# Patient Record
Sex: FEMALE | Race: WHITE | NOT HISPANIC OR LATINO | Employment: OTHER | URBAN - METROPOLITAN AREA
[De-identification: names, ages, dates, MRNs, and addresses within clinical notes are randomized per-mention and may not be internally consistent; named-entity substitution may affect disease eponyms.]

---

## 2017-01-05 ENCOUNTER — HOSPITAL ENCOUNTER (OUTPATIENT)
Dept: CT IMAGING | Facility: HOSPITAL | Age: 72
Discharge: HOME | End: 2017-01-05
Admitting: FAMILY MEDICINE

## 2017-01-05 DIAGNOSIS — J45.50 ASTHMATIC BRONCHITIS, SEVERE PERSISTENT, UNCOMPLICATED: ICD-10-CM

## 2017-01-05 PROCEDURE — 71250 CT THORAX DX C-: CPT

## 2017-01-18 DIAGNOSIS — K21.9 GASTROESOPHAGEAL REFLUX DISEASE, ESOPHAGITIS PRESENCE NOT SPECIFIED: ICD-10-CM

## 2017-01-18 RX ORDER — OMEPRAZOLE 40 MG/1
40 CAPSULE, DELAYED RELEASE ORAL DAILY
Qty: 90 CAPSULE | Refills: 3 | Status: SHIPPED | OUTPATIENT
Start: 2017-01-18 | End: 2018-01-14 | Stop reason: SDUPTHER

## 2017-01-27 ENCOUNTER — OFFICE VISIT (OUTPATIENT)
Dept: INTERNAL MEDICINE | Facility: CLINIC | Age: 72
End: 2017-01-27

## 2017-01-27 VITALS
HEART RATE: 72 BPM | RESPIRATION RATE: 16 BRPM | WEIGHT: 192 LBS | DIASTOLIC BLOOD PRESSURE: 74 MMHG | TEMPERATURE: 96.1 F | SYSTOLIC BLOOD PRESSURE: 156 MMHG | BODY MASS INDEX: 32.96 KG/M2

## 2017-01-27 DIAGNOSIS — E03.8 OTHER SPECIFIED HYPOTHYROIDISM: ICD-10-CM

## 2017-01-27 DIAGNOSIS — D50.0 IRON DEFICIENCY ANEMIA DUE TO CHRONIC BLOOD LOSS: Primary | ICD-10-CM

## 2017-01-27 DIAGNOSIS — E11.9 TYPE 2 DIABETES MELLITUS WITHOUT COMPLICATION, WITHOUT LONG-TERM CURRENT USE OF INSULIN (HCC): ICD-10-CM

## 2017-01-27 DIAGNOSIS — E78.5 HYPERLIPIDEMIA, UNSPECIFIED HYPERLIPIDEMIA TYPE: ICD-10-CM

## 2017-01-27 DIAGNOSIS — I10 BENIGN ESSENTIAL HYPERTENSION: ICD-10-CM

## 2017-01-27 PROCEDURE — 99214 OFFICE O/P EST MOD 30 MIN: CPT | Performed by: FAMILY MEDICINE

## 2017-01-27 RX ORDER — METOPROLOL SUCCINATE 50 MG/1
50 TABLET, EXTENDED RELEASE ORAL DAILY
Qty: 90 TABLET | Refills: 3 | Status: SHIPPED | OUTPATIENT
Start: 2017-01-27 | End: 2017-07-28 | Stop reason: DRUGHIGH

## 2017-01-27 RX ORDER — GLIPIZIDE 5 MG/1
5 TABLET, FILM COATED, EXTENDED RELEASE ORAL DAILY
Qty: 90 TABLET | Refills: 3 | Status: SHIPPED | OUTPATIENT
Start: 2017-01-27 | End: 2017-10-27

## 2017-01-27 NOTE — MR AVS SNAPSHOT
Yi Lee   1/27/2017 8:45 AM   Office Visit    Dept Phone:  [phone]   Encounter #:  [id number]    Provider:  Ankit Albert Jr., MD   Department:  [facility] INTERNAL MEDICINE                Your Full Care Plan              Today's Medication Changes          These changes are accurate as of: 1/27/17  9:32 AM.  If you have any questions, ask your nurse or doctor.               Medication(s)that have changed:     glipiZIDE 5 MG ER tablet   Commonly known as:  GLUCOTROL   Take 1 tablet by mouth Daily.   What changed:  See the new instructions.         Stop taking medication(s)listed here:     clarithromycin 500 MG tablet   Commonly known as:  BIAXIN           TESSALON PERLES 100 MG capsule   Generic drug:  benzonatate                Where to Get Your Medications      These medications were sent to [facility] [address] [place] [address] MAGGI JULIEN [place] [facility] [place] [phone] [facility] [phone]   [address] [place] [id number]     Phone:  [phone]     glipiZIDE 5 MG ER tablet    metFORMIN 500 MG tablet    metoprolol succinate XL 50 MG 24 hr tablet                  Your Updated Medication List          This list is accurate as of: 1/27/17  9:32 AM.  Always use your most recent med list.                aspirin 81 MG chewable tablet       donepezil 5 MG tablet   Commonly known as:  ARICEPT       FLUoxetine 20 MG capsule   Commonly known as:  PROzac       FREESTYLE LITE TEST VI       glipiZIDE 5 MG ER tablet   Commonly known as:  GLUCOTROL   Take 1 tablet by mouth Daily.       lamoTRIgine 200 MG tablet   Commonly known as:  LaMICtal       levothyroxine 100 MCG tablet   Commonly known as:  SYNTHROID, LEVOTHROID   Take 1 tablet by mouth Daily.       metFORMIN 500 MG tablet   Commonly known as:  GLUCOPHAGE   Take 2 tablets by mouth Daily With Breakfast.       metoprolol succinate XL 50 MG 24 hr tablet   Commonly known as:   TOPROL-XL   Take 1 tablet by mouth Daily.       omeprazole 40 MG capsule   Commonly known as:  priLOSEC   Take 1 capsule by mouth Daily.       polyethylene glycol packet   Commonly known as:  MIRALAX       pravastatin 40 MG tablet   Commonly known as:  PRAVACHOL   Take 1 tablet by mouth Daily.       PROVENTIL  (90 BASE) MCG/ACT inhaler   Generic drug:  albuterol       QUEtiapine  MG 24 hr tablet   Commonly known as:  SEROquel XR       Vitamin D3 2000 UNITS tablet               You Were Diagnosed With        Codes Comments    Other specified hypothyroidism     ICD-10-CM: E03.8  ICD-9-CM: 244.8     Hyperlipidemia, unspecified hyperlipidemia type     ICD-10-CM: E78.5  ICD-9-CM: 272.4     Benign essential hypertension     ICD-10-CM: I10  ICD-9-CM: 401.1     Type 2 diabetes mellitus without complication, without long-term current use of insulin     ICD-10-CM: E11.9  ICD-9-CM: 250.00       Instructions     None    Patient Instructions History      Upcoming Appointments     Visit Type Date Time Department    OFFICE VISIT 1/27/2017  8:45 AM [facility] 1 [id number]    FOLLOW UP 2/1/2017  9:45 AM [facility]      MyChart Signup     Our records indicate that you have declined [employer] signup. If you would like to sign up for [employer], please email [email or web address] or call [phone] to obtain an activation code.             Other Info from Your Visit           Your Appointments     Feb 01, 2017  9:45 AM EST   Follow Up with Eddie Grigsby MD   [facility] (--)    [address] [place] [id number] [phone]           Arrive 15 minutes prior to appointment.              Allergies     Morphine  Shortness Of Breath    Penicillins  Hives, Swelling    Ace Inhibitors  Cough    Telmisartan  Cough      Reason for Visit     Anemia     Pneumonia           Vital Signs     Blood Pressure Pulse Temperature Respirations Weight Body Mass Index     156/74 (BP Location: Left arm, Patient Position: Sitting, Cuff Size: Large Adult) 72 96.1 °F (35.6 °C) (Tympanic) 16 192 lb (87.1 kg) 32.96 kg/m2    Smoking Status                   Former Smoker           Problems and Diagnoses Noted     Benign essential hypertension    High cholesterol or triglycerides    Underactive thyroid    Type 2 diabetes mellitus without complication, without long-term current use of insulin

## 2017-02-01 ENCOUNTER — OFFICE VISIT (OUTPATIENT)
Dept: GASTROENTEROLOGY | Facility: CLINIC | Age: 72
End: 2017-02-01

## 2017-02-01 VITALS
SYSTOLIC BLOOD PRESSURE: 138 MMHG | WEIGHT: 191.4 LBS | HEIGHT: 65 IN | DIASTOLIC BLOOD PRESSURE: 80 MMHG | BODY MASS INDEX: 31.89 KG/M2

## 2017-02-01 DIAGNOSIS — K59.09 OTHER CONSTIPATION: ICD-10-CM

## 2017-02-01 DIAGNOSIS — R10.11 RIGHT UPPER QUADRANT ABDOMINAL PAIN: Primary | ICD-10-CM

## 2017-02-01 DIAGNOSIS — R10.31 RIGHT LOWER QUADRANT ABDOMINAL PAIN: ICD-10-CM

## 2017-02-01 DIAGNOSIS — D64.9 ANEMIA, UNSPECIFIED TYPE: ICD-10-CM

## 2017-02-01 PROCEDURE — 99214 OFFICE O/P EST MOD 30 MIN: CPT | Performed by: INTERNAL MEDICINE

## 2017-02-01 NOTE — PROGRESS NOTES
Chief Complaint   Patient presents with   • Abdominal Pain       History of Present Illness: We reviewed the results of the 11/16 colonoscopy. She takes the lactulose once daily and it works to help the constipation. No rectal bleeding or melena.  She has right sided abdominal discomfort x 1.5 yrs. She isn't sure what makes it worse and nothing makes it better. NO nausea or vomting. NO fevers, chills. No diarrhea. We reviewed the 6/16 CT abd/pelvis and the 10/16 labs.     Past Medical History   Diagnosis Date   • Abdominal pain, LLQ    • Arthralgia of hip 11/22/2015   • Asthma    • Ataxic gait 11/22/2015     Description: Eval Dr Lillian Mederos, Neuro.  Some vestibular dysfunction present 2013/2014   • Back pain 11/22/2015   • Bipolar I disorder, single manic episode    • Cardiac murmur    • Coronary artery disease    • Cystic kidney disease      right   • Degeneration, intervertebral disc, thoracolumbar    • Diverticulosis    • Esophageal spasm    • Fatigue    • H/O bone density study 10/17/2007     Dr. Giles   • Hemorrhoids    • History of mammogram      02/2012, per GYN Reshma Aranda   • History of Papanicolaou smear of cervix      DR. GILES GYN   • Impaired cognition 11/22/2015     Description: Testing done 05/14   • Memory loss 11/22/2015   • Optic nerve contusion    • Pain of lower extremity 11/22/2015   • Palpitations    • Vitamin B12 deficiency    • Vitamin D deficiency        Past Surgical History   Procedure Laterality Date   • Breast lumpectomy Left      benign   • Cholecystectomy     • Dilatation and curettage     • Esophagogastric fundoplasty     • Rotator cuff repair Right 2009   • Colonoscopy  2007     done 02/12, repeat 5 yrs, Dr Grigsby; tics in sigmoid colon, IH   • Colonoscopy N/A 11/3/2016     polyp, IH         Current Outpatient Prescriptions:   •  albuterol (PROVENTIL HFA) 108 (90 BASE) MCG/ACT inhaler, Inhale 2 puffs., Disp: , Rfl:   •  aspirin 81 MG chewable tablet, Chew 81 mg daily., Disp: , Rfl:    •  Cholecalciferol (VITAMIN D3) 2000 UNITS tablet, Take  by mouth., Disp: , Rfl:   •  donepezil (ARICEPT) 5 MG tablet, TK 1 T PO Q NIGHT, Disp: , Rfl: 1  •  FLUoxetine (PROzac) 20 MG capsule, TK ONE C PO QD, Disp: , Rfl: 1  •  glipiZIDE (GLUCOTROL) 5 MG ER tablet, Take 1 tablet by mouth Daily., Disp: 90 tablet, Rfl: 3  •  Glucose Blood (FREESTYLE LITE TEST VI), by In Vitro route. Use to test everyday as directed, Disp: , Rfl:   •  lamoTRIgine (LaMICtal) 200 MG tablet, TK 3 TS PO ONCE DAILY, Disp: , Rfl: 1  •  levothyroxine (SYNTHROID, LEVOTHROID) 100 MCG tablet, Take 1 tablet by mouth Daily., Disp: 90 tablet, Rfl: 3  •  metFORMIN (GLUCOPHAGE) 500 MG tablet, Take 2 tablets by mouth Daily With Breakfast., Disp: 180 tablet, Rfl: 3  •  metoprolol succinate XL (TOPROL-XL) 50 MG 24 hr tablet, Take 1 tablet by mouth Daily., Disp: 90 tablet, Rfl: 3  •  omeprazole (priLOSEC) 40 MG capsule, Take 1 capsule by mouth Daily., Disp: 90 capsule, Rfl: 3  •  polyethylene glycol (GLYCOLAX) packet, Take 17 g by mouth daily. Use as directed, Disp: , Rfl:   •  pravastatin (PRAVACHOL) 40 MG tablet, Take 1 tablet by mouth Daily., Disp: 90 tablet, Rfl: 3  •  QUEtiapine XR (SEROquel XR) 150 MG 24 hr tablet, 150 mg every night., Disp: , Rfl:     Allergies   Allergen Reactions   • Morphine Shortness Of Breath   • Penicillins Hives and Swelling   • Ace Inhibitors Cough   • Telmisartan Cough       Family History   Problem Relation Age of Onset   • Colon polyps Father    • Hepatitis Other    • Coronary artery disease Other        Social History     Social History   • Marital status:      Spouse name: N/A   • Number of children: N/A   • Years of education: N/A     Occupational History   • Not on file.     Social History Main Topics   • Smoking status: Former Smoker     Packs/day: 2.00     Types: Cigarettes     Quit date: 2012   • Smokeless tobacco: Never Used   • Alcohol use No   • Drug use: No   • Sexual activity: No     Other Topics  "Concern   • Not on file     Social History Narrative       Review of Systems   Gastrointestinal: Positive for abdominal pain.   All other systems reviewed and are negative.      Vitals:    02/01/17 0951   BP: 138/80       Physical Exam   Constitutional: She is oriented to person, place, and time. She appears well-developed and well-nourished. No distress.   HENT:   Head: Normocephalic and atraumatic. Hair is normal.   Right Ear: Hearing, tympanic membrane, external ear and ear canal normal. No drainage. No decreased hearing is noted.   Left Ear: Hearing, tympanic membrane, external ear and ear canal normal. No decreased hearing is noted.   Nose: No nasal deformity.   Mouth/Throat: Oropharynx is clear and moist.   Eyes: Conjunctivae, EOM and lids are normal. Pupils are equal, round, and reactive to light. Right eye exhibits no discharge. Left eye exhibits no discharge.   Neck: Normal range of motion. Neck supple. No JVD present. No tracheal deviation present. No thyromegaly present.   Cardiovascular: Normal rate, regular rhythm, normal heart sounds, intact distal pulses and normal pulses.  Exam reveals no gallop and no friction rub.    No murmur heard.  Pulmonary/Chest: Effort normal and breath sounds normal. No respiratory distress. She has no wheezes. She has no rales. She exhibits no tenderness.   Abdominal: Soft. Bowel sounds are normal. She exhibits no distension and no mass. There is no tenderness. There is no rebound and no guarding. No hernia.   Genitourinary:   Genitourinary Comments: She didn't want another rectal exam because \"you did one the last time\".   Musculoskeletal: Normal range of motion. She exhibits no edema, tenderness or deformity.   Lymphadenopathy:     She has no cervical adenopathy.   Neurological: She is alert and oriented to person, place, and time. She has normal reflexes. She displays normal reflexes. No cranial nerve deficit. She exhibits normal muscle tone. Coordination normal.   Skin: " Skin is warm and dry. No rash noted. She is not diaphoretic. No erythema.   Psychiatric: She has a normal mood and affect. Her behavior is normal. Judgment and thought content normal.   Vitals reviewed.      Yi was seen today for abdominal pain.    Diagnoses and all orders for this visit:    Right upper quadrant abdominal pain  -     FL Small Bowel Follow Through; Future  -     Urinalysis With / Culture If Indicated  -     TSH  -     CBC & Differential  -     Comprehensive Metabolic Panel  -     Reticulocytes  -     Iron and TIBC  -     Ferritin  -     Vitamin B12  -     Folate RBC    Right lower quadrant abdominal pain  -     FL Small Bowel Follow Through; Future  -     Urinalysis With / Culture If Indicated  -     TSH  -     CBC & Differential  -     Comprehensive Metabolic Panel  -     Reticulocytes  -     Iron and TIBC  -     Ferritin  -     Vitamin B12  -     Folate RBC    Other constipation  -     FL Small Bowel Follow Through; Future  -     Urinalysis With / Culture If Indicated  -     TSH  -     CBC & Differential  -     Comprehensive Metabolic Panel  -     Reticulocytes  -     Iron and TIBC  -     Ferritin  -     Vitamin B12  -     Folate RBC    Anemia, unspecified type  -     Urinalysis With / Culture If Indicated  -     TSH  -     CBC & Differential  -     Comprehensive Metabolic Panel  -     Reticulocytes  -     Iron and TIBC  -     Ferritin  -     Vitamin B12  -     Folate RBC     Assessment:  1) h/o colonic adenomas.  2) Right sided abdominal pain  3) Anemia    Recommendations:  1) Repeat colonscopy in 5 yrs.   2) SBFT  3) Labs: CBC, anemia labs, UA, CMP  4) f/u 6 weeks.      No Follow-up on file.

## 2017-02-04 LAB
ALBUMIN SERPL-MCNC: 4.3 G/DL (ref 3.5–5.2)
ALBUMIN/GLOB SERPL: 1.6 G/DL
ALP SERPL-CCNC: 82 U/L (ref 39–117)
ALT SERPL-CCNC: 18 U/L (ref 1–33)
APPEARANCE UR: CLEAR
AST SERPL-CCNC: 19 U/L (ref 1–32)
BACTERIA #/AREA URNS HPF: ABNORMAL /HPF
BACTERIA UR CULT: ABNORMAL
BASOPHILS # BLD AUTO: 0.03 10*3/MM3 (ref 0–0.2)
BASOPHILS NFR BLD AUTO: 0.5 % (ref 0–1.5)
BILIRUB SERPL-MCNC: <0.2 MG/DL (ref 0.1–1.2)
BILIRUB UR QL STRIP: NEGATIVE
BUN SERPL-MCNC: 15 MG/DL (ref 8–23)
BUN/CREAT SERPL: 18.1 (ref 7–25)
CALCIUM SERPL-MCNC: 9.8 MG/DL (ref 8.6–10.5)
CHLORIDE SERPL-SCNC: 104 MMOL/L (ref 98–107)
CO2 SERPL-SCNC: 25.4 MMOL/L (ref 22–29)
COLOR UR: YELLOW
CREAT SERPL-MCNC: 0.83 MG/DL (ref 0.57–1)
EOSINOPHIL # BLD AUTO: 0.38 10*3/MM3 (ref 0–0.7)
EOSINOPHIL NFR BLD AUTO: 5.9 % (ref 0.3–6.2)
EPI CELLS #/AREA URNS HPF: ABNORMAL /HPF
ERYTHROCYTE [DISTWIDTH] IN BLOOD BY AUTOMATED COUNT: 15 % (ref 11.7–13)
FERRITIN SERPL-MCNC: 14.94 NG/ML (ref 13–150)
FOLATE BLD-MCNC: 538.1 NG/ML
FOLATE RBC-MCNC: 1520 NG/ML
GLOBULIN SER CALC-MCNC: 2.7 GM/DL
GLUCOSE SERPL-MCNC: 139 MG/DL (ref 65–99)
GLUCOSE UR QL: NEGATIVE
HCT VFR BLD AUTO: 35.4 % (ref 35.6–45.5)
HGB BLD-MCNC: 10.7 G/DL (ref 11.9–15.5)
HGB UR QL STRIP: NEGATIVE
IMM GRANULOCYTES # BLD: 0.02 10*3/MM3 (ref 0–0.03)
IMM GRANULOCYTES NFR BLD: 0.3 % (ref 0–0.5)
IRON SATN MFR SERPL: 5 % (ref 20–50)
IRON SERPL-MCNC: 25 MCG/DL (ref 37–145)
KETONES UR QL STRIP: NEGATIVE
LEUKOCYTE ESTERASE UR QL STRIP: NEGATIVE
LYMPHOCYTES # BLD AUTO: 1.44 10*3/MM3 (ref 0.9–4.8)
LYMPHOCYTES NFR BLD AUTO: 22.5 % (ref 19.6–45.3)
MCH RBC QN AUTO: 26.2 PG (ref 26.9–32)
MCHC RBC AUTO-ENTMCNC: 30.2 G/DL (ref 32.4–36.3)
MCV RBC AUTO: 86.8 FL (ref 80.5–98.2)
MICRO URNS: ABNORMAL
MICRO URNS: ABNORMAL
MONOCYTES # BLD AUTO: 0.67 10*3/MM3 (ref 0.2–1.2)
MONOCYTES NFR BLD AUTO: 10.5 % (ref 5–12)
MUCOUS THREADS URNS QL MICRO: PRESENT /HPF
NEUTROPHILS # BLD AUTO: 3.87 10*3/MM3 (ref 1.9–8.1)
NEUTROPHILS NFR BLD AUTO: 60.3 % (ref 42.7–76)
NITRITE UR QL STRIP: NEGATIVE
OTHER ANTIBIOTIC SUSC ISLT: ABNORMAL
PH UR STRIP: 5.5 [PH] (ref 5–7.5)
PLATELET # BLD AUTO: 382 10*3/MM3 (ref 140–500)
POTASSIUM SERPL-SCNC: 5 MMOL/L (ref 3.5–5.2)
PROT SERPL-MCNC: 7 G/DL (ref 6–8.5)
PROT UR QL STRIP: ABNORMAL
RBC # BLD AUTO: 4.08 10*6/MM3 (ref 3.9–5.2)
RBC #/AREA URNS HPF: ABNORMAL /HPF
RETICS/RBC NFR AUTO: 1.21 % (ref 0.5–1.5)
SODIUM SERPL-SCNC: 141 MMOL/L (ref 136–145)
SP GR UR: >=1.03 (ref 1–1.03)
TIBC SERPL-MCNC: 484 MCG/DL
TSH SERPL DL<=0.005 MIU/L-ACNC: 1.97 MIU/ML (ref 0.27–4.2)
UIBC SERPL-MCNC: 459 MCG/DL
URINALYSIS REFLEX: ABNORMAL
UROBILINOGEN UR STRIP-MCNC: 0.2 MG/DL (ref 0.2–1)
VIT B12 SERPL-MCNC: 405 PG/ML (ref 211–946)
WBC # BLD AUTO: 6.41 10*3/MM3 (ref 4.5–10.7)
WBC #/AREA URNS HPF: ABNORMAL /HPF

## 2017-02-05 NOTE — PROGRESS NOTES
Tell her that she has iron deficiency anemia. I recommend that she take OTC iron sulfate pills 325 mg, one pill BID x a few months to build up her blood count.  Also her UA shows some WBC's and some RBC's but the urine culture grew very little. Have her go by her PCP's office to have a urinalysis rechecked. cheryl

## 2017-02-06 ENCOUNTER — TELEPHONE (OUTPATIENT)
Dept: GASTROENTEROLOGY | Facility: CLINIC | Age: 72
End: 2017-02-06

## 2017-02-06 NOTE — TELEPHONE ENCOUNTER
----- Message from Eddie Grigsby MD sent at 2/5/2017  4:15 PM EST -----  Tell her that she has iron deficiency anemia. I recommend that she take OTC iron sulfate pills 325 mg, one pill BID x a few months to build up her blood count.  Also her UA shows some WBC's and some RBC's but the urine culture grew very little. Have her go by her PCP's office to have a urinalysis rechecked. [email or web address]

## 2017-02-14 ENCOUNTER — HOSPITAL ENCOUNTER (OUTPATIENT)
Dept: GENERAL RADIOLOGY | Facility: HOSPITAL | Age: 72
Discharge: HOME | End: 2017-02-14
Attending: INTERNAL MEDICINE | Admitting: INTERNAL MEDICINE

## 2017-02-14 DIAGNOSIS — R10.11 RIGHT UPPER QUADRANT ABDOMINAL PAIN: ICD-10-CM

## 2017-02-14 DIAGNOSIS — R10.31 RIGHT LOWER QUADRANT ABDOMINAL PAIN: ICD-10-CM

## 2017-02-14 DIAGNOSIS — K59.09 OTHER CONSTIPATION: ICD-10-CM

## 2017-02-14 PROCEDURE — 74250 X-RAY XM SM INT 1CNTRST STD: CPT

## 2017-02-20 ENCOUNTER — TELEPHONE (OUTPATIENT)
Dept: GASTROENTEROLOGY | Facility: CLINIC | Age: 72
End: 2017-02-20

## 2017-03-25 ENCOUNTER — LAB (OUTPATIENT)
Dept: LAB | Facility: HOSPITAL | Age: 72
End: 2017-03-25
Attending: ORTHOPAEDIC SURGERY

## 2017-03-25 ENCOUNTER — HOSPITAL ENCOUNTER (OUTPATIENT)
Dept: GENERAL RADIOLOGY | Facility: HOSPITAL | Age: 72
Discharge: HOME | End: 2017-03-25
Attending: ORTHOPAEDIC SURGERY | Admitting: ORTHOPAEDIC SURGERY

## 2017-03-25 ENCOUNTER — TRANSCRIBE ORDERS (OUTPATIENT)
Dept: LAB | Facility: HOSPITAL | Age: 72
End: 2017-03-25

## 2017-03-25 DIAGNOSIS — Z01.818 PRE-OP TESTING: ICD-10-CM

## 2017-03-25 DIAGNOSIS — S83.105A DISLOCATION OF LEFT KNEE WITH MEDIAL MENISCUS TEAR, INITIAL ENCOUNTER: ICD-10-CM

## 2017-03-25 DIAGNOSIS — I15.2 HYPERTENSION ASSOCIATED WITH DIABETES (HCC): ICD-10-CM

## 2017-03-25 DIAGNOSIS — S83.242A DISLOCATION OF LEFT KNEE WITH MEDIAL MENISCUS TEAR, INITIAL ENCOUNTER: ICD-10-CM

## 2017-03-25 DIAGNOSIS — Z01.818 PRE-OP TESTING: Primary | ICD-10-CM

## 2017-03-25 DIAGNOSIS — E11.59 HYPERTENSION ASSOCIATED WITH DIABETES (HCC): ICD-10-CM

## 2017-03-25 LAB
ALBUMIN SERPL-MCNC: 3.8 G/DL (ref 3.5–5.2)
ALBUMIN/GLOB SERPL: 1.1 G/DL
ALP SERPL-CCNC: 81 U/L (ref 39–117)
ALT SERPL W P-5'-P-CCNC: 16 U/L (ref 1–33)
ANION GAP SERPL CALCULATED.3IONS-SCNC: 12.8 MMOL/L
AST SERPL-CCNC: 15 U/L (ref 1–32)
BASOPHILS # BLD AUTO: 0.03 10*3/MM3 (ref 0–0.2)
BASOPHILS NFR BLD AUTO: 0.5 % (ref 0–1.5)
BILIRUB SERPL-MCNC: 0.2 MG/DL (ref 0.1–1.2)
BUN BLD-MCNC: 15 MG/DL (ref 8–23)
BUN/CREAT SERPL: 16.7 (ref 7–25)
CALCIUM SPEC-SCNC: 10.5 MG/DL (ref 8.6–10.5)
CHLORIDE SERPL-SCNC: 102 MMOL/L (ref 98–107)
CO2 SERPL-SCNC: 25.2 MMOL/L (ref 22–29)
CREAT BLD-MCNC: 0.9 MG/DL (ref 0.57–1)
DEPRECATED RDW RBC AUTO: 52.4 FL (ref 37–54)
EOSINOPHIL # BLD AUTO: 0.24 10*3/MM3 (ref 0–0.7)
EOSINOPHIL NFR BLD AUTO: 4.1 % (ref 0.3–6.2)
ERYTHROCYTE [DISTWIDTH] IN BLOOD BY AUTOMATED COUNT: 16.3 % (ref 11.7–13)
GFR SERPL CREATININE-BSD FRML MDRD: 62 ML/MIN/1.73
GLOBULIN UR ELPH-MCNC: 3.4 GM/DL
GLUCOSE BLD-MCNC: 144 MG/DL (ref 65–99)
HCT VFR BLD AUTO: 38.3 % (ref 35.6–45.5)
HGB BLD-MCNC: 12 G/DL (ref 11.9–15.5)
IMM GRANULOCYTES # BLD: 0 10*3/MM3 (ref 0–0.03)
IMM GRANULOCYTES NFR BLD: 0 % (ref 0–0.5)
LYMPHOCYTES # BLD AUTO: 1.32 10*3/MM3 (ref 0.9–4.8)
LYMPHOCYTES NFR BLD AUTO: 22.3 % (ref 19.6–45.3)
MCH RBC QN AUTO: 27.4 PG (ref 26.9–32)
MCHC RBC AUTO-ENTMCNC: 31.3 G/DL (ref 32.4–36.3)
MCV RBC AUTO: 87.4 FL (ref 80.5–98.2)
MONOCYTES # BLD AUTO: 0.72 10*3/MM3 (ref 0.2–1.2)
MONOCYTES NFR BLD AUTO: 12.2 % (ref 5–12)
NEUTROPHILS # BLD AUTO: 3.61 10*3/MM3 (ref 1.9–8.1)
NEUTROPHILS NFR BLD AUTO: 60.9 % (ref 42.7–76)
PLATELET # BLD AUTO: 296 10*3/MM3 (ref 140–500)
PMV BLD AUTO: 9.1 FL (ref 6–12)
POTASSIUM BLD-SCNC: 5.3 MMOL/L (ref 3.5–5.2)
PROT SERPL-MCNC: 7.2 G/DL (ref 6–8.5)
RBC # BLD AUTO: 4.38 10*6/MM3 (ref 3.9–5.2)
SODIUM BLD-SCNC: 140 MMOL/L (ref 136–145)
WBC NRBC COR # BLD: 5.92 10*3/MM3 (ref 4.5–10.7)

## 2017-03-25 PROCEDURE — 80053 COMPREHEN METABOLIC PANEL: CPT

## 2017-03-25 PROCEDURE — 85025 COMPLETE CBC W/AUTO DIFF WBC: CPT

## 2017-03-25 PROCEDURE — 36415 COLL VENOUS BLD VENIPUNCTURE: CPT

## 2017-03-25 PROCEDURE — 93010 ELECTROCARDIOGRAM REPORT: CPT | Performed by: INTERNAL MEDICINE

## 2017-03-25 PROCEDURE — 93005 ELECTROCARDIOGRAM TRACING: CPT | Performed by: ORTHOPAEDIC SURGERY

## 2017-03-25 PROCEDURE — 71020 HC CHEST PA AND LATERAL: CPT

## 2017-04-19 PROBLEM — I73.9 CLAUDICATION (HCC): Status: ACTIVE | Noted: 2017-03-13

## 2017-04-19 PROBLEM — I65.29 CAROTID ARTERY NARROWING: Status: ACTIVE | Noted: 2017-03-13

## 2017-07-28 ENCOUNTER — OFFICE VISIT (OUTPATIENT)
Dept: INTERNAL MEDICINE | Facility: CLINIC | Age: 72
End: 2017-07-28

## 2017-07-28 VITALS
WEIGHT: 201 LBS | TEMPERATURE: 96.9 F | BODY MASS INDEX: 33.97 KG/M2 | HEART RATE: 68 BPM | SYSTOLIC BLOOD PRESSURE: 148 MMHG | RESPIRATION RATE: 16 BRPM | DIASTOLIC BLOOD PRESSURE: 88 MMHG

## 2017-07-28 DIAGNOSIS — Z00.00 MEDICARE ANNUAL WELLNESS VISIT, SUBSEQUENT: ICD-10-CM

## 2017-07-28 DIAGNOSIS — E08.65 DIABETES MELLITUS DUE TO UNDERLYING CONDITION WITH HYPERGLYCEMIA, WITHOUT LONG-TERM CURRENT USE OF INSULIN (HCC): ICD-10-CM

## 2017-07-28 DIAGNOSIS — R41.3 MEMORY LOSS: ICD-10-CM

## 2017-07-28 DIAGNOSIS — R94.4 ABNORMAL CREATININE CLEARANCE GLOMERULAR FILTRATION: ICD-10-CM

## 2017-07-28 DIAGNOSIS — I10 BENIGN ESSENTIAL HYPERTENSION: ICD-10-CM

## 2017-07-28 DIAGNOSIS — E78.2 MIXED HYPERLIPIDEMIA: ICD-10-CM

## 2017-07-28 DIAGNOSIS — F32.1 MODERATE SINGLE CURRENT EPISODE OF MAJOR DEPRESSIVE DISORDER (HCC): ICD-10-CM

## 2017-07-28 DIAGNOSIS — D50.0 IRON DEFICIENCY ANEMIA DUE TO CHRONIC BLOOD LOSS: ICD-10-CM

## 2017-07-28 DIAGNOSIS — Z23 NEED FOR PNEUMOCOCCAL VACCINE: Primary | ICD-10-CM

## 2017-07-28 PROBLEM — Q61.9 CYSTIC KIDNEY DISEASE: Status: ACTIVE | Noted: 2017-07-28

## 2017-07-28 PROBLEM — E53.8 VITAMIN B12 DEFICIENCY: Status: ACTIVE | Noted: 2017-07-28

## 2017-07-28 PROCEDURE — 90670 PCV13 VACCINE IM: CPT | Performed by: FAMILY MEDICINE

## 2017-07-28 PROCEDURE — G0009 ADMIN PNEUMOCOCCAL VACCINE: HCPCS | Performed by: FAMILY MEDICINE

## 2017-07-28 PROCEDURE — G0439 PPPS, SUBSEQ VISIT: HCPCS | Performed by: FAMILY MEDICINE

## 2017-07-28 PROCEDURE — 99214 OFFICE O/P EST MOD 30 MIN: CPT | Performed by: FAMILY MEDICINE

## 2017-07-28 RX ORDER — MELOXICAM 15 MG/1
15 TABLET ORAL
COMMUNITY
Start: 2017-06-03 | End: 2019-12-26

## 2017-07-28 RX ORDER — METOPROLOL SUCCINATE 100 MG/1
100 TABLET, EXTENDED RELEASE ORAL DAILY
Qty: 90 TABLET | Refills: 1 | Status: SHIPPED | OUTPATIENT
Start: 2017-07-28 | End: 2018-03-26 | Stop reason: SDUPTHER

## 2017-07-28 RX ORDER — LANOLIN ALCOHOL/MO/W.PET/CERES
650 CREAM (GRAM) TOPICAL
COMMUNITY
End: 2022-01-01 | Stop reason: SDDI

## 2017-07-28 NOTE — PROGRESS NOTES
Subjective   Yi Lee is a 71 y.o. female.     Chief Complaint   Patient presents with   • Hypertension   • Hyperlipidemia   • Diabetes   • Memory Loss         History of Present Illness Yi comes in with a complex series of events.  She has had a colonoscopy with history of iron deficient anemia and removal of polyps.  Need to recheck her iron level she is on ferrous sulfate 325 twice a day.  She had colonoscopy.    Otherwise there is a history of lupus which is in remission.    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  The question is whether or not her pravastatin which is associated with a low risk of amnesia is actually affecting this.    She has had some hypoglycemia on glipizide.    Her blood pressures not adequate controlled on metoprolol XL 50 mg daily.    Her psychiatrist has been fired from her position and I'll refill her medications pending another psychiatrist.    The following portions of the patient's history were reviewed and updated as appropriate: allergies, current medications, past social history and problem list.    Review of Systems    Objective   Vitals:    07/28/17 0835   BP: 148/88   Pulse: 68   Resp: 16   Temp: 96.9 °F (36.1 °C)     Physical Exam    Assessment/Plan   Problem List Items Addressed This Visit        Cardiovascular and Mediastinum    Benign essential hypertension    Relevant Medications    metoprolol succinate XL (TOPROL-XL) 100 MG 24 hr tablet    Other Relevant Orders    CBC & Differential    Comprehensive Metabolic Panel    Urinalysis With / Microscopic If Indicated    Hemoglobin A1c    Lipid Panel With / Chol / HDL Ratio    TSH    T4, Free    T3, Free    Vitamin B12    Sedimentation Rate    EUGENIA    Hyperlipidemia    Relevant Orders    CBC & Differential    Comprehensive Metabolic Panel    Urinalysis With / Microscopic If Indicated    Hemoglobin A1c    Lipid Panel With / Chol / HDL Ratio    TSH    T4, Free     T3, Free    Vitamin B12    Sedimentation Rate    EUGENIA       Hematopoietic and Hemostatic    Iron deficiency anemia due to chronic blood loss    Relevant Medications    ferrous sulfate 325 (65 FE) MG EC tablet    Other Relevant Orders    CBC & Differential    Comprehensive Metabolic Panel    Urinalysis With / Microscopic If Indicated    Hemoglobin A1c    Lipid Panel With / Chol / HDL Ratio    TSH    T4, Free    T3, Free    Vitamin B12    Sedimentation Rate    EUGENIA       Other    Memory loss    Relevant Orders    CBC & Differential    Comprehensive Metabolic Panel    Urinalysis With / Microscopic If Indicated    Hemoglobin A1c    Lipid Panel With / Chol / HDL Ratio    TSH    T4, Free    T3, Free    Vitamin B12    Sedimentation Rate    EUGENIA    Abnormal creatinine clearance glomerular filtration    Relevant Orders    CBC & Differential    Comprehensive Metabolic Panel    Urinalysis With / Microscopic If Indicated    Hemoglobin A1c    Lipid Panel With / Chol / HDL Ratio    TSH    T4, Free    T3, Free    Vitamin B12    Sedimentation Rate    EUGENIA    Depression    Relevant Orders    CBC & Differential    Comprehensive Metabolic Panel    Urinalysis With / Microscopic If Indicated    Hemoglobin A1c    Lipid Panel With / Chol / HDL Ratio    TSH    T4, Free    T3, Free    Vitamin B12    Sedimentation Rate    EUGENIA      Other Visit Diagnoses     Need for pneumococcal vaccine    -  Primary    Relevant Orders    Pneumococcal Conjugate Vaccine 13-Valent All (PCV13) (Completed)    CBC & Differential    Comprehensive Metabolic Panel    Urinalysis With / Microscopic If Indicated    Hemoglobin A1c    Lipid Panel With / Chol / HDL Ratio    TSH    T4, Free    T3, Free    Vitamin B12    Sedimentation Rate    EUGENIA    Diabetes mellitus due to underlying condition with hyperglycemia, without long-term current use of insulin         Relevant Orders    Hemoglobin A1c    Medicare annual wellness visit, subsequent          Plan: Labs including iron  studies B12.  Return visit in 3 months.  I will glipizide due to hypoglycemia.    Increase metoprolol  mg daily.    Stop pravastatin for 3 months and see if this has any impact on amnesia disorientation and memory.

## 2017-07-28 NOTE — PROGRESS NOTES
QUICK REFERENCE INFORMATION:  The ABCs of the Annual Wellness Visit    Subsequent Medicare Wellness Visit    HEALTH RISK ASSESSMENT    1945    Recent Hospitalizations:  No hospitalization(s) within the last year..        Current Medical Providers:  Patient Care Team:  Ankit Albert Jr., MD as PCP - General (Family Medicine)  Ana Castañeda MD as PCP - Claims Attributed        Smoking Status:  History   Smoking Status   • Former Smoker   • Packs/day: 2.00   • Types: Cigarettes   • Quit date: 2012   Smokeless Tobacco   • Never Used       Alcohol Consumption:  History   Alcohol Use No       Depression Screen:   PHQ-9 Depression Screening 7/28/2017   Little interest or pleasure in doing things 0   Feeling down, depressed, or hopeless 1   Trouble falling or staying asleep, or sleeping too much 0   Feeling tired or having little energy 1   Poor appetite or overeating 0   Feeling bad about yourself - or that you are a failure or have let yourself or your family down 0   Trouble concentrating on things, such as reading the newspaper or watching television 1   Moving or speaking so slowly that other people could have noticed. Or the opposite - being so fidgety or restless that you have been moving around a lot more than usual 0   Thoughts that you would be better off dead, or of hurting yourself in some way 0   PHQ-9 Total Score 3   If you checked off any problems, how difficult have these problems made it for you to do your work, take care of things at home, or get along with other people? Not difficult at all       Health Habits and Functional and Cognitive Screening:  Functional & Cognitive Status 7/28/2017   Do you have difficulty preparing food and eating? No   Do you have difficulty bathing yourself? No   Do you have difficulty getting dressed? No   Do you have difficulty using the toilet? No   Do you have difficulty moving around from place to place? No   In the past year have you fallen or experienced a near fall?  No   Do you need help using the phone?  No   Are you deaf or do you have serious difficulty hearing?  No   Do you need help with transportation? No   Do you need help shopping? No   Do you need help preparing meals?  No   Do you need help with housework?  No   Do you need help with laundry? No   Do you need help taking your medications? No   Do you need help managing money? No   Do you have difficulty concentrating, remembering or making decisions? No       Health Habits  Current Diet: Diabetic Diet  Dental Exam: Not up to date  Eye Exam: Not up to date  Exercise (times per week): 5 times per week  Current Exercise Activities Include: Yard Work      Does the patient have evidence of cognitive impairment? Yes    Aspirin use counseling: Taking ASA appropriately as indicated      Recent Lab Results:  CMP:  Lab Results   Component Value Date     (H) 02/01/2017    BUN 15 03/25/2017    CREATININE 0.90 03/25/2017    EGFRIFNONA 62 03/25/2017    EGFRIFAFRI 82 02/01/2017    BCR 16.7 03/25/2017     03/25/2017    K 5.3 (H) 03/25/2017    CO2 25.2 03/25/2017    CALCIUM 10.5 03/25/2017    PROTENTOTREF 7.0 02/01/2017    ALBUMIN 3.80 03/25/2017    LABGLOBREF 2.7 02/01/2017    LABIL2 1.1 03/25/2017    BILITOT 0.2 03/25/2017    ALKPHOS 81 03/25/2017    AST 15 03/25/2017    ALT 16 03/25/2017     Lipid Panel:  Lab Results   Component Value Date    TRIG 90 10/27/2016    HDL 53 10/27/2016    VLDL 18 10/27/2016    LDLHDL 1.6 12/28/2015     HbA1c:  Lab Results   Component Value Date    HGBA1C 6.5 (H) 10/27/2016       Visual Acuity:  No exam data present    Age-appropriate Screening Schedule:  Refer to the list below for future screening recommendations based on patient's age, sex and/or medical conditions. Orders for these recommended tests are listed in the plan section. The patient has been provided with a written plan.    Health Maintenance   Topic Date Due   • DIABETIC EYE EXAM  12/28/2015   • PNEUMOCOCCAL VACCINES (65+  LOW/MEDIUM RISK) (2 of 2 - PPSV23) 05/27/2016   • URINE MICROALBUMIN  04/25/2017   • HEMOGLOBIN A1C  04/27/2017   • INFLUENZA VACCINE  08/01/2017   • DIABETIC FOOT EXAM  08/03/2017   • LIPID PANEL  10/27/2017   • COLONOSCOPY  11/03/2021   • TDAP/TD VACCINES (3 - Td) 08/03/2026   • ZOSTER VACCINE  Completed   • DXA SCAN  Excluded        Subjective   History of Present Illness    Yi Lee is a 71 y.o. female who presents for an Subsequent Wellness Visit.    The following portions of the patient's history were reviewed and updated as appropriate: allergies, current medications, past family history, past medical history, past social history, past surgical history and problem list.    Outpatient Medications Prior to Visit   Medication Sig Dispense Refill   • albuterol (PROVENTIL HFA) 108 (90 BASE) MCG/ACT inhaler Inhale 2 puffs.     • aspirin 81 MG chewable tablet Chew 81 mg daily.     • Cholecalciferol (VITAMIN D3) 2000 UNITS tablet Take  by mouth.     • donepezil (ARICEPT) 5 MG tablet TK 1 T PO Q NIGHT  1   • FLUoxetine (PROzac) 20 MG capsule TK ONE C PO QD  1   • glipiZIDE (GLUCOTROL) 5 MG ER tablet Take 1 tablet by mouth Daily. 90 tablet 3   • Glucose Blood (FREESTYLE LITE TEST VI) by In Vitro route. Use to test everyday as directed     • lamoTRIgine (LaMICtal) 200 MG tablet TK 3 TS PO ONCE DAILY  1   • levothyroxine (SYNTHROID, LEVOTHROID) 100 MCG tablet Take 1 tablet by mouth Daily. 90 tablet 3   • metFORMIN (GLUCOPHAGE) 500 MG tablet Take 2 tablets by mouth Daily With Breakfast. 180 tablet 3   • metoprolol succinate XL (TOPROL-XL) 50 MG 24 hr tablet Take 1 tablet by mouth Daily. 90 tablet 3   • omeprazole (priLOSEC) 40 MG capsule Take 1 capsule by mouth Daily. 90 capsule 3   • polyethylene glycol (GLYCOLAX) packet Take 17 g by mouth daily. Use as directed     • pravastatin (PRAVACHOL) 40 MG tablet Take 1 tablet by mouth Daily. 90 tablet 3   • QUEtiapine XR (SEROquel XR) 150 MG 24 hr tablet 150 mg every night.        No facility-administered medications prior to visit.        Patient Active Problem List   Diagnosis   • Abnormal creatinine clearance glomerular filtration   • Benign essential hypertension   • Chronic obstructive pulmonary disease   • Depression   • Type 2 diabetes mellitus with diabetic neuropathy   • Gastroesophageal reflux disease   • Hyperlipidemia   • Hypothyroidism   • Obesity (BMI 30-39.9)   • Memory loss   • Ataxic gait   • Abdominal pain, right lateral   • Iron deficiency anemia due to chronic blood loss   • Cystic kidney disease   • Vitamin B12 deficiency       Advance Care Planning:  has an advance directive - a copy has been provided and is in file    Identification of Risk Factors:  Risk factors include: cardiovascular risk and depression.    Review of Systems    Compared to one year ago, the patient feels her physical health is better.  Compared to one year ago, the patient feels her mental health is the same.    Objective     Physical Exam    Vitals:    07/28/17 0835   BP: 148/88   BP Location: Left arm   Patient Position: Sitting   Cuff Size: Large Adult   Pulse: 68   Resp: 16   Temp: 96.9 °F (36.1 °C)   TempSrc: Tympanic   Weight: 201 lb (91.2 kg)   PainSc: 0-No pain       Body mass index is 33.97 kg/(m^2).  Discussed the patient's BMI with her. The BMI is above average; BMI management plan is completed.    Assessment/Plan   Patient Self-Management and Personalized Health Advice  The patient has been provided with information about: prevention of cardiac or vascular disease and mental health concerns and preventive services including:   · Counseling for cardiovascular disease risk reduction.    Visit Diagnoses:    ICD-10-CM ICD-9-CM   1. Need for pneumococcal vaccine Z23 V03.82       Orders Placed This Encounter   Procedures   • Pneumococcal Conjugate Vaccine 13-Valent All (PCV13)       Outpatient Encounter Prescriptions as of 7/28/2017   Medication Sig Dispense Refill   • albuterol  (PROVENTIL HFA) 108 (90 BASE) MCG/ACT inhaler Inhale 2 puffs.     • aspirin 81 MG chewable tablet Chew 81 mg daily.     • Cholecalciferol (VITAMIN D3) 2000 UNITS tablet Take  by mouth.     • donepezil (ARICEPT) 5 MG tablet TK 1 T PO Q NIGHT  1   • ferrous sulfate 325 (65 FE) MG EC tablet Take 325 mg by mouth.     • FLUoxetine (PROzac) 20 MG capsule TK ONE C PO QD  1   • glipiZIDE (GLUCOTROL) 5 MG ER tablet Take 1 tablet by mouth Daily. 90 tablet 3   • Glucose Blood (FREESTYLE LITE TEST VI) by In Vitro route. Use to test everyday as directed     • lamoTRIgine (LaMICtal) 200 MG tablet TK 3 TS PO ONCE DAILY  1   • levothyroxine (SYNTHROID, LEVOTHROID) 100 MCG tablet Take 1 tablet by mouth Daily. 90 tablet 3   • meloxicam (MOBIC) 15 MG tablet Take 15 mg by mouth.     • metFORMIN (GLUCOPHAGE) 500 MG tablet Take 2 tablets by mouth Daily With Breakfast. 180 tablet 3   • metoprolol succinate XL (TOPROL-XL) 50 MG 24 hr tablet Take 1 tablet by mouth Daily. 90 tablet 3   • omeprazole (priLOSEC) 40 MG capsule Take 1 capsule by mouth Daily. 90 capsule 3   • polyethylene glycol (GLYCOLAX) packet Take 17 g by mouth daily. Use as directed     • pravastatin (PRAVACHOL) 40 MG tablet Take 1 tablet by mouth Daily. 90 tablet 3   • QUEtiapine XR (SEROquel XR) 150 MG 24 hr tablet 150 mg every night.       No facility-administered encounter medications on file as of 7/28/2017.        Reviewed use of high risk medication in the elderly: yes  Reviewed for potential of harmful drug interactions in the elderly: yes    Follow Up:  No Follow-up on file.     An After Visit Summary and PPPS with all of these plans were given to the patient.

## 2017-07-31 LAB
ALBUMIN SERPL-MCNC: 4.3 G/DL (ref 3.5–5.2)
ALBUMIN/GLOB SERPL: 1.5 G/DL
ALP SERPL-CCNC: 98 U/L (ref 39–117)
ALT SERPL-CCNC: 14 U/L (ref 1–33)
ANA SER QL: NEGATIVE
APPEARANCE UR: CLEAR
AST SERPL-CCNC: 12 U/L (ref 1–32)
BACTERIA #/AREA URNS HPF: ABNORMAL /HPF
BASOPHILS # BLD AUTO: 0.04 10*3/MM3 (ref 0–0.2)
BASOPHILS NFR BLD AUTO: 0.6 % (ref 0–1.5)
BILIRUB SERPL-MCNC: 0.2 MG/DL (ref 0.1–1.2)
BILIRUB UR QL STRIP: NEGATIVE
BUN SERPL-MCNC: 17 MG/DL (ref 8–23)
BUN/CREAT SERPL: 15.7 (ref 7–25)
CALCIUM SERPL-MCNC: 10.1 MG/DL (ref 8.6–10.5)
CASTS URNS MICRO: ABNORMAL
CHLORIDE SERPL-SCNC: 102 MMOL/L (ref 98–107)
CHOLEST SERPL-MCNC: 173 MG/DL (ref 0–200)
CHOLEST/HDLC SERPL: 2.34 {RATIO}
CO2 SERPL-SCNC: 25.8 MMOL/L (ref 22–29)
COLOR UR: YELLOW
CREAT SERPL-MCNC: 1.08 MG/DL (ref 0.57–1)
EOSINOPHIL # BLD AUTO: 0.2 10*3/MM3 (ref 0–0.7)
EOSINOPHIL NFR BLD AUTO: 3.1 % (ref 0.3–6.2)
EPI CELLS #/AREA URNS HPF: ABNORMAL /HPF
ERYTHROCYTE [DISTWIDTH] IN BLOOD BY AUTOMATED COUNT: 14.5 % (ref 11.7–13)
ERYTHROCYTE [SEDIMENTATION RATE] IN BLOOD BY WESTERGREN METHOD: 31 MM/HR (ref 0–30)
FERRITIN SERPL-MCNC: 41.71 NG/ML (ref 13–150)
GLOBULIN SER CALC-MCNC: 2.8 GM/DL
GLUCOSE SERPL-MCNC: 140 MG/DL (ref 65–99)
GLUCOSE UR QL: NEGATIVE
HBA1C MFR BLD: 6.3 % (ref 4.8–5.6)
HCT VFR BLD AUTO: 38.2 % (ref 35.6–45.5)
HDLC SERPL-MCNC: 74 MG/DL (ref 40–60)
HGB BLD-MCNC: 11.8 G/DL (ref 11.9–15.5)
HGB UR QL STRIP: ABNORMAL
IMM GRANULOCYTES # BLD: 0.02 10*3/MM3 (ref 0–0.03)
IMM GRANULOCYTES NFR BLD: 0.3 % (ref 0–0.5)
IRON SATN MFR SERPL: 15 % (ref 20–50)
IRON SERPL-MCNC: 63 MCG/DL (ref 37–145)
KETONES UR QL STRIP: NEGATIVE
LDLC SERPL CALC-MCNC: 86 MG/DL (ref 0–100)
LEUKOCYTE ESTERASE UR QL STRIP: NEGATIVE
LYMPHOCYTES # BLD AUTO: 1.4 10*3/MM3 (ref 0.9–4.8)
LYMPHOCYTES NFR BLD AUTO: 21.5 % (ref 19.6–45.3)
MCH RBC QN AUTO: 29.5 PG (ref 26.9–32)
MCHC RBC AUTO-ENTMCNC: 30.9 G/DL (ref 32.4–36.3)
MCV RBC AUTO: 95.5 FL (ref 80.5–98.2)
MONOCYTES # BLD AUTO: 0.7 10*3/MM3 (ref 0.2–1.2)
MONOCYTES NFR BLD AUTO: 10.8 % (ref 5–12)
NEUTROPHILS # BLD AUTO: 4.15 10*3/MM3 (ref 1.9–8.1)
NEUTROPHILS NFR BLD AUTO: 63.7 % (ref 42.7–76)
NITRITE UR QL STRIP: NEGATIVE
PH UR STRIP: 6 [PH] (ref 5–8)
PLATELET # BLD AUTO: 345 10*3/MM3 (ref 140–500)
POTASSIUM SERPL-SCNC: 5.7 MMOL/L (ref 3.5–5.2)
PROT SERPL-MCNC: 7.1 G/DL (ref 6–8.5)
PROT UR QL STRIP: NEGATIVE
RBC # BLD AUTO: 4 10*6/MM3 (ref 3.9–5.2)
RBC #/AREA URNS HPF: ABNORMAL /HPF
SODIUM SERPL-SCNC: 141 MMOL/L (ref 136–145)
SP GR UR: 1.02 (ref 1–1.03)
T3FREE SERPL-MCNC: 2 PG/ML (ref 2–4.4)
T4 FREE SERPL-MCNC: 1.21 NG/DL (ref 0.93–1.7)
TIBC SERPL-MCNC: 409 MCG/DL
TRIGL SERPL-MCNC: 65 MG/DL (ref 0–150)
TSH SERPL DL<=0.005 MIU/L-ACNC: 1.53 MIU/ML (ref 0.27–4.2)
UIBC SERPL-MCNC: 346 MCG/DL
UROBILINOGEN UR STRIP-MCNC: ABNORMAL MG/DL
VIT B12 SERPL-MCNC: 353 PG/ML (ref 211–946)
VLDLC SERPL CALC-MCNC: 13 MG/DL (ref 5–40)
WBC # BLD AUTO: 6.51 10*3/MM3 (ref 4.5–10.7)
WBC #/AREA URNS HPF: ABNORMAL /HPF

## 2017-10-27 ENCOUNTER — OFFICE VISIT (OUTPATIENT)
Dept: INTERNAL MEDICINE | Facility: CLINIC | Age: 72
End: 2017-10-27

## 2017-10-27 VITALS
OXYGEN SATURATION: 98 % | TEMPERATURE: 97.6 F | HEART RATE: 74 BPM | DIASTOLIC BLOOD PRESSURE: 78 MMHG | WEIGHT: 198 LBS | BODY MASS INDEX: 33.46 KG/M2 | SYSTOLIC BLOOD PRESSURE: 178 MMHG

## 2017-10-27 DIAGNOSIS — E03.8 OTHER SPECIFIED HYPOTHYROIDISM: ICD-10-CM

## 2017-10-27 DIAGNOSIS — E11.40 TYPE 2 DIABETES MELLITUS WITH DIABETIC NEUROPATHY, WITHOUT LONG-TERM CURRENT USE OF INSULIN (HCC): ICD-10-CM

## 2017-10-27 DIAGNOSIS — I10 BENIGN ESSENTIAL HYPERTENSION: Primary | ICD-10-CM

## 2017-10-27 DIAGNOSIS — R41.3 MEMORY LOSS: ICD-10-CM

## 2017-10-27 DIAGNOSIS — E78.2 MIXED HYPERLIPIDEMIA: ICD-10-CM

## 2017-10-27 DIAGNOSIS — R94.4 ABNORMAL CREATININE CLEARANCE GLOMERULAR FILTRATION: ICD-10-CM

## 2017-10-27 PROCEDURE — 99214 OFFICE O/P EST MOD 30 MIN: CPT | Performed by: FAMILY MEDICINE

## 2017-10-27 RX ORDER — LACTULOSE 10 G/15ML
SOLUTION ORAL
COMMUNITY
Start: 2017-09-29 | End: 2017-12-07 | Stop reason: SDUPTHER

## 2017-10-27 RX ORDER — HYDROCHLOROTHIAZIDE 12.5 MG/1
12.5 CAPSULE, GELATIN COATED ORAL DAILY
Qty: 90 CAPSULE | Refills: 3 | Status: SHIPPED | OUTPATIENT
Start: 2017-10-27 | End: 2018-10-23 | Stop reason: SDUPTHER

## 2017-10-27 NOTE — PROGRESS NOTES
Subjective   Yi Lee is a 71 y.o. female.     Chief Complaint   Patient presents with   • Diabetes   • Hyperlipidemia   • Hypertension         History of Present Illness   Yi feels well last visit we discussed memory loss and she seems to be better off statins.  Will given recheck her lipid panel.  Otherwise she is off glipizide because of hypoglycemia and previous disorientation amnesia did not responded to omeprazole which she is off of his well.  Her blood pressure is not adequate adequately controlled her metoprolol XL is up 200 mg daily.  We'll add hydrochlorothiazide 12.5 daily.  Last week.  Recheck today.    Otherwise her memory loss seems better off statins.      The following portions of the patient's history were reviewed and updated as appropriate: allergies, current medications, past social history and problem list.    Review of Systems   Constitutional: Negative.    HENT: Negative.    Eyes: Negative.    Respiratory: Negative.    Cardiovascular: Negative.    Gastrointestinal: Negative.    Endocrine: Negative.    Genitourinary: Negative.    Musculoskeletal: Negative.    Skin: Negative.    Allergic/Immunologic: Negative.    Neurological: Negative.    Hematological: Negative.    Psychiatric/Behavioral: Negative.        Objective   Vitals:    10/27/17 1042   BP: 178/78   Pulse: 74   Temp: 97.6 °F (36.4 °C)   SpO2: 98%     Physical Exam   Constitutional: She is oriented to person, place, and time. She appears well-developed and well-nourished.   HENT:   Head: Normocephalic and atraumatic.   Right Ear: Tympanic membrane and external ear normal.   Left Ear: Tympanic membrane and external ear normal.   Nose: Nose normal.   Mouth/Throat: Oropharynx is clear and moist.   Eyes: Conjunctivae and EOM are normal. Pupils are equal, round, and reactive to light.   Neck: Normal range of motion. Neck supple. No JVD present. No thyromegaly present.   Cardiovascular: Normal rate, regular rhythm, normal heart sounds  and intact distal pulses.    Pulmonary/Chest: Effort normal and breath sounds normal.   Abdominal: Soft. Bowel sounds are normal.   Musculoskeletal: Normal range of motion.   Lymphadenopathy:     She has no cervical adenopathy.   Neurological: She is alert and oriented to person, place, and time. No cranial nerve deficit. Coordination normal.   Skin: Skin is warm and dry. No rash noted.   Psychiatric: She has a normal mood and affect. Her behavior is normal. Judgment and thought content normal.   Vitals reviewed.      Assessment/Plan   Problem List Items Addressed This Visit        Cardiovascular and Mediastinum    Benign essential hypertension - Primary    Relevant Medications    hydrochlorothiazide (MICROZIDE) 12.5 MG capsule    Other Relevant Orders    CBC & Differential    Comprehensive Metabolic Panel    Hemoglobin A1c    Lipid Panel With / Chol / HDL Ratio    Urinalysis With / Microscopic If Indicated - Urine, Clean Catch    TSH    T4, Free    T3, Free    Hyperlipidemia    Relevant Orders    CBC & Differential    Comprehensive Metabolic Panel    Hemoglobin A1c    Lipid Panel With / Chol / HDL Ratio    Urinalysis With / Microscopic If Indicated - Urine, Clean Catch    TSH    T4, Free    T3, Free       Endocrine    Type 2 diabetes mellitus with diabetic neuropathy    Relevant Orders    CBC & Differential    Comprehensive Metabolic Panel    Hemoglobin A1c    Lipid Panel With / Chol / HDL Ratio    Urinalysis With / Microscopic If Indicated - Urine, Clean Catch    TSH    T4, Free    T3, Free    Hypothyroidism    Relevant Orders    CBC & Differential    Comprehensive Metabolic Panel    Hemoglobin A1c    Lipid Panel With / Chol / HDL Ratio    Urinalysis With / Microscopic If Indicated - Urine, Clean Catch    TSH    T4, Free    T3, Free       Other    Memory loss    Relevant Orders    CBC & Differential    Comprehensive Metabolic Panel    Hemoglobin A1c    Lipid Panel With / Chol / HDL Ratio    Urinalysis With /  Microscopic If Indicated - Urine, Clean Catch    TSH    T4, Free    T3, Free    Abnormal creatinine clearance glomerular filtration    Relevant Orders    CBC & Differential    Comprehensive Metabolic Panel    Hemoglobin A1c    Lipid Panel With / Chol / HDL Ratio    Urinalysis With / Microscopic If Indicated - Urine, Clean Catch    TSH    T4, Free    T3, Free      Plan: Add hydrochlorothiazide 12.5 mg daily recheck labs today.  Return visit in 3 months.

## 2017-11-03 LAB
ALBUMIN SERPL-MCNC: 4.2 G/DL (ref 3.5–5.2)
ALBUMIN/GLOB SERPL: 1.6 G/DL
ALP SERPL-CCNC: 94 U/L (ref 39–117)
ALT SERPL-CCNC: 17 U/L (ref 1–33)
APPEARANCE UR: CLEAR
AST SERPL-CCNC: 17 U/L (ref 1–32)
BASOPHILS # BLD AUTO: 0.04 10*3/MM3 (ref 0–0.2)
BASOPHILS NFR BLD AUTO: 0.7 % (ref 0–1.5)
BILIRUB SERPL-MCNC: 0.2 MG/DL (ref 0.1–1.2)
BILIRUB UR QL STRIP: NEGATIVE
BUN SERPL-MCNC: 13 MG/DL (ref 8–23)
BUN/CREAT SERPL: 11.7 (ref 7–25)
CALCIUM SERPL-MCNC: 10 MG/DL (ref 8.6–10.5)
CHLORIDE SERPL-SCNC: 100 MMOL/L (ref 98–107)
CHOLEST SERPL-MCNC: 240 MG/DL (ref 0–200)
CHOLEST/HDLC SERPL: 3.38 {RATIO}
CO2 SERPL-SCNC: 27.5 MMOL/L (ref 22–29)
COLOR UR: YELLOW
CREAT SERPL-MCNC: 1.11 MG/DL (ref 0.57–1)
EOSINOPHIL # BLD AUTO: 0.09 10*3/MM3 (ref 0–0.7)
EOSINOPHIL NFR BLD AUTO: 1.7 % (ref 0.3–6.2)
ERYTHROCYTE [DISTWIDTH] IN BLOOD BY AUTOMATED COUNT: 13.4 % (ref 11.7–13)
GFR SERPLBLD CREATININE-BSD FMLA CKD-EPI: 49 ML/MIN/1.73
GFR SERPLBLD CREATININE-BSD FMLA CKD-EPI: 59 ML/MIN/1.73
GLOBULIN SER CALC-MCNC: 2.7 GM/DL
GLUCOSE SERPL-MCNC: 137 MG/DL (ref 65–99)
GLUCOSE UR QL: NEGATIVE
HBA1C MFR BLD: 6.45 % (ref 4.8–5.6)
HCT VFR BLD AUTO: 39.3 % (ref 35.6–45.5)
HDLC SERPL-MCNC: 71 MG/DL (ref 40–60)
HGB BLD-MCNC: 12.1 G/DL (ref 11.9–15.5)
HGB UR QL STRIP: NEGATIVE
IMM GRANULOCYTES # BLD: 0.03 10*3/MM3 (ref 0–0.03)
IMM GRANULOCYTES NFR BLD: 0.6 % (ref 0–0.5)
KETONES UR QL STRIP: NEGATIVE
LDLC SERPL CALC-MCNC: 150 MG/DL (ref 0–100)
LEUKOCYTE ESTERASE UR QL STRIP: NEGATIVE
LYMPHOCYTES # BLD AUTO: 1.16 10*3/MM3 (ref 0.9–4.8)
LYMPHOCYTES NFR BLD AUTO: 21.7 % (ref 19.6–45.3)
MCH RBC QN AUTO: 29.4 PG (ref 26.9–32)
MCHC RBC AUTO-ENTMCNC: 30.8 G/DL (ref 32.4–36.3)
MCV RBC AUTO: 95.4 FL (ref 80.5–98.2)
MONOCYTES # BLD AUTO: 0.67 10*3/MM3 (ref 0.2–1.2)
MONOCYTES NFR BLD AUTO: 12.5 % (ref 5–12)
NEUTROPHILS # BLD AUTO: 3.36 10*3/MM3 (ref 1.9–8.1)
NEUTROPHILS NFR BLD AUTO: 62.8 % (ref 42.7–76)
NITRITE UR QL STRIP: NEGATIVE
PH UR STRIP: 6.5 [PH] (ref 5–8)
PLATELET # BLD AUTO: 319 10*3/MM3 (ref 140–500)
POTASSIUM SERPL-SCNC: 5.1 MMOL/L (ref 3.5–5.2)
PROT SERPL-MCNC: 6.9 G/DL (ref 6–8.5)
PROT UR QL STRIP: NEGATIVE
RBC # BLD AUTO: 4.12 10*6/MM3 (ref 3.9–5.2)
SODIUM SERPL-SCNC: 141 MMOL/L (ref 136–145)
SP GR UR: 1.02 (ref 1–1.03)
T3FREE SERPL-MCNC: 2.3 PG/ML (ref 2–4.4)
T4 FREE SERPL-MCNC: 1.22 NG/DL (ref 0.93–1.7)
TRIGL SERPL-MCNC: 97 MG/DL (ref 0–150)
TSH SERPL DL<=0.005 MIU/L-ACNC: 1.65 MIU/ML (ref 0.27–4.2)
UROBILINOGEN UR STRIP-MCNC: NORMAL MG/DL
VLDLC SERPL CALC-MCNC: 19.4 MG/DL (ref 5–40)
WBC # BLD AUTO: 5.35 10*3/MM3 (ref 4.5–10.7)

## 2017-11-16 ENCOUNTER — TELEPHONE (OUTPATIENT)
Dept: INTERNAL MEDICINE | Facility: CLINIC | Age: 72
End: 2017-11-16

## 2017-12-07 ENCOUNTER — TELEPHONE (OUTPATIENT)
Dept: GASTROENTEROLOGY | Facility: CLINIC | Age: 72
End: 2017-12-07

## 2017-12-07 RX ORDER — LACTULOSE 10 G/15ML
SOLUTION ORAL
Qty: 1350 ML | Refills: 2 | Status: SHIPPED | OUTPATIENT
Start: 2017-12-07 | End: 2017-12-07 | Stop reason: SDUPTHER

## 2017-12-07 NOTE — TELEPHONE ENCOUNTER
PT RETURNED YOUR CALL - ADVISED OF NOTE  Received: Today       Dayanna Gomez RN; Kelsi Stoner RN                     TAMEKA O/V WITH DR RIVERA IN FEB.

## 2017-12-07 NOTE — TELEPHONE ENCOUNTER
----- Message from Dayanna Marks sent at 12/7/2017  8:59 AM EST -----  Regarding: PT CALLED - REFILL ON LACTULOSE  Contact: [phone]  PT OUT OF MEDS. STATED PHARM HAS SENT A FEW DAYS.

## 2017-12-07 NOTE — TELEPHONE ENCOUNTER
Faxed request from [facility] for Lactulose 10 mg/15 ml solution - take 45 ml by mouth daily.  Escribe completed for same with amount 1350 ml, R2.   Note included to pharmacy that pt will require office appt before any future refills.    VM to pt with request to contact office.

## 2017-12-08 RX ORDER — LACTULOSE 10 G/15ML
SOLUTION ORAL
Qty: 4050 ML | Refills: 0 | Status: SHIPPED | OUTPATIENT
Start: 2017-12-08 | End: 2018-02-08 | Stop reason: SDUPTHER

## 2017-12-14 DIAGNOSIS — I10 BENIGN ESSENTIAL HYPERTENSION: ICD-10-CM

## 2017-12-14 RX ORDER — LEVOTHYROXINE SODIUM 0.1 MG/1
TABLET ORAL
Qty: 90 TABLET | Refills: 0 | Status: SHIPPED | OUTPATIENT
Start: 2017-12-14 | End: 2018-03-21 | Stop reason: SDUPTHER

## 2018-01-14 DIAGNOSIS — K21.9 GASTROESOPHAGEAL REFLUX DISEASE, ESOPHAGITIS PRESENCE NOT SPECIFIED: ICD-10-CM

## 2018-01-15 RX ORDER — OMEPRAZOLE 40 MG/1
CAPSULE, DELAYED RELEASE ORAL
Qty: 90 CAPSULE | Refills: 0 | Status: SHIPPED | OUTPATIENT
Start: 2018-01-15 | End: 2018-02-08 | Stop reason: SDUPTHER

## 2018-02-02 RX ORDER — HYDROXYZINE HYDROCHLORIDE 25 MG/1
25 TABLET, FILM COATED ORAL 3 TIMES DAILY PRN
Qty: 90 TABLET | Refills: 5 | Status: SHIPPED | OUTPATIENT
Start: 2018-02-02 | End: 2021-01-27

## 2018-02-07 ENCOUNTER — HOSPITAL ENCOUNTER (OUTPATIENT)
Dept: GENERAL RADIOLOGY | Facility: HOSPITAL | Age: 73
Discharge: HOME | End: 2018-02-07

## 2018-02-07 ENCOUNTER — APPOINTMENT (OUTPATIENT)
Dept: PREADMISSION TESTING | Facility: HOSPITAL | Age: 73
End: 2018-02-07

## 2018-02-07 ENCOUNTER — HOSPITAL ENCOUNTER (OUTPATIENT)
Dept: GENERAL RADIOLOGY | Facility: HOSPITAL | Age: 73
Discharge: HOME | End: 2018-02-07
Admitting: ORTHOPAEDIC SURGERY

## 2018-02-07 VITALS
HEIGHT: 64 IN | RESPIRATION RATE: 16 BRPM | HEART RATE: 68 BPM | OXYGEN SATURATION: 98 % | BODY MASS INDEX: 32.91 KG/M2 | WEIGHT: 192.8 LBS | DIASTOLIC BLOOD PRESSURE: 70 MMHG | SYSTOLIC BLOOD PRESSURE: 155 MMHG | TEMPERATURE: 98.2 F

## 2018-02-07 LAB
ABO GROUP BLD: NORMAL
ALBUMIN SERPL-MCNC: 4.1 G/DL (ref 3.5–5.2)
ALBUMIN/GLOB SERPL: 1.3 G/DL
ALP SERPL-CCNC: 88 U/L (ref 39–117)
ALT SERPL W P-5'-P-CCNC: 13 U/L (ref 1–33)
ANION GAP SERPL CALCULATED.3IONS-SCNC: 12.4 MMOL/L
AST SERPL-CCNC: 13 U/L (ref 1–32)
BILIRUB SERPL-MCNC: 0.2 MG/DL (ref 0.1–1.2)
BILIRUB UR QL STRIP: NEGATIVE
BLD GP AB SCN SERPL QL: NEGATIVE
BUN BLD-MCNC: 22 MG/DL (ref 8–23)
BUN/CREAT SERPL: 18.2 (ref 7–25)
CALCIUM SPEC-SCNC: 10.3 MG/DL (ref 8.6–10.5)
CHLORIDE SERPL-SCNC: 100 MMOL/L (ref 98–107)
CLARITY UR: CLEAR
CO2 SERPL-SCNC: 26.6 MMOL/L (ref 22–29)
COLOR UR: YELLOW
CREAT BLD-MCNC: 1.21 MG/DL (ref 0.57–1)
DEPRECATED RDW RBC AUTO: 43.7 FL (ref 37–54)
ERYTHROCYTE [DISTWIDTH] IN BLOOD BY AUTOMATED COUNT: 12.9 % (ref 11.7–13)
GFR SERPL CREATININE-BSD FRML MDRD: 44 ML/MIN/1.73
GLOBULIN UR ELPH-MCNC: 3.1 GM/DL
GLUCOSE BLD-MCNC: 136 MG/DL (ref 65–99)
GLUCOSE UR STRIP-MCNC: NEGATIVE MG/DL
HCT VFR BLD AUTO: 37.6 % (ref 35.6–45.5)
HGB BLD-MCNC: 12 G/DL (ref 11.9–15.5)
HGB UR QL STRIP.AUTO: NEGATIVE
INR PPP: 0.98 (ref 0.9–1.1)
KETONES UR QL STRIP: NEGATIVE
LEUKOCYTE ESTERASE UR QL STRIP.AUTO: NEGATIVE
MCH RBC QN AUTO: 29.7 PG (ref 26.9–32)
MCHC RBC AUTO-ENTMCNC: 31.9 G/DL (ref 32.4–36.3)
MCV RBC AUTO: 93.1 FL (ref 80.5–98.2)
NITRITE UR QL STRIP: NEGATIVE
PH UR STRIP.AUTO: 6 [PH] (ref 5–8)
PLATELET # BLD AUTO: 303 10*3/MM3 (ref 140–500)
PMV BLD AUTO: 8.7 FL (ref 6–12)
POTASSIUM BLD-SCNC: 5 MMOL/L (ref 3.5–5.2)
PROT SERPL-MCNC: 7.2 G/DL (ref 6–8.5)
PROT UR QL STRIP: NEGATIVE
PROTHROMBIN TIME: 12.8 SECONDS (ref 11.7–14.2)
RBC # BLD AUTO: 4.04 10*6/MM3 (ref 3.9–5.2)
RH BLD: POSITIVE
SODIUM BLD-SCNC: 139 MMOL/L (ref 136–145)
SP GR UR STRIP: 1.02 (ref 1–1.03)
UROBILINOGEN UR QL STRIP: NORMAL
WBC NRBC COR # BLD: 7.36 10*3/MM3 (ref 4.5–10.7)

## 2018-02-07 PROCEDURE — 86900 BLOOD TYPING SEROLOGIC ABO: CPT | Performed by: ORTHOPAEDIC SURGERY

## 2018-02-07 PROCEDURE — 73560 X-RAY EXAM OF KNEE 1 OR 2: CPT

## 2018-02-07 PROCEDURE — 86850 RBC ANTIBODY SCREEN: CPT | Performed by: ORTHOPAEDIC SURGERY

## 2018-02-07 PROCEDURE — 93005 ELECTROCARDIOGRAM TRACING: CPT

## 2018-02-07 PROCEDURE — 81003 URINALYSIS AUTO W/O SCOPE: CPT | Performed by: ORTHOPAEDIC SURGERY

## 2018-02-07 PROCEDURE — 85027 COMPLETE CBC AUTOMATED: CPT | Performed by: ORTHOPAEDIC SURGERY

## 2018-02-07 PROCEDURE — 85610 PROTHROMBIN TIME: CPT | Performed by: ORTHOPAEDIC SURGERY

## 2018-02-07 PROCEDURE — 80053 COMPREHEN METABOLIC PANEL: CPT | Performed by: ORTHOPAEDIC SURGERY

## 2018-02-07 PROCEDURE — 93010 ELECTROCARDIOGRAM REPORT: CPT | Performed by: INTERNAL MEDICINE

## 2018-02-07 PROCEDURE — 86901 BLOOD TYPING SEROLOGIC RH(D): CPT | Performed by: ORTHOPAEDIC SURGERY

## 2018-02-07 PROCEDURE — 36415 COLL VENOUS BLD VENIPUNCTURE: CPT

## 2018-02-07 PROCEDURE — 71046 X-RAY EXAM CHEST 2 VIEWS: CPT

## 2018-02-07 RX ORDER — LEVOTHYROXINE SODIUM 0.1 MG/1
100 TABLET ORAL DAILY
COMMUNITY
End: 2018-02-08 | Stop reason: SDUPTHER

## 2018-02-07 RX ORDER — CHLORHEXIDINE GLUCONATE 500 MG/1
CLOTH TOPICAL
COMMUNITY
End: 2019-02-05

## 2018-02-07 RX ORDER — LAMOTRIGINE 200 MG/1
400 TABLET ORAL NIGHTLY
COMMUNITY
End: 2018-08-24 | Stop reason: SDUPTHER

## 2018-02-07 RX ORDER — OMEPRAZOLE 40 MG/1
40 CAPSULE, DELAYED RELEASE ORAL DAILY
COMMUNITY
End: 2018-02-08 | Stop reason: SDUPTHER

## 2018-02-07 RX ORDER — LACTULOSE 10 G/15ML
20 SOLUTION ORAL 2 TIMES DAILY PRN
COMMUNITY
End: 2018-02-08 | Stop reason: SDUPTHER

## 2018-02-07 RX ORDER — FLUOXETINE HYDROCHLORIDE 20 MG/1
20 CAPSULE ORAL DAILY
COMMUNITY
End: 2018-03-07 | Stop reason: SDUPTHER

## 2018-02-07 ASSESSMENT — KOOS JR
KOOS JR SCORE: 52.465
KOOS JR SCORE: 14

## 2018-02-07 NOTE — DISCHARGE INSTRUCTIONS
Take the following medications the morning of surgery with a small sip of water:    OMEPRAZOLE, METOPROLOL    General Instructions:  • Do not eat solid food after midnight the night before surgery.  • You may drink clear liquids day of surgery but must stop at least one hour before your hospital arrival time.  • It is beneficial for you to have a clear drink that contains carbohydrates the day of surgery.  We suggest a 12 to 20 ounce bottle of Gatorade or Powerade for non-diabetic patients or a 12 to 20 ounce bottle of G2 or Powerade Zero for diabetic patients. (Pediatric patients, are not advised to drink a 12 to 20 ounce carbohydrate drink)    Clear liquids are liquids you can see through.  Nothing red in color.     Plain water                               Sports drinks  Sodas                                   Gelatin (Jell-O)  Fruit juices without pulp such as white grape juice and apple juice  Popsicles that contain no fruit or yogurt  Tea or coffee (no cream or milk added)  Gatorade / Powerade  G2 / Powerade Zero    • Infants may have breast milk up to four hours before surgery.  • Infants drinking formula may drink formula up to six hours before surgery.   • Patients who avoid smoking, chewing tobacco and alcohol for 4 weeks prior to surgery have a reduced risk of post-operative complications.  Quit smoking as many days before surgery as you can.  • Do not smoke, use chewing tobacco or drink alcohol the day of surgery.   • If applicable bring your C-PAP/ BI-PAP machine.  • Bring any papers given to you in the doctor’s office.  • Wear clean comfortable clothes and socks.  • Do not wear contact lenses or make-up.  Bring a case for your glasses.   • Bring crutches or walker if applicable.  • Remove all piercings.  Leave jewelry and any other valuables at home.  • Hair extensions with metal clips must be removed prior to surgery.  • The Pre-Admission Testing nurse will instruct you to bring medications if unable  to obtain an accurate list in Pre-Admission Testing.        If you were given a blood bank ID arm band remember to bring it with you the day of surgery.    Preventing a Surgical Site Infection:  • For 2 to 3 days before surgery, avoid shaving with a razor because the razor can irritate skin and make it easier to develop an infection.  • The night prior to surgery sleep in a clean bed with clean clothing.  Do not allow pets to sleep with you.  • Shower on the morning of surgery using a fresh bar of anti-bacterial soap (such as Dial) and clean washcloth.  Dry with a clean towel and dress in clean clothing.  • Ask your surgeon if you will be receiving antibiotics prior to surgery.  • Make sure you, your family, and all healthcare providers clean their hands with soap and water or an alcohol based hand  before caring for you or your wound.    Day of surgery:  Upon arrival, a Pre-op nurse and Anesthesiologist will review your health history, obtain vital signs, and answer questions you may have.  The only belongings needed at this time will be your home medications and if applicable your C-PAP/BI-PAP machine.  If you are staying overnight your family can leave the rest of your belongings in the car and bring them to your room later.  A Pre-op nurse will start an IV and you may receive medication in preparation for surgery, including something to help you relax.  Your family will be able to see you in the Pre-op area.  While you are in surgery your family should notify the waiting room  if they leave the waiting room area and provide a contact phone number.    Please be aware that surgery does come with discomfort.  We want to make every effort to control your discomfort so please discuss any uncontrolled symptoms with your nurse.   Your doctor will most likely have prescribed pain medications.      If you are going home after surgery you will receive individualized written care instructions before  being discharged.  A responsible adult must drive you to and from the hospital on the day of your surgery and stay with you for 24 hours.    If you are staying overnight following surgery, you will be transported to your hospital room following the recovery period.  [facility] has all private rooms.    If you have any questions please call Pre-Admission Testing at [phone].  Deductibles and co-payments are collected on the day of service. Please be prepared to pay the required co-pay, deductible or deposit on the day of service as defined by your plan.    2% CHLORAHEXIDINE GLUCONATE* CLOTH  Preparing or “prepping” skin before surgery can reduce the risk of infection at the surgical site. To make the process easier, [facility] has chosen disposable cloths moistened with a rinse-free, 2% Chlorhexidine Gluconate (CHG) antiseptic solution. The steps below outline the prepping process and should be carefully followed.        Use the prep cloth on the area that is circled in the diagram             Directions Night before Surgery  1) Shower using a fresh bar of anti-bacterial soap (such as Dial) and clean washcloth.  Use a clean towel to completely dry your skin.  2) Do not use any lotions, oils or creams on your skin.  3) Open the package and remove 1 cloth, wipe your skin for 30 seconds in a circular motion.  Allow to dry for 3 minutes.  4) Repeat #3 with second cloth.  5) Do not touch your eyes, ears, or mouth with the prep cloth.  6) Allow the wet prep solution to air dry.  7) Discard the prep cloth and wash your hands with soap and water.   8) Dress in clean bed clothes and sleep on fresh clean bed sheets.   9) You may experience some temporary itching after the prep.    Directions Day of Surgery  1) Repeat steps 1,2,3,4,5,6,7, and 9.   2) Dress in clean clothes before coming to the hospital.    BACTROBAN NASAL OINTMENT  There are many germs normally in your nose. Bactroban is an  ointment that will help reduce these germs. Please follow these instructions for Bactroban use:    ____Two days before surgery in the evening Date________    ____The day before surgery in the morning  Date________    ____The day before surgery in the evening              Date________    ____The day of surgery in the morning    Date________    **Squirt ½ package of Bactroban Ointment onto a cotton applicator and apply to inside of 1st nostril.  Squirt the remaining Bactroban and apply to the inside of the other nostril.

## 2018-02-08 ENCOUNTER — OFFICE VISIT (OUTPATIENT)
Dept: GASTROENTEROLOGY | Facility: CLINIC | Age: 73
End: 2018-02-08

## 2018-02-08 VITALS
SYSTOLIC BLOOD PRESSURE: 126 MMHG | BODY MASS INDEX: 31.99 KG/M2 | WEIGHT: 192 LBS | TEMPERATURE: 98.9 F | DIASTOLIC BLOOD PRESSURE: 82 MMHG | HEIGHT: 65 IN

## 2018-02-08 DIAGNOSIS — K59.04 CHRONIC IDIOPATHIC CONSTIPATION: Primary | ICD-10-CM

## 2018-02-08 DIAGNOSIS — D12.6 ADENOMATOUS POLYP OF COLON, UNSPECIFIED PART OF COLON: ICD-10-CM

## 2018-02-08 DIAGNOSIS — K21.9 GASTROESOPHAGEAL REFLUX DISEASE, ESOPHAGITIS PRESENCE NOT SPECIFIED: ICD-10-CM

## 2018-02-08 PROCEDURE — 99213 OFFICE O/P EST LOW 20 MIN: CPT | Performed by: NURSE PRACTITIONER

## 2018-02-08 RX ORDER — OMEPRAZOLE 40 MG/1
40 CAPSULE, DELAYED RELEASE ORAL DAILY
Qty: 90 CAPSULE | Refills: 3 | Status: SHIPPED | OUTPATIENT
Start: 2018-02-08 | End: 2018-05-01 | Stop reason: SDUPTHER

## 2018-02-08 RX ORDER — LACTULOSE 10 G/15ML
SOLUTION ORAL
Qty: 4050 ML | Refills: 1 | Status: SHIPPED | OUTPATIENT
Start: 2018-02-08 | End: 2019-02-11 | Stop reason: SDUPTHER

## 2018-02-08 NOTE — PROGRESS NOTES
Chief Complaint   Patient presents with   • Constipation         HPI    Pt is being seen today for A follow-up.  She is followed by Dr. Grigsby with a history of chronic constipation, GERD, and adenomatous colon polyps.  She takes lactulose once daily for the constipation.  This produces a bowel movement within 15 minutes to an hour.  She denies rectal bleeding or melena.  She has occasional right-sided abdominal discomfort that is unchanged in 2-3 years.  Nothing makes it better or worse.  She denies nausea or vomiting.  She denies fever, chills, diarrhea or incontinence.  We reviewed her last colonoscopy performed in 2016.  She is due for a repeat colonoscopy in 2021.  Patient is scheduled for a knee replacement next week.      Past Medical History:   Diagnosis Date   • Abdominal pain, LLQ    • Anemia    • Arthralgia of hip 11/22/2015   • Ataxic gait 11/22/2015    Description: Eval Dr Lillian Mederos, Neuro.  Some vestibular dysfunction present 2013/2014   • Bipolar I disorder, single manic episode    • Cardiac murmur    • Colon polyp    • Coronary artery disease    • Degeneration, intervertebral disc, thoracolumbar    • Diabetes mellitus    • Disease of thyroid gland    • Diverticulosis    • Esophageal spasm    • Fatigue    • H/O bone density study 10/17/2007    Dr. Giles   • Hemorrhoids    • History of mammogram     02/2012, per GYN Reshma Aranda   • History of Papanicolaou smear of cervix     DR. GILES GYN   • Hypertension    • Impaired cognition 11/22/2015    Description: Testing done 05/14   • Optic nerve contusion    • Pain of lower extremity 11/22/2015   • Vitamin B12 deficiency    • Vitamin D deficiency      Past Surgical History:   Procedure Laterality Date   • BREAST LUMPECTOMY Left     benign   • CHOLECYSTECTOMY     • COLONOSCOPY  2007    done 02/12, repeat 5 yrs, Dr Grigsby; tics in sigmoid colon, IH   • COLONOSCOPY N/A 11/3/2016    polyp, IH   • DILATATION AND CURETTAGE     • ESOPHAGOGASTRIC FUNDOPLASTY      • ROTATOR CUFF REPAIR Right 2009       Current Outpatient Prescriptions   Medication Sig Dispense Refill   • albuterol (PROVENTIL HFA) 108 (90 BASE) MCG/ACT inhaler Inhale 2 puffs.     • aspirin 81 MG chewable tablet Chew 81 mg daily.     • Chlorhexidine Gluconate Cloth 2 % pads Apply  topically. PER MD INSTRUCTIONS     • Cholecalciferol (VITAMIN D3) 2000 UNITS tablet Take  by mouth.     • ferrous sulfate 325 (65 FE) MG EC tablet Take 325 mg by mouth.     • FLUoxetine (PROzac) 20 MG capsule Take 20 mg by mouth Daily.     • Glucose Blood (FREESTYLE LITE TEST VI) by In Vitro route. Use to test everyday as directed     • hydrochlorothiazide (MICROZIDE) 12.5 MG capsule Take 1 capsule by mouth Daily. 90 capsule 3   • hydrOXYzine (ATARAX) 25 MG tablet Take 1 tablet by mouth 3 (Three) Times a Day As Needed for Itching. 90 tablet 5   • lactulose (CHRONULAC) 10 GM/15ML solution 30-45mLs every day for constipation 4050 mL 1   • lamoTRIgine (LAMICTAL) 200 MG tablet Take 400 mg by mouth Every Night.     • levothyroxine (SYNTHROID, LEVOTHROID) 100 MCG tablet TAKE 1 TABLET BY MOUTH DAILY 90 tablet 0   • meloxicam (MOBIC) 15 MG tablet Take 15 mg by mouth.     • metFORMIN (GLUCOPHAGE) 500 MG tablet TAKE 2 TABLETS BY MOUTH DAILY WITH BREAKFAST 180 tablet 0   • metoprolol succinate XL (TOPROL-XL) 100 MG 24 hr tablet Take 1 tablet by mouth Daily. 90 tablet 1   • mupirocin (BACTROBAN) 2 % nasal ointment into each nostril. PER MD INSTRUCTIONS     • omeprazole (priLOSEC) 40 MG capsule Take 1 capsule by mouth Daily. 90 capsule 3   • QUEtiapine XR (SEROquel XR) 150 MG 24 hr tablet Take 150 mg by mouth Every Night.       No current facility-administered medications for this visit.      Facility-Administered Medications Ordered in Other Visits   Medication Dose Route Frequency Provider Last Rate Last Dose   • mupirocin (BACTROBAN) 2 % nasal ointment   Each Nare BID Selwyn Pinto MD           PRN Meds:.    Allergies   Allergen Reactions  "  • Morphine Shortness Of Breath   • Penicillins Hives and Swelling   • Ace Inhibitors Cough   • Telmisartan Cough       Social History     Social History   • Marital status:      Spouse name: N/A   • Number of children: N/A   • Years of education: N/A     Occupational History   • Not on file.     Social History Main Topics   • Smoking status: Former Smoker     Packs/day: 1.00     Years: 7.00     Types: Cigarettes     Quit date: 1972   • Smokeless tobacco: Never Used   • Alcohol use Yes      Comment: OCC   • Drug use: No   • Sexual activity: No     Other Topics Concern   • Not on file     Social History Narrative       Family History   Problem Relation Age of Onset   • Colon polyps Father    • Hepatitis Other    • Coronary artery disease Other    • Malig Hyperthermia Neg Hx        Review of Systems   Constitutional: Negative for appetite change and unexpected weight change.   HENT: Negative for trouble swallowing.    Respiratory: Negative for choking and shortness of breath.    Cardiovascular: Negative for chest pain.   Gastrointestinal: Positive for constipation. Negative for abdominal distention, abdominal pain, blood in stool, diarrhea, nausea and vomiting.   Musculoskeletal: Negative for back pain.   Skin: Negative for color change.   Neurological: Negative for dizziness.   Hematological: Does not bruise/bleed easily.   Psychiatric/Behavioral: Negative.        Vitals:    02/08/18 1434   BP: 126/82   Temp: 98.9 °F (37.2 °C)     /82  Temp 98.9 °F (37.2 °C)  Ht 163.8 cm (64.5\")  Wt 87.1 kg (192 lb)  BMI 32.45 kg/m2  Physical Exam   Constitutional: She is oriented to person, place, and time. She appears well-developed and well-nourished.   HENT:   Head: Normocephalic and atraumatic.   Eyes: Pupils are equal, round, and reactive to light.   Cardiovascular: Normal rate, regular rhythm and normal heart sounds.    Pulmonary/Chest: Effort normal and breath sounds normal.   Abdominal: Soft. Bowel sounds " are normal. She exhibits no shifting dullness, no distension, no pulsatile liver, no fluid wave, no abdominal bruit, no ascites, no pulsatile midline mass and no mass. There is no hepatosplenomegaly. There is no tenderness. There is no rigidity and no guarding. No hernia.   Musculoskeletal: Normal range of motion.   Neurological: She is alert and oriented to person, place, and time.   Skin: Skin is warm and dry.   Psychiatric: She has a normal mood and affect. Her behavior is normal. Thought content normal.   Nursing note and vitals reviewed.      ASSESSMENT AND PLAN    Yi was seen today for constipation.    Diagnoses and all orders for this visit:    Chronic idiopathic constipation  Comments:  lactulose daily- adjust amount as needed especially when taking opioids post knee replacement.     Gastroesophageal reflux disease, esophagitis presence not specified  Comments:  Continue PPI daily   Orders:  -     omeprazole (priLOSEC) 40 MG capsule; Take 1 capsule by mouth Daily.    Adenomatous polyp of colon, unspecified part of colon  Comments:  repeat colonoscopy 2021 - on recall system    Other orders  -     lactulose (CHRONULAC) 10 GM/15ML solution; 30-45mLs every day for constipation      Please begin a probiotic.  You can try activia yogurt, align, or florastor.  These can be found over-the-counter at a local grocery store.    For any additional questions, concerns or changes to your condition after today's office visit please contact the office at [phone].      Abi MUNGUIA   [facility]  [address] [place] [address]  Office: [phone]

## 2018-02-13 ENCOUNTER — ANESTHESIA EVENT (OUTPATIENT)
Dept: PERIOP | Facility: HOSPITAL | Age: 73
End: 2018-02-13

## 2018-02-13 ENCOUNTER — ANESTHESIA (OUTPATIENT)
Dept: PERIOP | Facility: HOSPITAL | Age: 73
End: 2018-02-13

## 2018-02-13 ENCOUNTER — APPOINTMENT (OUTPATIENT)
Dept: GENERAL RADIOLOGY | Facility: HOSPITAL | Age: 73
End: 2018-02-13

## 2018-02-13 ENCOUNTER — HOSPITAL ENCOUNTER (INPATIENT)
Facility: HOSPITAL | Age: 73
LOS: 2 days | Discharge: HOME HEALTH | End: 2018-02-15
Attending: ORTHOPAEDIC SURGERY | Admitting: ORTHOPAEDIC SURGERY

## 2018-02-13 DIAGNOSIS — R26.2 DIFFICULTY WALKING: Primary | ICD-10-CM

## 2018-02-13 PROBLEM — M17.9 OA (OSTEOARTHRITIS) OF KNEE: Status: ACTIVE | Noted: 2018-02-13

## 2018-02-13 LAB
ABO GROUP BLD: NORMAL
BLD GP AB SCN SERPL QL: NEGATIVE
GLUCOSE BLDC GLUCOMTR-MCNC: 141 MG/DL (ref 70–130)
RH BLD: POSITIVE

## 2018-02-13 PROCEDURE — 25010000002 FENTANYL CITRATE (PF) 100 MCG/2ML SOLUTION: Performed by: ANESTHESIOLOGY

## 2018-02-13 PROCEDURE — 25010000002 PROPOFOL 10 MG/ML EMULSION: Performed by: ANESTHESIOLOGY

## 2018-02-13 PROCEDURE — 25010000002 ONDANSETRON PER 1 MG: Performed by: ANESTHESIOLOGY

## 2018-02-13 PROCEDURE — 86850 RBC ANTIBODY SCREEN: CPT | Performed by: ORTHOPAEDIC SURGERY

## 2018-02-13 PROCEDURE — 25010000002 KETOROLAC TROMETHAMINE PER 15 MG: Performed by: ORTHOPAEDIC SURGERY

## 2018-02-13 PROCEDURE — 0SRD0J9 REPLACEMENT OF LEFT KNEE JOINT WITH SYNTHETIC SUBSTITUTE, CEMENTED, OPEN APPROACH: ICD-10-PCS | Performed by: ORTHOPAEDIC SURGERY

## 2018-02-13 PROCEDURE — 97110 THERAPEUTIC EXERCISES: CPT

## 2018-02-13 PROCEDURE — 25010000002 MIDAZOLAM PER 1 MG: Performed by: ANESTHESIOLOGY

## 2018-02-13 PROCEDURE — 86900 BLOOD TYPING SEROLOGIC ABO: CPT | Performed by: ORTHOPAEDIC SURGERY

## 2018-02-13 PROCEDURE — 82962 GLUCOSE BLOOD TEST: CPT

## 2018-02-13 PROCEDURE — C1713 ANCHOR/SCREW BN/BN,TIS/BN: HCPCS | Performed by: ORTHOPAEDIC SURGERY

## 2018-02-13 PROCEDURE — 25010000002 DEXAMETHASONE PER 1 MG: Performed by: ANESTHESIOLOGY

## 2018-02-13 PROCEDURE — 25010000002 ROPIVACAINE PER 1 MG: Performed by: ORTHOPAEDIC SURGERY

## 2018-02-13 PROCEDURE — 97162 PT EVAL MOD COMPLEX 30 MIN: CPT

## 2018-02-13 PROCEDURE — 73560 X-RAY EXAM OF KNEE 1 OR 2: CPT

## 2018-02-13 PROCEDURE — C1776 JOINT DEVICE (IMPLANTABLE): HCPCS | Performed by: ORTHOPAEDIC SURGERY

## 2018-02-13 PROCEDURE — 86901 BLOOD TYPING SEROLOGIC RH(D): CPT | Performed by: ORTHOPAEDIC SURGERY

## 2018-02-13 PROCEDURE — 94799 UNLISTED PULMONARY SVC/PX: CPT

## 2018-02-13 DEVICE — STEM TIB PRI FINN 46X40MM: Type: IMPLANTABLE DEVICE | Site: KNEE | Status: FUNCTIONAL

## 2018-02-13 DEVICE — CAP TOTL KN CMT PREMIUM: Type: IMPLANTABLE DEVICE | Site: KNEE | Status: FUNCTIONAL

## 2018-02-13 DEVICE — BEAR TIB/KN VANGUARD CR LIP 14X63/67MM NS: Type: IMPLANTABLE DEVICE | Site: KNEE | Status: FUNCTIONAL

## 2018-02-13 DEVICE — COMP FEM/KN VANGUARD INTLK CR 60MM NS LT: Type: IMPLANTABLE DEVICE | Site: KNEE | Status: FUNCTIONAL

## 2018-02-13 DEVICE — PAT 3PEG THN 31X6.2  31MM: Type: IMPLANTABLE DEVICE | Site: KNEE | Status: FUNCTIONAL

## 2018-02-13 DEVICE — CMT BONE PALACOS 120001: Type: IMPLANTABLE DEVICE | Site: KNEE | Status: FUNCTIONAL

## 2018-02-13 DEVICE — TRY TIB INTERLK PRI 67MM: Type: IMPLANTABLE DEVICE | Site: KNEE | Status: FUNCTIONAL

## 2018-02-13 RX ORDER — PROMETHAZINE HYDROCHLORIDE 25 MG/1
25 SUPPOSITORY RECTAL ONCE AS NEEDED
Status: DISCONTINUED | OUTPATIENT
Start: 2018-02-13 | End: 2018-02-13 | Stop reason: HOSPADM

## 2018-02-13 RX ORDER — LEVOTHYROXINE SODIUM 0.1 MG/1
100 TABLET ORAL
Status: DISCONTINUED | OUTPATIENT
Start: 2018-02-14 | End: 2018-02-15 | Stop reason: HOSPADM

## 2018-02-13 RX ORDER — SODIUM CHLORIDE 0.9 % (FLUSH) 0.9 %
1-10 SYRINGE (ML) INJECTION AS NEEDED
Status: DISCONTINUED | OUTPATIENT
Start: 2018-02-13 | End: 2018-02-15 | Stop reason: HOSPADM

## 2018-02-13 RX ORDER — PROPOFOL 10 MG/ML
VIAL (ML) INTRAVENOUS AS NEEDED
Status: DISCONTINUED | OUTPATIENT
Start: 2018-02-13 | End: 2018-02-13 | Stop reason: SURG

## 2018-02-13 RX ORDER — SODIUM CHLORIDE, SODIUM LACTATE, POTASSIUM CHLORIDE, CALCIUM CHLORIDE 600; 310; 30; 20 MG/100ML; MG/100ML; MG/100ML; MG/100ML
9 INJECTION, SOLUTION INTRAVENOUS CONTINUOUS
Status: DISCONTINUED | OUTPATIENT
Start: 2018-02-13 | End: 2018-02-15 | Stop reason: HOSPADM

## 2018-02-13 RX ORDER — OXYCODONE HYDROCHLORIDE AND ACETAMINOPHEN 5; 325 MG/1; MG/1
1 TABLET ORAL EVERY 4 HOURS PRN
Status: DISCONTINUED | OUTPATIENT
Start: 2018-02-13 | End: 2018-02-14

## 2018-02-13 RX ORDER — LABETALOL HYDROCHLORIDE 5 MG/ML
5 INJECTION, SOLUTION INTRAVENOUS
Status: DISCONTINUED | OUTPATIENT
Start: 2018-02-13 | End: 2018-02-13 | Stop reason: HOSPADM

## 2018-02-13 RX ORDER — ALBUTEROL SULFATE 2.5 MG/3ML
2.5 SOLUTION RESPIRATORY (INHALATION) EVERY 4 HOURS PRN
Status: DISCONTINUED | OUTPATIENT
Start: 2018-02-13 | End: 2018-02-15 | Stop reason: HOSPADM

## 2018-02-13 RX ORDER — LIDOCAINE HYDROCHLORIDE 20 MG/ML
INJECTION, SOLUTION INFILTRATION; PERINEURAL AS NEEDED
Status: DISCONTINUED | OUTPATIENT
Start: 2018-02-13 | End: 2018-02-13 | Stop reason: SURG

## 2018-02-13 RX ORDER — CELECOXIB 200 MG/1
200 CAPSULE ORAL ONCE
Status: COMPLETED | OUTPATIENT
Start: 2018-02-13 | End: 2018-02-13

## 2018-02-13 RX ORDER — ONDANSETRON 4 MG/1
4 TABLET, ORALLY DISINTEGRATING ORAL EVERY 6 HOURS PRN
Status: DISCONTINUED | OUTPATIENT
Start: 2018-02-13 | End: 2018-02-15 | Stop reason: HOSPADM

## 2018-02-13 RX ORDER — FAMOTIDINE 10 MG/ML
20 INJECTION, SOLUTION INTRAVENOUS ONCE
Status: COMPLETED | OUTPATIENT
Start: 2018-02-13 | End: 2018-02-13

## 2018-02-13 RX ORDER — ASPIRIN 325 MG
325 TABLET, DELAYED RELEASE (ENTERIC COATED) ORAL 2 TIMES DAILY WITH MEALS
Status: DISCONTINUED | OUTPATIENT
Start: 2018-02-13 | End: 2018-02-15 | Stop reason: HOSPADM

## 2018-02-13 RX ORDER — FERROUS SULFATE 325(65) MG
325 TABLET ORAL
Status: DISCONTINUED | OUTPATIENT
Start: 2018-02-14 | End: 2018-02-15 | Stop reason: HOSPADM

## 2018-02-13 RX ORDER — TRANEXAMIC ACID 100 MG/ML
INJECTION, SOLUTION INTRAVENOUS AS NEEDED
Status: DISCONTINUED | OUTPATIENT
Start: 2018-02-13 | End: 2018-02-13 | Stop reason: SURG

## 2018-02-13 RX ORDER — ONDANSETRON 2 MG/ML
4 INJECTION INTRAMUSCULAR; INTRAVENOUS EVERY 6 HOURS PRN
Status: DISCONTINUED | OUTPATIENT
Start: 2018-02-13 | End: 2018-02-15 | Stop reason: HOSPADM

## 2018-02-13 RX ORDER — PANTOPRAZOLE SODIUM 40 MG/1
40 TABLET, DELAYED RELEASE ORAL EVERY MORNING
Status: DISCONTINUED | OUTPATIENT
Start: 2018-02-14 | End: 2018-02-15 | Stop reason: HOSPADM

## 2018-02-13 RX ORDER — HYDROXYZINE HYDROCHLORIDE 25 MG/1
25 TABLET, FILM COATED ORAL 3 TIMES DAILY PRN
Status: DISCONTINUED | OUTPATIENT
Start: 2018-02-13 | End: 2018-02-15 | Stop reason: HOSPADM

## 2018-02-13 RX ORDER — OXYCODONE HYDROCHLORIDE AND ACETAMINOPHEN 5; 325 MG/1; MG/1
2 TABLET ORAL EVERY 4 HOURS PRN
Status: DISCONTINUED | OUTPATIENT
Start: 2018-02-13 | End: 2018-02-14

## 2018-02-13 RX ORDER — NALOXONE HCL 0.4 MG/ML
0.1 VIAL (ML) INJECTION
Status: DISCONTINUED | OUTPATIENT
Start: 2018-02-13 | End: 2018-02-15 | Stop reason: HOSPADM

## 2018-02-13 RX ORDER — PROMETHAZINE HYDROCHLORIDE 25 MG/1
12.5 TABLET ORAL ONCE AS NEEDED
Status: DISCONTINUED | OUTPATIENT
Start: 2018-02-13 | End: 2018-02-13 | Stop reason: HOSPADM

## 2018-02-13 RX ORDER — BISACODYL 10 MG
10 SUPPOSITORY, RECTAL RECTAL DAILY PRN
Status: DISCONTINUED | OUTPATIENT
Start: 2018-02-13 | End: 2018-02-15 | Stop reason: HOSPADM

## 2018-02-13 RX ORDER — HYDROMORPHONE HCL 110MG/55ML
0.25 PATIENT CONTROLLED ANALGESIA SYRINGE INTRAVENOUS
Status: DISCONTINUED | OUTPATIENT
Start: 2018-02-13 | End: 2018-02-13 | Stop reason: HOSPADM

## 2018-02-13 RX ORDER — PROMETHAZINE HYDROCHLORIDE 25 MG/1
25 TABLET ORAL ONCE AS NEEDED
Status: DISCONTINUED | OUTPATIENT
Start: 2018-02-13 | End: 2018-02-13 | Stop reason: HOSPADM

## 2018-02-13 RX ORDER — CLINDAMYCIN PHOSPHATE 900 MG/50ML
900 INJECTION INTRAVENOUS ONCE
Status: COMPLETED | OUTPATIENT
Start: 2018-02-13 | End: 2018-02-13

## 2018-02-13 RX ORDER — QUETIAPINE FUMARATE 50 MG/1
150 TABLET, EXTENDED RELEASE ORAL NIGHTLY
Status: DISCONTINUED | OUTPATIENT
Start: 2018-02-13 | End: 2018-02-15 | Stop reason: HOSPADM

## 2018-02-13 RX ORDER — MIDAZOLAM HYDROCHLORIDE 1 MG/ML
1 INJECTION INTRAMUSCULAR; INTRAVENOUS
Status: DISCONTINUED | OUTPATIENT
Start: 2018-02-13 | End: 2018-02-13 | Stop reason: HOSPADM

## 2018-02-13 RX ORDER — DIAZEPAM 5 MG/1
5 TABLET ORAL 2 TIMES DAILY PRN
Status: DISCONTINUED | OUTPATIENT
Start: 2018-02-13 | End: 2018-02-15 | Stop reason: HOSPADM

## 2018-02-13 RX ORDER — FENTANYL CITRATE 50 UG/ML
50 INJECTION, SOLUTION INTRAMUSCULAR; INTRAVENOUS
Status: DISCONTINUED | OUTPATIENT
Start: 2018-02-13 | End: 2018-02-13 | Stop reason: HOSPADM

## 2018-02-13 RX ORDER — FENTANYL CITRATE 50 UG/ML
25 INJECTION, SOLUTION INTRAMUSCULAR; INTRAVENOUS
Status: DISCONTINUED | OUTPATIENT
Start: 2018-02-13 | End: 2018-02-13 | Stop reason: HOSPADM

## 2018-02-13 RX ORDER — PROMETHAZINE HYDROCHLORIDE 25 MG/ML
12.5 INJECTION, SOLUTION INTRAMUSCULAR; INTRAVENOUS ONCE AS NEEDED
Status: DISCONTINUED | OUTPATIENT
Start: 2018-02-13 | End: 2018-02-13 | Stop reason: HOSPADM

## 2018-02-13 RX ORDER — ONDANSETRON 4 MG/1
4 TABLET, FILM COATED ORAL EVERY 6 HOURS PRN
Status: DISCONTINUED | OUTPATIENT
Start: 2018-02-13 | End: 2018-02-15 | Stop reason: HOSPADM

## 2018-02-13 RX ORDER — LAMOTRIGINE 100 MG/1
400 TABLET ORAL NIGHTLY
Status: DISCONTINUED | OUTPATIENT
Start: 2018-02-13 | End: 2018-02-15 | Stop reason: HOSPADM

## 2018-02-13 RX ORDER — MIDAZOLAM HYDROCHLORIDE 1 MG/ML
2 INJECTION INTRAMUSCULAR; INTRAVENOUS
Status: DISCONTINUED | OUTPATIENT
Start: 2018-02-13 | End: 2018-02-13 | Stop reason: HOSPADM

## 2018-02-13 RX ORDER — SODIUM CHLORIDE 0.9 % (FLUSH) 0.9 %
1-10 SYRINGE (ML) INJECTION AS NEEDED
Status: DISCONTINUED | OUTPATIENT
Start: 2018-02-13 | End: 2018-02-13 | Stop reason: HOSPADM

## 2018-02-13 RX ORDER — EPHEDRINE SULFATE 50 MG/ML
5 INJECTION, SOLUTION INTRAVENOUS ONCE AS NEEDED
Status: DISCONTINUED | OUTPATIENT
Start: 2018-02-13 | End: 2018-02-13 | Stop reason: HOSPADM

## 2018-02-13 RX ORDER — FENTANYL CITRATE 50 UG/ML
INJECTION, SOLUTION INTRAMUSCULAR; INTRAVENOUS AS NEEDED
Status: DISCONTINUED | OUTPATIENT
Start: 2018-02-13 | End: 2018-02-13 | Stop reason: SURG

## 2018-02-13 RX ORDER — CLINDAMYCIN PHOSPHATE 900 MG/50ML
900 INJECTION INTRAVENOUS EVERY 8 HOURS
Status: COMPLETED | OUTPATIENT
Start: 2018-02-13 | End: 2018-02-14

## 2018-02-13 RX ORDER — DIAZEPAM 5 MG/ML
5 INJECTION, SOLUTION INTRAMUSCULAR; INTRAVENOUS 2 TIMES DAILY PRN
Status: ACTIVE | OUTPATIENT
Start: 2018-02-13 | End: 2018-02-14

## 2018-02-13 RX ORDER — ALBUTEROL SULFATE 90 UG/1
2 AEROSOL, METERED RESPIRATORY (INHALATION) EVERY 4 HOURS PRN
Status: DISCONTINUED | OUTPATIENT
Start: 2018-02-13 | End: 2018-02-13 | Stop reason: CLARIF

## 2018-02-13 RX ORDER — SENNA AND DOCUSATE SODIUM 50; 8.6 MG/1; MG/1
2 TABLET, FILM COATED ORAL 2 TIMES DAILY PRN
Status: DISCONTINUED | OUTPATIENT
Start: 2018-02-13 | End: 2018-02-15 | Stop reason: HOSPADM

## 2018-02-13 RX ORDER — ONDANSETRON 2 MG/ML
4 INJECTION INTRAMUSCULAR; INTRAVENOUS ONCE AS NEEDED
Status: DISCONTINUED | OUTPATIENT
Start: 2018-02-13 | End: 2018-02-13 | Stop reason: HOSPADM

## 2018-02-13 RX ORDER — METOPROLOL SUCCINATE 100 MG/1
100 TABLET, EXTENDED RELEASE ORAL DAILY
Status: DISCONTINUED | OUTPATIENT
Start: 2018-02-13 | End: 2018-02-15 | Stop reason: HOSPADM

## 2018-02-13 RX ORDER — DEXAMETHASONE SODIUM PHOSPHATE 10 MG/ML
INJECTION INTRAMUSCULAR; INTRAVENOUS AS NEEDED
Status: DISCONTINUED | OUTPATIENT
Start: 2018-02-13 | End: 2018-02-13 | Stop reason: SURG

## 2018-02-13 RX ORDER — DIPHENHYDRAMINE HYDROCHLORIDE 50 MG/ML
12.5 INJECTION INTRAMUSCULAR; INTRAVENOUS
Status: DISCONTINUED | OUTPATIENT
Start: 2018-02-13 | End: 2018-02-13 | Stop reason: HOSPADM

## 2018-02-13 RX ORDER — FLUOXETINE HYDROCHLORIDE 20 MG/1
20 CAPSULE ORAL DAILY
Status: DISCONTINUED | OUTPATIENT
Start: 2018-02-13 | End: 2018-02-15 | Stop reason: HOSPADM

## 2018-02-13 RX ORDER — HYDROMORPHONE HYDROCHLORIDE 1 MG/ML
0.5 INJECTION, SOLUTION INTRAMUSCULAR; INTRAVENOUS; SUBCUTANEOUS
Status: DISCONTINUED | OUTPATIENT
Start: 2018-02-13 | End: 2018-02-15 | Stop reason: HOSPADM

## 2018-02-13 RX ORDER — PROMETHAZINE HYDROCHLORIDE 25 MG/ML
6.25 INJECTION, SOLUTION INTRAMUSCULAR; INTRAVENOUS ONCE AS NEEDED
Status: DISCONTINUED | OUTPATIENT
Start: 2018-02-13 | End: 2018-02-13 | Stop reason: HOSPADM

## 2018-02-13 RX ORDER — ACETAMINOPHEN 325 MG/1
325 TABLET ORAL EVERY 4 HOURS PRN
Status: DISCONTINUED | OUTPATIENT
Start: 2018-02-13 | End: 2018-02-15 | Stop reason: HOSPADM

## 2018-02-13 RX ORDER — EPHEDRINE SULFATE 50 MG/ML
INJECTION, SOLUTION INTRAVENOUS AS NEEDED
Status: DISCONTINUED | OUTPATIENT
Start: 2018-02-13 | End: 2018-02-13 | Stop reason: SURG

## 2018-02-13 RX ORDER — OXYCODONE HYDROCHLORIDE AND ACETAMINOPHEN 5; 325 MG/1; MG/1
TABLET ORAL
Status: COMPLETED
Start: 2018-02-13 | End: 2018-02-13

## 2018-02-13 RX ORDER — DOCUSATE SODIUM 100 MG/1
100 CAPSULE, LIQUID FILLED ORAL 2 TIMES DAILY PRN
Status: DISCONTINUED | OUTPATIENT
Start: 2018-02-13 | End: 2018-02-15 | Stop reason: HOSPADM

## 2018-02-13 RX ORDER — KETOROLAC TROMETHAMINE 30 MG/ML
15 INJECTION, SOLUTION INTRAMUSCULAR; INTRAVENOUS EVERY 8 HOURS PRN
Status: DISCONTINUED | OUTPATIENT
Start: 2018-02-13 | End: 2018-02-15 | Stop reason: HOSPADM

## 2018-02-13 RX ORDER — CHOLECALCIFEROL (VITAMIN D3) 125 MCG
5 CAPSULE ORAL NIGHTLY PRN
Status: DISCONTINUED | OUTPATIENT
Start: 2018-02-13 | End: 2018-02-15 | Stop reason: HOSPADM

## 2018-02-13 RX ORDER — ACETAMINOPHEN 500 MG
1000 TABLET ORAL ONCE
Status: COMPLETED | OUTPATIENT
Start: 2018-02-13 | End: 2018-02-13

## 2018-02-13 RX ORDER — FLUMAZENIL 0.1 MG/ML
0.2 INJECTION INTRAVENOUS AS NEEDED
Status: DISCONTINUED | OUTPATIENT
Start: 2018-02-13 | End: 2018-02-13 | Stop reason: HOSPADM

## 2018-02-13 RX ORDER — ONDANSETRON 2 MG/ML
INJECTION INTRAMUSCULAR; INTRAVENOUS AS NEEDED
Status: DISCONTINUED | OUTPATIENT
Start: 2018-02-13 | End: 2018-02-13 | Stop reason: SURG

## 2018-02-13 RX ORDER — MAGNESIUM HYDROXIDE 1200 MG/15ML
LIQUID ORAL AS NEEDED
Status: DISCONTINUED | OUTPATIENT
Start: 2018-02-13 | End: 2018-02-13 | Stop reason: HOSPADM

## 2018-02-13 RX ORDER — HYDROCHLOROTHIAZIDE 12.5 MG/1
12.5 CAPSULE, GELATIN COATED ORAL DAILY
Status: DISCONTINUED | OUTPATIENT
Start: 2018-02-13 | End: 2018-02-15 | Stop reason: HOSPADM

## 2018-02-13 RX ORDER — NALOXONE HCL 0.4 MG/ML
0.2 VIAL (ML) INJECTION AS NEEDED
Status: DISCONTINUED | OUTPATIENT
Start: 2018-02-13 | End: 2018-02-13 | Stop reason: HOSPADM

## 2018-02-13 RX ORDER — ASPIRIN 81 MG/1
81 TABLET ORAL DAILY
COMMUNITY
End: 2020-07-31

## 2018-02-13 RX ORDER — HYDRALAZINE HYDROCHLORIDE 20 MG/ML
5 INJECTION INTRAMUSCULAR; INTRAVENOUS
Status: DISCONTINUED | OUTPATIENT
Start: 2018-02-13 | End: 2018-02-13 | Stop reason: HOSPADM

## 2018-02-13 RX ORDER — SODIUM CHLORIDE 450 MG/100ML
100 INJECTION, SOLUTION INTRAVENOUS CONTINUOUS
Status: DISCONTINUED | OUTPATIENT
Start: 2018-02-13 | End: 2018-02-15 | Stop reason: HOSPADM

## 2018-02-13 RX ADMIN — FAMOTIDINE 20 MG: 10 INJECTION, SOLUTION INTRAVENOUS at 08:49

## 2018-02-13 RX ADMIN — LIDOCAINE HYDROCHLORIDE 60 MG: 20 INJECTION, SOLUTION INFILTRATION; PERINEURAL at 10:39

## 2018-02-13 RX ADMIN — CLINDAMYCIN PHOSPHATE 900 MG: 900 INJECTION INTRAVENOUS at 11:57

## 2018-02-13 RX ADMIN — ONDANSETRON 4 MG: 2 INJECTION INTRAMUSCULAR; INTRAVENOUS at 11:44

## 2018-02-13 RX ADMIN — MIDAZOLAM 1 MG: 1 INJECTION INTRAMUSCULAR; INTRAVENOUS at 09:27

## 2018-02-13 RX ADMIN — PROPOFOL 140 MG: 10 INJECTION, EMULSION INTRAVENOUS at 10:39

## 2018-02-13 RX ADMIN — MUPIROCIN: 20 OINTMENT TOPICAL at 20:40

## 2018-02-13 RX ADMIN — OXYCODONE HYDROCHLORIDE AND ACETAMINOPHEN 1 TABLET: 5; 325 TABLET ORAL at 14:03

## 2018-02-13 RX ADMIN — ACETAMINOPHEN 1000 MG: 500 TABLET ORAL at 08:22

## 2018-02-13 RX ADMIN — EPHEDRINE SULFATE 10 MG: 50 INJECTION INTRAMUSCULAR; INTRAVENOUS; SUBCUTANEOUS at 10:46

## 2018-02-13 RX ADMIN — QUETIAPINE FUMARATE 150 MG: 50 TABLET, EXTENDED RELEASE ORAL at 22:03

## 2018-02-13 RX ADMIN — FENTANYL CITRATE 25 MCG: 50 INJECTION INTRAMUSCULAR; INTRAVENOUS at 11:02

## 2018-02-13 RX ADMIN — SODIUM CHLORIDE, POTASSIUM CHLORIDE, SODIUM LACTATE AND CALCIUM CHLORIDE 9 ML/HR: 600; 310; 30; 20 INJECTION, SOLUTION INTRAVENOUS at 08:50

## 2018-02-13 RX ADMIN — FENTANYL CITRATE 25 MCG: 50 INJECTION INTRAMUSCULAR; INTRAVENOUS at 11:05

## 2018-02-13 RX ADMIN — MIDAZOLAM 2 MG: 1 INJECTION INTRAMUSCULAR; INTRAVENOUS at 08:49

## 2018-02-13 RX ADMIN — FLUOXETINE HYDROCHLORIDE 20 MG: 20 CAPSULE ORAL at 16:41

## 2018-02-13 RX ADMIN — FENTANYL CITRATE 50 MCG: 50 INJECTION INTRAMUSCULAR; INTRAVENOUS at 10:39

## 2018-02-13 RX ADMIN — FENTANYL CITRATE 25 MCG: 50 INJECTION INTRAMUSCULAR; INTRAVENOUS at 13:02

## 2018-02-13 RX ADMIN — PROPOFOL 50 MG: 10 INJECTION, EMULSION INTRAVENOUS at 11:02

## 2018-02-13 RX ADMIN — FENTANYL CITRATE 50 MCG: 50 INJECTION INTRAMUSCULAR; INTRAVENOUS at 09:27

## 2018-02-13 RX ADMIN — EPHEDRINE SULFATE 5 MG: 50 INJECTION INTRAMUSCULAR; INTRAVENOUS; SUBCUTANEOUS at 10:55

## 2018-02-13 RX ADMIN — CELECOXIB 200 MG: 200 CAPSULE ORAL at 08:22

## 2018-02-13 RX ADMIN — ASPIRIN 325 MG: 325 TABLET, COATED ORAL at 18:02

## 2018-02-13 RX ADMIN — OXYCODONE HYDROCHLORIDE AND ACETAMINOPHEN 1 TABLET: 5; 325 TABLET ORAL at 18:03

## 2018-02-13 RX ADMIN — FENTANYL CITRATE 25 MCG: 50 INJECTION INTRAMUSCULAR; INTRAVENOUS at 12:37

## 2018-02-13 RX ADMIN — SODIUM CHLORIDE 100 ML/HR: 4.5 INJECTION, SOLUTION INTRAVENOUS at 21:03

## 2018-02-13 RX ADMIN — DEXAMETHASONE SODIUM PHOSPHATE 4 MG: 10 INJECTION INTRAMUSCULAR; INTRAVENOUS at 10:55

## 2018-02-13 RX ADMIN — SODIUM CHLORIDE, POTASSIUM CHLORIDE, SODIUM LACTATE AND CALCIUM CHLORIDE: 600; 310; 30; 20 INJECTION, SOLUTION INTRAVENOUS at 10:36

## 2018-02-13 RX ADMIN — CLINDAMYCIN PHOSPHATE 900 MG: 18 INJECTION, SOLUTION INTRAMUSCULAR; INTRAVENOUS at 18:02

## 2018-02-13 RX ADMIN — LAMOTRIGINE 400 MG: 100 TABLET ORAL at 22:03

## 2018-02-13 RX ADMIN — CLINDAMYCIN PHOSPHATE 900 MG: 900 INJECTION INTRAVENOUS at 10:45

## 2018-02-13 RX ADMIN — TRANEXAMIC ACID 1000 MG: 100 INJECTION, SOLUTION INTRAVENOUS at 11:33

## 2018-02-13 RX ADMIN — FENTANYL CITRATE 25 MCG: 50 INJECTION INTRAMUSCULAR; INTRAVENOUS at 12:50

## 2018-02-13 NOTE — PLAN OF CARE
Problem: Fall Risk (Adult)  Goal: Identify Related Risk Factors and Signs and Symptoms  Outcome: Outcome(s) achieved Date Met: 02/13/18 02/13/18 1517   Fall Risk   Fall Risk: Related Risk Factors environment unfamiliar;gait/mobility problems

## 2018-02-13 NOTE — H&P
"History and Physical    Patient Name:  Yi Lee  YOB: 1945    Medical Records Number:  [id number]    Date of Admission:  2/13/2018  7:28 AM    Chief Complaint:  OA (osteoarthritis) of knee [M17.10]    Yi Lee is a 72 y.o. female who presents c/o severe left knee pain.  The pain has been on and off for many years, worsening to the point where the pain is becoming disabling.  The pain is a constant dull ache with occasional sharp, stabbing pain.  The patient has failed conservative treatment and would like to proceed with left total knee arthroplasty.    /60 (BP Location: Left arm, Patient Position: Lying)  Pulse 57  Temp 98.2 °F (36.8 °C) (Oral)   Resp 13  Ht 162.6 cm (64\")  Wt 87.1 kg (192 lb 1 oz)  SpO2 96%  BMI 32.97 kg/m2    Past Medical History:    Past Medical History:   Diagnosis Date   • Abdominal pain, LLQ    • Anemia    • Arthralgia of hip 11/22/2015   • Ataxic gait 11/22/2015    Description: Eval Dr Lillian Mederos, Neuro.  Some vestibular dysfunction present 2013/2014   • Bipolar I disorder, single manic episode    • Cardiac murmur    • Colon polyp    • Coronary artery disease    • Degeneration, intervertebral disc, thoracolumbar    • Diabetes mellitus    • Disease of thyroid gland    • Diverticulosis    • Esophageal spasm    • Fatigue    • H/O bone density study 10/17/2007    Dr. Giles   • Hemorrhoids    • History of mammogram     02/2012, per GYN Reshma Aranda   • History of Papanicolaou smear of cervix     DR. GILES GYN   • Hypertension    • Impaired cognition 11/22/2015    Description: Testing done 05/14   • Optic nerve contusion    • Pain of lower extremity 11/22/2015   • Vitamin B12 deficiency    • Vitamin D deficiency        Social History:    Social History     Social History   • Marital status:      Spouse name: N/A   • Number of children: N/A   • Years of education: N/A     Occupational History   • Not on file.     Social History Main Topics   • " Smoking status: Former Smoker     Packs/day: 1.00     Years: 7.00     Types: Cigarettes     Quit date: 1972   • Smokeless tobacco: Never Used   • Alcohol use Yes      Comment: OCC   • Drug use: No   • Sexual activity: No     Other Topics Concern   • Not on file     Social History Narrative       Family History:    Family History   Problem Relation Age of Onset   • Colon polyps Father    • Hepatitis Other    • Coronary artery disease Other    • Malig Hyperthermia Neg Hx        Current Medications:    Current Facility-Administered Medications:   •  clindamycin (CLEOCIN) 900 mg in dextrose 5% 50 mL IVPB (premix), 900 mg, Intravenous, Once, Selwyn Pinto MD  •  fentaNYL citrate (PF) (SUBLIMAZE) injection 50 mcg, 50 mcg, Intravenous, Q15 Min PRN, Mariah Gallardo MD  •  lactated ringers infusion, 9 mL/hr, Intravenous, Continuous, Mariah Gallardo MD, Last Rate: 9 mL/hr at 02/13/18 0850, 9 mL/hr at 02/13/18 0850  •  midazolam (VERSED) injection 1 mg, 1 mg, Intravenous, Q5 Min PRN **OR** midazolam (VERSED) injection 2 mg, 2 mg, Intravenous, Q5 Min PRN, Mariah Gallardo MD, 2 mg at 02/13/18 0849  •  ropivacaine (NAROPIN) 50 mL, ketorolac (TORADOL) 30 mg, EPINEPHrine PF (ADRENALIN) 0.3 mg in sodium chloride 0.9 % 101.3 mL, , Injection, Once, Selwyn Pinto MD  •  sodium chloride 0.9 % flush 1-10 mL, 1-10 mL, Intravenous, PRN, Mariah Gallardo MD    Allergies:    Allergies   Allergen Reactions   • Morphine Shortness Of Breath   • Penicillins Hives and Swelling   • Ace Inhibitors Cough   • Telmisartan Cough       Review of Systems:   HEENT: Patient denies any headaches, vision changes, change in hearing, or tinnitus, Patient denies any rhinorrhea,epistaxis, sinus pain, mouth or dental problems, sore throat or hoarseness, or dysphagia  Pulmonary: Patient denies any cough, congestion, SOA, or wheezing  Cardiovascular: Patient denies any chest pain, dyspnea, palpitations, weakness, intolerance of exercise, varicosities, swelling  of extremities, known murmur  Gastrointestinal:  Patient denies nausea, vomiting, diarrhea, constipation, loss  of appetite, change in appetite, dysphagia, gas, heartburn, melena, change in bowel habits, use of laxatives or other drugs to alter the function of the gastrointestinal tract.  Genital/Urinary: Patient denies dysuria, change in color of urine, change in frequency of urination, pain with urgency, incontinence, retention, or nocturia.  Musculoskeletal: Patient denies increased warmth; redness; or swelling of joints; limitation of function; deformity; crepitation: pain in a joint or an extremity, the neck, or the back, especially with movement.  Neurological: Patient denies dizziness, tremor, ataxia, difficulty in speaking, change in speech, paresthesia, loss of sensation, seizures, syncope, changes in memory.  Endocrine system: Patient denies tremors, palpitations, intolerance of heat or cold, polyuria, polydipsia, polyphagia, diaphoresis, exophthalmos, or goiter.  Psychological: Patient denies thoughts/plans or harming self or other; depression,  insomnia, night terrors, saul, memory loss, disorientation.  Skin: Patient denies any bruising, rashes, discoloration, pruritus, wounds, ulcers, decubiti, changes in the hair or nails  Hematopoietic: Patient denies history of spontaneous or excessive bleeding, epistaxis, hematuria, melena, fatigue, enlarged or tender lymph nodes, pallor, history of anemia.        Physical Exam:   Awake, A&O x3, affect normal, no acute distress  Ambulating with a limp due to knee pain  Knee ROM is limited due to pain (5-115)  Strength is 4/5 in the quad, hamstring and calf  Cap refill is normal, Sensation intact    Card:  RR, HD Stable  Pulm:  Regular breathing, no S.O.A  Abd:  Soft, NT, ND    Lab Results (last 24 hours)     Procedure Component Value Units Date/Time    POC Glucose Once [[id number]]  (Abnormal) Collected:  02/13/18 0742    Specimen:  Blood Updated:  02/13/18 0743      Glucose 141 (H) mg/dL     Narrative:       Meter: [id number] : [id number] Marco KAMINSKI          Xr Knee 1 Or 2 View Left    Result Date: 2/9/2018  Narrative: HISTORY: Preop, left knee surgery, left knee pain.  PA AND LATERAL CHEST 02/07/2018  FINDINGS: Heart size is within normal limits. Lungs appear free of acute infiltrates. Small calcified granuloma is seen in the left lung base. Bones and soft tissues are unremarkable.      Impression: No acute process.   LEFT KNEE 2 VIEWS 02/07/2018  FINDINGS: There is severe medial tibiofemoral joint space narrowing. Hypertrophic changes are seen in the knee. No fracture or subluxation is seen.  IMPRESSION: Moderate degenerative arthritis of the left knee.  This report was finalized on 2/9/2018 9:41 AM by Dr. Jonathan Munoz MD.      Xr Chest Pa & Lateral    Result Date: 2/9/2018  Narrative: HISTORY: Preop, left knee surgery, left knee pain.  PA AND LATERAL CHEST 02/07/2018  FINDINGS: Heart size is within normal limits. Lungs appear free of acute infiltrates. Small calcified granuloma is seen in the left lung base. Bones and soft tissues are unremarkable.      Impression: No acute process.   LEFT KNEE 2 VIEWS 02/07/2018  FINDINGS: There is severe medial tibiofemoral joint space narrowing. Hypertrophic changes are seen in the knee. No fracture or subluxation is seen.  IMPRESSION: Moderate degenerative arthritis of the left knee.  This report was finalized on 2/9/2018 9:41 AM by Dr. Jonathan Munoz MD.          Assessment:  End-stage Primary left Knee Osteoarthritis    Plan:  Patient's pain is becoming disabling, despite extensive conservative treatment.  Radiographs reveal end-stage degenerative changes.  The risks of surgery, including, but not limited to, heart attack, stroke, dying, DVT, nerve injury, vascular injury, arthrofibrosis and infection were discussed.  The alternatives and benefits were also discussed.  All questions answered and the patient wishes to  proceed with left total knee arthroplasty.

## 2018-02-13 NOTE — OP NOTE
Operative Note    Patient Name:  Yi Lee  YOB: 1945  Medical Records Number:  [id number]    Date of Procedure:  2/13/2018    Pre-operative Diagnosis:  Primary Osteoarthritis Left Knee    Post-operative Diagnosis:  Primary Osteoarthritis Left Knee    Procedure Performed:  Left Total Knee Arthroplasty    Implants:  Biomet Vanguard TKA, 60 Femoral Component, 67 Tibial Tray with a 40 mm Modular Stem, 31 3-Peg Patella, 14 lipped  Polyethylene Insert    Surgeon:  Selwyn Pinto M.D.    Assistant: Gaye Frazier (who was present during the critical portions of the case, thereby decreasing operative time and patient morbidity)    Anesthetic Type:  General    Estimated Blood Loss:  250cc's    Specimens:   Order Name Source Comment Collection Info Order Time   TYPE AND SCREEN   Collected By: Jessi Palencia RN 2/13/2018  9:06 AM       No Complications      Indications for Procedure:  Yi Lee is a 72 y.o. female suffering from end stage degenerative changes in the left knee.  The patients pain is becoming disabling, despite extensive conservative care, including NSAIDS, therapy and injections. The risks, benefits and alternatives were discussed and the patient wishes to proceed with left total knee arthroplasty.      Procedure Performed:    After informed consent was obtained and pre-operative IV Clindamycin given, prior to tourniquet inflation, the patient was taken to the operating room and placed supine on the operating table.  After general anesthesia induced, the patient's left lower extremity was prepped with chloraprep and draped in a sterile fashion.    A midline incision was made overlying the left knee and we sharply dissected down to expose the parapatellar retinaculum.  A mid-vastus, muscle sparing, parapatellar arthrotomy was performed and we elevated the soft tissue both medially and laterally.  The menisci and ACL were excised.  We everted the patella and measured this to be 22 mm  and we removed 7 mm of bone down to 15mm.  We measured the patella to be 31 and drilled our three drill holes.  We protected the patella with the patella protector and turned our attention to the femur and the tibia.    We drilled drill holes into the femoral and the tibial intramedullary canals and proceeded to irrigate the canals to remove the fatty marrow.  We used the intramedullary isis and the 5 degree valgus cut block to make our distal femoral cut.  Once we had a smooth surface, we measured the femur to be 60.  We assured we had rotational alignment using the epicondylar axis, then we used the four-in-one cut block to make our anterior, posterior, anterior and posterior chamfer cuts.  We placed our femoral trial, had excellent fit, and extended the knee and marked Jerardo's line on the tibia.      We then turned our attention to the tibia, where we irrigated the canal again.  We used the intramedullary isis and the 3 degree posterior sloped cut block to remove 2 mm of bone from the effected side of the tibia.  Prior to making our cut, we assured we had rotational alignment using the external guide and [place]'s line.  Once we had a smooth surface, we measured the tibia to be 67.  We then removed soft tissue and bony debris from the posterior aspect of the knee.    We placed our trial implants and had excellent fit, range of motion, stability and ligament balance, throughout a complete arc of motion with a 14 lipped polyethylene liner.  We removed our trial implants, punched the tibial keel, copiously irrigated the knee, gave 1 gm of IV Tranxemic Acid, then cemented our implants in place using palacos cement.  Once the cement cured, we trialed again with the 12, 14 and 16 standard and posterior lipped polyethylene liners.  The 14 lipped gave us the best range of motion, stability and ligament balance, throughout a complete arc of motion.  We removed the trials, copiously irrigated the knee, gave IV  "antibiotics and local anesthetic, then placed our permanent polyethylene liner.  We placed a 1/8\" hemovac drain, then closed the arthrotomy with #1 Vicryl pop-off sutures.  The subcutaneous tissue was closed with 2-0 vicryl pop-off sutures.  The skin was closed with staples.  We placed a sterile dressing of Xeroform, 4x4's, abdominal pads, cast padding and an Ace Wrap.  The patient was then awakened from general anesthesia and taken to the recovery room in stable condition.    The patient will be started on Aspirin 325mg twice daily for DVT prophylaxis.  IV antibiotics will be discontinued within 24 hours of surgery.  Immediately prior to surgery, there were no acute Thromboembolic nor Cardiovascular risk factors.  An updated Medical Reconciliation form is on the chart.        "

## 2018-02-13 NOTE — PLAN OF CARE
Problem: Patient Care Overview (Adult)  Goal: Plan of Care Review  Outcome: Ongoing (interventions implemented as appropriate)   02/13/18 1601   Coping/Psychosocial Response Interventions   Plan Of Care Reviewed With patient   Outcome Evaluation   Outcome Summary/Follow up Plan Pt is a 71 yo F admitted post-op L TKA for h/o OA. Pt presents with impaired gait and balance secondary to some LLE weakness, decreased ROM, and pain post-op. Pt may benefit from skilled PT to address gait, strength, ROM, and functional independence.       Problem: Inpatient Physical Therapy  Goal: Transfer Training Goal 1 LTG- PT  Outcome: Ongoing (interventions implemented as appropriate)   02/13/18 1601   Transfer Training PT LTG   Transfer Training PT LTG, Time to Achieve 3 days   Transfer Training PT LTG, Activity Type all transfers   Transfer Training PT LTG, Solon Level supervision required   Transfer Training PT LTG, Assist Device walker, rolling     Goal: Gait Training Goal LTG- PT  Outcome: Ongoing (interventions implemented as appropriate)   02/13/18 1601   Gait Training PT LTG   Gait Training Goal PT LTG, Time to Achieve 1 wk   Gait Training Goal PT LTG, Solon Level supervision required   Gait Training Goal PT LTG, Assist Device walker, rolling   Gait Training Goal PT LTG, Distance to Achieve 150     Goal: Stair Training Goal LTG- PT  Outcome: Ongoing (interventions implemented as appropriate)   02/13/18 1601   Stair Training PT LTG   Stair Training Goal PT LTG, Time to Achieve 1 wk   Stair Training Goal PT LTG, Number of Steps 3   Stair Training Goal PT LTG, [place] Level contact guard assist   Stair Training Goal PT LTG, Assist Device 1 handrail     Goal: Range of Motion Goal LTG- PT  Outcome: Ongoing (interventions implemented as appropriate)   02/13/18 1601   Range of Motion PT LTG   Range of Motion Goal PT LTG, Time to Achieve 3 days   Range fo Motion Goal PT LTG, Joint L knee   Range of Motion Goal PT LTG,  AROM Measure 0-90

## 2018-02-13 NOTE — ANESTHESIA PREPROCEDURE EVALUATION
Anesthesia Evaluation                  Airway   Mallampati: I  TM distance: >3 FB  Neck ROM: full  Dental    (+) edentulous    Pulmonary    (+) a smoker Former, COPD,   Cardiovascular     (+) hypertension, valvular problems/murmurs, CAD, hyperlipidemia  (-) dysrhythmias, angina      Neuro/Psych  GI/Hepatic/Renal/Endo    (+) morbid obesity, GERD, renal disease CRI, diabetes mellitus,     Musculoskeletal     Abdominal    Substance History      OB/GYN          Other                        Anesthesia Plan    ASA 3     general   (Abducter canal block)  intravenous induction   Anesthetic plan and risks discussed with patient.

## 2018-02-13 NOTE — PLAN OF CARE
Problem: Patient Care Overview (Adult)  Goal: Plan of Care Review  Outcome: Ongoing (interventions implemented as appropriate)   02/13/18 1321   Coping/Psychosocial Response Interventions   Plan Of Care Reviewed With patient   Patient Care Overview   Progress improving   Outcome Evaluation   Outcome Summary/Follow up Plan VSS, responding well to pain medication.       Problem: Perioperative Period (Adult)  Goal: Signs and Symptoms of Listed Potential Problems Will be Absent or Manageable (Perioperative Period)  Outcome: Ongoing (interventions implemented as appropriate)

## 2018-02-13 NOTE — ANESTHESIA POSTPROCEDURE EVALUATION
Patient: Yi Lee    Procedure Summary     Date Anesthesia Start Anesthesia Stop Room / Location    02/13/18 1036 1225 [facility] / [facility]       Procedure Diagnosis Surgeon Provider    LT TOTAL KNEE ARTHROPLASTY (Left Knee) No diagnosis on file. MD Mercedes Lazo MD          Anesthesia Type: general  Last vitals  BP   165/78 (02/13/18 1315)   Temp   36.7 °C (98.1 °F) (02/13/18 1222)   Pulse   59 (02/13/18 1315)   Resp   16 (02/13/18 1315)     SpO2   96 % (02/13/18 1315)     Post Anesthesia Care and Evaluation    Patient location during evaluation: PACU  Patient participation: complete - patient participated  Level of consciousness: awake  Pain management: adequate  Airway patency: patent  Anesthetic complications: No anesthetic complications  PONV Status: none  Cardiovascular status: acceptable  Respiratory status: acceptable  Hydration status: acceptable

## 2018-02-13 NOTE — PLAN OF CARE
Problem: Patient Care Overview (Adult)  Goal: Plan of Care Review  Outcome: Ongoing (interventions implemented as appropriate)   02/13/18 3093   Coping/Psychosocial Response Interventions   Plan Of Care Reviewed With patient   Patient Care Overview   Progress improving   Outcome Evaluation   Outcome Summary/Follow up Plan WORKED C PT, VOIDING PER BRP, AMBULATING, PAIN CONTROLLED, VSS- NO HTN NOTED, NVI, A&OX4     Goal: Adult Individualization and Mutuality  Outcome: Ongoing (interventions implemented as appropriate)    Goal: Discharge Needs Assessment  Outcome: Ongoing (interventions implemented as appropriate)      Problem: Knee Replacement, Total (Adult)  Goal: Signs and Symptoms of Listed Potential Problems Will be Absent or Manageable (Knee Replacement, Total)  Outcome: Ongoing (interventions implemented as appropriate)      Problem: Fall Risk (Adult)  Goal: Absence of Falls  Outcome: Ongoing (interventions implemented as appropriate)

## 2018-02-13 NOTE — PLAN OF CARE
Problem: Patient Care Overview (Adult)  Goal: Plan of Care Review  Outcome: Ongoing (interventions implemented as appropriate)   02/13/18 0813   Coping/Psychosocial Response Interventions   Plan Of Care Reviewed With patient   Patient Care Overview   Progress no change     Goal: Adult Individualization and Mutuality  Outcome: Ongoing (interventions implemented as appropriate)   02/13/18 0813   Individualization   Patient Specific Preferences Renu     Goal: Discharge Needs Assessment  Outcome: Ongoing (interventions implemented as appropriate)      Problem: Perioperative Period (Adult)  Goal: Signs and Symptoms of Listed Potential Problems Will be Absent or Manageable (Perioperative Period)  Outcome: Ongoing (interventions implemented as appropriate)   02/13/18 0813   Perioperative Period   Problems Assessed (Perioperative Period) all   Problems Present (Perioperative Period) pain

## 2018-02-14 LAB
ANION GAP SERPL CALCULATED.3IONS-SCNC: 10.4 MMOL/L
BUN BLD-MCNC: 28 MG/DL (ref 8–23)
BUN/CREAT SERPL: 18.4 (ref 7–25)
CALCIUM SPEC-SCNC: 8.8 MG/DL (ref 8.6–10.5)
CHLORIDE SERPL-SCNC: 101 MMOL/L (ref 98–107)
CO2 SERPL-SCNC: 26.6 MMOL/L (ref 22–29)
CREAT BLD-MCNC: 1.52 MG/DL (ref 0.57–1)
GFR SERPL CREATININE-BSD FRML MDRD: 34 ML/MIN/1.73
GLUCOSE BLD-MCNC: 119 MG/DL (ref 65–99)
HCT VFR BLD AUTO: 30.5 % (ref 35.6–45.5)
HGB BLD-MCNC: 9.9 G/DL (ref 11.9–15.5)
POTASSIUM BLD-SCNC: 4.7 MMOL/L (ref 3.5–5.2)
SODIUM BLD-SCNC: 138 MMOL/L (ref 136–145)

## 2018-02-14 PROCEDURE — 85014 HEMATOCRIT: CPT | Performed by: ORTHOPAEDIC SURGERY

## 2018-02-14 PROCEDURE — 97150 GROUP THERAPEUTIC PROCEDURES: CPT

## 2018-02-14 PROCEDURE — 97110 THERAPEUTIC EXERCISES: CPT

## 2018-02-14 PROCEDURE — 25010000002 KETOROLAC TROMETHAMINE PER 15 MG: Performed by: ORTHOPAEDIC SURGERY

## 2018-02-14 PROCEDURE — 80048 BASIC METABOLIC PNL TOTAL CA: CPT | Performed by: ORTHOPAEDIC SURGERY

## 2018-02-14 PROCEDURE — 85018 HEMOGLOBIN: CPT | Performed by: ORTHOPAEDIC SURGERY

## 2018-02-14 PROCEDURE — 94799 UNLISTED PULMONARY SVC/PX: CPT

## 2018-02-14 RX ORDER — HYDROCODONE BITARTRATE AND ACETAMINOPHEN 10; 325 MG/1; MG/1
2 TABLET ORAL
Status: DISCONTINUED | OUTPATIENT
Start: 2018-02-14 | End: 2018-02-15 | Stop reason: HOSPADM

## 2018-02-14 RX ORDER — HYDROCODONE BITARTRATE AND ACETAMINOPHEN 10; 325 MG/1; MG/1
1 TABLET ORAL EVERY 6 HOURS PRN
Status: DISCONTINUED | OUTPATIENT
Start: 2018-02-14 | End: 2018-02-14

## 2018-02-14 RX ORDER — HYDROCODONE BITARTRATE AND ACETAMINOPHEN 10; 325 MG/1; MG/1
1 TABLET ORAL
Status: DISCONTINUED | OUTPATIENT
Start: 2018-02-14 | End: 2018-02-15 | Stop reason: HOSPADM

## 2018-02-14 RX ADMIN — FLUOXETINE HYDROCHLORIDE 20 MG: 20 CAPSULE ORAL at 08:15

## 2018-02-14 RX ADMIN — PANTOPRAZOLE SODIUM 40 MG: 40 TABLET, DELAYED RELEASE ORAL at 06:41

## 2018-02-14 RX ADMIN — KETOROLAC TROMETHAMINE 15 MG: 30 INJECTION, SOLUTION INTRAMUSCULAR at 14:52

## 2018-02-14 RX ADMIN — FERROUS SULFATE TAB 325 MG (65 MG ELEMENTAL FE) 325 MG: 325 (65 FE) TAB at 08:15

## 2018-02-14 RX ADMIN — HYDROCODONE BITARTRATE AND ACETAMINOPHEN 2 TABLET: 10; 325 TABLET ORAL at 19:44

## 2018-02-14 RX ADMIN — CLINDAMYCIN PHOSPHATE 900 MG: 18 INJECTION, SOLUTION INTRAMUSCULAR; INTRAVENOUS at 04:41

## 2018-02-14 RX ADMIN — MUPIROCIN: 20 OINTMENT TOPICAL at 08:15

## 2018-02-14 RX ADMIN — HYDROCHLOROTHIAZIDE 12.5 MG: 12.5 CAPSULE ORAL at 08:15

## 2018-02-14 RX ADMIN — LAMOTRIGINE 400 MG: 100 TABLET ORAL at 21:01

## 2018-02-14 RX ADMIN — LEVOTHYROXINE SODIUM 100 MCG: 100 TABLET ORAL at 06:41

## 2018-02-14 RX ADMIN — QUETIAPINE FUMARATE 150 MG: 50 TABLET, EXTENDED RELEASE ORAL at 21:01

## 2018-02-14 RX ADMIN — ASPIRIN 325 MG: 325 TABLET, COATED ORAL at 17:58

## 2018-02-14 RX ADMIN — OXYCODONE HYDROCHLORIDE AND ACETAMINOPHEN 1 TABLET: 5; 325 TABLET ORAL at 12:46

## 2018-02-14 RX ADMIN — OXYCODONE HYDROCHLORIDE AND ACETAMINOPHEN 1 TABLET: 5; 325 TABLET ORAL at 04:27

## 2018-02-14 RX ADMIN — METOPROLOL SUCCINATE 100 MG: 100 TABLET, FILM COATED, EXTENDED RELEASE ORAL at 08:14

## 2018-02-14 RX ADMIN — OXYCODONE HYDROCHLORIDE AND ACETAMINOPHEN 1 TABLET: 5; 325 TABLET ORAL at 08:15

## 2018-02-14 RX ADMIN — DIAZEPAM 5 MG: 5 TABLET ORAL at 16:19

## 2018-02-14 RX ADMIN — MUPIROCIN: 20 OINTMENT TOPICAL at 21:01

## 2018-02-14 RX ADMIN — ASPIRIN 325 MG: 325 TABLET, COATED ORAL at 08:15

## 2018-02-14 RX ADMIN — OXYCODONE HYDROCHLORIDE AND ACETAMINOPHEN 1 TABLET: 5; 325 TABLET ORAL at 14:52

## 2018-02-14 RX ADMIN — METFORMIN HYDROCHLORIDE 1000 MG: 1000 TABLET ORAL at 08:15

## 2018-02-14 NOTE — NURSING NOTE
Michelle with PT said pt was c/o calf pain; I performed Akilah's sign on pt and she was only having some pain behind the knee-NO calf pain-that has subsided per pt; pt said she did have a pillow behind her knee during the night so knee was slightly bent.

## 2018-02-14 NOTE — THERAPY TREATMENT NOTE
Acute Care - Physical Therapy Treatment Note  [facility]     Patient Name: Yi Lee  : 1945  MRN: [id number]  Today's Date: 2018  Onset of Illness/Injury or Date of Surgery Date: 18          Admit Date: 2018    Visit Dx:    ICD-10-CM ICD-9-CM   1. Difficulty walking R26.2 719.7     Patient Active Problem List   Diagnosis   • Abnormal creatinine clearance glomerular filtration   • Benign essential hypertension   • Chronic obstructive pulmonary disease   • Depression   • Type 2 diabetes mellitus with diabetic neuropathy   • Gastroesophageal reflux disease   • Hyperlipidemia   • Hypothyroidism   • Obesity (BMI 30-39.9)   • Memory loss   • Ataxic gait   • Abdominal pain, right lateral   • Iron deficiency anemia due to chronic blood loss   • Cystic kidney disease   • Vitamin B12 deficiency   • OA (osteoarthritis) of knee               Adult Rehabilitation Note       18 0835          Rehab Assessment/Intervention    Discipline physical therapy assistant  -      Document Type therapy note (daily note)  -      Subjective Information agree to therapy;complains of;pain;weakness;fatigue;swelling  -      Precautions/Limitations fall precautions  -      Specific Treatment Considerations c/o calf pain-notified RN  -JM      Recorded by [JM] Ria Ramírez PTA      Pain Assessment    Pain Assessment 0-10  -      Pain Score 5  -      Pain Type Surgical pain  -      Pain Location Knee  -      Pain Orientation Left  -      Pain Intervention(s) Medication (See MAR);Repositioned   nsg to get fresh ice pack  -      Response to Interventions destiny  -JM      Recorded by [BRITTNY] Ria Ramírez PTA      ROM (Range of Motion)    General ROM Detail -11-97 L knee  -JM      Recorded by [BRITTNY] Ria Ramírez PTA      Bed Mobility, Assessment/Treatment    Bed Mob, Sit to Supine, Patrick conditional independence;verbal cues required  -      Recorded by [JM] Ria Ramírez PTA       Transfer Assessment/Treatment    Transfers, Sit-Stand Person supervision required;verbal cues required  -      Transfers, Stand-Sit Person supervision required;verbal cues required  -      Transfers, Sit-Stand-Sit, Assist Device rolling walker  -      Recorded by [BRITTNY] Ria Ramírez PTA      Gait Assessment/Treatment    Gait, Person Level stand by assist;verbal cues required  -      Gait, Assistive Device rolling walker  -      Gait, Distance (Feet) 100  -      Gait, Gait Deviations step length decreased;narrow base  -      Gait, Comment improved w/distance  -      Recorded by [BRITTNY] Ria Ramírez PTA      Stairs Assessment/Treatment    Number of Stairs 4  -      Stairs, Handrail Location both sides  -      Stairs, Person Level contact guard assist;verbal cues required  -      Stairs, Technique Used step to step (ascending);step to step (descending)  -      Stairs, Impairments strength decreased;pain  -      Stairs, Comment cues to avoid buckling-c/o block possibly still working   fam present for educ  -      Recorded by [BRITTNY] Ria Ramírez PTA      Therapy Exercises    Exercise Protocols total knee  -      Total Knee Exercises left:;15 reps;completed protocol  -      Recorded by [BRITTNY] Ria Ramírez PTA      Positioning and Restraints    Pre-Treatment Position sitting in chair/recliner  -      Post Treatment Position bed  -      In Bed fowlers;call light within reach;encouraged to call for assist;with family/caregiver;notified nsg  -      Recorded by [BRITTNY] Ria Ramírez PTA        User Key  (r) = Recorded By, (t) = Taken By, (c) = Cosigned By    Initials Name Effective Dates    BRITTNY Ramírez PTA 02/18/16 -                 IP PT Goals       02/13/18 1601          Transfer Training PT LTG    Transfer Training PT LTG, Time to Achieve 3 days  -      Transfer Training PT LTG, Activity Type all transfers  -      Transfer Training PT LTG,  [place] Level supervision required  -CH      Transfer Training PT LTG, Assist Device walker, rolling  -CH      Gait Training PT LTG    Gait Training Goal PT LTG, Time to Achieve 1 wk  -CH      Gait Training Goal PT LTG, [place] Level supervision required  -CH      Gait Training Goal PT LTG, Assist Device walker, rolling  -CH      Gait Training Goal PT LTG, Distance to Achieve 150  -CH      Stair Training PT LTG    Stair Training Goal PT LTG, Time to Achieve 1 wk  -CH      Stair Training Goal PT LTG, Number of Steps 3  -CH      Stair Training Goal PT LTG, [place] Level contact guard assist  -CH      Stair Training Goal PT LTG, Assist Device 1 handrail  -CH      Range of Motion PT LTG    Range of Motion Goal PT LTG, Time to Achieve 3 days  -CH      Range fo Motion Goal PT LTG, Joint L knee  -CH      Range of Motion Goal PT LTG, AROM Measure 0-90  -CH        User Key  (r) = Recorded By, (t) = Taken By, (c) = Cosigned By    Initials Name Provider Type    SONJA Payan, PT Physical Therapist          Physical Therapy Education     Title: PT OT SLP Therapies (Done)     Topic: Physical Therapy (Done)     Point: Mobility training (Done)    Learning Progress Summary    Learner Readiness Method Response Comment Documented by Status   Patient Acceptance E,TBIVANNA,NR   02/13/18 1600 Done               Point: Home exercise program (Done)    Learning Progress Summary    Learner Readiness Method Response Comment Documented by Status   Patient Acceptance E,TB,IVANNA VINSON,NR   02/13/18 1600 Done               Point: Body mechanics (Done)    Learning Progress Summary    Learner Readiness Method Response Comment Documented by Status   Patient Acceptance EMARLEY D VU,NR   02/13/18 1600 Done               Point: Precautions (Done)    Learning Progress Summary    Learner Readiness Method Response Comment Documented by Status   Patient Acceptance E,TB,IVANNA VINSON,NR   02/13/18 1600 Done                      User Key      Initials Effective Dates Name Provider Type [facility] 12/01/15 -  Wandy Payan PT Physical Therapist PT                    PT Recommendation and Plan  Anticipated Discharge Disposition: home with home health  Planned Therapy Interventions: balance training, bed mobility training, gait training, home exercise program, patient/family education, ROM (Range of Motion), stair training, transfer training  PT Frequency: 2 times/day             Outcome Measures       02/13/18 1600          How much help from another person do you currently need...    Turning from your back to your side while in flat bed without using bedrails? 3  -CH      Moving from lying on back to sitting on the side of a flat bed without bedrails? 3  -CH      Moving to and from a bed to a chair (including a wheelchair)? 3  -CH      Standing up from a chair using your arms (e.g., wheelchair, bedside chair)? 3  -CH      Climbing 3-5 steps with a railing? 2  -CH      To walk in hospital room? 3  -CH      AM-PAC 6 Clicks Score 17  -CH      Functional Assessment    Outcome Measure Options AM-PAC 6 Clicks Basic Mobility (PT)  -        User Key  (r) = Recorded By, (t) = Taken By, (c) = Cosigned By    Initials Name Provider Type     Wandy Payan PT Physical Therapist           Time Calculation:         PT Charges       02/14/18 1015          Time Calculation    Start Time 0826  -      Stop Time 0920  -      Time Calculation (min) 54 min  -      PT Received On 02/14/18  -BRITTNY      PT - Next Appointment 02/15/18  -BRITTNY        User Key  (r) = Recorded By, (t) = Taken By, (c) = Cosigned By    Initials Name Provider Type     Ria Ramírez PTA Physical Therapy Assistant          Therapy Charges for Today     Code Description Service Date Service Provider Modifiers Qty    97320656563 HC PT THER PROC EA 15 MIN 2/14/2018 Ria Ramírez PTA GP 1    41955139575 HC PT THER PROC GROUP 2/14/2018 Ria Ramírez PTA GP 1          PT  G-Codes  Outcome Measure Options: AM-PAC 6 Clicks Basic Mobility (PT)    Ria Ramírez, PTA  2/14/2018

## 2018-02-14 NOTE — PROGRESS NOTES
"Ortho POD 1    Patients Name:  Yi Lee  YOB: 1945  Medical Records Number:  [id number]    No complaints except pain    /54 (BP Location: Left arm, Patient Position: Lying)  Pulse 57  Temp 99 °F (37.2 °C) (Oral)   Resp 16  Ht 162.6 cm (64\")  Wt 87.1 kg (192 lb 1 oz)  SpO2 97%  BMI 32.97 kg/m2    LLE:  NVI, calf nontender, Sensation intact  No signs of DVT    Incision: clean, no infection    Lab Results (last 24 hours)     Procedure Component Value Units Date/Time    POC Glucose Once [[id number]]  (Abnormal) Collected:  02/13/18 0742    Specimen:  Blood Updated:  02/13/18 0743     Glucose 141 (H) mg/dL     Narrative:       Meter: [id number] : [id number] Marco KAMINSKI    Hemoglobin & Hematocrit, Blood [[id number]]  (Abnormal) Collected:  02/14/18 0344    Specimen:  Blood Updated:  02/14/18 0430     Hemoglobin 9.9 (L) g/dL      Hematocrit 30.5 (L) %     Basic Metabolic Panel [[id number]]  (Abnormal) Collected:  02/14/18 0344    Specimen:  Blood Updated:  02/14/18 0512     Glucose 119 (H) mg/dL      BUN 28 (H) mg/dL      Creatinine 1.52 (H) mg/dL      Sodium 138 mmol/L      Potassium 4.7 mmol/L      Chloride 101 mmol/L      CO2 26.6 mmol/L      Calcium 8.8 mg/dL      eGFR Non African Amer 34 (L) mL/min/1.73      BUN/Creatinine Ratio 18.4     Anion Gap 10.4 mmol/L     Narrative:       The MDRD GFR formula is only valid for adults with stable renal function between ages 18 and 70.          Xr Knee 1 Or 2 View Left    Result Date: 2/13/2018  Narrative: XR KNEE 1 OR 2 VW LEFT-  INDICATIONS: Postoperative evaluation.  TECHNIQUE:     Frontal and lateral views of the left knee  COMPARISON: 02/07/2018  FINDINGS:   Intact appearing knee arthroplasty hardware is seen with adjacent surgical soft tissue gas, drain, overlying skin staples. No acute fracture is identified.       Impression:  Postsurgical changes.    This report was finalized on 2/13/2018 12:55 PM by Dr. Daren Apodaca MD.  "     Xr Knee 1 Or 2 View Left    Result Date: 2/9/2018  Narrative: HISTORY: Preop, left knee surgery, left knee pain.  PA AND LATERAL CHEST 02/07/2018  FINDINGS: Heart size is within normal limits. Lungs appear free of acute infiltrates. Small calcified granuloma is seen in the left lung base. Bones and soft tissues are unremarkable.      Impression: No acute process.   LEFT KNEE 2 VIEWS 02/07/2018  FINDINGS: There is severe medial tibiofemoral joint space narrowing. Hypertrophic changes are seen in the knee. No fracture or subluxation is seen.  IMPRESSION: Moderate degenerative arthritis of the left knee.  This report was finalized on 2/9/2018 9:41 AM by Dr. Jonathan Munoz MD.      Xr Chest Pa & Lateral    Result Date: 2/9/2018  Narrative: HISTORY: Preop, left knee surgery, left knee pain.  PA AND LATERAL CHEST 02/07/2018  FINDINGS: Heart size is within normal limits. Lungs appear free of acute infiltrates. Small calcified granuloma is seen in the left lung base. Bones and soft tissues are unremarkable.      Impression: No acute process.   LEFT KNEE 2 VIEWS 02/07/2018  FINDINGS: There is severe medial tibiofemoral joint space narrowing. Hypertrophic changes are seen in the knee. No fracture or subluxation is seen.  IMPRESSION: Moderate degenerative arthritis of the left knee.  This report was finalized on 2/9/2018 9:41 AM by Dr. Jonathan Munoz MD.        S/p Left TKA  WBAT with walker  ASA for DVT prophylaxis  Home today after PT, if comfortable

## 2018-02-14 NOTE — PLAN OF CARE
Problem: Patient Care Overview (Adult)  Goal: Plan of Care Review   02/14/18 0028   Coping/Psychosocial Response Interventions   Plan Of Care Reviewed With patient   Outcome Evaluation   Outcome Summary/Follow up Plan po pain medication effective, unsteady with ambulation,VSS, skin on left hand reddeded from hand , educated on importance of monitoring blood pressure at home.     Goal: Adult Individualization and Mutuality  Outcome: Ongoing (interventions implemented as appropriate)    Goal: Discharge Needs Assessment  Outcome: Ongoing (interventions implemented as appropriate)      Problem: Perioperative Period (Adult)  Goal: Signs and Symptoms of Listed Potential Problems Will be Absent or Manageable (Perioperative Period)  Outcome: Ongoing (interventions implemented as appropriate)      Problem: Knee Replacement, Total (Adult)  Goal: Signs and Symptoms of Listed Potential Problems Will be Absent or Manageable (Knee Replacement, Total)  Outcome: Ongoing (interventions implemented as appropriate)      Problem: Fall Risk (Adult)  Goal: Absence of Falls  Outcome: Ongoing (interventions implemented as appropriate)

## 2018-02-14 NOTE — DISCHARGE SUMMARY
Discharge Summary    Patient Name:  Yi Lee  YOB: 1945  Medical Records Number:  [id number]    Date of Admission:  2/13/2018  Date of Discharge:  2/15/2017    Primary Discharge Diagnosis:  OA (osteoarthritis) of knee [M17.10]    Secondary Discharge Diagnosis:    Problem List Items Addressed This Visit     None          Procedures Performed:  Left Total Knee Arthroplasty      Hospital Course:    Yi Lee is a 72 y.o.  female who underwent successful left tka on 2/13/2018.  Yi Lee was started on Aspirin 325mg po twice daily immediately post-operatively for DVT prophylaxis.  On post-op day 1 the patients dressing was changed and their incision was clean, with no signs of infection and their calf was soft, with no signs of DVT.  The patient progressed well with physical therapy and the patients hemoglobin remained stable. On post-operative day 2 the patient was felt ready for discharge.      Total Knee Joint Replacement Discharge Instructions:    I. ACTIVITIES:  1. Exercises:  ? Complete exercise program as taught by the hospital physical therapist 2 times per day  ? Exercise program will be advanced by the physical therapist  ? During the day be up ambulating every 2 hours (while awake) for short distances  ? Complete the ankle pump exercises at least 10 times per hour (while awake)  ? Elevate legs most of the day the first week post operatively and thereafter elevate legs when in bed and for at least 30 minutes during the day. Caution must be taken to avoid pillow placement under the bend of the knee as this can led to flexion contractures of the knee.  ? Use cold packs 20-30 minutes approximately 5 times per day. This should be done before and after completing your exercises and at any time you are experiencing pain/ stiffness in your operative extremity.      2. Activities of Daily Living:  ? No tub baths, hot tubs, or swimming pools for 4 weeks  ? May shower and let water run  over the incision on post-operative day #5 if no drainage. Do not scrub or rub the incision. Simply let the water run over the incision and pat dry.    II. Restrictions  ? Do not cross legs or kneel  ? Your surgeon will discuss with you when you will be able to drive again.  ? Weight bearing is as tolerated  ? First week stay inside on even terrain. May go up and down stairs one stair at a time utilizing the hand rail  ? After one week, you may venture outside.    III. Precautions:  ? Everyone that comes near you should wash their hands  ? No elective dental, genital-urinary, or colon procedures or surgical procedures for 12 weeks after surgery unless absolutely necessary.  ?  If dental work or surgical procedure is deemed absolutely necessary, you will need to contact your surgeon as you will need to take antibiotics 1 hour prior to any dental work (including teeth cleanings).  ? Please discuss with your surgeon prophylactic antibiotics as the length of time this intervention will be necessary for you varies with each patient’s health history and situation.  ? Avoid sick people. If you must be around someone who is ill, they should wear a mask.  ? Avoid visits to the Emergency Room or Urgent Care unless you are having a life threatening event.   ? If ordered stockings are to be placed on in the morning and removed at night. Monitor the stockings to ensure that any swelling is not causing the stockings to become too tight. In this case, remove stockings immediately.    IV. INCISION CARE:  ? Wash your hands prior to dressing changes  ? Change the dressing as needed to keep incision clean and dry. Utilize dry gauze and paper tape. Avoid touching the side of the gauze that goes against the incision with your hands.  ? No creams or ointments to the incision  ? May remove dressing once the incision is free of drainage  ? Do not touch or pick at the incision  ? Check incision every day and notify surgeon immediately if  any of the following signs or symptoms are noted:  o Increase in redness  o Increase in swelling around the incision and of the entire extremity  o Increase in pain  o Drainage oozing from the incision  o Pulling apart of the edges of the incision  o Increase in overall body temperature (greater than 100.5 degrees)  ? Your surgeon will instruct you regarding suture or staple removal    V. Medications:   1. Anticoagulants: You will be discharged on an anticoagulant. This is a prophylactic medication that helps prevent blood clots during your post-operative period. The type and length of dosage varies based on your individual needs, procedure performed, and surgeon’s preference.  ? While taking the anticoagulant, you should avoid taking any additional aspirin, ibuprofen (Advil or Motrin), Aleve (Naprosyn) or other non-steroidal anti-inflammatory medications.   ? Notify surgeon immediately if any fabio bleeding is noted in the urine, stool, emesis, or from the nose or the incision. Blood in the stool will often appear as black rather than red. Blood in urine may appear as pink. Blood in emesis may appear as brown/black like coffee grounds.  ? You will need to apply pressure for longer periods of time to any cuts or abrasions to stop bleeding  ? Avoid alcohol while taking anticoagulants    2. Stool Softeners: You will be at greater risk of constipation after surgery due to being less mobile and the pain medications.   ? Take stool softeners as instructed by your surgeon while on pain medications. Over the counter Colace 100 mg 1-2 capsules twice daily.   ? If stools become too loose or too frequent, please decreases the dosage or stop the stool softener.  ? If constipation occurs despite use of stool softeners, you are to continue the stool softeners and add a laxative (Milk of Magnesia 1 ounce daily as needed)  ? Drink plenty of fluids, and eat fruits and vegetables during your recovery time    3. Pain Medications  utilized after surgery are narcotics and the law requires that the following information be given to all patients that are prescribed narcotics:  ? CLASSIFICATION: Pain medications are called Opioids and are narcotics  ? LEGALITIES: It is illegal to share narcotics with others and to drive within 24 hours of taking narcotics  ? POTENTIAL SIDE EFFECTS: Potential side effects of opioids include: nausea, vomiting, itching, dizziness, drowsiness, dry mouth, constipation, and difficulty urinating.  ? POTENTIAL ADVERSE EFFECTS:   o Opioid tolerance can develop with use of pain medications and this simply means that it requires more and more of the medication to control pain; however, this is seen more in patients that use opioids for longer periods of time.  o Opioid dependence can develop with use of Opioids and this simply means that to stop the medication can cause withdrawal symptoms; however, this is seen with patients that use Opioids for longer periods of time.  o Opioid addiction can develop with use of Opioids and the incidence of this is very unlikely in patients who take the medications as ordered and stop the medications as instructed.  o Opioid overdose can be dangerous, but is unlikely when the medication is taken as ordered and stopped when ordered. It is important not to mix opioids with alcohol or with and type of sedative such as Benadryl as this can lead to over sedation and respiratory difficulty.  ? DOSAGE:   o Pain medications will need to be taken consistently for the first week to decrease pain and promote adequate pain relief and participation in physical therapy.  o After the initial surgical pain begins to resolve, you may begin to decrease the pain medication. By the end of 6-8 weeks, you should be off of pain medications.  o Refills will not be given by the office during evening hours, on weekends, or after 6-8 weeks post-op.  o To seek refills on pain medications during the initial 6 week  post-operative period, you must call the office 48 hours in advance to request the refill. The office will then notify you when to  the prescription. DO NOT wait until you are out of the medication to request a refill.    V. FOLLOW-UP VISITS:  ? You will need to follow up in the office with your surgeon in 3 weeks. Please call this number [phone] to schedule this appointment.  If you have any concerns or suspected complications prior to your follow up visit, please call your surgeons office. Do not wait until your appointment time if you suspect complications. These will need to be addressed in the office promptly.      Discharge Medications:     1) Norco 10/325 mg  1-2 po q 4-6 hours for pain control  2)  Aspirin 325 mg po twice daily for 2 weeks, then once daily for 4 weeks.    CC:Ankit Albert Jr., MD:Selwyn Pinto MD

## 2018-02-14 NOTE — PROGRESS NOTES
Discharge Planning Assessment  [facility]     Patient Name: Yi Lee  MRN: [id number]  Today's Date: 2/14/2018    Admit Date: 2/13/2018          Discharge Needs Assessment       02/14/18 1648    Living Environment    Lives With alone    Living Arrangements house    Discharge Needs Assessment    Concerns To Be Addressed basic needs concerns    Readmission Within The Last 30 Days no previous admission in last 30 days    Anticipated Changes Related to Illness none    Equipment Currently Used at Home none    Discharge Facility/Level Of Care Needs home with home health            Discharge Plan       02/14/18 1648    Case Management/Social Work Plan    Plan [facility]     Patient/Family In Agreement With Plan yes    Additional Comments Spoke with pt, verified correct information on facesheet and explained the role of CCP. Pt would like to d/c home with [facility] , referral given to Vanessa with [facility] who states they are able to accept. Plan will be to d/c home with [facility] and family support. No other needs identified at this time.        Discharge Placement     Facility/Agency Request Status Selected? Address Phone Number Fax Number    [facility] Accepted    Yes [address] [facility], [address], [facility] [address] [phone]                Demographic Summary     None            Functional Status     None            Psychosocial     None            Abuse/Neglect     None            Legal     None            Substance Abuse     None            Patient Forms     None          Vicky Abdalla RN

## 2018-02-14 NOTE — NURSING NOTE
Spoke to Dr. Pinto-we will keep pt until tomorrow due to her significant pain; change pain meds from Perc 5 to Norco  1-2 tabs prn Q 3-4 hrs

## 2018-02-14 NOTE — DISCHARGE PLACEMENT REQUEST
"Barber Lee (72 y.o. Female)     Date of Birth Social Security Number Address Home Phone MRN    1945  [address] [address] [phone] [id number]    Zoroastrianism Marital Status          Rastafarian        Admission Date Admission Type Admitting Provider Attending Provider Department, Room/Bed    2/13/18 Elective Selwyn Pinto MD Goodin, Robert A, MD [address], P896/1    Discharge Date Discharge Disposition Discharge Destination                      Attending Provider: Selwyn Pinto MD     Allergies:  Morphine, Penicillins, Ace Inhibitors, Telmisartan    Isolation:  None   Infection:  None   Code Status:  FULL    Ht:  162.6 cm (64\")   Wt:  87.1 kg (192 lb 1 oz)    Admission Cmt:  None   Principal Problem:  None                Active Insurance as of 2/13/2018     Primary Coverage     Payor Plan Insurance Group Employer/Plan Group    MEDICARE MEDICARE A & B      Payor Plan Address Payor Plan Phone Number Effective From Effective To    [address] [phone] 12/1/2011     [place] [address]       Subscriber Name Subscriber Birth Date Member ID       BARBER LEE 1945 [id number]           Secondary Coverage     Payor Plan Insurance Group Employer/Plan Group    [facility] SUPP [id number]     Payor Plan Address Payor Plan Phone Number Effective From Effective To    [address]  12/1/2016     [place] [address]       Subscriber Name Subscriber Birth Date Member ID       BARBER LEE 1945 [id number]                 Emergency Contacts      (Rel.) Home Phone Work Phone Mobile Phone    Gail Christianson (Friend) [phone] -- --        "

## 2018-02-15 VITALS
TEMPERATURE: 99.1 F | SYSTOLIC BLOOD PRESSURE: 184 MMHG | OXYGEN SATURATION: 95 % | RESPIRATION RATE: 16 BRPM | HEIGHT: 64 IN | WEIGHT: 192.06 LBS | HEART RATE: 74 BPM | DIASTOLIC BLOOD PRESSURE: 80 MMHG | BODY MASS INDEX: 32.79 KG/M2

## 2018-02-15 LAB
HCT VFR BLD AUTO: 30.9 % (ref 35.6–45.5)
HGB BLD-MCNC: 9.8 G/DL (ref 11.9–15.5)

## 2018-02-15 PROCEDURE — 97150 GROUP THERAPEUTIC PROCEDURES: CPT

## 2018-02-15 PROCEDURE — 85018 HEMOGLOBIN: CPT | Performed by: ORTHOPAEDIC SURGERY

## 2018-02-15 PROCEDURE — 97110 THERAPEUTIC EXERCISES: CPT

## 2018-02-15 PROCEDURE — 85014 HEMATOCRIT: CPT | Performed by: ORTHOPAEDIC SURGERY

## 2018-02-15 RX ORDER — HYDROCODONE BITARTRATE AND ACETAMINOPHEN 10; 325 MG/1; MG/1
1 TABLET ORAL EVERY 4 HOURS PRN
Qty: 80 TABLET | Refills: 0 | Status: SHIPPED | OUTPATIENT
Start: 2018-02-15 | End: 2019-02-05

## 2018-02-15 RX ADMIN — MUPIROCIN: 20 OINTMENT TOPICAL at 09:48

## 2018-02-15 RX ADMIN — HYDROCODONE BITARTRATE AND ACETAMINOPHEN 1 TABLET: 10; 325 TABLET ORAL at 00:02

## 2018-02-15 RX ADMIN — FLUOXETINE HYDROCHLORIDE 20 MG: 20 CAPSULE ORAL at 08:27

## 2018-02-15 RX ADMIN — FERROUS SULFATE TAB 325 MG (65 MG ELEMENTAL FE) 325 MG: 325 (65 FE) TAB at 08:27

## 2018-02-15 RX ADMIN — HYDROCODONE BITARTRATE AND ACETAMINOPHEN 2 TABLET: 10; 325 TABLET ORAL at 08:27

## 2018-02-15 RX ADMIN — METOPROLOL SUCCINATE 100 MG: 100 TABLET, FILM COATED, EXTENDED RELEASE ORAL at 08:26

## 2018-02-15 RX ADMIN — ASPIRIN 325 MG: 325 TABLET, COATED ORAL at 08:27

## 2018-02-15 RX ADMIN — HYDROCHLOROTHIAZIDE 12.5 MG: 12.5 CAPSULE ORAL at 08:28

## 2018-02-15 RX ADMIN — PANTOPRAZOLE SODIUM 40 MG: 40 TABLET, DELAYED RELEASE ORAL at 06:40

## 2018-02-15 RX ADMIN — METFORMIN HYDROCHLORIDE 1000 MG: 1000 TABLET ORAL at 08:27

## 2018-02-15 RX ADMIN — DOCUSATE SODIUM 100 MG: 100 CAPSULE, LIQUID FILLED ORAL at 09:48

## 2018-02-15 NOTE — THERAPY TREATMENT NOTE
Acute Care - Physical Therapy Treatment Note  [facility]     Patient Name: Yi Lee  : 1945  MRN: [id number]  Today's Date: 2/15/2018  Onset of Illness/Injury or Date of Surgery Date: 18          Admit Date: 2018    Visit Dx:    ICD-10-CM ICD-9-CM   1. Difficulty walking R26.2 719.7     Patient Active Problem List   Diagnosis   • Abnormal creatinine clearance glomerular filtration   • Benign essential hypertension   • Chronic obstructive pulmonary disease   • Depression   • Type 2 diabetes mellitus with diabetic neuropathy   • Gastroesophageal reflux disease   • Hyperlipidemia   • Hypothyroidism   • Obesity (BMI 30-39.9)   • Memory loss   • Ataxic gait   • Abdominal pain, right lateral   • Iron deficiency anemia due to chronic blood loss   • Cystic kidney disease   • Vitamin B12 deficiency   • OA (osteoarthritis) of knee               Adult Rehabilitation Note       02/15/18 0837 18 0835       Rehab Assessment/Intervention    Discipline physical therapy assistant  - physical therapy assistant  -     Document Type discharge summary;therapy note (daily note)  - therapy note (daily note);discharge summary  -     Subjective Information agree to therapy;complains of;pain;swelling  - agree to therapy;complains of;pain;weakness;fatigue;swelling  -     Precautions/Limitations fall precautions  - fall precautions  -     Specific Treatment Considerations  c/o calf pain-notified RN  -     Recorded by [BRITTNY] Ria Ramírez PTA [JM] Ria Ramírez PTA     Pain Assessment    Pain Assessment 0-10  -JM 0-10  -JM     Pain Score 5  -JM 5  -JM     Pain Type Surgical pain  - Surgical pain  -     Pain Location Knee  - Knee  -     Pain Orientation Left  -JM Left  -     Pain Intervention(s) Medication (See MAR);Repositioned;Cold applied  - Medication (See MAR);Repositioned   nsg to get fresh ice pack  -     Response to Interventions destiny  -JM destiny  -     Recorded by []  Ria Ramírez PTA [JM] Ria Ramírez PTA     ROM (Range of Motion)    General ROM Detail -5-101  -JM -11-97 L knee  -     Recorded by [BRITTNY] Ria Ramírez PTA [JM] Ria Ramírez PTA     Bed Mobility, Assessment/Treatment    Bed Mob, Sit to Supine, [place] conditional independence;verbal cues required  - conditional independence;verbal cues required  -JM     Recorded by [JM] Ria Ramírez PTA [JM] Ria Ramírez PTA     Transfer Assessment/Treatment    Transfers, Sit-Stand [place] supervision required;verbal cues required  - supervision required;verbal cues required  -     Transfers, Stand-Sit [place] supervision required;verbal cues required  - supervision required;verbal cues required  -     Transfers, Sit-Stand-Sit, Assist Device rolling walker  - rolling walker  -     Recorded by [JM] Ria Ramírez PTA [JM] Ria Ramírez PTA     Gait Assessment/Treatment    Gait, [place] Level stand by assist;verbal cues required  - stand by assist;verbal cues required  -     Gait, Assistive Device rolling walker  - rolling walker  -     Gait, Distance (Feet) 75  -  -JM     Gait, Gait Deviations  step length decreased;narrow base  -     Gait, Comment dizziness limiting  - improved w/distance  -JM     Recorded by [JM] Ria Ramírez PTA [JM] Ria Ramírez PTA     Stairs Assessment/Treatment    Number of Stairs  4  -     Stairs, Handrail Location  both sides  -     Stairs, [place] Level  contact guard assist;verbal cues required  -     Stairs, Technique Used  step to step (ascending);step to step (descending)  -     Stairs, Impairments  strength decreased;pain  -     Stairs, Comment  cues to avoid buckling-c/o block possibly still working   fam present for educ  -     Recorded by  [JM] Ria Ramírez PTA     Therapy Exercises    Exercise Protocols total knee  - total knee  -     Total Knee Exercises left:;completed protocol;20  reps  -JM left:;15 reps;completed protocol  -JM     Recorded by [BRITTNY] Ria Ramírez PTA [BRITTNY] Ria Ramírez PTA     Positioning and Restraints    Pre-Treatment Position sitting in chair/recliner  -JM sitting in chair/recliner  -JM     Post Treatment Position  bed  -JM     In Bed  fowlers;call light within reach;encouraged to call for assist;with family/caregiver;notified nsg  -JM     In Chair reclined;with family/caregiver;encouraged to call for assist  -JM      Recorded by [BRITTNY] Ria Ramírez PTA [BRITTNY] Ria Ramírez PTA       User Key  (r) = Recorded By, (t) = Taken By, (c) = Cosigned By    Initials Name Effective Dates    BRITTNY Ramírez PTA 02/18/16 -                 IP PT Goals       02/13/18 1601          Transfer Training PT LTG    Transfer Training PT LTG, Time to Achieve 3 days  -CH      Transfer Training PT LTG, Activity Type all transfers  -CH      Transfer Training PT LTG, Ong Level supervision required  -CH      Transfer Training PT LTG, Assist Device walker, rolling  -CH      Gait Training PT LTG    Gait Training Goal PT LTG, Time to Achieve 1 wk  -CH      Gait Training Goal PT LTG, Ong Level supervision required  -CH      Gait Training Goal PT LTG, Assist Device walker, rolling  -CH      Gait Training Goal PT LTG, Distance to Achieve 150  -CH      Stair Training PT LTG    Stair Training Goal PT LTG, Time to Achieve 1 wk  -CH      Stair Training Goal PT LTG, Number of Steps 3  -CH      Stair Training Goal PT LTG, Ong Level contact guard assist  -CH      Stair Training Goal PT LTG, Assist Device 1 handrail  -CH      Range of Motion PT LTG    Range of Motion Goal PT LTG, Time to Achieve 3 days  -CH      Range fo Motion Goal PT LTG, Joint L knee  -CH      Range of Motion Goal PT LTG, AROM Measure 0-90  -CH        User Key  (r) = Recorded By, (t) = Taken By, (c) = Cosigned By    Initials Name Provider Type    SONJA Payan, PT Physical Therapist           Physical Therapy Education     Title: PT OT SLP Therapies (Resolved)     Topic: Physical Therapy (Resolved)     Point: Mobility training (Resolved)    Learning Progress Summary    Learner Readiness Method Response Comment Documented by Status   Patient Acceptance E,TB,D VU,NR   02/13/18 1600 Done               Point: Home exercise program (Resolved)    Learning Progress Summary    Learner Readiness Method Response Comment Documented by Status   Patient Acceptance E,TB,D VU,NR   02/13/18 1600 Done               Point: Body mechanics (Resolved)    Learning Progress Summary    Learner Readiness Method Response Comment Documented by Status   Patient Acceptance E,TB,D VU,NR   02/13/18 1600 Done               Point: Precautions (Resolved)    Learning Progress Summary    Learner Readiness Method Response Comment Documented by Status   Patient Acceptance E,TB,D VU,NR   02/13/18 1600 Done                      User Key     Initials Effective Dates Name Provider Type Discipline     12/01/15 -  Wandy Payan, PT Physical Therapist PT                    PT Recommendation and Plan  Anticipated Discharge Disposition: home with home health  Planned Therapy Interventions: balance training, bed mobility training, gait training, home exercise program, patient/family education, ROM (Range of Motion), stair training, transfer training  PT Frequency: 2 times/day             Outcome Measures       02/13/18 1600          How much help from another person do you currently need...    Turning from your back to your side while in flat bed without using bedrails? 3  -CH      Moving from lying on back to sitting on the side of a flat bed without bedrails? 3  -CH      Moving to and from a bed to a chair (including a wheelchair)? 3  -CH      Standing up from a chair using your arms (e.g., wheelchair, bedside chair)? 3  -CH      Climbing 3-5 steps with a railing? 2  -CH      To walk in hospital room? 3  -CH      AM-PAC 6 Clicks  Score 17  -      Functional Assessment    Outcome Measure Options AM-PAC 6 Clicks Basic Mobility (PT)  -        User Key  (r) = Recorded By, (t) = Taken By, (c) = Cosigned By    Initials Name Provider Type    SONJA Payan, MEENA Physical Therapist           Time Calculation:         PT Charges       02/15/18 1821          Time Calculation    Start Time 0837  -      Stop Time 0930  -      Time Calculation (min) 53 min  -      PT Received On 02/15/18  -        User Key  (r) = Recorded By, (t) = Taken By, (c) = Cosigned By    Initials Name Provider Type    BRITTNY Ramírez PTA Physical Therapy Assistant          Therapy Charges for Today     Code Description Service Date Service Provider Modifiers Qty    41547652374 HC PT THER PROC EA 15 MIN 2/14/2018 Ria Ramírez PTA GP 1    64978353125 HC PT THER PROC GROUP 2/14/2018 Ria Ramírez PTA GP 1    38761516824 HC PT THER PROC EA 15 MIN 2/15/2018 Ria Ramírez PTA GP 1    91266262794 HC PT THER PROC GROUP 2/15/2018 Ria Ramírez PTA GP 1          PT G-Codes  Outcome Measure Options: AM-PAC 6 Clicks Basic Mobility (PT)    Ria Ramírez PTA  2/15/2018

## 2018-02-15 NOTE — PROGRESS NOTES
"Ortho POD 2    Patient Name:  Yi Lee  YOB: 1945  Medical Records Number:  [id number]    No complaints except pain    /78 (BP Location: Right arm, Patient Position: Lying)  Pulse 68  Temp 98.6 °F (37 °C) (Oral)   Resp 16  Ht 162.6 cm (64\")  Wt 87.1 kg (192 lb 1 oz)  SpO2 97%  BMI 32.97 kg/m2    LLE:  NVI, calf nontender, sensation intact  No signs of DVT    Incision: clean, no infection    Lab Results (last 24 hours)     Procedure Component Value Units Date/Time    Hemoglobin & Hematocrit, Blood [[id number]]  (Abnormal) Collected:  02/15/18 0326    Specimen:  Blood Updated:  02/15/18 0411     Hemoglobin 9.8 (L) g/dL      Hematocrit 30.9 (L) %           S/p Left TKA  WBAT with walker  ASA for DVT prophylaxis  Home with home health today  "

## 2018-02-15 NOTE — PROGRESS NOTES
Discharge Planning Assessment  [facility]     Patient Name: Yi Lee  MRN: [id number]  Today's Date: 2/15/2018    Admit Date: 2/13/2018          Discharge Needs Assessment     None            Discharge Plan       02/15/18 1109    Final Note    Final Note DC home via private auto with [facility] to follow. Cyndy Garza RN        Discharge Placement     Facility/Agency Request Status Selected? Address Phone Number Fax Number    [facility] Accepted    Yes [address] [place], [address], [place] [address] [phone]        Expected Discharge Date and Time     Expected Discharge Date Expected Discharge Time    Feb 15, 2018               Demographic Summary     None            Functional Status     None            Psychosocial     None            Abuse/Neglect     None            Legal     None            Substance Abuse     None            Patient Forms     None          Cyndy Garza RN

## 2018-02-15 NOTE — PLAN OF CARE
"Problem: Patient Care Overview (Adult)  Goal: Plan of Care Review  Outcome: Ongoing (interventions implemented as appropriate)   02/14/18 2032   Coping/Psychosocial Response Interventions   Plan Of Care Reviewed With patient;timothy   Patient Care Overview   Progress improving   Outcome Evaluation   Outcome Summary/Follow up Plan VSS, pt c/o much pain, administered po meds along with toradol and valium, Per Dr. Pinto-changed PO pain meds to Norco  1-2 tabs Q 3-4 hrs, pt stayed today/tonight due to pain, pain did come down to \"5\" after valium, plan to go home w/HH tomorrow, educated pt on bp monitoring      Goal: Adult Individualization and Mutuality  Outcome: Ongoing (interventions implemented as appropriate)      Problem: Perioperative Period (Adult)  Goal: Signs and Symptoms of Listed Potential Problems Will be Absent or Manageable (Perioperative Period)  Outcome: Ongoing (interventions implemented as appropriate)      Problem: Knee Replacement, Total (Adult)  Goal: Signs and Symptoms of Listed Potential Problems Will be Absent or Manageable (Knee Replacement, Total)  Outcome: Ongoing (interventions implemented as appropriate)      Problem: Fall Risk (Adult)  Goal: Absence of Falls  Outcome: Ongoing (interventions implemented as appropriate)        "

## 2018-02-15 NOTE — PLAN OF CARE
Problem: Patient Care Overview (Adult)  Goal: Plan of Care Review  Outcome: Ongoing (interventions implemented as appropriate)   02/15/18 0429   Coping/Psychosocial Response Interventions   Plan Of Care Reviewed With patient   Patient Care Overview   Progress improving   Outcome Evaluation   Outcome Summary/Follow up Plan POD #1 TKA, VSS, voiding per brp, ambulating at increased distances, pt voices better pain control with new pain med order, probable DC home with home health later this afternnon, educated on the importance of continued BP monitoring related to history of HTN     Goal: Adult Individualization and Mutuality  Outcome: Ongoing (interventions implemented as appropriate)   02/15/18 0429   Individualization   Patient Specific Preferences none stated   Patient Specific Goals pain control and imporved mobility     Goal: Discharge Needs Assessment  Outcome: Ongoing (interventions implemented as appropriate)   02/15/18 0429   Discharge Needs Assessment   Concerns To Be Addressed basic needs concerns   Readmission Within The Last 30 Days no previous admission in last 30 days   Equipment Needed After Discharge walker, standard;wound care supplies   Discharge Facility/Level Of Care Needs home with home health   Discharge Disposition still a patient   Current Health   Anticipated Changes Related to Illness inability to care for self   Self-Care   Equipment Currently Used at Home none   Living Environment   Transportation Available car;family or friend will provide       Problem: Knee Replacement, Total (Adult)  Goal: Signs and Symptoms of Listed Potential Problems Will be Absent or Manageable (Knee Replacement, Total)  Outcome: Ongoing (interventions implemented as appropriate)   02/15/18 0429   Knee Replacement, Total   Problems Assessed (Total Knee Replacement) all   Problems Present (Total Knee Replacement) pain;decreased range of motion;functional decline/self care deficit;situational response       Problem:  Fall Risk (Adult)  Goal: Absence of Falls  Outcome: Ongoing (interventions implemented as appropriate)   02/15/18 0429   Fall Risk (Adult)   Absence of Falls achieves outcome

## 2018-03-07 RX ORDER — FLUOXETINE HYDROCHLORIDE 20 MG/1
20 CAPSULE ORAL DAILY
Qty: 90 CAPSULE | Refills: 1 | Status: SHIPPED | OUTPATIENT
Start: 2018-03-07 | End: 2018-08-24 | Stop reason: SDUPTHER

## 2018-03-09 ENCOUNTER — PATIENT OUTREACH (OUTPATIENT)
Dept: CASE MANAGEMENT | Facility: OTHER | Age: 73
End: 2018-03-09

## 2018-03-21 DIAGNOSIS — I10 BENIGN ESSENTIAL HYPERTENSION: ICD-10-CM

## 2018-03-21 RX ORDER — LEVOTHYROXINE SODIUM 0.1 MG/1
TABLET ORAL
Qty: 90 TABLET | Refills: 0 | Status: SHIPPED | OUTPATIENT
Start: 2018-03-21 | End: 2018-06-21 | Stop reason: SDUPTHER

## 2018-03-26 RX ORDER — METOPROLOL SUCCINATE 100 MG/1
100 TABLET, EXTENDED RELEASE ORAL DAILY
Qty: 90 TABLET | Refills: 0 | Status: SHIPPED | OUTPATIENT
Start: 2018-03-26 | End: 2018-06-21 | Stop reason: SDUPTHER

## 2018-04-02 ENCOUNTER — OFFICE VISIT (OUTPATIENT)
Dept: INTERNAL MEDICINE | Facility: CLINIC | Age: 73
End: 2018-04-02

## 2018-04-02 VITALS
HEART RATE: 70 BPM | SYSTOLIC BLOOD PRESSURE: 130 MMHG | WEIGHT: 187.6 LBS | DIASTOLIC BLOOD PRESSURE: 74 MMHG | BODY MASS INDEX: 32.03 KG/M2 | RESPIRATION RATE: 16 BRPM | HEIGHT: 64 IN | OXYGEN SATURATION: 98 % | TEMPERATURE: 97.2 F

## 2018-04-02 DIAGNOSIS — E53.8 VITAMIN B12 DEFICIENCY: ICD-10-CM

## 2018-04-02 DIAGNOSIS — L93.0 LUPUS ERYTHEMATOSUS, UNSPECIFIED FORM: ICD-10-CM

## 2018-04-02 DIAGNOSIS — Q61.9 CYSTIC KIDNEY DISEASE: ICD-10-CM

## 2018-04-02 DIAGNOSIS — E78.2 MIXED HYPERLIPIDEMIA: Primary | ICD-10-CM

## 2018-04-02 DIAGNOSIS — F32.1 MODERATE SINGLE CURRENT EPISODE OF MAJOR DEPRESSIVE DISORDER (HCC): ICD-10-CM

## 2018-04-02 DIAGNOSIS — E03.8 OTHER SPECIFIED HYPOTHYROIDISM: ICD-10-CM

## 2018-04-02 DIAGNOSIS — E11.40 TYPE 2 DIABETES MELLITUS WITH DIABETIC NEUROPATHY, WITHOUT LONG-TERM CURRENT USE OF INSULIN (HCC): ICD-10-CM

## 2018-04-02 DIAGNOSIS — I10 BENIGN ESSENTIAL HYPERTENSION: ICD-10-CM

## 2018-04-02 DIAGNOSIS — R94.4 ABNORMAL CREATININE CLEARANCE GLOMERULAR FILTRATION: ICD-10-CM

## 2018-04-02 DIAGNOSIS — N18.30 STAGE 3 CHRONIC KIDNEY DISEASE (HCC): ICD-10-CM

## 2018-04-02 PROCEDURE — 99214 OFFICE O/P EST MOD 30 MIN: CPT | Performed by: FAMILY MEDICINE

## 2018-04-02 RX ORDER — ROSUVASTATIN CALCIUM 5 MG/1
5 TABLET, COATED ORAL NIGHTLY
Qty: 90 TABLET | Refills: 1 | Status: SHIPPED | OUTPATIENT
Start: 2018-04-02 | End: 2018-10-23 | Stop reason: SDUPTHER

## 2018-04-02 NOTE — PROGRESS NOTES
Subjective   Yi Lee is a 72 y.o. female.     Chief Complaint   Patient presents with   • Hypertension   Elevated creatinine, review labs, hyperlipidemia.      History of Present Illness   Aj returns with review of her recent labs showing elevated creatinine and reduced GFR.  She is seen Dr. Carbajal in the past we'll send her back to see Dr. Valenzuela nephrology.  Otherwise her cholesterol went up to 240 off statins.  She was on Pravachol place her on generic Crestor 5 mg daily.  Rugae repeat labs to review thyroid function her blood pressure looks pretty good.  She is worried about her current weight loss.  We'll check a serum protein electrophoresis as well.      The following portions of the patient's history were reviewed and updated as appropriate: allergies, current medications, past social history and problem list.    Review of Systems   Constitutional: Positive for unexpected weight change.   HENT: Negative.    Eyes: Negative.    Respiratory: Negative.    Cardiovascular: Negative.    Gastrointestinal: Negative.    Endocrine: Negative.    Genitourinary: Negative.    Musculoskeletal: Positive for arthralgias.   Skin: Negative.    Allergic/Immunologic: Negative.    Neurological: Negative.    Hematological: Negative.    Psychiatric/Behavioral: Positive for dysphoric mood.       Objective   Vitals:    04/02/18 1158   BP: 130/74   Pulse: 70   Resp: 16   Temp: 97.2 °F (36.2 °C)   SpO2: 98%     Physical Exam   Constitutional: She is oriented to person, place, and time.   HENT:   Head: Normocephalic.   Right Ear: External ear normal.   Left Ear: External ear normal.   Nose: Nose normal.   Mouth/Throat: Oropharynx is clear and moist.   Eyes: Conjunctivae are normal. Pupils are equal, round, and reactive to light.   Neck: Normal range of motion. Neck supple. No thyromegaly present.   Cardiovascular: Normal rate, regular rhythm and normal heart sounds.    Pulmonary/Chest: Effort normal and breath sounds normal.    Abdominal: Soft. Bowel sounds are normal.   Musculoskeletal: Normal range of motion. She exhibits no edema.   Neurological: She is alert and oriented to person, place, and time.   Skin: Skin is warm and dry.   Psychiatric: She has a normal mood and affect. Her behavior is normal.   Nursing note and vitals reviewed.      Assessment/Plan   Problem List Items Addressed This Visit        Cardiovascular and Mediastinum    Benign essential hypertension    Relevant Orders    Comprehensive Metabolic Panel    CBC & Differential    Hemoglobin A1c    Lipid Panel With / Chol / HDL Ratio    Urinalysis With / Microscopic If Indicated - Urine, Clean Catch    TSH    T4, Free    Vitamin B12    Vitamin D 25 Hydroxy    T3, Free    Hyperlipidemia - Primary    Relevant Medications    rosuvastatin (CRESTOR) 5 MG tablet    Other Relevant Orders    Comprehensive Metabolic Panel    CBC & Differential    Hemoglobin A1c    Lipid Panel With / Chol / HDL Ratio    Urinalysis With / Microscopic If Indicated - Urine, Clean Catch    TSH    T4, Free    Vitamin B12    Vitamin D 25 Hydroxy    T3, Free       Digestive    Vitamin B12 deficiency    Relevant Orders    Comprehensive Metabolic Panel    CBC & Differential    Hemoglobin A1c    Lipid Panel With / Chol / HDL Ratio    Urinalysis With / Microscopic If Indicated - Urine, Clean Catch    TSH    T4, Free    Vitamin B12    Vitamin D 25 Hydroxy    T3, Free       Endocrine    Type 2 diabetes mellitus with diabetic neuropathy    Relevant Orders    Comprehensive Metabolic Panel    CBC & Differential    Hemoglobin A1c    Lipid Panel With / Chol / HDL Ratio    Urinalysis With / Microscopic If Indicated - Urine, Clean Catch    TSH    T4, Free    Vitamin B12    Vitamin D 25 Hydroxy    T3, Free    Hypothyroidism    Relevant Orders    Comprehensive Metabolic Panel    CBC & Differential    Hemoglobin A1c    Lipid Panel With / Chol / HDL Ratio    Urinalysis With / Microscopic If Indicated - Urine, Clean Catch     TSH    T4, Free    Vitamin B12    Vitamin D 25 Hydroxy    T3, Free       Genitourinary    Cystic kidney disease    Relevant Orders    Ambulatory Referral to Nephrology    Comprehensive Metabolic Panel    CBC & Differential    Hemoglobin A1c    Lipid Panel With / Chol / HDL Ratio    Urinalysis With / Microscopic If Indicated - Urine, Clean Catch    TSH    T4, Free    Vitamin B12    Vitamin D 25 Hydroxy    T3, Free       Other    Abnormal creatinine clearance glomerular filtration    Relevant Orders    Ambulatory Referral to Nephrology    Comprehensive Metabolic Panel    CBC & Differential    Hemoglobin A1c    Lipid Panel With / Chol / HDL Ratio    Urinalysis With / Microscopic If Indicated - Urine, Clean Catch    TSH    T4, Free    Vitamin B12    Vitamin D 25 Hydroxy    T3, Free    Depression    Relevant Orders    Comprehensive Metabolic Panel    CBC & Differential    Hemoglobin A1c    Lipid Panel With / Chol / HDL Ratio    Urinalysis With / Microscopic If Indicated - Urine, Clean Catch    TSH    T4, Free    Vitamin B12    Vitamin D 25 Hydroxy    T3, Free      Other Visit Diagnoses     Stage 3 chronic kidney disease        Relevant Orders    Ambulatory Referral to Nephrology    Comprehensive Metabolic Panel    CBC & Differential    Hemoglobin A1c    Lipid Panel With / Chol / HDL Ratio    Urinalysis With / Microscopic If Indicated - Urine, Clean Catch    TSH    T4, Free    Vitamin B12    Vitamin D 25 Hydroxy    T3, Free      Plan: Screening labs referral to nephrology recheck in 3 months sooner if needed .  Start Crestor.  Start Crestor 5 mg nightly.

## 2018-04-11 LAB
25(OH)D3+25(OH)D2 SERPL-MCNC: 34.4 NG/ML (ref 30–100)
ALBUMIN SERPL ELPH-MCNC: 3.6 G/DL (ref 2.9–4.4)
ALBUMIN SERPL-MCNC: 4 G/DL (ref 3.5–5.2)
ALBUMIN/GLOB SERPL: 1.1 {RATIO} (ref 0.7–1.7)
ALBUMIN/GLOB SERPL: 1.4 G/DL
ALP SERPL-CCNC: 106 U/L (ref 39–117)
ALPHA1 GLOB SERPL ELPH-MCNC: 0.2 G/DL (ref 0–0.4)
ALPHA2 GLOB SERPL ELPH-MCNC: 0.9 G/DL (ref 0.4–1)
ALT SERPL-CCNC: 11 U/L (ref 1–33)
APPEARANCE UR: CLEAR
AST SERPL-CCNC: 13 U/L (ref 1–32)
B-GLOBULIN SERPL ELPH-MCNC: 1.2 G/DL (ref 0.7–1.3)
BACTERIA #/AREA URNS HPF: ABNORMAL /HPF
BASOPHILS # BLD AUTO: 0.03 10*3/MM3 (ref 0–0.2)
BASOPHILS NFR BLD AUTO: 0.5 % (ref 0–1.5)
BILIRUB SERPL-MCNC: 0.2 MG/DL (ref 0.1–1.2)
BILIRUB UR QL STRIP: NEGATIVE
BUN SERPL-MCNC: 22 MG/DL (ref 8–23)
BUN/CREAT SERPL: 18.5 (ref 7–25)
CALCIUM SERPL-MCNC: 10 MG/DL (ref 8.6–10.5)
CASTS URNS MICRO: ABNORMAL
CENTROMERE B AB SER-ACNC: <0.2 AI (ref 0–0.9)
CHLORIDE SERPL-SCNC: 102 MMOL/L (ref 98–107)
CHOLEST SERPL-MCNC: 167 MG/DL (ref 0–200)
CHOLEST/HDLC SERPL: 2.78 {RATIO}
CHROMATIN AB SERPL-ACNC: 0.5 AI (ref 0–0.9)
CO2 SERPL-SCNC: 25.9 MMOL/L (ref 22–29)
COLOR UR: YELLOW
CREAT SERPL-MCNC: 1.19 MG/DL (ref 0.57–1)
DSDNA AB SER-ACNC: <1 IU/ML (ref 0–9)
ENA JO1 AB SER-ACNC: <0.2 AI (ref 0–0.9)
ENA RNP AB SER-ACNC: <0.2 AI (ref 0–0.9)
ENA SCL70 AB SER-ACNC: <0.2 AI (ref 0–0.9)
ENA SM AB SER-ACNC: <0.2 AI (ref 0–0.9)
ENA SS-A AB SER-ACNC: <0.2 AI (ref 0–0.9)
ENA SS-B AB SER-ACNC: <0.2 AI (ref 0–0.9)
EOSINOPHIL # BLD AUTO: 0.16 10*3/MM3 (ref 0–0.7)
EOSINOPHIL NFR BLD AUTO: 2.7 % (ref 0.3–6.2)
EPI CELLS #/AREA URNS HPF: ABNORMAL /HPF
ERYTHROCYTE [DISTWIDTH] IN BLOOD BY AUTOMATED COUNT: 13 % (ref 11.7–13)
GAMMA GLOB SERPL ELPH-MCNC: 0.9 G/DL (ref 0.4–1.8)
GFR SERPLBLD CREATININE-BSD FMLA CKD-EPI: 45 ML/MIN/1.73
GFR SERPLBLD CREATININE-BSD FMLA CKD-EPI: 54 ML/MIN/1.73
GLOBULIN SER CALC-MCNC: 2.8 GM/DL
GLOBULIN SER CALC-MCNC: 3.2 G/DL (ref 2.2–3.9)
GLUCOSE SERPL-MCNC: 185 MG/DL (ref 65–99)
GLUCOSE UR QL: NEGATIVE
HBA1C MFR BLD: 6.3 % (ref 4.8–5.6)
HCT VFR BLD AUTO: 39.1 % (ref 35.6–45.5)
HDLC SERPL-MCNC: 60 MG/DL (ref 40–60)
HGB BLD-MCNC: 12.1 G/DL (ref 11.9–15.5)
HGB UR QL STRIP: NEGATIVE
IMM GRANULOCYTES # BLD: 0.03 10*3/MM3 (ref 0–0.03)
IMM GRANULOCYTES NFR BLD: 0.5 % (ref 0–0.5)
KETONES UR QL STRIP: NEGATIVE
LABORATORY COMMENT REPORT: NORMAL
LDLC SERPL CALC-MCNC: 89 MG/DL (ref 0–100)
LEUKOCYTE ESTERASE UR QL STRIP: ABNORMAL
LYMPHOCYTES # BLD AUTO: 1.43 10*3/MM3 (ref 0.9–4.8)
LYMPHOCYTES NFR BLD AUTO: 24 % (ref 19.6–45.3)
Lab: NORMAL
M PROTEIN SERPL ELPH-MCNC: NORMAL G/DL
MCH RBC QN AUTO: 30.1 PG (ref 26.9–32)
MCHC RBC AUTO-ENTMCNC: 30.9 G/DL (ref 32.4–36.3)
MCV RBC AUTO: 97.3 FL (ref 80.5–98.2)
MONOCYTES # BLD AUTO: 0.56 10*3/MM3 (ref 0.2–1.2)
MONOCYTES NFR BLD AUTO: 9.4 % (ref 5–12)
NEUTROPHILS # BLD AUTO: 3.74 10*3/MM3 (ref 1.9–8.1)
NEUTROPHILS NFR BLD AUTO: 62.9 % (ref 42.7–76)
NITRITE UR QL STRIP: NEGATIVE
PH UR STRIP: 6.5 [PH] (ref 5–8)
PLATELET # BLD AUTO: 310 10*3/MM3 (ref 140–500)
POTASSIUM SERPL-SCNC: 5.3 MMOL/L (ref 3.5–5.2)
PROT PATTERN SERPL ELPH-IMP: NORMAL
PROT SERPL-MCNC: 6.8 G/DL (ref 6–8.5)
PROT UR QL STRIP: NEGATIVE
RBC # BLD AUTO: 4.02 10*6/MM3 (ref 3.9–5.2)
RBC #/AREA URNS HPF: ABNORMAL /HPF
SODIUM SERPL-SCNC: 140 MMOL/L (ref 136–145)
SP GR UR: 1.02 (ref 1–1.03)
T3FREE SERPL-MCNC: 2.4 PG/ML (ref 2–4.4)
T4 FREE SERPL-MCNC: 1.11 NG/DL (ref 0.93–1.7)
TRIGL SERPL-MCNC: 92 MG/DL (ref 0–150)
TSH SERPL DL<=0.005 MIU/L-ACNC: 0.86 MIU/ML (ref 0.27–4.2)
UROBILINOGEN UR STRIP-MCNC: ABNORMAL MG/DL
VIT B12 SERPL-MCNC: 336 PG/ML (ref 211–946)
VLDLC SERPL CALC-MCNC: 18.4 MG/DL (ref 5–40)
WBC # BLD AUTO: 5.95 10*3/MM3 (ref 4.5–10.7)
WBC #/AREA URNS HPF: ABNORMAL /HPF

## 2018-04-25 ENCOUNTER — TELEPHONE (OUTPATIENT)
Dept: INTERNAL MEDICINE | Facility: CLINIC | Age: 73
End: 2018-04-25

## 2018-04-25 DIAGNOSIS — E11.40 TYPE 2 DIABETES MELLITUS WITH DIABETIC NEUROPATHY, WITHOUT LONG-TERM CURRENT USE OF INSULIN (HCC): Primary | ICD-10-CM

## 2018-04-25 NOTE — TELEPHONE ENCOUNTER
Pt called requesting a referral for the Diabetes Education program at [facility]  Order was placed

## 2018-05-01 DIAGNOSIS — K21.9 GASTROESOPHAGEAL REFLUX DISEASE, ESOPHAGITIS PRESENCE NOT SPECIFIED: ICD-10-CM

## 2018-05-01 RX ORDER — OMEPRAZOLE 40 MG/1
CAPSULE, DELAYED RELEASE ORAL
Qty: 90 CAPSULE | Refills: 1 | Status: SHIPPED | OUTPATIENT
Start: 2018-05-01 | End: 2018-10-23 | Stop reason: SDUPTHER

## 2018-06-05 ENCOUNTER — HOSPITAL ENCOUNTER (OUTPATIENT)
Dept: DIABETES SERVICES | Facility: HOSPITAL | Age: 73
Setting detail: RECURRING SERIES
Discharge: HOME | End: 2018-06-05

## 2018-06-05 PROCEDURE — G0109 DIAB MANAGE TRN IND/GROUP: HCPCS

## 2018-06-12 ENCOUNTER — HOSPITAL ENCOUNTER (OUTPATIENT)
Dept: DIABETES SERVICES | Facility: HOSPITAL | Age: 73
Setting detail: RECURRING SERIES
Discharge: HOME | End: 2018-06-12

## 2018-06-12 PROCEDURE — G0109 DIAB MANAGE TRN IND/GROUP: HCPCS

## 2018-06-21 DIAGNOSIS — I10 BENIGN ESSENTIAL HYPERTENSION: ICD-10-CM

## 2018-06-21 RX ORDER — METOPROLOL SUCCINATE 100 MG/1
100 TABLET, EXTENDED RELEASE ORAL DAILY
Qty: 90 TABLET | Refills: 0 | Status: SHIPPED | OUTPATIENT
Start: 2018-06-21 | End: 2018-09-17 | Stop reason: SDUPTHER

## 2018-06-21 RX ORDER — LEVOTHYROXINE SODIUM 0.1 MG/1
TABLET ORAL
Qty: 90 TABLET | Refills: 0 | Status: SHIPPED | OUTPATIENT
Start: 2018-06-21 | End: 2018-09-17 | Stop reason: SDUPTHER

## 2018-08-01 ENCOUNTER — OFFICE VISIT (OUTPATIENT)
Dept: INTERNAL MEDICINE | Facility: CLINIC | Age: 73
End: 2018-08-01

## 2018-08-01 VITALS
BODY MASS INDEX: 31.24 KG/M2 | OXYGEN SATURATION: 96 % | SYSTOLIC BLOOD PRESSURE: 142 MMHG | HEART RATE: 68 BPM | DIASTOLIC BLOOD PRESSURE: 84 MMHG | WEIGHT: 182 LBS | TEMPERATURE: 96.4 F

## 2018-08-01 DIAGNOSIS — Z00.00 MEDICARE ANNUAL WELLNESS VISIT, SUBSEQUENT: ICD-10-CM

## 2018-08-01 DIAGNOSIS — E78.2 MIXED HYPERLIPIDEMIA: Primary | ICD-10-CM

## 2018-08-01 DIAGNOSIS — E03.8 OTHER SPECIFIED HYPOTHYROIDISM: ICD-10-CM

## 2018-08-01 DIAGNOSIS — E11.40 TYPE 2 DIABETES MELLITUS WITH DIABETIC NEUROPATHY, WITHOUT LONG-TERM CURRENT USE OF INSULIN (HCC): ICD-10-CM

## 2018-08-01 DIAGNOSIS — I10 BENIGN ESSENTIAL HYPERTENSION: ICD-10-CM

## 2018-08-01 DIAGNOSIS — R94.4 ABNORMAL CREATININE CLEARANCE GLOMERULAR FILTRATION: ICD-10-CM

## 2018-08-01 PROCEDURE — 99214 OFFICE O/P EST MOD 30 MIN: CPT | Performed by: FAMILY MEDICINE

## 2018-08-01 PROCEDURE — G0439 PPPS, SUBSEQ VISIT: HCPCS | Performed by: FAMILY MEDICINE

## 2018-08-01 RX ORDER — QUETIAPINE FUMARATE 300 MG/1
300 TABLET, FILM COATED ORAL NIGHTLY
Qty: 90 TABLET | Refills: 3 | Status: SHIPPED | OUTPATIENT
Start: 2018-08-01 | End: 2019-12-27 | Stop reason: SDUPTHER

## 2018-08-01 NOTE — PROGRESS NOTES
QUICK REFERENCE INFORMATION:  The ABCs of the Annual Wellness Visit    Subsequent Medicare Wellness Visit    HEALTH RISK ASSESSMENT    1945    Recent Hospitalizations:  Recently treated at the following:  [facility].        Current Medical Providers:  Patient Care Team:  Ankit Albert Jr., MD as PCP - General (Family Medicine)  Ana Castañeda MD as PCP - Claims Attributed  Ria Valenzuela MD as Consulting Physician (Nephrology)  Mirian Edwards RN as Care Coordinator (Population Health)        Smoking Status:  History   Smoking Status   • Former Smoker   • Packs/day: 1.00   • Years: 7.00   • Types: Cigarettes   • Quit date: 1972   Smokeless Tobacco   • Never Used       Alcohol Consumption:  History   Alcohol Use   • Yes     Comment: OCC       Depression Screen:   PHQ-2/PHQ-9 Depression Screening 8/1/2018   Little interest or pleasure in doing things 0   Feeling down, depressed, or hopeless 0   Trouble falling or staying asleep, or sleeping too much 0   Feeling tired or having little energy 0   Poor appetite or overeating 0   Feeling bad about yourself - or that you are a failure or have let yourself or your family down 0   Trouble concentrating on things, such as reading the newspaper or watching television 0   Moving or speaking so slowly that other people could have noticed. Or the opposite - being so fidgety or restless that you have been moving around a lot more than usual 0   Thoughts that you would be better off dead, or of hurting yourself in some way 0   Total Score 0   If you checked off any problems, how difficult have these problems made it for you to do your work, take care of things at home, or get along with other people? Not difficult at all       Health Habits and Functional and Cognitive Screening:  Functional & Cognitive Status 8/1/2018   Do you have difficulty preparing food and eating? No   Do you have difficulty bathing yourself, getting dressed or grooming yourself?  No   Do you have difficulty using the toilet? No   Do you have difficulty moving around from place to place? No   Do you have trouble with steps or getting out of a bed or a chair? No   In the past year have you fallen or experienced a near fall? No   Current Diet Unhealthy Diet   Dental Exam Up to date   Eye Exam Up to date   Exercise (times per week) 7 times per week   Current Exercise Activities Include Walking   Do you need help using the phone?  No   Are you deaf or do you have serious difficulty hearing?  No   Do you need help with transportation? No   Do you need help shopping? No   Do you need help preparing meals?  No   Do you need help with housework?  No   Do you need help with laundry? No   Do you need help taking your medications? No   Do you need help managing money? No   Do you ever drive or ride in a car without wearing a seat belt? No   Have you felt unusual stress, anger or loneliness in the last month? No   Who do you live with? Alone   If you need help, do you have trouble finding someone available to you? No   Have you been bothered in the last four weeks by sexual problems? No   Do you have difficulty concentrating, remembering or making decisions? No           Does the patient have evidence of cognitive impairment? No    Aspirin use counseling: Taking ASA appropriately as indicated      Recent Lab Results:  CMP:  Lab Results   Component Value Date     (H) 04/10/2018    BUN 22 04/10/2018    CREATININE 1.19 (H) 04/10/2018    EGFRIFNONA 45 (L) 04/10/2018    EGFRIFAFRI 54 (L) 04/10/2018    BCR 18.5 04/10/2018     04/10/2018    K 5.3 (H) 04/10/2018    CO2 25.9 04/10/2018    CALCIUM 10.0 04/10/2018    PROTENTOTREF 6.8 04/10/2018    ALBUMIN 4.00 04/10/2018    ALBUMIN 3.6 04/10/2018    LABGLOBREF 2.8 04/10/2018    LABGLOBREF 3.2 04/10/2018    LABIL2 1.4 04/10/2018    LABIL2 1.1 04/10/2018    BILITOT 0.2 04/10/2018    ALKPHOS 106 04/10/2018    AST 13 04/10/2018    ALT 11 04/10/2018      Lipid Panel:  Lab Results   Component Value Date    TRIG 92 04/10/2018    HDL 60 04/10/2018    VLDL 18.4 04/10/2018    LDLHDL 1.6 12/28/2015     HbA1c:  Lab Results   Component Value Date    HGBA1C 6.30 (H) 04/10/2018       Visual Acuity:  No exam data present    Age-appropriate Screening Schedule:  Refer to the list below for future screening recommendations based on patient's age, sex and/or medical conditions. Orders for these recommended tests are listed in the plan section. The patient has been provided with a written plan.    Health Maintenance   Topic Date Due   • ZOSTER VACCINE (2 of 3) 02/26/2007   • URINE MICROALBUMIN  04/25/2017   • DIABETIC FOOT EXAM  08/03/2017   • PNEUMOCOCCAL VACCINES (65+ LOW/MEDIUM RISK) (2 of 2 - PPSV23) 07/28/2018   • INFLUENZA VACCINE  08/01/2018   • DIABETIC EYE EXAM  09/27/2018   • HEMOGLOBIN A1C  10/10/2018   • LIPID PANEL  04/10/2019   • COLONOSCOPY  11/03/2021   • TDAP/TD VACCINES (3 - Td) 08/03/2026   • DXA SCAN  Excluded        Subjective   History of Present Illness    Yi Lee is a 72 y.o. female who presents for an Subsequent Wellness Visit.    The following portions of the patient's history were reviewed and updated as appropriate: allergies, current medications, past family history, past medical history, past social history, past surgical history and problem list.    Outpatient Medications Prior to Visit   Medication Sig Dispense Refill   • albuterol (PROVENTIL HFA) 108 (90 BASE) MCG/ACT inhaler Inhale 2 puffs.     • aspirin 81 MG chewable tablet Chew 81 mg daily.     • aspirin 81 MG EC tablet Take 81 mg by mouth Daily.     • Chlorhexidine Gluconate Cloth 2 % pads Apply  topically. PER MD INSTRUCTIONS     • Cholecalciferol (VITAMIN D3) 2000 UNITS tablet Take  by mouth.     • ferrous sulfate 325 (65 FE) MG EC tablet Take 325 mg by mouth.     • FLUoxetine (PROzac) 20 MG capsule Take 1 capsule by mouth Daily. 90 capsule 1   • glucose blood (FREESTYLE LITE) test  strip Use to test everyday as directed 100 each 3   • hydrochlorothiazide (MICROZIDE) 12.5 MG capsule Take 1 capsule by mouth Daily. 90 capsule 3   • HYDROcodone-acetaminophen (NORCO)  MG per tablet Take 1 tablet by mouth Every 4 (Four) Hours As Needed for Moderate Pain  (Pain). 80 tablet 0   • hydrOXYzine (ATARAX) 25 MG tablet Take 1 tablet by mouth 3 (Three) Times a Day As Needed for Itching. 90 tablet 5   • lactulose (CHRONULAC) 10 GM/15ML solution 30-45mLs every day for constipation 4050 mL 1   • lamoTRIgine (LAMICTAL) 200 MG tablet Take 400 mg by mouth Every Night.     • levothyroxine (SYNTHROID, LEVOTHROID) 100 MCG tablet TAKE 1 TABLET BY MOUTH DAILY 90 tablet 0   • meloxicam (MOBIC) 15 MG tablet Take 15 mg by mouth.     • metFORMIN (GLUCOPHAGE) 500 MG tablet TAKE 2 TABLETS BY MOUTH DAILY WITH BREAKFAST 180 tablet 0   • metoprolol succinate XL (TOPROL-XL) 100 MG 24 hr tablet TAKE 1 TABLET BY MOUTH DAILY 90 tablet 0   • omeprazole (priLOSEC) 40 MG capsule TAKE 1 CAPSULE BY MOUTH DAILY 90 capsule 1   • QUEtiapine XR (SEROquel XR) 150 MG 24 hr tablet Take 150 mg by mouth Every Night.     • rosuvastatin (CRESTOR) 5 MG tablet Take 1 tablet by mouth Every Night. for cholesterol 90 tablet 1     No facility-administered medications prior to visit.        Patient Active Problem List   Diagnosis   • Abnormal creatinine clearance glomerular filtration   • Benign essential hypertension   • Chronic obstructive pulmonary disease (CMS/HCC)   • Depression   • Type 2 diabetes mellitus with diabetic neuropathy (CMS/HCC)   • Gastroesophageal reflux disease   • Hyperlipidemia   • Hypothyroidism   • Obesity (BMI 30-39.9)   • Memory loss   • Ataxic gait   • Abdominal pain, right lateral   • Iron deficiency anemia due to chronic blood loss   • Cystic kidney disease   • Vitamin B12 deficiency   • OA (osteoarthritis) of knee       Advance Care Planning:  has an advance directive - a copy has been provided and is in  file    Identification of Risk Factors:  Risk factors include: cardiovascular risk.    Review of Systems    Compared to one year ago, the patient feels her physical health is the same.  Compared to one year ago, the patient feels her mental health is the same.    Objective     Physical Exam    Vitals:    08/01/18 0909   BP: 142/84   BP Location: Left arm   Patient Position: Sitting   Cuff Size: Large Adult   Pulse: 68   Temp: 96.4 °F (35.8 °C)   TempSrc: Tympanic   SpO2: 96%   Weight: 82.6 kg (182 lb)   PainSc: 0-No pain       Patient's Body mass index is 31.24 kg/m². BMI is above normal parameters. Recommendations include: educational material.      Assessment/Plan   Patient Self-Management and Personalized Health Advice  The patient has been provided with information about: prevention of cardiac or vascular disease and mental health concerns and preventive services including:   · Counseling for cardiovascular disease risk reduction.    Visit Diagnoses:  No diagnosis found.    No orders of the defined types were placed in this encounter.      Outpatient Encounter Prescriptions as of 8/1/2018   Medication Sig Dispense Refill   • albuterol (PROVENTIL HFA) 108 (90 BASE) MCG/ACT inhaler Inhale 2 puffs.     • aspirin 81 MG chewable tablet Chew 81 mg daily.     • aspirin 81 MG EC tablet Take 81 mg by mouth Daily.     • Chlorhexidine Gluconate Cloth 2 % pads Apply  topically. PER MD INSTRUCTIONS     • Cholecalciferol (VITAMIN D3) 2000 UNITS tablet Take  by mouth.     • ferrous sulfate 325 (65 FE) MG EC tablet Take 325 mg by mouth.     • FLUoxetine (PROzac) 20 MG capsule Take 1 capsule by mouth Daily. 90 capsule 1   • glucose blood (FREESTYLE LITE) test strip Use to test everyday as directed 100 each 3   • hydrochlorothiazide (MICROZIDE) 12.5 MG capsule Take 1 capsule by mouth Daily. 90 capsule 3   • HYDROcodone-acetaminophen (NORCO)  MG per tablet Take 1 tablet by mouth Every 4 (Four) Hours As Needed for Moderate  Pain  (Pain). 80 tablet 0   • hydrOXYzine (ATARAX) 25 MG tablet Take 1 tablet by mouth 3 (Three) Times a Day As Needed for Itching. 90 tablet 5   • lactulose (CHRONULAC) 10 GM/15ML solution 30-45mLs every day for constipation 4050 mL 1   • lamoTRIgine (LAMICTAL) 200 MG tablet Take 400 mg by mouth Every Night.     • levothyroxine (SYNTHROID, LEVOTHROID) 100 MCG tablet TAKE 1 TABLET BY MOUTH DAILY 90 tablet 0   • meloxicam (MOBIC) 15 MG tablet Take 15 mg by mouth.     • metFORMIN (GLUCOPHAGE) 500 MG tablet TAKE 2 TABLETS BY MOUTH DAILY WITH BREAKFAST 180 tablet 0   • metoprolol succinate XL (TOPROL-XL) 100 MG 24 hr tablet TAKE 1 TABLET BY MOUTH DAILY 90 tablet 0   • omeprazole (priLOSEC) 40 MG capsule TAKE 1 CAPSULE BY MOUTH DAILY 90 capsule 1   • QUEtiapine XR (SEROquel XR) 150 MG 24 hr tablet Take 150 mg by mouth Every Night.     • rosuvastatin (CRESTOR) 5 MG tablet Take 1 tablet by mouth Every Night. for cholesterol 90 tablet 1     No facility-administered encounter medications on file as of 8/1/2018.        Reviewed use of high risk medication in the elderly: yes  Reviewed for potential of harmful drug interactions in the elderly: yes    Follow Up:  No Follow-up on file.     An After Visit Summary and PPPS with all of these plans were given to the patient.

## 2018-08-01 NOTE — PROGRESS NOTES
Subjective   Yi Lee is a 72 y.o. female.     Chief Complaint   Patient presents with   • Annual Exam   • Anxiety   • Depression         History of Present Illness   Patient is here for return visit with history of hyperlipidemia hypertension some degree of chronic kidney disease diabetes type 2.  She had elevated creatinine with some improvement and is seen Dr. Carbajal nephrology.  Meloxicam does not seem to have affected the creatinine as she was off of it for a while.    She has some history of depression but appears stable without suicidal features.  [facility] and [facility] a therapist.  I am refilling her medicines.    We discussed a 10 pound weight loss she has had this year where labs look pretty normal and I cannot discern anything SINISTER as far as a cause or etiology.      The following portions of the patient's history were reviewed and updated as appropriate: allergies, current medications, past social history and problem list.    Review of Systems   Constitutional: Positive for unexpected weight change.   HENT: Negative.    Eyes: Negative.    Respiratory: Negative.    Cardiovascular: Negative.    Gastrointestinal: Negative.    Endocrine: Negative.    Genitourinary: Negative.    Musculoskeletal: Negative.    Skin: Negative.    Allergic/Immunologic: Negative.    Neurological: Negative.    Hematological: Negative.    Psychiatric/Behavioral: Positive for dysphoric mood.       Objective   Vitals:    08/01/18 0909   BP: 142/84   Pulse: 68   Temp: 96.4 °F (35.8 °C)   SpO2: 96%     Physical Exam   Constitutional: She is oriented to person, place, and time. She appears well-developed.   HENT:   Head: Normocephalic.   Right Ear: Tympanic membrane and external ear normal.   Left Ear: Tympanic membrane and external ear normal.   Mouth/Throat: Oropharynx is clear and moist.   Eyes: Pupils are equal, round, and reactive to light.   Neck: Normal range of motion. Neck  supple.   Cardiovascular: Normal rate, regular rhythm and normal heart sounds.    Pulmonary/Chest: Effort normal and breath sounds normal.   Abdominal: Soft. Bowel sounds are normal.   Musculoskeletal: Normal range of motion.   Neurological: She is alert and oriented to person, place, and time.   Skin: Skin is warm and dry.   Psychiatric: She has a normal mood and affect.   Vitals reviewed.      Assessment/Plan   Problem List Items Addressed This Visit        Cardiovascular and Mediastinum    Benign essential hypertension    Hyperlipidemia - Primary       Endocrine    Type 2 diabetes mellitus with diabetic neuropathy (CMS/[facility])    Hypothyroidism       Other    Abnormal creatinine clearance glomerular filtration      Other Visit Diagnoses     Medicare annual wellness visit, subsequent          Plan: Refill Seroquel 300 mg at bedtime.  Term visit 6 months.  Medicare wellness visit performed.  Previous labs are reviewed.

## 2018-08-24 RX ORDER — FLUOXETINE HYDROCHLORIDE 20 MG/1
20 CAPSULE ORAL DAILY
Qty: 90 CAPSULE | Refills: 1 | Status: SHIPPED | OUTPATIENT
Start: 2018-08-24 | End: 2018-12-30 | Stop reason: SDUPTHER

## 2018-08-24 RX ORDER — LAMOTRIGINE 200 MG/1
400 TABLET ORAL NIGHTLY
Qty: 180 TABLET | Refills: 1 | Status: SHIPPED | OUTPATIENT
Start: 2018-08-24 | End: 2019-02-20 | Stop reason: SDUPTHER

## 2018-08-24 NOTE — TELEPHONE ENCOUNTER
Pt called and said another Dr was filling this but you told her you would refill it not?    Ok to fill?

## 2018-09-17 DIAGNOSIS — I10 BENIGN ESSENTIAL HYPERTENSION: ICD-10-CM

## 2018-09-17 RX ORDER — METOPROLOL SUCCINATE 100 MG/1
100 TABLET, EXTENDED RELEASE ORAL DAILY
Qty: 90 TABLET | Refills: 0 | Status: SHIPPED | OUTPATIENT
Start: 2018-09-17 | End: 2018-12-16 | Stop reason: SDUPTHER

## 2018-09-17 RX ORDER — LEVOTHYROXINE SODIUM 0.1 MG/1
TABLET ORAL
Qty: 90 TABLET | Refills: 0 | Status: SHIPPED | OUTPATIENT
Start: 2018-09-17 | End: 2018-12-16 | Stop reason: SDUPTHER

## 2018-09-24 PROBLEM — E11.9 DIABETES MELLITUS (HCC): Status: ACTIVE | Noted: 2018-09-24

## 2018-10-09 ENCOUNTER — EPISODE CHANGES (OUTPATIENT)
Dept: CASE MANAGEMENT | Facility: OTHER | Age: 73
End: 2018-10-09

## 2018-10-23 DIAGNOSIS — K21.9 GASTROESOPHAGEAL REFLUX DISEASE, ESOPHAGITIS PRESENCE NOT SPECIFIED: ICD-10-CM

## 2018-10-23 RX ORDER — OMEPRAZOLE 40 MG/1
CAPSULE, DELAYED RELEASE ORAL
Qty: 90 CAPSULE | Refills: 0 | Status: SHIPPED | OUTPATIENT
Start: 2018-10-23 | End: 2018-12-30 | Stop reason: SDUPTHER

## 2018-10-23 RX ORDER — HYDROCHLOROTHIAZIDE 12.5 MG/1
12.5 CAPSULE, GELATIN COATED ORAL DAILY
Qty: 90 CAPSULE | Refills: 0 | Status: SHIPPED | OUTPATIENT
Start: 2018-10-23 | End: 2018-12-30 | Stop reason: SDUPTHER

## 2018-10-23 RX ORDER — ROSUVASTATIN CALCIUM 5 MG/1
5 TABLET, COATED ORAL NIGHTLY
Qty: 90 TABLET | Refills: 0 | Status: SHIPPED | OUTPATIENT
Start: 2018-10-23 | End: 2018-12-30 | Stop reason: SDUPTHER

## 2018-12-16 DIAGNOSIS — I10 BENIGN ESSENTIAL HYPERTENSION: ICD-10-CM

## 2018-12-17 RX ORDER — METOPROLOL SUCCINATE 100 MG/1
100 TABLET, EXTENDED RELEASE ORAL DAILY
Qty: 90 TABLET | Refills: 1 | Status: SHIPPED | OUTPATIENT
Start: 2018-12-17 | End: 2019-12-26

## 2018-12-17 RX ORDER — LEVOTHYROXINE SODIUM 0.1 MG/1
TABLET ORAL
Qty: 90 TABLET | Refills: 1 | Status: SHIPPED | OUTPATIENT
Start: 2018-12-17 | End: 2019-02-05 | Stop reason: SDUPTHER

## 2018-12-26 DIAGNOSIS — I10 BENIGN ESSENTIAL HYPERTENSION: ICD-10-CM

## 2018-12-26 RX ORDER — LEVOTHYROXINE SODIUM 0.1 MG/1
TABLET ORAL
Qty: 90 TABLET | Refills: 3 | Status: SHIPPED | OUTPATIENT
Start: 2018-12-26 | End: 2019-12-26

## 2018-12-26 RX ORDER — METOPROLOL SUCCINATE 100 MG/1
100 TABLET, EXTENDED RELEASE ORAL DAILY
Qty: 90 TABLET | Refills: 3 | Status: SHIPPED | OUTPATIENT
Start: 2018-12-26 | End: 2019-02-05 | Stop reason: SDUPTHER

## 2018-12-30 DIAGNOSIS — K21.9 GASTROESOPHAGEAL REFLUX DISEASE, ESOPHAGITIS PRESENCE NOT SPECIFIED: ICD-10-CM

## 2018-12-31 RX ORDER — ROSUVASTATIN CALCIUM 5 MG/1
5 TABLET, COATED ORAL NIGHTLY
Qty: 90 TABLET | Refills: 1 | Status: SHIPPED | OUTPATIENT
Start: 2018-12-31 | End: 2019-07-24 | Stop reason: SDUPTHER

## 2018-12-31 RX ORDER — HYDROCHLOROTHIAZIDE 12.5 MG/1
12.5 CAPSULE, GELATIN COATED ORAL DAILY
Qty: 90 CAPSULE | Refills: 1 | Status: SHIPPED | OUTPATIENT
Start: 2018-12-31 | End: 2019-07-24 | Stop reason: SDUPTHER

## 2018-12-31 RX ORDER — FLUOXETINE HYDROCHLORIDE 20 MG/1
20 CAPSULE ORAL DAILY
Qty: 90 CAPSULE | Refills: 1 | Status: SHIPPED | OUTPATIENT
Start: 2018-12-31 | End: 2019-08-16 | Stop reason: SDUPTHER

## 2019-01-04 RX ORDER — OMEPRAZOLE 40 MG/1
CAPSULE, DELAYED RELEASE ORAL
Qty: 90 CAPSULE | Refills: 0 | Status: SHIPPED | OUTPATIENT
Start: 2019-01-04 | End: 2019-02-11 | Stop reason: SDUPTHER

## 2019-01-08 ENCOUNTER — TELEPHONE (OUTPATIENT)
Dept: GASTROENTEROLOGY | Facility: CLINIC | Age: 74
End: 2019-01-08

## 2019-01-08 NOTE — TELEPHONE ENCOUNTER
See omeprazole rx of 1/4/19 - receipt confirmed, #90, R0.      Call to pt.  States was advised that could not fill until made appt (no note to this effect seen)    Call to Lalitha at [phone] and spoke with Brandi.  Rx not received.  Order omeprazole 40 mg 1 tab po daily, #90, R0.  Brandi R&V.    Call to pt to advise of same - verb understanding.

## 2019-01-08 NOTE — TELEPHONE ENCOUNTER
----- Message from Dayanna Marks sent at 1/8/2019 12:08 PM EST -----  Regarding: SEE PENDING REFILL REQUEST - OMEPRAZOLE  Contact: [phone]  TAMEKA O/V WITH EVARISTO IN FEB.

## 2019-02-05 ENCOUNTER — OFFICE VISIT (OUTPATIENT)
Dept: INTERNAL MEDICINE | Facility: CLINIC | Age: 74
End: 2019-02-05

## 2019-02-05 VITALS
BODY MASS INDEX: 31.41 KG/M2 | WEIGHT: 183 LBS | SYSTOLIC BLOOD PRESSURE: 168 MMHG | OXYGEN SATURATION: 98 % | TEMPERATURE: 96.2 F | DIASTOLIC BLOOD PRESSURE: 78 MMHG | HEART RATE: 58 BPM

## 2019-02-05 DIAGNOSIS — F32.0 CURRENT MILD EPISODE OF MAJOR DEPRESSIVE DISORDER WITHOUT PRIOR EPISODE (HCC): ICD-10-CM

## 2019-02-05 DIAGNOSIS — E55.9 VITAMIN D DEFICIENCY: ICD-10-CM

## 2019-02-05 DIAGNOSIS — D50.0 IRON DEFICIENCY ANEMIA DUE TO CHRONIC BLOOD LOSS: ICD-10-CM

## 2019-02-05 DIAGNOSIS — E53.8 VITAMIN B12 DEFICIENCY: ICD-10-CM

## 2019-02-05 DIAGNOSIS — E03.8 OTHER SPECIFIED HYPOTHYROIDISM: ICD-10-CM

## 2019-02-05 DIAGNOSIS — J41.0 SIMPLE CHRONIC BRONCHITIS (HCC): ICD-10-CM

## 2019-02-05 DIAGNOSIS — I10 BENIGN ESSENTIAL HYPERTENSION: Primary | ICD-10-CM

## 2019-02-05 DIAGNOSIS — E78.2 MIXED HYPERLIPIDEMIA: ICD-10-CM

## 2019-02-05 DIAGNOSIS — E11.40 TYPE 2 DIABETES MELLITUS WITH DIABETIC NEUROPATHY, WITHOUT LONG-TERM CURRENT USE OF INSULIN (HCC): ICD-10-CM

## 2019-02-05 DIAGNOSIS — R94.4 ABNORMAL CREATININE CLEARANCE GLOMERULAR FILTRATION: ICD-10-CM

## 2019-02-05 DIAGNOSIS — I44.0 AV BLOCK, 1ST DEGREE: ICD-10-CM

## 2019-02-05 PROCEDURE — 99214 OFFICE O/P EST MOD 30 MIN: CPT | Performed by: FAMILY MEDICINE

## 2019-02-05 PROCEDURE — 93000 ELECTROCARDIOGRAM COMPLETE: CPT | Performed by: FAMILY MEDICINE

## 2019-02-05 RX ORDER — LANCETS 28 GAUGE
EACH MISCELLANEOUS
Qty: 100 EACH | Refills: 12 | Status: SHIPPED | OUTPATIENT
Start: 2019-02-05

## 2019-02-05 RX ORDER — AMLODIPINE BESYLATE 5 MG/1
5 TABLET ORAL DAILY
Qty: 90 TABLET | Refills: 1 | Status: SHIPPED | OUTPATIENT
Start: 2019-02-05 | End: 2019-06-05

## 2019-02-05 RX ORDER — BLOOD-GLUCOSE METER
1 KIT MISCELLANEOUS 2 TIMES DAILY
Qty: 1 EACH | Refills: 1 | Status: SHIPPED | OUTPATIENT
Start: 2019-02-05 | End: 2019-06-05

## 2019-02-05 NOTE — PROGRESS NOTES
Procedure     ECG 12 Lead  Date/Time: 2/5/2019 9:13 AM  Performed by: Ankit Albert Jr., MD  Authorized by: Ankit Albert Jr., MD   Comparison: compared with previous ECG from 2/7/2018  Similar to previous ECG  Rhythm: sinus rhythm and sinus bradycardia  Rate: bradycardic  Conduction: 1st degree  ST Segments: ST segments normal  T Waves: T waves normal  QRS axis: normal  Other: no other findings  Clinical impression: non-specific ECG

## 2019-02-05 NOTE — PROGRESS NOTES
Subjective   Yi Lee is a 73 y.o. female.     Chief Complaint   Patient presents with   • Hypertension   • Hyperlipidemia   • Anemia   • Hypothyroidism         History of Present Illness   Patient has number of issues.  She has had a chronic anemia and will see Dr. Grigsby gastroenterology again.  She has had some blood on the pillow in the morning but no epistaxis.  She has dentures.  She has night sweats.  Previous chest x-ray year ago was normal.  There is a distant history of chronic bronchitis.  Her stools are not dark and she is taking iron tablet one a day.  Recheck labs today.  Blood pressures been running our discussed adding amlodipine.    Family history includes heart disease and brother with atrial fibrillation.  EKG today shows sinus bradycardia with first-degree AV block unchanged.    History of hypothyroidism reviewed with labs pending.  She is to have a diabetic eye exam.  Reviewed history of elevated sugar and diabetes type 2.  Medications are continued.    The following portions of the patient's history were reviewed and updated as appropriate: allergies, current medications, past social history and problem list.    Review of Systems   Constitutional: Negative.    HENT: Negative.    Eyes: Negative.    Respiratory: Negative.    Cardiovascular: Negative.    Gastrointestinal: Positive for constipation.   Endocrine: Negative.    Genitourinary: Negative.    Musculoskeletal: Negative.    Skin: Negative.    Allergic/Immunologic: Negative.    Neurological: Negative.    Hematological: Negative.    Psychiatric/Behavioral: Negative.        Objective   Vitals:    02/05/19 0836   BP: 168/78   Pulse: 58   Temp: 96.2 °F (35.7 °C)   SpO2: 98%     Physical Exam   Constitutional: She is oriented to person, place, and time. She appears well-developed and well-nourished.   HENT:   Head: Normocephalic and atraumatic.   Right Ear: Tympanic membrane and external ear normal.   Left Ear: Tympanic membrane and external  ear normal.   Nose: Nose normal.   Mouth/Throat: Oropharynx is clear and moist.   Eyes: Conjunctivae and EOM are normal. Pupils are equal, round, and reactive to light.   Neck: Normal range of motion. Neck supple. No JVD present. No thyromegaly present.   Cardiovascular: Regular rhythm and intact distal pulses.  Occasional extrasystoles are present. Bradycardia present.   Murmur heard.   Systolic murmur is present with a grade of 2/6.  Pulmonary/Chest: Effort normal and breath sounds normal.   Abdominal: Soft. Bowel sounds are normal.   Musculoskeletal: Normal range of motion.   Lymphadenopathy:     She has no cervical adenopathy.   Neurological: She is alert and oriented to person, place, and time. No cranial nerve deficit. Coordination normal.   Skin: Skin is warm and dry. No rash noted.   Psychiatric: She has a normal mood and affect. Her behavior is normal. Judgment and thought content normal.   Vitals reviewed.      Assessment/Plan   Problem List Items Addressed This Visit        Cardiovascular and Mediastinum    Benign essential hypertension - Primary    Relevant Medications    amLODIPine (NORVASC) 5 MG tablet    Other Relevant Orders    CBC & Differential    Iron and TIBC    Ferritin    Comprehensive Metabolic Panel    Lipid Panel With / Chol / HDL Ratio    Hemoglobin A1c    TSH    T4, Free    T3, Free    Vitamin D 25 Hydroxy    Vitamin B12    Hyperlipidemia    Relevant Orders    CBC & Differential    Iron and TIBC    Ferritin    Comprehensive Metabolic Panel    Lipid Panel With / Chol / HDL Ratio    Hemoglobin A1c    TSH    T4, Free    T3, Free    Vitamin D 25 Hydroxy    Vitamin B12       Respiratory    Chronic obstructive pulmonary disease (CMS/HCC)       Digestive    Vitamin B12 deficiency    Relevant Orders    CBC & Differential    Iron and TIBC    Ferritin    Comprehensive Metabolic Panel    Lipid Panel With / Chol / HDL Ratio    Hemoglobin A1c    TSH    T4, Free    T3, Free    Vitamin D 25 Hydroxy     Vitamin B12       Endocrine    Type 2 diabetes mellitus with diabetic neuropathy, without long-term current use of insulin (CMS/[facility])    Relevant Orders    CBC & Differential    Iron and TIBC    Ferritin    Comprehensive Metabolic Panel    Lipid Panel With / Chol / HDL Ratio    Hemoglobin A1c    TSH    T4, Free    T3, Free    Vitamin D 25 Hydroxy    Vitamin B12    Hypothyroidism    Relevant Orders    CBC & Differential    Iron and TIBC    Ferritin    Comprehensive Metabolic Panel    Lipid Panel With / Chol / HDL Ratio    Hemoglobin A1c    TSH    T4, Free    T3, Free    Vitamin D 25 Hydroxy    Vitamin B12       Hematopoietic and Hemostatic    Iron deficiency anemia due to chronic blood loss    Relevant Orders    CBC & Differential    Iron and TIBC    Ferritin    Comprehensive Metabolic Panel    Lipid Panel With / Chol / HDL Ratio    Hemoglobin A1c    TSH    T4, Free    T3, Free    Vitamin D 25 Hydroxy    Vitamin B12       Other    Abnormal creatinine clearance glomerular filtration    Relevant Orders    CBC & Differential    Iron and TIBC    Ferritin    Comprehensive Metabolic Panel    Lipid Panel With / Chol / HDL Ratio    Hemoglobin A1c    TSH    T4, Free    T3, Free    Vitamin D 25 Hydroxy    Vitamin B12    Depression      Other Visit Diagnoses     Vitamin D deficiency         Relevant Orders    Vitamin D 25 Hydroxy    AV block, 1st degree        Relevant Orders    ECG 12 Lead      Follow-up with gastroneurology meds continued on amlodipine 5 mg daily follow-up in 4 months.  Encouraged to get a diabetic eye exam.

## 2019-02-06 LAB
25(OH)D3+25(OH)D2 SERPL-MCNC: 39.6 NG/ML (ref 30–100)
ALBUMIN SERPL-MCNC: 4.2 G/DL (ref 3.5–5.2)
ALBUMIN/GLOB SERPL: 1.8 G/DL
ALP SERPL-CCNC: 91 U/L (ref 39–117)
ALT SERPL-CCNC: 18 U/L (ref 1–33)
AST SERPL-CCNC: 19 U/L (ref 1–32)
BASOPHILS # BLD AUTO: 0.04 10*3/MM3 (ref 0–0.2)
BASOPHILS NFR BLD AUTO: 0.6 % (ref 0–1.5)
BILIRUB SERPL-MCNC: <0.2 MG/DL (ref 0.1–1.2)
BUN SERPL-MCNC: 26 MG/DL (ref 8–23)
BUN/CREAT SERPL: 19.5 (ref 7–25)
CALCIUM SERPL-MCNC: 9.6 MG/DL (ref 8.6–10.5)
CHLORIDE SERPL-SCNC: 103 MMOL/L (ref 98–107)
CHOLEST SERPL-MCNC: 155 MG/DL (ref 0–200)
CHOLEST/HDLC SERPL: 2.58 {RATIO}
CO2 SERPL-SCNC: 24.3 MMOL/L (ref 22–29)
CREAT SERPL-MCNC: 1.33 MG/DL (ref 0.57–1)
EOSINOPHIL # BLD AUTO: 0.26 10*3/MM3 (ref 0–0.7)
EOSINOPHIL NFR BLD AUTO: 3.9 % (ref 0.3–6.2)
ERYTHROCYTE [DISTWIDTH] IN BLOOD BY AUTOMATED COUNT: 12.7 % (ref 11.7–13)
FERRITIN SERPL-MCNC: 61.45 NG/ML (ref 13–150)
GLOBULIN SER CALC-MCNC: 2.4 GM/DL
GLUCOSE SERPL-MCNC: 125 MG/DL (ref 65–99)
HBA1C MFR BLD: 6.28 % (ref 4.8–5.6)
HCT VFR BLD AUTO: 37 % (ref 35.6–45.5)
HDLC SERPL-MCNC: 60 MG/DL (ref 40–60)
HGB BLD-MCNC: 11.4 G/DL (ref 11.9–15.5)
IMM GRANULOCYTES # BLD AUTO: 0.02 10*3/MM3 (ref 0–0.03)
IMM GRANULOCYTES NFR BLD AUTO: 0.3 % (ref 0–0.5)
IRON SATN MFR SERPL: 8 % (ref 20–50)
IRON SERPL-MCNC: 34 MCG/DL (ref 37–145)
LDLC SERPL CALC-MCNC: 77 MG/DL (ref 0–100)
LYMPHOCYTES # BLD AUTO: 1.36 10*3/MM3 (ref 0.9–4.8)
LYMPHOCYTES NFR BLD AUTO: 20.3 % (ref 19.6–45.3)
MCH RBC QN AUTO: 30.2 PG (ref 26.9–32)
MCHC RBC AUTO-ENTMCNC: 30.8 G/DL (ref 32.4–36.3)
MCV RBC AUTO: 98.1 FL (ref 80.5–98.2)
MONOCYTES # BLD AUTO: 0.86 10*3/MM3 (ref 0.2–1.2)
MONOCYTES NFR BLD AUTO: 12.8 % (ref 5–12)
NEUTROPHILS # BLD AUTO: 4.17 10*3/MM3 (ref 1.9–8.1)
NEUTROPHILS NFR BLD AUTO: 62.1 % (ref 42.7–76)
PLATELET # BLD AUTO: 300 10*3/MM3 (ref 140–500)
POTASSIUM SERPL-SCNC: 4.8 MMOL/L (ref 3.5–5.2)
PROT SERPL-MCNC: 6.6 G/DL (ref 6–8.5)
RBC # BLD AUTO: 3.77 10*6/MM3 (ref 3.9–5.2)
SODIUM SERPL-SCNC: 140 MMOL/L (ref 136–145)
T3FREE SERPL-MCNC: 2 PG/ML (ref 2–4.4)
T4 FREE SERPL-MCNC: 1 NG/DL (ref 0.93–1.7)
TIBC SERPL-MCNC: 401 MCG/DL
TRIGL SERPL-MCNC: 89 MG/DL (ref 0–150)
TSH SERPL DL<=0.005 MIU/L-ACNC: 3.03 MIU/ML (ref 0.27–4.2)
UIBC SERPL-MCNC: 367 MCG/DL
VIT B12 SERPL-MCNC: 450 PG/ML (ref 211–946)
VLDLC SERPL CALC-MCNC: 17.8 MG/DL (ref 5–40)
WBC # BLD AUTO: 6.71 10*3/MM3 (ref 4.5–10.7)

## 2019-02-11 ENCOUNTER — OFFICE VISIT (OUTPATIENT)
Dept: GASTROENTEROLOGY | Facility: CLINIC | Age: 74
End: 2019-02-11

## 2019-02-11 VITALS
SYSTOLIC BLOOD PRESSURE: 146 MMHG | TEMPERATURE: 98 F | HEIGHT: 64 IN | DIASTOLIC BLOOD PRESSURE: 76 MMHG | BODY MASS INDEX: 31.1 KG/M2 | WEIGHT: 182.2 LBS

## 2019-02-11 DIAGNOSIS — K21.9 GASTROESOPHAGEAL REFLUX DISEASE, ESOPHAGITIS PRESENCE NOT SPECIFIED: Primary | ICD-10-CM

## 2019-02-11 DIAGNOSIS — D50.0 IRON DEFICIENCY ANEMIA DUE TO CHRONIC BLOOD LOSS: ICD-10-CM

## 2019-02-11 DIAGNOSIS — K59.00 CONSTIPATION, UNSPECIFIED CONSTIPATION TYPE: ICD-10-CM

## 2019-02-11 PROCEDURE — 99214 OFFICE O/P EST MOD 30 MIN: CPT | Performed by: NURSE PRACTITIONER

## 2019-02-11 RX ORDER — OMEPRAZOLE 40 MG/1
40 CAPSULE, DELAYED RELEASE ORAL DAILY
Qty: 90 CAPSULE | Refills: 3 | Status: SHIPPED | OUTPATIENT
Start: 2019-02-11 | End: 2020-04-13

## 2019-02-11 RX ORDER — LACTULOSE 10 G/15ML
SOLUTION ORAL
Qty: 4050 ML | Refills: 1 | Status: SHIPPED | OUTPATIENT
Start: 2019-02-11 | End: 2019-06-05 | Stop reason: SDUPTHER

## 2019-02-11 NOTE — PROGRESS NOTES
Chief Complaint   Patient presents with   • Follow-up   • Heartburn   • Constipation       Yi Lee is a  73 y.o. female here for a follow up visit for GERD and constipation.     HPI  72 yo f presents today for follow up visit for GERD, constipation and anemia. She is a patient of Dr. Grigsby. She was last seen in the office on 2/2018. She has hx GERD and admits she does well with prilosec. She also has hx constipation and reports lactulose PRN several times a week works well for her. She does have hx LUCA and takes Iron daily. She had her labs done by her PCP and her Hgb and Iron was a little lower than normal for her. She denies any dysphagia, reflux, abd pain, N&V, diarrhea, rectal bleeding or melena. She admits her appetite is good and she has been losing some weight lately. She has been trying to eat better. She is not taking any Vitamin C with her Iron. She tells me PCP told her to go to daily with the Iron instead of BID.     Past Medical History:   Diagnosis Date   • Abdominal pain, LLQ    • Anemia    • Arthralgia of hip 11/22/2015   • Ataxic gait 11/22/2015    Description: Eval Dr Lillian Mederos, Neuro.  Some vestibular dysfunction present 2013/2014   • Bipolar I disorder, single manic episode (CMS/HCC)    • Cardiac murmur    • Colon polyp    • Coronary artery disease    • Degeneration, intervertebral disc, thoracolumbar    • Diabetes mellitus (CMS/HCC)    • Disease of thyroid gland    • Diverticulosis    • Esophageal spasm    • Fatigue    • H/O bone density study 10/17/2007    Dr. Giles   • Hemorrhoids    • History of mammogram     02/2012, per GYN Reshma Aranda   • History of Papanicolaou smear of cervix     DR. GILES GYN   • Hypertension    • Impaired cognition 11/22/2015    Description: Testing done 05/14   • Optic nerve contusion    • Pain of lower extremity 11/22/2015   • Vitamin B12 deficiency    • Vitamin D deficiency        Past Surgical History:   Procedure Laterality Date   • BREAST LUMPECTOMY  Left     benign   • CHOLECYSTECTOMY     • COLONOSCOPY      done , repeat 5 yrs, Dr Grigsby; tics in sigmoid colon, IH   • COLONOSCOPY N/A 11/3/2016    polyp, IH   • DILATATION AND CURETTAGE     • ESOPHAGOGASTRIC FUNDOPLASTY     • ROTATOR CUFF REPAIR Right    • TOTAL KNEE ARTHROPLASTY Left 2018    Procedure: LT TOTAL KNEE ARTHROPLASTY;  Surgeon: Selwyn Pinto MD;  Location: [facility] OR;  Service:        Scheduled Meds:    Continuous Infusions:  No current facility-administered medications for this visit.     PRN Meds:.    Allergies   Allergen Reactions   • Morphine Shortness Of Breath   • Penicillins Hives and Swelling   • Ace Inhibitors Cough   • Telmisartan Cough       Social History     Socioeconomic History   • Marital status:      Spouse name: Not on file   • Number of children: Not on file   • Years of education: Not on file   • Highest education level: Not on file   Social Needs   • Financial resource strain: Not on file   • Food insecurity - worry: Not on file   • Food insecurity - inability: Not on file   • Transportation needs - medical: Not on file   • Transportation needs - non-medical: Not on file   Occupational History   • Not on file   Tobacco Use   • Smoking status: Former Smoker     Packs/day: 1.00     Years: 7.00     Pack years: 7.00     Types: Cigarettes     Last attempt to quit: 1972     Years since quittin.1   • Smokeless tobacco: Never Used   Substance and Sexual Activity   • Alcohol use: Yes     Comment: OCC   • Drug use: No   • Sexual activity: No   Other Topics Concern   • Not on file   Social History Narrative   • Not on file       Family History   Problem Relation Age of Onset   • Colon polyps Father    • Hepatitis Other    • Coronary artery disease Other    • Malig Hyperthermia Neg Hx        Review of Systems   Constitutional: Negative for appetite change, chills, diaphoresis, fatigue, fever and unexpected weight change.   HENT: Negative for nosebleeds,  postnasal drip, sore throat, trouble swallowing and voice change.    Respiratory: Negative for cough, choking, chest tightness, shortness of breath and wheezing.    Cardiovascular: Negative for chest pain.   Gastrointestinal: Negative for abdominal distention, abdominal pain, anal bleeding, blood in stool, constipation, diarrhea, nausea, rectal pain and vomiting.   Endocrine: Negative for polydipsia, polyphagia and polyuria.   Musculoskeletal: Negative for gait problem.   Skin: Negative for rash and wound.   Allergic/Immunologic: Negative for food allergies.   Neurological: Negative for dizziness, speech difficulty and light-headedness.   Psychiatric/Behavioral: Negative for confusion, self-injury, sleep disturbance and suicidal ideas.       Vitals:    02/11/19 0829   BP: 146/76   Temp: 98 °F (36.7 °C)       Physical Exam   Constitutional: She is oriented to person, place, and time. She appears well-developed and well-nourished. She does not appear ill. No distress.   HENT:   Head: Normocephalic.   Eyes: Pupils are equal, round, and reactive to light.   Cardiovascular: Normal rate, regular rhythm and normal heart sounds.   Pulmonary/Chest: Effort normal and breath sounds normal.   Abdominal: Soft. Bowel sounds are normal. She exhibits no distension and no mass. There is no hepatosplenomegaly. There is no tenderness. There is no rebound and no guarding. No hernia.   Musculoskeletal: Normal range of motion.   Neurological: She is alert and oriented to person, place, and time.   Skin: Skin is warm and dry.   Psychiatric: She has a normal mood and affect. Her speech is normal and behavior is normal. Judgment normal.       No images are attached to the encounter.    Assessment & Plan    1. Gastroesophageal reflux disease, esophagitis presence not specified  - omeprazole (priLOSEC) 40 MG capsule; Take 1 capsule by mouth Daily.  Dispense: 90 capsule; Refill: 3    2. Iron deficiency anemia due to chronic blood loss    3.  Constipation, unspecified constipation type      GERD is well controlled on Prilosec 40 mg daily. Constipation is doing well on lactulose PRN. LUCA is not well controlled. Most recent labs show lower Iron and Hgb than normal. Recommend she take the Iron BID and adds Vitamin C to it for better absorption. Patient to follow up with me or Dr. Grigsby in 1 year. Next colonoscopy due 2021.

## 2019-02-20 RX ORDER — LAMOTRIGINE 200 MG/1
400 TABLET ORAL NIGHTLY
Qty: 180 TABLET | Refills: 1 | Status: SHIPPED | OUTPATIENT
Start: 2019-02-20 | End: 2019-08-17 | Stop reason: SDUPTHER

## 2019-03-12 ENCOUNTER — TELEPHONE (OUTPATIENT)
Dept: INTERNAL MEDICINE | Facility: CLINIC | Age: 74
End: 2019-03-12

## 2019-04-16 ENCOUNTER — HOSPITAL ENCOUNTER (OUTPATIENT)
Age: 74
Setting detail: SPECIMEN
Discharge: HOME | End: 2019-04-16
Payer: MEDICARE

## 2019-04-16 DIAGNOSIS — R53.83 OTHER FATIGUE: ICD-10-CM

## 2019-04-16 DIAGNOSIS — Z13.220 LIPID SCREENING: ICD-10-CM

## 2019-04-16 DIAGNOSIS — E16.2 HYPOGLYCEMIA: ICD-10-CM

## 2019-04-16 LAB
ABSOLUTE EOS #: 0.07 K/UL (ref 0–0.44)
ABSOLUTE IMMATURE GRANULOCYTE: <0.03 K/UL (ref 0–0.3)
ABSOLUTE LYMPH #: 1.94 K/UL (ref 1.1–3.7)
ABSOLUTE MONO #: 0.53 K/UL (ref 0.1–1.2)
ALT SERPL-CCNC: 15 U/L (ref 5–33)
ANION GAP SERPL CALCULATED.3IONS-SCNC: 13 MMOL/L (ref 9–17)
AST SERPL-CCNC: 18 U/L
BASOPHILS # BLD: 0 % (ref 0–2)
BASOPHILS ABSOLUTE: <0.03 K/UL (ref 0–0.2)
BUN BLDV-MCNC: 17 MG/DL (ref 8–23)
BUN/CREAT BLD: ABNORMAL (ref 9–20)
CALCIUM SERPL-MCNC: 9.6 MG/DL (ref 8.6–10.4)
CHLORIDE BLD-SCNC: 108 MMOL/L (ref 98–107)
CHOLESTEROL/HDL RATIO: 2.7
CHOLESTEROL: 163 MG/DL
CO2: 23 MMOL/L (ref 20–31)
CREAT SERPL-MCNC: 0.97 MG/DL (ref 0.5–0.9)
DIFFERENTIAL TYPE: ABNORMAL
EOSINOPHILS RELATIVE PERCENT: 1 % (ref 1–4)
ESTIMATED AVERAGE GLUCOSE: 154 MG/DL
GFR AFRICAN AMERICAN: >60 ML/MIN
GFR NON-AFRICAN AMERICAN: 56 ML/MIN
GFR SERPL CREATININE-BSD FRML MDRD: ABNORMAL ML/MIN/{1.73_M2}
GFR SERPL CREATININE-BSD FRML MDRD: ABNORMAL ML/MIN/{1.73_M2}
GLUCOSE FASTING: 144 MG/DL (ref 70–99)
HBA1C MFR BLD: 7 % (ref 4–6)
HCT VFR BLD CALC: 44.1 % (ref 36.3–47.1)
HDLC SERPL-MCNC: 61 MG/DL
HEMOGLOBIN: 14.2 G/DL (ref 11.9–15.1)
IMMATURE GRANULOCYTES: 0 %
LDL CHOLESTEROL: 81 MG/DL (ref 0–130)
LYMPHOCYTES # BLD: 39 % (ref 24–43)
MCH RBC QN AUTO: 30.1 PG (ref 25.2–33.5)
MCHC RBC AUTO-ENTMCNC: 32.2 G/DL (ref 28.4–34.8)
MCV RBC AUTO: 93.4 FL (ref 82.6–102.9)
MONOCYTES # BLD: 11 % (ref 3–12)
NRBC AUTOMATED: 0 PER 100 WBC
PDW BLD-RTO: 15.2 % (ref 11.8–14.4)
PLATELET # BLD: 197 K/UL (ref 138–453)
PLATELET ESTIMATE: ABNORMAL
PMV BLD AUTO: 11.9 FL (ref 8.1–13.5)
POTASSIUM SERPL-SCNC: 3.5 MMOL/L (ref 3.7–5.3)
RBC # BLD: 4.72 M/UL (ref 3.95–5.11)
RBC # BLD: ABNORMAL 10*6/UL
SEG NEUTROPHILS: 49 % (ref 36–65)
SEGMENTED NEUTROPHILS ABSOLUTE COUNT: 2.43 K/UL (ref 1.5–8.1)
SODIUM BLD-SCNC: 144 MMOL/L (ref 135–144)
THYROXINE, FREE: 2.04 NG/DL (ref 0.93–1.7)
TRIGL SERPL-MCNC: 105 MG/DL
TSH SERPL DL<=0.05 MIU/L-ACNC: 1.74 MIU/L (ref 0.3–5)
VLDLC SERPL CALC-MCNC: NORMAL MG/DL (ref 1–30)
WBC # BLD: 5 K/UL (ref 3.5–11.3)
WBC # BLD: ABNORMAL 10*3/UL

## 2019-06-05 ENCOUNTER — OFFICE VISIT (OUTPATIENT)
Dept: INTERNAL MEDICINE | Facility: CLINIC | Age: 74
End: 2019-06-05

## 2019-06-05 VITALS
BODY MASS INDEX: 31.93 KG/M2 | WEIGHT: 186 LBS | HEART RATE: 57 BPM | TEMPERATURE: 96.5 F | SYSTOLIC BLOOD PRESSURE: 156 MMHG | OXYGEN SATURATION: 95 % | DIASTOLIC BLOOD PRESSURE: 72 MMHG

## 2019-06-05 DIAGNOSIS — R01.1 SYSTOLIC MURMUR: ICD-10-CM

## 2019-06-05 DIAGNOSIS — I10 BENIGN ESSENTIAL HYPERTENSION: ICD-10-CM

## 2019-06-05 DIAGNOSIS — E53.8 VITAMIN B12 DEFICIENCY: ICD-10-CM

## 2019-06-05 DIAGNOSIS — Q61.9 CYSTIC KIDNEY DISEASE: ICD-10-CM

## 2019-06-05 DIAGNOSIS — E03.9 ACQUIRED HYPOTHYROIDISM: ICD-10-CM

## 2019-06-05 DIAGNOSIS — R94.4 ABNORMAL CREATININE CLEARANCE GLOMERULAR FILTRATION: ICD-10-CM

## 2019-06-05 DIAGNOSIS — E55.9 VITAMIN D DEFICIENCY: ICD-10-CM

## 2019-06-05 DIAGNOSIS — E78.00 PURE HYPERCHOLESTEROLEMIA: Primary | ICD-10-CM

## 2019-06-05 DIAGNOSIS — F32.0 CURRENT MILD EPISODE OF MAJOR DEPRESSIVE DISORDER, UNSPECIFIED WHETHER RECURRENT (HCC): ICD-10-CM

## 2019-06-05 DIAGNOSIS — R23.3 BRUISING, SPONTANEOUS: ICD-10-CM

## 2019-06-05 DIAGNOSIS — D50.0 IRON DEFICIENCY ANEMIA DUE TO CHRONIC BLOOD LOSS: ICD-10-CM

## 2019-06-05 DIAGNOSIS — E11.40 TYPE 2 DIABETES MELLITUS WITH DIABETIC NEUROPATHY, WITHOUT LONG-TERM CURRENT USE OF INSULIN (HCC): ICD-10-CM

## 2019-06-05 LAB
25(OH)D3+25(OH)D2 SERPL-MCNC: 33.4 NG/ML (ref 30–100)
ALBUMIN SERPL-MCNC: 4.1 G/DL (ref 3.5–5.2)
ALBUMIN/GLOB SERPL: 1.7 G/DL
ALP SERPL-CCNC: 82 U/L (ref 39–117)
ALT SERPL-CCNC: 13 U/L (ref 1–33)
AST SERPL-CCNC: 15 U/L (ref 1–32)
BASOPHILS # BLD AUTO: 0.04 10*3/MM3 (ref 0–0.2)
BASOPHILS NFR BLD AUTO: 0.8 % (ref 0–1.5)
BILIRUB SERPL-MCNC: 0.2 MG/DL (ref 0.2–1.2)
BUN SERPL-MCNC: 25 MG/DL (ref 8–23)
BUN/CREAT SERPL: 20.8 (ref 7–25)
CALCIUM SERPL-MCNC: 10.4 MG/DL (ref 8.6–10.5)
CHLORIDE SERPL-SCNC: 106 MMOL/L (ref 98–107)
CO2 SERPL-SCNC: 22.4 MMOL/L (ref 22–29)
CREAT SERPL-MCNC: 1.2 MG/DL (ref 0.57–1)
EOSINOPHIL # BLD AUTO: 0.19 10*3/MM3 (ref 0–0.4)
EOSINOPHIL NFR BLD AUTO: 3.8 % (ref 0.3–6.2)
ERYTHROCYTE [DISTWIDTH] IN BLOOD BY AUTOMATED COUNT: 13.2 % (ref 12.3–15.4)
FOLATE SERPL-MCNC: 19.8 NG/ML (ref 4.78–24.2)
GLOBULIN SER CALC-MCNC: 2.4 GM/DL
GLUCOSE SERPL-MCNC: 134 MG/DL (ref 65–99)
HBA1C MFR BLD: 6 % (ref 4.8–5.6)
HCT VFR BLD AUTO: 35.4 % (ref 34–46.6)
HGB BLD-MCNC: 10.8 G/DL (ref 12–15.9)
IMM GRANULOCYTES # BLD AUTO: 0.02 10*3/MM3 (ref 0–0.05)
IMM GRANULOCYTES NFR BLD AUTO: 0.4 % (ref 0–0.5)
INR PPP: 0.98 (ref 0.9–1.1)
LYMPHOCYTES # BLD AUTO: 1.4 10*3/MM3 (ref 0.7–3.1)
LYMPHOCYTES NFR BLD AUTO: 27.8 % (ref 19.6–45.3)
MCH RBC QN AUTO: 30.3 PG (ref 26.6–33)
MCHC RBC AUTO-ENTMCNC: 30.5 G/DL (ref 31.5–35.7)
MCV RBC AUTO: 99.2 FL (ref 79–97)
MONOCYTES # BLD AUTO: 0.62 10*3/MM3 (ref 0.1–0.9)
MONOCYTES NFR BLD AUTO: 12.3 % (ref 5–12)
NEUTROPHILS # BLD AUTO: 2.76 10*3/MM3 (ref 1.7–7)
NEUTROPHILS NFR BLD AUTO: 54.9 % (ref 42.7–76)
NRBC BLD AUTO-RTO: 0 /100 WBC (ref 0–0.2)
PLATELET # BLD AUTO: 269 10*3/MM3 (ref 140–450)
POTASSIUM SERPL-SCNC: 5 MMOL/L (ref 3.5–5.2)
PROT SERPL-MCNC: 6.5 G/DL (ref 6–8.5)
PROTHROMBIN TIME: 12.7 SECONDS (ref 11.7–14.2)
RBC # BLD AUTO: 3.57 10*6/MM3 (ref 3.77–5.28)
SODIUM SERPL-SCNC: 140 MMOL/L (ref 136–145)
T4 FREE SERPL-MCNC: 1.06 NG/DL (ref 0.93–1.7)
TSH SERPL DL<=0.005 MIU/L-ACNC: 1.72 MIU/ML (ref 0.27–4.2)
VIT B12 SERPL-MCNC: 362 PG/ML (ref 211–946)
WBC # BLD AUTO: 5.03 10*3/MM3 (ref 3.4–10.8)

## 2019-06-05 PROCEDURE — 99214 OFFICE O/P EST MOD 30 MIN: CPT | Performed by: FAMILY MEDICINE

## 2019-06-05 RX ORDER — LACTULOSE 10 G/15ML
SOLUTION ORAL; RECTAL
COMMUNITY
Start: 2019-03-26 | End: 2020-03-30

## 2019-06-05 RX ORDER — AMLODIPINE BESYLATE 5 MG/1
10 TABLET ORAL DAILY
Qty: 90 TABLET | Refills: 1 | Status: SHIPPED | OUTPATIENT
Start: 2019-06-05 | End: 2019-07-24 | Stop reason: SDUPTHER

## 2019-06-05 NOTE — PROGRESS NOTES
Subjective   Yi Lee is a 73 y.o. female.     Chief Complaint   Patient presents with   • Bleeding/Bruising   • Hyperlipidemia   • Hypertension   • Depression         History of Present Illness   Patient with history of hypertension not well controlled we will increase her amlodipine from 5-10 daily otherwise she has easy bruising over the arms and legs with minimal trauma.  She is seeing gastroenterology for chronic anemia and she is on iron supplement.  Treatment of hyperlipidemia and depression are reviewed also and she has fatigue.  On exam she has chronic systolic murmur.  She saw cardiology many years ago.  We did an EKG on last visit which was nonspecific.  We discussed the etiology of bruising which may be simply loss of subcu fat on the arms.  Labs will be drawn in reference to coagulopathy.      The following portions of the patient's history were reviewed and updated as appropriate: allergies, current medications, past social history and problem list.    Review of Systems   Constitutional: Positive for fatigue.   HENT: Negative.    Eyes: Negative.    Respiratory: Negative.    Cardiovascular: Negative.    Gastrointestinal: Negative.    Endocrine: Negative.    Genitourinary: Negative.    Musculoskeletal: Negative.    Skin: Negative.    Allergic/Immunologic: Negative.    Neurological: Negative.    Hematological: Bruises/bleeds easily.   Psychiatric/Behavioral: Negative.        Objective   Vitals:    06/05/19 0832   BP: 156/72   Pulse: 57   Temp: 96.5 °F (35.8 °C)   SpO2: 95%     Physical Exam   Constitutional: She is oriented to person, place, and time. She appears well-developed and well-nourished.   HENT:   Head: Normocephalic and atraumatic.   Right Ear: Tympanic membrane and external ear normal.   Left Ear: Tympanic membrane and external ear normal.   Nose: Nose normal.   Mouth/Throat: Oropharynx is clear and moist.   Eyes: Conjunctivae and EOM are normal. Pupils are equal, round, and reactive to  light.   Neck: Normal range of motion. Neck supple. No JVD present. No thyromegaly present.   Cardiovascular: Normal rate, regular rhythm and intact distal pulses.   Murmur heard.   Systolic murmur is present with a grade of 3/6.  Pulmonary/Chest: Effort normal and breath sounds normal.   Abdominal: Soft. Bowel sounds are normal.   Musculoskeletal: Normal range of motion.   Lymphadenopathy:     She has no cervical adenopathy.   Neurological: She is alert and oriented to person, place, and time. No cranial nerve deficit. Coordination normal.   Skin: Skin is warm and dry. No rash noted.        Psychiatric: Her behavior is normal. Judgment and thought content normal. She exhibits a depressed mood.   Vitals reviewed.      Assessment/Plan   Problem List Items Addressed This Visit        Cardiovascular and Mediastinum    Benign essential hypertension    Relevant Medications    amLODIPine (NORVASC) 5 MG tablet    Other Relevant Orders    CBC & Differential    Vitamin B12    Folate    Comprehensive Metabolic Panel    Hemoglobin A1c    TSH    T4, Free    Vitamin D 25 Hydroxy    Protime-INR    Hyperlipidemia - Primary    Relevant Orders    CBC & Differential    Vitamin B12    Folate    Comprehensive Metabolic Panel    Hemoglobin A1c    TSH    T4, Free    Vitamin D 25 Hydroxy    Protime-INR       Digestive    Vitamin B12 deficiency    Relevant Orders    CBC & Differential    Vitamin B12    Folate    Comprehensive Metabolic Panel    Hemoglobin A1c    TSH    T4, Free    Vitamin D 25 Hydroxy    Protime-INR       Endocrine    Type 2 diabetes mellitus with diabetic neuropathy, without long-term current use of insulin (CMS/[facility])    Relevant Orders    CBC & Differential    Vitamin B12    Folate    Comprehensive Metabolic Panel    Hemoglobin A1c    TSH    T4, Free    Vitamin D 25 Hydroxy    Protime-INR    Hypothyroidism    Relevant Orders    CBC & Differential    Vitamin B12    Folate    Comprehensive Metabolic Panel    Hemoglobin A1c     TSH    T4, Free    Vitamin D 25 Hydroxy    Protime-INR       Genitourinary    Cystic kidney disease    Relevant Orders    CBC & Differential    Vitamin B12    Folate    Comprehensive Metabolic Panel    Hemoglobin A1c    TSH    T4, Free    Vitamin D 25 Hydroxy    Protime-INR       Hematopoietic and Hemostatic    Iron deficiency anemia due to chronic blood loss    Relevant Orders    CBC & Differential    Vitamin B12    Folate    Comprehensive Metabolic Panel    Hemoglobin A1c    TSH    T4, Free    Vitamin D 25 Hydroxy    Protime-INR       Other    Abnormal creatinine clearance glomerular filtration    Relevant Orders    CBC & Differential    Vitamin B12    Folate    Comprehensive Metabolic Panel    Hemoglobin A1c    TSH    T4, Free    Vitamin D 25 Hydroxy    Protime-INR    Depression      Other Visit Diagnoses     Bruising, spontaneous        Relevant Orders    CBC & Differential    Vitamin B12    Folate    Comprehensive Metabolic Panel    Hemoglobin A1c    TSH    T4, Free    Vitamin D 25 Hydroxy    Protime-INR    Vitamin D deficiency         Relevant Orders    Vitamin D 25 Hydroxy    Systolic murmur        Relevant Orders    Adult Transthoracic Echo Complete W/ Cont if Necessary Per Protocol      Echocardiogram labs in reference to fatigue and coagulopathy recheck in 3 months.  Increase amlodipine to 10 mg daily.

## 2019-07-24 RX ORDER — HYDROCHLOROTHIAZIDE 12.5 MG/1
12.5 CAPSULE, GELATIN COATED ORAL DAILY
Qty: 90 CAPSULE | Refills: 0 | Status: SHIPPED | OUTPATIENT
Start: 2019-07-24 | End: 2019-11-14 | Stop reason: SDUPTHER

## 2019-07-24 RX ORDER — ROSUVASTATIN CALCIUM 5 MG/1
5 TABLET, COATED ORAL NIGHTLY
Qty: 90 TABLET | Refills: 0 | Status: SHIPPED | OUTPATIENT
Start: 2019-07-24 | End: 2019-11-14 | Stop reason: SDUPTHER

## 2019-07-24 RX ORDER — AMLODIPINE BESYLATE 10 MG/1
10 TABLET ORAL DAILY
Qty: 90 TABLET | Refills: 1 | Status: SHIPPED | OUTPATIENT
Start: 2019-07-24 | End: 2020-01-14

## 2019-08-01 RX ORDER — AMLODIPINE BESYLATE 5 MG/1
5 TABLET ORAL DAILY
Qty: 90 TABLET | Refills: 1 | Status: SHIPPED | OUTPATIENT
Start: 2019-08-01 | End: 2019-12-26 | Stop reason: ALTCHOICE

## 2019-08-13 ENCOUNTER — HOSPITAL ENCOUNTER (OUTPATIENT)
Age: 74
Setting detail: SPECIMEN
Discharge: HOME | End: 2019-08-13
Payer: MEDICARE

## 2019-08-14 LAB
CULTURE: NORMAL
Lab: NORMAL
SPECIMEN DESCRIPTION: NORMAL

## 2019-08-16 RX ORDER — FLUOXETINE HYDROCHLORIDE 20 MG/1
20 CAPSULE ORAL DAILY
Qty: 90 CAPSULE | Refills: 1 | Status: SHIPPED | OUTPATIENT
Start: 2019-08-16 | End: 2020-02-12

## 2019-08-19 RX ORDER — LAMOTRIGINE 200 MG/1
400 TABLET ORAL NIGHTLY
Qty: 180 TABLET | Refills: 0 | Status: SHIPPED | OUTPATIENT
Start: 2019-08-19 | End: 2020-05-18

## 2019-11-05 ENCOUNTER — TELEPHONE (OUTPATIENT)
Dept: INTERNAL MEDICINE | Facility: CLINIC | Age: 74
End: 2019-11-05

## 2019-11-05 RX ORDER — AZITHROMYCIN 250 MG/1
TABLET, FILM COATED ORAL
Qty: 6 TABLET | Refills: 0 | Status: SHIPPED | OUTPATIENT
Start: 2019-11-05 | End: 2019-12-26

## 2019-11-06 ENCOUNTER — TELEPHONE (OUTPATIENT)
Dept: INTERNAL MEDICINE | Facility: CLINIC | Age: 74
End: 2019-11-06

## 2019-11-06 RX ORDER — BENZONATATE 200 MG/1
200 CAPSULE ORAL 3 TIMES DAILY PRN
Qty: 30 CAPSULE | Refills: 0 | Status: SHIPPED | OUTPATIENT
Start: 2019-11-06 | End: 2019-11-16

## 2019-11-06 NOTE — TELEPHONE ENCOUNTER
I sent tessalon perles.  If that does not work let me know if she can take codeine. She has morphine listed as allergy.

## 2019-11-14 RX ORDER — HYDROCHLOROTHIAZIDE 12.5 MG/1
12.5 CAPSULE, GELATIN COATED ORAL DAILY
Qty: 90 CAPSULE | Refills: 0 | Status: SHIPPED | OUTPATIENT
Start: 2019-11-14 | End: 2020-02-12

## 2019-11-14 RX ORDER — ROSUVASTATIN CALCIUM 5 MG/1
5 TABLET, COATED ORAL NIGHTLY
Qty: 90 TABLET | Refills: 0 | Status: SHIPPED | OUTPATIENT
Start: 2019-11-14 | End: 2020-02-13

## 2019-12-25 DIAGNOSIS — I10 BENIGN ESSENTIAL HYPERTENSION: ICD-10-CM

## 2019-12-26 ENCOUNTER — OFFICE VISIT (OUTPATIENT)
Dept: INTERNAL MEDICINE | Facility: CLINIC | Age: 74
End: 2019-12-26

## 2019-12-26 VITALS
OXYGEN SATURATION: 96 % | BODY MASS INDEX: 32.13 KG/M2 | SYSTOLIC BLOOD PRESSURE: 150 MMHG | TEMPERATURE: 98 F | HEART RATE: 58 BPM | RESPIRATION RATE: 16 BRPM | WEIGHT: 188.2 LBS | DIASTOLIC BLOOD PRESSURE: 64 MMHG | HEIGHT: 64 IN

## 2019-12-26 DIAGNOSIS — D50.0 IRON DEFICIENCY ANEMIA DUE TO CHRONIC BLOOD LOSS: ICD-10-CM

## 2019-12-26 DIAGNOSIS — E53.8 VITAMIN B12 DEFICIENCY: ICD-10-CM

## 2019-12-26 DIAGNOSIS — Q61.9 CYSTIC KIDNEY DISEASE: ICD-10-CM

## 2019-12-26 DIAGNOSIS — K21.9 GASTROESOPHAGEAL REFLUX DISEASE, ESOPHAGITIS PRESENCE NOT SPECIFIED: ICD-10-CM

## 2019-12-26 DIAGNOSIS — E11.40 TYPE 2 DIABETES MELLITUS WITH DIABETIC NEUROPATHY, WITHOUT LONG-TERM CURRENT USE OF INSULIN (HCC): ICD-10-CM

## 2019-12-26 DIAGNOSIS — I10 BENIGN ESSENTIAL HYPERTENSION: Primary | ICD-10-CM

## 2019-12-26 DIAGNOSIS — E03.9 ACQUIRED HYPOTHYROIDISM: ICD-10-CM

## 2019-12-26 DIAGNOSIS — F32.0 CURRENT MILD EPISODE OF MAJOR DEPRESSIVE DISORDER, UNSPECIFIED WHETHER RECURRENT (HCC): ICD-10-CM

## 2019-12-26 DIAGNOSIS — E78.00 PURE HYPERCHOLESTEROLEMIA: ICD-10-CM

## 2019-12-26 DIAGNOSIS — R94.4 ABNORMAL CREATININE CLEARANCE GLOMERULAR FILTRATION: ICD-10-CM

## 2019-12-26 DIAGNOSIS — J44.9 CHRONIC OBSTRUCTIVE PULMONARY DISEASE, UNSPECIFIED COPD TYPE (HCC): ICD-10-CM

## 2019-12-26 PROCEDURE — 99214 OFFICE O/P EST MOD 30 MIN: CPT | Performed by: FAMILY MEDICINE

## 2019-12-26 RX ORDER — METOPROLOL SUCCINATE 100 MG/1
100 TABLET, EXTENDED RELEASE ORAL DAILY
Qty: 90 TABLET | Refills: 1 | Status: SHIPPED | OUTPATIENT
Start: 2019-12-26 | End: 2020-07-08

## 2019-12-26 RX ORDER — DICLOFENAC SODIUM 75 MG/1
TABLET, DELAYED RELEASE ORAL
COMMUNITY
Start: 2019-12-25 | End: 2020-05-21

## 2019-12-26 RX ORDER — LEVOTHYROXINE SODIUM 0.1 MG/1
TABLET ORAL
Qty: 90 TABLET | Refills: 3 | Status: SHIPPED | OUTPATIENT
Start: 2019-12-26 | End: 2021-01-27 | Stop reason: SDUPTHER

## 2019-12-26 NOTE — PROGRESS NOTES
Subjective   Yi Lee is a 74 y.o. female.     Chief Complaint   Patient presents with   • Anemia   • Shortness of Breath         History of Present Illness   Patient with a history of hypertension recurrent anemia of uncertain etiology with history of gastro enterology work-up.  She has seen Dr. Grigsby and have advised to go back and see him more time for an opinion as far as any evidence of GI blood loss.  She did not did not wish to see hematology.  I discussed with her the possibility of capsule endoscopy or repeat upper endoscopy.  Otherwise we will get iron studies and repeat CBC as well as basic labs folate B12 etc.    Treatment of hypertension is reviewed as well as treatment of depression which is chronic.  There are no suicidal features.  She uses lactulose as needed for constipation which works well.    She does take diclofenac 75 mg for arthritis.  Also recheck GFR/creatinine BUN.          The following portions of the patient's history were reviewed and updated as appropriate: allergies, current medications, past social history and problem list.    Review of Systems   Constitutional: Positive for fatigue.   HENT: Negative.    Eyes: Negative.    Respiratory: Positive for shortness of breath.    Cardiovascular: Negative.    Gastrointestinal: Negative.    Endocrine: Negative.    Genitourinary: Negative.    Musculoskeletal: Negative.    Skin: Negative.    Allergic/Immunologic: Negative.    Neurological: Negative.    Hematological: Negative.    Psychiatric/Behavioral: Negative.        Objective   Vitals:    12/26/19 1007   BP: 150/64   Pulse: 58   Resp: 16   Temp: 98 °F (36.7 °C)   SpO2: 96%     Physical Exam   Constitutional: She is oriented to person, place, and time. She appears well-developed and well-nourished.   HENT:   Head: Normocephalic and atraumatic.   Right Ear: Tympanic membrane and external ear normal.   Left Ear: Tympanic membrane and external ear normal.   Nose: Nose normal.    Mouth/Throat: Oropharynx is clear and moist.   Eyes: Pupils are equal, round, and reactive to light. Conjunctivae and EOM are normal.   Neck: Normal range of motion. Neck supple. No JVD present. No thyromegaly present.   Cardiovascular: Normal rate, regular rhythm and intact distal pulses.   Murmur heard.   Systolic murmur is present with a grade of 2/6.  Pulmonary/Chest: Effort normal and breath sounds normal.   Abdominal: Soft. Bowel sounds are normal.   Musculoskeletal: Normal range of motion.   Lymphadenopathy:     She has no cervical adenopathy.   Neurological: She is alert and oriented to person, place, and time. No cranial nerve deficit. Coordination normal.   Skin: Skin is warm and dry. No rash noted.   Psychiatric: She has a normal mood and affect. Her behavior is normal. Judgment and thought content normal.   Vitals reviewed.      Assessment/Plan   Problem List Items Addressed This Visit        Cardiovascular and Mediastinum    Benign essential hypertension - Primary    Relevant Orders    CBC & Differential    Comprehensive Metabolic Panel    Hemoglobin A1c    Lipid Panel With / Chol / HDL Ratio    Iron and TIBC    Ferritin    Vitamin B12    TSH    T4, Free    T3, Free    Folate    Hyperlipidemia    Relevant Orders    CBC & Differential    Comprehensive Metabolic Panel    Hemoglobin A1c    Lipid Panel With / Chol / HDL Ratio    Iron and TIBC    Ferritin    Vitamin B12    TSH    T4, Free    T3, Free    Folate       Respiratory    Chronic obstructive pulmonary disease (CMS/HCC)       Digestive    Vitamin B12 deficiency    Relevant Orders    CBC & Differential    Comprehensive Metabolic Panel    Hemoglobin A1c    Lipid Panel With / Chol / HDL Ratio    Iron and TIBC    Ferritin    Vitamin B12    TSH    T4, Free    T3, Free    Folate    Gastroesophageal reflux disease    Relevant Orders    CBC & Differential    Comprehensive Metabolic Panel    Hemoglobin A1c    Lipid Panel With / Chol / HDL Ratio    Iron and  TIBC    Ferritin    Vitamin B12    TSH    T4, Free    T3, Free    Folate       Endocrine    Type 2 diabetes mellitus with diabetic neuropathy, without long-term current use of insulin (CMS/[facility])    Relevant Orders    CBC & Differential    Comprehensive Metabolic Panel    Hemoglobin A1c    Lipid Panel With / Chol / HDL Ratio    Iron and TIBC    Ferritin    Vitamin B12    TSH    T4, Free    T3, Free    Folate    Hypothyroidism    Relevant Orders    CBC & Differential    Comprehensive Metabolic Panel    Hemoglobin A1c    Lipid Panel With / Chol / HDL Ratio    Iron and TIBC    Ferritin    Vitamin B12    TSH    T4, Free    T3, Free    Folate       Genitourinary    Cystic kidney disease    Relevant Orders    CBC & Differential    Comprehensive Metabolic Panel    Hemoglobin A1c    Lipid Panel With / Chol / HDL Ratio    Iron and TIBC    Ferritin    Vitamin B12    TSH    T4, Free    T3, Free    Folate       Hematopoietic and Hemostatic    Iron deficiency anemia due to chronic blood loss       Other    Abnormal creatinine clearance glomerular filtration    Relevant Orders    CBC & Differential    Comprehensive Metabolic Panel    Hemoglobin A1c    Lipid Panel With / Chol / HDL Ratio    Iron and TIBC    Ferritin    Vitamin B12    TSH    T4, Free    T3, Free    Folate    Depression      Referral to gastroenterology for continued anemia.  Labs iron studies B12 folate thyroid function CMP CBC are pending.  Recheck in 3 months.

## 2019-12-27 DIAGNOSIS — E11.40 TYPE 2 DIABETES MELLITUS WITH DIABETIC NEUROPATHY, WITHOUT LONG-TERM CURRENT USE OF INSULIN (HCC): Primary | ICD-10-CM

## 2019-12-27 LAB
ALBUMIN SERPL-MCNC: NORMAL G/DL
ALP SERPL-CCNC: NORMAL U/L
ALT SERPL-CCNC: NORMAL U/L
AST SERPL-CCNC: NORMAL U/L
BASOPHILS # BLD AUTO: 0.04 10*3/MM3 (ref 0–0.2)
BASOPHILS NFR BLD AUTO: 0.6 % (ref 0–1.5)
BILIRUB SERPL-MCNC: NORMAL MG/DL
BUN SERPL-MCNC: NORMAL MG/DL
CALCIUM SERPL-MCNC: NORMAL MG/DL
CHLORIDE SERPL-SCNC: NORMAL MMOL/L
CHOLEST SERPL-MCNC: 172 MG/DL (ref 0–200)
CHOLEST/HDLC SERPL: 2.65 {RATIO}
CO2 SERPL-SCNC: NORMAL MMOL/L
CREAT SERPL-MCNC: NORMAL MG/DL
EOSINOPHIL # BLD AUTO: 0.3 10*3/MM3 (ref 0–0.4)
EOSINOPHIL NFR BLD AUTO: 4.7 % (ref 0.3–6.2)
ERYTHROCYTE [DISTWIDTH] IN BLOOD BY AUTOMATED COUNT: 12.2 % (ref 12.3–15.4)
FERRITIN SERPL-MCNC: 68 NG/ML (ref 13–150)
FOLATE SERPL-MCNC: 17.1 NG/ML (ref 4.78–24.2)
GLUCOSE SERPL-MCNC: NORMAL MG/DL
HBA1C MFR BLD: 6.6 % (ref 4.8–5.6)
HCT VFR BLD AUTO: 33 % (ref 34–46.6)
HDLC SERPL-MCNC: 65 MG/DL (ref 40–60)
HGB BLD-MCNC: 10.7 G/DL (ref 12–15.9)
IMM GRANULOCYTES # BLD AUTO: 0.03 10*3/MM3 (ref 0–0.05)
IMM GRANULOCYTES NFR BLD AUTO: 0.5 % (ref 0–0.5)
IRON SATN MFR SERPL: 21 % (ref 20–50)
IRON SERPL-MCNC: 81 MCG/DL (ref 37–145)
LDLC SERPL CALC-MCNC: 85 MG/DL (ref 0–100)
LYMPHOCYTES # BLD AUTO: 1.09 10*3/MM3 (ref 0.7–3.1)
LYMPHOCYTES NFR BLD AUTO: 16.9 % (ref 19.6–45.3)
MCH RBC QN AUTO: 30.6 PG (ref 26.6–33)
MCHC RBC AUTO-ENTMCNC: 32.4 G/DL (ref 31.5–35.7)
MCV RBC AUTO: 94.3 FL (ref 79–97)
MONOCYTES # BLD AUTO: 0.66 10*3/MM3 (ref 0.1–0.9)
MONOCYTES NFR BLD AUTO: 10.2 % (ref 5–12)
NEUTROPHILS # BLD AUTO: 4.32 10*3/MM3 (ref 1.7–7)
NEUTROPHILS NFR BLD AUTO: 67.1 % (ref 42.7–76)
NRBC BLD AUTO-RTO: 0 /100 WBC (ref 0–0.2)
PLATELET # BLD AUTO: 315 10*3/MM3 (ref 140–450)
POTASSIUM SERPL-SCNC: NORMAL MMOL/L
PROT SERPL-MCNC: NORMAL G/DL
RBC # BLD AUTO: 3.5 10*6/MM3 (ref 3.77–5.28)
SODIUM SERPL-SCNC: NORMAL MMOL/L
SPECIMEN STATUS: NORMAL
T3FREE SERPL-MCNC: 3.5 PG/ML (ref 2–4.4)
T4 FREE SERPL-MCNC: 1.35 NG/DL (ref 0.93–1.7)
TIBC SERPL-MCNC: 392 MCG/DL
TRIGL SERPL-MCNC: 112 MG/DL (ref 0–150)
TSH SERPL DL<=0.005 MIU/L-ACNC: 2.8 UIU/ML (ref 0.27–4.2)
UIBC SERPL-MCNC: 311 MCG/DL (ref 112–346)
VIT B12 SERPL-MCNC: 602 PG/ML (ref 211–946)
VLDLC SERPL CALC-MCNC: 22.4 MG/DL
WBC # BLD AUTO: 6.44 10*3/MM3 (ref 3.4–10.8)

## 2019-12-27 RX ORDER — QUETIAPINE FUMARATE 300 MG/1
300 TABLET, FILM COATED ORAL NIGHTLY
Qty: 90 TABLET | Refills: 3 | Status: SHIPPED | OUTPATIENT
Start: 2019-12-27 | End: 2021-01-27 | Stop reason: SDUPTHER

## 2020-01-01 ENCOUNTER — RESULTS ENCOUNTER (OUTPATIENT)
Dept: INTERNAL MEDICINE | Facility: CLINIC | Age: 75
End: 2020-01-01

## 2020-01-01 DIAGNOSIS — E11.40 TYPE 2 DIABETES MELLITUS WITH DIABETIC NEUROPATHY, WITHOUT LONG-TERM CURRENT USE OF INSULIN (HCC): ICD-10-CM

## 2020-01-09 ENCOUNTER — TELEPHONE (OUTPATIENT)
Dept: INTERNAL MEDICINE | Facility: CLINIC | Age: 75
End: 2020-01-09

## 2020-01-09 NOTE — TELEPHONE ENCOUNTER
Pt called to see if you had talked Dr Grigsby about her anemia?  Also, she doesn't have an appointment until next month, is that ok?

## 2020-01-13 ENCOUNTER — TELEPHONE (OUTPATIENT)
Dept: INTERNAL MEDICINE | Facility: CLINIC | Age: 75
End: 2020-01-13

## 2020-01-13 DIAGNOSIS — D50.0 IRON DEFICIENCY ANEMIA DUE TO CHRONIC BLOOD LOSS: Primary | ICD-10-CM

## 2020-01-14 ENCOUNTER — TELEPHONE (OUTPATIENT)
Dept: GASTROENTEROLOGY | Facility: CLINIC | Age: 75
End: 2020-01-14

## 2020-01-14 RX ORDER — AMLODIPINE BESYLATE 10 MG/1
10 TABLET ORAL DAILY
Qty: 90 TABLET | Refills: 1 | Status: SHIPPED | OUTPATIENT
Start: 2020-01-14 | End: 2020-07-13

## 2020-01-14 NOTE — TELEPHONE ENCOUNTER
----- Message from Eddie Grigsby MD sent at 1/14/2020  6:50 AM EST -----  Regarding: RE: anemia  SA/MT - Dr. Albert wants her seen sooner than her appointment next month. Please work her in to see me in the next one week. Thx. kjh  ----- Message -----  From: Ankit Albert Jr., MD  Sent: 1/13/2020  11:08 AM EST  To: Eddie Grigsby MD  Subject: anemia                                           The patient continues to have anemia with hemoglobin in the range of 10.5.  She has a follow-up with you in about 1 month.  Do you wish to see her sooner?  Thanks for everything.    Micah

## 2020-01-15 ENCOUNTER — OFFICE VISIT (OUTPATIENT)
Dept: GASTROENTEROLOGY | Facility: CLINIC | Age: 75
End: 2020-01-15

## 2020-01-15 VITALS
TEMPERATURE: 98 F | HEIGHT: 64 IN | WEIGHT: 184.4 LBS | BODY MASS INDEX: 31.48 KG/M2 | SYSTOLIC BLOOD PRESSURE: 142 MMHG | DIASTOLIC BLOOD PRESSURE: 70 MMHG

## 2020-01-15 DIAGNOSIS — D50.9 IRON DEFICIENCY ANEMIA, UNSPECIFIED IRON DEFICIENCY ANEMIA TYPE: Primary | ICD-10-CM

## 2020-01-15 LAB
BASOPHILS # BLD AUTO: 0.07 10*3/MM3 (ref 0–0.2)
BASOPHILS NFR BLD AUTO: 0.9 % (ref 0–1.5)
EOSINOPHIL # BLD AUTO: 0.25 10*3/MM3 (ref 0–0.4)
EOSINOPHIL NFR BLD AUTO: 3.3 % (ref 0.3–6.2)
ERYTHROCYTE [DISTWIDTH] IN BLOOD BY AUTOMATED COUNT: 12.3 % (ref 12.3–15.4)
HCT VFR BLD AUTO: 33.5 % (ref 34–46.6)
HGB BLD-MCNC: 11 G/DL (ref 12–15.9)
IMM GRANULOCYTES # BLD AUTO: 0.03 10*3/MM3 (ref 0–0.05)
IMM GRANULOCYTES NFR BLD AUTO: 0.4 % (ref 0–0.5)
LYMPHOCYTES # BLD AUTO: 1.3 10*3/MM3 (ref 0.7–3.1)
LYMPHOCYTES NFR BLD AUTO: 17 % (ref 19.6–45.3)
MCH RBC QN AUTO: 29.8 PG (ref 26.6–33)
MCHC RBC AUTO-ENTMCNC: 32.8 G/DL (ref 31.5–35.7)
MCV RBC AUTO: 90.8 FL (ref 79–97)
MONOCYTES # BLD AUTO: 0.85 10*3/MM3 (ref 0.1–0.9)
MONOCYTES NFR BLD AUTO: 11.1 % (ref 5–12)
NEUTROPHILS # BLD AUTO: 5.14 10*3/MM3 (ref 1.7–7)
NEUTROPHILS NFR BLD AUTO: 67.3 % (ref 42.7–76)
NRBC BLD AUTO-RTO: 0 /100 WBC (ref 0–0.2)
PLATELET # BLD AUTO: 332 10*3/MM3 (ref 140–450)
RBC # BLD AUTO: 3.69 10*6/MM3 (ref 3.77–5.28)
WBC # BLD AUTO: 7.64 10*3/MM3 (ref 3.4–10.8)

## 2020-01-15 PROCEDURE — 99214 OFFICE O/P EST MOD 30 MIN: CPT | Performed by: INTERNAL MEDICINE

## 2020-01-15 RX ORDER — ACETAMINOPHEN 325 MG/1
650 TABLET ORAL AS NEEDED
COMMUNITY
End: 2022-01-01 | Stop reason: SDDI

## 2020-01-15 NOTE — PROGRESS NOTES
Chief Complaint   Patient presents with   • Anemia       History of Present Illness:   74 y.o. female with a history of gastroesophageal reflux disease, constipation, and iron deficiency anemia.  Dr. Albert is concerned because she continues to have anemia. No melena or rectal bleeding. She is on iron pills. NO diarrhea. +constiption. On lactulose once daily and it works well. No abdominal or chest pain. She takes one baby aspirin/day and Diclofenac.     Past Medical History:   Diagnosis Date   • Abdominal pain, LLQ    • Anemia    • Arthralgia of hip 11/22/2015   • Ataxic gait 11/22/2015    Description: Eval Dr Lillian Mederos, Neuro.  Some vestibular dysfunction present 2013/2014   • Bipolar I disorder, single manic episode (CMS/HCC)    • Cardiac murmur    • Colon polyp    • Coronary artery disease    • Degeneration, intervertebral disc, thoracolumbar    • Diabetes mellitus (CMS/HCC)    • Disease of thyroid gland    • Diverticulosis    • Esophageal spasm    • Fatigue    • H/O bone density study 10/17/2007    Dr. Giles   • Hemorrhoids    • History of mammogram     02/2012, per GYN Reshma Aranda   • History of Papanicolaou smear of cervix     DR. GILES GYN   • Hypertension    • Impaired cognition 11/22/2015    Description: Testing done 05/14   • Optic nerve contusion    • Pain of lower extremity 11/22/2015   • Vitamin B12 deficiency    • Vitamin D deficiency        Past Surgical History:   Procedure Laterality Date   • BREAST LUMPECTOMY Left     benign   • CHOLECYSTECTOMY     • COLONOSCOPY  2007    done 02/12, repeat 5 yrs, Dr Grigsby; tics in sigmoid colon, IH   • COLONOSCOPY N/A 11/3/2016    polyp, IH   • DILATATION AND CURETTAGE     • ESOPHAGOGASTRIC FUNDOPLASTY     • ROTATOR CUFF REPAIR Right 2009   • TOTAL KNEE ARTHROPLASTY Left 2/13/2018    Procedure: LT TOTAL KNEE ARTHROPLASTY;  Surgeon: Selwyn Pinto MD;  Location: [facility] OR;  Service:          Current Outpatient Medications:   •  acetaminophen (TYLENOL) 325  MG tablet, Take 650 mg by mouth As Needed for Mild Pain ., Disp: , Rfl:   •  amLODIPine (NORVASC) 10 MG tablet, TAKE 1 TABLET BY MOUTH DAILY FOR BLOOD PRESSURE, Disp: 90 tablet, Rfl: 1  •  aspirin 81 MG EC tablet, Take 81 mg by mouth Daily., Disp: , Rfl:   •  Cholecalciferol (VITAMIN D3) 2000 UNITS tablet, Take 1,000 Units by mouth Daily., Disp: , Rfl:   •  diclofenac (VOLTAREN) 75 MG EC tablet, , Disp: , Rfl:   •  ferrous sulfate 325 (65 FE) MG EC tablet, Take 650 mg by mouth., Disp: , Rfl:   •  FLUoxetine (PROzac) 20 MG capsule, TAKE 1 CAPSULE BY MOUTH DAILY, Disp: 90 capsule, Rfl: 1  •  GENERLAC 10 GM/15ML solution solution (encephalopathy), , Disp: , Rfl:   •  glucose blood (FREESTYLE LITE) test strip, Use to test everyday as directed, Disp: 100 each, Rfl: 5  •  hydroCHLOROthiazide (MICROZIDE) 12.5 MG capsule, TAKE 1 CAPSULE BY MOUTH DAILY, Disp: 90 capsule, Rfl: 0  •  hydrOXYzine (ATARAX) 25 MG tablet, Take 1 tablet by mouth 3 (Three) Times a Day As Needed for Itching., Disp: 90 tablet, Rfl: 5  •  lamoTRIgine (LaMICtal) 200 MG tablet, TAKE 2 TABLETS BY MOUTH EVERY NIGHT, Disp: 180 tablet, Rfl: 0  •  Lancets (FREESTYLE) lancets, As directed to check blood glucose, Disp: 100 each, Rfl: 12  •  levothyroxine (SYNTHROID, LEVOTHROID) 100 MCG tablet, TAKE 1 TABLET BY MOUTH DAILY, Disp: 90 tablet, Rfl: 3  •  metFORMIN (GLUCOPHAGE) 500 MG tablet, TAKE 2 TABLETS BY MOUTH DAILY WITH BREAKFAST, Disp: 180 tablet, Rfl: 1  •  metoprolol succinate XL (TOPROL-XL) 100 MG 24 hr tablet, TAKE 1 TABLET BY MOUTH DAILY, Disp: 90 tablet, Rfl: 1  •  omeprazole (priLOSEC) 40 MG capsule, Take 1 capsule by mouth Daily., Disp: 90 capsule, Rfl: 3  •  QUEtiapine (SEROquel) 300 MG tablet, Take 1 tablet by mouth Every Night., Disp: 90 tablet, Rfl: 3  •  rosuvastatin (CRESTOR) 5 MG tablet, TAKE 1 TABLET BY MOUTH EVERY NIGHT FOR CHOLESTEROL, Disp: 90 tablet, Rfl: 0  •  albuterol (PROVENTIL HFA) 108 (90 BASE) MCG/ACT inhaler, Inhale 2 puffs.,  Disp: , Rfl:     Allergies   Allergen Reactions   • Morphine Shortness Of Breath   • Penicillins Hives and Swelling   • Ace Inhibitors Cough   • Telmisartan Cough       Family History   Problem Relation Age of Onset   • Colon polyps Father    • Hepatitis Other    • Coronary artery disease Other    • Malig Hyperthermia Neg Hx        Social History     Socioeconomic History   • Marital status:      Spouse name: Not on file   • Number of children: Not on file   • Years of education: Not on file   • Highest education level: Not on file   Tobacco Use   • Smoking status: Former Smoker     Packs/day: 1.00     Years: 7.00     Pack years: 7.00     Types: Cigarettes     Last attempt to quit:      Years since quittin.0   • Smokeless tobacco: Never Used   Substance and Sexual Activity   • Alcohol use: Yes     Comment: OCC   • Drug use: No   • Sexual activity: Never       Review of Systems   Gastrointestinal: Negative for abdominal pain.     Pertinent positives and negatives documented in the HPI and all other systems reviewed and were found to be negative.  Vitals:    01/15/20 1439   BP: 142/70   Temp: 98 °F (36.7 °C)       Physical Exam   Constitutional: She is oriented to person, place, and time. She appears well-developed and well-nourished. No distress.   HENT:   Head: Normocephalic and atraumatic. Hair is normal.   Right Ear: Hearing, tympanic membrane, external ear and ear canal normal. No drainage. No decreased hearing is noted.   Left Ear: Hearing, tympanic membrane, external ear and ear canal normal. No decreased hearing is noted.   Nose: No nasal deformity.   Mouth/Throat: Oropharynx is clear and moist.   Eyes: Pupils are equal, round, and reactive to light. Conjunctivae, EOM and lids are normal. Right eye exhibits no discharge. Left eye exhibits no discharge.   Neck: Normal range of motion. Neck supple. No JVD present. No tracheal deviation present. No thyromegaly present.   Cardiovascular: Normal  rate, regular rhythm, normal heart sounds, intact distal pulses and normal pulses. Exam reveals no gallop and no friction rub.   No murmur heard.  Pulmonary/Chest: Effort normal and breath sounds normal. No respiratory distress. She has no wheezes. She has no rales. She exhibits no tenderness.   Abdominal: Soft. Bowel sounds are normal. She exhibits no distension and no mass. There is no tenderness. There is no rebound and no guarding. No hernia.   Genitourinary: Rectal exam shows guaiac negative stool.   Musculoskeletal: Normal range of motion. She exhibits no edema, tenderness or deformity.   Lymphadenopathy:     She has no cervical adenopathy.   Neurological: She is alert and oriented to person, place, and time. She has normal reflexes. She displays normal reflexes. No cranial nerve deficit. She exhibits normal muscle tone. Coordination normal.   Skin: Skin is warm and dry. No rash noted. She is not diaphoretic. No erythema.   Psychiatric: She has a normal mood and affect. Her behavior is normal. Judgment and thought content normal.   Vitals reviewed.      Yi was seen today for anemia.    Diagnoses and all orders for this visit:    Iron deficiency anemia, unspecified iron deficiency anemia type  -     Case Request; Standing  -     Case Request    Other orders  -     Follow Anesthesia Guidelines / Standing Orders; Future  -     Obtain Informed Consent; Future      Assessment:  1. Iron def anemia    Recommendations:  1. EGD and colonoscopy.    No follow-ups on file.    Eddie Grigsby MD  1/15/2020

## 2020-01-28 NOTE — THERAPY EVALUATION
Acute Care - Physical Therapy Initial Evaluation  [facility]     Patient Name: Yi Lee  : 1945  MRN: [id number]  Today's Date: 2018   Onset of Illness/Injury or Date of Surgery Date: 18            Admit Date: 2018     Visit Dx:    ICD-10-CM ICD-9-CM   1. Difficulty walking R26.2 719.7     Patient Active Problem List   Diagnosis   • Abnormal creatinine clearance glomerular filtration   • Benign essential hypertension   • Chronic obstructive pulmonary disease   • Depression   • Type 2 diabetes mellitus with diabetic neuropathy   • Gastroesophageal reflux disease   • Hyperlipidemia   • Hypothyroidism   • Obesity (BMI 30-39.9)   • Memory loss   • Ataxic gait   • Abdominal pain, right lateral   • Iron deficiency anemia due to chronic blood loss   • Cystic kidney disease   • Vitamin B12 deficiency   • OA (osteoarthritis) of knee     Past Medical History:   Diagnosis Date   • Abdominal pain, LLQ    • Anemia    • Arthralgia of hip 2015   • Ataxic gait 2015    Description: Eval Dr Lillian Mederos, Neuro.  Some vestibular dysfunction present    • Bipolar I disorder, single manic episode    • Cardiac murmur    • Colon polyp    • Coronary artery disease    • Degeneration, intervertebral disc, thoracolumbar    • Diabetes mellitus    • Disease of thyroid gland    • Diverticulosis    • Esophageal spasm    • Fatigue    • H/O bone density study 10/17/2007    Dr. Giles   • Hemorrhoids    • History of mammogram     2012, per GYN Reshma Aranda   • History of Papanicolaou smear of cervix     DR. GILES GYN   • Hypertension    • Impaired cognition 2015    Description: Testing done    • Optic nerve contusion    • Pain of lower extremity 2015   • Vitamin B12 deficiency    • Vitamin D deficiency      Past Surgical History:   Procedure Laterality Date   • BREAST LUMPECTOMY Left     benign   • CHOLECYSTECTOMY     • COLONOSCOPY      done , repeat 5 yrs, Dr Grigsby;  Pt notified   tics in sigmoid colon, IH   • COLONOSCOPY N/A 11/3/2016    polyp, IH   • DILATATION AND CURETTAGE     • ESOPHAGOGASTRIC FUNDOPLASTY     • ROTATOR CUFF REPAIR Right 2009          PT ASSESSMENT (last 72 hours)      PT Evaluation       02/13/18 1550 02/13/18 1420    Rehab Evaluation    Document Type evaluation  -     Subjective Information agree to therapy  -     Patient Effort, Rehab Treatment good  -     Symptoms Noted During/After Treatment none  -     General Information    Onset of Illness/Injury or Date of Surgery Date 02/13/18  -     General Observations suine in bed, no acute distress noted at rest, family present  -     Pertinent History Of Current Problem pt is post-op L TKA  -     Precautions/Limitations fall precautions  -     Prior Level of Function independent:;gait;transfer;bed mobility;ADL's  -     Equipment Currently Used at Home none   has walker for home use  - none  -    Plans/Goals Discussed With patient and family  -     Barriers to Rehab none identified  -     Clinical Impression    Patient/Family Goals Statement to return to [facility]  -     Criteria for Skilled Therapeutic Interventions Met treatment indicated  -     Impairments Found (describe specific impairments) gait, locomotion, and balance;muscle performance  -     Rehab Potential good, to achieve stated therapy goals  -     Pain Assessment    Pain Assessment 0-10  -     Pain Score 5  -     Pain Type Surgical pain  -     Pain Location Knee  -     Pain Orientation Left  -     Pain Intervention(s) Repositioned;Cold applied  -     Cognitive Assessment/Intervention    Current Cognitive/Communication Assessment functional  -     Orientation Status oriented x 4  -     Follows Commands/Answers Questions 100% of the time  -     Personal Safety WNL/WFL  -     Personal Safety Interventions fall prevention program maintained;gait belt;nonskid shoes/slippers when out of bed  -     ROM (Range of  Motion)    General ROM lower extremity range of motion deficits identified   L knee ROM limited post-op  -CH     MMT (Manual Muscle Testing)    General MMT Assessment lower extremity strength deficits identified   LLE weakness noted post-op  -CH     Bed Mobility, Assessment/Treatment    Bed Mob, Supine to Sit, [place] verbal cues required;nonverbal cues required (demo/gesture);contact guard assist  -     Bed Mob, Sit to Supine, [place] not tested   sitting in chair  -     Transfer Assessment/Treatment    Transfers, Sit-Stand [place] verbal cues required;nonverbal cues required (demo/gesture);contact guard assist;2 person assist required  -     Transfers, Stand-Sit [place] verbal cues required;nonverbal cues required (demo/gesture);contact guard assist;2 person assist required  -     Transfers, Sit-Stand-Sit, Assist Device rolling walker  -     Gait Assessment/Treatment    Gait, [place] Level verbal cues required;nonverbal cues required (demo/gesture);minimum assist (75% patient effort);2 person assist required  -     Gait, Assistive Device rolling walker  -     Gait, Distance (Feet) 15  -     Gait, Gait Deviations stacy decreased;step length decreased;stride length decreased;weight-shifting ability decreased  -     Gait, Safety Issues weight-shifting ability decreased;step length decreased;balance decreased during turns  -     Gait, Comment pt required repeated to put L foot on floor, pt keeps hoping, however, pt states L LE is numb/feel strange so full weight bearing was avoided  -     Motor Skills/Interventions    Additional Documentation Balance Skills Training (Group)  -     Balance Skills Training    Standing-Level of Assistance Contact guard;x2  -CH     Static Standing Balance Support assistive device  -     Gait Balance-Level of Assistance Minimum assistance;x2  -CH     Gait Balance Support assistive device  -     Therapy Exercises    Exercise Protocols  total knee  -     Total Knee Exercises left:;10 reps;completed protocol;with assist  -     Positioning and Restraints    Pre-Treatment Position in bed  -     Post Treatment Position chair  -     In Chair reclined;call light within reach;encouraged to call for assist;with family/caregiver  -       02/13/18 0808       General Information    Equipment Currently Used at Home glucometer  -KR     Living Environment    Lives With alone  -KR     Living Arrangements house  -KR     Home Accessibility no concerns  -KR     Transportation Available car;family or friend will provide  -KR       User Key  (r) = Recorded By, (t) = Taken By, (c) = Cosigned By    Initials Name Provider Type     Wandy Payan, PT Physical Therapist    KASANDRA Ashton, RN Registered Nurse    CHRIS Michaels RN Registered Nurse          Physical Therapy Education     Title: PT OT SLP Therapies (Done)     Topic: Physical Therapy (Done)     Point: Mobility training (Done)    Learning Progress Summary    Learner Readiness Method Response Comment Documented by Status   Patient Acceptance E,TB,D VU,NR   02/13/18 1600 Done               Point: Home exercise program (Done)    Learning Progress Summary    Learner Readiness Method Response Comment Documented by Status   Patient Acceptance E,TB,D VU,NR   02/13/18 1600 Done               Point: Body mechanics (Done)    Learning Progress Summary    Learner Readiness Method Response Comment Documented by Status   Patient Acceptance E,TB,D VU,NR   02/13/18 1600 Done               Point: Precautions (Done)    Learning Progress Summary    Learner Readiness Method Response Comment Documented by Status   Patient Acceptance E,TB,D VU,NR   02/13/18 1600 Done                      User Key     Initials Effective Dates Name Provider Type [facility] 12/01/15 -  Wandy Payan PT Physical Therapist PT                PT Recommendation and Plan  Anticipated Discharge Disposition: home with home  health  Planned Therapy Interventions: balance training, bed mobility training, gait training, home exercise program, patient/family education, ROM (Range of Motion), stair training, transfer training  PT Frequency: daily  Plan of Care Review  Plan Of Care Reviewed With: patient  Outcome Summary/Follow up Plan: Pt is a 71 yo F admitted post-op L TKA  for h/o OA. Pt presents with impaired gait and balance secondary to some LLE weakness, decreased ROM, and pain post-op. Pt may benefit from skilled PT to address gait, strength, ROM, and functional independence.          IP PT Goals       02/13/18 1601          Transfer Training PT LTG    Transfer Training PT LTG, Time to Achieve 3 days  -CH      Transfer Training PT LTG, Activity Type all transfers  -CH      Transfer Training PT LTG, Sneads Level supervision required  -CH      Transfer Training PT LTG, Assist Device walker, rolling  -CH      Gait Training PT LTG    Gait Training Goal PT LTG, Time to Achieve 1 wk  -CH      Gait Training Goal PT LTG, [place]ne[place] Level supervision required  -CH      Gait Training Goal PT LTG, Assist Device walker, rolling  -CH      Gait Training Goal PT LTG, Distance to Achieve 150  -CH      Stair Training PT LTG    Stair Training Goal PT LTG, Time to Achieve 1 wk  -CH      Stair Training Goal PT LTG, Number of Steps 3  -CH      Stair Training Goal PT LTG, [place] Level contact guard assist  -CH      Stair Training Goal PT LTG, Assist Device 1 handrail  -CH      Range of Motion PT LTG    Range of Motion Goal PT LTG, Time to Achieve 3 days  -CH      Range fo Motion Goal PT LTG, Joint L knee  -CH      Range of Motion Goal PT LTG, AROM Measure 0-90  -CH        User Key  (r) = Recorded By, (t) = Taken By, (c) = Cosigned By    Initials Name Provider Type    SONJA Payan, PT Physical Therapist                Outcome Measures       02/13/18 1600          How much help from another person do you currently need...    Turning  from your back to your side while in flat bed without using bedrails? 3  -CH      Moving from lying on back to sitting on the side of a flat bed without bedrails? 3  -CH      Moving to and from a bed to a chair (including a wheelchair)? 3  -CH      Standing up from a chair using your arms (e.g., wheelchair, bedside chair)? 3  -CH      Climbing 3-5 steps with a railing? 2  -CH      To walk in hospital room? 3  -CH      AM-PAC 6 Clicks Score 17  -CH      Functional Assessment    Outcome Measure Options AM-PAC 6 Clicks Basic Mobility (PT)  -        User Key  (r) = Recorded By, (t) = Taken By, (c) = Cosigned By    Initials Name Provider Type    SONJA Payan PT Physical Therapist           Time Calculation:         PT Charges       02/13/18 1605          Time Calculation    Start Time 1530  -      Stop Time 1550  -      Time Calculation (min) 20 min  -      PT Received On 02/13/18  -      PT - Next Appointment 02/14/18  -      PT Goal Re-Cert Due Date 02/18/18  -        User Key  (r) = Recorded By, (t) = Taken By, (c) = Cosigned By    Initials Name Provider Type    SONJA Payan PT Physical Therapist          Therapy Charges for Today     Code Description Service Date Service Provider Modifiers Qty    31196916241 HC PT EVAL MOD COMPLEXITY 2 2/13/2018 Wandy Payan, PT GP 1    71506162953 HC PT THER PROC EA 15 MIN 2/13/2018 Wandy Payan, PT GP 1    32732083999 HC PT THER SUPP EA 15 MIN 2/13/2018 Wandy Payan, PT GP 1          PT G-Codes  Outcome Measure Options: AM-PAC 6 Clicks Basic Mobility (PT)      Wandy Payan PT  2/13/2018

## 2020-02-12 RX ORDER — HYDROCHLOROTHIAZIDE 12.5 MG/1
12.5 CAPSULE, GELATIN COATED ORAL DAILY
Qty: 90 CAPSULE | Refills: 1 | Status: SHIPPED | OUTPATIENT
Start: 2020-02-12 | End: 2020-08-28

## 2020-02-12 RX ORDER — FLUOXETINE HYDROCHLORIDE 20 MG/1
20 CAPSULE ORAL DAILY
Qty: 90 CAPSULE | Refills: 1 | Status: SHIPPED | OUTPATIENT
Start: 2020-02-12 | End: 2020-06-17 | Stop reason: SDUPTHER

## 2020-02-13 RX ORDER — ROSUVASTATIN CALCIUM 5 MG/1
5 TABLET, COATED ORAL NIGHTLY
Qty: 90 TABLET | Refills: 1 | Status: SHIPPED | OUTPATIENT
Start: 2020-02-13 | End: 2020-08-28

## 2020-02-24 ENCOUNTER — ANESTHESIA (OUTPATIENT)
Dept: GASTROENTEROLOGY | Facility: HOSPITAL | Age: 75
End: 2020-02-24

## 2020-02-24 ENCOUNTER — ANESTHESIA EVENT (OUTPATIENT)
Dept: GASTROENTEROLOGY | Facility: HOSPITAL | Age: 75
End: 2020-02-24

## 2020-02-24 ENCOUNTER — HOSPITAL ENCOUNTER (OUTPATIENT)
Facility: HOSPITAL | Age: 75
Setting detail: HOSPITAL OUTPATIENT SURGERY
Discharge: HOME | End: 2020-02-24
Attending: INTERNAL MEDICINE | Admitting: INTERNAL MEDICINE

## 2020-02-24 VITALS
HEART RATE: 66 BPM | OXYGEN SATURATION: 98 % | HEIGHT: 64 IN | BODY MASS INDEX: 29.88 KG/M2 | RESPIRATION RATE: 17 BRPM | WEIGHT: 175 LBS | SYSTOLIC BLOOD PRESSURE: 118 MMHG | DIASTOLIC BLOOD PRESSURE: 83 MMHG

## 2020-02-24 DIAGNOSIS — D50.9 IRON DEFICIENCY ANEMIA, UNSPECIFIED IRON DEFICIENCY ANEMIA TYPE: ICD-10-CM

## 2020-02-24 LAB — GLUCOSE BLDC GLUCOMTR-MCNC: 116 MG/DL (ref 70–130)

## 2020-02-24 PROCEDURE — 87081 CULTURE SCREEN ONLY: CPT | Performed by: INTERNAL MEDICINE

## 2020-02-24 PROCEDURE — 43239 EGD BIOPSY SINGLE/MULTIPLE: CPT | Performed by: INTERNAL MEDICINE

## 2020-02-24 PROCEDURE — 88305 TISSUE EXAM BY PATHOLOGIST: CPT | Performed by: INTERNAL MEDICINE

## 2020-02-24 PROCEDURE — 25010000002 PROPOFOL 10 MG/ML EMULSION: Performed by: NURSE ANESTHETIST, CERTIFIED REGISTERED

## 2020-02-24 PROCEDURE — 45380 COLONOSCOPY AND BIOPSY: CPT | Performed by: INTERNAL MEDICINE

## 2020-02-24 PROCEDURE — 82962 GLUCOSE BLOOD TEST: CPT

## 2020-02-24 RX ORDER — LIDOCAINE HYDROCHLORIDE 20 MG/ML
INJECTION, SOLUTION INFILTRATION; PERINEURAL AS NEEDED
Status: DISCONTINUED | OUTPATIENT
Start: 2020-02-24 | End: 2020-02-24 | Stop reason: SURG

## 2020-02-24 RX ORDER — SODIUM CHLORIDE 0.9 % (FLUSH) 0.9 %
3 SYRINGE (ML) INJECTION EVERY 12 HOURS SCHEDULED
Status: DISCONTINUED | OUTPATIENT
Start: 2020-02-24 | End: 2020-02-24 | Stop reason: HOSPADM

## 2020-02-24 RX ORDER — PROPOFOL 10 MG/ML
VIAL (ML) INTRAVENOUS CONTINUOUS PRN
Status: DISCONTINUED | OUTPATIENT
Start: 2020-02-24 | End: 2020-02-24 | Stop reason: SURG

## 2020-02-24 RX ORDER — SODIUM CHLORIDE, SODIUM LACTATE, POTASSIUM CHLORIDE, CALCIUM CHLORIDE 600; 310; 30; 20 MG/100ML; MG/100ML; MG/100ML; MG/100ML
30 INJECTION, SOLUTION INTRAVENOUS CONTINUOUS PRN
Status: DISCONTINUED | OUTPATIENT
Start: 2020-02-24 | End: 2020-02-24 | Stop reason: HOSPADM

## 2020-02-24 RX ORDER — PROPOFOL 10 MG/ML
VIAL (ML) INTRAVENOUS AS NEEDED
Status: DISCONTINUED | OUTPATIENT
Start: 2020-02-24 | End: 2020-02-24 | Stop reason: SURG

## 2020-02-24 RX ORDER — SODIUM CHLORIDE 0.9 % (FLUSH) 0.9 %
10 SYRINGE (ML) INJECTION AS NEEDED
Status: DISCONTINUED | OUTPATIENT
Start: 2020-02-24 | End: 2020-02-24 | Stop reason: HOSPADM

## 2020-02-24 RX ORDER — GLYCOPYRROLATE 0.2 MG/ML
INJECTION INTRAMUSCULAR; INTRAVENOUS AS NEEDED
Status: DISCONTINUED | OUTPATIENT
Start: 2020-02-24 | End: 2020-02-24 | Stop reason: SURG

## 2020-02-24 RX ADMIN — GLYCOPYRROLATE 200 MCG: 0.2 INJECTION INTRAMUSCULAR; INTRAVENOUS at 16:39

## 2020-02-24 RX ADMIN — PROPOFOL 60 MG: 10 INJECTION, EMULSION INTRAVENOUS at 16:49

## 2020-02-24 RX ADMIN — SODIUM CHLORIDE, POTASSIUM CHLORIDE, SODIUM LACTATE AND CALCIUM CHLORIDE: 600; 310; 30; 20 INJECTION, SOLUTION INTRAVENOUS at 16:38

## 2020-02-24 RX ADMIN — SODIUM CHLORIDE, POTASSIUM CHLORIDE, SODIUM LACTATE AND CALCIUM CHLORIDE 30 ML/HR: 600; 310; 30; 20 INJECTION, SOLUTION INTRAVENOUS at 14:45

## 2020-02-24 RX ADMIN — LIDOCAINE HYDROCHLORIDE 60 MG: 20 INJECTION, SOLUTION INFILTRATION; PERINEURAL at 16:42

## 2020-02-24 RX ADMIN — PROPOFOL 180 MCG/KG/MIN: 10 INJECTION, EMULSION INTRAVENOUS at 16:42

## 2020-02-24 RX ADMIN — PROPOFOL 100 MG: 10 INJECTION, EMULSION INTRAVENOUS at 16:42

## 2020-02-24 NOTE — ANESTHESIA POSTPROCEDURE EVALUATION
Patient: Yi Lee    Procedure Summary     Date:  02/24/20 Room / Location:   [facility] /  [facility]    Anesthesia Start:  1638 Anesthesia Stop:  1721    Procedures:       ESOPHAGOGASTRODUODENOSCOPY with biopsies (N/A Esophagus)      COLONOSCOPY TO CECUM WITH COLD POLYPECTOMY (N/A ) Diagnosis:       Iron deficiency anemia, unspecified iron deficiency anemia type      (Iron deficiency anemia, unspecified iron deficiency anemia type [D50.9])    Surgeon:  Eddie Grigsby MD Provider:  Tyrone Perez MD    Anesthesia Type:  MAC ASA Status:  4          Anesthesia Type: MAC    Vitals  No vitals data found for the desired time range.          Post Anesthesia Care and Evaluation    Patient location during evaluation: PHASE II  Patient participation: complete - patient participated  Level of consciousness: awake and alert  Pain management: adequate  Airway patency: patent  Anesthetic complications: No anesthetic complications  PONV Status: none  Cardiovascular status: acceptable  Respiratory status: acceptable  Hydration status: acceptable

## 2020-02-24 NOTE — H&P
Chief Complaint   Patient presents with   • Anemia         History of Present Illness:   74 y.o. female with a history of gastroesophageal reflux disease, constipation, and iron deficiency anemia.  Dr. Albert is concerned because she continues to have anemia. No melena or rectal bleeding. She is on iron pills. NO diarrhea. +constiption. On lactulose once daily and it works well. No abdominal or chest pain. She takes one baby aspirin/day and Diclofenac.      Medical History        Past Medical History:   Diagnosis Date   • Abdominal pain, LLQ     • Anemia     • Arthralgia of hip 11/22/2015   • Ataxic gait 11/22/2015     Description: Eval Dr Lillian Mederos, Neuro.  Some vestibular dysfunction present 2013/2014   • Bipolar I disorder, single manic episode (CMS/HCC)     • Cardiac murmur     • Colon polyp     • Coronary artery disease     • Degeneration, intervertebral disc, thoracolumbar     • Diabetes mellitus (CMS/HCC)     • Disease of thyroid gland     • Diverticulosis     • Esophageal spasm     • Fatigue     • H/O bone density study 10/17/2007     Dr. Giles   • Hemorrhoids     • History of mammogram       02/2012, per GYN Reshma Aranda   • History of Papanicolaou smear of cervix       DR. GILES GYN   • Hypertension     • Impaired cognition 11/22/2015     Description: Testing done 05/14   • Optic nerve contusion     • Pain of lower extremity 11/22/2015   • Vitamin B12 deficiency     • Vitamin D deficiency              Surgical History   Past Surgical History:   Procedure Laterality Date   • BREAST LUMPECTOMY Left       benign   • CHOLECYSTECTOMY       • COLONOSCOPY   2007     done 02/12, repeat 5 yrs, Dr Grigsby; tics in sigmoid colon, IH   • COLONOSCOPY N/A 11/3/2016     polyp, IH   • DILATATION AND CURETTAGE       • ESOPHAGOGASTRIC FUNDOPLASTY       • ROTATOR CUFF REPAIR Right 2009   • TOTAL KNEE ARTHROPLASTY Left 2/13/2018     Procedure: LT TOTAL KNEE ARTHROPLASTY;  Surgeon: Selwyn Pinto MD;  Location[facility]  OR;  Service:                Current Outpatient Medications:   •  acetaminophen (TYLENOL) 325 MG tablet, Take 650 mg by mouth As Needed for Mild Pain ., Disp: , Rfl:   •  amLODIPine (NORVASC) 10 MG tablet, TAKE 1 TABLET BY MOUTH DAILY FOR BLOOD PRESSURE, Disp: 90 tablet, Rfl: 1  •  aspirin 81 MG EC tablet, Take 81 mg by mouth Daily., Disp: , Rfl:   •  Cholecalciferol (VITAMIN D3) 2000 UNITS tablet, Take 1,000 Units by mouth Daily., Disp: , Rfl:   •  diclofenac (VOLTAREN) 75 MG EC tablet, , Disp: , Rfl:   •  ferrous sulfate 325 (65 FE) MG EC tablet, Take 650 mg by mouth., Disp: , Rfl:   •  FLUoxetine (PROzac) 20 MG capsule, TAKE 1 CAPSULE BY MOUTH DAILY, Disp: 90 capsule, Rfl: 1  •  GENERLAC 10 GM/15ML solution solution (encephalopathy), , Disp: , Rfl:   •  glucose blood (FREESTYLE LITE) test strip, Use to test everyday as directed, Disp: 100 each, Rfl: 5  •  hydroCHLOROthiazide (MICROZIDE) 12.5 MG capsule, TAKE 1 CAPSULE BY MOUTH DAILY, Disp: 90 capsule, Rfl: 0  •  hydrOXYzine (ATARAX) 25 MG tablet, Take 1 tablet by mouth 3 (Three) Times a Day As Needed for Itching., Disp: 90 tablet, Rfl: 5  •  lamoTRIgine (LaMICtal) 200 MG tablet, TAKE 2 TABLETS BY MOUTH EVERY NIGHT, Disp: 180 tablet, Rfl: 0  •  Lancets (FREESTYLE) lancets, As directed to check blood glucose, Disp: 100 each, Rfl: 12  •  levothyroxine (SYNTHROID, LEVOTHROID) 100 MCG tablet, TAKE 1 TABLET BY MOUTH DAILY, Disp: 90 tablet, Rfl: 3  •  metFORMIN (GLUCOPHAGE) 500 MG tablet, TAKE 2 TABLETS BY MOUTH DAILY WITH BREAKFAST, Disp: 180 tablet, Rfl: 1  •  metoprolol succinate XL (TOPROL-XL) 100 MG 24 hr tablet, TAKE 1 TABLET BY MOUTH DAILY, Disp: 90 tablet, Rfl: 1  •  omeprazole (priLOSEC) 40 MG capsule, Take 1 capsule by mouth Daily., Disp: 90 capsule, Rfl: 3  •  QUEtiapine (SEROquel) 300 MG tablet, Take 1 tablet by mouth Every Night., Disp: 90 tablet, Rfl: 3  •  rosuvastatin (CRESTOR) 5 MG tablet, TAKE 1 TABLET BY MOUTH EVERY NIGHT FOR CHOLESTEROL, Disp: 90  tablet, Rfl: 0  •  albuterol (PROVENTIL HFA) 108 (90 BASE) MCG/ACT inhaler, Inhale 2 puffs., Disp: , Rfl:           Allergies   Allergen Reactions   • Morphine Shortness Of Breath   • Penicillins Hives and Swelling   • Ace Inhibitors Cough   • Telmisartan Cough               Family History   Problem Relation Age of Onset   • Colon polyps Father     • Hepatitis Other     • Coronary artery disease Other     • Malig Hyperthermia Neg Hx           Social History   Social History            Socioeconomic History   • Marital status:        Spouse name: Not on file   • Number of children: Not on file   • Years of education: Not on file   • Highest education level: Not on file   Tobacco Use   • Smoking status: Former Smoker       Packs/day: 1.00       Years: 7.00       Pack years: 7.00       Types: Cigarettes       Last attempt to quit: 1972       Years since quittin.0   • Smokeless tobacco: Never Used   Substance and Sexual Activity   • Alcohol use: Yes       Comment: OCC   • Drug use: No   • Sexual activity: Never            Review of Systems   Gastrointestinal: Negative for abdominal pain.      Pertinent positives and negatives documented in the HPI and all other systems reviewed and were found to be negative.      Vitals:     01/15/20 1439   BP: 142/70   Temp: 98 °F (36.7 °C)         Physical Exam   Constitutional: She is oriented to person, place, and time. She appears well-developed and well-nourished. No distress.   HENT:   Head: Normocephalic and atraumatic. Hair is normal.   Right Ear: Hearing, tympanic membrane, external ear and ear canal normal. No drainage. No decreased hearing is noted.   Left Ear: Hearing, tympanic membrane, external ear and ear canal normal. No decreased hearing is noted.   Nose: No nasal deformity.   Mouth/Throat: Oropharynx is clear and moist.   Eyes: Pupils are equal, round, and reactive to light. Conjunctivae, EOM and lids are normal. Right eye exhibits no discharge. Left eye  exhibits no discharge.   Neck: Normal range of motion. Neck supple. No JVD present. No tracheal deviation present. No thyromegaly present.   Cardiovascular: Normal rate, regular rhythm, normal heart sounds, intact distal pulses and normal pulses. Exam reveals no gallop and no friction rub.   No murmur heard.  Pulmonary/Chest: Effort normal and breath sounds normal. No respiratory distress. She has no wheezes. She has no rales. She exhibits no tenderness.   Abdominal: Soft. Bowel sounds are normal. She exhibits no distension and no mass. There is no tenderness. There is no rebound and no guarding. No hernia.   Genitourinary: Rectal exam shows guaiac negative stool.   Musculoskeletal: Normal range of motion. She exhibits no edema, tenderness or deformity.   Lymphadenopathy:     She has no cervical adenopathy.   Neurological: She is alert and oriented to person, place, and time. She has normal reflexes. She displays normal reflexes. No cranial nerve deficit. She exhibits normal muscle tone. Coordination normal.   Skin: Skin is warm and dry. No rash noted. She is not diaphoretic. No erythema.   Psychiatric: She has a normal mood and affect. Her behavior is normal. Judgment and thought content normal.   Vitals reviewed.        Yi was seen today for anemia.     Diagnoses and all orders for this visit:     Iron deficiency anemia, unspecified iron deficiency anemia type  -     Case Request; Standing  -     Case Request     Other orders  -     Follow Anesthesia Guidelines / Standing Orders; Future  -     Obtain Informed Consent; Future        Assessment:  1. Iron def anemia     Recommendations:  1. EGD and colonoscopy.     No follow-ups on file.     2/24/20 - No change from the above H and P.   Eddie Grigsby MD

## 2020-02-24 NOTE — DISCHARGE INSTRUCTIONS
For the next 24 hours patient needs to be with a responsible adult.    For 24 hours DO NOT drive, operate machinery, appliances, drink alcohol, make important decisions or sign legal documents.    Start with a light or bland diet if you are feeling sick to your stomach otherwise advance to regular diet as tolerated.    Follow recommendations on procedure report if provided by your doctor.    Call Dr Grigsby for problems [phone]    Problems may include but not limited to: large amounts of bleeding, trouble breathing, repeated vomiting, severe unrelieved pain, fever or chills.      WHAT ARE DIVERTICULOSIS AND DIVERTICULITIS?  Many people have small pouches in their colons that bulge outward through weak spots, like an inner tube that pokes through weak places in a tire.  Each pouch is called a diverticulum.  The condition of having diverticula is DIVERTICULOSIS.  The condition becomes more common as people age.  About half of all people over the age of 60 have diverticulosis    When pouches become infected or inflamed, the condition is called DIVERTICULITIS.  This happens in 10% to 25% of people with diverticulosis.  Diverticulosis and diverticulitis are also called DIVERTICULAR DISEASE.     WHAT ARE THE SYMPTOMS?  Diverticulosis - Most people do not have any discomfort or symptoms.  However, symptoms may include mild cramps, bloating, and constipation.  Other diseases such as irritable bowel syndrome (IBS) and stomach ulcers cause similar problems, so these symptoms do not always mean a person has diverticulosis.  You should visit your doctor if you have these troubling symptoms.    Diverticulitis - The most common symptom is abdominal pain.  The most common sign is tenderness around the left side of the lower abdomen.  If infection is the cause, fever, nausea, vomiting, chills, cramping, and constipation may occur as well.  The severity depends on the extent of the infection and complications.    WHAT ARE THE  COMPLICATIONS?  Diverticulitis can lead to bleeding, infections,perforations or tears, or blockages.  These complications always require treatment to prevent them from proggressing and causing serous illness.    Bleeding from a diverticula is a rare complication.  When this occurs, blood may appear in the toilet or in your stool.  Bleeding can be severe, but it may stop by itself and not require treatment.  Doctors believe bleeding diverticula are caused by a small blood vessel in a diverticulum that weakens and finally bursts.  If you have bleeding from the rectum, you should see your doctor.  If the bleeding does not stop you may need surgery.    Abscess, Perforation, and Peritonitis - The infection causing diverticulitis often clears up after a few days of treatment with antibiotics.  If the condition gets worse, an abscess may form in the colon.  An abscess is an infected area with pus that may cause swelling and destroy tissue.  Sometimes the infected diverticula may develop small holes, called perforations.  These perforations allow pus to leak out of the colon into the abdominal area.  If the abscess is small and remains in the colon, it may clear up after treatment with antibiotics.  If not, the doctor may need to drain it.  A large abscess can become a serious problem if the infection leaks out and contaminates areas outside the colon.  Infection that spreads into the abdominal cavity is called peritonitis.  Peritonitis requires immediate surgery toclean the abdominal cavity and remove the damaged part of the colon.  Without surgery, peritonitis can be fatal.    FISTULA  A fistula is an abnormal connection of tissue between two organs or between an organ and the skin.  When damaged tissues come into contact with each other during infection, they sometimes stick together.  If they heal that way, a fistula forms.  When diverticulitis-related infection spreads through out the colon, the colon's tissue may  stick to nearby tissues.  The organs usually involved are the bladder, small intestine, and skin.  The problem can be corrected with surgery to remove the fistula and affected part of the colon.    INTESTINAL OBSTRUCTION  The scarring caused by infection may cause partial or total blockage of the large intestine.  When this happens, the colon is unable to move bowel contents normally.  When the obstruction totally blocks the intestine, emergency surgery is necessary.  Partial blockage is not an emergency, so the surgery to correct it can be planned.    WHAT CAUSES DIVERTICULAR DISEASE  Although not proven, the dominant theory is that a low-fiber diet is the main cause of diverticular disease.  The disease was first noticed in the [place] in the early 1900s.  At about the same time, processed foods were introduced into the American diet.  Many processed foods contain refined, low-fiber flour.  Unlike whole-wheat flour, refined flour has no wheat bran.    Diverticular disease is common in developed or industrialized countries-particularly the [place], [place], and [place]-where low-fiber diets are common.  The disease is rare in countries of [place], where people eat high-fiber vegetable diets.    Fiber is the part of fruits, vegetables, and whole grains that the body cannot digest.  Some fiber dissolves easily in water (soluble fiber).  It takes on a soft, jelly-like texture in the intestines.  Some fiber passes almost unchanged through the intestines (insoluble fiber).  Both kinds of fiber help make stools soft and easy to pass.  Fiber also prevents constipation.    Constipation makes the muscles strain to move stool that is too hard.  It is the main cause of increased pressure in the colon.  This excess pressure might cause the weak spots in the colon to bulge out and become diverticula.  Diverticulitis occurs when diverticula become infected or inflamed.  Doctors are not certain what causes  the infection.  It may begin when stool or bacteria are caught in the diverticula.  An attack of diverticulitis can develop suddenly and without warning.    HOW DOES THE DOCTOR DIAGNOSE DIVERTICULAR DISEASE  The doctor asks about medical history, does a physical exam, and may perform one or more diagnostic tests.  Because most people do not have symptoms, diverticulosis is often found through tests ordered for another ailment.    When taking a medical history, the doctor may ask about bowel habits, symptoms, pain, diet, and medications.  The physical exam usually involves a digital rectal exam.  To preform this test. The doctor inserts a gloved, lubricated finger into the rectum to detect tenderness, blockage, or blood.  The doctor may check stool for signs of bleeding and test blood for signs of infection.  The doctor may also order x-rays or other tests.    WHAT IS THE TREATMENT FOR DIVERTICULAR DISEASE  Increasing the amount of fiber in the diet may reduce symptoms of diverticulosis and prevent complications such as diverticulitis.  Fiber keeps stool soft and lowers pressure inside the colon so that bowel contents can move through easily.  The American Dietetic Association. Recommends 20 to 35 grams of fiber each day.  The doctor may also recommend taking a fiber product such as Citrucel or Metamucil once a dya.  These products are mixed water and provide about 2 to 3.5 grams of fiber per  Tablespoon, mixed with 8 ounces of water.    Avoidance of nuts, popcorn, and sunflower, pumpkin, olivia, and sesame seeds has been recommended by physicians out of fear that food particles could enter, block, or irritate the diverticula.  However, no scientific data support this treatment measure.  Eating a high-fiber diet is the only requirement highly emphasized across the medical literature.  Eliminating specific foods is not necessary.  The seeds in tomatoes, zucchini, cucumbers, strawberries, and raspberries, as well as  poppy seeds, are generally considered harmless.  People differ in amounts and types of foods the can eat.  Decisions about diet should be made based on what works best for each person.  Keeping a food diary may help identify what foods may cause symptoms.    If cramps, bloating, and constipation are problems, the doctor may prescribe  Short course of pain medication.  However, many medications affect emptying of the colon, an undesirable side effect for people with diverticulosis.    DIVERTICULITIS  Treatment focuses on clearing up the infection and inflammation, resting the colon, and preventing or minimizing complications.  An attack of diverticulitis without complications may respond to antibiotics within a few days if treated early.  To help the colon rest, the doctor may recommend bed rest and a liquid diet, along with a pain reliever.    An acute attack with severe pain or sever infection may require a hospital stay.  Most acute cases of diverticulitis are treated with antibiotics and a liquid diet.  The antibiotics are given by injection into a vein.  In some cases, however, surgery may be necessary.    WHEN IS SURGERY NECESSARY  If attacks are severe or frequent, the doctor may advise surgery.  The surgeon removes the affected part of the colon and joins the remaining sections.  This typed of surgery, called colon resection, aims to keep attacks from coming back and to prevent complications.  The doctor may also recommend surgery for complications of a fistula or intestinal obstruction.    If antibiotics do not correct an attack, emergency surgery may be required.  Other reasons for emergency surgery include a large abscess, perforation, peritonitis, or continued bleeding.    Emergency surgery usually involves 2 operations.  The first will clear the infected abdominal cavity and remove part of the colon.  Because infection and sometimes obstruction, it is not safe to rejoin the colon during the first  operation.  Instead, the surgeon creates a temporary hole, or stoma, in the abdomen.  The end of the colon is connected to the hole, a procedure called a colostomy, to allow normal eating and bowel movements.  The stool goes into a bag attached to the opening in the abdomen.  In the second operation, the surgeon rejoins the ends of the colon.

## 2020-02-24 NOTE — H&P
[facility]  Pre Procedure History & Physical    Chief Complaint:   Time for my colonoscopy and EGD    Subjective     HPI:   74 y.o. female who has iron deficiency anemia, gastroesophageal reflux disease, and a h/o colon polyps. Her last colonoscopy was in 11/16.     Past Medical History:   Past Medical History:   Diagnosis Date   • Abdominal pain, LLQ    • Anemia    • Anemia    • Arthralgia of hip 11/22/2015   • Ataxic gait 11/22/2015    Description: Eval Dr Lillian Mederos, Neuro.  Some vestibular dysfunction present 2013/2014   • Bipolar I disorder, single manic episode (CMS/HCC)    • Cardiac murmur    • Colon polyp    • Coronary artery disease    • Degeneration, intervertebral disc, thoracolumbar    • Diabetes mellitus (CMS/HCC)    • Disease of thyroid gland    • Diverticulosis    • Esophageal spasm    • Fatigue    • GERD (gastroesophageal reflux disease)    • H/O bone density study 10/17/2007    Dr. Giles   • Hemorrhoids    • History of mammogram     02/2012, per GYN Reshma Aranda   • History of Papanicolaou smear of cervix     DR. GILES GYN   • Hyperlipidemia    • Hypertension    • Impaired cognition 11/22/2015    Description: Testing done 05/14   • Optic nerve contusion    • Pain of lower extremity 11/22/2015   • Vitamin B12 deficiency    • Vitamin D deficiency        Family History:  Family History   Problem Relation Age of Onset   • Colon polyps Father    • Hepatitis Other    • Coronary artery disease Other    • Malig Hyperthermia Neg Hx        Social History:   reports that she quit smoking about 48 years ago. Her smoking use included cigarettes. She has a 7.00 pack-year smoking history. She has never used smokeless tobacco. She reports that she drinks alcohol. She reports that she does not use drugs.    Medications:   Medications Prior to Admission   Medication Sig Dispense Refill Last Dose   • acetaminophen (TYLENOL) 325 MG tablet Take 650 mg by mouth As Needed for Mild Pain .    2/23/2020 at Unknown time   • amLODIPine (NORVASC) 10 MG tablet TAKE 1 TABLET BY MOUTH DAILY FOR BLOOD PRESSURE 90 tablet 1 2/24/2020 at 0700   • Cholecalciferol (VITAMIN D3) 2000 UNITS tablet Take 1,000 Units by mouth Daily.   Past Week at Unknown time   • diclofenac (VOLTAREN) 75 MG EC tablet    Past Week at Unknown time   • ferrous sulfate 325 (65 FE) MG EC tablet Take 650 mg by mouth.   Past Week at Unknown time   • FLUoxetine (PROzac) 20 MG capsule TAKE 1 CAPSULE BY MOUTH DAILY 90 capsule 1 2/23/2020 at Unknown time   • GENERLAC 10 GM/15ML solution solution (encephalopathy)    2/23/2020 at Unknown time   • glucose blood (FREESTYLE LITE) test strip Use to test everyday as directed 100 each 5 2/23/2020 at Unknown time   • hydroCHLOROthiazide (MICROZIDE) 12.5 MG capsule TAKE 1 CAPSULE BY MOUTH DAILY 90 capsule 1 2/23/2020 at Unknown time   • hydrOXYzine (ATARAX) 25 MG tablet Take 1 tablet by mouth 3 (Three) Times a Day As Needed for Itching. 90 tablet 5 2/23/2020 at Unknown time   • lamoTRIgine (LaMICtal) 200 MG tablet TAKE 2 TABLETS BY MOUTH EVERY NIGHT 180 tablet 0 2/23/2020 at Unknown time   • Lancets (FREESTYLE) lancets As directed to check blood glucose 100 each 12 2/23/2020 at Unknown time   • levothyroxine (SYNTHROID, LEVOTHROID) 100 MCG tablet TAKE 1 TABLET BY MOUTH DAILY 90 tablet 3 2/23/2020 at Unknown time   • metFORMIN (GLUCOPHAGE) 500 MG tablet TAKE 2 TABLETS BY MOUTH DAILY WITH BREAKFAST 180 tablet 1 2/23/2020 at Unknown time   • metoprolol succinate XL (TOPROL-XL) 100 MG 24 hr tablet TAKE 1 TABLET BY MOUTH DAILY 90 tablet 1 2/24/2020 at 0700   • omeprazole (priLOSEC) 40 MG capsule Take 1 capsule by mouth Daily. 90 capsule 3 2/23/2020 at Unknown time   • QUEtiapine (SEROquel) 300 MG tablet Take 1 tablet by mouth Every Night. 90 tablet 3 2/23/2020 at Unknown time   • rosuvastatin (CRESTOR) 5 MG tablet TAKE 1 TABLET BY MOUTH EVERY NIGHT FOR CHOLESTEROL 90 tablet 1 2/23/2020 at Unknown time   •  "albuterol (PROVENTIL HFA) 108 (90 BASE) MCG/ACT inhaler Inhale 2 puffs.   Unknown at Unknown time   • aspirin 81 MG EC tablet Take 81 mg by mouth Daily.   2/18/2020       Allergies:  Morphine; Penicillins; Ace inhibitors; and Telmisartan    ROS:    Pertinent items are noted in HPI     Objective     Blood pressure 165/82, pulse 63, resp. rate 16, height 162.6 cm (64\"), weight 79.4 kg (175 lb), SpO2 97 %, not currently breastfeeding.    Physical Exam   Constitutional: Pt is oriented to person, place, and time and well-developed, well-nourished, and in no distress.   HENT:   Mouth/Throat: Oropharynx is clear and moist.   Neck: Normal range of motion. Neck supple.   Cardiovascular: Normal rate, regular rhythm and normal heart sounds.    Pulmonary/Chest: Effort normal and breath sounds normal. No respiratory distress. No  wheezes.   Abdominal: Soft. Bowel sounds are normal.   Skin: Skin is warm and dry.   Psychiatric: Mood, memory, affect and judgment normal.     Assessment/Plan     Diagnosis:  74 y.o. female who has iron deficiency anemia, gastroesophageal reflux disease, and a h/o colon polyps. Her last colonoscopy was in 11/16.     Anticipated Surgical Procedure:  EGD and Colonoscopy    The risks, benefits, and alternatives of this procedure have been discussed with the patient or the responsible party- the patient understands and agrees to proceed.    Eddie Grigsby M.D.  "

## 2020-02-24 NOTE — ANESTHESIA PREPROCEDURE EVALUATION
Anesthesia Evaluation     Patient summary reviewed and Nursing notes reviewed   no history of anesthetic complications:               Airway   Mallampati: II  TM distance: >3 FB  Neck ROM: full  no difficulty expected  Dental - normal exam     Pulmonary     breath sounds clear to auscultation  (+) COPD,   (-) shortness of breath, sleep apnea, decreased breath sounds, wheezes  Cardiovascular - normal exam  Exercise tolerance: good (4-7 METS)    Rhythm: regular  Rate: normal    (+) hypertension, valvular problems/murmurs murmur, CAD, hyperlipidemia,   (-) past MI, angina, CHF, orthopnea, PND, CASTELLON, PVD      Neuro/Psych  (+) psychiatric history Bipolar and Depression,     (-) seizures, neuromuscular disease, TIA, CVA, dizziness/light headedness, weakness, numbness  GI/Hepatic/Renal/Endo    (+) obesity,  GERD,  renal disease CRI, diabetes mellitus,   (-) liver disease    Musculoskeletal (-) negative ROS    Abdominal   (+) obese,    Substance History - negative use  (-) alcohol use, drug use     OB/GYN negative ob/gyn ROS         Other - negative ROS                       Anesthesia Plan    ASA 4     MAC     intravenous induction     Anesthetic plan, all risks, benefits, and alternatives have been provided, discussed and informed consent has been obtained with: patient.

## 2020-02-25 LAB
CYTO UR: NORMAL
LAB AP CASE REPORT: NORMAL
PATH REPORT.FINAL DX SPEC: NORMAL
PATH REPORT.GROSS SPEC: NORMAL
UREASE TISS QL: NEGATIVE

## 2020-02-25 NOTE — PROGRESS NOTES
02/25/20  Tell her that pathology from the EGD looked okay.  The colon polyp that was removed was not cancerous but was precancerous.  I recommend a repeat colonoscopy in 5 years.  Please fax a copy of this report to her PCP.  Hugh nath

## 2020-03-03 ENCOUNTER — TELEPHONE (OUTPATIENT)
Dept: GASTROENTEROLOGY | Facility: CLINIC | Age: 75
End: 2020-03-03

## 2020-03-03 NOTE — TELEPHONE ENCOUNTER
Call to pt.  Advise per Dr Grigsby note.  Verb understanding.     C/s for 2/24/25 is in recall.      Message to Dr Ankit Albert.

## 2020-03-03 NOTE — TELEPHONE ENCOUNTER
----- Message from Eddie Grigsby MD sent at 2/25/2020  4:21 PM EST -----  02/25/20  Tell her that pathology from the EGD looked okay.  The colon polyp that was removed was not cancerous but was precancerous.  I recommend a repeat colonoscopy in 5 years.  Please fax a copy of this report to her PCP.  Thx. kjh

## 2020-03-30 RX ORDER — LACTULOSE 10 G/15ML
SOLUTION ORAL; RECTAL
Qty: 1350 ML | Refills: 3 | Status: SHIPPED | OUTPATIENT
Start: 2020-03-30 | End: 2021-02-04 | Stop reason: SDUPTHER

## 2020-04-13 DIAGNOSIS — K21.9 GASTROESOPHAGEAL REFLUX DISEASE, ESOPHAGITIS PRESENCE NOT SPECIFIED: ICD-10-CM

## 2020-04-13 RX ORDER — OMEPRAZOLE 40 MG/1
40 CAPSULE, DELAYED RELEASE ORAL DAILY
Qty: 90 CAPSULE | Refills: 3 | Status: SHIPPED | OUTPATIENT
Start: 2020-04-13 | End: 2021-02-04 | Stop reason: SDUPTHER

## 2020-05-18 RX ORDER — LAMOTRIGINE 200 MG/1
400 TABLET ORAL NIGHTLY
Qty: 180 TABLET | Refills: 0 | Status: SHIPPED | OUTPATIENT
Start: 2020-05-18 | End: 2020-06-17 | Stop reason: SDUPTHER

## 2020-05-21 ENCOUNTER — OFFICE VISIT (OUTPATIENT)
Dept: INTERNAL MEDICINE | Facility: CLINIC | Age: 75
End: 2020-05-21

## 2020-05-21 VITALS
HEIGHT: 64 IN | OXYGEN SATURATION: 98 % | DIASTOLIC BLOOD PRESSURE: 78 MMHG | WEIGHT: 185 LBS | HEART RATE: 76 BPM | SYSTOLIC BLOOD PRESSURE: 138 MMHG | BODY MASS INDEX: 31.58 KG/M2

## 2020-05-21 DIAGNOSIS — R23.3 EASY BRUISING: ICD-10-CM

## 2020-05-21 DIAGNOSIS — N64.59 BLEEDING FROM NIPPLE IN FEMALE: ICD-10-CM

## 2020-05-21 DIAGNOSIS — Q61.9 CYSTIC KIDNEY DISEASE: ICD-10-CM

## 2020-05-21 DIAGNOSIS — R79.89 ELEVATED SERUM CREATININE: Primary | ICD-10-CM

## 2020-05-21 DIAGNOSIS — S51.819A SKIN TEAR OF FOREARM WITHOUT COMPLICATION, INITIAL ENCOUNTER: ICD-10-CM

## 2020-05-21 DIAGNOSIS — D50.8 OTHER IRON DEFICIENCY ANEMIA: ICD-10-CM

## 2020-05-21 PROCEDURE — 99214 OFFICE O/P EST MOD 30 MIN: CPT | Performed by: FAMILY MEDICINE

## 2020-05-21 RX ORDER — DOXYCYCLINE HYCLATE 100 MG/1
100 CAPSULE ORAL 2 TIMES DAILY
Qty: 14 CAPSULE | Refills: 0 | Status: SHIPPED | OUTPATIENT
Start: 2020-05-21 | End: 2020-06-17

## 2020-05-21 NOTE — PROGRESS NOTES
Subjective   Yi Lee is a 74 y.o. female.     Chief Complaint   Patient presents with   • Hyperlipidemia   • Hypertension   • Skin Lesion         History of Present Illness   Very pleasant lady with a recheck elevation of creatinine history of anemia worked up with upper and lower endoscopy by gastroenterology showing only a rectal/colon polyp.  Otherwise recheck of hypertension history of CAD.    She went to nephrology but did not like the experience of going to nephrology.  Apparently her creatinine is improved I do not see the recent labs.    She is encouraged to stay off nonsteroidal anti-inflammatory drugs given the rising creatinine.    In reference to the anemia she had a EGD and colonoscopy and are referred with the current report.    As an aside she notes a scab or seeing some crusted blood over the left nipple once over the past 3 months.  She had a normal mammogram done last fall at [facility].    She is dismayed because she did not had a skin flap of the right forearm getting some Food in a store.  Abdomen hematoma bruises spread up the arm and there is some erythema.  This appears to be a simple hematoma skin tear.  She is worried about infection.  There is a little erythema I will place her on doxycycline 100 twice daily with mupirocin ointment 3 times daily.  A dressing is placed on the arm today.      The following portions of the patient's history were reviewed and updated as appropriate: allergies, current medications, past social history and problem list.    Review of Systems   Constitutional: Negative.    HENT: Negative.    Eyes: Negative.    Respiratory: Negative.    Cardiovascular: Negative.    Gastrointestinal: Negative.    Endocrine: Negative.    Genitourinary: Negative.    Musculoskeletal: Negative.    Skin: Negative.    Allergic/Immunologic: Negative.    Neurological: Negative.    Hematological: Bruises/bleeds easily.   Psychiatric/Behavioral: Negative.        Objective    Vitals:    05/21/20 1353   BP: 138/78   Pulse: 76   SpO2: 98%     Physical Exam   Constitutional: She is oriented to person, place, and time. She appears well-developed and well-nourished.   HENT:   Head: Normocephalic and atraumatic.   Right Ear: Tympanic membrane and external ear normal.   Left Ear: Tympanic membrane and external ear normal.   Nose: Nose normal.   Mouth/Throat: Oropharynx is clear and moist.   Eyes: Pupils are equal, round, and reactive to light. Conjunctivae and EOM are normal.   Neck: Normal range of motion. Neck supple. No JVD present. No thyromegaly present.   Cardiovascular: Normal rate, regular rhythm and intact distal pulses.   Murmur heard.   Systolic murmur is present with a grade of 2/6.  Pulmonary/Chest: Effort normal and breath sounds normal.   Abdominal: Soft. Bowel sounds are normal.   Musculoskeletal: Normal range of motion.        Arms:  Lymphadenopathy:     She has no cervical adenopathy.   Neurological: She is alert and oriented to person, place, and time. No cranial nerve deficit. Coordination normal.   Skin: Skin is warm and dry. No rash noted.   Psychiatric: She has a normal mood and affect. Her behavior is normal. Judgment and thought content normal.   Vitals reviewed.      Assessment/Plan   Problem List Items Addressed This Visit        Genitourinary    Cystic kidney disease    Relevant Orders    CBC & Differential    Basic Metabolic Panel    Vitamin B12    Folate    Iron and TIBC    Ferritin       Hematopoietic and Hemostatic    Iron deficiency anemia    Relevant Orders    CBC & Differential    Basic Metabolic Panel    Vitamin B12    Folate    Iron and TIBC    Ferritin      Other Visit Diagnoses     Elevated serum creatinine    -  Primary    Relevant Orders    CBC & Differential    Basic Metabolic Panel    Vitamin B12    Folate    Iron and TIBC    Ferritin    Bleeding from nipple in female        Easy bruising        Relevant Orders    CBC & Differential    Basic  Metabolic Panel    Vitamin B12    Folate    Iron and TIBC    Ferritin    Skin tear of forearm without complication, initial encounter        Relevant Medications    mupirocin (Bactroban) 2 % ointment      Plan: Recheck in July labs today for anemia elevation of creatinine iron studies B12 folate.  Doxycycline 100 twice daily #14 not to be taken with iron supplements.  Mupirocin 2% ointment applied 3 times daily of the right forearm.    Extensive discussion about the left nipple bleeding.  This happened only once and appears to be an abrasion.  She does not wish to see specialty care for this.  Perhaps he will have to refer to breast surgery despite the fact of having a normal mammogram.  Again we will see her back in July.

## 2020-05-22 LAB
BASOPHILS # BLD AUTO: 0.04 10*3/MM3 (ref 0–0.2)
BASOPHILS NFR BLD AUTO: 0.6 % (ref 0–1.5)
BUN SERPL-MCNC: 20 MG/DL (ref 8–23)
BUN/CREAT SERPL: 14.4 (ref 7–25)
CALCIUM SERPL-MCNC: 9.8 MG/DL (ref 8.6–10.5)
CHLORIDE SERPL-SCNC: 101 MMOL/L (ref 98–107)
CO2 SERPL-SCNC: 22.2 MMOL/L (ref 22–29)
CREAT SERPL-MCNC: 1.39 MG/DL (ref 0.57–1)
EOSINOPHIL # BLD AUTO: 0.17 10*3/MM3 (ref 0–0.4)
EOSINOPHIL NFR BLD AUTO: 2.5 % (ref 0.3–6.2)
ERYTHROCYTE [DISTWIDTH] IN BLOOD BY AUTOMATED COUNT: 12.1 % (ref 12.3–15.4)
FERRITIN SERPL-MCNC: 65.2 NG/ML (ref 13–150)
FOLATE SERPL-MCNC: >20 NG/ML (ref 4.78–24.2)
GLUCOSE SERPL-MCNC: 107 MG/DL (ref 65–99)
HCT VFR BLD AUTO: 35 % (ref 34–46.6)
HGB BLD-MCNC: 11.8 G/DL (ref 12–15.9)
IMM GRANULOCYTES # BLD AUTO: 0.03 10*3/MM3 (ref 0–0.05)
IMM GRANULOCYTES NFR BLD AUTO: 0.4 % (ref 0–0.5)
IRON SATN MFR SERPL: 16 % (ref 20–50)
IRON SERPL-MCNC: 63 MCG/DL (ref 37–145)
LYMPHOCYTES # BLD AUTO: 1.43 10*3/MM3 (ref 0.7–3.1)
LYMPHOCYTES NFR BLD AUTO: 20.8 % (ref 19.6–45.3)
MCH RBC QN AUTO: 30.3 PG (ref 26.6–33)
MCHC RBC AUTO-ENTMCNC: 33.7 G/DL (ref 31.5–35.7)
MCV RBC AUTO: 89.7 FL (ref 79–97)
MONOCYTES # BLD AUTO: 0.68 10*3/MM3 (ref 0.1–0.9)
MONOCYTES NFR BLD AUTO: 9.9 % (ref 5–12)
NEUTROPHILS # BLD AUTO: 4.51 10*3/MM3 (ref 1.7–7)
NEUTROPHILS NFR BLD AUTO: 65.8 % (ref 42.7–76)
NRBC BLD AUTO-RTO: 0 /100 WBC (ref 0–0.2)
PLATELET # BLD AUTO: 327 10*3/MM3 (ref 140–450)
POTASSIUM SERPL-SCNC: 5 MMOL/L (ref 3.5–5.2)
RBC # BLD AUTO: 3.9 10*6/MM3 (ref 3.77–5.28)
SODIUM SERPL-SCNC: 137 MMOL/L (ref 136–145)
TIBC SERPL-MCNC: 394 MCG/DL
UIBC SERPL-MCNC: 331 MCG/DL (ref 112–346)
VIT B12 SERPL-MCNC: 565 PG/ML (ref 211–946)
WBC # BLD AUTO: 6.86 10*3/MM3 (ref 3.4–10.8)

## 2020-06-17 ENCOUNTER — OFFICE VISIT (OUTPATIENT)
Dept: INTERNAL MEDICINE | Facility: CLINIC | Age: 75
End: 2020-06-17

## 2020-06-17 VITALS
WEIGHT: 184 LBS | HEIGHT: 64 IN | DIASTOLIC BLOOD PRESSURE: 70 MMHG | HEART RATE: 48 BPM | SYSTOLIC BLOOD PRESSURE: 174 MMHG | RESPIRATION RATE: 16 BRPM | OXYGEN SATURATION: 96 % | TEMPERATURE: 97 F | BODY MASS INDEX: 31.41 KG/M2

## 2020-06-17 DIAGNOSIS — E78.2 MIXED HYPERLIPIDEMIA: ICD-10-CM

## 2020-06-17 DIAGNOSIS — E03.9 ACQUIRED HYPOTHYROIDISM: ICD-10-CM

## 2020-06-17 DIAGNOSIS — I10 BENIGN ESSENTIAL HYPERTENSION: Primary | ICD-10-CM

## 2020-06-17 DIAGNOSIS — F30.9 BIPOLAR I DISORDER, SINGLE MANIC EPISODE (HCC): ICD-10-CM

## 2020-06-17 PROCEDURE — 99214 OFFICE O/P EST MOD 30 MIN: CPT | Performed by: FAMILY MEDICINE

## 2020-06-17 RX ORDER — LAMOTRIGINE 200 MG/1
500 TABLET ORAL NIGHTLY
Qty: 225 TABLET | Refills: 1 | Status: SHIPPED | OUTPATIENT
Start: 2020-06-17 | End: 2021-01-27 | Stop reason: SDUPTHER

## 2020-06-17 RX ORDER — FLUOXETINE HYDROCHLORIDE 20 MG/1
40 CAPSULE ORAL DAILY
Qty: 90 CAPSULE | Refills: 1 | Status: SHIPPED | OUTPATIENT
Start: 2020-06-17 | End: 2020-07-31

## 2020-06-17 NOTE — PROGRESS NOTES
Subjective   Yi Lee is a 74 y.o. female.     Chief Complaint   Patient presents with   • Anxiety   • Depression         History of Present Illness   Is a difficult relationship patient has had a history of bipolar disorder over 20 years and history of electroconvulsive therapy and on multiple treatments.  She is now Lamictal for 11 years and previously has psychiatrist with a psychiatrist from the [facility].  She does not wish to see another psychiatrist but she is very depressed without suicidal ideation.  Isolation and pandemic does not help.    Reviewed her medicines in depth over 25 minutes greater.  Options are difficult and tricky.  She is on a pretty high dose of Lamictal.  Present switch medicines will increase Lamictal dose by 100 mg a day.  Taking 2-1/2 tablets at bedtime.  Otherwise we will try increasing Prozac from 20-40 daily and she will give us an update early next week.  Recheck in 2 weeks.      The following portions of the patient's history were reviewed and updated as appropriate: allergies, current medications, past social history and problem list.    Review of Systems   Constitutional: Negative.    HENT: Negative.    Eyes: Negative.    Respiratory: Negative.    Cardiovascular: Negative.    Gastrointestinal: Negative.    Endocrine: Negative.    Genitourinary: Negative.    Musculoskeletal: Negative.    Skin: Negative.    Allergic/Immunologic: Negative.    Neurological: Negative.    Hematological: Negative.    Psychiatric/Behavioral: Positive for decreased concentration and dysphoric mood. The patient is nervous/anxious.        Objective   Vitals:    06/17/20 1030   BP: 174/70   Pulse: (!) 48   Resp: 16   Temp: 97 °F (36.1 °C)   SpO2: 96%     Physical Exam   Constitutional: She is oriented to person, place, and time. She appears well-developed and well-nourished.   HENT:   Head: Normocephalic and atraumatic.   Right Ear: Tympanic membrane and external ear normal.   Left Ear: Tympanic  membrane and external ear normal.   Nose: Nose normal.   Mouth/Throat: Oropharynx is clear and moist.   Eyes: Pupils are equal, round, and reactive to light. Conjunctivae and EOM are normal.   Neck: Normal range of motion. Neck supple. No JVD present. No thyromegaly present.   Cardiovascular: Normal rate, regular rhythm, normal heart sounds and intact distal pulses.   Pulmonary/Chest: Effort normal and breath sounds normal.   Abdominal: Soft. Bowel sounds are normal.   Musculoskeletal: Normal range of motion.   Lymphadenopathy:     She has no cervical adenopathy.   Neurological: She is alert and oriented to person, place, and time. No cranial nerve deficit. Coordination normal.   Skin: Skin is warm and dry. No rash noted.   Psychiatric: Her speech is normal and behavior is normal. Judgment and thought content normal. She exhibits a depressed mood.   Vitals reviewed.      Assessment/Plan   Problem List Items Addressed This Visit        Cardiovascular and Mediastinum    Benign essential hypertension - Primary    Hyperlipidemia       Endocrine    Hypothyroidism       Other    Bipolar I disorder, single manic episode (CMS/HCC)    Relevant Medications    FLUoxetine (PROzac) 20 MG capsule      Increase Lamictal to 200 mg tablet 2-1/2 at at bedtime.  For total of 500 mg at bedtime.    Increase Prozac to 20 mg 2 capsules daily.  Recheck in 2 weeks call for update next week.

## 2020-06-19 ENCOUNTER — TELEPHONE (OUTPATIENT)
Dept: INTERNAL MEDICINE | Facility: CLINIC | Age: 75
End: 2020-06-19

## 2020-06-19 NOTE — TELEPHONE ENCOUNTER
PATIENT CALLED AND STATED THAT SHE HAS BEEN HAVING ISSUES WITH HER FLUoxetine (PROzac) 20 MG capsule. SHE STATES ON Wednesday 06/17/2020 THE MEDICATION WAS UPPED TO 40 MG. THE PATIENT STATES AFTER SHE TOOK 40 MG FOR THE FIRST HOUR AND A HALF SHE FELT DIZZY AND DROWSY ALMOST AS IF SHE HAD BEEN DRINKING. THE PATIENT WOULD LIKE TO KNOW IF THIS IS NORMAL OR IF SHE SHOULD STOP TAKING THE MEDICATION OR IF HER BODY WILL EVENTUALLY GET USE TO THE MEDICATION. PLEASE ADVISE.     PATIENT CALL BACK [phone]

## 2020-06-20 NOTE — TELEPHONE ENCOUNTER
I instructed her to cut the Prozac 10-20 and leave the Lamictal at 200 mg tablet 2-1/2 tablets at bedtime.  She will give us an update next week.  No response needed

## 2020-06-30 ENCOUNTER — HOSPITAL ENCOUNTER (OUTPATIENT)
Age: 75
Setting detail: SPECIMEN
Discharge: HOME | End: 2020-06-30
Payer: MEDICARE

## 2020-07-01 LAB
CULTURE: NORMAL
Lab: NORMAL
SPECIMEN DESCRIPTION: NORMAL

## 2020-07-08 RX ORDER — METOPROLOL SUCCINATE 100 MG/1
100 TABLET, EXTENDED RELEASE ORAL DAILY
Qty: 90 TABLET | Refills: 1 | Status: SHIPPED | OUTPATIENT
Start: 2020-07-08 | End: 2020-08-03

## 2020-07-13 RX ORDER — AMLODIPINE BESYLATE 10 MG/1
10 TABLET ORAL DAILY
Qty: 90 TABLET | Refills: 1 | Status: SHIPPED | OUTPATIENT
Start: 2020-07-13 | End: 2020-07-31 | Stop reason: ALTCHOICE

## 2020-07-31 ENCOUNTER — OFFICE VISIT (OUTPATIENT)
Dept: INTERNAL MEDICINE | Facility: CLINIC | Age: 75
End: 2020-07-31

## 2020-07-31 VITALS
HEIGHT: 64 IN | SYSTOLIC BLOOD PRESSURE: 190 MMHG | RESPIRATION RATE: 16 BRPM | DIASTOLIC BLOOD PRESSURE: 67 MMHG | BODY MASS INDEX: 30.9 KG/M2 | OXYGEN SATURATION: 95 % | HEART RATE: 47 BPM | TEMPERATURE: 98.5 F | WEIGHT: 181 LBS

## 2020-07-31 DIAGNOSIS — Z01.810 PREOP CARDIOVASCULAR EXAM: Primary | ICD-10-CM

## 2020-07-31 DIAGNOSIS — I10 BENIGN ESSENTIAL HYPERTENSION: ICD-10-CM

## 2020-07-31 DIAGNOSIS — F30.9 BIPOLAR I DISORDER, SINGLE MANIC EPISODE (HCC): ICD-10-CM

## 2020-07-31 DIAGNOSIS — R00.1 BRADYCARDIA: ICD-10-CM

## 2020-07-31 DIAGNOSIS — D05.12 DUCTAL CARCINOMA IN SITU (DCIS) OF LEFT BREAST: ICD-10-CM

## 2020-07-31 DIAGNOSIS — F32.1 CURRENT MODERATE EPISODE OF MAJOR DEPRESSIVE DISORDER, UNSPECIFIED WHETHER RECURRENT (HCC): ICD-10-CM

## 2020-07-31 DIAGNOSIS — R01.1 SYSTOLIC MURMUR: ICD-10-CM

## 2020-07-31 PROCEDURE — 99214 OFFICE O/P EST MOD 30 MIN: CPT | Performed by: FAMILY MEDICINE

## 2020-07-31 RX ORDER — FLUOXETINE HYDROCHLORIDE 20 MG/1
20 CAPSULE ORAL DAILY
Qty: 90 CAPSULE | Refills: 1 | Status: SHIPPED
Start: 2020-07-31 | End: 2021-01-27 | Stop reason: SDUPTHER

## 2020-07-31 RX ORDER — NIFEDIPINE 90 MG/1
90 TABLET, EXTENDED RELEASE ORAL DAILY
Qty: 90 TABLET | Refills: 1 | Status: SHIPPED | OUTPATIENT
Start: 2020-07-31 | End: 2021-01-27

## 2020-07-31 NOTE — PROGRESS NOTES
Subjective   Yi Lee is a 74 y.o. female.     Chief Complaint   Patient presents with   • Follow-up     Pt presents here today for a 2 month follow up.   • Diabetes   • Hypertension         History of Present Illness   Patient has met many issues.  She has diagnosis of ductal carcinoma in situ of the left breast.  She is going to have lumpectomy.  I reviewed records from breast surgery and her recent evaluations.  She is to stop aspirin which she is already stopped.  She will stop metformin 24 hours before surgery.  She will stop Mobic 7 days before surgery.    She has history of systolic murmur she did not get the echocardiogram last year.  She has bradycardia chronically.  She has hypertension not controlled.  We are stopping amlodipine and starting Procardia XL equivalent 90 mg daily plus continue metoprolol 100 mg XL daily plus hydrochlorothiazide 12.5 mg daily.  Labs will be preop labs as per surgery.  I discussed with her at length about getting pre-op cardiology evaluation as fast as we can do it.  She did not really want to do that but I encouraged her to do it and a referral is sent.    In reference to depression depression is stable now on same dose Prozac 20 mg daily she could not tolerate 40 mg.  However total of 500 mg Lamictal has worked wonders as far as her mood.  We will continue the Lamictal at the same dose but spread the dose out through the day.  She will watch for sedation.  I will see her back in about 3 months.      The following portions of the patient's history were reviewed and updated as appropriate: allergies, current medications, past social history and problem list.    Review of Systems    Objective   Vitals:    07/31/20 0811   BP: (!) 190/67   Pulse: (!) 47   Resp: 16   Temp: 98.5 °F (36.9 °C)   SpO2: 95%     Physical Exam   Constitutional: She is oriented to person, place, and time. She appears well-developed and well-nourished.   HENT:   Head: Normocephalic and atraumatic.    Right Ear: Tympanic membrane and external ear normal.   Left Ear: Tympanic membrane and external ear normal.   Nose: Nose normal.   Mouth/Throat: Oropharynx is clear and moist.   Eyes: Pupils are equal, round, and reactive to light. Conjunctivae and EOM are normal.   Neck: Normal range of motion. Neck supple. No JVD present. No thyromegaly present.   Cardiovascular: Regular rhythm and intact distal pulses. Bradycardia present.   Murmur heard.   Systolic murmur is present with a grade of 2/6.  Pulmonary/Chest: Effort normal and breath sounds normal.   Abdominal: Soft. Bowel sounds are normal.   Musculoskeletal: Normal range of motion.   Lymphadenopathy:     She has no cervical adenopathy.   Neurological: She is alert and oriented to person, place, and time. No cranial nerve deficit. Coordination normal.   Skin: Skin is warm and dry. No rash noted.   Psychiatric: She has a normal mood and affect. Her behavior is normal. Judgment and thought content normal.   Vitals reviewed.      Assessment/Plan   Problem List Items Addressed This Visit        Cardiovascular and Mediastinum    Bradycardia    Relevant Orders    Ambulatory Referral to Cardiology    Systolic murmur    Relevant Orders    Ambulatory Referral to Cardiology    Benign essential hypertension    Relevant Medications    NIFEdipine XL (Procardia XL) 90 MG 24 hr tablet    Other Relevant Orders    Ambulatory Referral to Cardiology       Other    Ductal carcinoma in situ (DCIS) of left breast    Bipolar I disorder, single manic episode (CMS/HCC)    Relevant Medications    FLUoxetine (PROzac) 20 MG capsule    Depression    Relevant Medications    FLUoxetine (PROzac) 20 MG capsule      Other Visit Diagnoses     Preop cardiovascular exam    -  Primary    Relevant Orders    Ambulatory Referral to Cardiology      Plan: Plan as above in narrative and H&P.  Return visit in 3 months.    Medication changes DC amlodipine start Procardia XL 90 mg daily for better blood  pressure control.  Preop cardiology evaluation pending.  Follow-up with breast cancer surgery.  Recheck in 3 months.  Continue Lamictal at 500 mg total daily dose with spreading the dose out through the day.

## 2020-08-03 ENCOUNTER — OFFICE VISIT (OUTPATIENT)
Dept: CARDIOLOGY | Facility: CLINIC | Age: 75
End: 2020-08-03

## 2020-08-03 ENCOUNTER — TELEPHONE (OUTPATIENT)
Dept: INTERNAL MEDICINE | Facility: CLINIC | Age: 75
End: 2020-08-03

## 2020-08-03 VITALS
WEIGHT: 179.4 LBS | OXYGEN SATURATION: 99 % | DIASTOLIC BLOOD PRESSURE: 80 MMHG | HEART RATE: 44 BPM | SYSTOLIC BLOOD PRESSURE: 124 MMHG | HEIGHT: 64 IN | BODY MASS INDEX: 30.63 KG/M2

## 2020-08-03 DIAGNOSIS — I10 BENIGN ESSENTIAL HYPERTENSION: ICD-10-CM

## 2020-08-03 DIAGNOSIS — Z01.810 PREOP CARDIOVASCULAR EXAM: Primary | ICD-10-CM

## 2020-08-03 DIAGNOSIS — E78.2 MIXED HYPERLIPIDEMIA: ICD-10-CM

## 2020-08-03 DIAGNOSIS — R01.1 HEART MURMUR: ICD-10-CM

## 2020-08-03 PROCEDURE — 93000 ELECTROCARDIOGRAM COMPLETE: CPT | Performed by: INTERNAL MEDICINE

## 2020-08-03 PROCEDURE — 99204 OFFICE O/P NEW MOD 45 MIN: CPT | Performed by: INTERNAL MEDICINE

## 2020-08-03 RX ORDER — METOPROLOL SUCCINATE 100 MG/1
50 TABLET, EXTENDED RELEASE ORAL DAILY
Start: 2020-08-03 | End: 2021-01-27

## 2020-08-03 NOTE — PROGRESS NOTES
[facility] New Patient Office Note     Encounter Date:20  Patient:Yi Lee  :1945  MRN:[id number]    Referring Provider: Ankit Albert Jr., MD    Consulted for: Preoperative clearance and systolic murmur    Chief Complaint:   Chief Complaint   Patient presents with   • Slow Heart Rate     pre op Lt brst lumpectomy w Dr. Srinivasan     History of Presenting Illness:      Ms. Lee is a 74 y.o. woman with past medical history notable for hypertension, mixed hyperlipidemia, bradycardia, gastric reflux, COPD, type 2 diabetes on oral therapy, and obesity who presents to our office for an initial evaluation for preoperative clearance as well as further evaluation of a systolic heart murmur.    In general patient is doing quite well from a functional standpoint.  She works as a  and does a lot of heavy chores including operating a industrial mop as well as taking the trash out to the dumpster and having to lift large trash bags into the dumpster.  She denies any limitations to her daily activities with shortness of breath or chest pain.  She denies any racing heart rates.  She denies any presyncopal or syncopal episodes.  She is noted to have bradycardia which she has had for a number of years and has been on metoprolol for management of her blood pressure.  For the most part her blood pressure is reasonably controlled.  She was recently seen by her primary care physician and was noted to have elevated blood pressures at that visit however she had not taken her blood pressure medications that morning.  Fortunately with a few minor changes made by her primary care physician her blood pressure is excellently controlled today.  She is noted to have a heart murmur on exam which was even noted about a year ago.  There were plans for an echocardiogram at that time but unfortunately patient did not follow-up with this recommendation.  Nonetheless she denies any symptoms of congestive  heart failure or high risk features of severe valvular disease such as syncope or presyncopal episodes.  She denies any TIA or strokelike symptoms.  She does have scattered ecchymoses over arms but has recently stopped her aspirin      Review of Systems:  Review of Systems   Constitution: Negative.   HENT: Negative.    Eyes: Negative.    Cardiovascular: Negative.    Respiratory: Negative.    Endocrine: Negative.    Hematologic/Lymphatic: Negative.    Skin: Positive for color change.   Musculoskeletal: Negative.    Gastrointestinal: Negative.    Genitourinary: Negative.    Neurological: Negative.    Psychiatric/Behavioral: Negative.    Allergic/Immunologic: Negative.        Current Outpatient Medications on File Prior to Visit   Medication Sig Dispense Refill   • acetaminophen (TYLENOL) 325 MG tablet Take 650 mg by mouth As Needed for Mild Pain .     • albuterol (PROVENTIL HFA) 108 (90 BASE) MCG/ACT inhaler Inhale 2 puffs.     • Cholecalciferol (VITAMIN D3) 2000 UNITS tablet Take 1,000 Units by mouth Daily.     • ferrous sulfate 325 (65 FE) MG EC tablet Take 650 mg by mouth.     • FLUoxetine (PROzac) 20 MG capsule Take 1 capsule by mouth Daily. 90 capsule 1   • GENERLAC 10 GM/15ML solution solution (encephalopathy) TAKE 30 TO 45 ML BY MOUTH EVERY DAY FOR CONSTIPATION 1350 mL 3   • glucose blood (FREESTYLE LITE) test strip Use to test everyday as directed 100 each 5   • hydroCHLOROthiazide (MICROZIDE) 12.5 MG capsule TAKE 1 CAPSULE BY MOUTH DAILY 90 capsule 1   • hydrOXYzine (ATARAX) 25 MG tablet Take 1 tablet by mouth 3 (Three) Times a Day As Needed for Itching. 90 tablet 5   • lamoTRIgine (LaMICtal) 200 MG tablet Take 2.5 tablets by mouth Every Night. 225 tablet 1   • Lancets (FREESTYLE) lancets As directed to check blood glucose 100 each 12   • levothyroxine (SYNTHROID, LEVOTHROID) 100 MCG tablet TAKE 1 TABLET BY MOUTH DAILY 90 tablet 3   • metFORMIN (GLUCOPHAGE) 500 MG tablet TAKE 2 TABLETS BY MOUTH DAILY WITH  BREAKFAST 180 tablet 1   • mupirocin (Bactroban) 2 % ointment Apply  topically to the appropriate area as directed 3 (Three) Times a Day. 30 g 0   • NIFEdipine XL (Procardia XL) 90 MG 24 hr tablet Take 1 tablet by mouth Daily. For blood pressure 90 tablet 1   • omeprazole (priLOSEC) 40 MG capsule TAKE 1 CAPSULE BY MOUTH DAILY 90 capsule 3   • QUEtiapine (SEROquel) 300 MG tablet Take 1 tablet by mouth Every Night. (Patient taking differently: Take 150 mg by mouth Every Night.) 90 tablet 3   • rosuvastatin (CRESTOR) 5 MG tablet TAKE 1 TABLET BY MOUTH EVERY NIGHT FOR CHOLESTEROL 90 tablet 1   • [DISCONTINUED] metoprolol succinate XL (TOPROL-XL) 100 MG 24 hr tablet TAKE 1 TABLET BY MOUTH DAILY 90 tablet 1     No current facility-administered medications on file prior to visit.        Allergies   Allergen Reactions   • Morphine Shortness Of Breath   • Penicillins Hives and Swelling   • Ace Inhibitors Cough   • Telmisartan Cough       Past Medical History:   Diagnosis Date   • Abdominal pain, LLQ    • Anemia    • Anemia    • Arthralgia of hip 11/22/2015   • Ataxic gait 11/22/2015    Description: Eval Dr Lillian Mederos, Neuro.  Some vestibular dysfunction present 2013/2014   • Bipolar I disorder, single manic episode (CMS/HCC)    • Bradycardia    • Cardiac murmur    • Colon polyp    • Coronary artery disease    • Degeneration, intervertebral disc, thoracolumbar    • Depression    • Diabetes mellitus (CMS/HCC)    • Disease of thyroid gland    • Diverticulosis    • Ductal carcinoma in situ (DCIS) of left breast    • Esophageal spasm    • Fatigue    • GERD (gastroesophageal reflux disease)    • H/O bone density study 10/17/2007    Dr. Giles   • Hemorrhoids    • History of mammogram     02/2012, per GYN Reshma Aranda   • History of Papanicolaou smear of cervix     DR. GILES [facility]   • Hyperlipidemia    • Hypertension    • Impaired cognition 11/22/2015    Description: Testing done 05/14   • Optic nerve contusion    • Pain of lower  extremity 2015   • Systolic murmur    • Vitamin B12 deficiency    • Vitamin D deficiency        Past Surgical History:   Procedure Laterality Date   • BREAST LUMPECTOMY Left     benign   • CHOLECYSTECTOMY     • COLONOSCOPY      done , repeat 5 yrs, Dr Grigsby; tics in sigmoid colon, IH   • COLONOSCOPY N/A 11/3/2016    polyp, IH   • COLONOSCOPY N/A 2020    Procedure: COLONOSCOPY TO CECUM WITH COLD POLYPECTOMY;  Surgeon: Eddie Grigsby MD;  Location: [facility] ENDOSCOPY;  Service: Gastroenterology;  Laterality: N/A;  pre: hx of polyps  post: polyp, hemorrhoids, diverticulosis   • D&C AND LAPAROSCOPY     • DILATATION AND CURETTAGE     • ENDOSCOPY N/A 2020    Procedure: ESOPHAGOGASTRODUODENOSCOPY with biopsies;  Surgeon: Eddie Grigsby MD;  Location: [facility] ENDOSCOPY;  Service: Gastroenterology;  Laterality: N/A;  pre: iron deficiency anemia  post: gastriits   • ESOPHAGOGASTRIC FUNDOPLASTY     • ROTATOR CUFF REPAIR Right    • TOTAL KNEE ARTHROPLASTY Left 2018    Procedure: LT TOTAL KNEE ARTHROPLASTY;  Surgeon: Selwyn Pinto MD;  Location: [facility] MAIN OR;  Service:        Social History     Socioeconomic History   • Marital status:      Spouse name: Not on file   • Number of children: 1   • Years of education: Not on file   • Highest education level: Not on file   Occupational History   • Occupation: commercial cleaning   Tobacco Use   • Smoking status: Former Smoker     Packs/day: 1.00     Years: 7.00     Pack years: 7.00     Types: Cigarettes     Last attempt to quit: 1972     Years since quittin.6   • Smokeless tobacco: Never Used   • Tobacco comment: caffeine use   Substance and Sexual Activity   • Alcohol use: Yes     Comment: OCC   • Drug use: No   • Sexual activity: Never       Family History   Problem Relation Age of Onset   • Colon polyps Father    • Heart disease Father    • Prostate cancer Father    • Hepatitis Other    • Coronary artery disease Other    •  "Hypertension Brother    • Lung cancer Maternal Aunt    • Lung cancer Paternal Uncle    • Stroke Paternal Grandmother    • Cerebral aneurysm Paternal Grandmother    • Stroke Paternal Grandfather    • Cerebral aneurysm Paternal Grandfather    • Lung cancer Maternal Aunt    • Malig Hyperthermia Neg Hx        The following portions of the patient's history were reviewed and updated as appropriate: allergies, current medications, past family history, past medical history, past social history, past surgical history and problem list.       Objective:       Vitals:    08/03/20 0821   BP: 124/80   BP Location: Left arm   Patient Position: Sitting   Cuff Size: Large Adult   Pulse: (!) 44   SpO2: 99%   Weight: 81.4 kg (179 lb 6.4 oz)   Height: 162.6 cm (64\")       Physical Exam:  Constitutional: Well appearing, well developed, no acute distress   HENT: Oropharynx clear and membrane moist  Eyes: Normal conjunctiva, no sclera icterus.  Neck: Supple, no carotid bruit bilaterally.  Cardiovascular: Regular and Bradycardic rate and rhythm, Early peaking systolic murmur over the right upper sternal border, No bilateral lower extremity edema.  Pulmonary: Normal respiratory effort, normal lung sounds, no wheezing.  Abdominal: Soft, nontender, no hepatosplenomegaly, liver is non-pulsatile.  Neurological: Alert and orient x 3.   Skin: Warm, dry, scattered ecchymosis bilateral forearms, no rash.  Psych: Appropriate mood and affect. Normal judgment and insight.      Lab Results   Component Value Date    GLUCOSE 119 (H) 02/14/2018    BUN 20 05/21/2020    CREATININE 1.39 (H) 05/21/2020    EGFRIFNONA 37 (L) 05/21/2020    EGFRIFAFRI 45 (L) 05/21/2020    BCR 14.4 05/21/2020    K 5.0 05/21/2020    CO2 22.2 05/21/2020    CALCIUM 9.8 05/21/2020    PROTENTOTREF CANCELED 12/26/2019    ALBUMIN CANCELED 12/26/2019    LABIL2 1.7 06/05/2019    AST CANCELED 12/26/2019    ALT CANCELED 12/26/2019       Lab Results   Component Value Date    WBC 6.86 " 05/21/2020    HGB 11.8 (L) 05/21/2020    HCT 35.0 05/21/2020    MCV 89.7 05/21/2020     05/21/2020       No results found for: CKTOTAL, CKMB, CKMBINDEX, TROPONINI, TROPONINT    Lab Results   Component Value Date    CHLPL 172 12/26/2019    CHLPL 155 02/05/2019    CHLPL 167 04/10/2018     Lab Results   Component Value Date    TRIG 112 12/26/2019    TRIG 89 02/05/2019    TRIG 92 04/10/2018     Lab Results   Component Value Date    HDL 65 (H) 12/26/2019    HDL 60 02/05/2019    HDL 60 04/10/2018     Lab Results   Component Value Date    LDL 85 12/26/2019    LDL 77 02/05/2019    LDL 89 04/10/2018       Lab Results   Component Value Date    TSH 2.800 12/26/2019         ECG 12 Lead  Date/Time: 8/3/2020 8:44 AM  Performed by: Fran Falcon MD  Authorized by: Fran Falcon MD   Previous ECG: no previous ECG available  Rhythm: sinus bradycardia    Clinical impression: normal ECG                  Assessment:          Diagnosis Plan   1. Preop cardiovascular exam     2. Benign essential hypertension     3. Mixed hyperlipidemia     4. Heart murmur  Adult Transthoracic Echo Complete W/ Cont if Necessary Per Protocol          Plan:       Ms. Lee is a 74 y.o. woman with past medical history notable for hypertension, mixed hyperlipidemia, bradycardia, gastric reflux, COPD, type 2 diabetes on oral therapy, and obesity who presents to our office for an initial evaluation for preoperative clearance as well as further evaluation of a systolic heart murmur.  From a preoperative clearance standpoint patient has excellent functional capacity and clearly can perform greater than 4 METS of activity without significant limitation.  She is planning on undergoing a low risk surgery and for this reason she may proceed with surgery as planned.  She does have a heart murmur on exam and although she has good functional capacity would not be unreasonable to also check an echocardiogram but would not delay surgery for her  echocardiogram.  With regards to her blood pressure this is under good control with the recent changes with her primary care physician.  I would recommend backing off on her metoprolol from 100 mg down to 50 mg given her bradycardia.  I would consider making some changes from metoprolol to carvedilol in the future as this might have better blood pressure control with less beta-blockade however would wait till after surgery to make any major changes with her regimen given that she is now well controlled.    Preoperative evaluation:  · Patient with excellent functional capacity undergoing a low risk surgical procedure and may proceed with surgery as no high risk symptoms or findings on exam or EKG.    Systolic murmur:  · May represent some aortic sclerosis or stenosis  · Echocardiogram ordered we will follow-up on results    Hypertension:  · Blood pressure well controlled  · Will back down on metoprolol dose from 100 mg down to 50 mg    Bradycardia:  · Sinus bradycardia no high risk symptoms such as presyncopal or syncopal episodes  · Given degree of bradycardia would be reasonable to back off on metoprolol dose from 100 mg down to 50 mg    Mixed hyperlipidemia:  · Continue high potency statin    Follow up:  6 Weeks    Thank you for allowing me to participate in the care of Yi ANA Lee. Feel free to contact me directly with any further questions or concerns.    Fran Falcon MD  [facility]  08/03/20  08:40

## 2020-08-28 RX ORDER — ROSUVASTATIN CALCIUM 5 MG/1
5 TABLET, COATED ORAL NIGHTLY
Qty: 90 TABLET | Refills: 1 | Status: SHIPPED | OUTPATIENT
Start: 2020-08-28 | End: 2021-01-26

## 2020-08-28 RX ORDER — HYDROCHLOROTHIAZIDE 12.5 MG/1
12.5 CAPSULE, GELATIN COATED ORAL DAILY
Qty: 90 CAPSULE | Refills: 1 | Status: SHIPPED | OUTPATIENT
Start: 2020-08-28 | End: 2021-01-26

## 2020-11-18 ENCOUNTER — TELEPHONE (OUTPATIENT)
Dept: INTERNAL MEDICINE | Facility: CLINIC | Age: 75
End: 2020-11-18

## 2020-11-23 ENCOUNTER — OFFICE VISIT (OUTPATIENT)
Dept: INTERNAL MEDICINE | Facility: CLINIC | Age: 75
End: 2020-11-23

## 2020-11-23 ENCOUNTER — APPOINTMENT (OUTPATIENT)
Dept: WOMENS IMAGING | Facility: HOSPITAL | Age: 75
End: 2020-11-23

## 2020-11-23 VITALS
WEIGHT: 175 LBS | DIASTOLIC BLOOD PRESSURE: 86 MMHG | RESPIRATION RATE: 17 BRPM | BODY MASS INDEX: 29.88 KG/M2 | OXYGEN SATURATION: 96 % | HEART RATE: 49 BPM | TEMPERATURE: 98.2 F | SYSTOLIC BLOOD PRESSURE: 152 MMHG | HEIGHT: 64 IN

## 2020-11-23 DIAGNOSIS — M54.9 MID BACK PAIN: ICD-10-CM

## 2020-11-23 DIAGNOSIS — R10.9 RIGHT FLANK PAIN: Primary | ICD-10-CM

## 2020-11-23 PROCEDURE — 72072 X-RAY EXAM THORAC SPINE 3VWS: CPT | Performed by: NURSE PRACTITIONER

## 2020-11-23 PROCEDURE — 72072 X-RAY EXAM THORAC SPINE 3VWS: CPT | Performed by: RADIOLOGY

## 2020-11-23 PROCEDURE — 72110 X-RAY EXAM L-2 SPINE 4/>VWS: CPT | Performed by: NURSE PRACTITIONER

## 2020-11-23 PROCEDURE — 72110 X-RAY EXAM L-2 SPINE 4/>VWS: CPT | Performed by: RADIOLOGY

## 2020-11-23 PROCEDURE — 99214 OFFICE O/P EST MOD 30 MIN: CPT | Performed by: NURSE PRACTITIONER

## 2020-11-23 RX ORDER — ANASTROZOLE 1 MG/1
1 TABLET ORAL DAILY
COMMUNITY
Start: 2020-10-08

## 2020-11-23 RX ORDER — AMLODIPINE BESYLATE 10 MG/1
TABLET ORAL
COMMUNITY
Start: 2020-10-26 | End: 2021-01-27

## 2020-11-23 NOTE — PROGRESS NOTES
"Subjective   Yi Lee is a 74 y.o. female.   CC: Right mid back/flank pain     Patient presents for evaluation of right mid back pain and right flank pain.  This is a 74-year-old female patient of Dr. Albert with breast cancer on Arimidex, hyperlipidemia, hypertension, type 2 diabetes, depression and bipolar one disorder.  She endorses right mid back/right flank pain over the past 1.5 weeks.  This is waxing and waning.  Tylenol helps \"sometimes\" and at other times \"does not touch the pain.\"  Pain tends to worsen with movement.  She is seeing a nephrologist, Dr. Valenzuela, and was taken off of meloxicam in July 2020 per patient.  She wonders whether his pain is related to \"arthritis.\"She states \"I do not drink much water during the day and I do not urinate all that much.\"  She denies any increase in frequency or hesitancy of urination and denies cloudy or discolored urine/hematuria.  Denies any falls or mechanism of injury.  Denies any known history of kidney stones or recent UTIs.  Denies development of any other new issues today.       The following portions of the patient's history were reviewed and updated as appropriate: allergies, current medications, past family history, past medical history, past social history, past surgical history and problem list.    Review of Systems   Constitutional: Negative for activity change, chills, fatigue, fever, unexpected weight gain and unexpected weight loss.   HENT: Negative for congestion, hearing loss, postnasal drip, sinus pressure, sneezing, sore throat, swollen glands and tinnitus.    Eyes: Negative for photophobia, pain and visual disturbance.   Respiratory: Negative for cough, chest tightness, shortness of breath and wheezing.    Cardiovascular: Negative for chest pain, palpitations and leg swelling.   Gastrointestinal: Negative for abdominal distention, abdominal pain, constipation, diarrhea, nausea and vomiting.   Endocrine: Negative for polydipsia, polyphagia and " "polyuria.   Genitourinary: Positive for flank pain (  R). Negative for dysuria, frequency, hematuria and urgency.   Musculoskeletal: Positive for back pain (  R mid).   Neurological: Negative for dizziness, weakness, numbness and headache.   All other systems reviewed and are negative.      Objective    Blood pressure 152/86, pulse (!) 49, temperature 98.2 °F (36.8 °C), resp. rate 17, height 162.6 cm (64\"), weight 79.4 kg (175 lb), SpO2 96 %, not currently breastfeeding.      Physical Exam  Vitals signs and nursing note reviewed.   Constitutional:       General: She is not in acute distress.     Appearance: Normal appearance. She is well-developed. She is obese. She is not ill-appearing, toxic-appearing or diaphoretic.   HENT:      Head: Normocephalic and atraumatic.      Right Ear: External ear normal.      Left Ear: External ear normal.   Eyes:      Pupils: Pupils are equal, round, and reactive to light.   Neck:      Musculoskeletal: Normal range of motion and neck supple. No neck rigidity or muscular tenderness.      Vascular: No carotid bruit.   Cardiovascular:      Rate and Rhythm: Regular rhythm.      Pulses: Normal pulses.      Heart sounds: Normal heart sounds.      Comments: No peripheral edema  Pulmonary:      Effort: Pulmonary effort is normal. No respiratory distress.      Breath sounds: Normal breath sounds. No stridor. No wheezing, rhonchi or rales.   Chest:      Chest wall: No tenderness.   Abdominal:      General: Bowel sounds are normal. There is no distension.      Palpations: Abdomen is soft. There is no mass.      Tenderness: There is no abdominal tenderness. There is no right CVA tenderness, left CVA tenderness, guarding or rebound.      Hernia: No hernia is present.   Musculoskeletal: Normal range of motion.      Thoracic back: She exhibits pain and spasm. She exhibits normal range of motion, no tenderness, no bony tenderness, no swelling, no edema, no deformity, no laceration and normal pulse. "   Lymphadenopathy:      Cervical: No cervical adenopathy.   Skin:     General: Skin is warm and dry.      Capillary Refill: Capillary refill takes less than 2 seconds.   Neurological:      General: No focal deficit present.      Mental Status: She is alert and oriented to person, place, and time. Mental status is at baseline.   Psychiatric:         Mood and Affect: Mood normal.         Behavior: Behavior normal.         Thought Content: Thought content normal.         Judgment: Judgment normal.       Current outpatient and discharge medications have been reconciled for the patient.  Reviewed by: EZRA Rebolledo      Assessment/Plan   Diagnoses and all orders for this visit:    1. Right flank pain (Primary)  -     Urinalysis With Culture If Indicated - Urine, Clean Catch  -     XR Spine Thoracic 3 View (In Office)  -     XR Spine Lumbar 4+ View (In Office)    2. Mid back pain  -     Urinalysis With Culture If Indicated - Urine, Clean Catch  -     XR Spine Thoracic 3 View (In Office)  -     XR Spine Lumbar 4+ View (In Office)      -Likely musculoskeletal based upon physical assessment and HPI.  I have asked her to use heat consistently as well as lidocaine patches.  X-ray thoracic and lumbar spine.    -Will rule out renal etiology with urinalysis/culture if indicated.  If microscopic hematuria present will order right renal ultrasound.    -We will contact patient with imaging and urine results and any further recommendations.  Follow-up as needed and routinely with PCP, Dr. Albert.

## 2020-11-25 DIAGNOSIS — S22.060A COMPRESSION FRACTURE OF T7 VERTEBRA, INITIAL ENCOUNTER (HCC): Primary | ICD-10-CM

## 2020-11-25 NOTE — PROGRESS NOTES
Please notify patient that her thoracic and lumbar x-rays are showing degenerative changes.  Also showing a mild compression fracture of T7 and T8 vertebral level.  I am entering a referral for Dr. Garay, orthopedics.  Please continue with heat/ice alternation.  May use extra strength Tylenol.  I will get her the urine results as soon as I have them.

## 2020-12-02 ENCOUNTER — TELEPHONE (OUTPATIENT)
Dept: INTERNAL MEDICINE | Facility: CLINIC | Age: 75
End: 2020-12-02

## 2020-12-02 DIAGNOSIS — S22.060A COMPRESSION FRACTURE OF T7 VERTEBRA, INITIAL ENCOUNTER (HCC): Primary | ICD-10-CM

## 2020-12-02 NOTE — TELEPHONE ENCOUNTER
Patient states she had xrays performed and a referral was sent to Dr Saba at [facility]. Patient states she flores her own orthopedist at [facility]. She would like the xray discs made up so she can pick it up and take it to her orthopedists.     Please call patient when ready at [phone].

## 2020-12-02 NOTE — TELEPHONE ENCOUNTER
Patient called would like to see Dr Selwyn Pinto at [facility] on [address] Can you please place an orthorpedic referral in to Dr Selwyn Pinto

## 2020-12-04 ENCOUNTER — TELEPHONE (OUTPATIENT)
Dept: INTERNAL MEDICINE | Facility: CLINIC | Age: 75
End: 2020-12-04

## 2020-12-04 NOTE — TELEPHONE ENCOUNTER
Caller: Yi Lee    Relationship to patient: Self    Best call back number: [phone]       Patient is needing: Patient went to orthopedic and had blood pressure checked,blood pressure was 160/80. Patient wants to know if she should come in or get blood pressure meds changed and would like a call back.

## 2020-12-08 NOTE — TELEPHONE ENCOUNTER
I talked to her about her blood pressure rise and she will go back to metoprolol xl 100 mg taking the whole tablet instead of half tablet and watch her pulse.  If there is any question we will get a holter monitor.

## 2021-01-01 ENCOUNTER — TELEPHONE (OUTPATIENT)
Dept: GASTROENTEROLOGY | Facility: CLINIC | Age: 76
End: 2021-01-01

## 2021-01-01 ENCOUNTER — ANESTHESIA (OUTPATIENT)
Dept: GASTROENTEROLOGY | Facility: HOSPITAL | Age: 76
End: 2021-01-01

## 2021-01-01 ENCOUNTER — HOSPITAL ENCOUNTER (OUTPATIENT)
Facility: HOSPITAL | Age: 76
Setting detail: HOSPITAL OUTPATIENT SURGERY
Discharge: HOME | End: 2021-12-07
Attending: INTERNAL MEDICINE | Admitting: INTERNAL MEDICINE

## 2021-01-01 VITALS
OXYGEN SATURATION: 100 % | RESPIRATION RATE: 16 BRPM | WEIGHT: 157 LBS | SYSTOLIC BLOOD PRESSURE: 131 MMHG | HEART RATE: 56 BPM | BODY MASS INDEX: 26.8 KG/M2 | HEIGHT: 64 IN | DIASTOLIC BLOOD PRESSURE: 70 MMHG

## 2021-01-01 DIAGNOSIS — D50.9 IRON DEFICIENCY ANEMIA, UNSPECIFIED IRON DEFICIENCY ANEMIA TYPE: ICD-10-CM

## 2021-01-01 DIAGNOSIS — K21.9 GASTROESOPHAGEAL REFLUX DISEASE, UNSPECIFIED WHETHER ESOPHAGITIS PRESENT: ICD-10-CM

## 2021-01-01 DIAGNOSIS — R63.4 WEIGHT LOSS, ABNORMAL: ICD-10-CM

## 2021-01-01 DIAGNOSIS — K59.00 CONSTIPATION, UNSPECIFIED CONSTIPATION TYPE: ICD-10-CM

## 2021-01-01 DIAGNOSIS — R63.0 LOSS OF APPETITE: ICD-10-CM

## 2021-01-01 LAB
ALBUMIN SERPL-MCNC: 3.6 G/DL (ref 3.5–5.2)
ALBUMIN/GLOB SERPL: 1.4 G/DL
ALP SERPL-CCNC: 62 U/L (ref 39–117)
ALT SERPL W P-5'-P-CCNC: 23 U/L (ref 1–33)
ANION GAP SERPL CALCULATED.3IONS-SCNC: 11.2 MMOL/L (ref 5–15)
AST SERPL-CCNC: 25 U/L (ref 1–32)
BASOPHILS # BLD AUTO: 0.04 10*3/MM3 (ref 0–0.2)
BASOPHILS NFR BLD AUTO: 0.8 % (ref 0–1.5)
BILIRUB SERPL-MCNC: 0.2 MG/DL (ref 0–1.2)
BUN SERPL-MCNC: 19 MG/DL (ref 8–23)
BUN/CREAT SERPL: 16.7 (ref 7–25)
CALCIUM SPEC-SCNC: 9.5 MG/DL (ref 8.6–10.5)
CHLORIDE SERPL-SCNC: 104 MMOL/L (ref 98–107)
CO2 SERPL-SCNC: 20.8 MMOL/L (ref 22–29)
CREAT SERPL-MCNC: 1.14 MG/DL (ref 0.57–1)
DEPRECATED RDW RBC AUTO: 40.3 FL (ref 37–54)
EOSINOPHIL # BLD AUTO: 0.02 10*3/MM3 (ref 0–0.4)
EOSINOPHIL NFR BLD AUTO: 0.4 % (ref 0.3–6.2)
ERYTHROCYTE [DISTWIDTH] IN BLOOD BY AUTOMATED COUNT: 11.8 % (ref 12.3–15.4)
GFR SERPL CREATININE-BSD FRML MDRD: 47 ML/MIN/1.73
GLOBULIN UR ELPH-MCNC: 2.6 GM/DL
GLUCOSE BLDC GLUCOMTR-MCNC: 110 MG/DL (ref 70–130)
GLUCOSE SERPL-MCNC: 121 MG/DL (ref 65–99)
HCT VFR BLD AUTO: 33.6 % (ref 34–46.6)
HGB BLD-MCNC: 10.8 G/DL (ref 12–15.9)
IMM GRANULOCYTES # BLD AUTO: 0.02 10*3/MM3 (ref 0–0.05)
IMM GRANULOCYTES NFR BLD AUTO: 0.4 % (ref 0–0.5)
LAB AP CASE REPORT: NORMAL
LAB AP DIAGNOSIS COMMENT: NORMAL
LYMPHOCYTES # BLD AUTO: 0.42 10*3/MM3 (ref 0.7–3.1)
LYMPHOCYTES NFR BLD AUTO: 8 % (ref 19.6–45.3)
MCH RBC QN AUTO: 30.6 PG (ref 26.6–33)
MCHC RBC AUTO-ENTMCNC: 32.1 G/DL (ref 31.5–35.7)
MCV RBC AUTO: 95.2 FL (ref 79–97)
MONOCYTES # BLD AUTO: 0.62 10*3/MM3 (ref 0.1–0.9)
MONOCYTES NFR BLD AUTO: 11.9 % (ref 5–12)
NEUTROPHILS NFR BLD AUTO: 4.1 10*3/MM3 (ref 1.7–7)
NEUTROPHILS NFR BLD AUTO: 78.5 % (ref 42.7–76)
NRBC BLD AUTO-RTO: 0 /100 WBC (ref 0–0.2)
PATH REPORT.FINAL DX SPEC: NORMAL
PATH REPORT.GROSS SPEC: NORMAL
PLATELET # BLD AUTO: 253 10*3/MM3 (ref 140–450)
PMV BLD AUTO: 9.1 FL (ref 6–12)
POTASSIUM SERPL-SCNC: 3.5 MMOL/L (ref 3.5–5.2)
PROT SERPL-MCNC: 6.2 G/DL (ref 6–8.5)
RBC # BLD AUTO: 3.53 10*6/MM3 (ref 3.77–5.28)
SODIUM SERPL-SCNC: 136 MMOL/L (ref 136–145)
TSH SERPL DL<=0.05 MIU/L-ACNC: 4.42 UIU/ML (ref 0.27–4.2)
WBC NRBC COR # BLD: 5.22 10*3/MM3 (ref 3.4–10.8)

## 2021-01-01 PROCEDURE — 85025 COMPLETE CBC W/AUTO DIFF WBC: CPT | Performed by: INTERNAL MEDICINE

## 2021-01-01 PROCEDURE — 80053 COMPREHEN METABOLIC PANEL: CPT | Performed by: INTERNAL MEDICINE

## 2021-01-01 PROCEDURE — 84443 ASSAY THYROID STIM HORMONE: CPT | Performed by: INTERNAL MEDICINE

## 2021-01-01 PROCEDURE — 88305 TISSUE EXAM BY PATHOLOGIST: CPT | Performed by: INTERNAL MEDICINE

## 2021-01-01 PROCEDURE — 43239 EGD BIOPSY SINGLE/MULTIPLE: CPT | Performed by: INTERNAL MEDICINE

## 2021-01-01 PROCEDURE — 82962 GLUCOSE BLOOD TEST: CPT

## 2021-01-01 PROCEDURE — 45380 COLONOSCOPY AND BIOPSY: CPT | Performed by: INTERNAL MEDICINE

## 2021-01-01 PROCEDURE — 25010000002 PROPOFOL 10 MG/ML EMULSION: Performed by: ANESTHESIOLOGY

## 2021-01-01 RX ORDER — LIDOCAINE HYDROCHLORIDE 20 MG/ML
INJECTION, SOLUTION INFILTRATION; PERINEURAL AS NEEDED
Status: DISCONTINUED | OUTPATIENT
Start: 2021-01-01 | End: 2021-01-01 | Stop reason: SURG

## 2021-01-01 RX ORDER — PROPOFOL 10 MG/ML
VIAL (ML) INTRAVENOUS AS NEEDED
Status: DISCONTINUED | OUTPATIENT
Start: 2021-01-01 | End: 2021-01-01 | Stop reason: SURG

## 2021-01-01 RX ORDER — LACTULOSE 10 G/15ML
SOLUTION ORAL
Qty: 1892 ML | Refills: 11 | Status: SHIPPED | OUTPATIENT
Start: 2021-01-01 | End: 2022-01-01 | Stop reason: HOSPADM

## 2021-01-01 RX ORDER — PROPOFOL 10 MG/ML
VIAL (ML) INTRAVENOUS CONTINUOUS PRN
Status: DISCONTINUED | OUTPATIENT
Start: 2021-01-01 | End: 2021-01-01 | Stop reason: SURG

## 2021-01-01 RX ORDER — SODIUM CHLORIDE, SODIUM LACTATE, POTASSIUM CHLORIDE, CALCIUM CHLORIDE 600; 310; 30; 20 MG/100ML; MG/100ML; MG/100ML; MG/100ML
30 INJECTION, SOLUTION INTRAVENOUS CONTINUOUS PRN
Status: DISCONTINUED | OUTPATIENT
Start: 2021-01-01 | End: 2021-01-01 | Stop reason: HOSPADM

## 2021-01-01 RX ADMIN — Medication 200 MCG/KG/MIN: at 07:10

## 2021-01-01 RX ADMIN — LIDOCAINE HYDROCHLORIDE 100 MG: 20 INJECTION, SOLUTION INFILTRATION; PERINEURAL at 07:10

## 2021-01-01 RX ADMIN — SODIUM CHLORIDE, POTASSIUM CHLORIDE, SODIUM LACTATE AND CALCIUM CHLORIDE 30 ML/HR: 600; 310; 30; 20 INJECTION, SOLUTION INTRAVENOUS at 06:44

## 2021-01-01 RX ADMIN — PROPOFOL 150 MG: 10 INJECTION, EMULSION INTRAVENOUS at 07:09

## 2021-01-15 ENCOUNTER — HOSPITAL ENCOUNTER (OUTPATIENT)
Dept: CARDIOLOGY | Facility: HOSPITAL | Age: 76
Discharge: HOME | End: 2021-01-15
Admitting: INTERNAL MEDICINE

## 2021-01-15 DIAGNOSIS — R01.1 HEART MURMUR: ICD-10-CM

## 2021-01-15 PROCEDURE — 93306 TTE W/DOPPLER COMPLETE: CPT | Performed by: INTERNAL MEDICINE

## 2021-01-15 PROCEDURE — 93306 TTE W/DOPPLER COMPLETE: CPT

## 2021-01-18 ENCOUNTER — TELEPHONE (OUTPATIENT)
Dept: CARDIOLOGY | Facility: CLINIC | Age: 76
End: 2021-01-18

## 2021-01-18 LAB
ASCENDING AORTA: 2.9 CM
BH CV ECHO MEAS - ACS: 1.5 CM
BH CV ECHO MEAS - AO ARCH DIAM (PROXIMAL TRANS.): 1.9 CM
BH CV ECHO MEAS - AO MAX PG (FULL): 9.9 MMHG
BH CV ECHO MEAS - AO MAX PG: 15 MMHG
BH CV ECHO MEAS - AO MEAN PG (FULL): 6.1 MMHG
BH CV ECHO MEAS - AO MEAN PG: 9 MMHG
BH CV ECHO MEAS - AO ROOT AREA (BSA CORRECTED): 1.6
BH CV ECHO MEAS - AO ROOT AREA: 6.7 CM^2
BH CV ECHO MEAS - AO ROOT DIAM: 2.9 CM
BH CV ECHO MEAS - AO V2 MAX: 194.1 CM/SEC
BH CV ECHO MEAS - AO V2 MEAN: 140 CM/SEC
BH CV ECHO MEAS - AO V2 VTI: 51.3 CM
BH CV ECHO MEAS - ASC AORTA: 2.6 CM
BH CV ECHO MEAS - AVA(I,A): 1.5 CM^2
BH CV ECHO MEAS - AVA(I,D): 1.5 CM^2
BH CV ECHO MEAS - AVA(V,A): 1.8 CM^2
BH CV ECHO MEAS - AVA(V,D): 1.8 CM^2
BH CV ECHO MEAS - BSA(HAYCOCK): 1.9 M^2
BH CV ECHO MEAS - BSA: 1.8 M^2
BH CV ECHO MEAS - BZI_BMI: 30 KILOGRAMS/M^2
BH CV ECHO MEAS - BZI_METRIC_HEIGHT: 162.6 CM
BH CV ECHO MEAS - BZI_METRIC_WEIGHT: 79.4 KG
BH CV ECHO MEAS - EDV(MOD-SP2): 105 ML
BH CV ECHO MEAS - EDV(MOD-SP4): 97 ML
BH CV ECHO MEAS - EDV(TEICH): 84.1 ML
BH CV ECHO MEAS - EF(CUBED): 83 %
BH CV ECHO MEAS - EF(MOD-BP): 59 %
BH CV ECHO MEAS - EF(MOD-SP2): 61 %
BH CV ECHO MEAS - EF(MOD-SP4): 57.7 %
BH CV ECHO MEAS - EF(TEICH): 76.1 %
BH CV ECHO MEAS - ESV(MOD-SP2): 41 ML
BH CV ECHO MEAS - ESV(MOD-SP4): 41 ML
BH CV ECHO MEAS - ESV(TEICH): 20.1 ML
BH CV ECHO MEAS - FS: 44.6 %
BH CV ECHO MEAS - IVS/LVPW: 1
BH CV ECHO MEAS - IVSD: 1.2 CM
BH CV ECHO MEAS - LAT PEAK E' VEL: 13.2 CM/SEC
BH CV ECHO MEAS - LV DIASTOLIC VOL/BSA (35-75): 52.5 ML/M^2
BH CV ECHO MEAS - LV MASS(C)D: 191.2 GRAMS
BH CV ECHO MEAS - LV MASS(C)DI: 103.5 GRAMS/M^2
BH CV ECHO MEAS - LV MAX PG: 5.2 MMHG
BH CV ECHO MEAS - LV MEAN PG: 2.5 MMHG
BH CV ECHO MEAS - LV SYSTOLIC VOL/BSA (12-30): 22.2 ML/M^2
BH CV ECHO MEAS - LV V1 MAX: 113.5 CM/SEC
BH CV ECHO MEAS - LV V1 MEAN: 74 CM/SEC
BH CV ECHO MEAS - LV V1 VTI: 25.5 CM
BH CV ECHO MEAS - LVIDD: 4.3 CM
BH CV ECHO MEAS - LVIDS: 2.4 CM
BH CV ECHO MEAS - LVLD AP2: 8.3 CM
BH CV ECHO MEAS - LVLD AP4: 7.9 CM
BH CV ECHO MEAS - LVLS AP2: 7.2 CM
BH CV ECHO MEAS - LVLS AP4: 6.6 CM
BH CV ECHO MEAS - LVOT AREA (M): 3.1 CM^2
BH CV ECHO MEAS - LVOT AREA: 3 CM^2
BH CV ECHO MEAS - LVOT DIAM: 2 CM
BH CV ECHO MEAS - LVPWD: 1.2 CM
BH CV ECHO MEAS - MED PEAK E' VEL: 9.2 CM/SEC
BH CV ECHO MEAS - MR MAX PG: 162.2 MMHG
BH CV ECHO MEAS - MR MAX VEL: 636.7 CM/SEC
BH CV ECHO MEAS - MR MEAN PG: 114.9 MMHG
BH CV ECHO MEAS - MR MEAN VEL: 521 CM/SEC
BH CV ECHO MEAS - MR VTI: 251.3 CM
BH CV ECHO MEAS - MV A DUR: 0.12 SEC
BH CV ECHO MEAS - MV A MAX VEL: 59.1 CM/SEC
BH CV ECHO MEAS - MV DEC SLOPE: 1081 CM/SEC^2
BH CV ECHO MEAS - MV DEC TIME: 0.19 SEC
BH CV ECHO MEAS - MV E MAX VEL: 127.7 CM/SEC
BH CV ECHO MEAS - MV E/A: 2.2
BH CV ECHO MEAS - MV MAX PG: 9.5 MMHG
BH CV ECHO MEAS - MV MEAN PG: 1.8 MMHG
BH CV ECHO MEAS - MV P1/2T MAX VEL: 154.2 CM/SEC
BH CV ECHO MEAS - MV P1/2T: 41.8 MSEC
BH CV ECHO MEAS - MV V2 MAX: 154.2 CM/SEC
BH CV ECHO MEAS - MV V2 MEAN: 58.9 CM/SEC
BH CV ECHO MEAS - MV V2 VTI: 37.3 CM
BH CV ECHO MEAS - MVA P1/2T LCG: 1.4 CM^2
BH CV ECHO MEAS - MVA(P1/2T): 5.3 CM^2
BH CV ECHO MEAS - MVA(VTI): 2.1 CM^2
BH CV ECHO MEAS - PA ACC TIME: 0.15 SEC
BH CV ECHO MEAS - PA MAX PG (FULL): 4 MMHG
BH CV ECHO MEAS - PA MAX PG: 8 MMHG
BH CV ECHO MEAS - PA PR(ACCEL): 12.5 MMHG
BH CV ECHO MEAS - PA V2 MAX: 141.2 CM/SEC
BH CV ECHO MEAS - PULM A REVS DUR: 0.08 SEC
BH CV ECHO MEAS - PULM A REVS VEL: 32.1 CM/SEC
BH CV ECHO MEAS - PULM DIAS VEL: 96.8 CM/SEC
BH CV ECHO MEAS - PULM S/D: 0.65
BH CV ECHO MEAS - PULM SYS VEL: 63.4 CM/SEC
BH CV ECHO MEAS - PVA(V,A): 2.2 CM^2
BH CV ECHO MEAS - PVA(V,D): 2.2 CM^2
BH CV ECHO MEAS - QP/QS: 1.1
BH CV ECHO MEAS - RAP SYSTOLE: 3 MMHG
BH CV ECHO MEAS - RV MAX PG: 4 MMHG
BH CV ECHO MEAS - RV MEAN PG: 2.6 MMHG
BH CV ECHO MEAS - RV V1 MAX: 99.8 CM/SEC
BH CV ECHO MEAS - RV V1 MEAN: 77.8 CM/SEC
BH CV ECHO MEAS - RV V1 VTI: 27.4 CM
BH CV ECHO MEAS - RVOT AREA: 3.1 CM^2
BH CV ECHO MEAS - RVOT DIAM: 2 CM
BH CV ECHO MEAS - RVSP: 35 MMHG
BH CV ECHO MEAS - SI(AO): 184.4 ML/M^2
BH CV ECHO MEAS - SI(CUBED): 36.2 ML/M^2
BH CV ECHO MEAS - SI(LVOT): 41.9 ML/M^2
BH CV ECHO MEAS - SI(MOD-SP2): 34.6 ML/M^2
BH CV ECHO MEAS - SI(MOD-SP4): 30.3 ML/M^2
BH CV ECHO MEAS - SI(TEICH): 34.6 ML/M^2
BH CV ECHO MEAS - SV(AO): 340.9 ML
BH CV ECHO MEAS - SV(CUBED): 67 ML
BH CV ECHO MEAS - SV(LVOT): 77.5 ML
BH CV ECHO MEAS - SV(MOD-SP2): 64 ML
BH CV ECHO MEAS - SV(MOD-SP4): 56 ML
BH CV ECHO MEAS - SV(RVOT): 85.1 ML
BH CV ECHO MEAS - SV(TEICH): 64 ML
BH CV ECHO MEAS - TAPSE (>1.6): 2.6 CM
BH CV ECHO MEAS - TR MAX VEL: 281.2 CM/SEC
BH CV ECHO MEASUREMENTS AVERAGE E/E' RATIO: 11.4
BH CV XLRA - RV BASE: 4.2 CM
BH CV XLRA - RV LENGTH: 7.8 CM
BH CV XLRA - RV MID: 2.8 CM
BH CV XLRA - TDI S': 14.9 CM/SEC
LEFT ATRIUM VOLUME INDEX: 48 ML/M2
MAXIMAL PREDICTED HEART RATE: 146 BPM
SINUS: 3.2 CM
STJ: 2.4 CM
STRESS TARGET HR: 124 BPM

## 2021-01-18 NOTE — TELEPHONE ENCOUNTER
Patient called for her echo results. Results are in [facility].    She can be reached at [phone]

## 2021-01-18 NOTE — TELEPHONE ENCOUNTER
Call patient with echocardiogram results.  She does have what appears to be a bicuspid aortic valve with mild stenosis as well as a left ventricular outflow tract thickening but no significant obstructive disease.  Would continue with beta-blocker therapy.  In general patient is doing quite well and no changes are needed at this time.  Would see her back in a year.

## 2021-01-26 RX ORDER — HYDROCHLOROTHIAZIDE 12.5 MG/1
12.5 CAPSULE, GELATIN COATED ORAL DAILY
Qty: 90 CAPSULE | Refills: 1 | Status: SHIPPED | OUTPATIENT
Start: 2021-01-26 | End: 2021-01-27

## 2021-01-26 RX ORDER — ROSUVASTATIN CALCIUM 5 MG/1
5 TABLET, COATED ORAL NIGHTLY
Qty: 90 TABLET | Refills: 1 | Status: SHIPPED | OUTPATIENT
Start: 2021-01-26 | End: 2021-01-27 | Stop reason: SDUPTHER

## 2021-01-27 ENCOUNTER — OFFICE VISIT (OUTPATIENT)
Dept: INTERNAL MEDICINE | Facility: CLINIC | Age: 76
End: 2021-01-27

## 2021-01-27 VITALS
SYSTOLIC BLOOD PRESSURE: 179 MMHG | HEART RATE: 50 BPM | WEIGHT: 172 LBS | OXYGEN SATURATION: 95 % | TEMPERATURE: 98.1 F | RESPIRATION RATE: 19 BRPM | HEIGHT: 64 IN | BODY MASS INDEX: 29.37 KG/M2 | DIASTOLIC BLOOD PRESSURE: 72 MMHG

## 2021-01-27 DIAGNOSIS — E03.9 ACQUIRED HYPOTHYROIDISM: ICD-10-CM

## 2021-01-27 DIAGNOSIS — E78.2 MIXED HYPERLIPIDEMIA: ICD-10-CM

## 2021-01-27 DIAGNOSIS — F30.9 BIPOLAR I DISORDER, SINGLE MANIC EPISODE (HCC): ICD-10-CM

## 2021-01-27 DIAGNOSIS — E03.8 OTHER SPECIFIED HYPOTHYROIDISM: ICD-10-CM

## 2021-01-27 DIAGNOSIS — I10 BENIGN ESSENTIAL HYPERTENSION: ICD-10-CM

## 2021-01-27 DIAGNOSIS — E11.40 TYPE 2 DIABETES MELLITUS WITH DIABETIC NEUROPATHY, WITHOUT LONG-TERM CURRENT USE OF INSULIN (HCC): ICD-10-CM

## 2021-01-27 DIAGNOSIS — N18.31 STAGE 3A CHRONIC KIDNEY DISEASE (HCC): ICD-10-CM

## 2021-01-27 DIAGNOSIS — R01.1 SYSTOLIC MURMUR: Primary | ICD-10-CM

## 2021-01-27 DIAGNOSIS — R79.89 ELEVATED SERUM CREATININE: ICD-10-CM

## 2021-01-27 PROCEDURE — 99214 OFFICE O/P EST MOD 30 MIN: CPT | Performed by: FAMILY MEDICINE

## 2021-01-27 RX ORDER — LAMOTRIGINE 200 MG/1
500 TABLET ORAL NIGHTLY
Qty: 225 TABLET | Refills: 1 | Status: SHIPPED | OUTPATIENT
Start: 2021-01-27 | End: 2021-06-01

## 2021-01-27 RX ORDER — ROSUVASTATIN CALCIUM 5 MG/1
5 TABLET, COATED ORAL NIGHTLY
Qty: 90 TABLET | Refills: 1 | Status: SHIPPED | OUTPATIENT
Start: 2021-01-27 | End: 2021-08-05

## 2021-01-27 RX ORDER — QUETIAPINE FUMARATE 300 MG/1
300 TABLET, FILM COATED ORAL NIGHTLY
Qty: 90 TABLET | Refills: 3 | Status: SHIPPED | OUTPATIENT
Start: 2021-01-27 | End: 2021-11-08

## 2021-01-27 RX ORDER — FELODIPINE 10 MG/1
10 TABLET, EXTENDED RELEASE ORAL DAILY
Qty: 90 TABLET | Refills: 3 | Status: SHIPPED | OUTPATIENT
Start: 2021-01-27 | End: 2021-02-07

## 2021-01-27 RX ORDER — METOPROLOL SUCCINATE 100 MG/1
100 TABLET, EXTENDED RELEASE ORAL DAILY
Status: SHIPPED
Start: 2021-01-27 | End: 2021-01-27

## 2021-01-27 RX ORDER — FLUOXETINE HYDROCHLORIDE 20 MG/1
20 CAPSULE ORAL DAILY
Qty: 90 CAPSULE | Refills: 1
Start: 2021-01-27 | End: 2021-04-05

## 2021-01-27 RX ORDER — METOPROLOL SUCCINATE AND HYDROCHLOROTHIAZIDE 12.5; 1 MG/1; MG/1
1 TABLET ORAL DAILY
Qty: 90 TABLET | Refills: 3 | Status: SHIPPED | OUTPATIENT
Start: 2021-01-27 | End: 2021-02-04

## 2021-01-27 RX ORDER — LEVOTHYROXINE SODIUM 0.1 MG/1
100 TABLET ORAL DAILY
Qty: 90 TABLET | Refills: 3 | Status: SHIPPED | OUTPATIENT
Start: 2021-01-27 | End: 2021-11-08

## 2021-01-27 NOTE — PROGRESS NOTES
"Chief Complaint  Follow-up, Hypertension, and Hyperlipidemia    Subjective          Yi Lee presents to [facility] for   Patient is a delightful lady with a history of bipolar disorder chronic kidney disease stage III diabetes type 2 on low-dose Metformin as well as difficult control blood pressure.  She has systolic murmur with echo showing preserved EF but bicuspid aortic valve.  Echocardiogram is reviewed with her as well as prior nephrology results.    Blood pressure is not adequately controlled and she states that the Procardia XL generic tablet is passing straight through her.  We will try switching to felodipine extended release 10 mg daily plus switching to metoprolol 100 mg XL/HCTZ 12.5 and a single tablet.  Other medications are refilled and continue to medication list is revised.  I have her come back in 3 months and will get labs today to recheck everything we discussed COVID-19 vaccination waiting list.      Objective   Vital Signs:   /72 (BP Location: Right arm, Patient Position: Sitting, Cuff Size: Adult)   Pulse 50   Temp 98.1 °F (36.7 °C)   Resp 19   Ht 162.6 cm (64\")   Wt 78 kg (172 lb)   SpO2 95%   BMI 29.52 kg/m²     Physical Exam  Vitals signs reviewed.   Constitutional:       Appearance: She is well-developed.   HENT:      Head: Normocephalic and atraumatic.      Right Ear: Tympanic membrane and external ear normal.      Left Ear: Tympanic membrane and external ear normal.   Eyes:      Conjunctiva/sclera: Conjunctivae normal.      Pupils: Pupils are equal, round, and reactive to light.   Neck:      Musculoskeletal: Normal range of motion and neck supple.      Thyroid: No thyromegaly.      Vascular: No JVD.   Cardiovascular:      Rate and Rhythm: Regular rhythm. Bradycardia present.      Heart sounds: Murmur present. Systolic murmur present with a grade of 3/6.   Pulmonary:      Effort: Pulmonary effort is normal.      Breath sounds: Normal " breath sounds.   Abdominal:      General: Bowel sounds are normal.      Palpations: Abdomen is soft.   Musculoskeletal: Normal range of motion.   Lymphadenopathy:      Cervical: No cervical adenopathy.   Skin:     General: Skin is warm and dry.      Findings: No rash.   Neurological:      Mental Status: She is alert and oriented to person, place, and time.      Cranial Nerves: No cranial nerve deficit.      Coordination: Coordination normal.   Psychiatric:         Behavior: Behavior normal.         Thought Content: Thought content normal.         Judgment: Judgment normal.        Result Review :   The following data was reviewed by: Ankit Albert Jr., MD on 01/27/2021:  Common labs    Common Labsle 5/21/20 5/21/20 8/12/20    1451 1451    Glucose  107 (A)    BUN  20    Creatinine  1.39 (A)    eGFR Non  Am  37 (A)    eGFR  Am  45 (A)    Sodium  137    Potassium  5.0    Chloride  101    Calcium  9.8    WBC 6.86  5.73   Hemoglobin 11.8 (A)  11.7 (A)   Hematocrit 35.0  36.7   Platelets 327  311   (A) Abnormal value       Comments are available for some flowsheets but are not being displayed.           Data reviewed: Cardiology studies echocardiogram          Assessment and Plan    Problem List Items Addressed This Visit        Cardiac and Vasculature    Systolic murmur - Primary    Relevant Orders    CBC & Differential    Comprehensive Metabolic Panel    Lipid Panel With / Chol / HDL Ratio    Hemoglobin A1c    TSH    T4, Free    T3, Free    Urinalysis With Microscopic If Indicated (No Culture) - Urine, Clean Catch    Vitamin B12    Folate    Benign essential hypertension    Relevant Medications    Metoprolol-HCTZ -12.5 MG tablet sustained-release 24 hour    felodipine (PLENDIL) 10 MG 24 hr tablet    levothyroxine (SYNTHROID, LEVOTHROID) 100 MCG tablet    Other Relevant Orders    CBC & Differential    Comprehensive Metabolic Panel    Lipid Panel With / Chol / HDL Ratio    Hemoglobin A1c    TSH    T4,  Free    T3, Free    Urinalysis With Microscopic If Indicated (No Culture) - Urine, Clean Catch    Vitamin B12    Folate    Hyperlipidemia    Relevant Medications    rosuvastatin (CRESTOR) 5 MG tablet    Other Relevant Orders    CBC & Differential    Comprehensive Metabolic Panel    Lipid Panel With / Chol / HDL Ratio    Hemoglobin A1c    TSH    T4, Free    T3, Free    Urinalysis With Microscopic If Indicated (No Culture) - Urine, Clean Catch    Vitamin B12    Folate       Endocrine and Metabolic    Type 2 diabetes mellitus with diabetic neuropathy, without long-term current use of insulin (CMS/[facility])    Overview     Description: foot exam 04/16  eye exam patient reported this was done 02/16  Microalbumin 04/16         Relevant Medications    metFORMIN (GLUCOPHAGE) 500 MG tablet    Other Relevant Orders    CBC & Differential    Comprehensive Metabolic Panel    Lipid Panel With / Chol / HDL Ratio    Hemoglobin A1c    TSH    T4, Free    T3, Free    Urinalysis With Microscopic If Indicated (No Culture) - Urine, Clean Catch    Vitamin B12    Folate    Hypothyroidism    Relevant Medications    levothyroxine (SYNTHROID, LEVOTHROID) 100 MCG tablet    Other Relevant Orders    CBC & Differential    Comprehensive Metabolic Panel    Lipid Panel With / Chol / HDL Ratio    Hemoglobin A1c    TSH    T4, Free    T3, Free    Urinalysis With Microscopic If Indicated (No Culture) - Urine, Clean Catch    Vitamin B12    Folate    CBC & Differential    Comprehensive Metabolic Panel    Lipid Panel With / Chol / HDL Ratio    Hemoglobin A1c    TSH    T4, Free    T3, Free    Urinalysis With Microscopic If Indicated (No Culture) - Urine, Clean Catch    Vitamin B12    Folate       Mental Health    Bipolar I disorder, single manic episode (CMS/[facility])    Relevant Medications    FLUoxetine (PROzac) 20 MG capsule    QUEtiapine (SEROquel) 300 MG tablet    Other Relevant Orders    CBC & Differential    Comprehensive Metabolic Panel    Lipid Panel With /  Chol / HDL Ratio    Hemoglobin A1c    TSH    T4, Free    T3, Free    Urinalysis With Microscopic If Indicated (No Culture) - Urine, Clean Catch    Vitamin B12    Folate      Other Visit Diagnoses     Elevated serum creatinine        Relevant Orders    CBC & Differential    Comprehensive Metabolic Panel    Lipid Panel With / Chol / HDL Ratio    Hemoglobin A1c    TSH    T4, Free    T3, Free    Urinalysis With Microscopic If Indicated (No Culture) - Urine, Clean Catch    Vitamin B12    Folate    Stage 3a chronic kidney disease (CMS/HCC)        Relevant Orders    CBC & Differential    Comprehensive Metabolic Panel    Lipid Panel With / Chol / HDL Ratio    Hemoglobin A1c    TSH    T4, Free    T3, Free    Urinalysis With Microscopic If Indicated (No Culture) - Urine, Clean Catch    Vitamin B12    Folate      Medications revised as above metoprolol/HCTZ /12.5 daily plus felodipine extended release 10 mg daily.  Return visit in 3 months.  Labs today.    Follow Up   Return in about 3 months (around 4/27/2021) for Recheck.  Patient was given instructions and counseling regarding her condition or for health maintenance advice. Please see specific information pulled into the AVS if appropriate.

## 2021-01-28 LAB
ALBUMIN SERPL-MCNC: 4.1 G/DL (ref 3.5–5.2)
ALBUMIN/GLOB SERPL: 1.7 G/DL
ALP SERPL-CCNC: 86 U/L (ref 39–117)
ALT SERPL-CCNC: 14 U/L (ref 1–33)
APPEARANCE UR: CLEAR
AST SERPL-CCNC: 18 U/L (ref 1–32)
BACTERIA #/AREA URNS HPF: ABNORMAL /HPF
BASOPHILS # BLD AUTO: 0.05 10*3/MM3 (ref 0–0.2)
BASOPHILS NFR BLD AUTO: 0.7 % (ref 0–1.5)
BILIRUB SERPL-MCNC: 0.2 MG/DL (ref 0–1.2)
BILIRUB UR QL STRIP: NEGATIVE
BUN SERPL-MCNC: 23 MG/DL (ref 8–23)
BUN/CREAT SERPL: 18.1 (ref 7–25)
CALCIUM SERPL-MCNC: 9.8 MG/DL (ref 8.6–10.5)
CASTS URNS MICRO: ABNORMAL
CHLORIDE SERPL-SCNC: 105 MMOL/L (ref 98–107)
CHOLEST SERPL-MCNC: 178 MG/DL (ref 0–200)
CHOLEST/HDLC SERPL: 2.41 {RATIO}
CO2 SERPL-SCNC: 25 MMOL/L (ref 22–29)
COLOR UR: ABNORMAL
CREAT SERPL-MCNC: 1.27 MG/DL (ref 0.57–1)
EOSINOPHIL # BLD AUTO: 0.18 10*3/MM3 (ref 0–0.4)
EOSINOPHIL NFR BLD AUTO: 2.6 % (ref 0.3–6.2)
EPI CELLS #/AREA URNS HPF: ABNORMAL /HPF
ERYTHROCYTE [DISTWIDTH] IN BLOOD BY AUTOMATED COUNT: 12.1 % (ref 12.3–15.4)
FOLATE SERPL-MCNC: >20 NG/ML (ref 4.78–24.2)
GLOBULIN SER CALC-MCNC: 2.4 GM/DL
GLUCOSE SERPL-MCNC: 156 MG/DL (ref 65–99)
GLUCOSE UR QL: NEGATIVE
HBA1C MFR BLD: 6.5 % (ref 4.8–5.6)
HCT VFR BLD AUTO: 36.1 % (ref 34–46.6)
HDLC SERPL-MCNC: 74 MG/DL (ref 40–60)
HGB BLD-MCNC: 11.6 G/DL (ref 12–15.9)
HGB UR QL STRIP: NEGATIVE
IMM GRANULOCYTES # BLD AUTO: 0.03 10*3/MM3 (ref 0–0.05)
IMM GRANULOCYTES NFR BLD AUTO: 0.4 % (ref 0–0.5)
KETONES UR QL STRIP: ABNORMAL
LDLC SERPL CALC-MCNC: 89 MG/DL (ref 0–100)
LEUKOCYTE ESTERASE UR QL STRIP: ABNORMAL
LYMPHOCYTES # BLD AUTO: 1.16 10*3/MM3 (ref 0.7–3.1)
LYMPHOCYTES NFR BLD AUTO: 16.9 % (ref 19.6–45.3)
MCH RBC QN AUTO: 29.6 PG (ref 26.6–33)
MCHC RBC AUTO-ENTMCNC: 32.1 G/DL (ref 31.5–35.7)
MCV RBC AUTO: 92.1 FL (ref 79–97)
MONOCYTES # BLD AUTO: 0.68 10*3/MM3 (ref 0.1–0.9)
MONOCYTES NFR BLD AUTO: 9.9 % (ref 5–12)
NEUTROPHILS # BLD AUTO: 4.75 10*3/MM3 (ref 1.7–7)
NEUTROPHILS NFR BLD AUTO: 69.5 % (ref 42.7–76)
NITRITE UR QL STRIP: NEGATIVE
NRBC BLD AUTO-RTO: 0 /100 WBC (ref 0–0.2)
PH UR STRIP: 5.5 [PH] (ref 5–8)
PLATELET # BLD AUTO: 357 10*3/MM3 (ref 140–450)
POTASSIUM SERPL-SCNC: 4.8 MMOL/L (ref 3.5–5.2)
PROT SERPL-MCNC: 6.5 G/DL (ref 6–8.5)
PROT UR QL STRIP: ABNORMAL
RBC # BLD AUTO: 3.92 10*6/MM3 (ref 3.77–5.28)
RBC #/AREA URNS HPF: ABNORMAL /HPF
SODIUM SERPL-SCNC: 140 MMOL/L (ref 136–145)
SP GR UR: 1.03 (ref 1–1.03)
T3FREE SERPL-MCNC: 2.2 PG/ML (ref 2–4.4)
T4 FREE SERPL-MCNC: 1.13 NG/DL (ref 0.93–1.7)
TRIGL SERPL-MCNC: 80 MG/DL (ref 0–150)
TSH SERPL DL<=0.005 MIU/L-ACNC: 3.11 UIU/ML (ref 0.27–4.2)
UROBILINOGEN UR STRIP-MCNC: ABNORMAL MG/DL
VIT B12 SERPL-MCNC: 503 PG/ML (ref 211–946)
VLDLC SERPL CALC-MCNC: 15 MG/DL (ref 5–40)
WBC # BLD AUTO: 6.85 10*3/MM3 (ref 3.4–10.8)
WBC #/AREA URNS HPF: ABNORMAL /HPF

## 2021-01-29 ENCOUNTER — TELEPHONE (OUTPATIENT)
Dept: INTERNAL MEDICINE | Facility: CLINIC | Age: 76
End: 2021-01-29

## 2021-01-29 RX ORDER — HYDROCHLOROTHIAZIDE 12.5 MG/1
12.5 TABLET ORAL DAILY
Qty: 90 TABLET | Refills: 3 | Status: SHIPPED | OUTPATIENT
Start: 2021-01-29 | End: 2021-03-18

## 2021-01-29 RX ORDER — METOPROLOL SUCCINATE 100 MG/1
100 TABLET, EXTENDED RELEASE ORAL DAILY
Qty: 90 TABLET | Refills: 3 | Status: SHIPPED | OUTPATIENT
Start: 2021-01-29 | End: 2021-02-24 | Stop reason: SDUPTHER

## 2021-01-29 NOTE — TELEPHONE ENCOUNTER
Pharmacy Name: [id number] - [place], [place] - [address] AT [facility] [address] - [phone] [facility] [phone] FX<br>[id number] - [place], [place] - [address] AT [facility] [address] [facility] [phone] [facility] [phone] FX<br>[facility] - [address] [phone] [facility] [phone] FX     Pharmacy representative name: KADEN     Pharmacy representative phone number:   [phone] [facility] (Pharmacy) [phone]         What medication are you calling in regards to: METOPROLOL- HCTZ    What question does the pharmacy have: NEEDING CLARIFICATION ON THE MEDICATION. NO LONGER MADE WOULD LIKE TO KNOW IF THE  DOCTOR CAN CONSIDER ALTERNATIVE PRESCRIPTION     Who is the provider that prescribed the medication: DR. DURAND     Additional notes: ORDER NUMBER REF: [id number]

## 2021-01-29 NOTE — TELEPHONE ENCOUNTER
I sent alternative metoprolol  mg plus hydrochlorothiazide 12.5 mg daily as separate prescriptions.

## 2021-02-02 RX ORDER — LACTULOSE 10 G/15ML
SOLUTION ORAL; RECTAL
Refills: 3 | OUTPATIENT
Start: 2021-02-02

## 2021-02-04 ENCOUNTER — OFFICE VISIT (OUTPATIENT)
Dept: GASTROENTEROLOGY | Facility: CLINIC | Age: 76
End: 2021-02-04

## 2021-02-04 VITALS — BODY MASS INDEX: 29.67 KG/M2 | HEIGHT: 64 IN | WEIGHT: 173.8 LBS | TEMPERATURE: 96.5 F

## 2021-02-04 DIAGNOSIS — K21.9 GASTROESOPHAGEAL REFLUX DISEASE: ICD-10-CM

## 2021-02-04 DIAGNOSIS — D50.0 IRON DEFICIENCY ANEMIA DUE TO CHRONIC BLOOD LOSS: Primary | ICD-10-CM

## 2021-02-04 DIAGNOSIS — D12.6 ADENOMATOUS POLYP OF COLON, UNSPECIFIED PART OF COLON: ICD-10-CM

## 2021-02-04 DIAGNOSIS — K59.00 CONSTIPATION, UNSPECIFIED CONSTIPATION TYPE: ICD-10-CM

## 2021-02-04 PROCEDURE — 99214 OFFICE O/P EST MOD 30 MIN: CPT | Performed by: NURSE PRACTITIONER

## 2021-02-04 RX ORDER — OMEPRAZOLE 40 MG/1
40 CAPSULE, DELAYED RELEASE ORAL DAILY
Qty: 90 CAPSULE | Refills: 3 | Status: SHIPPED | OUTPATIENT
Start: 2021-02-04 | End: 2021-04-20 | Stop reason: SDUPTHER

## 2021-02-04 RX ORDER — LACTULOSE 10 G/15ML
SOLUTION ORAL; RECTAL
Qty: 1350 ML | Refills: 3 | Status: SHIPPED | OUTPATIENT
Start: 2021-02-04 | End: 2021-08-23

## 2021-02-04 NOTE — PROGRESS NOTES
Chief Complaint   Patient presents with   • Heartburn   • Constipation       Yi Lee is a  75 y.o. female here for a follow up visit for GERD.    HPI  75-year-old female presents today for follow-up visit for GERD and constipation.  She is a patient of Dr. Grigsby.  She was last seen in the office by me on 2/11/2019.  She underwent EGD and colonoscopy on 2/24/2020.  EGD showed some gastritis but path was negative.  Colonoscopy showed 1 adenomatous colon polyp.  Otherwise unremarkable.  Patient admits she does well on omeprazole 40 mg daily.  She denies any breakthrough reflux at this time.  She also has a history of chronic constipation and admits as long she takes her lactulose every day she does really well.  She denies any dysphagia, reflux, abdominal pain, nausea vomiting, diarrhea, constipation, rectal bleeding or melena.  She was appetite is good and her weight is stable.  Past Medical History:   Diagnosis Date   • Abdominal pain, LLQ    • Anemia    • Anemia    • Arthralgia of hip 11/22/2015   • Ataxic gait 11/22/2015    Description: Eval Dr Lillian Mederos, Neuro.  Some vestibular dysfunction present 2013/2014   • Bipolar I disorder, single manic episode (CMS/HCC)    • Bradycardia    • Cardiac murmur    • Colon polyp    • Coronary artery disease    • Degeneration, intervertebral disc, thoracolumbar    • Depression    • Diabetes mellitus (CMS/HCC)    • Disease of thyroid gland    • Diverticulosis    • Ductal carcinoma in situ (DCIS) of left breast    • Esophageal spasm    • Fatigue    • GERD (gastroesophageal reflux disease)    • H/O bone density study 10/17/2007    Dr. Giles   • Hemorrhoids    • History of mammogram     02/2012, per GYN Reshma Aranda   • History of Papanicolaou smear of cervix     DR. GILES GYN   • Hyperlipidemia    • Hypertension    • Impaired cognition 11/22/2015    Description: Testing done 05/14   • Optic nerve contusion    • Pain of lower extremity 11/22/2015   • Systolic murmur    •  Vitamin B12 deficiency    • Vitamin D deficiency        Past Surgical History:   Procedure Laterality Date   • BREAST LUMPECTOMY Left     benign   • CHOLECYSTECTOMY     • COLONOSCOPY      done , repeat 5 yrs, Dr Grigsby; tics in sigmoid colon, IH   • COLONOSCOPY N/A 11/3/2016    polyp, IH   • COLONOSCOPY N/A 2020    Procedure: COLONOSCOPY TO CECUM WITH COLD POLYPECTOMY;  Surgeon: Eddie Grigsby MD;  Location: [facility] ENDOSCOPY;  Service: Gastroenterology;  Laterality: N/A;  pre: hx of polyps  post: polyp, hemorrhoids, diverticulosis   • D&C AND LAPAROSCOPY     • DILATATION AND CURETTAGE     • ENDOSCOPY N/A 2020    Procedure: ESOPHAGOGASTRODUODENOSCOPY with biopsies;  Surgeon: Eddie Grigsby MD;  Location: [facility] ENDOSCOPY;  Service: Gastroenterology;  Laterality: N/A;  pre: iron deficiency anemia  post: gastriits   • ESOPHAGOGASTRIC FUNDOPLASTY     • ROTATOR CUFF REPAIR Right    • TOTAL KNEE ARTHROPLASTY Left 2018    Procedure: LT TOTAL KNEE ARTHROPLASTY;  Surgeon: Selwyn Pinto MD;  Location: [facility] MAIN OR;  Service:        Scheduled Meds:    Continuous Infusions:No current facility-administered medications for this visit.       PRN Meds:.    Allergies   Allergen Reactions   • Morphine Shortness Of Breath   • Penicillins Hives and Swelling   • Ace Inhibitors Cough   • Telmisartan Cough       Social History     Socioeconomic History   • Marital status:      Spouse name: Not on file   • Number of children: 1   • Years of education: Not on file   • Highest education level: Not on file   Occupational History   • Occupation: commercial cleaning   Tobacco Use   • Smoking status: Former Smoker     Packs/day: 1.00     Years: 7.00     Pack years: 7.00     Types: Cigarettes     Quit date:      Years since quittin.1   • Smokeless tobacco: Never Used   • Tobacco comment: caffeine use   Substance and Sexual Activity   • Alcohol use: Yes     Comment: OCC   • Drug use: No   • Sexual  activity: Never       Family History   Problem Relation Age of Onset   • Colon polyps Father    • Heart disease Father    • Prostate cancer Father    • Hepatitis Other    • Coronary artery disease Other    • Hypertension Brother    • Lung cancer Maternal Aunt    • Lung cancer Paternal Uncle    • Stroke Paternal Grandmother    • Cerebral aneurysm Paternal Grandmother    • Stroke Paternal Grandfather    • Cerebral aneurysm Paternal Grandfather    • Lung cancer Maternal Aunt    • Malig Hyperthermia Neg Hx        Review of Systems   Constitutional: Negative for appetite change, chills, diaphoresis, fatigue, fever and unexpected weight change.   HENT: Negative for nosebleeds, postnasal drip, sore throat, trouble swallowing and voice change.    Respiratory: Negative for cough, choking, chest tightness, shortness of breath and wheezing.    Cardiovascular: Negative for chest pain, palpitations and leg swelling.   Gastrointestinal: Negative for abdominal distention, abdominal pain, anal bleeding, blood in stool, constipation, diarrhea, nausea, rectal pain and vomiting.   Endocrine: Negative for polydipsia, polyphagia and polyuria.   Musculoskeletal: Negative for gait problem.   Skin: Negative for rash and wound.   Allergic/Immunologic: Negative for food allergies.   Neurological: Negative for dizziness, speech difficulty and light-headedness.   Psychiatric/Behavioral: Negative for confusion, self-injury, sleep disturbance and suicidal ideas.       Vitals:    02/04/21 1308   Temp: 96.5 °F (35.8 °C)       Physical Exam  Constitutional:       General: She is not in acute distress.     Appearance: She is well-developed. She is not ill-appearing.   HENT:      Head: Normocephalic.   Eyes:      Pupils: Pupils are equal, round, and reactive to light.   Cardiovascular:      Rate and Rhythm: Normal rate and regular rhythm.      Heart sounds: Normal heart sounds.   Pulmonary:      Effort: Pulmonary effort is normal.      Breath sounds:  Normal breath sounds.   Abdominal:      General: Bowel sounds are normal. There is no distension.      Palpations: Abdomen is soft. There is no mass.      Tenderness: There is no abdominal tenderness. There is no guarding or rebound.      Hernia: No hernia is present.   Musculoskeletal: Normal range of motion.   Skin:     General: Skin is warm and dry.   Neurological:      Mental Status: She is alert and oriented to person, place, and time.   Psychiatric:         Speech: Speech normal.         Behavior: Behavior normal.         Judgment: Judgment normal.         No radiology results for the last 7 days     Assessment and plan     1. Gastroesophageal reflux disease  - omeprazole (priLOSEC) 40 MG capsule; Take 1 capsule by mouth Daily.  Dispense: 90 capsule; Refill: 3    2. Iron deficiency anemia due to chronic blood loss    3. Constipation, unspecified constipation type    4. Adenomatous polyp of colon, unspecified part of colon      Reviewed most recent lab work with her today.  Hemoglobin is stable at 11.6.  Reviewed most recent EGD and colonoscopy results with with her as well.  Recall colonoscopy in 4 years.  GERD seems well controlled on omeprazole 40 mg daily.  Will refill for 1 year.  Continue GERD precautions.  Constipation seems well controlled with lactulose daily.  Will refill that for 1 year as well.  Overall think she is doing really well.  Patient to call the office with any issues.  Patient to follow-up with me in 1 year.  Patient is agreeable to the plan.

## 2021-02-05 ENCOUNTER — TELEPHONE (OUTPATIENT)
Dept: INTERNAL MEDICINE | Facility: CLINIC | Age: 76
End: 2021-02-05

## 2021-02-05 NOTE — TELEPHONE ENCOUNTER
Caller: Yi Lee    Relationship: Self    Best call back number: [phone]    What medications are you currently taking:   Current Outpatient Medications on File Prior to Visit   Medication Sig Dispense Refill   • acetaminophen (TYLENOL) 325 MG tablet Take 650 mg by mouth As Needed for Mild Pain .     • albuterol (PROVENTIL HFA) 108 (90 BASE) MCG/ACT inhaler Inhale 2 puffs.     • anastrozole (ARIMIDEX) 1 MG tablet Take 1 mg by mouth Daily.     • Cholecalciferol (VITAMIN D3) 2000 UNITS tablet Take 1,000 Units by mouth Daily.     • felodipine (PLENDIL) 10 MG 24 hr tablet Take 1 tablet by mouth Daily. 90 tablet 3   • ferrous sulfate 325 (65 FE) MG EC tablet Take 650 mg by mouth.     • FLUoxetine (PROzac) 20 MG capsule Take 1 capsule by mouth Daily. 90 capsule 1   • glucose blood (FREESTYLE LITE) test strip Use to test everyday as directed 100 each 5   • hydroCHLOROthiazide (HYDRODIURIL) 12.5 MG tablet Take 1 tablet by mouth Daily. 90 tablet 3   • lactulose (Generlac) 10 GM/15ML solution solution (encephalopathy) Take 35-45 ml po daily for constipation 1350 mL 3   • lamoTRIgine (LaMICtal) 200 MG tablet Take 2.5 tablets by mouth Every Night. 225 tablet 1   • Lancets (FREESTYLE) lancets As directed to check blood glucose 100 each 12   • levothyroxine (SYNTHROID, LEVOTHROID) 100 MCG tablet Take 1 tablet by mouth Daily. 90 tablet 3   • metFORMIN (GLUCOPHAGE) 500 MG tablet Take 2 tablets by mouth Daily With Breakfast. 180 tablet 1   • metoprolol succinate XL (Toprol XL) 100 MG 24 hr tablet Take 1 tablet by mouth Daily. 90 tablet 3   • mupirocin (Bactroban) 2 % ointment Apply  topically to the appropriate area as directed 3 (Three) Times a Day. 30 g 0   • omeprazole (priLOSEC) 40 MG capsule Take 1 capsule by mouth Daily. 90 capsule 3   • QUEtiapine (SEROquel) 300 MG tablet Take 1 tablet by mouth Every Night. 90 tablet 3   • rosuvastatin (CRESTOR) 5 MG tablet Take 1 tablet by mouth Every Night. for cholesterol 90  tablet 1     No current facility-administered medications on file prior to visit.         Which medication are you concerned about: felodipine (PLENDIL) 10 MG 24 hr tablet  METOPROLOL HCTZ (NOT IN MED LIST)    What are your concerns: PATIENT CALLED STATING THAT MEDICATION FELODIPINE WAS TOO EXPENSIVE AND THE SECOND MED THEY DO NOT MAKE ANYMORE. PATIENT WOULD LIKE TO STAY ON MEDICATIONS THAT SHE HAS BEEN TAKING, PLEASE ADVISE PATIENT HOW TO MOVE FORWARD.

## 2021-02-07 RX ORDER — AMLODIPINE BESYLATE 10 MG/1
10 TABLET ORAL DAILY
Qty: 90 TABLET | Refills: 1 | Status: SHIPPED | OUTPATIENT
Start: 2021-02-07 | End: 2021-03-29

## 2021-02-08 NOTE — TELEPHONE ENCOUNTER
I sent an alternative for both these to her pharmacy.  Let me know if they still need corrections.

## 2021-02-12 ENCOUNTER — TELEPHONE (OUTPATIENT)
Dept: INTERNAL MEDICINE | Facility: CLINIC | Age: 76
End: 2021-02-12

## 2021-02-12 NOTE — TELEPHONE ENCOUNTER
Pt calling back about the medications for blood pressure.  She said that she has amlodipine 10 mg, metoprolol succ  mg, and HCTZ at home.  She wanted to make sure that is all you want her to take until her appt in July.  She does not check blood pressure at home.  I did advise it would be a good idea to do so to make sure the medications are helping, but she did want to ask if these are all she should take for now.    The felodipine was discontinued as it was too expensive.

## 2021-02-16 ENCOUNTER — TELEPHONE (OUTPATIENT)
Dept: INTERNAL MEDICINE | Facility: CLINIC | Age: 76
End: 2021-02-16

## 2021-02-16 NOTE — TELEPHONE ENCOUNTER
Pt wondering if this is muscular.  She does not have any other symptoms, just pain with certain movements.  She does not really want to come in due to weather.

## 2021-02-22 NOTE — TELEPHONE ENCOUNTER
I talked to Yi on Saturday and instructed her to go to Urgent Care as she really needs a Covid test.

## 2021-02-24 ENCOUNTER — OFFICE VISIT (OUTPATIENT)
Dept: INTERNAL MEDICINE | Facility: CLINIC | Age: 76
End: 2021-02-24

## 2021-02-24 VITALS
BODY MASS INDEX: 29.35 KG/M2 | HEART RATE: 70 BPM | WEIGHT: 171 LBS | SYSTOLIC BLOOD PRESSURE: 126 MMHG | DIASTOLIC BLOOD PRESSURE: 68 MMHG | TEMPERATURE: 97.7 F | OXYGEN SATURATION: 98 %

## 2021-02-24 DIAGNOSIS — I10 BENIGN ESSENTIAL HYPERTENSION: Primary | ICD-10-CM

## 2021-02-24 DIAGNOSIS — R09.89 LABILE HYPERTENSION: ICD-10-CM

## 2021-02-24 DIAGNOSIS — R01.1 SYSTOLIC MURMUR: ICD-10-CM

## 2021-02-24 PROCEDURE — 99214 OFFICE O/P EST MOD 30 MIN: CPT | Performed by: FAMILY MEDICINE

## 2021-02-24 RX ORDER — METOPROLOL SUCCINATE 100 MG/1
150 TABLET, EXTENDED RELEASE ORAL DAILY
Qty: 135 TABLET | Refills: 3 | Status: SHIPPED | OUTPATIENT
Start: 2021-02-24 | End: 2021-03-18

## 2021-02-24 RX ORDER — PREDNISONE 10 MG/1
TABLET ORAL
COMMUNITY
Start: 2021-02-22 | End: 2021-03-18

## 2021-02-24 NOTE — PROGRESS NOTES
Chief Complaint  Follow-up (Er f/u ) and Hypertension    Subjective          Yi Lee presents to [facility] PRIMARY CARE  Patient with an extensive history and blood pressure elevations which are labile.  Blood pressure normotensive upon entrance to the office and that recheck blood pressure with regular cuff right arm is 172/72.  We also looked at the cuff she has at home.  Her blood pressure is not adequately controlled all the time.  She did go to the emergency room with what appeared to be an abdominal strain and had a CT of the abdomen which did not show any obvious acute abdominal pathology.  This is [facility] the hospital.    Otherwise she went back to [facility] a severe headache and blood pressure elevation at 1 point of 201/84.  She had change in her blood pressure medications based on no metoprolol succinate plus hydrochlorothiazide and the same tablet.  Felodipine was too expensive and she is now on amlodipine 10 mg daily.    Her blood pressure medicines at home now include hydrochlorothiazide 12.5 mg daily metoprolol succinate 100 mg daily amlodipine 10 mg daily we discussed going up on metoprolol succinate 250 mg daily which we will do.  Labs are reviewed from last visit would look pretty stable except for mild elevation of creatinine.  Prior echocardiogram shows mitral valve regurgitation a bicuspid aortic valve.  Stressors include depression anxiety and also history of breast surgery in August.    I advised to go ahead and get Covid vaccine when available.    Hypertension           Objective   Vital Signs:   /68 (BP Location: Left arm, Patient Position: Sitting, Cuff Size: Large Adult)   Pulse 70   Temp 97.7 °F (36.5 °C) (Tympanic)   Wt 77.6 kg (171 lb)   SpO2 98%   BMI 29.35 kg/m²     Physical Exam  Vitals signs reviewed.   Constitutional:       Appearance: She is well-developed.   HENT:      Head: Normocephalic and atraumatic.      Right Ear:  Tympanic membrane and external ear normal.      Left Ear: Tympanic membrane and external ear normal.   Eyes:      Conjunctiva/sclera: Conjunctivae normal.      Pupils: Pupils are equal, round, and reactive to light.   Neck:      Musculoskeletal: Normal range of motion and neck supple.      Thyroid: No thyromegaly.      Vascular: No JVD.   Cardiovascular:      Rate and Rhythm: Normal rate and regular rhythm.      Heart sounds: Normal heart sounds.   Pulmonary:      Effort: Pulmonary effort is normal.      Breath sounds: Normal breath sounds.   Abdominal:      General: Bowel sounds are normal.      Palpations: Abdomen is soft.   Musculoskeletal: Normal range of motion.   Lymphadenopathy:      Cervical: No cervical adenopathy.   Skin:     General: Skin is warm and dry.      Findings: No rash.   Neurological:      Mental Status: She is alert and oriented to person, place, and time.      Cranial Nerves: No cranial nerve deficit.      Coordination: Coordination normal.   Psychiatric:         Attention and Perception: Attention normal.         Mood and Affect: Mood is anxious.         Behavior: Behavior normal.         Thought Content: Thought content normal.         Judgment: Judgment normal.        Result Review :   The following data was reviewed by: Ankit Albert Jr., MD on 02/24/2021:  Common labs    Common Labsle 5/21/20 5/21/20 8/12/20 1/27/21 1/27/21 1/27/21 1/27/21    1451 1451  1141 1141 1141 1141   Glucose  107 (A)   156 (A)     BUN  20   23     Creatinine  1.39 (A)   1.27 (A)     eGFR Non  Am  37 (A)   41 (A)     eGFR  Am  45 (A)   50 (A)     Sodium  137   140     Potassium  5.0   4.8     Chloride  101   105     Calcium  9.8   9.8     Total Protein     6.5     Albumin     4.10     Total Bilirubin     0.2     Alkaline Phosphatase     86     AST (SGOT)     18     ALT (SGPT)     14     WBC 6.86  5.73 6.85      Hemoglobin 11.8 (A)  11.7 (A) 11.6 (A)      Hematocrit 35.0  36.7 36.1      Platelets 327   311 357      Total Cholesterol      178    Triglycerides      80    HDL Cholesterol      74 (A)    LDL Cholesterol       89    Hemoglobin A1C       6.50 (A)   (A) Abnormal value       Comments are available for some flowsheets but are not being displayed.           Data reviewed: Recent hospitalization notes Emergency room records and CT abdomen report          Assessment and Plan    Diagnoses and all orders for this visit:    1. Benign essential hypertension (Primary)    2. Systolic murmur    3. Labile hypertension    Other orders  -     metoprolol succinate XL (Toprol XL) 100 MG 24 hr tablet; Take 1.5 tablets by mouth Daily.  Dispense: 135 tablet; Refill: 3    Plan: She is anxious about recent history.  This started with hitting head on the ice Saturday night.  Her head was pulsating she was evaluated emergency room along with abdominal strain which occurred later while getting in bed.  She was noted to have high blood pressure at various moments had ride an ambulance as well as at the hospital.  Also elevated blood pressure at urgent care and transferred to hospital.  At this point we will change medicines only in increasing metoprolol succinate 250 mg every morning and leave amlodipine to 10 mg daily hydrochlorothiazide 12.5 mg daily and recheck in April.  She will continue home monitoring of blood pressure.       Follow Up   No follow-ups on file.  Patient was given instructions and counseling regarding her condition or for health maintenance advice. Please see specific information pulled into the AVS if appropriate.

## 2021-03-02 DIAGNOSIS — Z23 IMMUNIZATION DUE: ICD-10-CM

## 2021-03-18 ENCOUNTER — OFFICE VISIT (OUTPATIENT)
Dept: CARDIOLOGY | Facility: CLINIC | Age: 76
End: 2021-03-18

## 2021-03-18 VITALS
WEIGHT: 174.6 LBS | BODY MASS INDEX: 29.81 KG/M2 | HEART RATE: 39 BPM | DIASTOLIC BLOOD PRESSURE: 66 MMHG | OXYGEN SATURATION: 95 % | RESPIRATION RATE: 18 BRPM | SYSTOLIC BLOOD PRESSURE: 168 MMHG | HEIGHT: 64 IN

## 2021-03-18 DIAGNOSIS — Q23.1 BICUSPID AORTIC VALVE: Primary | ICD-10-CM

## 2021-03-18 DIAGNOSIS — R00.1 BRADYCARDIA: ICD-10-CM

## 2021-03-18 DIAGNOSIS — I10 BENIGN ESSENTIAL HYPERTENSION: ICD-10-CM

## 2021-03-18 PROCEDURE — 99214 OFFICE O/P EST MOD 30 MIN: CPT | Performed by: NURSE PRACTITIONER

## 2021-03-18 PROCEDURE — 93000 ELECTROCARDIOGRAM COMPLETE: CPT | Performed by: NURSE PRACTITIONER

## 2021-03-18 RX ORDER — HYDRALAZINE HYDROCHLORIDE 50 MG/1
50 TABLET, FILM COATED ORAL 3 TIMES DAILY
Qty: 100 TABLET | Refills: 0 | Status: SHIPPED | OUTPATIENT
Start: 2021-03-18 | End: 2021-03-18

## 2021-03-18 RX ORDER — CHLORTHALIDONE 25 MG/1
25 TABLET ORAL DAILY
Qty: 30 TABLET | Refills: 0 | Status: SHIPPED | OUTPATIENT
Start: 2021-03-18 | End: 2021-03-18

## 2021-03-18 RX ORDER — HYDROXYZINE HYDROCHLORIDE 25 MG/1
25 TABLET, FILM COATED ORAL NIGHTLY PRN
COMMUNITY
End: 2022-01-01

## 2021-03-18 RX ORDER — HYDRALAZINE HYDROCHLORIDE 50 MG/1
TABLET, FILM COATED ORAL
Qty: 272 TABLET | Refills: 0 | Status: SHIPPED | OUTPATIENT
Start: 2021-03-18 | End: 2021-03-23 | Stop reason: SDUPTHER

## 2021-03-18 RX ORDER — METOPROLOL SUCCINATE 50 MG/1
50 TABLET, EXTENDED RELEASE ORAL DAILY
Qty: 30 TABLET | Refills: 0 | Status: SHIPPED | OUTPATIENT
Start: 2021-03-18 | End: 2021-03-18

## 2021-03-18 RX ORDER — CHLORTHALIDONE 25 MG/1
25 TABLET ORAL DAILY
Qty: 90 TABLET | Refills: 0 | Status: SHIPPED | OUTPATIENT
Start: 2021-03-18 | End: 2021-04-08

## 2021-03-18 RX ORDER — METOPROLOL SUCCINATE 50 MG/1
50 TABLET, EXTENDED RELEASE ORAL DAILY
Qty: 90 TABLET | Refills: 0 | OUTPATIENT
Start: 2021-03-18 | End: 2021-03-26

## 2021-03-18 NOTE — TELEPHONE ENCOUNTER
Patient called and stated she is having stomach pain- when the patient stands up or leans back she is having sharp pain. When the patient sits it eases the pain- stomach is not tender to the touch.    Wants to know what to do about this     Please advise at [phone]   16-Mar-2021 20:03 15-Mar-2021 19:44 16-Mar-2021 00:01 18-Mar-2021 16:18 15-Mar-2021 14:50

## 2021-03-18 NOTE — PROGRESS NOTES
Date of Office Visit: 2021  Encounter Provider: EZRA Griffith  Place of Service: [facility] CARDIOLOGY  Patient Name: Yi Lee  :1945    Chief Complaint   Patient presents with   • Slow Heart Rate     HOSP FOLLOW UP   • Heart Murmur   :     HPI: Yi Lee is a 75 y.o. female who presents today for follow-up.  Old records have been obtained and reviewed by me.  She is a patient with a past medical history significant for hypertension, hyperlipidemia, GERD, COPD, type 2 diabetes, and obesity.  She was first evaluated in the office by Dr. Falcon in 2020 for preoperative clearance as well as evaluation of a systolic heart murmur.  Overall she was doing well.  She denied any symptoms of angina or heart failure.  Dr. Falcon felt she had excellent functional capacity and was a low risk for surgery.  She has a history of bradycardia which she has had for a number of years.  Given the degree of bradycardia, Dr. Falcon felt it was reasonable to cut back on the metoprolol dose from 100 to 50.  In 2021, an echocardiogram revealed normal LV systolic function, left ventricular outflow tract thickening with no significant obstructive disease, bicuspid aortic valve with mild stenosis, and mild to moderate mitral regurgitation.  No changes were recommended, and she was advised to follow-up in a year.   On 2021, she presented to [facility] ED.  Records have been obtained and reviewed.  She presented with right lower quadrant abdominal pain exacerbated by position change.  Work-up including lab work and CT were unrevealing.  She was discharged with instructions to follow-up with her PCP.  On 2021, she was seen by Dr. Albert.  Evidently she had presented back to [facility] with a severe headache and elevated blood pressure of 201/84.  Blood pressure medications were adjusted with the increase of the Toprol dose.  She is here  today for follow-up.     Overall she is feeling okay.  She denies any chest pain, palpitations, edema, dizziness, or syncope.  She does feel she is getting slightly more short of breath on exertion but denies any PND orthopnea.  She brought her blood pressure readings from home with her today, and her blood pressures have been consistently elevated from the 160s all the way up to the 200s systolic.  She does report headaches and feeling her heart pulsating in her ears when her blood pressure is elevated.  She has occasional dizziness when her blood sugar drops.    Past Medical History:   Diagnosis Date   • Abdominal pain, LLQ    • Anemia    • Anemia    • Arthralgia of hip 11/22/2015   • Ataxic gait 11/22/2015    Description: Eval Dr Lillian Mederos, Neuro.  Some vestibular dysfunction present 2013/2014   • Bipolar I disorder, single manic episode (CMS/HCC)    • Bradycardia    • Cardiac murmur    • Colon polyp    • Coronary artery disease    • Degeneration, intervertebral disc, thoracolumbar    • Depression    • Diabetes mellitus (CMS/HCC)    • Disease of thyroid gland    • Diverticulosis    • Ductal carcinoma in situ (DCIS) of left breast    • Esophageal spasm    • Fatigue    • GERD (gastroesophageal reflux disease)    • H/O bone density study 10/17/2007    Dr. Giles   • Hemorrhoids    • History of mammogram     02/2012, per GYN Reshma Aranda   • History of Papanicolaou smear of cervix     DR. GILES GYN   • Hyperlipidemia    • Hypertension    • Impaired cognition 11/22/2015    Description: Testing done 05/14   • Optic nerve contusion    • Pain of lower extremity 11/22/2015   • Systolic murmur    • Vitamin B12 deficiency    • Vitamin D deficiency        Past Surgical History:   Procedure Laterality Date   • BREAST LUMPECTOMY Left     benign   • CHOLECYSTECTOMY     • COLONOSCOPY  2007    done 02/12, repeat 5 yrs, Dr Grigsby; tics in sigmoid colon, IH   • COLONOSCOPY N/A 11/3/2016    polyp, IH   • COLONOSCOPY N/A 2/24/2020     Procedure: COLONOSCOPY TO CECUM WITH COLD POLYPECTOMY;  Surgeon: Eddie Grigsby MD;  Location: [facility] ENDOSCOPY;  Service: Gastroenterology;  Laterality: N/A;  pre: hx of polyps  post: polyp, hemorrhoids, diverticulosis   • D & C AND LAPAROSCOPY     • DILATATION AND CURETTAGE     • ENDOSCOPY N/A 2020    Procedure: ESOPHAGOGASTRODUODENOSCOPY with biopsies;  Surgeon: Eddie Grigsby MD;  Location: [facility] ENDOSCOPY;  Service: Gastroenterology;  Laterality: N/A;  pre: iron deficiency anemia  post: gastriits   • ESOPHAGOGASTRIC FUNDOPLASTY     • ROTATOR CUFF REPAIR Right    • TOTAL KNEE ARTHROPLASTY Left 2018    Procedure: LT TOTAL KNEE ARTHROPLASTY;  Surgeon: Selwyn Pinto MD;  Location: [facility] MAIN OR;  Service:        Social History     Socioeconomic History   • Marital status:      Spouse name: Not on file   • Number of children: 1   • Years of education: Not on file   • Highest education level: Not on file   Tobacco Use   • Smoking status: Former Smoker     Packs/day: 1.00     Years: 7.00     Pack years: 7.00     Types: Cigarettes     Quit date:      Years since quittin.2   • Smokeless tobacco: Never Used   • Tobacco comment: caffeine use   Substance and Sexual Activity   • Alcohol use: Yes     Comment: OCC   • Drug use: No   • Sexual activity: Never       Family History   Problem Relation Age of Onset   • Colon polyps Father    • Heart disease Father    • Prostate cancer Father    • Hepatitis Other    • Coronary artery disease Other    • Hypertension Brother    • Lung cancer Maternal Aunt    • Lung cancer Paternal Uncle    • Stroke Paternal Grandmother    • Cerebral aneurysm Paternal Grandmother    • Stroke Paternal Grandfather    • Cerebral aneurysm Paternal Grandfather    • Lung cancer Maternal Aunt    • Malig Hyperthermia Neg Hx        Review of Systems   Constitutional: Negative.   Cardiovascular: Positive for dyspnea on exertion. Negative for chest pain, leg swelling,  orthopnea, paroxysmal nocturnal dyspnea and syncope.   Respiratory: Negative.    Hematologic/Lymphatic: Negative for bleeding problem.   Musculoskeletal: Negative for falls.   Gastrointestinal: Negative for melena.   Neurological: Positive for dizziness and headaches. Negative for light-headedness.       Allergies   Allergen Reactions   • Morphine Shortness Of Breath   • Penicillins Hives and Swelling   • Ace Inhibitors Cough   • Telmisartan Cough         Current Outpatient Medications:   •  acetaminophen (TYLENOL) 325 MG tablet, Take 650 mg by mouth As Needed for Mild Pain ., Disp: , Rfl:   •  amLODIPine (Norvasc) 10 MG tablet, Take 1 tablet by mouth Daily., Disp: 90 tablet, Rfl: 1  •  anastrozole (ARIMIDEX) 1 MG tablet, Take 1 mg by mouth Daily., Disp: , Rfl:   •  Cholecalciferol (VITAMIN D3) 2000 UNITS tablet, Take 1,000 Units by mouth Daily., Disp: , Rfl:   •  ferrous sulfate 325 (65 FE) MG EC tablet, Take 650 mg by mouth., Disp: , Rfl:   •  FLUoxetine (PROzac) 20 MG capsule, Take 1 capsule by mouth Daily., Disp: 90 capsule, Rfl: 1  •  glucose blood (FREESTYLE LITE) test strip, Use to test everyday as directed, Disp: 100 each, Rfl: 5  •  hydrOXYzine (ATARAX) 25 MG tablet, Take 25 mg by mouth 3 (Three) Times a Day As Needed for Itching., Disp: , Rfl:   •  lactulose (Generlac) 10 GM/15ML solution solution (encephalopathy), Take 35-45 ml po daily for constipation, Disp: 1350 mL, Rfl: 3  •  lamoTRIgine (LaMICtal) 200 MG tablet, Take 2.5 tablets by mouth Every Night., Disp: 225 tablet, Rfl: 1  •  Lancets (FREESTYLE) lancets, As directed to check blood glucose, Disp: 100 each, Rfl: 12  •  levothyroxine (SYNTHROID, LEVOTHROID) 100 MCG tablet, Take 1 tablet by mouth Daily., Disp: 90 tablet, Rfl: 3  •  metFORMIN (GLUCOPHAGE) 500 MG tablet, Take 2 tablets by mouth Daily With Breakfast., Disp: 180 tablet, Rfl: 1  •  metoprolol succinate XL (Toprol XL) 50 MG 24 hr tablet, Take 1 tablet by mouth Daily., Disp: 30 tablet, Rfl:  "0  •  omeprazole (priLOSEC) 40 MG capsule, Take 1 capsule by mouth Daily., Disp: 90 capsule, Rfl: 3  •  QUEtiapine (SEROquel) 300 MG tablet, Take 1 tablet by mouth Every Night., Disp: 90 tablet, Rfl: 3  •  rosuvastatin (CRESTOR) 5 MG tablet, Take 1 tablet by mouth Every Night. for cholesterol, Disp: 90 tablet, Rfl: 1  •  chlorthalidone (HYGROTON) 25 MG tablet, Take 1 tablet by mouth Daily., Disp: 30 tablet, Rfl: 0  •  hydrALAZINE (APRESOLINE) 50 MG tablet, Take 1 tablet by mouth 3 (Three) Times a Day., Disp: 100 tablet, Rfl: 0      Objective:     Vitals:    03/18/21 0934 03/18/21 0944   BP: 162/66 168/66   BP Location: Right arm Left arm   Patient Position: Sitting Sitting   Pulse: (!) 40 (!) 39   Resp: 18    SpO2: 95%    Weight: 79.2 kg (174 lb 9.6 oz)    Height: 162.6 cm (64\")      Body mass index is 29.97 kg/m².    PHYSICAL EXAM:    Neck:      Vascular: No JVD.   Pulmonary:      Effort: Pulmonary effort is normal.      Breath sounds: Normal breath sounds.   Cardiovascular:      Bradycardia present. Regular rhythm.      Murmurs: There is a grade 1/6 harsh midsystolic murmur at the URSB, radiating to the neck.      No gallop. No click. No rub.   Pulses:     Intact distal pulses.           ECG 12 Lead    Date/Time: 3/18/2021 9:52 AM  Performed by: Cony Sanabria APRN  Authorized by: Cony Sanabria APRN   Comparison: compared with previous ECG from 8/3/2020  Similar to previous ECG  Rhythm: sinus bradycardia  Rate: bradycardic  BPM: 39  Conduction: 1st degree AV block  T inversion: V1  T flattening: aVL    Clinical impression: abnormal EKG  Comments: Indication: Hypertension              Assessment:       Diagnosis Plan   1. Bicuspid aortic valve     2. Benign essential hypertension  ECG 12 Lead   3. Bradycardia       Orders Placed This Encounter   Procedures   • ECG 12 Lead     This order was created via procedure documentation          Plan:       1.  Bicuspid aortic valve.  Echocardiogram from January " revealed a bicuspid aortic valve with no hemodynamically significant stenosis and normal LV function.      2.  Hypertension/bradycardia.  Her blood pressure is high today.  Unfortunately, she has an allergy to ACE inhibitors and ARB's.  I am going to make a few changes.  I am going to stop the hydrochlorothiazide and replace it with chlorthalidone 25 mg daily.  I will also start her on hydralazine 50 mg 3 times daily.  Although she has a history of bradycardia, I think her heart rate today is too low.  I will also decrease the metoprolol to 50 mg daily.  I would like for her to come back and see me in 2 weeks follow-up.  I have encouraged her to keep checking her blood pressures at home and call me if she has any problems.      Further recommendations will be made at her appointment in 2 weeks.      As always, it has been a pleasure to participate in your patient's care.      Sincerely,         EZRA Landa

## 2021-03-22 ENCOUNTER — EPISODE CHANGES (OUTPATIENT)
Dept: CASE MANAGEMENT | Facility: OTHER | Age: 76
End: 2021-03-22

## 2021-03-22 ENCOUNTER — HOSPITAL ENCOUNTER (EMERGENCY)
Facility: HOSPITAL | Age: 76
Discharge: HOME | End: 2021-03-22
Attending: EMERGENCY MEDICINE | Admitting: EMERGENCY MEDICINE

## 2021-03-22 VITALS
HEART RATE: 59 BPM | OXYGEN SATURATION: 98 % | WEIGHT: 174 LBS | RESPIRATION RATE: 18 BRPM | HEIGHT: 64 IN | TEMPERATURE: 97.1 F | SYSTOLIC BLOOD PRESSURE: 180 MMHG | DIASTOLIC BLOOD PRESSURE: 70 MMHG | BODY MASS INDEX: 29.71 KG/M2

## 2021-03-22 DIAGNOSIS — R03.0 ELEVATED BLOOD PRESSURE READING: Primary | ICD-10-CM

## 2021-03-22 DIAGNOSIS — G44.209 ACUTE NON INTRACTABLE TENSION-TYPE HEADACHE: ICD-10-CM

## 2021-03-22 LAB
ANION GAP SERPL CALCULATED.3IONS-SCNC: 8 MMOL/L (ref 5–15)
BUN SERPL-MCNC: 26 MG/DL (ref 8–23)
BUN/CREAT SERPL: 18.3 (ref 7–25)
CALCIUM SPEC-SCNC: 10 MG/DL (ref 8.6–10.5)
CHLORIDE SERPL-SCNC: 102 MMOL/L (ref 98–107)
CO2 SERPL-SCNC: 25 MMOL/L (ref 22–29)
CREAT SERPL-MCNC: 1.42 MG/DL (ref 0.57–1)
GFR SERPL CREATININE-BSD FRML MDRD: 36 ML/MIN/1.73
GLUCOSE SERPL-MCNC: 138 MG/DL (ref 65–99)
HOLD SPECIMEN: NORMAL
HOLD SPECIMEN: NORMAL
POTASSIUM SERPL-SCNC: 4.6 MMOL/L (ref 3.5–5.2)
QT INTERVAL: 453 MS
SODIUM SERPL-SCNC: 135 MMOL/L (ref 136–145)
WHOLE BLOOD HOLD SPECIMEN: NORMAL
WHOLE BLOOD HOLD SPECIMEN: NORMAL

## 2021-03-22 PROCEDURE — 80048 BASIC METABOLIC PNL TOTAL CA: CPT | Performed by: EMERGENCY MEDICINE

## 2021-03-22 PROCEDURE — 99284 EMERGENCY DEPT VISIT MOD MDM: CPT

## 2021-03-22 PROCEDURE — 93005 ELECTROCARDIOGRAM TRACING: CPT | Performed by: PHYSICIAN ASSISTANT

## 2021-03-22 PROCEDURE — 93010 ELECTROCARDIOGRAM REPORT: CPT | Performed by: INTERNAL MEDICINE

## 2021-03-22 RX ORDER — HYDRALAZINE HYDROCHLORIDE 50 MG/1
50 TABLET, FILM COATED ORAL ONCE
Status: COMPLETED | OUTPATIENT
Start: 2021-03-22 | End: 2021-03-22

## 2021-03-22 RX ORDER — ACETAMINOPHEN 500 MG
1000 TABLET ORAL ONCE
Status: COMPLETED | OUTPATIENT
Start: 2021-03-22 | End: 2021-03-22

## 2021-03-22 RX ORDER — HYDRALAZINE HYDROCHLORIDE 20 MG/ML
20 INJECTION INTRAMUSCULAR; INTRAVENOUS ONCE
Status: DISCONTINUED | OUTPATIENT
Start: 2021-03-22 | End: 2021-03-22

## 2021-03-22 RX ORDER — SODIUM CHLORIDE 0.9 % (FLUSH) 0.9 %
10 SYRINGE (ML) INJECTION AS NEEDED
Status: DISCONTINUED | OUTPATIENT
Start: 2021-03-22 | End: 2021-03-22 | Stop reason: HOSPADM

## 2021-03-22 RX ADMIN — ACETAMINOPHEN 1000 MG: 500 TABLET, FILM COATED ORAL at 12:23

## 2021-03-22 RX ADMIN — HYDRALAZINE HYDROCHLORIDE 50 MG: 50 TABLET, FILM COATED ORAL at 12:46

## 2021-03-22 NOTE — ED PROVIDER NOTES
Brief history of present illness: 75-year-old female with headache intermittently x1 week is also noted elevated blood pressure over similar period of time.  Headache generally resolves completely with Tylenol.  History of labile hypertension currently under management of cardiology.  Specifically no chest pain, shortness of breath, syncope.  Patient reports chronic vision change that is unchanged today.  No focal motor deficit reported though sometimes both arms feel tremulous.  No focal numbness.    Physical exam:   ED Triage Vitals   Temp Heart Rate Resp BP SpO2   03/22/21 1005 03/22/21 1015 03/22/21 1015 03/22/21 1015 03/22/21 1015   97.1 °F (36.2 °C) 62 16 (!) 200/80 98 %      Temp src Heart Rate Source Patient Position BP Location FiO2 (%)   03/22/21 1005 03/22/21 1015 -- -- --   Tympanic Monitor        Very anxious female.  Abisai, EOMI.  No meningismus or rigidity.  Alert appropriate.  Moves all extremities equally.   strength equal bilaterally.  Moves bilateral lower extremities equally.  Abdomen soft.  Lungs clear.  No respiratory distress.  Strong radial pulses bilaterally.  No lower extremity edema.    MDM: I personally reviewed the EKG and interpreted concomitant with treatment.  EKG time 1131 reveals sinus bradycardia with no acute ischemic change.    Close outpatient follow-up with PCP and/or cardiology for more aggressive outpatient therapy of hypertension recommended.    I have seen and personally evaluated this patient, discussed the case with the treating advanced practice provider, and reviewed their note. I was involved in the medical decision making during the evaluation, testing and disposition planning for this patient.     Donaldo Gallardo MD  03/22/21 [id number]

## 2021-03-22 NOTE — ED NOTES
Patient has on mask, nurse has on mask goggles and face shield.      Sharon Ness, RN  03/22/21 [id number]

## 2021-03-22 NOTE — ED PROVIDER NOTES
EMERGENCY DEPARTMENT ENCOUNTER    Room Number:  25/25  Date of encounter:  3/22/2021  PCP: Ankit Albert Jr., MD  Historian: Patient      I used full protective equipment while examining this patient.  This includes face mask, gloves and protective eyewear.  I washed my hands before entering the room and immediately upon leaving the room      HPI:  Chief Complaint: Elevated blood pressure, headache  A complete HPI/ROS/PMH/PSH/SH/FH are unobtainable due to: Nothing    Context: Yi Lee is a 75 y.o. female who presents to the ED c/o elevated blood pressure x1 week, as well as associated headache. Patient states she has a history of very difficult to treat hypertension. She states her blood pressure at home was 265/99 earlier today without her taking her morning medications. Patient states when her blood pressure is elevated she has a diffuse throbbing headache. Patient was recently seen by her cardiologist on 3/18/2021. She had her medications adjusted for hypertension. She denies any chest pain, shortness of breath. She states her headache today is a throbbing, diffusely right-sided headache.  She states the headache had a gradual onset, no thunderclap type onset.  She states light does make her headache worse. She denies any nausea or vomiting. Patient takes Tylenol at home for headaches and states this typically resolves her pain. Patient has a history of bradycardia as well.    Review of Medical Records  I reviewed cardiology office visit from 3/18/2021. Patient had hydralazine 50 mg 3 times daily added to her regimen. She had her metoprolol cut in half. She also had chlorthalidone substituted for her HCTZ.    PAST MEDICAL HISTORY  Active Ambulatory Problems     Diagnosis Date Noted   • Abnormal creatinine clearance glomerular filtration 11/22/2015   • Benign essential hypertension 11/22/2015   • Chronic obstructive pulmonary disease (CMS/HCC) 11/22/2015   • Depression 11/22/2015   • Type 2 diabetes mellitus  with diabetic neuropathy, without long-term current use of insulin (CMS/[facility]) 11/22/2015   • Gastroesophageal reflux disease 11/22/2015   • Hyperlipidemia 11/22/2015   • Hypothyroidism 11/22/2015   • Obesity (BMI 30-39.9) 11/22/2015   • Ataxic gait 11/22/2015   • Abdominal pain, right lateral 04/25/2016   • Iron deficiency anemia due to chronic blood loss 01/27/2017   • Cystic kidney disease 07/28/2017   • Vitamin B12 deficiency 07/28/2017   • OA (osteoarthritis) of knee 02/13/2018   • Iron deficiency anemia 01/15/2020   • Bipolar I disorder, single manic episode (CMS/[facility]) 06/17/2020   • Ductal carcinoma in situ (DCIS) of left breast 07/31/2020   • Bradycardia 07/31/2020   • Systolic murmur 07/31/2020   • Bicuspid aortic valve 03/18/2021     Resolved Ambulatory Problems     Diagnosis Date Noted   • Acute bronchitis 11/22/2015   • Pain of lower extremity 11/22/2015   • Ataxic gait 11/22/2015   • Back pain 11/22/2015   • Impaired cognition 11/22/2015   • Cough 11/22/2015   • Arthralgia of hip 11/22/2015   • Memory loss 11/22/2015     Past Medical History:   Diagnosis Date   • Abdominal pain, LLQ    • Anemia    • Anemia    • Cardiac murmur    • Colon polyp    • Coronary artery disease    • Degeneration, intervertebral disc, thoracolumbar    • Diabetes mellitus (CMS/[facility])    • Disease of thyroid gland    • Diverticulosis    • Esophageal spasm    • Fatigue    • GERD (gastroesophageal reflux disease)    • H/O bone density study 10/17/2007   • Hemorrhoids    • History of mammogram    • History of Papanicolaou smear of cervix    • Hypertension    • Optic nerve contusion    • Vitamin D deficiency          PAST SURGICAL HISTORY  Past Surgical History:   Procedure Laterality Date   • BREAST LUMPECTOMY Left     benign   • CHOLECYSTECTOMY     • COLONOSCOPY  2007    done 02/12, repeat 5 yrs, Dr Grigsby; tics in sigmoid colon, IH   • COLONOSCOPY N/A 11/3/2016    polyp, IH   • COLONOSCOPY N/A 2/24/2020    Procedure: COLONOSCOPY TO  CECUM WITH COLD POLYPECTOMY;  Surgeon: Eddie Grigsby MD;  [facility] END[facility];  Service: Gastroenterology;  Laterality: N/A;  pre: hx of polyps  post: polyp, hemorrhoids, diverticulosis   • D & C AND LAPAROSCOPY     • DILATATION AND CURETTAGE     • ENDOSCOPY N/A 2020    Procedure: ESOPHAGOGASTRODUODENOSCOPY with biopsies;  Surgeon: Eddie Grigsby MD;  [facility];  Service: Gastroenterology;  Laterality: N/A;  pre: iron deficiency anemia  post: gastriits   • ESOPHAGOGASTRIC FUNDOPLASTY     • ROTATOR CUFF REPAIR Right    • TOTAL KNEE ARTHROPLASTY Left 2018    Procedure: LT TOTAL KNEE ARTHROPLASTY;  Surgeon: Selwyn Pinto MD;  Location: [facility] MAIN OR;  Service:          FAMILY HISTORY  Family History   Problem Relation Age of Onset   • Colon polyps Father    • Heart disease Father    • Prostate cancer Father    • Hepatitis Other    • Coronary artery disease Other    • Hypertension Brother    • Lung cancer Maternal Aunt    • Lung cancer Paternal Uncle    • Stroke Paternal Grandmother    • Cerebral aneurysm Paternal Grandmother    • Stroke Paternal Grandfather    • Cerebral aneurysm Paternal Grandfather    • Lung cancer Maternal Aunt    • Malig Hyperthermia Neg Hx          SOCIAL HISTORY  Social History     Socioeconomic History   • Marital status:      Spouse name: Not on file   • Number of children: 1   • Years of education: Not on file   • Highest education level: Not on file   Tobacco Use   • Smoking status: Former Smoker     Packs/day: 1.00     Years: 7.00     Pack years: 7.00     Types: Cigarettes     Quit date:      Years since quittin.2   • Smokeless tobacco: Never Used   • Tobacco comment: caffeine use   Substance and Sexual Activity   • Alcohol use: Yes     Comment: OCC   • Drug use: No   • Sexual activity: Never         ALLERGIES  Morphine, Penicillins, Ace inhibitors, and Telmisartan        REVIEW OF SYSTEMS  All systems reviewed and negative except for  those discussed in HPI.       PHYSICAL EXAM    I have reviewed the triage vital signs and nursing notes.    ED Triage Vitals   Temp Heart Rate Resp BP SpO2   03/22/21 1005 03/22/21 1015 03/22/21 1015 03/22/21 1015 03/22/21 1015   97.1 °F (36.2 °C) 62 16 (!) 200/80 98 %      Temp src Heart Rate Source Patient Position BP Location FiO2 (%)   03/22/21 1005 03/22/21 1015 -- -- --   Tympanic Monitor          Physical Exam  GENERAL: Alert, oriented, anxious, not distressed  HENT: head atraumatic, no nuchal rigidity  EYES: no scleral icterus, EOMI  CV: regular rhythm, bradycardic rate, no murmur  RESPIRATORY: normal effort, CTA  ABDOMEN: soft, nontender  MUSCULOSKELETAL: no deformity, FROM, no calf swelling or tenderness  NEURO: alert, cranial nerves II through XII grossly intact, no focal deficits  SKIN: warm, dry        LAB RESULTS  Recent Results (from the past 24 hour(s))   ECG 12 Lead    Collection Time: 03/22/21 11:31 AM   Result Value Ref Range    QT Interval 453 ms   Light Blue Top    Collection Time: 03/22/21 12:06 PM   Result Value Ref Range    Extra Tube hold for add-on    Green Top (Gel)    Collection Time: 03/22/21 12:06 PM   Result Value Ref Range    Extra Tube Hold for add-ons.    Lavender Top    Collection Time: 03/22/21 12:06 PM   Result Value Ref Range    Extra Tube hold for add-on    Gold Top - SST    Collection Time: 03/22/21 12:06 PM   Result Value Ref Range    Extra Tube Hold for add-ons.    Basic Metabolic Panel    Collection Time: 03/22/21 12:06 PM    Specimen: Blood   Result Value Ref Range    Glucose 138 (H) 65 - 99 mg/dL    BUN 26 (H) 8 - 23 mg/dL    Creatinine 1.42 (H) 0.57 - 1.00 mg/dL    Sodium 135 (L) 136 - 145 mmol/L    Potassium 4.6 3.5 - 5.2 mmol/L    Chloride 102 98 - 107 mmol/L    CO2 25.0 22.0 - 29.0 mmol/L    Calcium 10.0 8.6 - 10.5 mg/dL    eGFR Non African Amer 36 (L) >60 mL/min/1.73    BUN/Creatinine Ratio 18.3 7.0 - 25.0    Anion Gap 8.0 5.0 - 15.0 mmol/L       Ordered the above  labs and independently reviewed the results.    MEDICATIONS GIVEN IN ER    Medications   acetaminophen (TYLENOL) tablet 1,000 mg (1,000 mg Oral Given 3/22/21 1223)   hydrALAZINE (APRESOLINE) tablet 50 mg (50 mg Oral Given 3/22/21 1246)         PROGRESS, DATA ANALYSIS, CONSULTS, AND MEDICAL DECISION MAKING    All labs have been independently reviewed by me.  All radiology studies have been reviewed by me and discussed with radiologist dictating the report.   EKG's independently viewed and interpreted by me.  Discussion below represents my analysis of pertinent findings related to patient's condition, differential diagnosis, treatment plan and final disposition.    I have discussed case with Dr. Gallardo, emergency room physician.  He has performed his own bedside examination and agrees with treatment plan.    ED Course as of Mar 22 1827   Mon Mar 22, 2021   1143 Patient presents with elevated blood pressure, as well as daily headache x1 week. Patient recently had blood pressure medications changed by cardiology. She has not had her medicines this morning. Plan to check BMP, administer morning medications, and monitor in the ER.    [EE]   1210 EKG           EKG time: 1131  Rhythm/Rate: Sinus bradycardia, 50  P waves and PA: First degree AV block  QRS, axis: Narrow  ST and T waves: No STEMI; QTC within normal limits; U wave present    Interpreted Contemporaneously by me, independently viewed  Comparison: 2/7/2018      [RS]   1246 Creatinine(!): 1.42 [EE]   1246 Potassium: 4.6 [EE]   1246 Calcium: 10.0 [EE]   1350 Patient has had improvement of her blood pressure.  She has stable labs.  Patient was given strict return to ER directions.  Her headache is resolved at this time.    [EE]      ED Course User Index  [EE] Wei Humphreys PA  [RS] Donaldo Gallardo MD       AS OF 18:27 EDT VITALS:    BP - 180/70  HR - 59  TEMP - 97.1 °F (36.2 °C) (Tympanic)  O2 SATS - 98%        DIAGNOSIS  Final diagnoses:   Elevated blood pressure  reading   Acute non intractable tension-type headache         DISPOSITION  Discharged           Wei Humphreys PA  03/22/21 3260

## 2021-03-22 NOTE — ED NOTES
Pt to ER by EMS from home c/o HTN and HA. Pt reports HTN meds changed on Thursday, pt unaware as to which meds changed, only handed a list to EMS with no further info.    This RN is wearing mask, gloves and goggles at all times during all patient interactions.     Julito Cueva, RN  03/22/21 4206

## 2021-03-23 ENCOUNTER — TELEPHONE (OUTPATIENT)
Dept: CARDIOLOGY | Facility: CLINIC | Age: 76
End: 2021-03-23

## 2021-03-23 RX ORDER — HYDRALAZINE HYDROCHLORIDE 100 MG/1
100 TABLET, FILM COATED ORAL 3 TIMES DAILY
Qty: 280 TABLET | Refills: 3 | Status: SHIPPED | OUTPATIENT
Start: 2021-03-23 | End: 2021-06-22 | Stop reason: SDUPTHER

## 2021-03-23 NOTE — TELEPHONE ENCOUNTER
I spoke with her.  Her blood pressures are still poorly controlled.  Today it was 161/82 after taking her medications.  I advised her to increase the hydralazine to 100 mg 3 times daily.

## 2021-03-23 NOTE — TELEPHONE ENCOUNTER
S/W pt re: reporting to E yesterday for HTN. Pt stated she was told that he did not need to report to the ED unless she felt like she was going to have a stroke. Pt stated she reported to ED yesterday due to her BP being in the 200's/100's. Pt stated she didn't want to die at home and she didn't know if this could be related to her heart.  Please review ED Note and advise of recommendations.    MIREILLE Navarro

## 2021-03-24 NOTE — TELEPHONE ENCOUNTER
Spoke with patient.  She called the answering service complaining of lightheadedness and fatigue.  Her blood pressure has been 200/80 and is now 160/60.  I explained to her that blood pressure is still elevated although this does not appear to be an emergent need to go to the emergency department.  She is to continue current dosing and continue monitoring.  She will call with any further questions or concerns.

## 2021-03-26 ENCOUNTER — APPOINTMENT (OUTPATIENT)
Dept: GENERAL RADIOLOGY | Facility: HOSPITAL | Age: 76
End: 2021-03-26

## 2021-03-26 ENCOUNTER — APPOINTMENT (OUTPATIENT)
Dept: CT IMAGING | Facility: HOSPITAL | Age: 76
End: 2021-03-26

## 2021-03-26 ENCOUNTER — HOSPITAL ENCOUNTER (EMERGENCY)
Facility: HOSPITAL | Age: 76
Discharge: HOME | End: 2021-03-26
Attending: EMERGENCY MEDICINE | Admitting: EMERGENCY MEDICINE

## 2021-03-26 VITALS
SYSTOLIC BLOOD PRESSURE: 172 MMHG | TEMPERATURE: 97.6 F | HEIGHT: 66 IN | HEART RATE: 58 BPM | RESPIRATION RATE: 16 BRPM | DIASTOLIC BLOOD PRESSURE: 66 MMHG | BODY MASS INDEX: 28.08 KG/M2 | OXYGEN SATURATION: 98 %

## 2021-03-26 DIAGNOSIS — I10 ELEVATED BLOOD PRESSURE READING WITH DIAGNOSIS OF HYPERTENSION: Primary | ICD-10-CM

## 2021-03-26 LAB
ALBUMIN SERPL-MCNC: 4.2 G/DL (ref 3.5–5.2)
ALBUMIN/GLOB SERPL: 1.6 G/DL
ALP SERPL-CCNC: 83 U/L (ref 39–117)
ALT SERPL W P-5'-P-CCNC: 17 U/L (ref 1–33)
ANION GAP SERPL CALCULATED.3IONS-SCNC: 10 MMOL/L (ref 5–15)
AST SERPL-CCNC: 18 U/L (ref 1–32)
BASOPHILS # BLD AUTO: 0.06 10*3/MM3 (ref 0–0.2)
BASOPHILS NFR BLD AUTO: 0.7 % (ref 0–1.5)
BILIRUB SERPL-MCNC: 0.2 MG/DL (ref 0–1.2)
BUN SERPL-MCNC: 28 MG/DL (ref 8–23)
BUN/CREAT SERPL: 19.6 (ref 7–25)
CALCIUM SPEC-SCNC: 10.1 MG/DL (ref 8.6–10.5)
CHLORIDE SERPL-SCNC: 102 MMOL/L (ref 98–107)
CO2 SERPL-SCNC: 22 MMOL/L (ref 22–29)
CREAT SERPL-MCNC: 1.43 MG/DL (ref 0.57–1)
DEPRECATED RDW RBC AUTO: 40.2 FL (ref 37–54)
EOSINOPHIL # BLD AUTO: 0.25 10*3/MM3 (ref 0–0.4)
EOSINOPHIL NFR BLD AUTO: 2.9 % (ref 0.3–6.2)
ERYTHROCYTE [DISTWIDTH] IN BLOOD BY AUTOMATED COUNT: 11.8 % (ref 12.3–15.4)
GFR SERPL CREATININE-BSD FRML MDRD: 36 ML/MIN/1.73
GLOBULIN UR ELPH-MCNC: 2.6 GM/DL
GLUCOSE SERPL-MCNC: 174 MG/DL (ref 65–99)
HCT VFR BLD AUTO: 35.1 % (ref 34–46.6)
HGB BLD-MCNC: 11.5 G/DL (ref 12–15.9)
IMM GRANULOCYTES # BLD AUTO: 0.04 10*3/MM3 (ref 0–0.05)
IMM GRANULOCYTES NFR BLD AUTO: 0.5 % (ref 0–0.5)
LYMPHOCYTES # BLD AUTO: 1.22 10*3/MM3 (ref 0.7–3.1)
LYMPHOCYTES NFR BLD AUTO: 14.2 % (ref 19.6–45.3)
MAGNESIUM SERPL-MCNC: 2 MG/DL (ref 1.6–2.4)
MCH RBC QN AUTO: 30.2 PG (ref 26.6–33)
MCHC RBC AUTO-ENTMCNC: 32.8 G/DL (ref 31.5–35.7)
MCV RBC AUTO: 92.1 FL (ref 79–97)
MONOCYTES # BLD AUTO: 1.02 10*3/MM3 (ref 0.1–0.9)
MONOCYTES NFR BLD AUTO: 11.9 % (ref 5–12)
NEUTROPHILS NFR BLD AUTO: 6.01 10*3/MM3 (ref 1.7–7)
NEUTROPHILS NFR BLD AUTO: 69.8 % (ref 42.7–76)
NRBC BLD AUTO-RTO: 0 /100 WBC (ref 0–0.2)
NT-PROBNP SERPL-MCNC: 613.5 PG/ML (ref 0–900)
PLATELET # BLD AUTO: 388 10*3/MM3 (ref 140–450)
PMV BLD AUTO: 8.7 FL (ref 6–12)
POTASSIUM SERPL-SCNC: 4.6 MMOL/L (ref 3.5–5.2)
PROT SERPL-MCNC: 6.8 G/DL (ref 6–8.5)
QT INTERVAL: 446 MS
RBC # BLD AUTO: 3.81 10*6/MM3 (ref 3.77–5.28)
SARS-COV-2 ORF1AB RESP QL NAA+PROBE: NOT DETECTED
SODIUM SERPL-SCNC: 134 MMOL/L (ref 136–145)
TROPONIN T SERPL-MCNC: <0.01 NG/ML (ref 0–0.03)
WBC # BLD AUTO: 8.6 10*3/MM3 (ref 3.4–10.8)

## 2021-03-26 PROCEDURE — 84484 ASSAY OF TROPONIN QUANT: CPT | Performed by: NURSE PRACTITIONER

## 2021-03-26 PROCEDURE — 99285 EMERGENCY DEPT VISIT HI MDM: CPT

## 2021-03-26 PROCEDURE — 93010 ELECTROCARDIOGRAM REPORT: CPT | Performed by: INTERNAL MEDICINE

## 2021-03-26 PROCEDURE — 25010000002 HYDRALAZINE PER 20 MG: Performed by: NURSE PRACTITIONER

## 2021-03-26 PROCEDURE — 80053 COMPREHEN METABOLIC PANEL: CPT | Performed by: NURSE PRACTITIONER

## 2021-03-26 PROCEDURE — 83735 ASSAY OF MAGNESIUM: CPT | Performed by: NURSE PRACTITIONER

## 2021-03-26 PROCEDURE — U0004 COV-19 TEST NON-CDC HGH THRU: HCPCS | Performed by: NURSE PRACTITIONER

## 2021-03-26 PROCEDURE — U0005 INFEC AGEN DETEC AMPLI PROBE: HCPCS | Performed by: NURSE PRACTITIONER

## 2021-03-26 PROCEDURE — 71045 X-RAY EXAM CHEST 1 VIEW: CPT

## 2021-03-26 PROCEDURE — 93005 ELECTROCARDIOGRAM TRACING: CPT | Performed by: NURSE PRACTITIONER

## 2021-03-26 PROCEDURE — 83880 ASSAY OF NATRIURETIC PEPTIDE: CPT | Performed by: NURSE PRACTITIONER

## 2021-03-26 PROCEDURE — C9803 HOPD COVID-19 SPEC COLLECT: HCPCS | Performed by: NURSE PRACTITIONER

## 2021-03-26 PROCEDURE — 85025 COMPLETE CBC W/AUTO DIFF WBC: CPT | Performed by: NURSE PRACTITIONER

## 2021-03-26 PROCEDURE — 70450 CT HEAD/BRAIN W/O DYE: CPT

## 2021-03-26 PROCEDURE — 96374 THER/PROPH/DIAG INJ IV PUSH: CPT

## 2021-03-26 RX ORDER — SODIUM CHLORIDE 0.9 % (FLUSH) 0.9 %
10 SYRINGE (ML) INJECTION AS NEEDED
Status: DISCONTINUED | OUTPATIENT
Start: 2021-03-26 | End: 2021-03-26 | Stop reason: HOSPADM

## 2021-03-26 RX ORDER — ACETAMINOPHEN 500 MG
1000 TABLET ORAL ONCE
Status: COMPLETED | OUTPATIENT
Start: 2021-03-26 | End: 2021-03-26

## 2021-03-26 RX ORDER — CHLORTHALIDONE 25 MG/1
25 TABLET ORAL ONCE
Status: COMPLETED | OUTPATIENT
Start: 2021-03-26 | End: 2021-03-26

## 2021-03-26 RX ORDER — HYDRALAZINE HYDROCHLORIDE 20 MG/ML
10 INJECTION INTRAMUSCULAR; INTRAVENOUS ONCE
Status: COMPLETED | OUTPATIENT
Start: 2021-03-26 | End: 2021-03-26

## 2021-03-26 RX ORDER — CARVEDILOL 12.5 MG/1
12.5 TABLET ORAL 2 TIMES DAILY
Qty: 60 TABLET | Refills: 0 | Status: SHIPPED | OUTPATIENT
Start: 2021-03-26 | End: 2021-04-06 | Stop reason: SDUPTHER

## 2021-03-26 RX ORDER — HYDRALAZINE HYDROCHLORIDE 50 MG/1
100 TABLET, FILM COATED ORAL ONCE
Status: COMPLETED | OUTPATIENT
Start: 2021-03-26 | End: 2021-03-26

## 2021-03-26 RX ORDER — METOPROLOL SUCCINATE 50 MG/1
50 TABLET, EXTENDED RELEASE ORAL ONCE
Status: COMPLETED | OUTPATIENT
Start: 2021-03-26 | End: 2021-03-26

## 2021-03-26 RX ORDER — ACETAMINOPHEN 500 MG
TABLET ORAL
Status: COMPLETED
Start: 2021-03-26 | End: 2021-03-26

## 2021-03-26 RX ADMIN — Medication 1000 MG: at 09:33

## 2021-03-26 RX ADMIN — METOPROLOL SUCCINATE 50 MG: 50 TABLET, EXTENDED RELEASE ORAL at 09:05

## 2021-03-26 RX ADMIN — CHLORTHALIDONE 25 MG: 25 TABLET ORAL at 09:05

## 2021-03-26 RX ADMIN — HYDRALAZINE HYDROCHLORIDE 100 MG: 50 TABLET, FILM COATED ORAL at 09:05

## 2021-03-26 RX ADMIN — ACETAMINOPHEN 1000 MG: 500 TABLET, FILM COATED ORAL at 09:33

## 2021-03-26 RX ADMIN — HYDRALAZINE HYDROCHLORIDE 10 MG: 20 INJECTION, SOLUTION INTRAMUSCULAR; INTRAVENOUS at 06:45

## 2021-03-29 ENCOUNTER — TELEPHONE (OUTPATIENT)
Dept: CARDIOLOGY | Facility: CLINIC | Age: 76
End: 2021-03-29

## 2021-03-29 ENCOUNTER — OFFICE VISIT (OUTPATIENT)
Dept: CARDIOLOGY | Facility: CLINIC | Age: 76
End: 2021-03-29

## 2021-03-29 VITALS
HEART RATE: 54 BPM | DIASTOLIC BLOOD PRESSURE: 80 MMHG | HEIGHT: 66 IN | BODY MASS INDEX: 27.97 KG/M2 | SYSTOLIC BLOOD PRESSURE: 177 MMHG | WEIGHT: 174 LBS | RESPIRATION RATE: 18 BRPM | OXYGEN SATURATION: 98 %

## 2021-03-29 DIAGNOSIS — I10 BENIGN ESSENTIAL HYPERTENSION: ICD-10-CM

## 2021-03-29 DIAGNOSIS — N28.9 RENAL INSUFFICIENCY: ICD-10-CM

## 2021-03-29 DIAGNOSIS — I10 BENIGN ESSENTIAL HYPERTENSION: Primary | ICD-10-CM

## 2021-03-29 DIAGNOSIS — I44.0 1ST DEGREE AV BLOCK: ICD-10-CM

## 2021-03-29 PROBLEM — R42 DIZZINESS: Status: ACTIVE | Noted: 2021-03-29

## 2021-03-29 PROCEDURE — 99215 OFFICE O/P EST HI 40 MIN: CPT | Performed by: NURSE PRACTITIONER

## 2021-03-29 PROCEDURE — 93000 ELECTROCARDIOGRAM COMPLETE: CPT | Performed by: NURSE PRACTITIONER

## 2021-03-29 RX ORDER — AMLODIPINE BESYLATE 5 MG/1
5 TABLET ORAL DAILY
Qty: 30 TABLET | Refills: 0 | Status: SHIPPED | OUTPATIENT
Start: 2021-03-29 | End: 2021-03-30

## 2021-03-29 RX ORDER — AMLODIPINE BESYLATE 5 MG/1
5 TABLET ORAL DAILY
Qty: 30 TABLET | Refills: 0
Start: 2021-03-29 | End: 2021-03-29 | Stop reason: SDUPTHER

## 2021-03-29 NOTE — TELEPHONE ENCOUNTER
----- Message from EZRA Umanzor sent at 3/29/2021 11:17 AM EDT -----  Patient needs to follow-up with Dr. Valenzuela at [facility].  Please arrange appt

## 2021-03-29 NOTE — PROGRESS NOTES
Date of Office Visit: 2021  Encounter Provider: EZRA Griffith  Place of Service: [facility] CARDIOLOGY  Patient Name: Yi Lee  :1945    Chief Complaint   Patient presents with   • Hypertension     HOSP FOLLOW UP   :     HPI: iY Lee is a 75 y.o. female who presents today for follow-up.  Old records have been obtained and reviewed by me.  She is a patient of Dr. Falcon's with a past medical history significant for hypertension, hyperlipidemia, GERD, COPD, type 2 diabetes, and obesity.  In 2020, she was evaluated for a systolic murmur.  She was also bradycardic for which Dr. Falcon recommended decreasing the dose of the metoprolol.  In 2021, an echocardiogram revealed normal LV systolic function, LV outflow track thickening with no significant obstructive disease, a bicuspid aortic valve with mild stenosis, and mild to moderate mitral regurgitation.     I last saw her in the office on 3/18/2021 at the request of her PCP for elevated blood pressure.  Additionally, her metoprolol dose had been increased again and her heart rate was in the 30s.  I decreased metoprolol, switched her from hydrochlorothiazide to chlorthalidone, and started her on hydralazine.  She was advised to follow-up with me in 2 weeks for reassessment.   On 3/22/2021, she was seen in the ER for intermittent headaches and elevated blood pressure.  Evidently she had not yet had her medications.  Ultimately, I increased the hydralazine to 100 mg 3 times daily.  On 3/26/2021, she presented back to the ED with elevated blood pressure and complaints of feeling like her whole body was throbbing.  She was given IV hydralazine and eventually started on a Cardene drip.  Ultimately, the Toprol was discontinued and she was started on carvedilol instead.   She still having episodes of high blood pressure.  She tells me every time her blood pressure is high she experiences a  throbbing sensation in her whole body, leg weakness, and jerkiness.  Her vision gets foggy and she has headaches.  Yesterday her blood pressure was 131/64 at which time she felt a little weak.  Therefore, she did not take her midday dose of hydralazine.  She took her medications around 11:00 last night and went to sleep.  This morning she feels okay.  She denies any chest pain, palpitations, edema, or syncope.  She has some mild shortness of breath with exertion that is unchanged but denies any PND orthopnea.  Interestingly, she is no longer taking amlodipine and is not sure when or why it was stopped.    Past Medical History:   Diagnosis Date   • Abdominal pain, LLQ    • Anemia    • Anemia    • Arthralgia of hip 11/22/2015   • Ataxic gait 11/22/2015    Description: Eval Dr Lillian Mederos, Neuro.  Some vestibular dysfunction present 2013/2014   • Bipolar I disorder, single manic episode (CMS/HCC)    • Bradycardia    • Cardiac murmur    • Colon polyp    • Coronary artery disease    • Degeneration, intervertebral disc, thoracolumbar    • Depression    • Diabetes mellitus (CMS/HCC)    • Disease of thyroid gland    • Diverticulosis    • Ductal carcinoma in situ (DCIS) of left breast    • Esophageal spasm    • Fatigue    • GERD (gastroesophageal reflux disease)    • H/O bone density study 10/17/2007    Dr. Giles   • Hemorrhoids    • History of mammogram     02/2012, per GYN Reshma Aranda   • History of Papanicolaou smear of cervix     DR. [facility]   • Hyperlipidemia    • Hypertension    • Impaired cognition 11/22/2015    Description: Testing done 05/14   • Optic nerve contusion    • Pain of lower extremity 11/22/2015   • Systolic murmur    • Vitamin B12 deficiency    • Vitamin D deficiency        Past Surgical History:   Procedure Laterality Date   • BREAST LUMPECTOMY Left     benign   • CHOLECYSTECTOMY     • COLONOSCOPY  2007    done 02/12, repeat 5 yrs, Dr Grigsby; tics in sigmoid colon, IH   • COLONOSCOPY N/A 11/3/2016     polyp, IH   • COLONOSCOPY N/A 2020    Procedure: COLONOSCOPY TO CECUM WITH COLD POLYPECTOMY;  Surgeon: Eddie Grigsby MD;  Location: [facility] ENDOSCOPY;  Service: Gastroenterology;  Laterality: N/A;  pre: hx of polyps  post: polyp, hemorrhoids, diverticulosis   • D & C AND LAPAROSCOPY     • DILATATION AND CURETTAGE     • ENDOSCOPY N/A 2020    Procedure: ESOPHAGOGASTRODUODENOSCOPY with biopsies;  Surgeon: Eddie Grigsby MD;  [facility];  Service: Gastroenterology;  Laterality: N/A;  pre: iron deficiency anemia  post: gastriits   • ESOPHAGOGASTRIC FUNDOPLASTY     • ROTATOR CUFF REPAIR Right    • TOTAL KNEE ARTHROPLASTY Left 2018    Procedure: LT TOTAL KNEE ARTHROPLASTY;  Surgeon: Selwyn Pinto MD;  Location: [facility] MAIN OR;  Service:        Social History     Socioeconomic History   • Marital status:      Spouse name: Not on file   • Number of children: 1   • Years of education: Not on file   • Highest education level: Not on file   Tobacco Use   • Smoking status: Former Smoker     Packs/day: 1.00     Years: 7.00     Pack years: 7.00     Types: Cigarettes     Quit date:      Years since quittin.2   • Smokeless tobacco: Never Used   • Tobacco comment: caffeine use: 2 CUPS COFFEE/ 3 DIET PEPSIS DAILY   Vaping Use   • Vaping Use: Never used   Substance and Sexual Activity   • Alcohol use: Yes     Comment: OCC   • Drug use: No   • Sexual activity: Never       Family History   Problem Relation Age of Onset   • Colon polyps Father    • Heart disease Father    • Prostate cancer Father    • Hepatitis Other    • Coronary artery disease Other    • Hypertension Brother    • Lung cancer Maternal Aunt    • Lung cancer Paternal Uncle    • Stroke Paternal Grandmother    • Cerebral aneurysm Paternal Grandmother    • Stroke Paternal Grandfather    • Cerebral aneurysm Paternal Grandfather    • Lung cancer Maternal Aunt    • Malig Hyperthermia Neg Hx        Review of Systems    Constitutional: Positive for malaise/fatigue.   Cardiovascular: Positive for dyspnea on exertion. Negative for chest pain, leg swelling, orthopnea, paroxysmal nocturnal dyspnea and syncope.   Respiratory: Negative.    Hematologic/Lymphatic: Negative for bleeding problem.   Musculoskeletal: Negative for falls.   Gastrointestinal: Negative for melena.   Neurological: Positive for dizziness and headaches. Negative for light-headedness.       Allergies   Allergen Reactions   • Morphine Shortness Of Breath   • Penicillins Hives and Swelling   • Ace Inhibitors Cough   • Telmisartan Cough         Current Outpatient Medications:   •  acetaminophen (TYLENOL) 325 MG tablet, Take 650 mg by mouth As Needed for Mild Pain ., Disp: , Rfl:   •  anastrozole (ARIMIDEX) 1 MG tablet, Take 1 mg by mouth Daily., Disp: , Rfl:   •  carvedilol (COREG) 12.5 MG tablet, Take 1 tablet by mouth 2 (Two) Times a Day for 30 days., Disp: 60 tablet, Rfl: 0  •  chlorthalidone (HYGROTON) 25 MG tablet, TAKE 1 TABLET BY MOUTH DAILY, Disp: 90 tablet, Rfl: 0  •  Cholecalciferol (VITAMIN D3) 2000 UNITS tablet, Take 1,000 Units by mouth Daily., Disp: , Rfl:   •  ferrous sulfate 325 (65 FE) MG EC tablet, Take 650 mg by mouth., Disp: , Rfl:   •  FLUoxetine (PROzac) 20 MG capsule, Take 1 capsule by mouth Daily., Disp: 90 capsule, Rfl: 1  •  glucose blood (FREESTYLE LITE) test strip, Use to test everyday as directed, Disp: 100 each, Rfl: 5  •  hydrALAZINE (APRESOLINE) 100 MG tablet, Take 1 tablet by mouth 3 (Three) Times a Day., Disp: 280 tablet, Rfl: 3  •  hydrOXYzine (ATARAX) 25 MG tablet, Take 25 mg by mouth 3 (Three) Times a Day As Needed for Itching., Disp: , Rfl:   •  lactulose (Generlac) 10 GM/15ML solution solution (encephalopathy), Take 35-45 ml po daily for constipation, Disp: 1350 mL, Rfl: 3  •  lamoTRIgine (LaMICtal) 200 MG tablet, Take 2.5 tablets by mouth Every Night., Disp: 225 tablet, Rfl: 1  •  Lancets (FREESTYLE) lancets, As directed to check  "blood glucose, Disp: 100 each, Rfl: 12  •  levothyroxine (SYNTHROID, LEVOTHROID) 100 MCG tablet, Take 1 tablet by mouth Daily., Disp: 90 tablet, Rfl: 3  •  metFORMIN (GLUCOPHAGE) 500 MG tablet, Take 2 tablets by mouth Daily With Breakfast., Disp: 180 tablet, Rfl: 1  •  omeprazole (priLOSEC) 40 MG capsule, Take 1 capsule by mouth Daily., Disp: 90 capsule, Rfl: 3  •  QUEtiapine (SEROquel) 300 MG tablet, Take 1 tablet by mouth Every Night., Disp: 90 tablet, Rfl: 3  •  rosuvastatin (CRESTOR) 5 MG tablet, Take 1 tablet by mouth Every Night. for cholesterol, Disp: 90 tablet, Rfl: 1  •  amLODIPine (Norvasc) 5 MG tablet, Take 1 tablet by mouth Daily., Disp: 30 tablet, Rfl: 0      Objective:     Vitals:    03/29/21 1027 03/29/21 1040   BP: 168/74 177/80   BP Location: Right arm Left arm   Patient Position: Sitting Sitting   Pulse: 62 54   Resp: 18    SpO2: 98%    Weight: 78.9 kg (174 lb)    Height: 167.6 cm (66\")      Body mass index is 28.08 kg/m².    PHYSICAL EXAM:    Neck:      Vascular: No JVD.   Pulmonary:      Effort: Pulmonary effort is normal.      Breath sounds: Normal breath sounds.   Cardiovascular:      Normal rate. Regular rhythm.      Murmurs: There is a systolic murmur.      No gallop. No click. No rub.   Pulses:     Intact distal pulses.           ECG 12 Lead    Date/Time: 3/29/2021 10:48 AM  Performed by: Cony Sanabria APRN  Authorized by: Cony Sanabria APRN   Comparison: compared with previous ECG from 3/26/2021  Similar to previous ECG  Rhythm: sinus bradycardia  Ectopy: atrial premature contractions  Rate: bradycardic  BPM: 54  Conduction: 1st degree AV block  T inversion: V1 and aVL    Clinical impression: abnormal EKG  Comments: Indication: Hypertension              Assessment:       Diagnosis Plan   1. Benign essential hypertension  ECG 12 Lead    Duplex Renal Artery - Bilateral Complete CAR    amLODIPine (Norvasc) 5 MG tablet   2. Renal insufficiency  Duplex Renal Artery - Bilateral " Complete CAR    Ambulatory Referral to Nephrology   3. 1st degree AV block  Holter Monitor - 24 Hour     Orders Placed This Encounter   Procedures   • Ambulatory Referral to Nephrology     Referral Priority:   Routine     Referral Type:   Consultation     Referral Reason:   Specialty Services Required     Requested Specialty:   Nephrology     Number of Visits Requested:   1   • Holter Monitor - 24 Hour     Standing Status:   Future     Standing Expiration Date:   3/29/2022     Order Specific Question:   Reason for exam?     Answer:   Dizziness   • ECG 12 Lead     This order was created via procedure documentation          Plan:       1.  Hypertension.  She is now on carvedilol 12.5 mg twice daily, chlorthalidone 25 mg once daily, and hydralazine 100 mg 3 times daily.  I think at this point it would be appropriate to proceed with a renal artery ultrasound.  I have also advised her to resume amlodipine.  We will start with 5 mg daily and increase to 10 mg daily if she does not see an improvement in her blood pressures of less than 140/90.      2.  Renal insufficiency.  She has previously seen Dr. Valenzuela with [facility] but has not seen her in about a year.  We will arrange follow-up.        3.  First-degree AV block/dizziness/PACs.  She is having frequent PACs on her EKG today.  In addition, she has a long first-degree AV block.  I will order a 24-hour Holter.      Overall I think she is stable.  Further recommendations will be made pending the results of the above test.  Otherwise, she will follow-up with Dr. Falcon in 2 months.      I spent 50 minutes in total including but not limited to the 25 minutes spent in direct conversation with this patient.       As always, it has been a pleasure to participate in your patient's care.      Sincerely,         EZRA Landa

## 2021-03-29 NOTE — TELEPHONE ENCOUNTER
Pt L/M stating she needs a refill for the new dose of Amlodipine 5 mg tabs sent to her pharm. Pt stated she was trying to cut the 10 mg tabs in 1/2, but the tabs kept breaking apart. Please review and sign.    MIREILLE Navarro

## 2021-03-30 RX ORDER — AMLODIPINE BESYLATE 5 MG/1
5 TABLET ORAL DAILY
Qty: 90 TABLET | Refills: 0 | Status: SHIPPED | OUTPATIENT
Start: 2021-03-30 | End: 2021-08-11 | Stop reason: DRUGHIGH

## 2021-03-31 ENCOUNTER — TELEPHONE (OUTPATIENT)
Dept: CARDIOLOGY | Facility: CLINIC | Age: 76
End: 2021-03-31

## 2021-03-31 DIAGNOSIS — I44.0 1ST DEGREE AV BLOCK: Primary | ICD-10-CM

## 2021-03-31 DIAGNOSIS — I44.1 AV BLOCK, MOBITZ 1: ICD-10-CM

## 2021-03-31 NOTE — TELEPHONE ENCOUNTER
I reviewed her Holter results with Dr. Hawkins.  Ultimately, he would like to see her in follow-up.  He did not recommend any medication changes at this point and does not feel like there is anything life threatening or dangerous going on.  I will place a referral to Dr. Hawkins.    I left her voicemail to call me regarding this plan.

## 2021-04-02 ENCOUNTER — TELEPHONE (OUTPATIENT)
Dept: CARDIOLOGY | Facility: CLINIC | Age: 76
End: 2021-04-02

## 2021-04-02 ENCOUNTER — TELEPHONE (OUTPATIENT)
Dept: INTERNAL MEDICINE | Facility: CLINIC | Age: 76
End: 2021-04-02

## 2021-04-02 ENCOUNTER — PATIENT OUTREACH (OUTPATIENT)
Dept: CASE MANAGEMENT | Facility: OTHER | Age: 76
End: 2021-04-02

## 2021-04-02 NOTE — TELEPHONE ENCOUNTER
Left vociemail for pt to come  letter or let me know where to fax it to. Left up front for .    DA

## 2021-04-02 NOTE — TELEPHONE ENCOUNTER
Pt calling stating her BP readings have be good and stable this week. Now she needs a return to work note for this Monday 4/5/21.     LUDWIG

## 2021-04-02 NOTE — TELEPHONE ENCOUNTER
----- Message from Mirian Edwards RN sent at 4/2/2021  9:31 AM EDT -----  Regarding: FYI- Unable to reach  I have been unsuccessful in four attempts to contact Ms. Lee to offer Nurse Ambulatory Case Management services.  Please know that I am available in the future as needed.  Thank you and your staff for all you do .  Happy Easter weekend.   Mirian Edwards RN, BSN, [facility]  [phone].

## 2021-04-02 NOTE — OUTREACH NOTE
Care Coordination Note    Informed of unsuccessful attempts to contact patient to offer ACM.  Notified of availability to assit in the future as needed with contact information provided.     Mirian Edwards RN  Ambulatory     4/2/2021, 09:35 EDT

## 2021-04-05 RX ORDER — FLUOXETINE HYDROCHLORIDE 20 MG/1
CAPSULE ORAL
Qty: 90 CAPSULE | Refills: 1 | Status: SHIPPED | OUTPATIENT
Start: 2021-04-05 | End: 2021-07-01 | Stop reason: SDUPTHER

## 2021-04-05 NOTE — TELEPHONE ENCOUNTER
Prozac 20 MG   [facility] [address]   Last office visit 3/29/2021  Next office visit 4/9/2021 with Dr Hawkins   Last EKG 3/29/2021  Labs 3/26/2021

## 2021-04-05 NOTE — TELEPHONE ENCOUNTER
Chlorthalidone 25 MG   Last office visit  3/29/2021  Next office visit 4/9/2021  Last EKG 3/29/2021  Labs 3/26/2021

## 2021-04-06 ENCOUNTER — TELEPHONE (OUTPATIENT)
Dept: CARDIOLOGY | Facility: CLINIC | Age: 76
End: 2021-04-06

## 2021-04-06 ENCOUNTER — HOSPITAL ENCOUNTER (OUTPATIENT)
Dept: CARDIOLOGY | Facility: HOSPITAL | Age: 76
Discharge: HOME | End: 2021-04-06
Admitting: NURSE PRACTITIONER

## 2021-04-06 DIAGNOSIS — N28.9 RENAL INSUFFICIENCY: ICD-10-CM

## 2021-04-06 DIAGNOSIS — I10 BENIGN ESSENTIAL HYPERTENSION: ICD-10-CM

## 2021-04-06 LAB
BH CV ECHO MEAS - DIST REN A EDV LEFT: -43 CM/SEC
BH CV ECHO MEAS - DIST REN A PSV LEFT: -160 CM/SEC
BH CV ECHO MEAS - DIST REN A RI LEFT: 0.73
BH CV ECHO MEAS - MID REN A EDV LEFT: -36.1 CM/SEC
BH CV ECHO MEAS - MID REN A PSV LEFT: -150 CM/SEC
BH CV ECHO MEAS - MID REN A RI LEFT: 0.76
BH CV ECHO MEAS - PROX REN A EDV LEFT: -25.8 CM/SEC
BH CV ECHO MEAS - PROX REN A PSV LEFT: -105 CM/SEC
BH CV ECHO MEAS - PROX REN A RI LEFT: 0.75
BH CV VAS KIDNEY HEIGHT LEFT: 4.3 CM
BH CV VAS RENAL AORTIC MID PSV: 120 CM/S
BH CV XLRA MEAS - KID L LEFT: 8.6 CM
BH CV XLRA MEAS DIST REN A EDV RIGHT: 20.5 CM/SEC
BH CV XLRA MEAS DIST REN A PSV RIGHT: 116 CM/SEC
BH CV XLRA MEAS DIST REN A RI RIGHT: 0.82
BH CV XLRA MEAS KID H RIGHT: 4.5 CM
BH CV XLRA MEAS KID L RIGHT: 10.4 CM
BH CV XLRA MEAS MID REN A EDV RIGHT: 19.6 CM/SEC
BH CV XLRA MEAS MID REN A PSV RIGHT: 132 CM/SEC
BH CV XLRA MEAS MID REN A RI RIGHT: 0.85
BH CV XLRA MEAS PROX REN A EDV RIGHT: 25.4 CM/SEC
BH CV XLRA MEAS PROX REN A PSV RIGHT: 115 CM/SEC
BH CV XLRA MEAS PROX REN A RI RIGHT: 0.78
BH CV XLRA MEAS RAR LEFT: 1.3
BH CV XLRA MEAS RAR RIGHT: 1.1

## 2021-04-06 PROCEDURE — 93975 VASCULAR STUDY: CPT

## 2021-04-06 PROCEDURE — 93975 VASCULAR STUDY: CPT | Performed by: INTERNAL MEDICINE

## 2021-04-06 NOTE — TELEPHONE ENCOUNTER
S/W pt re: needing refill on her Carvedilol 12.5 mg BID for 90 day supply to [facility]. Was prescribed in E per DC APRN.  Please review and sign.    MIREILLE Navarro

## 2021-04-06 NOTE — TELEPHONE ENCOUNTER
Advised pt of normal Renal Ultrasound Results and that there will be no changes to their regimen at this time. Pt verbalized understanding.     MIREILLE Navarro

## 2021-04-07 RX ORDER — CARVEDILOL 12.5 MG/1
12.5 TABLET ORAL 2 TIMES DAILY
Qty: 180 TABLET | Refills: 1 | Status: SHIPPED | OUTPATIENT
Start: 2021-04-07 | End: 2021-10-08

## 2021-04-08 RX ORDER — CHLORTHALIDONE 25 MG/1
25 TABLET ORAL DAILY
Qty: 90 TABLET | Refills: 0 | Status: SHIPPED | OUTPATIENT
Start: 2021-04-08 | End: 2021-07-09

## 2021-04-08 NOTE — TELEPHONE ENCOUNTER
S/W pharm re: Chlorthalidone 25 mg QD. Pharm stated the Rx you sent in in March was only 30 days, even though we see it as 90 days. So, pt really does need a 90 day refill.  Please review and sign.    MIREILLE Navarro

## 2021-04-19 ENCOUNTER — TELEPHONE (OUTPATIENT)
Dept: CARDIOLOGY | Facility: CLINIC | Age: 76
End: 2021-04-19

## 2021-04-19 NOTE — TELEPHONE ENCOUNTER
Pt L/M stating since starting the Hydralazine 300 mg TID she has been experiencing weakness and ringing in her ears. Pt stated she drank some pedialite and it help the problem. Pt stated she wanted to make some changes, but doesn't want to until you give recommendations.     I L/M for pt to call me back, as I had a couple extra questions to ask.    MIREILLE Navarro

## 2021-04-20 ENCOUNTER — TELEPHONE (OUTPATIENT)
Dept: GASTROENTEROLOGY | Facility: CLINIC | Age: 76
End: 2021-04-20

## 2021-04-20 DIAGNOSIS — K21.9 GASTROESOPHAGEAL REFLUX DISEASE: ICD-10-CM

## 2021-04-20 RX ORDER — OMEPRAZOLE 40 MG/1
40 CAPSULE, DELAYED RELEASE ORAL DAILY
Qty: 90 CAPSULE | Refills: 2 | Status: SHIPPED | OUTPATIENT
Start: 2021-04-20 | End: 2021-11-23

## 2021-04-20 NOTE — TELEPHONE ENCOUNTER
----- Message from Alden Butt sent at 4/20/2021  2:55 PM EDT -----  Regarding: omeprazole (priLOSEC) 40 MG capsule  PT needs a refill on medication sent to new pharmacy , please refill    omeprazole (priLOSEC) 40 MG capsule    [facility]  Phone: [phone]

## 2021-04-20 NOTE — TELEPHONE ENCOUNTER
See omeprazole 40 mg daily escribe of 2/4/21 to [facility]. .  Pharmacy info updated, escribe initiated with message to LORENA Kaufman

## 2021-04-22 NOTE — TELEPHONE ENCOUNTER
BP's are as follows:    151/59 58  139/69 57  131/59 57  139/69 57  181/91 61 -this AM before meds    MIREILLE Navarro

## 2021-04-23 NOTE — TELEPHONE ENCOUNTER
I would recommend she call our office to report any concerns regarding elevated blood pressures.  The only reason she would need to go to the ER is if she were having acute symptoms such as symptoms of a stroke, a severe headache, chest pain, shortness of breath.  Otherwise, she can take an additional amlodipine.  She should not take more than 10 mg of amlodipine in 1 day.

## 2021-04-23 NOTE — TELEPHONE ENCOUNTER
S/W pt and she stated she is no longer having symptoms of weakness and ringing in the ears.     Pt then stated the last two times she has been to [facility], her BP was in the 200's systolic and they have told her that her BP was fine and she didn't need to go to the ED for HTN. Pt stated she no longer wants to come to [facility] for HTN, as she feels like they are going to allow her to Stroke out. Pt asked if possibly when she gets HTN can she take an extra dose of her med or is there another pill she can take. Pt stated she would rather try and take care of her HTN herself w/ the assistance of us just so she doesn't have to go back to the ED.  Pt stated she will go to [facility] or somewhere else if she has to take another trip to the ED.  Please advise of recommendations.    MIREILLE Navarro

## 2021-04-28 ENCOUNTER — OFFICE VISIT (OUTPATIENT)
Dept: INTERNAL MEDICINE | Facility: CLINIC | Age: 76
End: 2021-04-28

## 2021-04-28 VITALS
WEIGHT: 173 LBS | BODY MASS INDEX: 27.92 KG/M2 | TEMPERATURE: 98.6 F | DIASTOLIC BLOOD PRESSURE: 88 MMHG | SYSTOLIC BLOOD PRESSURE: 144 MMHG | HEART RATE: 56 BPM | OXYGEN SATURATION: 98 %

## 2021-04-28 DIAGNOSIS — M79.672 PAIN OF LEFT HEEL: ICD-10-CM

## 2021-04-28 DIAGNOSIS — I10 BENIGN ESSENTIAL HYPERTENSION: Primary | ICD-10-CM

## 2021-04-28 DIAGNOSIS — I44.1 MOBITZ (TYPE) I (WENCKEBACH'S) ATRIOVENTRICULAR BLOCK: ICD-10-CM

## 2021-04-28 DIAGNOSIS — I10 ACCELERATED HYPERTENSION: ICD-10-CM

## 2021-04-28 PROCEDURE — 99213 OFFICE O/P EST LOW 20 MIN: CPT | Performed by: FAMILY MEDICINE

## 2021-04-28 NOTE — PROGRESS NOTES
Chief Complaint  Hypertension and Systolic murmur    Subjective          Yi Lee presents to [facility]  Yi is very upset because she feels like she is worried so much better blood pressure.  She has been emergency room twice.  Medication changes now include the addition of amlodipine 5 mg daily hydralazine 100 mg 3 times daily chlorthalidone 25 mg daily carvedilol 12.5 mg twice daily.  Her blood pressures approaching some semblance of control.  Currently 144/88.  Heart rate 56.  She has an abnormal Holter monitor has a follow-up with Dr. Hawkins on Monday.  She has evidence of Mobitz type I block intermittently.  Otherwise she has a follow-up with cardiology and nephrology for renal insufficiency.  I encouraged her to keep these appointments I will see her in 3 months with no medication changes at this time.  She will monitor her blood pressure and call me if any problems.    She displays complaint of heel pain for the past week.  We will send her to podiatry.    Hypertension  This is a recurrent problem. The current episode started more than 1 year ago. The problem has been gradually improving since onset. Associated symptoms include blurred vision and headaches. Risk factors for coronary artery disease include diabetes mellitus and dyslipidemia. Past treatments include beta blockers, direct vasodilators, diuretics and calcium channel blockers. Current antihypertension treatment includes calcium channel blockers, diuretics, direct vasodilators and beta blockers. The current treatment provides moderate improvement. There are no compliance problems.  Hypertensive end-organ damage includes kidney disease. Identifiable causes of hypertension include chronic renal disease.       Objective   Vital Signs:   /88 (BP Location: Left arm, Patient Position: Sitting, Cuff Size: Adult)   Pulse 56   Temp 98.6 °F (37 °C)   Wt 78.5 kg (173 lb)   SpO2 98%   BMI 27.92 kg/m²      Physical Exam  Vitals reviewed.   Constitutional:       Appearance: She is well-developed.   HENT:      Head: Normocephalic and atraumatic.      Right Ear: Tympanic membrane and external ear normal.      Left Ear: Tympanic membrane and external ear normal.   Eyes:      Conjunctiva/sclera: Conjunctivae normal.      Pupils: Pupils are equal, round, and reactive to light.   Neck:      Thyroid: No thyromegaly.      Vascular: No JVD.   Cardiovascular:      Rate and Rhythm: Normal rate and regular rhythm.      Heart sounds: Normal heart sounds.   Pulmonary:      Effort: Pulmonary effort is normal.      Breath sounds: Normal breath sounds.   Abdominal:      General: Bowel sounds are normal.      Palpations: Abdomen is soft.   Musculoskeletal:         General: Normal range of motion.      Cervical back: Normal range of motion and neck supple.   Lymphadenopathy:      Cervical: No cervical adenopathy.   Skin:     General: Skin is warm and dry.      Findings: No rash.   Neurological:      Mental Status: She is alert and oriented to person, place, and time.      Cranial Nerves: No cranial nerve deficit.      Coordination: Coordination normal.   Psychiatric:         Mood and Affect: Mood is anxious.         Behavior: Behavior normal.         Thought Content: Thought content normal.         Judgment: Judgment normal.        Result Review :                 Assessment and Plan    Diagnoses and all orders for this visit:    1. Benign essential hypertension (Primary)    2. Accelerated hypertension    3. Mobitz (type) I (Wenckebach's) atrioventricular block    4. Pain of left heel  -     Ambulatory Referral to Podiatry        Follow Up   Return in about 3 months (around 7/28/2021) for Recheck.  Patient was given instructions and counseling regarding her condition or for health maintenance advice. Please see specific information pulled into the AVS if appropriate.

## 2021-05-03 ENCOUNTER — OFFICE VISIT (OUTPATIENT)
Dept: CARDIOLOGY | Facility: CLINIC | Age: 76
End: 2021-05-03

## 2021-05-03 VITALS
DIASTOLIC BLOOD PRESSURE: 66 MMHG | SYSTOLIC BLOOD PRESSURE: 150 MMHG | WEIGHT: 170 LBS | BODY MASS INDEX: 27.32 KG/M2 | HEART RATE: 56 BPM | HEIGHT: 66 IN

## 2021-05-03 DIAGNOSIS — I10 BENIGN ESSENTIAL HYPERTENSION: ICD-10-CM

## 2021-05-03 DIAGNOSIS — R00.1 BRADYCARDIA: ICD-10-CM

## 2021-05-03 DIAGNOSIS — I44.0 1ST DEGREE AV BLOCK: Primary | ICD-10-CM

## 2021-05-03 PROCEDURE — 99204 OFFICE O/P NEW MOD 45 MIN: CPT | Performed by: INTERNAL MEDICINE

## 2021-05-03 PROCEDURE — 93000 ELECTROCARDIOGRAM COMPLETE: CPT | Performed by: INTERNAL MEDICINE

## 2021-05-03 NOTE — PROGRESS NOTES
Date of Office Visit: 2021  Encounter Provider: Scar Hawkins MD  Place of Service: [facility] CARDIOLOGY  Patient Name: Yi Lee  : 1945    Subjective:     Encounter Date:2021      Patient ID: Yi Lee is a 75 y.o. female who has a cc referred by RS for bradycardia and abn holter.     She feels fine.     She does have headaches sometimes.     She sees Michelle Carbajal for nephrology. Baseline Creat -- 1.25-1.45      No anginal chest pain,   No sig godoy,   No soa,   No fainting,No dizziness.   No orthostasis.   No edema.   Exercise tolerance: good.     There have been no hospital admission since the last visit.     There have been no bleeding events.       Past Medical History:   Diagnosis Date   • Abdominal pain, LLQ    • Anemia    • Anemia    • Arthralgia of hip 2015   • Ataxic gait 2015    Description: Eval Dr Lillian Mederos, Neuro.  Some vestibular dysfunction present    • Bipolar I disorder, single manic episode (CMS/HCC)    • Bradycardia    • Cardiac murmur    • Colon polyp    • Coronary artery disease    • Degeneration, intervertebral disc, thoracolumbar    • Depression    • Diabetes mellitus (CMS/HCC)    • Disease of thyroid gland    • Diverticulosis    • Ductal carcinoma in situ (DCIS) of left breast    • Esophageal spasm    • Fatigue    • GERD (gastroesophageal reflux disease)    • H/O bone density study 10/17/2007    Dr. Giles   • Hemorrhoids    • History of mammogram     2012, per GYN Reshma Aranda   • History of Papanicolaou smear of cervix     DR. GILES GYN   • Hyperlipidemia    • Hypertension    • Impaired cognition 2015    Description: Testing done    • Optic nerve contusion    • Pain of lower extremity 2015   • Systolic murmur    • Vitamin B12 deficiency    • Vitamin D deficiency        Social History     Socioeconomic History   • Marital status:      Spouse name: Not on file   • Number of children: 1   •  "Years of education: Not on file   • Highest education level: Not on file   Tobacco Use   • Smoking status: Former Smoker     Packs/day: 1.00     Years: 7.00     Pack years: 7.00     Types: Cigarettes     Quit date:      Years since quittin.3   • Smokeless tobacco: Never Used   • Tobacco comment: caffeine use: 2 CUPS COFFEE/ 3 DIET PEPSIS DAILY   Vaping Use   • Vaping Use: Never used   Substance and Sexual Activity   • Alcohol use: Yes     Comment: OCC   • Drug use: No   • Sexual activity: Never       Family History   Problem Relation Age of Onset   • Colon polyps Father    • Heart disease Father    • Prostate cancer Father    • Hepatitis Other    • Coronary artery disease Other    • Hypertension Brother    • Lung cancer Maternal Aunt    • Lung cancer Paternal Uncle    • Stroke Paternal Grandmother    • Cerebral aneurysm Paternal Grandmother    • Stroke Paternal Grandfather    • Cerebral aneurysm Paternal Grandfather    • Lung cancer Maternal Aunt    • Malig Hyperthermia Neg Hx        Review of Systems   Constitutional: Negative for fever and night sweats.   HENT: Negative for ear pain and stridor.    Eyes: Negative for discharge and visual halos.   Cardiovascular: Negative for cyanosis.   Respiratory: Negative for hemoptysis and sputum production.    Hematologic/Lymphatic: Negative for adenopathy.   Skin: Negative for nail changes and unusual hair distribution.   Musculoskeletal: Negative for gout and joint swelling.   Gastrointestinal: Negative for bowel incontinence and flatus.   Genitourinary: Negative for dysuria and flank pain.   Neurological: Negative for seizures and tremors.   Psychiatric/Behavioral: Negative for altered mental status. The patient is not nervous/anxious.             Objective:     Vitals:    21 1318   BP: 150/66   Pulse: 56   Weight: 77.1 kg (170 lb)   Height: 167.6 cm (66\")         Eyes:      General:         Right eye: No discharge.         Left eye: No discharge.   HENT:    "   Head: Normocephalic and atraumatic.   Neck:      Thyroid: No thyromegaly.      Vascular: No JVD.   Pulmonary:      Effort: Pulmonary effort is normal.      Breath sounds: Normal breath sounds. No rales.   Cardiovascular:      Normal rate. Regular rhythm.      No gallop.   Edema:     Peripheral edema absent.   Abdominal:      General: Bowel sounds are normal.      Palpations: Abdomen is soft.      Tenderness: There is no abdominal tenderness.   Musculoskeletal: Normal range of motion.         General: No deformity. Skin:     General: Skin is warm and dry.      Findings: No erythema.   Neurological:      Mental Status: Alert and oriented to person, place, and time.      Motor: Normal muscle tone.   Psychiatric:         Behavior: Behavior normal.         Thought Content: Thought content normal.           ECG 12 Lead    Date/Time: 5/3/2021 1:35 PM  Performed by: Scar Hawkins MD  Authorized by: Scar Hawkins MD   Comparison: compared with previous ECG   Similar to previous ECG  Rhythm: sinus bradycardia  Conduction: 1st degree AV block            Lab Review:       Assessment:          Diagnosis Plan   1. 1st degree AV block     2. Bradycardia     3. Benign essential hypertension            Plan:     Bradycardia due to mild SSS and first degree AV block and occ Wenckebach and these are asymptomatic and not bothersome and I would simply ignore them.     I would minimize bb use however.     Her main issue is the high blood pressure. -- she is so torn up about this mentally. She is worried.     I think she needs to see Dr. Carbajal and just get the BP meds adjusted tho she is close to ok now.         I wonder about she an SGLT2i -- we have strong data for CV outcomes in patients with diabetes and CKD.     Heart rate is a non-issue until she develops symptoms     We also discussed the importance of weight loss and regular exercise.

## 2021-06-01 RX ORDER — LAMOTRIGINE 200 MG/1
TABLET ORAL
Qty: 225 TABLET | Refills: 0 | Status: SHIPPED | OUTPATIENT
Start: 2021-06-01 | End: 2021-08-05

## 2021-06-10 ENCOUNTER — TELEPHONE (OUTPATIENT)
Dept: CARDIOLOGY | Facility: CLINIC | Age: 76
End: 2021-06-10

## 2021-06-10 NOTE — TELEPHONE ENCOUNTER
HARI....Pt calling stating she has been experiencing severe increased urination for the past day. Unsure if it is caused by one of her  Medications. Explained to her that she is taking a water pill, but the increased urination should have started shortly after her starting the medication. Pt will check with her other Dr's and call us back if her problem persist.        LUDWIG

## 2021-06-11 ENCOUNTER — OFFICE VISIT (OUTPATIENT)
Dept: INTERNAL MEDICINE | Facility: CLINIC | Age: 76
End: 2021-06-11

## 2021-06-11 VITALS
HEART RATE: 54 BPM | RESPIRATION RATE: 20 BRPM | WEIGHT: 169 LBS | OXYGEN SATURATION: 95 % | TEMPERATURE: 98 F | HEIGHT: 66 IN | DIASTOLIC BLOOD PRESSURE: 65 MMHG | SYSTOLIC BLOOD PRESSURE: 146 MMHG | BODY MASS INDEX: 27.16 KG/M2

## 2021-06-11 DIAGNOSIS — R68.89 FLUCTUATION OF WEIGHT: ICD-10-CM

## 2021-06-11 DIAGNOSIS — R82.90 ABNORMAL URINE: Primary | ICD-10-CM

## 2021-06-11 PROCEDURE — 99214 OFFICE O/P EST MOD 30 MIN: CPT | Performed by: NURSE PRACTITIONER

## 2021-06-11 NOTE — PROGRESS NOTES
"Chief Complaint  Weight Loss (Pt presents here today with concerns of weight loss.)    Subjective          Yi Lee presents to [facility]  Patient presents for evaluation of weight loss.  This is a 75-year-old female.  She is concerned that her weight has fluctuated 3 to 4 pounds over the past few months.  Last documented weight that we have was from 5/3/2021, 170 pounds.  Today, 6/11/2021 weight is 169 pounds.  She reports that at times her appetite fluctuates but overall she has not changed her eating habits.  She has not changed her exercise habits either.  She is up-to-date on colonoscopy last performed by Dr. Grigsby on 2/24/2020 which showed a 1 mm polyp, internal hemorrhoids and diverticulosis, recommended a 5-year repeat.  She denies chest pain, shortness of breath, fever or chills.    She endorses some \"black particles\" in her urine over the past week.  She has noticed this twice.  She uses a bedside commode at night and noticed the particles floating in the air.  She states \"I was not sure if this was just started from the commode or something in my urine.\"  She thinks the urine looks a bit cloudy.  She has been measuring her urine at night and it is amounting to about three quarters of a quart per patient.  No frequency, urgency or hesitancy of urination.  No flank pain.    Denies development of any other new issues today      Objective   Vital Signs:   /65 (BP Location: Right arm, Patient Position: Sitting, Cuff Size: Adult)   Pulse 54   Temp 98 °F (36.7 °C)   Resp 20   Ht 167.6 cm (66\")   Wt 76.7 kg (169 lb)   SpO2 95%   BMI 27.28 kg/m²     Physical Exam  Vitals and nursing note reviewed.   Constitutional:       General: She is not in acute distress.     Appearance: Normal appearance. She is well-developed. She is not ill-appearing, toxic-appearing or diaphoretic.   HENT:      Head: Normocephalic and atraumatic.      Right Ear: External ear normal.      " Left Ear: External ear normal.   Eyes:      Pupils: Pupils are equal, round, and reactive to light.   Neck:      Vascular: No carotid bruit.   Cardiovascular:      Rate and Rhythm: Normal rate and regular rhythm.      Pulses: Normal pulses.      Heart sounds: Normal heart sounds.      Comments: No peripheral edema  Pulmonary:      Effort: Pulmonary effort is normal. No respiratory distress.      Breath sounds: Normal breath sounds. No stridor. No wheezing, rhonchi or rales.   Chest:      Chest wall: No tenderness.   Abdominal:      General: Bowel sounds are normal. There is no distension.      Palpations: Abdomen is soft. There is no mass.      Tenderness: There is no abdominal tenderness. There is no right CVA tenderness, left CVA tenderness, guarding or rebound.      Hernia: No hernia is present.   Musculoskeletal:         General: Normal range of motion.      Cervical back: Normal range of motion and neck supple. No rigidity or tenderness.   Lymphadenopathy:      Cervical: No cervical adenopathy.   Skin:     General: Skin is warm and dry.      Capillary Refill: Capillary refill takes less than 2 seconds.   Neurological:      General: No focal deficit present.      Mental Status: She is alert and oriented to person, place, and time. Mental status is at baseline.   Psychiatric:         Mood and Affect: Mood normal.         Behavior: Behavior normal.         Thought Content: Thought content normal.         Judgment: Judgment normal.        Result Review :   The following data was reviewed by: EZRA Alvares on 06/11/2021:  Common labs    Common Labsle 1/27/21 1/27/21 1/27/21 1/27/21 3/22/21 3/26/21 3/26/21    1141 1141 1141 1141  0603 0603   Glucose     138 (A)  174 (A)   Glucose  156 (A)        BUN  23   26 (A)  28 (A)   Creatinine  1.27 (A)   1.42 (A)  1.43 (A)   eGFR Non  Am  41 (A)   36 (A)  36 (A)   eGFR  Am  50 (A)        Sodium  140   135 (A)  134 (A)   Potassium  4.8   4.6  4.6   Chloride  " 105   102  102   Calcium  9.8   10.0  10.1   Total Protein  6.5        Albumin  4.10     4.20   Total Bilirubin  0.2     0.2   Alkaline Phosphatase  86     83   AST (SGOT)  18     18   ALT (SGPT)  14     17   WBC 6.85     8.60    Hemoglobin 11.6 (A)     11.5 (A)    Hematocrit 36.1     35.1    Platelets 357     388    Total Cholesterol   178       Triglycerides   80       HDL Cholesterol   74 (A)       LDL Cholesterol    89       Hemoglobin A1C    6.50 (A)      (A) Abnormal value       Comments are available for some flowsheets but are not being displayed.           Current outpatient and discharge medications have been reconciled for the patient.  Reviewed by: EZRA Alvares           Assessment and Plan    Diagnoses and all orders for this visit:    1. Abnormal urine (Primary)  -     Urinalysis With Microscopic - Urine, Clean Catch  -     Urine Culture - Urine, Urine, Clean Catch  -     CBC No Differential  -     Basic Metabolic Panel    2. Fluctuation of weight  -     TSH  -     T4, Free  -     CBC No Differential  -     Basic Metabolic Panel    No UTI symptoms and likely that these \"particles\" were secondary to her bedside commode.  Will check urinalysis and culture to be safe.    Her weight appears relatively stable, she has lost about 1 pound in the last month.  Compared to 3 months ago she is down about 4 pounds.  Colonoscopy is up-to-date.  I will check thyroid studies, CBC and BMP.    I asked her to keep a weekly chart of her weights.    We will contact patient with lab results and any further recommendations.  Follow-up as needed and in August 2021 per previously scheduled appointment with PCP, Dr. Albert.      Follow Up   Return if symptoms worsen or fail to improve, for Next scheduled follow up.  Patient was given instructions and counseling regarding her condition or for health maintenance advice. Please see specific information pulled into the AVS if appropriate.       "

## 2021-06-13 LAB
APPEARANCE UR: CLEAR
BACTERIA #/AREA URNS HPF: NORMAL /[HPF]
BACTERIA UR CULT: NORMAL
BACTERIA UR CULT: NORMAL
BILIRUB UR QL STRIP: NEGATIVE
BUN SERPL-MCNC: 28 MG/DL (ref 8–27)
BUN/CREAT SERPL: 19 (ref 12–28)
CALCIUM SERPL-MCNC: 9.5 MG/DL (ref 8.7–10.3)
CASTS URNS QL MICRO: NORMAL /LPF
CHLORIDE SERPL-SCNC: 102 MMOL/L (ref 96–106)
CO2 SERPL-SCNC: 22 MMOL/L (ref 20–29)
COLOR UR: YELLOW
CREAT SERPL-MCNC: 1.45 MG/DL (ref 0.57–1)
EPI CELLS #/AREA URNS HPF: NORMAL /HPF (ref 0–10)
ERYTHROCYTE [DISTWIDTH] IN BLOOD BY AUTOMATED COUNT: 12.6 % (ref 11.7–15.4)
GLUCOSE SERPL-MCNC: 119 MG/DL (ref 65–99)
GLUCOSE UR QL: NEGATIVE
HCT VFR BLD AUTO: 33.6 % (ref 34–46.6)
HGB BLD-MCNC: 10.7 G/DL (ref 11.1–15.9)
HGB UR QL STRIP: NEGATIVE
KETONES UR QL STRIP: NEGATIVE
LEUKOCYTE ESTERASE UR QL STRIP: NEGATIVE
MCH RBC QN AUTO: 29.3 PG (ref 26.6–33)
MCHC RBC AUTO-ENTMCNC: 31.8 G/DL (ref 31.5–35.7)
MCV RBC AUTO: 92 FL (ref 79–97)
MICRO URNS: NORMAL
MICRO URNS: NORMAL
NITRITE UR QL STRIP: NEGATIVE
PH UR STRIP: 6.5 [PH] (ref 5–7.5)
PLATELET # BLD AUTO: 318 X10E3/UL (ref 150–450)
POTASSIUM SERPL-SCNC: 4.8 MMOL/L (ref 3.5–5.2)
PROT UR QL STRIP: NEGATIVE
RBC # BLD AUTO: 3.65 X10E6/UL (ref 3.77–5.28)
RBC #/AREA URNS HPF: NORMAL /HPF (ref 0–2)
SODIUM SERPL-SCNC: 138 MMOL/L (ref 134–144)
SP GR UR: 1.02 (ref 1–1.03)
T4 FREE SERPL-MCNC: 1.51 NG/DL (ref 0.82–1.77)
TSH SERPL DL<=0.005 MIU/L-ACNC: 1.69 UIU/ML (ref 0.45–4.5)
UROBILINOGEN UR STRIP-MCNC: 0.2 MG/DL (ref 0.2–1)
WBC # BLD AUTO: 4.9 X10E3/UL (ref 3.4–10.8)
WBC #/AREA URNS HPF: NORMAL /HPF (ref 0–5)

## 2021-06-14 NOTE — PROGRESS NOTES
Good morning Ms. Lee, your labs are back.  Your urine is clear with no signs of UTI.  Urine culture came back normal.  Thyroid numbers are normal.  Blood count is showing some slight anemia but hemoglobin looks comparable to previous checks, please keep the appointment with Dr. Albert next month for recheck of blood count and iron labs.  Metabolic panel is showing that kidney function is about at baseline, please avoid any excessive NSAIDs and ensure hydrating.  Normal electrolytes.  Please let us know if you have any questions and make sure to keep the follow-up in August with Dr. Albert for recheck.  Have a great day,EZRA Alvares

## 2021-06-23 RX ORDER — HYDRALAZINE HYDROCHLORIDE 100 MG/1
100 TABLET, FILM COATED ORAL 3 TIMES DAILY
Qty: 280 TABLET | Refills: 3 | Status: SHIPPED | OUTPATIENT
Start: 2021-06-23 | End: 2022-01-01

## 2021-07-01 ENCOUNTER — TELEPHONE (OUTPATIENT)
Dept: INTERNAL MEDICINE | Facility: CLINIC | Age: 76
End: 2021-07-01

## 2021-07-01 RX ORDER — FLUOXETINE HYDROCHLORIDE 20 MG/1
40 CAPSULE ORAL DAILY
Qty: 180 CAPSULE | Refills: 0 | Status: SHIPPED | OUTPATIENT
Start: 2021-07-01 | End: 2021-09-01

## 2021-07-01 NOTE — TELEPHONE ENCOUNTER
Caller: Yi Lee    Relationship: Self    Best call back number: [phone] (H)  Medication needed:   Requested Prescriptions     Pending Prescriptions Disp Refills   • FLUoxetine (PROzac) 20 MG capsule 90 capsule 1     Sig: Take 2 capsules by mouth Daily.       When do you need the refill by:AS SOON AS POSSIBLE      What additional details did the patient provide when requesting the medication: PATIENT IS OUT OF MEDICATION    Does the patient have less than a 3 day supply:  [x] Yes  [] No    What is the patient's preferred pharmacy:      [facility] - [place] [address] - [phone] [facility] [phone]   [phone]

## 2021-07-09 RX ORDER — CHLORTHALIDONE 25 MG/1
25 TABLET ORAL DAILY
Qty: 90 TABLET | Refills: 0 | Status: SHIPPED | OUTPATIENT
Start: 2021-07-09 | End: 2021-10-08

## 2021-07-26 ENCOUNTER — TELEPHONE (OUTPATIENT)
Dept: CARDIOLOGY | Facility: CLINIC | Age: 76
End: 2021-07-26

## 2021-07-26 DIAGNOSIS — R53.83 FATIGUE, UNSPECIFIED TYPE: Primary | ICD-10-CM

## 2021-07-26 NOTE — TELEPHONE ENCOUNTER
Pt calling to report her blood sugars have increased, once she read up on side effects from her chlorthalidone, hydralazine, carvedilol, amlodipine. She feels terrible after she takes her medicine in the mornings. She has been on amlodipine for a long time but the other 3 were started this yr. Please advise.    DA

## 2021-07-26 NOTE — TELEPHONE ENCOUNTER
Pt states she feels really fatigue/ow energy after taking medications that makes her feel like she needs to lay down and go to sleep. She does see her PCP on 8/10/21. Advised to call PCP and give us a call.    LUDWIG

## 2021-07-27 NOTE — TELEPHONE ENCOUNTER
Cony, I think the best way to sort out the heart rate is to just to do a simple 24-hour Holter just to make sure that her heart block has not progressed..  I have a low suspicion that it has but we could exclude heart rate by just doing a Holter

## 2021-07-28 NOTE — TELEPHONE ENCOUNTER
Elvira, please notify the patient of Dr. Hawkins's recommendation.  I have ordered the 24 hour Holter.  See if she can come today to have it placed.

## 2021-08-05 DIAGNOSIS — F30.9 BIPOLAR I DISORDER, SINGLE MANIC EPISODE (HCC): ICD-10-CM

## 2021-08-05 DIAGNOSIS — E78.2 MIXED HYPERLIPIDEMIA: ICD-10-CM

## 2021-08-05 DIAGNOSIS — E11.40 TYPE 2 DIABETES MELLITUS WITH DIABETIC NEUROPATHY, WITHOUT LONG-TERM CURRENT USE OF INSULIN (HCC): Primary | ICD-10-CM

## 2021-08-05 RX ORDER — ROSUVASTATIN CALCIUM 5 MG/1
TABLET, COATED ORAL
Qty: 90 TABLET | Refills: 3 | Status: ON HOLD | OUTPATIENT
Start: 2021-08-05 | End: 2022-01-01

## 2021-08-05 RX ORDER — LAMOTRIGINE 200 MG/1
TABLET ORAL
Qty: 225 TABLET | Refills: 3 | Status: ON HOLD | OUTPATIENT
Start: 2021-08-05 | End: 2022-01-01

## 2021-08-11 ENCOUNTER — OFFICE VISIT (OUTPATIENT)
Dept: INTERNAL MEDICINE | Facility: CLINIC | Age: 76
End: 2021-08-11

## 2021-08-11 VITALS
WEIGHT: 166 LBS | BODY MASS INDEX: 26.79 KG/M2 | HEART RATE: 55 BPM | DIASTOLIC BLOOD PRESSURE: 76 MMHG | TEMPERATURE: 97.5 F | SYSTOLIC BLOOD PRESSURE: 132 MMHG | OXYGEN SATURATION: 97 %

## 2021-08-11 DIAGNOSIS — R63.4 ABNORMAL WEIGHT LOSS: Primary | ICD-10-CM

## 2021-08-11 DIAGNOSIS — R94.4 ABNORMAL CREATININE CLEARANCE GLOMERULAR FILTRATION: ICD-10-CM

## 2021-08-11 DIAGNOSIS — E55.9 VITAMIN D DEFICIENCY: ICD-10-CM

## 2021-08-11 DIAGNOSIS — F41.0 PANIC ATTACKS: ICD-10-CM

## 2021-08-11 DIAGNOSIS — E53.8 VITAMIN B12 DEFICIENCY: ICD-10-CM

## 2021-08-11 DIAGNOSIS — E11.40 TYPE 2 DIABETES MELLITUS WITH DIABETIC NEUROPATHY, WITHOUT LONG-TERM CURRENT USE OF INSULIN (HCC): ICD-10-CM

## 2021-08-11 DIAGNOSIS — D50.8 OTHER IRON DEFICIENCY ANEMIA: ICD-10-CM

## 2021-08-11 DIAGNOSIS — R93.89 ABNORMAL CHEST CT: ICD-10-CM

## 2021-08-11 DIAGNOSIS — D05.12 DUCTAL CARCINOMA IN SITU (DCIS) OF LEFT BREAST: ICD-10-CM

## 2021-08-11 PROCEDURE — 99214 OFFICE O/P EST MOD 30 MIN: CPT | Performed by: FAMILY MEDICINE

## 2021-08-11 RX ORDER — AMLODIPINE BESYLATE 10 MG/1
15 TABLET ORAL DAILY
COMMUNITY
End: 2021-10-13 | Stop reason: SDUPTHER

## 2021-08-11 RX ORDER — BUSPIRONE HYDROCHLORIDE 10 MG/1
10 TABLET ORAL 2 TIMES DAILY
Qty: 60 TABLET | Refills: 2 | Status: SHIPPED | OUTPATIENT
Start: 2021-08-11 | End: 2021-09-07 | Stop reason: ALTCHOICE
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
## 2021-08-12 LAB
25(OH)D3+25(OH)D2 SERPL-MCNC: 52.9 NG/ML (ref 30–100)
ALBUMIN SERPL ELPH-MCNC: 3.6 G/DL (ref 2.9–4.4)
ALBUMIN SERPL-MCNC: 4.2 G/DL (ref 3.5–5.2)
ALBUMIN/GLOB SERPL: 1.4 {RATIO} (ref 0.7–1.7)
ALBUMIN/GLOB SERPL: 2.1 G/DL
ALP SERPL-CCNC: 87 U/L (ref 39–117)
ALPHA1 GLOB SERPL ELPH-MCNC: 0.3 G/DL (ref 0–0.4)
ALPHA2 GLOB SERPL ELPH-MCNC: 0.7 G/DL (ref 0.4–1)
ALT SERPL-CCNC: 38 U/L (ref 1–33)
APPEARANCE UR: CLEAR
AST SERPL-CCNC: 32 U/L (ref 1–32)
B-GLOBULIN SERPL ELPH-MCNC: 0.9 G/DL (ref 0.7–1.3)
BACTERIA #/AREA URNS HPF: NORMAL /HPF
BASOPHILS # BLD AUTO: 0.03 10*3/MM3 (ref 0–0.2)
BASOPHILS NFR BLD AUTO: 0.6 % (ref 0–1.5)
BILIRUB SERPL-MCNC: 0.2 MG/DL (ref 0–1.2)
BILIRUB UR QL STRIP: NEGATIVE
BUN SERPL-MCNC: 29 MG/DL (ref 8–23)
BUN/CREAT SERPL: 19.9 (ref 7–25)
CALCIUM SERPL-MCNC: 9.8 MG/DL (ref 8.6–10.5)
CASTS URNS MICRO: NORMAL
CHLORIDE SERPL-SCNC: 102 MMOL/L (ref 98–107)
CHOLEST SERPL-MCNC: 150 MG/DL (ref 0–200)
CHOLEST/HDLC SERPL: 2.46 {RATIO}
CO2 SERPL-SCNC: 24 MMOL/L (ref 22–29)
COLOR UR: YELLOW
CREAT SERPL-MCNC: 1.46 MG/DL (ref 0.57–1)
EOSINOPHIL # BLD AUTO: 0.05 10*3/MM3 (ref 0–0.4)
EOSINOPHIL NFR BLD AUTO: 1.1 % (ref 0.3–6.2)
EPI CELLS #/AREA URNS HPF: NORMAL /HPF
ERYTHROCYTE [DISTWIDTH] IN BLOOD BY AUTOMATED COUNT: 11.8 % (ref 12.3–15.4)
FOLATE SERPL-MCNC: >20 NG/ML (ref 4.78–24.2)
GAMMA GLOB SERPL ELPH-MCNC: 0.7 G/DL (ref 0.4–1.8)
GLOBULIN SER CALC-MCNC: 2 GM/DL
GLOBULIN SER CALC-MCNC: 2.6 G/DL (ref 2.2–3.9)
GLUCOSE SERPL-MCNC: 140 MG/DL (ref 65–99)
GLUCOSE UR QL: NEGATIVE
HBA1C MFR BLD: 5.4 % (ref 4.8–5.6)
HCT VFR BLD AUTO: 33.5 % (ref 34–46.6)
HDLC SERPL-MCNC: 61 MG/DL (ref 40–60)
HGB BLD-MCNC: 11 G/DL (ref 12–15.9)
HGB UR QL STRIP: NEGATIVE
IMM GRANULOCYTES # BLD AUTO: 0.03 10*3/MM3 (ref 0–0.05)
IMM GRANULOCYTES NFR BLD AUTO: 0.6 % (ref 0–0.5)
KETONES UR QL STRIP: ABNORMAL
LABORATORY COMMENT REPORT: NORMAL
LDLC SERPL CALC-MCNC: 75 MG/DL (ref 0–100)
LEUKOCYTE ESTERASE UR QL STRIP: ABNORMAL
LYMPHOCYTES # BLD AUTO: 0.53 10*3/MM3 (ref 0.7–3.1)
LYMPHOCYTES NFR BLD AUTO: 11.1 % (ref 19.6–45.3)
M PROTEIN SERPL ELPH-MCNC: NORMAL G/DL
MCH RBC QN AUTO: 31.3 PG (ref 26.6–33)
MCHC RBC AUTO-ENTMCNC: 32.8 G/DL (ref 31.5–35.7)
MCV RBC AUTO: 95.2 FL (ref 79–97)
MONOCYTES # BLD AUTO: 0.81 10*3/MM3 (ref 0.1–0.9)
MONOCYTES NFR BLD AUTO: 17 % (ref 5–12)
NEUTROPHILS # BLD AUTO: 3.31 10*3/MM3 (ref 1.7–7)
NEUTROPHILS NFR BLD AUTO: 69.6 % (ref 42.7–76)
NITRITE UR QL STRIP: NEGATIVE
NRBC BLD AUTO-RTO: 0 /100 WBC (ref 0–0.2)
PH UR STRIP: 6 [PH] (ref 5–8)
PLATELET # BLD AUTO: 318 10*3/MM3 (ref 140–450)
POTASSIUM SERPL-SCNC: 4.4 MMOL/L (ref 3.5–5.2)
PROT PATTERN SERPL ELPH-IMP: NORMAL
PROT SERPL-MCNC: 6.2 G/DL (ref 6–8.5)
PROT UR QL STRIP: ABNORMAL
RBC # BLD AUTO: 3.52 10*6/MM3 (ref 3.77–5.28)
RBC #/AREA URNS HPF: NORMAL /HPF
SODIUM SERPL-SCNC: 137 MMOL/L (ref 136–145)
SP GR UR: 1.02 (ref 1–1.03)
T3FREE SERPL-MCNC: 2 PG/ML (ref 2–4.4)
T4 FREE SERPL-MCNC: 1.42 NG/DL (ref 0.93–1.7)
TRIGL SERPL-MCNC: 70 MG/DL (ref 0–150)
TSH SERPL DL<=0.005 MIU/L-ACNC: 3.62 UIU/ML (ref 0.27–4.2)
UROBILINOGEN UR STRIP-MCNC: ABNORMAL MG/DL
VIT B12 SERPL-MCNC: 527 PG/ML (ref 211–946)
VLDLC SERPL CALC-MCNC: 14 MG/DL (ref 5–40)
WBC # BLD AUTO: 4.76 10*3/MM3 (ref 3.4–10.8)
WBC #/AREA URNS HPF: NORMAL /HPF

## 2021-08-16 ENCOUNTER — TELEPHONE (OUTPATIENT)
Dept: CARDIOLOGY | Facility: CLINIC | Age: 76
End: 2021-08-16

## 2021-08-16 NOTE — TELEPHONE ENCOUNTER
Started taking bp two wks ago and states it is high in the mornings after taking meds at night. Doesn't have all the readings and seems a little confused when trying to work the memory on her BP machine. Readings are 1 hr after meds     187/79   170/80   180/70  Today 174/80 after meds 156/70     Advised pt to start bid bp log two hrs after morning meds and once in the evening until I hear more instructions. Please advise.        DA

## 2021-08-19 NOTE — TELEPHONE ENCOUNTER
Pt states she just now got my message about coming in for a BP check. States she was woken up this am with a BP of 202/81 then 2 hrs after her medication it was 143/60. Do we still want her to come in for BP check today or tomorrow if I can reach her.    DA

## 2021-08-23 ENCOUNTER — TELEPHONE (OUTPATIENT)
Dept: INTERNAL MEDICINE | Facility: CLINIC | Age: 76
End: 2021-08-23

## 2021-08-23 RX ORDER — LACTULOSE 10 G/15ML
SOLUTION ORAL; RECTAL
Qty: 1350 ML | Refills: 3 | Status: SHIPPED | OUTPATIENT
Start: 2021-08-23 | End: 2021-10-07 | Stop reason: SDUPTHER

## 2021-08-23 NOTE — TELEPHONE ENCOUNTER
Caller: Yi Lee    Relationship: Self    Best call back number:[phone]  What is the best time to reach you: ANYTIME    Who are you requesting to speak with (clinical staff, provider,  specific staff member): DR. DURAND    Do you know the name of the person who called: N/A    What was the call regarding: N/A    Do you require a callback: YES

## 2021-08-23 NOTE — TELEPHONE ENCOUNTER
"Please review her labs and advise, she is pretty concerned-she said \"things aren't looking good for the home team\"     "

## 2021-08-24 ENCOUNTER — TELEPHONE (OUTPATIENT)
Dept: INTERNAL MEDICINE | Facility: CLINIC | Age: 76
End: 2021-08-24

## 2021-08-24 NOTE — TELEPHONE ENCOUNTER
Caller: Yi Lee    Relationship to patient: Self    Best call back number: [phone]    Patient is needing: ASKING TO SPEAK TO DR DURAND ABOUT LAB RESULTS

## 2021-08-26 NOTE — TELEPHONE ENCOUNTER
PATIENT STATES SHE DOES NOT NEED A CALL BACK, SHE IS WORKING ONCOLOGIST     CALL BACK [phone]

## 2021-09-01 RX ORDER — FLUOXETINE HYDROCHLORIDE 20 MG/1
40 CAPSULE ORAL DAILY
Qty: 180 CAPSULE | Refills: 1 | Status: SHIPPED | OUTPATIENT
Start: 2021-09-01 | End: 2021-09-17

## 2021-09-02 ENCOUNTER — TELEPHONE (OUTPATIENT)
Dept: INTERNAL MEDICINE | Facility: CLINIC | Age: 76
End: 2021-09-02

## 2021-09-02 NOTE — TELEPHONE ENCOUNTER
PATIENT IS CALLING TO REQUEST A DIFFERENT MEDICATION BECAUSE AFTER TRYING BUSPAR FOR ABOUT 2 WEEKS, SHE THINKS SHE NEEDS SOMETHING STRONGER.     Caller: Yi Lee    Relationship: Self    Best call back number: [phone]     What medication are you requesting: XANAX BECAUSE THE BUSPAR      If a prescription is needed, what is your preferred pharmacy and phone number: [id number] [place], [place] - [address] AT [facility] [address] - [phone] [facility] [phone] FX

## 2021-09-03 NOTE — TELEPHONE ENCOUNTER
PATIENT IS CHECKING STATUS. BUSPAR WASN'T WORKING AND DR. DURAND HAD MENTIONED XANAX. PLEASE CALL AND ADVISE.     [id number] - [place], [place] - [address] AT [facility] [address] - [phone]  - [phone]   [phone]

## 2021-09-07 DIAGNOSIS — F41.9 ANXIETY: Primary | ICD-10-CM

## 2021-09-07 RX ORDER — ALPRAZOLAM 0.25 MG/1
0.25 TABLET ORAL 3 TIMES DAILY PRN
Qty: 60 TABLET | Refills: 1 | Status: SHIPPED | OUTPATIENT
Start: 2021-09-07 | End: 2021-09-17 | Stop reason: DRUGHIGH

## 2021-09-07 NOTE — TELEPHONE ENCOUNTER
I talked her on the phone and she has a great deal of anxiety which we will discontinue BuSpar and give her a very small dose of Xanax 0.2 5/2-1 3 times daily as needed.  She has follow-up with oncology and I reviewed those notes.  At this time she does not appear to have cancer related weight loss.

## 2021-09-16 ENCOUNTER — APPOINTMENT (OUTPATIENT)
Dept: CT IMAGING | Facility: HOSPITAL | Age: 76
End: 2021-09-16

## 2021-09-17 ENCOUNTER — OFFICE VISIT (OUTPATIENT)
Dept: INTERNAL MEDICINE | Facility: CLINIC | Age: 76
End: 2021-09-17

## 2021-09-17 VITALS
BODY MASS INDEX: 25.71 KG/M2 | HEIGHT: 66 IN | TEMPERATURE: 98.2 F | WEIGHT: 160 LBS | HEART RATE: 50 BPM | OXYGEN SATURATION: 98 %

## 2021-09-17 DIAGNOSIS — R63.4 WEIGHT LOSS, NON-INTENTIONAL: ICD-10-CM

## 2021-09-17 DIAGNOSIS — F30.9 BIPOLAR I DISORDER, SINGLE MANIC EPISODE (HCC): ICD-10-CM

## 2021-09-17 DIAGNOSIS — F41.9 ANXIETY: Primary | ICD-10-CM

## 2021-09-17 DIAGNOSIS — F32.1 CURRENT MODERATE EPISODE OF MAJOR DEPRESSIVE DISORDER, UNSPECIFIED WHETHER RECURRENT (HCC): ICD-10-CM

## 2021-09-17 PROCEDURE — 99214 OFFICE O/P EST MOD 30 MIN: CPT | Performed by: FAMILY MEDICINE

## 2021-09-17 RX ORDER — ALPRAZOLAM 0.5 MG/1
0.5 TABLET ORAL 3 TIMES DAILY PRN
Qty: 270 TABLET | Refills: 0 | Status: SHIPPED | OUTPATIENT
Start: 2021-09-17

## 2021-09-17 RX ORDER — PAROXETINE HYDROCHLORIDE 40 MG/1
40 TABLET, FILM COATED ORAL EVERY MORNING
Qty: 90 TABLET | Refills: 1 | Status: SHIPPED | OUTPATIENT
Start: 2021-09-17 | End: 2022-01-01 | Stop reason: SDUPTHER

## 2021-09-17 NOTE — PROGRESS NOTES
"Chief Complaint  Hyperlipidemia and Hypothyroidism    Subjective          Yi Lee presents to [facility]  Patient has been evaluated by oncology with history of breast cancer.  They could not find evidence of obvious metabolic or oncologic reason for weight loss.  She has been seen by a counselor.  We discussed the effects of her current medications.  We will provide as needed Xanax 0.5 3 times daily as needed and otherwise switch from Prozac to Paxil 40 mg daily for depression with increased appetite effect of the paroxetine.  Side effects are discussed with her and we will see her back in about a month.    Active manage of hyperlipidemia and hypothyroidism were reviewed also.      Objective   Vital Signs:   Pulse 50   Temp 98.2 °F (36.8 °C)   Ht 167.6 cm (66\")   Wt 72.6 kg (160 lb)   SpO2 98%   BMI 25.82 kg/m²     Physical Exam  Vitals reviewed.   Constitutional:       Appearance: She is well-developed.   HENT:      Head: Normocephalic and atraumatic.      Right Ear: Tympanic membrane and external ear normal.      Left Ear: Tympanic membrane and external ear normal.      Nose: Nose normal.   Eyes:      Conjunctiva/sclera: Conjunctivae normal.      Pupils: Pupils are equal, round, and reactive to light.   Neck:      Thyroid: No thyromegaly.      Vascular: No JVD.   Cardiovascular:      Rate and Rhythm: Normal rate and regular rhythm.      Heart sounds: Normal heart sounds.   Pulmonary:      Effort: Pulmonary effort is normal.      Breath sounds: Normal breath sounds.   Abdominal:      General: Bowel sounds are normal.      Palpations: Abdomen is soft.   Musculoskeletal:         General: Normal range of motion.      Cervical back: Normal range of motion and neck supple.   Lymphadenopathy:      Cervical: No cervical adenopathy.   Skin:     General: Skin is warm and dry.      Findings: No rash.   Neurological:      Mental Status: She is alert and oriented to person, place, and " time.      Cranial Nerves: No cranial nerve deficit.      Coordination: Coordination normal.   Psychiatric:         Attention and Perception: Attention normal.         Mood and Affect: Mood is anxious. Affect is labile.         Speech: Speech normal.         Behavior: Behavior normal.         Thought Content: Thought content normal.         Judgment: Judgment normal.        Result Review :                 Assessment and Plan    Diagnoses and all orders for this visit:    1. Anxiety (Primary)  Comments:  Xanax 0.5 3 times daily as needed and recheck in about a month.  Orders:  -     ALPRAZolam (Xanax) 0.5 MG tablet; Take 1 tablet by mouth 3 (Three) Times a Day As Needed for Anxiety.  Dispense: 270 tablet; Refill: 0    2. Bipolar I disorder, single manic episode (CMS/HCC)  Comments:  Continue current dose of Seroquel 300 mg at bedtime.    3. Current moderate episode of major depressive disorder, unspecified whether recurrent (CMS/HCC)  Comments:  Replace Prozac with Paxil 40 mg daily    4. Weight loss, non-intentional  Comments:  Felt secondary to depression and anxiety.  Discussion current medications and will discontinue Prozac and replaced with paroxetine 40 mg daily for increased deisy    Other orders  -     PARoxetine (Paxil) 40 MG tablet; Take 1 tablet by mouth Every Morning. For depression  Dispense: 90 tablet; Refill: 1        Follow Up   Return in about 1 month (around 10/17/2021) for Recheck.  Patient was given instructions and counseling regarding her condition or for health maintenance advice. Please see specific information pulled into the AVS if appropriate.

## 2021-09-22 ENCOUNTER — TELEPHONE (OUTPATIENT)
Dept: INTERNAL MEDICINE | Facility: CLINIC | Age: 76
End: 2021-09-22

## 2021-09-22 NOTE — TELEPHONE ENCOUNTER
PATIENT IS ASKING FOR RONNA TO CALL HER REGARDING HER DOSAGE OF HER MEDICATION XANAX.     PLEASE ADVISE  [phone]

## 2021-09-23 NOTE — TELEPHONE ENCOUNTER
I spoke w/ the pt and [facility] had not sent her Alprazolam yet so I call and I spoke w/ Yesemia @ [facility] and the RX will be shipped to the pt today  Pt advised

## 2021-10-07 ENCOUNTER — TELEPHONE (OUTPATIENT)
Dept: GASTROENTEROLOGY | Facility: CLINIC | Age: 76
End: 2021-10-07

## 2021-10-07 RX ORDER — LACTULOSE 10 G/15ML
SOLUTION ORAL; RECTAL
Qty: 4050 ML | Refills: 1 | Status: SHIPPED | OUTPATIENT
Start: 2021-10-07

## 2021-10-07 NOTE — TELEPHONE ENCOUNTER
Faxed request received from [facility] for Generlac.      Escribe completed for Generlac 10gm/15 ml - take 35-45 ml by mouth daily for constipation, #4050 ml, R1.

## 2021-10-08 RX ORDER — CHLORTHALIDONE 25 MG/1
25 TABLET ORAL DAILY
Qty: 90 TABLET | Refills: 0 | Status: SHIPPED | OUTPATIENT
Start: 2021-10-08 | End: 2021-10-14 | Stop reason: SDUPTHER

## 2021-10-08 RX ORDER — CARVEDILOL 12.5 MG/1
TABLET ORAL
Qty: 180 TABLET | Refills: 1 | Status: SHIPPED | OUTPATIENT
Start: 2021-10-08 | End: 2021-10-14 | Stop reason: SDUPTHER

## 2021-10-13 ENCOUNTER — OFFICE VISIT (OUTPATIENT)
Dept: CARDIOLOGY | Facility: CLINIC | Age: 76
End: 2021-10-13

## 2021-10-13 VITALS
HEIGHT: 66 IN | BODY MASS INDEX: 26.23 KG/M2 | SYSTOLIC BLOOD PRESSURE: 118 MMHG | HEART RATE: 50 BPM | DIASTOLIC BLOOD PRESSURE: 64 MMHG | WEIGHT: 163.2 LBS

## 2021-10-13 DIAGNOSIS — I10 BENIGN ESSENTIAL HYPERTENSION: Primary | ICD-10-CM

## 2021-10-13 DIAGNOSIS — T14.8XXA BRUISING: ICD-10-CM

## 2021-10-13 PROCEDURE — 93000 ELECTROCARDIOGRAM COMPLETE: CPT | Performed by: NURSE PRACTITIONER

## 2021-10-13 PROCEDURE — 99214 OFFICE O/P EST MOD 30 MIN: CPT | Performed by: NURSE PRACTITIONER

## 2021-10-13 RX ORDER — AMLODIPINE BESYLATE 5 MG/1
7.5 TABLET ORAL 2 TIMES DAILY
Qty: 275 TABLET | Refills: 0 | Status: SHIPPED | OUTPATIENT
Start: 2021-10-13 | End: 2022-01-01 | Stop reason: SDUPTHER

## 2021-10-13 NOTE — PROGRESS NOTES
Date of Office Visit: 10/13/2021  Encounter Provider: EZRA Frazier  Place of Service: [facility] CARDIOLOGY  Patient Name: Yi Lee  :1945    Chief Complaint   Patient presents with   • Hypertension   :     HPI: Yi Lee is a 75 y.o. female who presents today for follow-up.  Old records have been obtained and reviewed by me.  She is a patient of Dr. Falcon's with a past medical history significant for hypertension, hyperlipidemia, GERD, COPD, type 2 diabetes, and obesity.  In 2020, she was evaluated for a systolic murmur.  She was also bradycardic for which Dr. Falcon recommended decreasing the dose of the metoprolol.  In 2021, an echocardiogram revealed normal LV systolic function, LV outflow track thickening with no significant obstructive disease, a bicuspid aortic valve with mild stenosis, and mild to moderate mitral regurgitation.                I last saw her in the office on 3/18/2021 at the request of her PCP for elevated blood pressure.  Additionally, her metoprolol dose had been increased again and her heart rate was in the 30s.  I decreased metoprolol, switched her from hydrochlorothiazide to chlorthalidone, and started her on hydralazine.  She was advised to follow-up with me in 2 weeks for reassessment.              On 3/22/2021, she was seen in the ER for intermittent headaches and elevated blood pressure.  Evidently she had not yet had her medications.  Ultimately, I increased the hydralazine to 100 mg 3 times daily.  On 3/26/2021, she presented back to the ED with elevated blood pressure and complaints of feeling like her whole body was throbbing.  She was given IV hydralazine and eventually started on a Cardene drip.  Ultimately, the Toprol was discontinued and she was started on carvedilol instead.   I last saw her on 3/29/2021 at which time she was still having episodes of elevated blood pressure with associated leg  "weakness and jerkiness as well as foggy vision and headaches.  She had stopped the amlodipine for unknown reasons and I advised her to resume it.  Additionally, I ordered a renal artery ultrasound.  She was having frequent PACs as well as a first-degree block so a Holter monitor was ordered.  Subsequently, she was referred to Dr. Hawkins for bradycardia/mild sick sinus syndrome.  However, given she was relatively asymptomatic, he noted to simply ignore them and minimize beta-blocker use.   From a symptom standpoint, she is really doing well.  She denies any chest pain, palpitations, edema, dizziness, or syncope.  She has some mild shortness of breath with inclines.  Her biggest concerns today are regarding elevated blood pressures in the morning upon awakening as well as significant bruising of her upper extremities.  Evidently her blood pressures have been in the 170s and 180s systolic in the mornings prior to taking her medication.  She is worried about \"stroking out\" overnight.  After she takes her medications, her blood pressure regulates.    Past Medical History:   Diagnosis Date   • Abdominal pain, LLQ    • Anemia    • Anemia    • Arthralgia of hip 11/22/2015   • Ataxic gait 11/22/2015    Description: Eval Dr Lillian Mederos, Neuro.  Some vestibular dysfunction present 2013/2014   • Bipolar I disorder, single manic episode (HCC)    • Bradycardia    • Cardiac murmur    • Colon polyp    • Coronary artery disease    • Degeneration, intervertebral disc, thoracolumbar    • Depression    • Diabetes mellitus (HCC)    • Disease of thyroid gland    • Diverticulosis    • Ductal carcinoma in situ (DCIS) of left breast    • Esophageal spasm    • Fatigue    • GERD (gastroesophageal reflux disease)    • H/O bone density study 10/17/2007    Dr. Giles   • Hemorrhoids    • History of mammogram     02/2012, per GYN Reshma Aranda   • History of Papanicolaou smear of cervix     DR. GILES GYN   • Hyperlipidemia    • Hypertension  "   • Impaired cognition 2015    Description: Testing done    • Optic nerve contusion    • Pain of lower extremity 2015   • Systolic murmur    • Vitamin B12 deficiency    • Vitamin D deficiency        Past Surgical History:   Procedure Laterality Date   • BREAST LUMPECTOMY Left     benign   • CHOLECYSTECTOMY     • COLONOSCOPY      done , repeat 5 yrs, Dr Grigsby; tics in sigmoid colon, IH   • COLONOSCOPY N/A 11/3/2016    polyp, IH   • COLONOSCOPY N/A 2020    Procedure: COLONOSCOPY TO CECUM WITH COLD POLYPECTOMY;  Surgeon: Eddie Grigsby MD;  Location: [facility] ENDOSCOPY;  Service: Gastroenterology;  Laterality: N/A;  pre: hx of polyps  post: polyp, hemorrhoids, diverticulosis   • D & C AND LAPAROSCOPY     • DILATATION AND CURETTAGE     • ENDOSCOPY N/A 2020    Procedure: ESOPHAGOGASTRODUODENOSCOPY with biopsies;  Surgeon: Eddie Grigsby MD;  Location: [facility] ENDOSCOPY;  Service: Gastroenterology;  Laterality: N/A;  pre: iron deficiency anemia  post: gastriits   • ESOPHAGOGASTRIC FUNDOPLASTY     • ROTATOR CUFF REPAIR Right    • TOTAL KNEE ARTHROPLASTY Left 2018    Procedure: LT TOTAL KNEE ARTHROPLASTY;  Surgeon: Selwyn Pinto MD;  Location: [facility] MAIN OR;  Service:        Social History     Socioeconomic History   • Marital status:    • Number of children: 1   Tobacco Use   • Smoking status: Former Smoker     Packs/day: 1.00     Years: 7.00     Pack years: 7.00     Types: Cigarettes     Quit date: 1972     Years since quittin.8   • Smokeless tobacco: Never Used   • Tobacco comment: caffeine use: 2 CUPS COFFEE/ 3 DIET PEPSIS DAILY   Vaping Use   • Vaping Use: Never used   Substance and Sexual Activity   • Alcohol use: Yes     Comment: OCC   • Drug use: No   • Sexual activity: Never       Family History   Problem Relation Age of Onset   • Colon polyps Father    • Heart disease Father    • Prostate cancer Father    • Hepatitis Other    • Coronary artery disease Other     • Hypertension Brother    • Lung cancer Maternal Aunt    • Lung cancer Paternal Uncle    • Stroke Paternal Grandmother    • Cerebral aneurysm Paternal Grandmother    • Stroke Paternal Grandfather    • Cerebral aneurysm Paternal Grandfather    • Lung cancer Maternal Aunt    • Malig Hyperthermia Neg Hx        Review of Systems   Constitutional: Negative.   Cardiovascular: Positive for dyspnea on exertion. Negative for chest pain, leg swelling, orthopnea, paroxysmal nocturnal dyspnea and syncope.   Respiratory: Negative.    Hematologic/Lymphatic: Negative for bleeding problem.   Skin:        bruising   Musculoskeletal: Negative for falls.   Gastrointestinal: Negative for melena.   Neurological: Negative for dizziness and light-headedness.       Allergies   Allergen Reactions   • Morphine Shortness Of Breath   • Penicillins Hives and Swelling   • Ace Inhibitors Cough   • Telmisartan Cough         Current Outpatient Medications:   •  acetaminophen (TYLENOL) 325 MG tablet, Take 650 mg by mouth As Needed for Mild Pain ., Disp: , Rfl:   •  ALPRAZolam (Xanax) 0.5 MG tablet, Take 1 tablet by mouth 3 (Three) Times a Day As Needed for Anxiety., Disp: 270 tablet, Rfl: 0  •  amLODIPine (NORVASC) 5 MG tablet, Take 1.5 tablets by mouth 2 (Two) Times a Day., Disp: 275 tablet, Rfl: 0  •  anastrozole (ARIMIDEX) 1 MG tablet, Take 1 mg by mouth Daily., Disp: , Rfl:   •  carvedilol (COREG) 12.5 MG tablet, TAKE 1 TABLET BY MOUTH TWICE DAILY, Disp: 180 tablet, Rfl: 1  •  chlorthalidone (HYGROTON) 25 MG tablet, TAKE 1 TABLET BY MOUTH DAILY, Disp: 90 tablet, Rfl: 0  •  Cholecalciferol (VITAMIN D3) 2000 UNITS tablet, Take 1,000 Units by mouth Daily., Disp: , Rfl:   •  ferrous sulfate 325 (65 FE) MG EC tablet, Take 650 mg by mouth., Disp: , Rfl:   •  glucose blood (FREESTYLE LITE) test strip, Use to test everyday as directed, Disp: 100 each, Rfl: 5  •  hydrALAZINE (APRESOLINE) 100 MG tablet, Take 1 tablet by mouth 3 (Three) Times a Day.,  "Disp: 280 tablet, Rfl: 3  •  hydrOXYzine (ATARAX) 25 MG tablet, Take 25 mg by mouth At Night As Needed for Anxiety., Disp: , Rfl:   •  lactulose (Generlac) 10 GM/15ML solution solution (encephalopathy), TAKE 35 TO 45 ML BY MOUTH DAILY FOR CONSTIPATION, Disp: 4050 mL, Rfl: 1  •  lamoTRIgine (LaMICtal) 200 MG tablet, TAKE 2 AND 1/2 TABLETS BY  MOUTH AT NIGHT, Disp: 225 tablet, Rfl: 3  •  Lancets (FREESTYLE) lancets, As directed to check blood glucose, Disp: 100 each, Rfl: 12  •  levothyroxine (SYNTHROID, LEVOTHROID) 100 MCG tablet, Take 1 tablet by mouth Daily., Disp: 90 tablet, Rfl: 3  •  metFORMIN (GLUCOPHAGE) 500 MG tablet, TAKE 2 TABLETS BY MOUTH  DAILY WITH BREAKFAST, Disp: 180 tablet, Rfl: 3  •  omeprazole (priLOSEC) 40 MG capsule, Take 1 capsule by mouth Daily., Disp: 90 capsule, Rfl: 2  •  PARoxetine (Paxil) 40 MG tablet, Take 1 tablet by mouth Every Morning. For depression, Disp: 90 tablet, Rfl: 1  •  QUEtiapine (SEROquel) 300 MG tablet, Take 1 tablet by mouth Every Night. (Patient taking differently: Take 300 mg by mouth Every Night. PATIENT REPORTS TAKING 1/2 TAB DAILY), Disp: 90 tablet, Rfl: 3  •  rosuvastatin (CRESTOR) 5 MG tablet, TAKE 1 TABLET BY MOUTH AT  NIGHT FOR CHOLESTEROL, Disp: 90 tablet, Rfl: 3      Objective:     Vitals:    10/13/21 1455   BP: 118/64   Pulse: 50   Weight: 74 kg (163 lb 3.2 oz)   Height: 167.6 cm (66\")     Body mass index is 26.34 kg/m².    PHYSICAL EXAM:    Neck:      Vascular: No JVD.   Pulmonary:      Effort: Pulmonary effort is normal.      Breath sounds: Normal breath sounds.   Cardiovascular:      Normal rate. Regular rhythm.      Murmurs: There is a harsh midsystolic murmur at the URSB, radiating to the neck.      No gallop. No click. No rub.   Pulses:     Intact distal pulses.   Skin:               ECG 12 Lead    Date/Time: 10/13/2021 3:07 PM  Performed by: Cony Sanabria APRN  Authorized by: Cony Sanabria APRN   Comparison: compared with previous ECG from " 5/3/2021  Similar to previous ECG  Rhythm: sinus bradycardia  Rate: bradycardic  BPM: 50  Conduction: 1st degree AV block    Clinical impression: abnormal EKG  Comments: Indication: Hypertension              Assessment:       Diagnosis Plan   1. Benign essential hypertension  ECG 12 Lead   2. Bruising       Orders Placed This Encounter   Procedures   • ECG 12 Lead     This order was created via procedure documentation     Order Specific Question:   Release to patient     Answer:   Immediate          Plan:       1.  Hypertension.  Her blood pressure looks great today.  I will have her divide the amlodipine dose and take 7.5 mg in the morning and 7.5 mg in the evening.  Hopefully the evening dose will hold her over until the morning.  I also reassured her that blood pressures in the 170s are not going to cause a stroke.  I discussed with Dr. Falcon, and he is in agreement.      2.  Bruising.  I am not sure the etiology of her bruising.  She is not on any blood thinners.  Recent CBC demonstrated normal platelets.  I encouraged her to follow-up with her PCP.      Overall think she is stable.  She denies any symptoms of angina or heart failure.  She will follow-up with Dr. Falcon on 1/17/2022.      As always, it has been a pleasure to participate in your patient's care.      Sincerely,         EZRA Landa

## 2021-10-14 RX ORDER — CHLORTHALIDONE 25 MG/1
25 TABLET ORAL DAILY
Qty: 90 TABLET | Refills: 2 | Status: SHIPPED | OUTPATIENT
Start: 2021-10-14 | End: 2022-01-01 | Stop reason: SDUPTHER

## 2021-10-14 RX ORDER — CARVEDILOL 12.5 MG/1
12.5 TABLET ORAL 2 TIMES DAILY
Qty: 180 TABLET | Refills: 1 | Status: SHIPPED | OUTPATIENT
Start: 2021-10-14 | End: 2021-10-25 | Stop reason: SDUPTHER

## 2021-10-25 RX ORDER — CARVEDILOL 12.5 MG/1
12.5 TABLET ORAL 2 TIMES DAILY
Qty: 180 TABLET | Refills: 1 | Status: SHIPPED | OUTPATIENT
Start: 2021-10-25 | End: 2022-01-01

## 2021-11-08 DIAGNOSIS — I10 BENIGN ESSENTIAL HYPERTENSION: ICD-10-CM

## 2021-11-08 RX ORDER — QUETIAPINE FUMARATE 300 MG/1
TABLET, FILM COATED ORAL
Qty: 90 TABLET | Refills: 3 | Status: SHIPPED | OUTPATIENT
Start: 2021-11-08 | End: 2022-01-01 | Stop reason: HOSPADM

## 2021-11-08 RX ORDER — LEVOTHYROXINE SODIUM 0.1 MG/1
100 TABLET ORAL DAILY
Qty: 90 TABLET | Refills: 3 | Status: SHIPPED | OUTPATIENT
Start: 2021-11-08 | End: 2022-01-01

## 2021-11-15 ENCOUNTER — OFFICE VISIT (OUTPATIENT)
Dept: GASTROENTEROLOGY | Facility: CLINIC | Age: 76
End: 2021-11-15

## 2021-11-15 VITALS — TEMPERATURE: 97.3 F | WEIGHT: 156.8 LBS | BODY MASS INDEX: 26.77 KG/M2 | HEIGHT: 64 IN

## 2021-11-15 DIAGNOSIS — R63.0 LOSS OF APPETITE: ICD-10-CM

## 2021-11-15 DIAGNOSIS — D50.9 IRON DEFICIENCY ANEMIA, UNSPECIFIED IRON DEFICIENCY ANEMIA TYPE: ICD-10-CM

## 2021-11-15 DIAGNOSIS — D12.6 ADENOMATOUS POLYP OF COLON, UNSPECIFIED PART OF COLON: ICD-10-CM

## 2021-11-15 DIAGNOSIS — K59.00 CONSTIPATION, UNSPECIFIED CONSTIPATION TYPE: ICD-10-CM

## 2021-11-15 DIAGNOSIS — K21.9 GASTROESOPHAGEAL REFLUX DISEASE WITHOUT ESOPHAGITIS: ICD-10-CM

## 2021-11-15 DIAGNOSIS — R63.4 WEIGHT LOSS, ABNORMAL: Primary | ICD-10-CM

## 2021-11-15 PROCEDURE — 99214 OFFICE O/P EST MOD 30 MIN: CPT | Performed by: PHYSICIAN ASSISTANT

## 2021-11-15 NOTE — PATIENT INSTRUCTIONS
Increase Lactulose to 45mL in the morning and up to 45mL in the evening. Can titrate evening dose down if diarrhea.     Check CT chest, Abdomen, and Pelvis. With ORAL contrast only.

## 2021-11-15 NOTE — PROGRESS NOTES
"Chief Complaint  Follow-up and Weight Loss    Subjective          History of Present Illness    Yi Lee is a  75 y.o. female presents for evaluation for weight loss. She has medical history of mitral regurg, hypertension, hyperlipidemia, GERD, constipation, COPD, type 2 diabetes, breast cancer, and obesity.  She is a patient of Dr. Grigsby last seen in the office on 2/4/2021 by Syl MUNGUIA.    She was evaluated by oncology for her weight loss and they cannot find any metabolic or oncologic reasons for it.  Her PCP felt that it might be secondary to her depression and anxiety so he switched around some of her meds in September.  She has continued to lose weight since then. She has lost 10 pounds since August per our records. She reports decrease in her appetite and worsening constipation as well.  She denies nausea, vomiting, dental pain, diarrhea, GERD, dyspepsia, trouble swallowing, shortness of air, chest pain, melena, or hematochezia.     She has chronic constipation and takes lactulose every day which worked well for her until several months ago. Unfortunately this has not worked as well recently. No melena or hematochezia.     She does take omeprazole 40 mg daily for her GERD.  This is well controlled on this medication.    She states that her oncologist requested the referral for possible endoscopies.    11/4/21: Free T4, ferritin, B12 all normal; CBC with hemoglobin of 11.5 which is mildly low but stable from previous.  8/26/2021 labs show normal LDH iron profile, B12; CBC with hemoglobin 11.1.  8/11/2021 labs show CMP with creatinine 1.46, BUN 29, ALT 38, otherwise normal LFTs.  Thyroid panel normal.    She underwent EGD and colonoscopy on 2/24/2020.  EGD showed some gastritis but path was negative.  Colonoscopy showed 1 adenomatous colon polyp.  Otherwise unremarkable.      She is a former smoker but quite 50 years ago.     Objective   Vital Signs:   Temp 97.3 °F (36.3 °C)   Ht 162.6 cm (64\")   " Wt 71.1 kg (156 lb 12.8 oz)   BMI 26.91 kg/m²       Physical Exam  Vitals reviewed.   Constitutional:       General: She is awake. She is not in acute distress.     Appearance: Normal appearance. She is well-developed and well-groomed.   HENT:      Head: Normocephalic and atraumatic.      Mouth/Throat:      Mouth: Mucous membranes are moist.   Cardiovascular:      Rate and Rhythm: Normal rate and regular rhythm.      Heart sounds: Normal heart sounds.   Pulmonary:      Effort: Pulmonary effort is normal.      Breath sounds: Normal breath sounds.   Abdominal:      General: Abdomen is flat. Bowel sounds are normal. There is no distension.      Palpations: Abdomen is soft. There is no mass.      Tenderness: There is no abdominal tenderness. There is no guarding or rebound.   Skin:     General: Skin is warm and dry.   Neurological:      Mental Status: She is alert and oriented to person, place, and time.      Gait: Gait normal.   Psychiatric:         Mood and Affect: Affect normal.         Speech: Speech normal.         Behavior: Behavior is cooperative.         Judgment: Judgment normal.          Result Review :   The following data was reviewed by: Mindy Mcfarlane PA-C on 11/15/2021:  CMP    CMP 3/26/21 6/11/21 8/11/21 8/11/21      0946 0946   Glucose 174 (A) 119 (A) 140 (A)    BUN 28 (A) 28 (A) 29 (A)    Creatinine 1.43 (A) 1.45 (A) 1.46 (A)    eGFR Non  Am 36 (A) 36 (A) 35 (A)    eGFR  Am  41 (A) 42 (A)    Sodium 134 (A) 138 137    Potassium 4.6 4.8 4.4    Chloride 102 102 102    Calcium 10.1 9.5 9.8    Total Protein   6.2    Albumin 4.20  4.20 3.6   Globulin   2.0 2.6   Total Bilirubin 0.2  0.2    Alkaline Phosphatase 83  87    AST (SGOT) 18  32    ALT (SGPT) 17  38 (A)    (A) Abnormal value       Comments are available for some flowsheets but are not being displayed.           CBC w/diff    CBC w/Diff 8/11/21 8/26/21 11/4/21   WBC 4.76 4.74 5.39   RBC 3.52 (A) 3.68 (A) 3.83 (A)   Hemoglobin 11.0  (A) 11.1 (A) 11.5 (A)   Hematocrit 33.5 (A) 34.9 (A) 36.1   MCV 95.2 94.8 94.3   MCH 31.3 30.2 30.0   MCHC 32.8 31.8 31.9   RDW 11.8 (A) 12.3 12.9   Platelets 318 264 303   Neutrophil Rel % 69.6 75.3 74.8   Immature Granulocyte Rel %  0.2 0.4   Lymphocyte Rel % 11.1 (A) 8.0 (A) 10.8 (A)   Monocyte Rel % 17.0 (A) 14.6 12.4   Eosinophil Rel % 1.1 1.1 0.9   Basophil Rel % 0.6 0.8 0.7   (A) Abnormal value            TSH    TSH 1/27/21 6/11/21 8/11/21   TSH 3.110 1.690 3.620                 Assessment and Plan    Diagnoses and all orders for this visit:    1. Weight loss, abnormal (Primary)  -     CT Abdomen Pelvis With Contrast; Future  -     CT Chest Without Contrast; Future    2. Loss of appetite  -     CT Abdomen Pelvis With Contrast; Future  -     CT Chest Without Contrast; Future    3. Iron deficiency anemia, unspecified iron deficiency anemia type  -     CT Abdomen Pelvis With Contrast; Future  -     CT Chest Without Contrast; Future    4. Adenomatous polyp of colon, unspecified part of colon  -     CT Abdomen Pelvis With Contrast; Future  -     CT Chest Without Contrast; Future    5. Constipation, unspecified constipation type  -     CT Abdomen Pelvis With Contrast; Future  -     CT Chest Without Contrast; Future    6. Gastroesophageal reflux disease without esophagitis    Recommend further work-up with CT scan chest, abdomen, pelvis.  Unfortunately she has renal insufficiency so these will be done with oral contrast only.    Oncology referred for possible endoscopies.  I will review the patient with Dr. Grigsby for further recommendations on possible bidirectional endoscopies.  Discussed with Dr. Grigsby will plan for bidirectional endoscopies if CT scans are unremarkable.    She also has worsening constipation.  Currently taking lactulose 45 mL in the morning.  Recommended that she take this twice daily.  She may need to titrate down her evening dose if it causes too much diarrhea.    She should start nutritional  supplement in the meantime.    Follow Up   No follow-ups on file.    Dragon dictation used throughout this note.     Mindy Mcfarlane PA-C

## 2021-11-20 DIAGNOSIS — K21.9 GASTROESOPHAGEAL REFLUX DISEASE: ICD-10-CM

## 2021-11-22 ENCOUNTER — TELEPHONE (OUTPATIENT)
Dept: GASTROENTEROLOGY | Facility: CLINIC | Age: 76
End: 2021-11-22

## 2021-11-22 DIAGNOSIS — R63.4 WEIGHT LOSS, ABNORMAL: Primary | ICD-10-CM

## 2021-11-22 DIAGNOSIS — D50.9 IRON DEFICIENCY ANEMIA, UNSPECIFIED IRON DEFICIENCY ANEMIA TYPE: ICD-10-CM

## 2021-11-22 DIAGNOSIS — R63.0 LOSS OF APPETITE: ICD-10-CM

## 2021-11-22 DIAGNOSIS — K59.00 CONSTIPATION, UNSPECIFIED CONSTIPATION TYPE: ICD-10-CM

## 2021-11-22 DIAGNOSIS — K21.9 GASTROESOPHAGEAL REFLUX DISEASE, UNSPECIFIED WHETHER ESOPHAGITIS PRESENT: ICD-10-CM

## 2021-11-22 NOTE — TELEPHONE ENCOUNTER
----- Message from Alden Mart sent at 11/19/2021  2:31 PM EST -----  Regarding: pt questions  Contact: [phone]  Pt is wanting to speak with you again in regards to her last office visit. She would not tell me what it's about she just wants to speak with you.

## 2021-11-22 NOTE — TELEPHONE ENCOUNTER
I spoke with patient.  She is very concerned about not being able to have a bowel movement.  She is taking lactulose 45 mL twice daily with only small amounts at a time.  She reports that if she holds her urine it fills up her bladder then she can have a bowel movement.  This may be anatomical in nature with some pelvic floor dysfunction.  Either way she will increase her lactulose to 3 times daily for now.  May need to consider getting her samples of Linzess to 90 mcg daily if this is not working.    Also her CT scan scheduled 4 weeks out.  This does not work as it is too far out.  I advised that she should find a different location whether it is a stand-alone place or not to move up the CT scans.  Just to be clear the CT scans are with oral contrast only, no IV contrast due to history of CKD.  Please see if we can move up her CT scans to a different location, possibly across the street from [facility].  She does live in [place].    Lastly I put an order for colonoscopy and upper endoscopy Dr. Grigsby given her concerns.  Please schedule her for this for next available and then put her on the cancellation list if this is going to be after the first of the year.

## 2021-11-23 RX ORDER — OMEPRAZOLE 40 MG/1
40 CAPSULE, DELAYED RELEASE ORAL DAILY
Qty: 90 CAPSULE | Refills: 3 | Status: SHIPPED | OUTPATIENT
Start: 2021-11-23 | End: 2022-01-01

## 2021-11-23 NOTE — TELEPHONE ENCOUNTER
Left message for patient to call back to schedule EGD/CS and to see where else she is willing to go to get CT scan moved up sooner.

## 2021-11-24 ENCOUNTER — TELEPHONE (OUTPATIENT)
Dept: GASTROENTEROLOGY | Facility: CLINIC | Age: 76
End: 2021-11-24

## 2021-11-24 PROBLEM — K59.00 CONSTIPATION: Status: ACTIVE | Noted: 2021-11-24

## 2021-11-24 PROBLEM — R63.0 LOSS OF APPETITE: Status: ACTIVE | Noted: 2021-11-24

## 2021-11-24 PROBLEM — R63.4 WEIGHT LOSS, ABNORMAL: Status: ACTIVE | Noted: 2021-11-24

## 2021-11-24 NOTE — TELEPHONE ENCOUNTER
Call from Salena at [facility] on [address].  Pt has appt 11/30 for ct chest and ct abd.  Requesting orders be faxed to [phone].  Did so - confirmation received.     Message to [facility].

## 2021-11-24 NOTE — TELEPHONE ENCOUNTER
SW patient via telephone for EGD/CS . Scheduled on 12/07/2021 with arrival time of 6:00am . Prep packet mailed to verified address. Spoke with Milli--Yakelin    Regarding imaging orders,  has nothing sooner than 12/20/21. Spoke with Sharon at [facility] at [phone]. Patient has been scheduled for both CT scans on 11/30/21 at 9:30am at [address] [place] [address] location. Spoke with patient about time and date or her appointment. Message sent to Aib Wray to obtain any required precertifications for these procedures.

## 2021-11-24 NOTE — TELEPHONE ENCOUNTER
MARIANNA patient via telephone for EGD/CS . Scheduled on 12/07/2021 with arrival time of  6:00am. Prep packet mailed to verified address. Patient also advised that his/her arrival time assigned based on [facility] guidelines . MARIANNA Mendez--Miralax .

## 2021-11-29 ENCOUNTER — TELEPHONE (OUTPATIENT)
Dept: GASTROENTEROLOGY | Facility: CLINIC | Age: 76
End: 2021-11-29

## 2021-11-29 NOTE — TELEPHONE ENCOUNTER
----- Message from Alden Maher sent at 11/29/2021  3:18 PM EST -----  Regarding: ct order  Contact: [phone]  Mohinder from [place] is wanting to talk to nurse regarding the ct order.

## 2021-11-30 NOTE — TELEPHONE ENCOUNTER
Return call to [facility] left advising that returning call from Mohinder.  Request contact office.

## 2021-12-01 ENCOUNTER — TRANSCRIBE ORDERS (OUTPATIENT)
Dept: GASTROENTEROLOGY | Facility: CLINIC | Age: 76
End: 2021-12-01

## 2021-12-01 DIAGNOSIS — Z01.818 OTHER SPECIFIED PRE-OPERATIVE EXAMINATION: Primary | ICD-10-CM

## 2021-12-04 ENCOUNTER — LAB (OUTPATIENT)
Dept: LAB | Facility: HOSPITAL | Age: 76
End: 2021-12-04

## 2021-12-04 DIAGNOSIS — Z01.818 OTHER SPECIFIED PRE-OPERATIVE EXAMINATION: Primary | ICD-10-CM

## 2021-12-04 LAB — SARS-COV-2 ORF1AB RESP QL NAA+PROBE: NOT DETECTED

## 2021-12-04 PROCEDURE — U0005 INFEC AGEN DETEC AMPLI PROBE: HCPCS

## 2021-12-04 PROCEDURE — U0004 COV-19 TEST NON-CDC HGH THRU: HCPCS

## 2021-12-04 PROCEDURE — C9803 HOPD COVID-19 SPEC COLLECT: HCPCS

## 2021-12-06 ENCOUNTER — ANESTHESIA EVENT (OUTPATIENT)
Dept: GASTROENTEROLOGY | Facility: HOSPITAL | Age: 76
End: 2021-12-06

## 2021-12-07 NOTE — DISCHARGE INSTRUCTIONS
For the next 24 hours patient needs to be with a responsible adult.    For 24 hours DO NOT drive, operate machinery, appliances, drink alcohol, make important decisions or sign legal documents.    Start with a light or bland diet and advance to regular diet as tolerated.    Follow recommendations on procedure report provided by your doctor.    Call Dr Grigsby for problems [phone]    Problems may include but not limited to: large amounts of bleeding, trouble breathing, repeated vomiting, severe unrelieved pain, fever or chills.

## 2021-12-07 NOTE — TELEPHONE ENCOUNTER
----- Message from Alden Lehman Rep sent at 12/7/2021  4:14 PM EST -----  Regarding: Questions  Contact: [phone]  Pt is having slight bleeding after her procedure today , would like to speak to someone.

## 2021-12-07 NOTE — H&P
Follow-up and Weight Loss        Subjective              History of Present Illness     Yi Lee is a  75 y.o. female presents for evaluation for weight loss. She has medical history of mitral regurg, hypertension, hyperlipidemia, GERD, constipation, COPD, type 2 diabetes, breast cancer, and obesity.  She is a patient of Dr. Grigsby last seen in the office on 2/4/2021 by Syl MUNGUIA.     She was evaluated by oncology for her weight loss and they cannot find any metabolic or oncologic reasons for it.  Her PCP felt that it might be secondary to her depression and anxiety so he switched around some of her meds in September.  She has continued to lose weight since then. She has lost 10 pounds since August per our records. She reports decrease in her appetite and worsening constipation as well.  She denies nausea, vomiting, dental pain, diarrhea, GERD, dyspepsia, trouble swallowing, shortness of air, chest pain, melena, or hematochezia.      She has chronic constipation and takes lactulose every day which worked well for her until several months ago. Unfortunately this has not worked as well recently. No melena or hematochezia.      She does take omeprazole 40 mg daily for her GERD.  This is well controlled on this medication.     She states that her oncologist requested the referral for possible endoscopies.     11/4/21: Free T4, ferritin, B12 all normal; CBC with hemoglobin of 11.5 which is mildly low but stable from previous.  8/26/2021 labs show normal LDH iron profile, B12; CBC with hemoglobin 11.1.  8/11/2021 labs show CMP with creatinine 1.46, BUN 29, ALT 38, otherwise normal LFTs.  Thyroid panel normal.     She underwent EGD and colonoscopy on 2/24/2020.  EGD showed some gastritis but path was negative.  Colonoscopy showed 1 adenomatous colon polyp.  Otherwise unremarkable.       She is a former smoker but quite 50 years ago.            Objective      Vital Signs:   Temp 97.3 °F (36.3 °C)   Ht 162.6 cm  "(64\")   Wt 71.1 kg (156 lb 12.8 oz)   BMI 26.91 kg/m²        Physical Exam  Vitals reviewed.   Constitutional:       General: She is awake. She is not in acute distress.     Appearance: Normal appearance. She is well-developed and well-groomed.   HENT:      Head: Normocephalic and atraumatic.      Mouth/Throat:      Mouth: Mucous membranes are moist.   Cardiovascular:      Rate and Rhythm: Normal rate and regular rhythm.      Heart sounds: Normal heart sounds.   Pulmonary:      Effort: Pulmonary effort is normal.      Breath sounds: Normal breath sounds.   Abdominal:      General: Abdomen is flat. Bowel sounds are normal. There is no distension.      Palpations: Abdomen is soft. There is no mass.      Tenderness: There is no abdominal tenderness. There is no guarding or rebound.   Skin:     General: Skin is warm and dry.   Neurological:      Mental Status: She is alert and oriented to person, place, and time.      Gait: Gait normal.   Psychiatric:         Mood and Affect: Affect normal.         Speech: Speech normal.         Behavior: Behavior is cooperative.         Judgment: Judgment normal.                  Result Review    :   The following data was reviewed by: Mindy Mcfarlane PA-C on 11/15/2021:  CMP Results:          CMP    CMP 3/26/21 6/11/21 8/11/21 8/11/21         0946 0946   Glucose 174 (A) 119 (A) 140 (A)     BUN 28 (A) 28 (A) 29 (A)     Creatinine 1.43 (A) 1.45 (A) 1.46 (A)     eGFR Non  Am 36 (A) 36 (A) 35 (A)     eGFR  Am   41 (A) 42 (A)     Sodium 134 (A) 138 137     Potassium 4.6 4.8 4.4     Chloride 102 102 102     Calcium 10.1 9.5 9.8     Total Protein     6.2     Albumin 4.20   4.20 3.6   Globulin     2.0 2.6   Total Bilirubin 0.2   0.2     Alkaline Phosphatase 83   87     AST (SGOT) 18   32     ALT (SGPT) 17   38 (A)     (A) Abnormal value        Comments are available for some flowsheets but are not being displayed.                       CBC w/diff    CBC w/Diff 8/11/21 " 8/26/21 11/4/21   WBC 4.76 4.74 5.39   RBC 3.52 (A) 3.68 (A) 3.83 (A)   Hemoglobin 11.0 (A) 11.1 (A) 11.5 (A)   Hematocrit 33.5 (A) 34.9 (A) 36.1   MCV 95.2 94.8 94.3   MCH 31.3 30.2 30.0   MCHC 32.8 31.8 31.9   RDW 11.8 (A) 12.3 12.9   Platelets 318 264 303   Neutrophil Rel % 69.6 75.3 74.8   Immature Granulocyte Rel %   0.2 0.4   Lymphocyte Rel % 11.1 (A) 8.0 (A) 10.8 (A)   Monocyte Rel % 17.0 (A) 14.6 12.4   Eosinophil Rel % 1.1 1.1 0.9   Basophil Rel % 0.6 0.8 0.7   (A) Abnormal value               TSH Results:         TSH    TSH 1/27/21 6/11/21 8/11/21   TSH 3.110 1.690 3.620                        Assessment and Plan    Diagnoses and all orders for this visit:     1. Weight loss, abnormal (Primary)  -     CT Abdomen Pelvis With Contrast; Future  -     CT Chest Without Contrast; Future     2. Loss of appetite  -     CT Abdomen Pelvis With Contrast; Future  -     CT Chest Without Contrast; Future     3. Iron deficiency anemia, unspecified iron deficiency anemia type  -     CT Abdomen Pelvis With Contrast; Future  -     CT Chest Without Contrast; Future     4. Adenomatous polyp of colon, unspecified part of colon  -     CT Abdomen Pelvis With Contrast; Future  -     CT Chest Without Contrast; Future     5. Constipation, unspecified constipation type  -     CT Abdomen Pelvis With Contrast; Future  -     CT Chest Without Contrast; Future     6. Gastroesophageal reflux disease without esophagitis     Recommend further work-up with CT scan chest, abdomen, pelvis.  Unfortunately she has renal insufficiency so these will be done with oral contrast only.     Oncology referred for possible endoscopies.  I will review the patient with Dr. Grigsby for further recommendations on possible bidirectional endoscopies.  Discussed with Dr. Grigsby will plan for bidirectional endoscopies if CT scans are unremarkable.     She also has worsening constipation.  Currently taking lactulose 45 mL in the morning.  Recommended that she take  this twice daily.  She may need to titrate down her evening dose if it causes too much diarrhea.     She should start nutritional supplement in the meantime.     12/7/21 - No change from the above H and P.   Eddie Grigsby M.D.

## 2021-12-07 NOTE — TELEPHONE ENCOUNTER
"Call to pt.  Reports \"mucousy blood\" in toilet water and on tissue when has BM.  Does not note otherwise.  States feels fine, but is tired after procedure.      It is noted that 6 polyps removed today and internal hemorrhoids noted.     Advise pt will send message to DR Grigsby, but in the meantime - if symptoms worsen, go to ER for immediate eval.  Verb understanding.   "

## 2021-12-07 NOTE — TELEPHONE ENCOUNTER
Please call [facility] on [address]. Get the reports of the CT's of the chest, abdomen and pelvis done there in the last few weeks. thx.kjh

## 2021-12-07 NOTE — ANESTHESIA PREPROCEDURE EVALUATION
Anesthesia Evaluation     NPO Solid Status: > 8 hours  NPO Liquid Status: > 2 hours           Airway   Mallampati: II  Neck ROM: full  No difficulty expected  Dental - normal exam     Pulmonary     breath sounds clear to auscultation  (+) COPD,   Cardiovascular     Rhythm: regular    (+) hypertension, valvular problems/murmurs, CAD, dysrhythmias, hyperlipidemia,       Neuro/Psych  (+) dizziness/light headedness, psychiatric history,     GI/Hepatic/Renal/Endo    (+) morbid obesity, GERD,  renal disease, diabetes mellitus,     Musculoskeletal     Abdominal   (+) obese,    Substance History      OB/GYN          Other   arthritis,    history of cancer                    Anesthesia Plan    ASA 3     MAC     intravenous induction     Anesthetic plan, all risks, benefits, and alternatives have been provided, discussed and informed consent has been obtained with: patient.

## 2021-12-07 NOTE — ANESTHESIA POSTPROCEDURE EVALUATION
"Patient: Yi Lee    Procedure Summary     Date: 12/07/21 Room / Location: [facility] 5 / [facility]    Anesthesia Start: 0703 Anesthesia Stop: 0754    Procedures:       COLONOSCOPY TO CECUM  - COLD BIOPSY POLYPECTOMIES (N/A )      ESOPHAGOGASTRODUODENOSCOPY, WITH BIOPSIES (N/A Esophagus) Diagnosis:       Weight loss, abnormal      Gastroesophageal reflux disease, unspecified whether esophagitis present      Loss of appetite      Iron deficiency anemia, unspecified iron deficiency anemia type      Constipation, unspecified constipation type      (Weight loss, abnormal [R63.4])      (Gastroesophageal reflux disease, unspecified whether esophagitis present [K21.9])      (Loss of appetite [R63.0])      (Iron deficiency anemia, unspecified iron deficiency anemia type [D50.9])      (Constipation, unspecified constipation type [K59.00])    Surgeons: Eddie Grigsby MD Provider: Frandy Coles MD    Anesthesia Type: MAC ASA Status: 3          Anesthesia Type: MAC    Vitals  Vitals Value Taken Time   /70 12/07/21 0811   Temp     Pulse 56 12/07/21 0811   Resp 16 12/07/21 0811   SpO2 100 % 12/07/21 0811           Post Anesthesia Care and Evaluation    Patient location during evaluation: bedside  Patient participation: complete - patient participated  Level of consciousness: sleepy but conscious  Pain score: 0  Pain management: adequate  Airway patency: patent  Anesthetic complications: No anesthetic complications    Cardiovascular status: acceptable  Respiratory status: acceptable  Hydration status: acceptable    Comments: /70 (BP Location: Left arm, Patient Position: Sitting)   Pulse 56   Resp 16   Ht 162.6 cm (64\")   Wt 71.2 kg (157 lb)   LMP  (LMP Unknown)   SpO2 100%   BMI 26.95 kg/m²         "

## 2021-12-07 NOTE — PROGRESS NOTES
12/07/21      Tell her that her lab work showed that her thyroid-stimulating hormone is mildly elevated at 4.42.  Her comprehensive metabolic profile shows that her glucose is mildly elevated at 1.21.  Her kidney function (creatinine of 1.14 with a BUN of 19) remains mildly abnormal.  Her CBC is mildly abnormal with a hemoglobin of 10.8, hematocrit 33.6, MCV of 95.  Her platelet count and white count are normal.  Tell her that I would go to see Dr. Ankit albert to have her thyroid rechecked.       Also tell her that the CT of the chest abdomen and pelvis without contrast done on 11/30/2021 at [facility] on [facility] showed that her heart is enlarged (cardiomegaly): She has a hiatal hernia, and a right adrenal adenoma.       Please send a copy of these labs and a copy of the CT of the chest, abdomen pelvis to Dr. Ankit Albert.   Thx. kjh

## 2021-12-08 NOTE — TELEPHONE ENCOUNTER
VM to pt with request to contact office.     Update to Dr Ankit Albert (CT results under Media Tab).

## 2021-12-08 NOTE — TELEPHONE ENCOUNTER
----- Message from Eddie Grigsby MD sent at 12/7/2021  2:02 PM EST -----  12/07/21      Tell her that her lab work showed that her thyroid-stimulating hormone is mildly elevated at 4.42.  Her comprehensive metabolic profile shows that her glucose is mildly elevated at 1.21.  Her kidney function (creatinine of 1.14 with a BUN of 19) remains mildly abnormal.  Her CBC is mildly abnormal with a hemoglobin of 10.8, hematocrit 33.6, MCV of 95.  Her platelet count and white count are normal.  Tell her that I would go to see Dr. Ankit albert to have her thyroid rechecked.       Also tell her that the CT of the chest abdomen and pelvis without contrast done on 11/30/2021 at [facility] on [facility] showed that her heart is enlarged (cardiomegaly): She has a hiatal hernia, and a right adrenal adenoma.       Please send a copy of these labs and a copy of the CT of the chest, abdomen pelvis to Dr. Ankit Albert.   Thx. kjh

## 2021-12-08 NOTE — TELEPHONE ENCOUNTER
Called pt and pt reports that Dr Grigsby had already called her with these results.  Pt states that he wants to see her in 4 wks.  Attempted to make appt but none available . Offered appt with Mindy MORENO but pt wants MD. Advised will send message to manager for appt.  Verb understanding.

## 2021-12-12 NOTE — PROGRESS NOTES
12/12/21       TEll her that path from the EGD showed inflammation of the stomach but no evidence of helicobacter pylori lining the stomach.        The colon polyps that were removed were not cancerous but most were precancerous. I recommend a repeat colonoscopy in 3 yrs.        Send to her PCP.   Hugh nath

## 2021-12-13 NOTE — TELEPHONE ENCOUNTER
----- Message from Eddie Grigsby MD sent at 12/12/2021  5:22 PM EST -----  12/12/21       TEll her that path from the EGD showed inflammation of the stomach but no evidence of helicobacter pylori lining the stomach.        The colon polyps that were removed were not cancerous but most were precancerous. I recommend a repeat colonoscopy in 3 yrs.        Send to her PCP.   Hugh kjmaría

## 2021-12-13 NOTE — TELEPHONE ENCOUNTER
Called pt and left  for pt to call back.     C/s placed in [facility] and  for 12/07/2024.     Results sent to Dr Albert thru [facility].

## 2022-01-01 ENCOUNTER — LAB (OUTPATIENT)
Dept: LAB | Facility: HOSPITAL | Age: 77
End: 2022-01-01

## 2022-01-01 ENCOUNTER — HOME CARE VISIT (OUTPATIENT)
Dept: HOME HEALTH SERVICES | Facility: HOME HEALTHCARE | Age: 77
End: 2022-01-01

## 2022-01-01 ENCOUNTER — APPOINTMENT (OUTPATIENT)
Dept: GENERAL RADIOLOGY | Facility: HOSPITAL | Age: 77
End: 2022-01-01

## 2022-01-01 ENCOUNTER — OFFICE VISIT (OUTPATIENT)
Dept: CARDIOLOGY | Facility: CLINIC | Age: 77
End: 2022-01-01

## 2022-01-01 ENCOUNTER — HOSPITAL ENCOUNTER (OUTPATIENT)
Facility: HOSPITAL | Age: 77
Setting detail: OBSERVATION
Discharge: HOME | End: 2022-10-05
Attending: EMERGENCY MEDICINE | Admitting: EMERGENCY MEDICINE

## 2022-01-01 ENCOUNTER — HOSPITAL ENCOUNTER (OUTPATIENT)
Dept: GENERAL RADIOLOGY | Facility: HOSPITAL | Age: 77
Discharge: HOME | End: 2022-08-04

## 2022-01-01 ENCOUNTER — OFFICE VISIT (OUTPATIENT)
Dept: INTERNAL MEDICINE | Facility: CLINIC | Age: 77
End: 2022-01-01

## 2022-01-01 ENCOUNTER — TELEPHONE (OUTPATIENT)
Dept: GASTROENTEROLOGY | Facility: CLINIC | Age: 77
End: 2022-01-01

## 2022-01-01 ENCOUNTER — TELEPHONE (OUTPATIENT)
Dept: INTERNAL MEDICINE | Facility: CLINIC | Age: 77
End: 2022-01-01

## 2022-01-01 ENCOUNTER — APPOINTMENT (OUTPATIENT)
Dept: CARDIOLOGY | Facility: HOSPITAL | Age: 77
End: 2022-01-01

## 2022-01-01 ENCOUNTER — ANESTHESIA (OUTPATIENT)
Dept: GASTROENTEROLOGY | Facility: HOSPITAL | Age: 77
End: 2022-01-01

## 2022-01-01 ENCOUNTER — APPOINTMENT (OUTPATIENT)
Dept: CT IMAGING | Facility: HOSPITAL | Age: 77
End: 2022-01-01

## 2022-01-01 ENCOUNTER — ANESTHESIA EVENT (OUTPATIENT)
Dept: PERIOP | Facility: HOSPITAL | Age: 77
End: 2022-01-01

## 2022-01-01 ENCOUNTER — HOSPITAL ENCOUNTER (OUTPATIENT)
Facility: HOSPITAL | Age: 77
Setting detail: OBSERVATION
Discharge: HOME HEALTH | End: 2022-11-04
Attending: EMERGENCY MEDICINE | Admitting: HOSPITALIST

## 2022-01-01 ENCOUNTER — APPOINTMENT (OUTPATIENT)
Dept: NUCLEAR MEDICINE | Facility: HOSPITAL | Age: 77
End: 2022-01-01

## 2022-01-01 ENCOUNTER — HOME HEALTH ADMISSION (OUTPATIENT)
Dept: HOME HEALTH SERVICES | Facility: HOME HEALTHCARE | Age: 77
End: 2022-01-01

## 2022-01-01 ENCOUNTER — TRANSITIONAL CARE MANAGEMENT TELEPHONE ENCOUNTER (OUTPATIENT)
Dept: CALL CENTER | Facility: HOSPITAL | Age: 77
End: 2022-01-01

## 2022-01-01 ENCOUNTER — PRE-ADMISSION TESTING (OUTPATIENT)
Dept: PREADMISSION TESTING | Facility: HOSPITAL | Age: 77
End: 2022-01-01

## 2022-01-01 ENCOUNTER — HOSPITAL ENCOUNTER (INPATIENT)
Facility: HOSPITAL | Age: 77
LOS: 16 days | End: 2022-12-07
Attending: EMERGENCY MEDICINE | Admitting: INTERNAL MEDICINE

## 2022-01-01 ENCOUNTER — READMISSION MANAGEMENT (OUTPATIENT)
Dept: CALL CENTER | Facility: HOSPITAL | Age: 77
End: 2022-01-01

## 2022-01-01 ENCOUNTER — HOSPITAL ENCOUNTER (OUTPATIENT)
Dept: GENERAL RADIOLOGY | Facility: HOSPITAL | Age: 77
Discharge: HOME | End: 2022-06-02

## 2022-01-01 ENCOUNTER — TELEPHONE (OUTPATIENT)
Dept: CARDIOLOGY | Facility: CLINIC | Age: 77
End: 2022-01-01

## 2022-01-01 ENCOUNTER — ANESTHESIA (OUTPATIENT)
Dept: PERIOP | Facility: HOSPITAL | Age: 77
End: 2022-01-01

## 2022-01-01 ENCOUNTER — OFFICE VISIT (OUTPATIENT)
Dept: ORTHOPEDIC SURGERY | Facility: CLINIC | Age: 77
End: 2022-01-01

## 2022-01-01 ENCOUNTER — CLINICAL SUPPORT (OUTPATIENT)
Dept: CARDIOLOGY | Facility: CLINIC | Age: 77
End: 2022-01-01

## 2022-01-01 ENCOUNTER — APPOINTMENT (OUTPATIENT)
Dept: MRI IMAGING | Facility: HOSPITAL | Age: 77
End: 2022-01-01

## 2022-01-01 ENCOUNTER — ANESTHESIA EVENT (OUTPATIENT)
Dept: GASTROENTEROLOGY | Facility: HOSPITAL | Age: 77
End: 2022-01-01

## 2022-01-01 ENCOUNTER — CONSULT (OUTPATIENT)
Dept: ONCOLOGY | Facility: CLINIC | Age: 77
End: 2022-01-01

## 2022-01-01 ENCOUNTER — HOSPITAL ENCOUNTER (OUTPATIENT)
Facility: HOSPITAL | Age: 77
Discharge: NURSING, REHAB OR LONG-TERM CARE | End: 2022-08-10
Attending: ORTHOPAEDIC SURGERY | Admitting: ORTHOPAEDIC SURGERY

## 2022-01-01 ENCOUNTER — APPOINTMENT (OUTPATIENT)
Dept: ULTRASOUND IMAGING | Facility: HOSPITAL | Age: 77
End: 2022-01-01

## 2022-01-01 ENCOUNTER — HOSPITAL ENCOUNTER (EMERGENCY)
Facility: HOSPITAL | Age: 77
Discharge: HOME | End: 2022-08-24
Attending: EMERGENCY MEDICINE | Admitting: EMERGENCY MEDICINE

## 2022-01-01 VITALS
HEART RATE: 53 BPM | TEMPERATURE: 97.6 F | OXYGEN SATURATION: 96 % | SYSTOLIC BLOOD PRESSURE: 134 MMHG | DIASTOLIC BLOOD PRESSURE: 60 MMHG

## 2022-01-01 VITALS
SYSTOLIC BLOOD PRESSURE: 120 MMHG | OXYGEN SATURATION: 97 % | TEMPERATURE: 98.3 F | HEART RATE: 63 BPM | DIASTOLIC BLOOD PRESSURE: 58 MMHG

## 2022-01-01 VITALS
SYSTOLIC BLOOD PRESSURE: 114 MMHG | TEMPERATURE: 98.2 F | DIASTOLIC BLOOD PRESSURE: 60 MMHG | OXYGEN SATURATION: 97 % | HEART RATE: 63 BPM

## 2022-01-01 VITALS
WEIGHT: 156 LBS | DIASTOLIC BLOOD PRESSURE: 60 MMHG | BODY MASS INDEX: 26.63 KG/M2 | SYSTOLIC BLOOD PRESSURE: 129 MMHG | OXYGEN SATURATION: 97 % | HEART RATE: 65 BPM | HEIGHT: 64 IN

## 2022-01-01 VITALS
SYSTOLIC BLOOD PRESSURE: 145 MMHG | BODY MASS INDEX: 28.75 KG/M2 | HEART RATE: 57 BPM | DIASTOLIC BLOOD PRESSURE: 68 MMHG | OXYGEN SATURATION: 98 % | WEIGHT: 168.4 LBS | RESPIRATION RATE: 18 BRPM | HEIGHT: 64 IN | TEMPERATURE: 98.8 F

## 2022-01-01 VITALS
OXYGEN SATURATION: 98 % | TEMPERATURE: 99.5 F | HEART RATE: 57 BPM | RESPIRATION RATE: 16 BRPM | HEIGHT: 64 IN | WEIGHT: 165.8 LBS | SYSTOLIC BLOOD PRESSURE: 155 MMHG | DIASTOLIC BLOOD PRESSURE: 67 MMHG | BODY MASS INDEX: 28.31 KG/M2

## 2022-01-01 VITALS
SYSTOLIC BLOOD PRESSURE: 138 MMHG | RESPIRATION RATE: 16 BRPM | DIASTOLIC BLOOD PRESSURE: 70 MMHG | HEIGHT: 62 IN | OXYGEN SATURATION: 96 % | WEIGHT: 154 LBS | BODY MASS INDEX: 28.34 KG/M2 | TEMPERATURE: 97.7 F | HEART RATE: 65 BPM

## 2022-01-01 VITALS
RESPIRATION RATE: 18 BRPM | SYSTOLIC BLOOD PRESSURE: 143 MMHG | TEMPERATURE: 98.9 F | HEIGHT: 64 IN | DIASTOLIC BLOOD PRESSURE: 64 MMHG | HEART RATE: 55 BPM | BODY MASS INDEX: 27.31 KG/M2 | OXYGEN SATURATION: 96 % | WEIGHT: 160 LBS

## 2022-01-01 VITALS
OXYGEN SATURATION: 94 % | WEIGHT: 163.2 LBS | SYSTOLIC BLOOD PRESSURE: 108 MMHG | TEMPERATURE: 97.5 F | BODY MASS INDEX: 27.86 KG/M2 | DIASTOLIC BLOOD PRESSURE: 65 MMHG | HEIGHT: 64 IN | HEART RATE: 59 BPM

## 2022-01-01 VITALS
HEIGHT: 62 IN | TEMPERATURE: 98.1 F | RESPIRATION RATE: 16 BRPM | OXYGEN SATURATION: 98 % | DIASTOLIC BLOOD PRESSURE: 73 MMHG | WEIGHT: 165 LBS | BODY MASS INDEX: 30.36 KG/M2 | HEART RATE: 62 BPM | SYSTOLIC BLOOD PRESSURE: 166 MMHG

## 2022-01-01 VITALS
DIASTOLIC BLOOD PRESSURE: 63 MMHG | TEMPERATURE: 98.2 F | SYSTOLIC BLOOD PRESSURE: 172 MMHG | RESPIRATION RATE: 18 BRPM | OXYGEN SATURATION: 96 % | HEIGHT: 64 IN | HEART RATE: 61 BPM | WEIGHT: 163 LBS | BODY MASS INDEX: 27.83 KG/M2

## 2022-01-01 VITALS
DIASTOLIC BLOOD PRESSURE: 52 MMHG | TEMPERATURE: 98 F | BODY MASS INDEX: 26.12 KG/M2 | HEART RATE: 65 BPM | OXYGEN SATURATION: 98 % | SYSTOLIC BLOOD PRESSURE: 144 MMHG | HEIGHT: 64 IN | WEIGHT: 153 LBS

## 2022-01-01 VITALS
HEIGHT: 64 IN | OXYGEN SATURATION: 95 % | RESPIRATION RATE: 16 BRPM | TEMPERATURE: 97.7 F | BODY MASS INDEX: 28.31 KG/M2 | DIASTOLIC BLOOD PRESSURE: 72 MMHG | SYSTOLIC BLOOD PRESSURE: 135 MMHG | HEART RATE: 54 BPM | WEIGHT: 165.8 LBS

## 2022-01-01 VITALS
DIASTOLIC BLOOD PRESSURE: 71 MMHG | HEIGHT: 62 IN | WEIGHT: 162 LBS | BODY MASS INDEX: 29.81 KG/M2 | HEART RATE: 58 BPM | SYSTOLIC BLOOD PRESSURE: 171 MMHG

## 2022-01-01 VITALS — SYSTOLIC BLOOD PRESSURE: 150 MMHG | DIASTOLIC BLOOD PRESSURE: 60 MMHG

## 2022-01-01 VITALS
HEART RATE: 58 BPM | WEIGHT: 160 LBS | DIASTOLIC BLOOD PRESSURE: 80 MMHG | TEMPERATURE: 97.7 F | OXYGEN SATURATION: 98 % | RESPIRATION RATE: 16 BRPM | SYSTOLIC BLOOD PRESSURE: 180 MMHG | HEIGHT: 62 IN | BODY MASS INDEX: 29.44 KG/M2

## 2022-01-01 VITALS
SYSTOLIC BLOOD PRESSURE: 144 MMHG | RESPIRATION RATE: 18 BRPM | TEMPERATURE: 97.6 F | HEIGHT: 64 IN | WEIGHT: 151 LBS | BODY MASS INDEX: 25.78 KG/M2 | OXYGEN SATURATION: 98 % | DIASTOLIC BLOOD PRESSURE: 77 MMHG | HEART RATE: 57 BPM

## 2022-01-01 VITALS
BODY MASS INDEX: 28.15 KG/M2 | DIASTOLIC BLOOD PRESSURE: 40 MMHG | HEIGHT: 64 IN | RESPIRATION RATE: 31 BRPM | WEIGHT: 164.9 LBS | OXYGEN SATURATION: 72 % | TEMPERATURE: 97.9 F | SYSTOLIC BLOOD PRESSURE: 64 MMHG

## 2022-01-01 VITALS — TEMPERATURE: 98 F | BODY MASS INDEX: 27.83 KG/M2 | WEIGHT: 163 LBS | HEIGHT: 64 IN

## 2022-01-01 DIAGNOSIS — J45.20 MILD INTERMITTENT ASTHMATIC BRONCHITIS WITHOUT COMPLICATION: Primary | ICD-10-CM

## 2022-01-01 DIAGNOSIS — K21.9 GASTROESOPHAGEAL REFLUX DISEASE: ICD-10-CM

## 2022-01-01 DIAGNOSIS — F32.1 CURRENT MODERATE EPISODE OF MAJOR DEPRESSIVE DISORDER, UNSPECIFIED WHETHER RECURRENT: Chronic | ICD-10-CM

## 2022-01-01 DIAGNOSIS — E78.2 MIXED HYPERLIPIDEMIA: ICD-10-CM

## 2022-01-01 DIAGNOSIS — D64.9 CHRONIC ANEMIA: ICD-10-CM

## 2022-01-01 DIAGNOSIS — R52 PAIN: Primary | ICD-10-CM

## 2022-01-01 DIAGNOSIS — J45.20 MILD INTERMITTENT ASTHMATIC BRONCHITIS WITHOUT COMPLICATION: ICD-10-CM

## 2022-01-01 DIAGNOSIS — D50.0 IRON DEFICIENCY ANEMIA DUE TO CHRONIC BLOOD LOSS: Primary | ICD-10-CM

## 2022-01-01 DIAGNOSIS — R27.0 ATAXIA: Primary | ICD-10-CM

## 2022-01-01 DIAGNOSIS — I10 PRIMARY HYPERTENSION: Primary | Chronic | ICD-10-CM

## 2022-01-01 DIAGNOSIS — M19.012 PRIMARY LOCALIZED OSTEOARTHROSIS OF LEFT SHOULDER REGION: ICD-10-CM

## 2022-01-01 DIAGNOSIS — F32.1 CURRENT MODERATE EPISODE OF MAJOR DEPRESSIVE DISORDER, UNSPECIFIED WHETHER RECURRENT: ICD-10-CM

## 2022-01-01 DIAGNOSIS — S01.21XA LACERATION OF NOSE, INITIAL ENCOUNTER: ICD-10-CM
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
## 2022-01-01 DIAGNOSIS — N18.30 STAGE 3 CHRONIC KIDNEY DISEASE, UNSPECIFIED WHETHER STAGE 3A OR 3B CKD: ICD-10-CM

## 2022-01-01 DIAGNOSIS — G89.29 CHRONIC LEFT SHOULDER PAIN: Primary | ICD-10-CM

## 2022-01-01 DIAGNOSIS — R06.09 EXERTIONAL DYSPNEA: Primary | ICD-10-CM

## 2022-01-01 DIAGNOSIS — S00.03XA CONTUSION OF SCALP, INITIAL ENCOUNTER: ICD-10-CM

## 2022-01-01 DIAGNOSIS — E11.40 TYPE 2 DIABETES MELLITUS WITH DIABETIC NEUROPATHY, WITHOUT LONG-TERM CURRENT USE OF INSULIN: ICD-10-CM

## 2022-01-01 DIAGNOSIS — F51.01 PRIMARY INSOMNIA: ICD-10-CM

## 2022-01-01 DIAGNOSIS — R00.1 BRADYCARDIA WITH 31-40 BEATS PER MINUTE: Primary | ICD-10-CM

## 2022-01-01 DIAGNOSIS — J18.9 COMMUNITY ACQUIRED PNEUMONIA, UNSPECIFIED LATERALITY: ICD-10-CM

## 2022-01-01 DIAGNOSIS — R26.0 ATAXIC GAIT: ICD-10-CM

## 2022-01-01 DIAGNOSIS — K44.9 HIATAL HERNIA: Primary | ICD-10-CM

## 2022-01-01 DIAGNOSIS — I95.1 ORTHOSTATIC HYPOTENSION: ICD-10-CM

## 2022-01-01 DIAGNOSIS — F30.9 BIPOLAR I DISORDER, SINGLE MANIC EPISODE: ICD-10-CM

## 2022-01-01 DIAGNOSIS — I10 BENIGN ESSENTIAL HYPERTENSION: ICD-10-CM

## 2022-01-01 DIAGNOSIS — E03.9 ACQUIRED HYPOTHYROIDISM: Chronic | ICD-10-CM

## 2022-01-01 DIAGNOSIS — I10 PRIMARY HYPERTENSION: ICD-10-CM

## 2022-01-01 DIAGNOSIS — I44.0 1ST DEGREE AV BLOCK: ICD-10-CM

## 2022-01-01 DIAGNOSIS — D50.8 OTHER IRON DEFICIENCY ANEMIA: ICD-10-CM

## 2022-01-01 DIAGNOSIS — S02.2XXD CLOSED FRACTURE OF NASAL BONE WITH ROUTINE HEALING: ICD-10-CM

## 2022-01-01 DIAGNOSIS — M25.512 CHRONIC LEFT SHOULDER PAIN: Primary | ICD-10-CM

## 2022-01-01 DIAGNOSIS — F30.9 BIPOLAR I DISORDER, SINGLE MANIC EPISODE: Chronic | ICD-10-CM

## 2022-01-01 DIAGNOSIS — J44.9 CHRONIC OBSTRUCTIVE PULMONARY DISEASE, UNSPECIFIED COPD TYPE: ICD-10-CM

## 2022-01-01 DIAGNOSIS — R07.81 RIB PAIN: ICD-10-CM

## 2022-01-01 DIAGNOSIS — E11.40 TYPE 2 DIABETES MELLITUS WITH DIABETIC NEUROPATHY, WITHOUT LONG-TERM CURRENT USE OF INSULIN: Chronic | ICD-10-CM

## 2022-01-01 DIAGNOSIS — D50.9 IRON DEFICIENCY ANEMIA, UNSPECIFIED IRON DEFICIENCY ANEMIA TYPE: ICD-10-CM

## 2022-01-01 DIAGNOSIS — R01.1 SYSTOLIC MURMUR: ICD-10-CM

## 2022-01-01 DIAGNOSIS — Z91.81 STATUS POST FALL: ICD-10-CM

## 2022-01-01 DIAGNOSIS — Z48.02 VISIT FOR SUTURE REMOVAL: Primary | ICD-10-CM

## 2022-01-01 DIAGNOSIS — Z91.81 STATUS POST FALL: Primary | ICD-10-CM

## 2022-01-01 DIAGNOSIS — Q23.1 BICUSPID AORTIC VALVE: Primary | ICD-10-CM

## 2022-01-01 DIAGNOSIS — S02.2XXB OPEN FRACTURE OF NASAL BONE, INITIAL ENCOUNTER: ICD-10-CM

## 2022-01-01 DIAGNOSIS — Z09 HOSPITAL DISCHARGE FOLLOW-UP: Primary | ICD-10-CM
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
## 2022-01-01 DIAGNOSIS — R05.9 COUGH: Primary | ICD-10-CM

## 2022-01-01 DIAGNOSIS — D50.9 IRON DEFICIENCY ANEMIA, UNSPECIFIED IRON DEFICIENCY ANEMIA TYPE: Primary | ICD-10-CM

## 2022-01-01 DIAGNOSIS — Z96.612 S/P REVERSE TOTAL SHOULDER ARTHROPLASTY, LEFT: ICD-10-CM

## 2022-01-01 DIAGNOSIS — R04.2 HEMOPTYSIS: ICD-10-CM

## 2022-01-01 DIAGNOSIS — J20.9 ACUTE BRONCHITIS, UNSPECIFIED ORGANISM: ICD-10-CM

## 2022-01-01 DIAGNOSIS — Q23.1 BICUSPID AORTIC VALVE: ICD-10-CM

## 2022-01-01 DIAGNOSIS — R00.1 BRADYCARDIA: ICD-10-CM

## 2022-01-01 LAB
ABO GROUP BLD: NORMAL
ABO GROUP BLD: NORMAL
ALBUMIN SERPL-MCNC: 2.7 G/DL (ref 3.5–5.2)
ALBUMIN SERPL-MCNC: 2.9 G/DL (ref 3.5–5.2)
ALBUMIN SERPL-MCNC: 3.2 G/DL (ref 3.5–5.2)
ALBUMIN SERPL-MCNC: 3.3 G/DL (ref 3.5–5.2)
ALBUMIN SERPL-MCNC: 3.5 G/DL (ref 3.5–5.2)
ALBUMIN SERPL-MCNC: 3.6 G/DL (ref 3.5–5.2)
ALBUMIN SERPL-MCNC: 3.7 G/DL (ref 3.5–5.2)
ALBUMIN SERPL-MCNC: 3.7 G/DL (ref 3.5–5.2)
ALBUMIN SERPL-MCNC: 3.8 G/DL (ref 3.5–5.2)
ALBUMIN SERPL-MCNC: 3.8 G/DL (ref 3.5–5.2)
ALBUMIN SERPL-MCNC: 3.9 G/DL (ref 3.5–5.2)
ALBUMIN SERPL-MCNC: 4.1 G/DL (ref 3.5–5.2)
ALBUMIN SERPL-MCNC: 4.1 G/DL (ref 3.5–5.2)
ALBUMIN SERPL-MCNC: 4.2 G/DL (ref 3.5–5.2)
ALBUMIN/GLOB SERPL: 1.2 G/DL
ALBUMIN/GLOB SERPL: 1.2 G/DL
ALBUMIN/GLOB SERPL: 1.2 G/DL (ref 1.1–2.4)
ALBUMIN/GLOB SERPL: 1.3 G/DL
ALBUMIN/GLOB SERPL: 1.6 G/DL
ALBUMIN/GLOB SERPL: 1.6 G/DL
ALP SERPL-CCNC: 103 U/L (ref 39–117)
ALP SERPL-CCNC: 76 U/L (ref 39–117)
ALP SERPL-CCNC: 85 U/L (ref 39–117)
ALP SERPL-CCNC: 87 U/L (ref 39–117)
ALP SERPL-CCNC: 88 U/L (ref 39–117)
ALP SERPL-CCNC: 89 U/L (ref 38–116)
ALT SERPL W P-5'-P-CCNC: 10 U/L (ref 1–33)
ALT SERPL W P-5'-P-CCNC: 10 U/L (ref 1–33)
ALT SERPL W P-5'-P-CCNC: 12 U/L (ref 1–33)
ALT SERPL W P-5'-P-CCNC: 15 U/L (ref 1–33)
ALT SERPL W P-5'-P-CCNC: 8 U/L (ref 0–33)
ALT SERPL W P-5'-P-CCNC: 8 U/L (ref 1–33)
AMMONIA BLD-SCNC: 28 UMOL/L (ref 11–51)
ANION GAP SERPL CALCULATED.3IONS-SCNC: 10 MMOL/L (ref 5–15)
ANION GAP SERPL CALCULATED.3IONS-SCNC: 10 MMOL/L (ref 5–15)
ANION GAP SERPL CALCULATED.3IONS-SCNC: 10.3 MMOL/L (ref 5–15)
ANION GAP SERPL CALCULATED.3IONS-SCNC: 11 MMOL/L (ref 5–15)
ANION GAP SERPL CALCULATED.3IONS-SCNC: 11.3 MMOL/L (ref 5–15)
ANION GAP SERPL CALCULATED.3IONS-SCNC: 11.3 MMOL/L (ref 5–15)
ANION GAP SERPL CALCULATED.3IONS-SCNC: 11.5 MMOL/L (ref 5–15)
ANION GAP SERPL CALCULATED.3IONS-SCNC: 12 MMOL/L (ref 5–15)
ANION GAP SERPL CALCULATED.3IONS-SCNC: 12.2 MMOL/L (ref 5–15)
ANION GAP SERPL CALCULATED.3IONS-SCNC: 12.6 MMOL/L (ref 5–15)
ANION GAP SERPL CALCULATED.3IONS-SCNC: 12.9 MMOL/L (ref 5–15)
ANION GAP SERPL CALCULATED.3IONS-SCNC: 13.7 MMOL/L (ref 5–15)
ANION GAP SERPL CALCULATED.3IONS-SCNC: 14 MMOL/L (ref 5–15)
ANION GAP SERPL CALCULATED.3IONS-SCNC: 14 MMOL/L (ref 5–15)
ANION GAP SERPL CALCULATED.3IONS-SCNC: 14.1 MMOL/L (ref 5–15)
ANION GAP SERPL CALCULATED.3IONS-SCNC: 14.3 MMOL/L (ref 5–15)
ANION GAP SERPL CALCULATED.3IONS-SCNC: 16 MMOL/L (ref 5–15)
ANION GAP SERPL CALCULATED.3IONS-SCNC: 16.1 MMOL/L (ref 5–15)
ANION GAP SERPL CALCULATED.3IONS-SCNC: 17.3 MMOL/L (ref 5–15)
ANION GAP SERPL CALCULATED.3IONS-SCNC: 18.1 MMOL/L (ref 5–15)
ANION GAP SERPL CALCULATED.3IONS-SCNC: 7 MMOL/L (ref 5–15)
ANION GAP SERPL CALCULATED.3IONS-SCNC: 7.7 MMOL/L (ref 5–15)
ANION GAP SERPL CALCULATED.3IONS-SCNC: 9.5 MMOL/L (ref 5–15)
AORTIC DIMENSIONLESS INDEX: 0.6 (DI)
APTT PPP: 40 SECONDS (ref 22.7–35.4)
ARTERIAL PATENCY WRIST A: ABNORMAL
ARTERIAL PATENCY WRIST A: POSITIVE
ASCENDING AORTA: 2.8 CM
AST SERPL-CCNC: 14 U/L (ref 1–32)
AST SERPL-CCNC: 15 U/L (ref 0–32)
AST SERPL-CCNC: 15 U/L (ref 1–32)
AST SERPL-CCNC: 17 U/L (ref 1–32)
AST SERPL-CCNC: 17 U/L (ref 1–32)
AST SERPL-CCNC: 18 U/L (ref 1–32)
ATMOSPHERIC PRESS: 734.4 MMHG
ATMOSPHERIC PRESS: 748.8 MMHG
ATMOSPHERIC PRESS: 751.5 MMHG
ATMOSPHERIC PRESS: 753.2 MMHG
ATMOSPHERIC PRESS: 754.1 MMHG
ATMOSPHERIC PRESS: 755.9 MMHG
ATMOSPHERIC PRESS: 757.7 MMHG
ATMOSPHERIC PRESS: 761.3 MMHG
ATMOSPHERIC PRESS: 763.6 MMHG
B PARAPERT DNA SPEC QL NAA+PROBE: NOT DETECTED
B PARAPERT DNA SPEC QL NAA+PROBE: NOT DETECTED
B PERT DNA SPEC QL NAA+PROBE: NOT DETECTED
B PERT DNA SPEC QL NAA+PROBE: NOT DETECTED
BACTERIA SPEC AEROBE CULT: NO GROWTH
BACTERIA SPEC AEROBE CULT: NORMAL
BACTERIA SPEC RESP CULT: NO GROWTH
BACTERIA UR QL AUTO: ABNORMAL /HPF
BASE EXCESS BLDA CALC-SCNC: -0.9 MMOL/L (ref 0–2)
BASE EXCESS BLDA CALC-SCNC: -12.1 MMOL/L (ref 0–2)
BASE EXCESS BLDA CALC-SCNC: -16.2 MMOL/L (ref 0–2)
BASE EXCESS BLDA CALC-SCNC: -3 MMOL/L (ref 0–2)
BASE EXCESS BLDA CALC-SCNC: -3.6 MMOL/L (ref 0–2)
BASE EXCESS BLDA CALC-SCNC: -4 MMOL/L (ref 0–2)
BASE EXCESS BLDA CALC-SCNC: -4.9 MMOL/L (ref 0–2)
BASE EXCESS BLDA CALC-SCNC: -9.5 MMOL/L (ref 0–2)
BASE EXCESS BLDA CALC-SCNC: -9.9 MMOL/L (ref 0–2)
BASOPHILS # BLD AUTO: 0 10*3/MM3 (ref 0–0.2)
BASOPHILS # BLD AUTO: 0.01 10*3/MM3 (ref 0–0.2)
BASOPHILS # BLD AUTO: 0.02 10*3/MM3 (ref 0–0.2)
BASOPHILS # BLD AUTO: 0.03 10*3/MM3 (ref 0–0.2)
BASOPHILS # BLD AUTO: 0.04 10*3/MM3 (ref 0–0.2)
BASOPHILS # BLD AUTO: 0.05 10*3/MM3 (ref 0–0.2)
BASOPHILS # BLD AUTO: 0.05 10*3/MM3 (ref 0–0.2)
BASOPHILS NFR BLD AUTO: 0 % (ref 0–1.5)
BASOPHILS NFR BLD AUTO: 0.1 % (ref 0–1.5)
BASOPHILS NFR BLD AUTO: 0.2 % (ref 0–1.5)
BASOPHILS NFR BLD AUTO: 0.2 % (ref 0–1.5)
BASOPHILS NFR BLD AUTO: 0.4 % (ref 0–1.5)
BASOPHILS NFR BLD AUTO: 0.5 % (ref 0–1.5)
BASOPHILS NFR BLD AUTO: 0.7 % (ref 0–1.5)
BASOPHILS NFR BLD AUTO: 0.7 % (ref 0–1.5)
BASOPHILS NFR BLD AUTO: 0.8 % (ref 0–1.5)
BASOPHILS NFR BLD AUTO: 1 % (ref 0–1.5)
BDY SITE: ABNORMAL
BH BB BLOOD EXPIRATION DATE: NORMAL
BH BB BLOOD TYPE BARCODE: 5100
BH BB DISPENSE STATUS: NORMAL
BH BB PRODUCT CODE: NORMAL
BH BB UNIT NUMBER: NORMAL
BH CV ECHO MEAS - AO MAX PG: 23.5 MMHG
BH CV ECHO MEAS - AO MEAN PG: 11.5 MMHG
BH CV ECHO MEAS - AO ROOT DIAM: 2.8 CM
BH CV ECHO MEAS - AO V2 MAX: 242.5 CM/SEC
BH CV ECHO MEAS - AO V2 VTI: 48.4 CM
BH CV ECHO MEAS - AVA(I,D): 1.17 CM2
BH CV ECHO MEAS - EDV(CUBED): 101.5 ML
BH CV ECHO MEAS - EDV(CUBED): 47.1 ML
BH CV ECHO MEAS - EDV(MOD-SP2): 92 ML
BH CV ECHO MEAS - EDV(MOD-SP4): 96 ML
BH CV ECHO MEAS - EF(MOD-BP): 66 %
BH CV ECHO MEAS - EF(MOD-SP2): 62 %
BH CV ECHO MEAS - EF(MOD-SP4): 68.8 %
BH CV ECHO MEAS - ESV(CUBED): 21.6 ML
BH CV ECHO MEAS - ESV(CUBED): 23.5 ML
BH CV ECHO MEAS - ESV(MOD-SP2): 35 ML
BH CV ECHO MEAS - ESV(MOD-SP4): 30 ML
BH CV ECHO MEAS - FS: 22.9 %
BH CV ECHO MEAS - FS: 38.6 %
BH CV ECHO MEAS - IVS/LVPW: 1.03 CM
BH CV ECHO MEAS - IVS/LVPW: 1.11 CM
BH CV ECHO MEAS - IVSD: 1.1 CM
BH CV ECHO MEAS - IVSD: 1.13 CM
BH CV ECHO MEAS - LAT PEAK E' VEL: 13.7 CM/SEC
BH CV ECHO MEAS - LV DIASTOLIC VOL/BSA (35-75): 52.9 CM2
BH CV ECHO MEAS - LV MASS(C)D: 120.6 GRAMS
BH CV ECHO MEAS - LV MASS(C)D: 182.8 GRAMS
BH CV ECHO MEAS - LV MAX PG: 6.1 MMHG
BH CV ECHO MEAS - LV MEAN PG: 3.3 MMHG
BH CV ECHO MEAS - LV SYSTOLIC VOL/BSA (12-30): 16.5 CM2
BH CV ECHO MEAS - LV V1 MAX: 123.4 CM/SEC
BH CV ECHO MEAS - LV V1 VTI: 27.9 CM
BH CV ECHO MEAS - LVIDD: 3.6 CM
BH CV ECHO MEAS - LVIDD: 4.7 CM
BH CV ECHO MEAS - LVIDS: 2.8 CM
BH CV ECHO MEAS - LVIDS: 2.9 CM
BH CV ECHO MEAS - LVOT AREA: 2.03 CM2
BH CV ECHO MEAS - LVOT DIAM: 1.61 CM
BH CV ECHO MEAS - LVPWD: 1.02 CM
BH CV ECHO MEAS - LVPWD: 1.08 CM
BH CV ECHO MEAS - MED PEAK E' VEL: 8.9 CM/SEC
BH CV ECHO MEAS - MV A DUR: 0.14 SEC
BH CV ECHO MEAS - MV A MAX VEL: 91.8 CM/SEC
BH CV ECHO MEAS - MV DEC SLOPE: 354.6 CM/SEC2
BH CV ECHO MEAS - MV DEC TIME: 296 MSEC
BH CV ECHO MEAS - MV E MAX VEL: 87.4 CM/SEC
BH CV ECHO MEAS - MV E/A: 0.95
BH CV ECHO MEAS - MV MEAN PG: 1.82 MMHG
BH CV ECHO MEAS - MV P1/2T: 89.7 MSEC
BH CV ECHO MEAS - MV V2 VTI: 40.3 CM
BH CV ECHO MEAS - MVA(P1/2T): 2.45 CM2
BH CV ECHO MEAS - MVA(VTI): 1.4 CM2
BH CV ECHO MEAS - PA V2 MAX: 160.2 CM/SEC
BH CV ECHO MEAS - RAP SYSTOLE: 3 MMHG
BH CV ECHO MEAS - RAP SYSTOLE: 3 MMHG
BH CV ECHO MEAS - RV MAX PG: 2.9 MMHG
BH CV ECHO MEAS - RV V1 MAX: 84.6 CM/SEC
BH CV ECHO MEAS - RV V1 VTI: 15.3 CM
BH CV ECHO MEAS - RVSP: 19 MMHG
BH CV ECHO MEAS - RVSP: 39.3 MMHG
BH CV ECHO MEAS - SI(MOD-SP2): 31.4 ML/M2
BH CV ECHO MEAS - SI(MOD-SP4): 36.3 ML/M2
BH CV ECHO MEAS - SV(LVOT): 56.7 ML
BH CV ECHO MEAS - SV(MOD-SP2): 57 ML
BH CV ECHO MEAS - SV(MOD-SP4): 66 ML
BH CV ECHO MEAS - TAPSE (>1.6): 2.3 CM
BH CV ECHO MEAS - TR MAX PG: 15.5 MMHG
BH CV ECHO MEAS - TR MAX PG: 36.3 MMHG
BH CV ECHO MEAS - TR MAX VEL: 196.8 CM/SEC
BH CV ECHO MEAS - TR MAX VEL: 301.3 CM/SEC
BH CV ECHO MEASUREMENTS AVERAGE E/E' RATIO: 7.73
BH CV LOWER VASCULAR LEFT COMMON FEMORAL AUGMENT: NORMAL
BH CV LOWER VASCULAR LEFT COMMON FEMORAL COMPETENT: NORMAL
BH CV LOWER VASCULAR LEFT COMMON FEMORAL COMPRESS: NORMAL
BH CV LOWER VASCULAR LEFT COMMON FEMORAL PHASIC: NORMAL
BH CV LOWER VASCULAR LEFT COMMON FEMORAL SPONT: NORMAL
BH CV LOWER VASCULAR LEFT DISTAL FEMORAL COMPRESS: NORMAL
BH CV LOWER VASCULAR LEFT GASTRONEMIUS COMPRESS: NORMAL
BH CV LOWER VASCULAR LEFT GREATER SAPH AK COMPRESS: NORMAL
BH CV LOWER VASCULAR LEFT GREATER SAPH BK COMPRESS: NORMAL
BH CV LOWER VASCULAR LEFT LESSER SAPH COMPRESS: NORMAL
BH CV LOWER VASCULAR LEFT MID FEMORAL AUGMENT: NORMAL
BH CV LOWER VASCULAR LEFT MID FEMORAL COMPETENT: NORMAL
BH CV LOWER VASCULAR LEFT MID FEMORAL COMPRESS: NORMAL
BH CV LOWER VASCULAR LEFT MID FEMORAL PHASIC: NORMAL
BH CV LOWER VASCULAR LEFT MID FEMORAL SPONT: NORMAL
BH CV LOWER VASCULAR LEFT PERONEAL COMPRESS: NORMAL
BH CV LOWER VASCULAR LEFT POPLITEAL AUGMENT: NORMAL
BH CV LOWER VASCULAR LEFT POPLITEAL COMPETENT: NORMAL
BH CV LOWER VASCULAR LEFT POPLITEAL COMPRESS: NORMAL
BH CV LOWER VASCULAR LEFT POPLITEAL PHASIC: NORMAL
BH CV LOWER VASCULAR LEFT POPLITEAL SPONT: NORMAL
BH CV LOWER VASCULAR LEFT POSTERIOR TIBIAL COMPRESS: NORMAL
BH CV LOWER VASCULAR LEFT PROFUNDA FEMORAL COMPRESS: NORMAL
BH CV LOWER VASCULAR LEFT PROXIMAL FEMORAL COMPRESS: NORMAL
BH CV LOWER VASCULAR LEFT SAPHENOFEMORAL JUNCTION COMPRESS: NORMAL
BH CV LOWER VASCULAR RIGHT COMMON FEMORAL AUGMENT: NORMAL
BH CV LOWER VASCULAR RIGHT COMMON FEMORAL COMPETENT: NORMAL
BH CV LOWER VASCULAR RIGHT COMMON FEMORAL COMPRESS: NORMAL
BH CV LOWER VASCULAR RIGHT COMMON FEMORAL PHASIC: NORMAL
BH CV LOWER VASCULAR RIGHT COMMON FEMORAL SPONT: NORMAL
BH CV LOWER VASCULAR RIGHT DISTAL FEMORAL COMPRESS: NORMAL
BH CV LOWER VASCULAR RIGHT GASTRONEMIUS COMPRESS: NORMAL
BH CV LOWER VASCULAR RIGHT GREATER SAPH AK COMPRESS: NORMAL
BH CV LOWER VASCULAR RIGHT GREATER SAPH BK COMPRESS: NORMAL
BH CV LOWER VASCULAR RIGHT LESSER SAPH COMPRESS: NORMAL
BH CV LOWER VASCULAR RIGHT MID FEMORAL AUGMENT: NORMAL
BH CV LOWER VASCULAR RIGHT MID FEMORAL COMPETENT: NORMAL
BH CV LOWER VASCULAR RIGHT MID FEMORAL COMPRESS: NORMAL
BH CV LOWER VASCULAR RIGHT MID FEMORAL PHASIC: NORMAL
BH CV LOWER VASCULAR RIGHT MID FEMORAL SPONT: NORMAL
BH CV LOWER VASCULAR RIGHT PERONEAL COMPRESS: NORMAL
BH CV LOWER VASCULAR RIGHT POPLITEAL AUGMENT: NORMAL
BH CV LOWER VASCULAR RIGHT POPLITEAL COMPETENT: NORMAL
BH CV LOWER VASCULAR RIGHT POPLITEAL COMPRESS: NORMAL
BH CV LOWER VASCULAR RIGHT POPLITEAL PHASIC: NORMAL
BH CV LOWER VASCULAR RIGHT POPLITEAL SPONT: NORMAL
BH CV LOWER VASCULAR RIGHT POSTERIOR TIBIAL COMPRESS: NORMAL
BH CV LOWER VASCULAR RIGHT PROFUNDA FEMORAL COMPRESS: NORMAL
BH CV LOWER VASCULAR RIGHT PROXIMAL FEMORAL COMPRESS: NORMAL
BH CV LOWER VASCULAR RIGHT SAPHENOFEMORAL JUNCTION COMPRESS: NORMAL
BH CV REST NUCLEAR ISOTOPE DOSE: 10.6 MCI
BH CV STRESS COMMENTS STAGE 1: NORMAL
BH CV STRESS DOSE REGADENOSON STAGE 1: 0.4
BH CV STRESS DURATION MIN STAGE 1: 0
BH CV STRESS DURATION SEC STAGE 1: 10
BH CV STRESS NUCLEAR ISOTOPE DOSE: 33.4 MCI
BH CV STRESS PROTOCOL 1: NORMAL
BH CV STRESS RECOVERY BP: NORMAL MMHG
BH CV STRESS RECOVERY HR: 65 BPM
BH CV STRESS STAGE 1: 1
BH CV XLRA - RV BASE: 3.9 CM
BH CV XLRA - RV LENGTH: 6.4 CM
BH CV XLRA - RV MID: 3.1 CM
BH CV XLRA - TDI S': 14.3 CM/SEC
BILIRUB SERPL-MCNC: 0.2 MG/DL (ref 0–1.2)
BILIRUB SERPL-MCNC: 0.3 MG/DL (ref 0.2–1.2)
BILIRUB SERPL-MCNC: 0.3 MG/DL (ref 0–1.2)
BILIRUB SERPL-MCNC: 0.3 MG/DL (ref 0–1.2)
BILIRUB UR QL STRIP: NEGATIVE
BLD GP AB SCN SERPL QL: NEGATIVE
BLD GP AB SCN SERPL QL: NEGATIVE
BUN SERPL-MCNC: 17 MG/DL (ref 8–23)
BUN SERPL-MCNC: 17 MG/DL (ref 8–23)
BUN SERPL-MCNC: 19 MG/DL (ref 6–20)
BUN SERPL-MCNC: 20 MG/DL (ref 8–23)
BUN SERPL-MCNC: 20 MG/DL (ref 8–23)
BUN SERPL-MCNC: 22 MG/DL (ref 8–23)
BUN SERPL-MCNC: 24 MG/DL (ref 8–23)
BUN SERPL-MCNC: 26 MG/DL (ref 8–23)
BUN SERPL-MCNC: 27 MG/DL (ref 8–23)
BUN SERPL-MCNC: 27 MG/DL (ref 8–23)
BUN SERPL-MCNC: 29 MG/DL (ref 8–23)
BUN SERPL-MCNC: 29 MG/DL (ref 8–23)
BUN SERPL-MCNC: 31 MG/DL (ref 8–23)
BUN SERPL-MCNC: 41 MG/DL (ref 8–23)
BUN SERPL-MCNC: 47 MG/DL (ref 8–23)
BUN SERPL-MCNC: 62 MG/DL (ref 8–23)
BUN SERPL-MCNC: 63 MG/DL (ref 8–23)
BUN SERPL-MCNC: 75 MG/DL (ref 8–23)
BUN SERPL-MCNC: 79 MG/DL (ref 8–23)
BUN SERPL-MCNC: 79 MG/DL (ref 8–23)
BUN SERPL-MCNC: 88 MG/DL (ref 8–23)
BUN SERPL-MCNC: 89 MG/DL (ref 8–23)
BUN SERPL-MCNC: 92 MG/DL (ref 8–23)
BUN/CREAT SERPL: 11.8 (ref 7–25)
BUN/CREAT SERPL: 13.3 (ref 7.3–30)
BUN/CREAT SERPL: 13.6 (ref 7–25)
BUN/CREAT SERPL: 13.7 (ref 7–25)
BUN/CREAT SERPL: 13.7 (ref 7–25)
BUN/CREAT SERPL: 14.8 (ref 7–25)
BUN/CREAT SERPL: 14.9 (ref 7–25)
BUN/CREAT SERPL: 14.9 (ref 7–25)
BUN/CREAT SERPL: 15.3 (ref 7–25)
BUN/CREAT SERPL: 15.5 (ref 7–25)
BUN/CREAT SERPL: 15.6 (ref 7–25)
BUN/CREAT SERPL: 18.1 (ref 7–25)
BUN/CREAT SERPL: 18.4 (ref 7–25)
BUN/CREAT SERPL: 18.6 (ref 7–25)
BUN/CREAT SERPL: 19.1 (ref 7–25)
BUN/CREAT SERPL: 19.4 (ref 7–25)
BUN/CREAT SERPL: 20.1 (ref 7–25)
BUN/CREAT SERPL: 20.2 (ref 7–25)
BUN/CREAT SERPL: 20.4 (ref 7–25)
BUN/CREAT SERPL: 21.4 (ref 7–25)
BUN/CREAT SERPL: 21.6 (ref 7–25)
BUN/CREAT SERPL: 23.2 (ref 7–25)
BUN/CREAT SERPL: 9.8 (ref 7–25)
C DIFF GDH + TOXINS A+B STL QL IA.RAPID: POSITIVE
C DIFF TOX GENS STL QL NAA+PROBE: POSITIVE
C PNEUM DNA NPH QL NAA+NON-PROBE: NOT DETECTED
C PNEUM DNA NPH QL NAA+NON-PROBE: NOT DETECTED
C-ANCA TITR SER IF: ABNORMAL TITER
C-ANCA TITR SER IF: ABNORMAL TITER
CA-I BLD-MCNC: 5.4 MG/DL (ref 4.6–5.4)
CA-I SERPL ISE-MCNC: 1.34 MMOL/L (ref 1.15–1.35)
CALCIUM SPEC-SCNC: 10.5 MG/DL (ref 8.6–10.5)
CALCIUM SPEC-SCNC: 8.2 MG/DL (ref 8.6–10.5)
CALCIUM SPEC-SCNC: 8.2 MG/DL (ref 8.6–10.5)
CALCIUM SPEC-SCNC: 8.4 MG/DL (ref 8.6–10.5)
CALCIUM SPEC-SCNC: 8.5 MG/DL (ref 8.6–10.5)
CALCIUM SPEC-SCNC: 8.6 MG/DL (ref 8.6–10.5)
CALCIUM SPEC-SCNC: 8.7 MG/DL (ref 8.6–10.5)
CALCIUM SPEC-SCNC: 8.8 MG/DL (ref 8.6–10.5)
CALCIUM SPEC-SCNC: 8.9 MG/DL (ref 8.6–10.5)
CALCIUM SPEC-SCNC: 9 MG/DL (ref 8.6–10.5)
CALCIUM SPEC-SCNC: 9.2 MG/DL (ref 8.6–10.5)
CALCIUM SPEC-SCNC: 9.2 MG/DL (ref 8.6–10.5)
CALCIUM SPEC-SCNC: 9.4 MG/DL (ref 8.6–10.5)
CALCIUM SPEC-SCNC: 9.5 MG/DL (ref 8.6–10.5)
CALCIUM SPEC-SCNC: 9.5 MG/DL (ref 8.6–10.5)
CALCIUM SPEC-SCNC: 9.8 MG/DL (ref 8.5–10.2)
CALCIUM SPEC-SCNC: 9.9 MG/DL (ref 8.6–10.5)
CENTROMERE B AB SER-ACNC: <0.2 AI (ref 0–0.9)
CHLORIDE SERPL-SCNC: 100 MMOL/L (ref 98–107)
CHLORIDE SERPL-SCNC: 100 MMOL/L (ref 98–107)
CHLORIDE SERPL-SCNC: 101 MMOL/L (ref 98–107)
CHLORIDE SERPL-SCNC: 102 MMOL/L (ref 98–107)
CHLORIDE SERPL-SCNC: 103 MMOL/L (ref 98–107)
CHLORIDE SERPL-SCNC: 104 MMOL/L (ref 98–107)
CHLORIDE SERPL-SCNC: 104 MMOL/L (ref 98–107)
CHLORIDE SERPL-SCNC: 106 MMOL/L (ref 98–107)
CHLORIDE SERPL-SCNC: 107 MMOL/L (ref 98–107)
CHLORIDE SERPL-SCNC: 108 MMOL/L (ref 98–107)
CHLORIDE SERPL-SCNC: 109 MMOL/L (ref 98–107)
CHLORIDE SERPL-SCNC: 109 MMOL/L (ref 98–107)
CHLORIDE SERPL-SCNC: 110 MMOL/L (ref 98–107)
CHLORIDE SERPL-SCNC: 113 MMOL/L (ref 98–107)
CHLORIDE SERPL-SCNC: 114 MMOL/L (ref 98–107)
CHLORIDE SERPL-SCNC: 115 MMOL/L (ref 98–107)
CHOLEST SERPL-MCNC: 115 MG/DL (ref 0–200)
CHOLEST SERPL-MCNC: 139 MG/DL (ref 0–200)
CHROMATIN AB SERPL-ACNC: 1 AI (ref 0–0.9)
CLARITY UR: ABNORMAL
CLARITY UR: CLEAR
CLARITY UR: CLEAR
CO2 SERPL-SCNC: 11.7 MMOL/L (ref 22–29)
CO2 SERPL-SCNC: 14 MMOL/L (ref 22–29)
CO2 SERPL-SCNC: 14 MMOL/L (ref 22–29)
CO2 SERPL-SCNC: 15.9 MMOL/L (ref 22–29)
CO2 SERPL-SCNC: 18.8 MMOL/L (ref 22–29)
CO2 SERPL-SCNC: 19.9 MMOL/L (ref 22–29)
CO2 SERPL-SCNC: 20.1 MMOL/L (ref 22–29)
CO2 SERPL-SCNC: 20.3 MMOL/L (ref 22–29)
CO2 SERPL-SCNC: 20.4 MMOL/L (ref 22–29)
CO2 SERPL-SCNC: 20.5 MMOL/L (ref 22–29)
CO2 SERPL-SCNC: 21 MMOL/L (ref 22–29)
CO2 SERPL-SCNC: 21 MMOL/L (ref 22–29)
CO2 SERPL-SCNC: 21.5 MMOL/L (ref 22–29)
CO2 SERPL-SCNC: 21.7 MMOL/L (ref 22–29)
CO2 SERPL-SCNC: 22 MMOL/L (ref 22–29)
CO2 SERPL-SCNC: 22 MMOL/L (ref 22–29)
CO2 SERPL-SCNC: 22.9 MMOL/L (ref 22–29)
CO2 SERPL-SCNC: 23 MMOL/L (ref 22–29)
CO2 SERPL-SCNC: 23.3 MMOL/L (ref 22–29)
CO2 SERPL-SCNC: 24 MMOL/L (ref 22–29)
CO2 SERPL-SCNC: 24.7 MMOL/L (ref 22–29)
COLOR UR: YELLOW
COPPER SERPL-MCNC: 115 UG/DL (ref 80–158)
CREAT SERPL-MCNC: 1.12 MG/DL (ref 0.57–1)
CREAT SERPL-MCNC: 1.14 MG/DL (ref 0.57–1)
CREAT SERPL-MCNC: 1.31 MG/DL (ref 0.57–1)
CREAT SERPL-MCNC: 1.39 MG/DL (ref 0.57–1)
CREAT SERPL-MCNC: 1.43 MG/DL (ref 0.6–1.1)
CREAT SERPL-MCNC: 1.46 MG/DL (ref 0.57–1)
CREAT SERPL-MCNC: 1.6 MG/DL (ref 0.57–1)
CREAT SERPL-MCNC: 1.62 MG/DL (ref 0.57–1)
CREAT SERPL-MCNC: 1.73 MG/DL (ref 0.57–1)
CREAT SERPL-MCNC: 1.74 MG/DL (ref 0.57–1)
CREAT SERPL-MCNC: 1.75 MG/DL (ref 0.57–1)
CREAT SERPL-MCNC: 2.1 MG/DL (ref 0.57–1)
CREAT SERPL-MCNC: 2.45 MG/DL (ref 0.57–1)
CREAT SERPL-MCNC: 2.75 MG/DL (ref 0.57–1)
CREAT SERPL-MCNC: 2.94 MG/DL (ref 0.57–1)
CREAT SERPL-MCNC: 3.03 MG/DL (ref 0.57–1)
CREAT SERPL-MCNC: 3.37 MG/DL (ref 0.57–1)
CREAT SERPL-MCNC: 3.91 MG/DL (ref 0.57–1)
CREAT SERPL-MCNC: 3.93 MG/DL (ref 0.57–1)
CREAT SERPL-MCNC: 4.04 MG/DL (ref 0.57–1)
CREAT SERPL-MCNC: 4.25 MG/DL (ref 0.57–1)
CREAT SERPL-MCNC: 4.32 MG/DL (ref 0.57–1)
CREAT SERPL-MCNC: 4.67 MG/DL (ref 0.57–1)
CREAT UR-MCNC: 51.6 MG/DL
CROSSMATCH INTERPRETATION: NORMAL
CYTO UR: NORMAL
D DIMER PPP FEU-MCNC: 1.54 MCGFEU/ML (ref 0–0.49)
D DIMER PPP FEU-MCNC: 1.95 MCGFEU/ML (ref 0–0.49)
D-LACTATE SERPL-SCNC: 0.6 MMOL/L (ref 0.5–2)
D-LACTATE SERPL-SCNC: 0.8 MMOL/L (ref 0.5–2)
D-LACTATE SERPL-SCNC: 2.4 MMOL/L (ref 0.5–2)
DEPRECATED RDW RBC AUTO: 37.3 FL (ref 37–54)
DEPRECATED RDW RBC AUTO: 38 FL (ref 37–54)
DEPRECATED RDW RBC AUTO: 38.1 FL (ref 37–54)
DEPRECATED RDW RBC AUTO: 38.4 FL (ref 37–54)
DEPRECATED RDW RBC AUTO: 38.4 FL (ref 37–54)
DEPRECATED RDW RBC AUTO: 39.1 FL (ref 37–54)
DEPRECATED RDW RBC AUTO: 40.3 FL (ref 37–54)
DEPRECATED RDW RBC AUTO: 40.5 FL (ref 37–54)
DEPRECATED RDW RBC AUTO: 40.7 FL (ref 37–54)
DEPRECATED RDW RBC AUTO: 41.2 FL (ref 37–54)
DEPRECATED RDW RBC AUTO: 41.3 FL (ref 37–54)
DEPRECATED RDW RBC AUTO: 41.9 FL (ref 37–54)
DEPRECATED RDW RBC AUTO: 42 FL (ref 37–54)
DEPRECATED RDW RBC AUTO: 43.5 FL (ref 37–54)
DEPRECATED RDW RBC AUTO: 45.3 FL (ref 37–54)
DEPRECATED RDW RBC AUTO: 46 FL (ref 37–54)
DEPRECATED RDW RBC AUTO: 47.5 FL (ref 37–54)
DEPRECATED RDW RBC AUTO: 48.5 FL (ref 37–54)
DEPRECATED RDW RBC AUTO: 49.2 FL (ref 37–54)
DSDNA AB SER-ACNC: 4 IU/ML (ref 0–9)
EGFRCR SERPLBLD CKD-EPI 2021: 10.2 ML/MIN/1.73
EGFRCR SERPLBLD CKD-EPI 2021: 10.4 ML/MIN/1.73
EGFRCR SERPLBLD CKD-EPI 2021: 11 ML/MIN/1.73
EGFRCR SERPLBLD CKD-EPI 2021: 11.4 ML/MIN/1.73
EGFRCR SERPLBLD CKD-EPI 2021: 11.5 ML/MIN/1.73
EGFRCR SERPLBLD CKD-EPI 2021: 13.7 ML/MIN/1.73
EGFRCR SERPLBLD CKD-EPI 2021: 15.6 ML/MIN/1.73
EGFRCR SERPLBLD CKD-EPI 2021: 16.1 ML/MIN/1.73
EGFRCR SERPLBLD CKD-EPI 2021: 17.5 ML/MIN/1.73
EGFRCR SERPLBLD CKD-EPI 2021: 20.1 ML/MIN/1.73
EGFRCR SERPLBLD CKD-EPI 2021: 24.2 ML/MIN/1.73
EGFRCR SERPLBLD CKD-EPI 2021: 30.1 ML/MIN/1.73
EGFRCR SERPLBLD CKD-EPI 2021: 30.3 ML/MIN/1.73
EGFRCR SERPLBLD CKD-EPI 2021: 30.5 ML/MIN/1.73
EGFRCR SERPLBLD CKD-EPI 2021: 33 ML/MIN/1.73
EGFRCR SERPLBLD CKD-EPI 2021: 33.5 ML/MIN/1.73
EGFRCR SERPLBLD CKD-EPI 2021: 37.4 ML/MIN/1.73
EGFRCR SERPLBLD CKD-EPI 2021: 38.3 ML/MIN/1.73
EGFRCR SERPLBLD CKD-EPI 2021: 39.7 ML/MIN/1.73
EGFRCR SERPLBLD CKD-EPI 2021: 42.6 ML/MIN/1.73
EGFRCR SERPLBLD CKD-EPI 2021: 50.3 ML/MIN/1.73
EGFRCR SERPLBLD CKD-EPI 2021: 51.4 ML/MIN/1.73
EGFRCR SERPLBLD CKD-EPI 2021: 9.3 ML/MIN/1.73
ENA JO1 AB SER-ACNC: <0.2 AI (ref 0–0.9)
ENA RNP AB SER-ACNC: <0.2 AI (ref 0–0.9)
ENA SCL70 AB SER-ACNC: <0.2 AI (ref 0–0.9)
ENA SM AB SER-ACNC: <0.2 AI (ref 0–0.9)
ENA SM+RNP AB SER-ACNC: <0.2 AI (ref 0–0.9)
ENA SS-A AB SER-ACNC: <0.2 AI (ref 0–0.9)
ENA SS-B AB SER-ACNC: <0.2 AI (ref 0–0.9)
EOSINOPHIL # BLD AUTO: 0 10*3/MM3 (ref 0–0.4)
EOSINOPHIL # BLD AUTO: 0.01 10*3/MM3 (ref 0–0.4)
EOSINOPHIL # BLD AUTO: 0.02 10*3/MM3 (ref 0–0.4)
EOSINOPHIL # BLD AUTO: 0.07 10*3/MM3 (ref 0–0.4)
EOSINOPHIL # BLD AUTO: 0.08 10*3/MM3 (ref 0–0.4)
EOSINOPHIL # BLD AUTO: 0.17 10*3/MM3 (ref 0–0.4)
EOSINOPHIL NFR BLD AUTO: 0 % (ref 0.3–6.2)
EOSINOPHIL NFR BLD AUTO: 0.2 % (ref 0.3–6.2)
EOSINOPHIL NFR BLD AUTO: 0.3 % (ref 0.3–6.2)
EOSINOPHIL NFR BLD AUTO: 1.1 % (ref 0.3–6.2)
EOSINOPHIL NFR BLD AUTO: 1.4 % (ref 0.3–6.2)
EOSINOPHIL NFR BLD AUTO: 2.7 % (ref 0.3–6.2)
ERYTHROCYTE [DISTWIDTH] IN BLOOD BY AUTOMATED COUNT: 11.7 % (ref 12.3–15.4)
ERYTHROCYTE [DISTWIDTH] IN BLOOD BY AUTOMATED COUNT: 11.9 % (ref 12.3–15.4)
ERYTHROCYTE [DISTWIDTH] IN BLOOD BY AUTOMATED COUNT: 12.4 % (ref 12.3–15.4)
ERYTHROCYTE [DISTWIDTH] IN BLOOD BY AUTOMATED COUNT: 12.5 % (ref 12.3–15.4)
ERYTHROCYTE [DISTWIDTH] IN BLOOD BY AUTOMATED COUNT: 12.7 % (ref 12.3–15.4)
ERYTHROCYTE [DISTWIDTH] IN BLOOD BY AUTOMATED COUNT: 13.2 % (ref 12.3–15.4)
ERYTHROCYTE [DISTWIDTH] IN BLOOD BY AUTOMATED COUNT: 13.2 % (ref 12.3–15.4)
ERYTHROCYTE [DISTWIDTH] IN BLOOD BY AUTOMATED COUNT: 13.3 % (ref 12.3–15.4)
ERYTHROCYTE [DISTWIDTH] IN BLOOD BY AUTOMATED COUNT: 13.6 % (ref 12.3–15.4)
ERYTHROCYTE [DISTWIDTH] IN BLOOD BY AUTOMATED COUNT: 13.9 % (ref 12.3–15.4)
ERYTHROCYTE [DISTWIDTH] IN BLOOD BY AUTOMATED COUNT: 15.5 % (ref 12.3–15.4)
ERYTHROCYTE [DISTWIDTH] IN BLOOD BY AUTOMATED COUNT: 15.6 % (ref 12.3–15.4)
ERYTHROCYTE [DISTWIDTH] IN BLOOD BY AUTOMATED COUNT: 15.7 % (ref 12.3–15.4)
ERYTHROCYTE [DISTWIDTH] IN BLOOD BY AUTOMATED COUNT: 15.7 % (ref 12.3–15.4)
ERYTHROCYTE [DISTWIDTH] IN BLOOD BY AUTOMATED COUNT: 15.8 % (ref 12.3–15.4)
ERYTHROCYTE [SEDIMENTATION RATE] IN BLOOD: 32 MM/HR (ref 0–30)
EXPIRATION DATE: NORMAL
FERRITIN SERPL-MCNC: 57.2 NG/ML (ref 13–150)
FIBRINOGEN PPP-MCNC: 119 MG/DL (ref 219–464)
FIBRINOGEN PPP-MCNC: 125 MG/DL (ref 219–464)
FIBRINOGEN PPP-MCNC: 182 MG/DL (ref 219–464)
FIBRINOGEN PPP-MCNC: 193 MG/DL (ref 219–464)
FIBRINOGEN PPP-MCNC: 214 MG/DL (ref 219–464)
FIBRINOGEN PPP-MCNC: 215 MG/DL (ref 219–464)
FIBRINOGEN PPP-MCNC: 423 MG/DL (ref 219–464)
FLUAV AG UPPER RESP QL IA.RAPID: NOT DETECTED
FLUAV SUBTYP SPEC NAA+PROBE: NOT DETECTED
FLUAV SUBTYP SPEC NAA+PROBE: NOT DETECTED
FLUBV AG UPPER RESP QL IA.RAPID: NOT DETECTED
FLUBV RNA ISLT QL NAA+PROBE: NOT DETECTED
FLUBV RNA ISLT QL NAA+PROBE: NOT DETECTED
FOLATE SERPL-MCNC: 9.7 NG/ML (ref 4.78–24.2)
GAS FLOW AIRWAY: 6 LPM
GBM AB SER IA-ACNC: <0.2 UNITS (ref 0–0.9)
GBM AB SER IA-ACNC: <0.2 UNITS (ref 0–0.9)
GIE STN SPEC: NORMAL
GLOBULIN UR ELPH-MCNC: 2.4 GM/DL
GLOBULIN UR ELPH-MCNC: 2.5 GM/DL
GLOBULIN UR ELPH-MCNC: 2.6 GM/DL
GLOBULIN UR ELPH-MCNC: 2.8 GM/DL
GLOBULIN UR ELPH-MCNC: 3.3 GM/DL (ref 1.8–3.5)
GLOBULIN UR ELPH-MCNC: 3.6 GM/DL
GLUCOSE BLDC GLUCOMTR-MCNC: 101 MG/DL (ref 70–130)
GLUCOSE BLDC GLUCOMTR-MCNC: 107 MG/DL (ref 70–130)
GLUCOSE BLDC GLUCOMTR-MCNC: 110 MG/DL (ref 70–130)
GLUCOSE BLDC GLUCOMTR-MCNC: 110 MG/DL (ref 70–130)
GLUCOSE BLDC GLUCOMTR-MCNC: 113 MG/DL (ref 70–130)
GLUCOSE BLDC GLUCOMTR-MCNC: 118 MG/DL (ref 70–130)
GLUCOSE BLDC GLUCOMTR-MCNC: 119 MG/DL (ref 70–130)
GLUCOSE BLDC GLUCOMTR-MCNC: 122 MG/DL (ref 70–130)
GLUCOSE BLDC GLUCOMTR-MCNC: 124 MG/DL (ref 70–130)
GLUCOSE BLDC GLUCOMTR-MCNC: 125 MG/DL (ref 70–130)
GLUCOSE BLDC GLUCOMTR-MCNC: 129 MG/DL (ref 70–130)
GLUCOSE BLDC GLUCOMTR-MCNC: 130 MG/DL (ref 70–130)
GLUCOSE BLDC GLUCOMTR-MCNC: 137 MG/DL (ref 70–130)
GLUCOSE BLDC GLUCOMTR-MCNC: 138 MG/DL (ref 70–130)
GLUCOSE BLDC GLUCOMTR-MCNC: 138 MG/DL (ref 70–130)
GLUCOSE BLDC GLUCOMTR-MCNC: 139 MG/DL (ref 70–130)
GLUCOSE BLDC GLUCOMTR-MCNC: 139 MG/DL (ref 70–130)
GLUCOSE BLDC GLUCOMTR-MCNC: 141 MG/DL (ref 70–130)
GLUCOSE BLDC GLUCOMTR-MCNC: 142 MG/DL (ref 70–130)
GLUCOSE BLDC GLUCOMTR-MCNC: 142 MG/DL (ref 70–130)
GLUCOSE BLDC GLUCOMTR-MCNC: 143 MG/DL (ref 70–130)
GLUCOSE BLDC GLUCOMTR-MCNC: 144 MG/DL (ref 70–130)
GLUCOSE BLDC GLUCOMTR-MCNC: 144 MG/DL (ref 70–130)
GLUCOSE BLDC GLUCOMTR-MCNC: 146 MG/DL (ref 70–130)
GLUCOSE BLDC GLUCOMTR-MCNC: 149 MG/DL (ref 70–130)
GLUCOSE BLDC GLUCOMTR-MCNC: 149 MG/DL (ref 70–130)
GLUCOSE BLDC GLUCOMTR-MCNC: 159 MG/DL (ref 70–130)
GLUCOSE BLDC GLUCOMTR-MCNC: 160 MG/DL (ref 70–130)
GLUCOSE BLDC GLUCOMTR-MCNC: 161 MG/DL (ref 70–130)
GLUCOSE BLDC GLUCOMTR-MCNC: 167 MG/DL (ref 70–130)
GLUCOSE BLDC GLUCOMTR-MCNC: 168 MG/DL (ref 70–130)
GLUCOSE BLDC GLUCOMTR-MCNC: 168 MG/DL (ref 70–130)
GLUCOSE BLDC GLUCOMTR-MCNC: 170 MG/DL (ref 70–130)
GLUCOSE BLDC GLUCOMTR-MCNC: 170 MG/DL (ref 70–130)
GLUCOSE BLDC GLUCOMTR-MCNC: 173 MG/DL (ref 70–130)
GLUCOSE BLDC GLUCOMTR-MCNC: 174 MG/DL (ref 70–130)
GLUCOSE BLDC GLUCOMTR-MCNC: 175 MG/DL (ref 70–130)
GLUCOSE BLDC GLUCOMTR-MCNC: 175 MG/DL (ref 70–130)
GLUCOSE BLDC GLUCOMTR-MCNC: 176 MG/DL (ref 70–130)
GLUCOSE BLDC GLUCOMTR-MCNC: 176 MG/DL (ref 70–130)
GLUCOSE BLDC GLUCOMTR-MCNC: 178 MG/DL (ref 70–130)
GLUCOSE BLDC GLUCOMTR-MCNC: 179 MG/DL (ref 70–130)
GLUCOSE BLDC GLUCOMTR-MCNC: 181 MG/DL (ref 70–130)
GLUCOSE BLDC GLUCOMTR-MCNC: 191 MG/DL (ref 70–130)
GLUCOSE BLDC GLUCOMTR-MCNC: 193 MG/DL (ref 70–130)
GLUCOSE BLDC GLUCOMTR-MCNC: 194 MG/DL (ref 70–130)
GLUCOSE BLDC GLUCOMTR-MCNC: 198 MG/DL (ref 70–130)
GLUCOSE BLDC GLUCOMTR-MCNC: 199 MG/DL (ref 70–130)
GLUCOSE BLDC GLUCOMTR-MCNC: 199 MG/DL (ref 70–130)
GLUCOSE BLDC GLUCOMTR-MCNC: 200 MG/DL (ref 70–130)
GLUCOSE BLDC GLUCOMTR-MCNC: 200 MG/DL (ref 70–130)
GLUCOSE BLDC GLUCOMTR-MCNC: 201 MG/DL (ref 70–130)
GLUCOSE BLDC GLUCOMTR-MCNC: 207 MG/DL (ref 70–130)
GLUCOSE BLDC GLUCOMTR-MCNC: 207 MG/DL (ref 70–130)
GLUCOSE BLDC GLUCOMTR-MCNC: 208 MG/DL (ref 70–130)
GLUCOSE BLDC GLUCOMTR-MCNC: 211 MG/DL (ref 70–130)
GLUCOSE BLDC GLUCOMTR-MCNC: 212 MG/DL (ref 70–130)
GLUCOSE BLDC GLUCOMTR-MCNC: 215 MG/DL (ref 70–130)
GLUCOSE BLDC GLUCOMTR-MCNC: 215 MG/DL (ref 70–130)
GLUCOSE BLDC GLUCOMTR-MCNC: 219 MG/DL (ref 70–130)
GLUCOSE BLDC GLUCOMTR-MCNC: 220 MG/DL (ref 70–130)
GLUCOSE BLDC GLUCOMTR-MCNC: 224 MG/DL (ref 70–130)
GLUCOSE BLDC GLUCOMTR-MCNC: 224 MG/DL (ref 70–130)
GLUCOSE BLDC GLUCOMTR-MCNC: 228 MG/DL (ref 70–130)
GLUCOSE BLDC GLUCOMTR-MCNC: 230 MG/DL (ref 70–130)
GLUCOSE BLDC GLUCOMTR-MCNC: 242 MG/DL (ref 70–130)
GLUCOSE BLDC GLUCOMTR-MCNC: 258 MG/DL (ref 70–130)
GLUCOSE BLDC GLUCOMTR-MCNC: 259 MG/DL (ref 70–130)
GLUCOSE BLDC GLUCOMTR-MCNC: 260 MG/DL (ref 70–130)
GLUCOSE BLDC GLUCOMTR-MCNC: 261 MG/DL (ref 70–130)
GLUCOSE BLDC GLUCOMTR-MCNC: 264 MG/DL (ref 70–130)
GLUCOSE BLDC GLUCOMTR-MCNC: 272 MG/DL (ref 70–130)
GLUCOSE BLDC GLUCOMTR-MCNC: 298 MG/DL (ref 70–130)
GLUCOSE BLDC GLUCOMTR-MCNC: 79 MG/DL (ref 70–130)
GLUCOSE BLDC GLUCOMTR-MCNC: 98 MG/DL (ref 70–130)
GLUCOSE SERPL-MCNC: 109 MG/DL (ref 65–99)
GLUCOSE SERPL-MCNC: 110 MG/DL (ref 65–99)
GLUCOSE SERPL-MCNC: 113 MG/DL (ref 65–99)
GLUCOSE SERPL-MCNC: 121 MG/DL (ref 65–99)
GLUCOSE SERPL-MCNC: 125 MG/DL (ref 65–99)
GLUCOSE SERPL-MCNC: 133 MG/DL (ref 65–99)
GLUCOSE SERPL-MCNC: 134 MG/DL (ref 65–99)
GLUCOSE SERPL-MCNC: 136 MG/DL (ref 65–99)
GLUCOSE SERPL-MCNC: 137 MG/DL (ref 65–99)
GLUCOSE SERPL-MCNC: 146 MG/DL (ref 65–99)
GLUCOSE SERPL-MCNC: 149 MG/DL (ref 65–99)
GLUCOSE SERPL-MCNC: 157 MG/DL (ref 65–99)
GLUCOSE SERPL-MCNC: 158 MG/DL (ref 65–99)
GLUCOSE SERPL-MCNC: 164 MG/DL (ref 65–99)
GLUCOSE SERPL-MCNC: 169 MG/DL (ref 65–99)
GLUCOSE SERPL-MCNC: 170 MG/DL (ref 74–124)
GLUCOSE SERPL-MCNC: 181 MG/DL (ref 65–99)
GLUCOSE SERPL-MCNC: 182 MG/DL (ref 65–99)
GLUCOSE SERPL-MCNC: 194 MG/DL (ref 65–99)
GLUCOSE SERPL-MCNC: 195 MG/DL (ref 65–99)
GLUCOSE SERPL-MCNC: 202 MG/DL (ref 65–99)
GLUCOSE SERPL-MCNC: 216 MG/DL (ref 65–99)
GLUCOSE SERPL-MCNC: 271 MG/DL (ref 65–99)
GLUCOSE UR STRIP-MCNC: NEGATIVE MG/DL
GRAM STN SPEC: NORMAL
HADV DNA SPEC NAA+PROBE: NOT DETECTED
HADV DNA SPEC NAA+PROBE: NOT DETECTED
HBA1C MFR BLD: 5.9 % (ref 4.8–5.6)
HBA1C MFR BLD: 5.9 % (ref 4.8–5.6)
HBA1C MFR BLD: 6.1 % (ref 4.8–5.6)
HBV CORE AB SERPL QL IA: NEGATIVE
HBV SURFACE AB SER RIA-ACNC: NORMAL
HBV SURFACE AG SERPL QL IA: NORMAL
HCO3 BLDA-SCNC: 11.1 MMOL/L (ref 22–28)
HCO3 BLDA-SCNC: 15.9 MMOL/L (ref 22–28)
HCO3 BLDA-SCNC: 16.7 MMOL/L (ref 22–28)
HCO3 BLDA-SCNC: 17.5 MMOL/L (ref 22–28)
HCO3 BLDA-SCNC: 19.4 MMOL/L (ref 22–28)
HCO3 BLDA-SCNC: 19.8 MMOL/L (ref 22–28)
HCO3 BLDA-SCNC: 20.4 MMOL/L (ref 22–28)
HCO3 BLDA-SCNC: 20.4 MMOL/L (ref 22–28)
HCO3 BLDA-SCNC: 22.3 MMOL/L (ref 22–28)
HCOV 229E RNA SPEC QL NAA+PROBE: NOT DETECTED
HCOV 229E RNA SPEC QL NAA+PROBE: NOT DETECTED
HCOV HKU1 RNA SPEC QL NAA+PROBE: NOT DETECTED
HCOV HKU1 RNA SPEC QL NAA+PROBE: NOT DETECTED
HCOV NL63 RNA SPEC QL NAA+PROBE: NOT DETECTED
HCOV NL63 RNA SPEC QL NAA+PROBE: NOT DETECTED
HCOV OC43 RNA SPEC QL NAA+PROBE: NOT DETECTED
HCOV OC43 RNA SPEC QL NAA+PROBE: NOT DETECTED
HCT VFR BLD AUTO: 17.9 % (ref 34–46.6)
HCT VFR BLD AUTO: 20.2 % (ref 34–46.6)
HCT VFR BLD AUTO: 21.1 % (ref 34–46.6)
HCT VFR BLD AUTO: 21.7 % (ref 34–46.6)
HCT VFR BLD AUTO: 22.3 % (ref 34–46.6)
HCT VFR BLD AUTO: 22.3 % (ref 34–46.6)
HCT VFR BLD AUTO: 22.4 % (ref 34–46.6)
HCT VFR BLD AUTO: 22.8 % (ref 34–46.6)
HCT VFR BLD AUTO: 23 % (ref 34–46.6)
HCT VFR BLD AUTO: 23.3 % (ref 34–46.6)
HCT VFR BLD AUTO: 24.5 % (ref 34–46.6)
HCT VFR BLD AUTO: 24.8 % (ref 34–46.6)
HCT VFR BLD AUTO: 25.2 % (ref 34–46.6)
HCT VFR BLD AUTO: 25.6 % (ref 34–46.6)
HCT VFR BLD AUTO: 26.7 % (ref 34–46.6)
HCT VFR BLD AUTO: 26.9 % (ref 34–46.6)
HCT VFR BLD AUTO: 27 % (ref 34–46.6)
HCT VFR BLD AUTO: 29 % (ref 34–46.6)
HCT VFR BLD AUTO: 29.1 % (ref 34–46.6)
HCT VFR BLD AUTO: 30 % (ref 34–46.6)
HCT VFR BLD AUTO: 33 % (ref 34–46.6)
HCT VFR BLD AUTO: 34 % (ref 34–46.6)
HCYS SERPL-MCNC: 24 UMOL/L (ref 0–15)
HDLC SERPL-MCNC: 42 MG/DL (ref 40–60)
HDLC SERPL-MCNC: 50 MG/DL (ref 40–60)
HGB BLD-MCNC: 10.2 G/DL (ref 12–15.9)
HGB BLD-MCNC: 10.5 G/DL (ref 12–15.9)
HGB BLD-MCNC: 6 G/DL (ref 12–15.9)
HGB BLD-MCNC: 6.6 G/DL (ref 12–15.9)
HGB BLD-MCNC: 6.7 G/DL (ref 12–15.9)
HGB BLD-MCNC: 7.2 G/DL (ref 12–15.9)
HGB BLD-MCNC: 7.3 G/DL (ref 12–15.9)
HGB BLD-MCNC: 7.4 G/DL (ref 12–15.9)
HGB BLD-MCNC: 7.8 G/DL (ref 12–15.9)
HGB BLD-MCNC: 7.8 G/DL (ref 12–15.9)
HGB BLD-MCNC: 8 G/DL (ref 12–15.9)
HGB BLD-MCNC: 8 G/DL (ref 12–15.9)
HGB BLD-MCNC: 8.3 G/DL (ref 12–15.9)
HGB BLD-MCNC: 8.4 G/DL (ref 12–15.9)
HGB BLD-MCNC: 9.2 G/DL (ref 12–15.9)
HGB BLD-MCNC: 9.2 G/DL (ref 12–15.9)
HGB BLD-MCNC: 9.6 G/DL (ref 12–15.9)
HGB BLD-MCNC: 9.8 G/DL (ref 12–15.9)
HGB RETIC QN AUTO: 29.7 PG (ref 29.8–36.1)
HGB UR QL STRIP.AUTO: ABNORMAL
HGB UR QL STRIP.AUTO: ABNORMAL
HGB UR QL STRIP.AUTO: NEGATIVE
HMPV RNA NPH QL NAA+NON-PROBE: NOT DETECTED
HMPV RNA NPH QL NAA+NON-PROBE: NOT DETECTED
HOLD SPECIMEN: NORMAL
HPIV1 RNA ISLT QL NAA+PROBE: NOT DETECTED
HPIV1 RNA ISLT QL NAA+PROBE: NOT DETECTED
HPIV2 RNA SPEC QL NAA+PROBE: NOT DETECTED
HPIV2 RNA SPEC QL NAA+PROBE: NOT DETECTED
HPIV3 RNA NPH QL NAA+PROBE: NOT DETECTED
HPIV3 RNA NPH QL NAA+PROBE: NOT DETECTED
HPIV4 P GENE NPH QL NAA+PROBE: NOT DETECTED
HPIV4 P GENE NPH QL NAA+PROBE: NOT DETECTED
HYALINE CASTS UR QL AUTO: ABNORMAL /LPF
IMM GRANULOCYTES # BLD AUTO: 0.02 10*3/MM3 (ref 0–0.05)
IMM GRANULOCYTES # BLD AUTO: 0.03 10*3/MM3 (ref 0–0.05)
IMM GRANULOCYTES # BLD AUTO: 0.04 10*3/MM3 (ref 0–0.05)
IMM GRANULOCYTES # BLD AUTO: 0.06 10*3/MM3 (ref 0–0.05)
IMM GRANULOCYTES # BLD AUTO: 0.07 10*3/MM3 (ref 0–0.05)
IMM GRANULOCYTES # BLD AUTO: 0.1 10*3/MM3 (ref 0–0.05)
IMM GRANULOCYTES # BLD AUTO: 0.13 10*3/MM3 (ref 0–0.05)
IMM GRANULOCYTES # BLD AUTO: 0.13 10*3/MM3 (ref 0–0.05)
IMM GRANULOCYTES # BLD AUTO: 0.15 10*3/MM3 (ref 0–0.05)
IMM GRANULOCYTES # BLD AUTO: 0.21 10*3/MM3 (ref 0–0.05)
IMM GRANULOCYTES # BLD AUTO: 0.27 10*3/MM3 (ref 0–0.05)
IMM GRANULOCYTES # BLD AUTO: 0.32 10*3/MM3 (ref 0–0.05)
IMM GRANULOCYTES # BLD AUTO: 0.33 10*3/MM3 (ref 0–0.05)
IMM GRANULOCYTES NFR BLD AUTO: 0.4 % (ref 0–0.5)
IMM GRANULOCYTES NFR BLD AUTO: 0.5 % (ref 0–0.5)
IMM GRANULOCYTES NFR BLD AUTO: 0.6 % (ref 0–0.5)
IMM GRANULOCYTES NFR BLD AUTO: 0.7 % (ref 0–0.5)
IMM GRANULOCYTES NFR BLD AUTO: 0.8 % (ref 0–0.5)
IMM GRANULOCYTES NFR BLD AUTO: 0.9 % (ref 0–0.5)
IMM GRANULOCYTES NFR BLD AUTO: 1 % (ref 0–0.5)
IMM GRANULOCYTES NFR BLD AUTO: 1.2 % (ref 0–0.5)
IMM GRANULOCYTES NFR BLD AUTO: 1.4 % (ref 0–0.5)
IMM GRANULOCYTES NFR BLD AUTO: 1.5 % (ref 0–0.5)
IMM GRANULOCYTES NFR BLD AUTO: 1.7 % (ref 0–0.5)
IMM GRANULOCYTES NFR BLD AUTO: 2 % (ref 0–0.5)
IMM RETICS NFR: 15.2 % (ref 3–15.8)
INHALED O2 CONCENTRATION: 30 %
INHALED O2 CONCENTRATION: 40 %
INHALED O2 CONCENTRATION: 40 %
INHALED O2 CONCENTRATION: 50 %
INHALED O2 CONCENTRATION: 85 %
INR PPP: 1.05 (ref 0.9–1.1)
INR PPP: 1.14 (ref 0.9–1.1)
INTERNAL CONTROL: NORMAL
IRON 24H UR-MRATE: 29 MCG/DL (ref 37–145)
IRON 24H UR-MRATE: 35 MCG/DL (ref 37–145)
IRON SATN MFR SERPL: 10 % (ref 14–48)
IRON SATN MFR SERPL: 8 % (ref 20–50)
KETONES UR QL STRIP: NEGATIVE
LAB AP CASE REPORT: NORMAL
LAB AP CLINICAL INFORMATION: NORMAL
LAB AP CLINICAL INFORMATION: NORMAL
LAB AP DIAGNOSIS COMMENT: NORMAL
LAB AP DIAGNOSIS COMMENT: NORMAL
LDLC SERPL CALC-MCNC: 53 MG/DL (ref 0–100)
LDLC SERPL CALC-MCNC: 69 MG/DL (ref 0–100)
LDLC/HDLC SERPL: 1.22 {RATIO}
LDLC/HDLC SERPL: 1.35 {RATIO}
LEFT ATRIUM VOLUME INDEX: 42.3 ML/M2
LEUKOCYTE ESTERASE UR QL STRIP.AUTO: ABNORMAL
LEUKOCYTE ESTERASE UR QL STRIP.AUTO: ABNORMAL
LEUKOCYTE ESTERASE UR QL STRIP.AUTO: NEGATIVE
LV EF NUC BP: 79 %
LYMPHOCYTES # BLD AUTO: 0.2 10*3/MM3 (ref 0.7–3.1)
LYMPHOCYTES # BLD AUTO: 0.23 10*3/MM3 (ref 0.7–3.1)
LYMPHOCYTES # BLD AUTO: 0.23 10*3/MM3 (ref 0.7–3.1)
LYMPHOCYTES # BLD AUTO: 0.24 10*3/MM3 (ref 0.7–3.1)
LYMPHOCYTES # BLD AUTO: 0.27 10*3/MM3 (ref 0.7–3.1)
LYMPHOCYTES # BLD AUTO: 0.28 10*3/MM3 (ref 0.7–3.1)
LYMPHOCYTES # BLD AUTO: 0.33 10*3/MM3 (ref 0.7–3.1)
LYMPHOCYTES # BLD AUTO: 0.33 10*3/MM3 (ref 0.7–3.1)
LYMPHOCYTES # BLD AUTO: 0.37 10*3/MM3 (ref 0.7–3.1)
LYMPHOCYTES # BLD AUTO: 0.4 10*3/MM3 (ref 0.7–3.1)
LYMPHOCYTES # BLD AUTO: 0.48 10*3/MM3 (ref 0.7–3.1)
LYMPHOCYTES # BLD AUTO: 0.48 10*3/MM3 (ref 0.7–3.1)
LYMPHOCYTES # BLD AUTO: 0.53 10*3/MM3 (ref 0.7–3.1)
LYMPHOCYTES # BLD AUTO: 0.54 10*3/MM3 (ref 0.7–3.1)
LYMPHOCYTES # BLD AUTO: 0.57 10*3/MM3 (ref 0.7–3.1)
LYMPHOCYTES # BLD AUTO: 0.59 10*3/MM3 (ref 0.7–3.1)
LYMPHOCYTES # BLD AUTO: 0.66 10*3/MM3 (ref 0.7–3.1)
LYMPHOCYTES # BLD MANUAL: 0.36 10*3/MM3 (ref 0.7–3.1)
LYMPHOCYTES NFR BLD AUTO: 1.2 % (ref 19.6–45.3)
LYMPHOCYTES NFR BLD AUTO: 1.4 % (ref 19.6–45.3)
LYMPHOCYTES NFR BLD AUTO: 1.6 % (ref 19.6–45.3)
LYMPHOCYTES NFR BLD AUTO: 11.4 % (ref 19.6–45.3)
LYMPHOCYTES NFR BLD AUTO: 11.9 % (ref 19.6–45.3)
LYMPHOCYTES NFR BLD AUTO: 12.4 % (ref 19.6–45.3)
LYMPHOCYTES NFR BLD AUTO: 2.3 % (ref 19.6–45.3)
LYMPHOCYTES NFR BLD AUTO: 2.4 % (ref 19.6–45.3)
LYMPHOCYTES NFR BLD AUTO: 3 % (ref 19.6–45.3)
LYMPHOCYTES NFR BLD AUTO: 4.4 % (ref 19.6–45.3)
LYMPHOCYTES NFR BLD AUTO: 6.8 % (ref 19.6–45.3)
LYMPHOCYTES NFR BLD AUTO: 8 % (ref 19.6–45.3)
LYMPHOCYTES NFR BLD AUTO: 8.4 % (ref 19.6–45.3)
LYMPHOCYTES NFR BLD AUTO: 8.6 % (ref 19.6–45.3)
LYMPHOCYTES NFR BLD AUTO: 8.8 % (ref 19.6–45.3)
LYMPHOCYTES NFR BLD MANUAL: 3 % (ref 5–12)
Lab: ABNORMAL
Lab: NORMAL
M PNEUMO IGG SER IA-ACNC: NOT DETECTED
M PNEUMO IGG SER IA-ACNC: NOT DETECTED
MAGNESIUM SERPL-MCNC: 2 MG/DL (ref 1.6–2.4)
MAGNESIUM SERPL-MCNC: 2 MG/DL (ref 1.6–2.4)
MAGNESIUM SERPL-MCNC: 2.1 MG/DL (ref 1.6–2.4)
MAGNESIUM SERPL-MCNC: 2.3 MG/DL (ref 1.6–2.4)
MAXIMAL PREDICTED HEART RATE: 145 BPM
MCH RBC QN AUTO: 26.3 PG (ref 26.6–33)
MCH RBC QN AUTO: 26.8 PG (ref 26.6–33)
MCH RBC QN AUTO: 27 PG (ref 26.6–33)
MCH RBC QN AUTO: 27.1 PG (ref 26.6–33)
MCH RBC QN AUTO: 27.1 PG (ref 26.6–33)
MCH RBC QN AUTO: 27.2 PG (ref 26.6–33)
MCH RBC QN AUTO: 27.3 PG (ref 26.6–33)
MCH RBC QN AUTO: 27.3 PG (ref 26.6–33)
MCH RBC QN AUTO: 27.4 PG (ref 26.6–33)
MCH RBC QN AUTO: 27.5 PG (ref 26.6–33)
MCH RBC QN AUTO: 27.5 PG (ref 26.6–33)
MCH RBC QN AUTO: 27.6 PG (ref 26.6–33)
MCH RBC QN AUTO: 27.7 PG (ref 26.6–33)
MCH RBC QN AUTO: 27.9 PG (ref 26.6–33)
MCH RBC QN AUTO: 28 PG (ref 26.6–33)
MCH RBC QN AUTO: 28.4 PG (ref 26.6–33)
MCH RBC QN AUTO: 29.4 PG (ref 26.6–33)
MCHC RBC AUTO-ENTMCNC: 30.7 G/DL (ref 31.5–35.7)
MCHC RBC AUTO-ENTMCNC: 30.9 G/DL (ref 31.5–35.7)
MCHC RBC AUTO-ENTMCNC: 30.9 G/DL (ref 31.5–35.7)
MCHC RBC AUTO-ENTMCNC: 31 G/DL (ref 31.5–35.7)
MCHC RBC AUTO-ENTMCNC: 31.3 G/DL (ref 31.5–35.7)
MCHC RBC AUTO-ENTMCNC: 31.3 G/DL (ref 31.5–35.7)
MCHC RBC AUTO-ENTMCNC: 31.5 G/DL (ref 31.5–35.7)
MCHC RBC AUTO-ENTMCNC: 31.5 G/DL (ref 31.5–35.7)
MCHC RBC AUTO-ENTMCNC: 31.6 G/DL (ref 31.5–35.7)
MCHC RBC AUTO-ENTMCNC: 31.6 G/DL (ref 31.5–35.7)
MCHC RBC AUTO-ENTMCNC: 32 G/DL (ref 31.5–35.7)
MCHC RBC AUTO-ENTMCNC: 32.3 G/DL (ref 31.5–35.7)
MCHC RBC AUTO-ENTMCNC: 32.6 G/DL (ref 31.5–35.7)
MCHC RBC AUTO-ENTMCNC: 32.7 G/DL (ref 31.5–35.7)
MCHC RBC AUTO-ENTMCNC: 33.5 G/DL (ref 31.5–35.7)
MCHC RBC AUTO-ENTMCNC: 33.6 G/DL (ref 31.5–35.7)
MCHC RBC AUTO-ENTMCNC: 33.8 G/DL (ref 31.5–35.7)
MCV RBC AUTO: 80.4 FL (ref 79–97)
MCV RBC AUTO: 81.4 FL (ref 79–97)
MCV RBC AUTO: 81.5 FL (ref 79–97)
MCV RBC AUTO: 84.2 FL (ref 79–97)
MCV RBC AUTO: 84.2 FL (ref 79–97)
MCV RBC AUTO: 84.5 FL (ref 79–97)
MCV RBC AUTO: 84.5 FL (ref 79–97)
MCV RBC AUTO: 84.8 FL (ref 79–97)
MCV RBC AUTO: 85.5 FL (ref 79–97)
MCV RBC AUTO: 85.7 FL (ref 79–97)
MCV RBC AUTO: 86.1 FL (ref 79–97)
MCV RBC AUTO: 86.7 FL (ref 79–97)
MCV RBC AUTO: 86.7 FL (ref 79–97)
MCV RBC AUTO: 87.6 FL (ref 79–97)
MCV RBC AUTO: 87.8 FL (ref 79–97)
MCV RBC AUTO: 87.8 FL (ref 79–97)
MCV RBC AUTO: 89.8 FL (ref 79–97)
MCV RBC AUTO: 90.6 FL (ref 79–97)
MCV RBC AUTO: 95.2 FL (ref 79–97)
METHYLMALONATE SERPL-SCNC: 438 NMOL/L (ref 0–378)
MODALITY: ABNORMAL
MONOCYTES # BLD AUTO: 0.21 10*3/MM3 (ref 0.1–0.9)
MONOCYTES # BLD AUTO: 0.23 10*3/MM3 (ref 0.1–0.9)
MONOCYTES # BLD AUTO: 0.32 10*3/MM3 (ref 0.1–0.9)
MONOCYTES # BLD AUTO: 0.36 10*3/MM3 (ref 0.1–0.9)
MONOCYTES # BLD AUTO: 0.36 10*3/MM3 (ref 0.1–0.9)
MONOCYTES # BLD AUTO: 0.42 10*3/MM3 (ref 0.1–0.9)
MONOCYTES # BLD AUTO: 0.43 10*3/MM3 (ref 0.1–0.9)
MONOCYTES # BLD AUTO: 0.45 10*3/MM3 (ref 0.1–0.9)
MONOCYTES # BLD AUTO: 0.46 10*3/MM3 (ref 0.1–0.9)
MONOCYTES # BLD AUTO: 0.55 10*3/MM3 (ref 0.1–0.9)
MONOCYTES # BLD AUTO: 0.59 10*3/MM3 (ref 0.1–0.9)
MONOCYTES # BLD AUTO: 0.62 10*3/MM3 (ref 0.1–0.9)
MONOCYTES # BLD AUTO: 0.68 10*3/MM3 (ref 0.1–0.9)
MONOCYTES # BLD AUTO: 0.78 10*3/MM3 (ref 0.1–0.9)
MONOCYTES # BLD AUTO: 0.88 10*3/MM3 (ref 0.1–0.9)
MONOCYTES # BLD AUTO: 0.9 10*3/MM3 (ref 0.1–0.9)
MONOCYTES # BLD AUTO: 1.45 10*3/MM3 (ref 0.1–0.9)
MONOCYTES # BLD: 1.08 10*3/MM3 (ref 0.1–0.9)
MONOCYTES NFR BLD AUTO: 1.8 % (ref 5–12)
MONOCYTES NFR BLD AUTO: 13.4 % (ref 5–12)
MONOCYTES NFR BLD AUTO: 2 % (ref 5–12)
MONOCYTES NFR BLD AUTO: 2.2 % (ref 5–12)
MONOCYTES NFR BLD AUTO: 3.9 % (ref 5–12)
MONOCYTES NFR BLD AUTO: 4.6 % (ref 5–12)
MONOCYTES NFR BLD AUTO: 6.2 % (ref 5–12)
MONOCYTES NFR BLD AUTO: 6.7 % (ref 5–12)
MONOCYTES NFR BLD AUTO: 7.4 % (ref 5–12)
MONOCYTES NFR BLD AUTO: 7.5 % (ref 5–12)
MONOCYTES NFR BLD AUTO: 8.1 % (ref 5–12)
MONOCYTES NFR BLD AUTO: 9 % (ref 5–12)
MONOCYTES NFR BLD AUTO: 9.1 % (ref 5–12)
MONOCYTES NFR BLD AUTO: 9.6 % (ref 5–12)
MONOCYTES NFR BLD AUTO: 9.6 % (ref 5–12)
MRSA DNA SPEC QL NAA+PROBE: NORMAL
MYELOPEROXIDASE AB SER IA-ACNC: >8 UNITS (ref 0–0.9)
MYELOPEROXIDASE AB SER IA-ACNC: >8 UNITS (ref 0–0.9)
NEUTROPHILS # BLD AUTO: 34.68 10*3/MM3 (ref 1.7–7)
NEUTROPHILS NFR BLD AUTO: 10.77 10*3/MM3 (ref 1.7–7)
NEUTROPHILS NFR BLD AUTO: 11.95 10*3/MM3 (ref 1.7–7)
NEUTROPHILS NFR BLD AUTO: 12.3 10*3/MM3 (ref 1.7–7)
NEUTROPHILS NFR BLD AUTO: 13.68 10*3/MM3 (ref 1.7–7)
NEUTROPHILS NFR BLD AUTO: 13.71 10*3/MM3 (ref 1.7–7)
NEUTROPHILS NFR BLD AUTO: 17.58 10*3/MM3 (ref 1.7–7)
NEUTROPHILS NFR BLD AUTO: 21.38 10*3/MM3 (ref 1.7–7)
NEUTROPHILS NFR BLD AUTO: 3.64 10*3/MM3 (ref 1.7–7)
NEUTROPHILS NFR BLD AUTO: 3.72 10*3/MM3 (ref 1.7–7)
NEUTROPHILS NFR BLD AUTO: 4.08 10*3/MM3 (ref 1.7–7)
NEUTROPHILS NFR BLD AUTO: 4.22 10*3/MM3 (ref 1.7–7)
NEUTROPHILS NFR BLD AUTO: 4.57 10*3/MM3 (ref 1.7–7)
NEUTROPHILS NFR BLD AUTO: 4.86 10*3/MM3 (ref 1.7–7)
NEUTROPHILS NFR BLD AUTO: 4.88 10*3/MM3 (ref 1.7–7)
NEUTROPHILS NFR BLD AUTO: 5.09 10*3/MM3 (ref 1.7–7)
NEUTROPHILS NFR BLD AUTO: 72.6 % (ref 42.7–76)
NEUTROPHILS NFR BLD AUTO: 76.4 % (ref 42.7–76)
NEUTROPHILS NFR BLD AUTO: 77.9 % (ref 42.7–76)
NEUTROPHILS NFR BLD AUTO: 79.3 % (ref 42.7–76)
NEUTROPHILS NFR BLD AUTO: 8.29 10*3/MM3 (ref 1.7–7)
NEUTROPHILS NFR BLD AUTO: 80.8 % (ref 42.7–76)
NEUTROPHILS NFR BLD AUTO: 81.1 % (ref 42.7–76)
NEUTROPHILS NFR BLD AUTO: 82.2 % (ref 42.7–76)
NEUTROPHILS NFR BLD AUTO: 84.5 % (ref 42.7–76)
NEUTROPHILS NFR BLD AUTO: 88.2 % (ref 42.7–76)
NEUTROPHILS NFR BLD AUTO: 9.92 10*3/MM3 (ref 1.7–7)
NEUTROPHILS NFR BLD AUTO: 90.8 % (ref 42.7–76)
NEUTROPHILS NFR BLD AUTO: 90.9 % (ref 42.7–76)
NEUTROPHILS NFR BLD AUTO: 91 % (ref 42.7–76)
NEUTROPHILS NFR BLD AUTO: 92.2 % (ref 42.7–76)
NEUTROPHILS NFR BLD AUTO: 92.8 % (ref 42.7–76)
NEUTROPHILS NFR BLD AUTO: 94.6 % (ref 42.7–76)
NEUTROPHILS NFR BLD AUTO: 94.7 % (ref 42.7–76)
NEUTROPHILS NFR BLD AUTO: 95.5 % (ref 42.7–76)
NEUTROPHILS NFR BLD MANUAL: 96 % (ref 42.7–76)
NITRITE UR QL STRIP: NEGATIVE
NRBC BLD AUTO-RTO: 0 /100 WBC (ref 0–0.2)
NRBC BLD AUTO-RTO: 0.1 /100 WBC (ref 0–0.2)
NT-PROBNP SERPL-MCNC: 1018 PG/ML (ref 0–1800)
O2 A-A PPRESDIFF RESPIRATORY: 0.1 MMHG
O2 A-A PPRESDIFF RESPIRATORY: 0.2 MMHG
O2 A-A PPRESDIFF RESPIRATORY: 0.3 MMHG
O2 A-A PPRESDIFF RESPIRATORY: 0.4 MMHG
O2 A-A PPRESDIFF RESPIRATORY: 0.4 MMHG
O2 A-A PPRESDIFF RESPIRATORY: 0.5 MMHG
P-ANCA ATYPICAL TITR SER IF: ABNORMAL TITER
P-ANCA ATYPICAL TITR SER IF: ABNORMAL TITER
P-ANCA TITR SER IF: ABNORMAL TITER
P-ANCA TITR SER IF: ABNORMAL TITER
PATH REPORT.ADDENDUM SPEC: NORMAL
PATH REPORT.FINAL DX SPEC: NORMAL
PATH REPORT.GROSS SPEC: NORMAL
PCO2 BLDA: 29.4 MM HG (ref 35–45)
PCO2 BLDA: 29.9 MM HG (ref 35–45)
PCO2 BLDA: 30.6 MM HG (ref 35–45)
PCO2 BLDA: 31.3 MM HG (ref 35–45)
PCO2 BLDA: 31.4 MM HG (ref 35–45)
PCO2 BLDA: 31.5 MM HG (ref 35–45)
PCO2 BLDA: 39.5 MM HG (ref 35–45)
PCO2 BLDA: 43.1 MM HG (ref 35–45)
PCO2 BLDA: 45.7 MM HG (ref 35–45)
PEEP RESPIRATORY: 5 CM[H2O]
PERCENT MAX PREDICTED HR: 50.34 %
PH BLDA: 7.15 PH UNITS (ref 7.35–7.45)
PH BLDA: 7.16 PH UNITS (ref 7.35–7.45)
PH BLDA: 7.22 PH UNITS (ref 7.35–7.45)
PH BLDA: 7.24 PH UNITS (ref 7.35–7.45)
PH BLDA: 7.4 PH UNITS (ref 7.35–7.45)
PH BLDA: 7.42 PH UNITS (ref 7.35–7.45)
PH BLDA: 7.44 PH UNITS (ref 7.35–7.45)
PH BLDA: 7.44 PH UNITS (ref 7.35–7.45)
PH BLDA: 7.47 PH UNITS (ref 7.35–7.45)
PH UR STRIP.AUTO: 5.5 [PH] (ref 5–8)
PH UR STRIP.AUTO: 6 [PH] (ref 5–8)
PH UR STRIP.AUTO: <=5 [PH] (ref 5–8)
PHOSPHATE SERPL-MCNC: 3.7 MG/DL (ref 2.5–4.5)
PHOSPHATE SERPL-MCNC: 3.9 MG/DL (ref 2.5–4.5)
PHOSPHATE SERPL-MCNC: 4.5 MG/DL (ref 2.5–4.5)
PHOSPHATE SERPL-MCNC: 4.8 MG/DL (ref 2.5–4.5)
PHOSPHATE SERPL-MCNC: 5.1 MG/DL (ref 2.5–4.5)
PHOSPHATE SERPL-MCNC: 5.4 MG/DL (ref 2.5–4.5)
PHOSPHATE SERPL-MCNC: 5.5 MG/DL (ref 2.5–4.5)
PHOSPHATE SERPL-MCNC: 5.5 MG/DL (ref 2.5–4.5)
PHOSPHATE SERPL-MCNC: 6.2 MG/DL (ref 2.5–4.5)
PHOSPHATE SERPL-MCNC: 7.2 MG/DL (ref 2.5–4.5)
PLAT MORPH BLD: NORMAL
PLATELET # BLD AUTO: 122 10*3/MM3 (ref 140–450)
PLATELET # BLD AUTO: 127 10*3/MM3 (ref 140–450)
PLATELET # BLD AUTO: 159 10*3/MM3 (ref 140–450)
PLATELET # BLD AUTO: 170 10*3/MM3 (ref 140–450)
PLATELET # BLD AUTO: 182 10*3/MM3 (ref 140–450)
PLATELET # BLD AUTO: 183 10*3/MM3 (ref 140–450)
PLATELET # BLD AUTO: 189 10*3/MM3 (ref 140–450)
PLATELET # BLD AUTO: 191 10*3/MM3 (ref 140–450)
PLATELET # BLD AUTO: 194 10*3/MM3 (ref 140–450)
PLATELET # BLD AUTO: 194 10*3/MM3 (ref 140–450)
PLATELET # BLD AUTO: 210 10*3/MM3 (ref 140–450)
PLATELET # BLD AUTO: 219 10*3/MM3 (ref 140–450)
PLATELET # BLD AUTO: 231 10*3/MM3 (ref 140–450)
PLATELET # BLD AUTO: 231 10*3/MM3 (ref 140–450)
PLATELET # BLD AUTO: 276 10*3/MM3 (ref 140–450)
PLATELET # BLD AUTO: 284 10*3/MM3 (ref 140–450)
PLATELET # BLD AUTO: 287 10*3/MM3 (ref 140–450)
PLATELET # BLD AUTO: 296 10*3/MM3 (ref 140–450)
PLATELET # BLD AUTO: 300 10*3/MM3 (ref 140–450)
PMV BLD AUTO: 10 FL (ref 6–12)
PMV BLD AUTO: 10.2 FL (ref 6–12)
PMV BLD AUTO: 10.3 FL (ref 6–12)
PMV BLD AUTO: 11.2 FL (ref 6–12)
PMV BLD AUTO: 11.3 FL (ref 6–12)
PMV BLD AUTO: 8.6 FL (ref 6–12)
PMV BLD AUTO: 8.7 FL (ref 6–12)
PMV BLD AUTO: 8.9 FL (ref 6–12)
PMV BLD AUTO: 9 FL (ref 6–12)
PMV BLD AUTO: 9.1 FL (ref 6–12)
PMV BLD AUTO: 9.1 FL (ref 6–12)
PMV BLD AUTO: 9.2 FL (ref 6–12)
PMV BLD AUTO: 9.6 FL (ref 6–12)
PMV BLD AUTO: 9.7 FL (ref 6–12)
PMV BLD AUTO: 9.7 FL (ref 6–12)
PMV BLD AUTO: 9.8 FL (ref 6–12)
PO2 BLDA: 62.6 MM HG (ref 80–100)
PO2 BLDA: 64.3 MM HG (ref 80–100)
PO2 BLDA: 64.9 MM HG (ref 80–100)
PO2 BLDA: 66.3 MM HG (ref 80–100)
PO2 BLDA: 70.1 MM HG (ref 80–100)
PO2 BLDA: 75.4 MM HG (ref 80–100)
PO2 BLDA: 78.4 MM HG (ref 80–100)
PO2 BLDA: 79.1 MM HG (ref 80–100)
PO2 BLDA: 88.6 MM HG (ref 80–100)
POTASSIUM SERPL-SCNC: 3.3 MMOL/L (ref 3.5–5.2)
POTASSIUM SERPL-SCNC: 3.5 MMOL/L (ref 3.5–5.2)
POTASSIUM SERPL-SCNC: 3.6 MMOL/L (ref 3.5–5.2)
POTASSIUM SERPL-SCNC: 3.6 MMOL/L (ref 3.5–5.2)
POTASSIUM SERPL-SCNC: 3.7 MMOL/L (ref 3.5–5.2)
POTASSIUM SERPL-SCNC: 3.9 MMOL/L (ref 3.5–5.2)
POTASSIUM SERPL-SCNC: 4 MMOL/L (ref 3.5–5.2)
POTASSIUM SERPL-SCNC: 4.2 MMOL/L (ref 3.5–5.2)
POTASSIUM SERPL-SCNC: 4.3 MMOL/L (ref 3.5–5.2)
POTASSIUM SERPL-SCNC: 4.4 MMOL/L (ref 3.5–5.2)
POTASSIUM SERPL-SCNC: 4.7 MMOL/L (ref 3.5–4.7)
POTASSIUM SERPL-SCNC: 4.8 MMOL/L (ref 3.5–5.2)
POTASSIUM SERPL-SCNC: 5.4 MMOL/L (ref 3.5–5.2)
PROCALCITONIN SERPL-MCNC: 0.15 NG/ML (ref 0–0.25)
PROCALCITONIN SERPL-MCNC: 0.19 NG/ML (ref 0–0.25)
PROCALCITONIN SERPL-MCNC: 0.19 NG/ML (ref 0–0.25)
PROCALCITONIN SERPL-MCNC: 0.49 NG/ML (ref 0–0.25)
PROCALCITONIN SERPL-MCNC: 1.12 NG/ML (ref 0–0.25)
PROT ?TM UR-MCNC: 23.2 MG/DL
PROT SERPL-MCNC: 5.8 G/DL (ref 6–8.5)
PROT SERPL-MCNC: 6.3 G/DL (ref 6–8.5)
PROT SERPL-MCNC: 6.5 G/DL (ref 6–8.5)
PROT SERPL-MCNC: 6.6 G/DL (ref 6–8.5)
PROT SERPL-MCNC: 7.1 G/DL (ref 6.3–8)
PROT SERPL-MCNC: 7.8 G/DL (ref 6–8.5)
PROT UR QL STRIP: ABNORMAL
PROT UR QL STRIP: ABNORMAL
PROT UR QL STRIP: NEGATIVE
PROT/CREAT UR: 449.6 MG/G CREA (ref 0–200)
PROTEINASE3 AB SER IA-ACNC: 3.3 UNITS (ref 0–0.9)
PROTEINASE3 AB SER IA-ACNC: 3.5 UNITS (ref 0–0.9)
PROTHROMBIN TIME: 13.8 SECONDS (ref 11.7–14.2)
PROTHROMBIN TIME: 14.7 SECONDS (ref 11.7–14.2)
PSV: 10 CMH2O
PSV: 10 CMH2O
QT INTERVAL: 406 MS
QT INTERVAL: 407 MS
QT INTERVAL: 412 MS
QT INTERVAL: 418 MS
QT INTERVAL: 424 MS
QT INTERVAL: 428 MS
RBC # BLD AUTO: 2.2 10*6/MM3 (ref 3.77–5.28)
RBC # BLD AUTO: 2.4 10*6/MM3 (ref 3.77–5.28)
RBC # BLD AUTO: 2.63 10*6/MM3 (ref 3.77–5.28)
RBC # BLD AUTO: 2.64 10*6/MM3 (ref 3.77–5.28)
RBC # BLD AUTO: 2.69 10*6/MM3 (ref 3.77–5.28)
RBC # BLD AUTO: 2.7 10*6/MM3 (ref 3.77–5.28)
RBC # BLD AUTO: 2.83 10*6/MM3 (ref 3.77–5.28)
RBC # BLD AUTO: 2.86 10*6/MM3 (ref 3.77–5.28)
RBC # BLD AUTO: 2.87 10*6/MM3 (ref 3.77–5.28)
RBC # BLD AUTO: 2.98 10*6/MM3 (ref 3.77–5.28)
RBC # BLD AUTO: 3.04 10*6/MM3 (ref 3.77–5.28)
RBC # BLD AUTO: 3.1 10*6/MM3 (ref 3.77–5.28)
RBC # BLD AUTO: 3.24 10*6/MM3 (ref 3.77–5.28)
RBC # BLD AUTO: 3.46 10*6/MM3 (ref 3.77–5.28)
RBC # BLD AUTO: 3.56 10*6/MM3 (ref 3.77–5.28)
RBC # BLD AUTO: 3.57 10*6/MM3 (ref 3.77–5.28)
RBC # BLD AUTO: 3.76 10*6/MM3 (ref 3.77–5.28)
RBC # UR STRIP: ABNORMAL /HPF
RBC MORPH BLD: NORMAL
REF LAB TEST METHOD: ABNORMAL
RETICS # AUTO: 0.05 10*6/MM3 (ref 0.02–0.13)
RETICS/RBC NFR AUTO: 1.41 % (ref 0.7–1.9)
RH BLD: POSITIVE
RH BLD: POSITIVE
RHINOVIRUS RNA SPEC NAA+PROBE: NOT DETECTED
RHINOVIRUS RNA SPEC NAA+PROBE: NOT DETECTED
RIBOSOMAL P AB SER-ACNC: <0.2 AI (ref 0–0.9)
RPR SER QL: NORMAL
RSV RNA NPH QL NAA+NON-PROBE: NOT DETECTED
RSV RNA NPH QL NAA+NON-PROBE: NOT DETECTED
SAO2 % BLDCOA: 84.1 % (ref 92–99)
SAO2 % BLDCOA: 91.6 % (ref 92–99)
SAO2 % BLDCOA: 92.2 % (ref 92–99)
SAO2 % BLDCOA: 92.5 % (ref 92–99)
SAO2 % BLDCOA: 92.7 % (ref 92–99)
SAO2 % BLDCOA: 93.4 % (ref 92–99)
SAO2 % BLDCOA: 94.3 % (ref 92–99)
SAO2 % BLDCOA: 94.4 % (ref 92–99)
SAO2 % BLDCOA: 97.3 % (ref 92–99)
SARS-COV-2 AG UPPER RESP QL IA.RAPID: NOT DETECTED
SARS-COV-2 ORF1AB RESP QL NAA+PROBE: NOT DETECTED
SARS-COV-2 RNA NPH QL NAA+NON-PROBE: NOT DETECTED
SARS-COV-2 RNA NPH QL NAA+NON-PROBE: NOT DETECTED
SARS-COV-2 RNA PNL SPEC NAA+PROBE: NOT DETECTED
SARS-COV-2 RNA RESP QL NAA+PROBE: NOT DETECTED
SET MECH RESP RATE: 20
SET MECH RESP RATE: 22
SET MECH RESP RATE: 22
SET MECH RESP RATE: 28
SODIUM SERPL-SCNC: 132 MMOL/L (ref 136–145)
SODIUM SERPL-SCNC: 133 MMOL/L (ref 136–145)
SODIUM SERPL-SCNC: 133 MMOL/L (ref 136–145)
SODIUM SERPL-SCNC: 134 MMOL/L (ref 136–145)
SODIUM SERPL-SCNC: 135 MMOL/L (ref 136–145)
SODIUM SERPL-SCNC: 135 MMOL/L (ref 136–145)
SODIUM SERPL-SCNC: 136 MMOL/L (ref 134–145)
SODIUM SERPL-SCNC: 136 MMOL/L (ref 136–145)
SODIUM SERPL-SCNC: 136 MMOL/L (ref 136–145)
SODIUM SERPL-SCNC: 137 MMOL/L (ref 136–145)
SODIUM SERPL-SCNC: 138 MMOL/L (ref 136–145)
SODIUM SERPL-SCNC: 138 MMOL/L (ref 136–145)
SODIUM SERPL-SCNC: 139 MMOL/L (ref 136–145)
SODIUM SERPL-SCNC: 141 MMOL/L (ref 136–145)
SODIUM SERPL-SCNC: 143 MMOL/L (ref 136–145)
SODIUM SERPL-SCNC: 144 MMOL/L (ref 136–145)
SODIUM SERPL-SCNC: 146 MMOL/L (ref 136–145)
SODIUM SERPL-SCNC: 148 MMOL/L (ref 136–145)
SP GR UR STRIP: 1.01 (ref 1–1.03)
SP GR UR STRIP: 1.02 (ref 1–1.03)
SP GR UR STRIP: 1.02 (ref 1–1.03)
SQUAMOUS #/AREA URNS HPF: ABNORMAL /HPF
STRESS BASELINE BP: NORMAL MMHG
STRESS BASELINE HR: 60 BPM
STRESS PERCENT HR: 59 %
STRESS POST PEAK BP: NORMAL MMHG
STRESS POST PEAK HR: 73 BPM
STRESS TARGET HR: 123 BPM
T&S EXPIRATION DATE: NORMAL
T&S EXPIRATION DATE: NORMAL
TIBC SERPL-MCNC: 339 MCG/DL (ref 249–505)
TIBC SERPL-MCNC: 365 MCG/DL (ref 298–536)
TOTAL RATE: 12 BREATHS/MINUTE
TOTAL RATE: 15 BREATHS/MINUTE
TOTAL RATE: 22 BREATHS/MINUTE
TOTAL RATE: 24 BREATHS/MINUTE
TOTAL RATE: 28 BREATHS/MINUTE
TOTAL RATE: 28 BREATHS/MINUTE
TOTAL RATE: 33 BREATHS/MINUTE
TOTAL RATE: 33 BREATHS/MINUTE
TOTAL RATE: 36 BREATHS/MINUTE
TRANSFERRIN SERPL-MCNC: 242 MG/DL (ref 200–360)
TRANSFERRIN SERPL-MCNC: 245 MG/DL (ref 200–360)
TRIGL SERPL-MCNC: 107 MG/DL (ref 0–150)
TRIGL SERPL-MCNC: 108 MG/DL (ref 0–150)
TRIGL SERPL-MCNC: 142 MG/DL (ref 0–150)
TROPONIN T SERPL-MCNC: 0.01 NG/ML (ref 0–0.03)
TROPONIN T SERPL-MCNC: <0.01 NG/ML (ref 0–0.03)
TSH SERPL DL<=0.005 MIU/L-ACNC: 1.29 UIU/ML (ref 0.45–4.5)
TSH SERPL DL<=0.05 MIU/L-ACNC: 1.11 UIU/ML (ref 0.27–4.2)
TSH SERPL DL<=0.05 MIU/L-ACNC: 3.03 UIU/ML (ref 0.27–4.2)
UNIT  ABO: NORMAL
UNIT  RH: NORMAL
URATE SERPL-MCNC: 6.7 MG/DL (ref 2.4–5.7)
URATE SERPL-MCNC: 7.9 MG/DL (ref 2.4–5.7)
UROBILINOGEN UR QL STRIP: ABNORMAL
VARIANT LYMPHS NFR BLD MANUAL: 1 % (ref 19.6–45.3)
VENTILATOR MODE: ABNORMAL
VIT B12 BLD-MCNC: 392 PG/ML (ref 211–946)
VLDLC SERPL-MCNC: 20 MG/DL (ref 5–40)
VLDLC SERPL-MCNC: 20 MG/DL (ref 5–40)
VT ON VENT VENT: 400 ML
VT ON VENT VENT: 457 ML
VT ON VENT VENT: 458 ML
VT ON VENT VENT: 611 ML
WBC # UR STRIP: ABNORMAL /HPF
WBC MORPH BLD: NORMAL
WBC NRBC COR # BLD: 10.47 10*3/MM3 (ref 3.4–10.8)
WBC NRBC COR # BLD: 12.21 10*3/MM3 (ref 3.4–10.8)
WBC NRBC COR # BLD: 12.52 10*3/MM3 (ref 3.4–10.8)
WBC NRBC COR # BLD: 13.53 10*3/MM3 (ref 3.4–10.8)
WBC NRBC COR # BLD: 14.48 10*3/MM3 (ref 3.4–10.8)
WBC NRBC COR # BLD: 14.73 10*3/MM3 (ref 3.4–10.8)
WBC NRBC COR # BLD: 19.08 10*3/MM3 (ref 3.4–10.8)
WBC NRBC COR # BLD: 23.47 10*3/MM3 (ref 3.4–10.8)
WBC NRBC COR # BLD: 36.13 10*3/MM3 (ref 3.4–10.8)
WBC NRBC COR # BLD: 4.06 10*3/MM3 (ref 3.4–10.8)
WBC NRBC COR # BLD: 4.77 10*3/MM3 (ref 3.4–10.8)
WBC NRBC COR # BLD: 4.78 10*3/MM3 (ref 3.4–10.8)
WBC NRBC COR # BLD: 4.83 10*3/MM3 (ref 3.4–10.8)
WBC NRBC COR # BLD: 5.56 10*3/MM3 (ref 3.4–10.8)
WBC NRBC COR # BLD: 5.81 10*3/MM3 (ref 3.4–10.8)
WBC NRBC COR # BLD: 6.02 10*3/MM3 (ref 3.4–10.8)
WBC NRBC COR # BLD: 6.15 10*3/MM3 (ref 3.4–10.8)
WBC NRBC COR # BLD: 6.28 10*3/MM3 (ref 3.4–10.8)
WBC NRBC COR # BLD: 9.13 10*3/MM3 (ref 3.4–10.8)
WHOLE BLOOD HOLD COAG: NORMAL
WHOLE BLOOD HOLD COAG: NORMAL
WHOLE BLOOD HOLD SPECIMEN: NORMAL
WHOLE BLOOD HOLD SPECIMEN: NORMAL
YEAST URNS QL MICRO: ABNORMAL /HPF

## 2022-01-01 PROCEDURE — 25010000002 DIPHENHYDRAMINE PER 50 MG: Performed by: INTERNAL MEDICINE

## 2022-01-01 PROCEDURE — 99204 OFFICE O/P NEW MOD 45 MIN: CPT | Performed by: INTERNAL MEDICINE

## 2022-01-01 PROCEDURE — 86900 BLOOD TYPING SEROLOGIC ABO: CPT

## 2022-01-01 PROCEDURE — 25010000002 CEFTRIAXONE PER 250 MG: Performed by: INTERNAL MEDICINE

## 2022-01-01 PROCEDURE — 88346 IMFLUOR 1ST 1ANTB STAIN PX: CPT

## 2022-01-01 PROCEDURE — 94761 N-INVAS EAR/PLS OXIMETRY MLT: CPT

## 2022-01-01 PROCEDURE — 25010000002 FENTANYL CITRATE (PF) 50 MCG/ML SOLUTION

## 2022-01-01 PROCEDURE — 85025 COMPLETE CBC W/AUTO DIFF WBC: CPT | Performed by: INTERNAL MEDICINE

## 2022-01-01 PROCEDURE — 86900 BLOOD TYPING SEROLOGIC ABO: CPT | Performed by: INTERNAL MEDICINE

## 2022-01-01 PROCEDURE — 36415 COLL VENOUS BLD VENIPUNCTURE: CPT | Performed by: INTERNAL MEDICINE

## 2022-01-01 PROCEDURE — 87493 C DIFF AMPLIFIED PROBE: CPT | Performed by: INTERNAL MEDICINE

## 2022-01-01 PROCEDURE — 99222 1ST HOSP IP/OBS MODERATE 55: CPT | Performed by: PSYCHIATRY & NEUROLOGY

## 2022-01-01 PROCEDURE — 82962 GLUCOSE BLOOD TEST: CPT

## 2022-01-01 PROCEDURE — 0 TECHNETIUM SESTAMIBI: Performed by: EMERGENCY MEDICINE

## 2022-01-01 PROCEDURE — 94760 N-INVAS EAR/PLS OXIMETRY 1: CPT

## 2022-01-01 PROCEDURE — 82525 ASSAY OF COPPER: CPT | Performed by: PHYSICIAN ASSISTANT

## 2022-01-01 PROCEDURE — 87206 SMEAR FLUORESCENT/ACID STAI: CPT | Performed by: INTERNAL MEDICINE

## 2022-01-01 PROCEDURE — 70553 MRI BRAIN STEM W/O & W/DYE: CPT

## 2022-01-01 PROCEDURE — 25010000002 METHYLPREDNISOLONE PER 125 MG: Performed by: INTERNAL MEDICINE

## 2022-01-01 PROCEDURE — 80048 BASIC METABOLIC PNL TOTAL CA: CPT | Performed by: HOSPITALIST

## 2022-01-01 PROCEDURE — 85025 COMPLETE CBC W/AUTO DIFF WBC: CPT | Performed by: NURSE PRACTITIONER

## 2022-01-01 PROCEDURE — G0378 HOSPITAL OBSERVATION PER HR: HCPCS

## 2022-01-01 PROCEDURE — 86037 ANCA TITER EACH ANTIBODY: CPT | Performed by: INTERNAL MEDICINE

## 2022-01-01 PROCEDURE — 25010000002 PROPOFOL 10 MG/ML EMULSION: Performed by: INTERNAL MEDICINE

## 2022-01-01 PROCEDURE — 0 BUPIVACAINE LIPOSOME 1.3 % SUSPENSION 10 ML VIAL: Performed by: ORTHOPAEDIC SURGERY

## 2022-01-01 PROCEDURE — 36514 APHERESIS PLASMA: CPT

## 2022-01-01 PROCEDURE — 93005 ELECTROCARDIOGRAM TRACING: CPT | Performed by: INTERNAL MEDICINE

## 2022-01-01 PROCEDURE — 97530 THERAPEUTIC ACTIVITIES: CPT

## 2022-01-01 PROCEDURE — 25010000002 VANCOMYCIN PER 500 MG: Performed by: ORTHOPAEDIC SURGERY

## 2022-01-01 PROCEDURE — 63710000001 OXYCODONE-ACETAMINOPHEN 10-325 MG TABLET: Performed by: ORTHOPAEDIC SURGERY

## 2022-01-01 PROCEDURE — A9270 NON-COVERED ITEM OR SERVICE: HCPCS | Performed by: ORTHOPAEDIC SURGERY

## 2022-01-01 PROCEDURE — 77012 CT SCAN FOR NEEDLE BIOPSY: CPT

## 2022-01-01 PROCEDURE — 93010 ELECTROCARDIOGRAM REPORT: CPT | Performed by: INTERNAL MEDICINE

## 2022-01-01 PROCEDURE — 85379 FIBRIN DEGRADATION QUANT: CPT | Performed by: STUDENT IN AN ORGANIZED HEALTH CARE EDUCATION/TRAINING PROGRAM

## 2022-01-01 PROCEDURE — 94799 UNLISTED PULMONARY SVC/PX: CPT

## 2022-01-01 PROCEDURE — 84550 ASSAY OF BLOOD/URIC ACID: CPT | Performed by: INTERNAL MEDICINE

## 2022-01-01 PROCEDURE — 36600 WITHDRAWAL OF ARTERIAL BLOOD: CPT

## 2022-01-01 PROCEDURE — 25010000002 CALCIUM GLUCONATE PER 10 ML: Performed by: INTERNAL MEDICINE

## 2022-01-01 PROCEDURE — C1776 JOINT DEVICE (IMPLANTABLE): HCPCS | Performed by: ORTHOPAEDIC SURGERY

## 2022-01-01 PROCEDURE — 99214 OFFICE O/P EST MOD 30 MIN: CPT | Performed by: NURSE PRACTITIONER

## 2022-01-01 PROCEDURE — 80053 COMPREHEN METABOLIC PANEL: CPT | Performed by: EMERGENCY MEDICINE

## 2022-01-01 PROCEDURE — G0151 HHCP-SERV OF PT,EA 15 MIN: HCPCS

## 2022-01-01 PROCEDURE — 63710000001 INSULIN LISPRO (HUMAN) PER 5 UNITS: Performed by: INTERNAL MEDICINE

## 2022-01-01 PROCEDURE — 97535 SELF CARE MNGMENT TRAINING: CPT

## 2022-01-01 PROCEDURE — 99231 SBSQ HOSP IP/OBS SF/LOW 25: CPT | Performed by: INTERNAL MEDICINE

## 2022-01-01 PROCEDURE — 63710000001 MASTISOL LIQUID: Performed by: ORTHOPAEDIC SURGERY

## 2022-01-01 PROCEDURE — 25010000002 HYDROMORPHONE 1 MG/ML SOLUTION: Performed by: INTERNAL MEDICINE

## 2022-01-01 PROCEDURE — 93308 TTE F-UP OR LMTD: CPT | Performed by: INTERNAL MEDICINE

## 2022-01-01 PROCEDURE — 99214 OFFICE O/P EST MOD 30 MIN: CPT | Performed by: INTERNAL MEDICINE

## 2022-01-01 PROCEDURE — 83605 ASSAY OF LACTIC ACID: CPT | Performed by: EMERGENCY MEDICINE

## 2022-01-01 PROCEDURE — 0BBF8ZX EXCISION OF RIGHT LOWER LUNG LOBE, VIA NATURAL OR ARTIFICIAL OPENING ENDOSCOPIC, DIAGNOSTIC: ICD-10-PCS | Performed by: INTERNAL MEDICINE

## 2022-01-01 PROCEDURE — 93005 ELECTROCARDIOGRAM TRACING: CPT | Performed by: EMERGENCY MEDICINE

## 2022-01-01 PROCEDURE — 93018 CV STRESS TEST I&R ONLY: CPT | Performed by: INTERNAL MEDICINE

## 2022-01-01 PROCEDURE — 85384 FIBRINOGEN ACTIVITY: CPT | Performed by: INTERNAL MEDICINE

## 2022-01-01 PROCEDURE — 85730 THROMBOPLASTIN TIME PARTIAL: CPT | Performed by: EMERGENCY MEDICINE

## 2022-01-01 PROCEDURE — 80053 COMPREHEN METABOLIC PANEL: CPT | Performed by: INTERNAL MEDICINE

## 2022-01-01 PROCEDURE — 63710000001 ANASTROZOLE 1 MG TABLET: Performed by: ORTHOPAEDIC SURGERY

## 2022-01-01 PROCEDURE — P9047 ALBUMIN (HUMAN), 25%, 50ML: HCPCS | Performed by: INTERNAL MEDICINE

## 2022-01-01 PROCEDURE — 25010000002 LORAZEPAM PER 2 MG: Performed by: INTERNAL MEDICINE

## 2022-01-01 PROCEDURE — 25010000002 HEPARIN (PORCINE) PER 1000 UNITS: Performed by: STUDENT IN AN ORGANIZED HEALTH CARE EDUCATION/TRAINING PROGRAM

## 2022-01-01 PROCEDURE — P9016 RBC LEUKOCYTES REDUCED: HCPCS

## 2022-01-01 PROCEDURE — 80069 RENAL FUNCTION PANEL: CPT | Performed by: INTERNAL MEDICINE

## 2022-01-01 PROCEDURE — 86923 COMPATIBILITY TEST ELECTRIC: CPT

## 2022-01-01 PROCEDURE — U0005 INFEC AGEN DETEC AMPLI PROBE: HCPCS | Performed by: ORTHOPAEDIC SURGERY

## 2022-01-01 PROCEDURE — 93325 DOPPLER ECHO COLOR FLOW MAPG: CPT | Performed by: INTERNAL MEDICINE

## 2022-01-01 PROCEDURE — 86235 NUCLEAR ANTIGEN ANTIBODY: CPT | Performed by: INTERNAL MEDICINE

## 2022-01-01 PROCEDURE — 84145 PROCALCITONIN (PCT): CPT | Performed by: EMERGENCY MEDICINE

## 2022-01-01 PROCEDURE — 87428 SARSCOV & INF VIR A&B AG IA: CPT | Performed by: NURSE PRACTITIONER

## 2022-01-01 PROCEDURE — 82803 BLOOD GASES ANY COMBINATION: CPT

## 2022-01-01 PROCEDURE — 63710000001 PANTOPRAZOLE 40 MG TABLET DELAYED-RELEASE: Performed by: ORTHOPAEDIC SURGERY

## 2022-01-01 PROCEDURE — 93306 TTE W/DOPPLER COMPLETE: CPT | Performed by: INTERNAL MEDICINE

## 2022-01-01 PROCEDURE — 25010000002 CYANOCOBALAMIN PER 1000 MCG: Performed by: HOSPITALIST

## 2022-01-01 PROCEDURE — 86592 SYPHILIS TEST NON-TREP QUAL: CPT | Performed by: PHYSICIAN ASSISTANT

## 2022-01-01 PROCEDURE — 25010000002 FENTANYL CITRATE (PF) 50 MCG/ML SOLUTION: Performed by: INTERNAL MEDICINE

## 2022-01-01 PROCEDURE — 90471 IMMUNIZATION ADMIN: CPT | Performed by: PHYSICIAN ASSISTANT

## 2022-01-01 PROCEDURE — C1752 CATH,HEMODIALYSIS,SHORT-TERM: HCPCS

## 2022-01-01 PROCEDURE — 25010000002 ROPIVACAINE PER 1 MG: Performed by: ANESTHESIOLOGY

## 2022-01-01 PROCEDURE — U0005 INFEC AGEN DETEC AMPLI PROBE: HCPCS

## 2022-01-01 PROCEDURE — 25010000002 HYDROMORPHONE PER 4 MG: Performed by: ORTHOPAEDIC SURGERY

## 2022-01-01 PROCEDURE — 71045 X-RAY EXAM CHEST 1 VIEW: CPT

## 2022-01-01 PROCEDURE — 25010000002 AZITHROMYCIN PER 500 MG: Performed by: EMERGENCY MEDICINE

## 2022-01-01 PROCEDURE — 63710000001 LAMOTRIGINE 25 MG TABLET: Performed by: ORTHOPAEDIC SURGERY

## 2022-01-01 PROCEDURE — 93016 CV STRESS TEST SUPVJ ONLY: CPT | Performed by: INTERNAL MEDICINE

## 2022-01-01 PROCEDURE — 85025 COMPLETE CBC W/AUTO DIFF WBC: CPT

## 2022-01-01 PROCEDURE — 0 LIDOCAINE 1 % SOLUTION: Performed by: INTERNAL MEDICINE

## 2022-01-01 PROCEDURE — 0TB03ZX EXCISION OF RIGHT KIDNEY, PERCUTANEOUS APPROACH, DIAGNOSTIC: ICD-10-PCS | Performed by: RADIOLOGY

## 2022-01-01 PROCEDURE — 87205 SMEAR GRAM STAIN: CPT | Performed by: INTERNAL MEDICINE

## 2022-01-01 PROCEDURE — 76000 FLUOROSCOPY <1 HR PHYS/QHP: CPT

## 2022-01-01 PROCEDURE — 88350 IMFLUOR EA ADDL 1ANTB STN PX: CPT

## 2022-01-01 PROCEDURE — 36415 COLL VENOUS BLD VENIPUNCTURE: CPT

## 2022-01-01 PROCEDURE — 87635 SARS-COV-2 COVID-19 AMP PRB: CPT | Performed by: EMERGENCY MEDICINE

## 2022-01-01 PROCEDURE — 99284 EMERGENCY DEPT VISIT MOD MDM: CPT

## 2022-01-01 PROCEDURE — 5A1D70Z PERFORMANCE OF URINARY FILTRATION, INTERMITTENT, LESS THAN 6 HOURS PER DAY: ICD-10-PCS | Performed by: INTERNAL MEDICINE

## 2022-01-01 PROCEDURE — 63710000001 LACTULOSE 10 GM/15ML SOLUTION: Performed by: ORTHOPAEDIC SURGERY

## 2022-01-01 PROCEDURE — 94003 VENT MGMT INPAT SUBQ DAY: CPT

## 2022-01-01 PROCEDURE — 93306 TTE W/DOPPLER COMPLETE: CPT

## 2022-01-01 PROCEDURE — 84484 ASSAY OF TROPONIN QUANT: CPT | Performed by: EMERGENCY MEDICINE

## 2022-01-01 PROCEDURE — 81001 URINALYSIS AUTO W/SCOPE: CPT | Performed by: INTERNAL MEDICINE

## 2022-01-01 PROCEDURE — 72125 CT NECK SPINE W/O DYE: CPT

## 2022-01-01 PROCEDURE — 31500 INSERT EMERGENCY AIRWAY: CPT

## 2022-01-01 PROCEDURE — 99283 EMERGENCY DEPT VISIT LOW MDM: CPT

## 2022-01-01 PROCEDURE — C1713 ANCHOR/SCREW BN/BN,TIS/BN: HCPCS | Performed by: ORTHOPAEDIC SURGERY

## 2022-01-01 PROCEDURE — 84466 ASSAY OF TRANSFERRIN: CPT | Performed by: INTERNAL MEDICINE

## 2022-01-01 PROCEDURE — 63710000001 MUPIROCIN 2 % OINTMENT: Performed by: ORTHOPAEDIC SURGERY

## 2022-01-01 PROCEDURE — 85018 HEMOGLOBIN: CPT | Performed by: ORTHOPAEDIC SURGERY

## 2022-01-01 PROCEDURE — 36430 TRANSFUSION BLD/BLD COMPNT: CPT

## 2022-01-01 PROCEDURE — 80053 COMPREHEN METABOLIC PANEL: CPT

## 2022-01-01 PROCEDURE — 83540 ASSAY OF IRON: CPT | Performed by: STUDENT IN AN ORGANIZED HEALTH CARE EDUCATION/TRAINING PROGRAM

## 2022-01-01 PROCEDURE — 80061 LIPID PANEL: CPT

## 2022-01-01 PROCEDURE — 85018 HEMOGLOBIN: CPT | Performed by: INTERNAL MEDICINE

## 2022-01-01 PROCEDURE — 78452 HT MUSCLE IMAGE SPECT MULT: CPT

## 2022-01-01 PROCEDURE — 25010000002 HEPARIN (PORCINE) PER 1000 UNITS: Performed by: INTERNAL MEDICINE

## 2022-01-01 PROCEDURE — 87040 BLOOD CULTURE FOR BACTERIA: CPT | Performed by: EMERGENCY MEDICINE

## 2022-01-01 PROCEDURE — 25010000002 REGADENOSON 0.4 MG/5ML SOLUTION: Performed by: EMERGENCY MEDICINE

## 2022-01-01 PROCEDURE — 97110 THERAPEUTIC EXERCISES: CPT

## 2022-01-01 PROCEDURE — 86901 BLOOD TYPING SEROLOGIC RH(D): CPT | Performed by: INTERNAL MEDICINE

## 2022-01-01 PROCEDURE — 84145 PROCALCITONIN (PCT): CPT | Performed by: INTERNAL MEDICINE

## 2022-01-01 PROCEDURE — 70486 CT MAXILLOFACIAL W/O DYE: CPT

## 2022-01-01 PROCEDURE — 99496 TRANSJ CARE MGMT HIGH F2F 7D: CPT | Performed by: NURSE PRACTITIONER

## 2022-01-01 PROCEDURE — 99285 EMERGENCY DEPT VISIT HI MDM: CPT

## 2022-01-01 PROCEDURE — 02HV33Z INSERTION OF INFUSION DEVICE INTO SUPERIOR VENA CAVA, PERCUTANEOUS APPROACH: ICD-10-PCS | Performed by: STUDENT IN AN ORGANIZED HEALTH CARE EDUCATION/TRAINING PROGRAM

## 2022-01-01 PROCEDURE — 0 LIDOCAINE 1 % SOLUTION: Performed by: STUDENT IN AN ORGANIZED HEALTH CARE EDUCATION/TRAINING PROGRAM

## 2022-01-01 PROCEDURE — 25010000002 MIDAZOLAM PER 1 MG: Performed by: ANESTHESIOLOGY

## 2022-01-01 PROCEDURE — 93321 DOPPLER ECHO F-UP/LMTD STD: CPT | Performed by: INTERNAL MEDICINE

## 2022-01-01 PROCEDURE — 80061 LIPID PANEL: CPT | Performed by: HOSPITALIST

## 2022-01-01 PROCEDURE — 63710000001 CARVEDILOL 12.5 MG TABLET: Performed by: ORTHOPAEDIC SURGERY

## 2022-01-01 PROCEDURE — 99231 SBSQ HOSP IP/OBS SF/LOW 25: CPT | Performed by: PSYCHIATRY & NEUROLOGY

## 2022-01-01 PROCEDURE — 83735 ASSAY OF MAGNESIUM: CPT | Performed by: INTERNAL MEDICINE

## 2022-01-01 PROCEDURE — 71046 X-RAY EXAM CHEST 2 VIEWS: CPT

## 2022-01-01 PROCEDURE — 5A1955Z RESPIRATORY VENTILATION, GREATER THAN 96 CONSECUTIVE HOURS: ICD-10-PCS | Performed by: INTERNAL MEDICINE

## 2022-01-01 PROCEDURE — 87071 CULTURE AEROBIC QUANT OTHER: CPT | Performed by: INTERNAL MEDICINE

## 2022-01-01 PROCEDURE — 99233 SBSQ HOSP IP/OBS HIGH 50: CPT | Performed by: PSYCHIATRY & NEUROLOGY

## 2022-01-01 PROCEDURE — 88112 CYTOPATH CELL ENHANCE TECH: CPT | Performed by: INTERNAL MEDICINE

## 2022-01-01 PROCEDURE — 25010000002 PROPOFOL 10 MG/ML EMULSION

## 2022-01-01 PROCEDURE — 85014 HEMATOCRIT: CPT | Performed by: INTERNAL MEDICINE

## 2022-01-01 PROCEDURE — 83036 HEMOGLOBIN GLYCOSYLATED A1C: CPT

## 2022-01-01 PROCEDURE — 84466 ASSAY OF TRANSFERRIN: CPT | Performed by: STUDENT IN AN ORGANIZED HEALTH CARE EDUCATION/TRAINING PROGRAM

## 2022-01-01 PROCEDURE — A9540 TC99M MAA: HCPCS | Performed by: INTERNAL MEDICINE

## 2022-01-01 PROCEDURE — 1111F DSCHRG MED/CURRENT MED MERGE: CPT | Performed by: NURSE PRACTITIONER

## 2022-01-01 PROCEDURE — 99222 1ST HOSP IP/OBS MODERATE 55: CPT | Performed by: INTERNAL MEDICINE

## 2022-01-01 PROCEDURE — 83735 ASSAY OF MAGNESIUM: CPT | Performed by: EMERGENCY MEDICINE

## 2022-01-01 PROCEDURE — 74176 CT ABD & PELVIS W/O CONTRAST: CPT

## 2022-01-01 PROCEDURE — 85610 PROTHROMBIN TIME: CPT | Performed by: INTERNAL MEDICINE

## 2022-01-01 PROCEDURE — 86225 DNA ANTIBODY NATIVE: CPT | Performed by: INTERNAL MEDICINE

## 2022-01-01 PROCEDURE — 25010000002 ALBUMIN HUMAN 25% PER 50 ML: Performed by: INTERNAL MEDICINE

## 2022-01-01 PROCEDURE — 36415 COLL VENOUS BLD VENIPUNCTURE: CPT | Performed by: HOSPITALIST

## 2022-01-01 PROCEDURE — 25010000002 PROPOFOL 10 MG/ML EMULSION: Performed by: ANESTHESIOLOGY

## 2022-01-01 PROCEDURE — 63710000001 PAROXETINE 20 MG TABLET: Performed by: ORTHOPAEDIC SURGERY

## 2022-01-01 PROCEDURE — 94002 VENT MGMT INPAT INIT DAY: CPT

## 2022-01-01 PROCEDURE — 83516 IMMUNOASSAY NONANTIBODY: CPT | Performed by: INTERNAL MEDICINE

## 2022-01-01 PROCEDURE — 82607 VITAMIN B-12: CPT | Performed by: INTERNAL MEDICINE

## 2022-01-01 PROCEDURE — 25010000002 PROPOFOL 10 MG/ML EMULSION: Performed by: NURSE ANESTHETIST, CERTIFIED REGISTERED

## 2022-01-01 PROCEDURE — 82140 ASSAY OF AMMONIA: CPT | Performed by: PSYCHIATRY & NEUROLOGY

## 2022-01-01 PROCEDURE — 99204 OFFICE O/P NEW MOD 45 MIN: CPT | Performed by: PHYSICIAN ASSISTANT

## 2022-01-01 PROCEDURE — 93017 CV STRESS TEST TRACING ONLY: CPT

## 2022-01-01 PROCEDURE — 86706 HEP B SURFACE ANTIBODY: CPT | Performed by: INTERNAL MEDICINE

## 2022-01-01 PROCEDURE — 0BBD8ZX EXCISION OF RIGHT MIDDLE LUNG LOBE, VIA NATURAL OR ARTIFICIAL OPENING ENDOSCOPIC, DIAGNOSTIC: ICD-10-PCS | Performed by: INTERNAL MEDICINE

## 2022-01-01 PROCEDURE — 99213 OFFICE O/P EST LOW 20 MIN: CPT

## 2022-01-01 PROCEDURE — 78580 LUNG PERFUSION IMAGING: CPT

## 2022-01-01 PROCEDURE — 87449 NOS EACH ORGANISM AG IA: CPT | Performed by: INTERNAL MEDICINE

## 2022-01-01 PROCEDURE — 83605 ASSAY OF LACTIC ACID: CPT | Performed by: INTERNAL MEDICINE

## 2022-01-01 PROCEDURE — 74018 RADEX ABDOMEN 1 VIEW: CPT

## 2022-01-01 PROCEDURE — 93005 ELECTROCARDIOGRAM TRACING: CPT

## 2022-01-01 PROCEDURE — 0 GADOBENATE DIMEGLUMINE 529 MG/ML SOLUTION: Performed by: HOSPITALIST

## 2022-01-01 PROCEDURE — 63710000001 ALPRAZOLAM 0.5 MG TABLET: Performed by: ORTHOPAEDIC SURGERY

## 2022-01-01 PROCEDURE — 71250 CT THORAX DX C-: CPT

## 2022-01-01 PROCEDURE — 93321 DOPPLER ECHO F-UP/LMTD STD: CPT

## 2022-01-01 PROCEDURE — 93308 TTE F-UP OR LMTD: CPT

## 2022-01-01 PROCEDURE — 85379 FIBRIN DEGRADATION QUANT: CPT | Performed by: EMERGENCY MEDICINE

## 2022-01-01 PROCEDURE — 99232 SBSQ HOSP IP/OBS MODERATE 35: CPT | Performed by: INTERNAL MEDICINE

## 2022-01-01 PROCEDURE — 99203 OFFICE O/P NEW LOW 30 MIN: CPT | Performed by: ORTHOPAEDIC SURGERY

## 2022-01-01 PROCEDURE — 73030 X-RAY EXAM OF SHOULDER: CPT

## 2022-01-01 PROCEDURE — 96372 THER/PROPH/DIAG INJ SC/IM: CPT

## 2022-01-01 PROCEDURE — 25010000002 ONDANSETRON PER 1 MG: Performed by: NURSE ANESTHETIST, CERTIFIED REGISTERED

## 2022-01-01 PROCEDURE — 25010000002 CEFTRIAXONE PER 250 MG: Performed by: EMERGENCY MEDICINE

## 2022-01-01 PROCEDURE — 63710000001 MULTIVITAMIN TABLET: Performed by: ORTHOPAEDIC SURGERY

## 2022-01-01 PROCEDURE — 88300 SURGICAL PATH GROSS: CPT | Performed by: INTERNAL MEDICINE

## 2022-01-01 PROCEDURE — 25010000002 VANCOMYCIN PER 500 MG: Performed by: INTERNAL MEDICINE

## 2022-01-01 PROCEDURE — 85025 COMPLETE CBC W/AUTO DIFF WBC: CPT | Performed by: EMERGENCY MEDICINE

## 2022-01-01 PROCEDURE — 63710000001 INSULIN LISPRO (HUMAN) PER 5 UNITS: Performed by: NURSE PRACTITIONER

## 2022-01-01 PROCEDURE — 88305 TISSUE EXAM BY PATHOLOGIST: CPT | Performed by: INTERNAL MEDICINE

## 2022-01-01 PROCEDURE — 25010000002 DEXAMETHASONE PER 1 MG: Performed by: NURSE ANESTHETIST, CERTIFIED REGISTERED

## 2022-01-01 PROCEDURE — 86704 HEP B CORE ANTIBODY TOTAL: CPT | Performed by: INTERNAL MEDICINE

## 2022-01-01 PROCEDURE — G0180 MD CERTIFICATION HHA PATIENT: HCPCS | Performed by: FAMILY MEDICINE

## 2022-01-01 PROCEDURE — 25010000002 RITUXIMAB-ARRX 100 MG/10ML SOLUTION 10 ML VIAL: Performed by: INTERNAL MEDICINE

## 2022-01-01 PROCEDURE — A9500 TC99M SESTAMIBI: HCPCS | Performed by: EMERGENCY MEDICINE

## 2022-01-01 PROCEDURE — 87070 CULTURE OTHR SPECIMN AEROBIC: CPT | Performed by: INTERNAL MEDICINE

## 2022-01-01 PROCEDURE — 0 DIATRIZOATE MEGLUMINE & SODIUM PER 1 ML: Performed by: INTERNAL MEDICINE

## 2022-01-01 PROCEDURE — 85014 HEMATOCRIT: CPT | Performed by: ORTHOPAEDIC SURGERY

## 2022-01-01 PROCEDURE — 80048 BASIC METABOLIC PNL TOTAL CA: CPT | Performed by: INTERNAL MEDICINE

## 2022-01-01 PROCEDURE — 93000 ELECTROCARDIOGRAM COMPLETE: CPT | Performed by: NURSE PRACTITIONER

## 2022-01-01 PROCEDURE — 25010000002 HYDRALAZINE PER 20 MG: Performed by: INTERNAL MEDICINE

## 2022-01-01 PROCEDURE — 99213 OFFICE O/P EST LOW 20 MIN: CPT | Performed by: NURSE PRACTITIONER

## 2022-01-01 PROCEDURE — C9803 HOPD COVID-19 SPEC COLLECT: HCPCS

## 2022-01-01 PROCEDURE — 87340 HEPATITIS B SURFACE AG IA: CPT | Performed by: INTERNAL MEDICINE

## 2022-01-01 PROCEDURE — 63710000001 METFORMIN 1000 MG TABLET: Performed by: ORTHOPAEDIC SURGERY

## 2022-01-01 PROCEDURE — 93246 EXT ECG>7D<15D RECORDING: CPT

## 2022-01-01 PROCEDURE — 81001 URINALYSIS AUTO W/SCOPE: CPT

## 2022-01-01 PROCEDURE — 25010000002 TETANUS-DIPHTH-ACELL PERTUSSIS 5-2.5-18.5 LF-MCG/0.5 SUSPENSION PREFILLED SYRINGE: Performed by: PHYSICIAN ASSISTANT

## 2022-01-01 PROCEDURE — 83540 ASSAY OF IRON: CPT | Performed by: INTERNAL MEDICINE

## 2022-01-01 PROCEDURE — 88305 TISSUE EXAM BY PATHOLOGIST: CPT

## 2022-01-01 PROCEDURE — 82570 ASSAY OF URINE CREATININE: CPT | Performed by: INTERNAL MEDICINE

## 2022-01-01 PROCEDURE — 82330 ASSAY OF CALCIUM: CPT | Performed by: INTERNAL MEDICINE

## 2022-01-01 PROCEDURE — 0 LIDOCAINE 1 % SOLUTION: Performed by: RADIOLOGY

## 2022-01-01 PROCEDURE — 73030 X-RAY EXAM OF SHOULDER: CPT | Performed by: ORTHOPAEDIC SURGERY

## 2022-01-01 PROCEDURE — 87176 TISSUE HOMOGENIZATION CULTR: CPT | Performed by: INTERNAL MEDICINE

## 2022-01-01 PROCEDURE — 97162 PT EVAL MOD COMPLEX 30 MIN: CPT

## 2022-01-01 PROCEDURE — 86850 RBC ANTIBODY SCREEN: CPT | Performed by: INTERNAL MEDICINE

## 2022-01-01 PROCEDURE — 84443 ASSAY THYROID STIM HORMONE: CPT | Performed by: HOSPITALIST

## 2022-01-01 PROCEDURE — 25010000002 FENTANYL CITRATE (PF) 50 MCG/ML SOLUTION: Performed by: ANESTHESIOLOGY

## 2022-01-01 PROCEDURE — 99213 OFFICE O/P EST LOW 20 MIN: CPT | Performed by: FAMILY MEDICINE

## 2022-01-01 PROCEDURE — 25010000002 PHENYLEPHRINE 10 MG/ML SOLUTION: Performed by: NURSE ANESTHETIST, CERTIFIED REGISTERED

## 2022-01-01 PROCEDURE — 83880 ASSAY OF NATRIURETIC PEPTIDE: CPT | Performed by: EMERGENCY MEDICINE

## 2022-01-01 PROCEDURE — 63710000001 LEVOTHYROXINE 100 MCG TABLET: Performed by: ORTHOPAEDIC SURGERY

## 2022-01-01 PROCEDURE — 78452 HT MUSCLE IMAGE SPECT MULT: CPT | Performed by: INTERNAL MEDICINE

## 2022-01-01 PROCEDURE — 84478 ASSAY OF TRIGLYCERIDES: CPT | Performed by: INTERNAL MEDICINE

## 2022-01-01 PROCEDURE — P9012 CRYOPRECIPITATE EACH UNIT: HCPCS

## 2022-01-01 PROCEDURE — 63710000001 HYDRALAZINE 50 MG TABLET: Performed by: ORTHOPAEDIC SURGERY

## 2022-01-01 PROCEDURE — 76942 ECHO GUIDE FOR BIOPSY: CPT | Performed by: ORTHOPAEDIC SURGERY

## 2022-01-01 PROCEDURE — 83036 HEMOGLOBIN GLYCOSYLATED A1C: CPT | Performed by: HOSPITALIST

## 2022-01-01 PROCEDURE — 82746 ASSAY OF FOLIC ACID SERUM: CPT | Performed by: INTERNAL MEDICINE

## 2022-01-01 PROCEDURE — 0 LIDOCAINE 1 % SOLUTION: Performed by: PHYSICIAN ASSISTANT

## 2022-01-01 PROCEDURE — A9577 INJ MULTIHANCE: HCPCS | Performed by: HOSPITALIST

## 2022-01-01 PROCEDURE — 63710000001 AMLODIPINE 2.5 MG TABLET: Performed by: ORTHOPAEDIC SURGERY

## 2022-01-01 PROCEDURE — 83921 ORGANIC ACID SINGLE QUANT: CPT | Performed by: PHYSICIAN ASSISTANT

## 2022-01-01 PROCEDURE — 0202U NFCT DS 22 TRGT SARS-COV-2: CPT | Performed by: STUDENT IN AN ORGANIZED HEALTH CARE EDUCATION/TRAINING PROGRAM

## 2022-01-01 PROCEDURE — 70450 CT HEAD/BRAIN W/O DYE: CPT

## 2022-01-01 PROCEDURE — 76775 US EXAM ABDO BACK WALL LIM: CPT

## 2022-01-01 PROCEDURE — U0003 INFECTIOUS AGENT DETECTION BY NUCLEIC ACID (DNA OR RNA); SEVERE ACUTE RESPIRATORY SYNDROME CORONAVIRUS 2 (SARS-COV-2) (CORONAVIRUS DISEASE [COVID-19]), AMPLIFIED PROBE TECHNIQUE, MAKING USE OF HIGH THROUGHPUT TECHNOLOGIES AS DESCRIBED BY CMS-2020-01-R: HCPCS | Performed by: ORTHOPAEDIC SURGERY

## 2022-01-01 PROCEDURE — 71275 CT ANGIOGRAPHY CHEST: CPT

## 2022-01-01 PROCEDURE — 0202U NFCT DS 22 TRGT SARS-COV-2: CPT | Performed by: HOSPITALIST

## 2022-01-01 PROCEDURE — 25010000002 ENOXAPARIN PER 10 MG: Performed by: ORTHOPAEDIC SURGERY

## 2022-01-01 PROCEDURE — A9270 NON-COVERED ITEM OR SERVICE: HCPCS | Performed by: NURSE PRACTITIONER

## 2022-01-01 PROCEDURE — 85027 COMPLETE CBC AUTOMATED: CPT

## 2022-01-01 PROCEDURE — 85007 BL SMEAR W/DIFF WBC COUNT: CPT | Performed by: INTERNAL MEDICINE

## 2022-01-01 PROCEDURE — 93325 DOPPLER ECHO COLOR FLOW MAPG: CPT

## 2022-01-01 PROCEDURE — 88312 SPECIAL STAINS GROUP 1: CPT | Performed by: INTERNAL MEDICINE

## 2022-01-01 PROCEDURE — 0 IOPAMIDOL PER 1 ML: Performed by: EMERGENCY MEDICINE

## 2022-01-01 PROCEDURE — 99232 SBSQ HOSP IP/OBS MODERATE 35: CPT | Performed by: PSYCHIATRY & NEUROLOGY

## 2022-01-01 PROCEDURE — 85046 RETICYTE/HGB CONCENTRATE: CPT | Performed by: INTERNAL MEDICINE

## 2022-01-01 PROCEDURE — 63710000001 AMLODIPINE 5 MG TABLET: Performed by: ORTHOPAEDIC SURGERY

## 2022-01-01 PROCEDURE — 93010 ELECTROCARDIOGRAM REPORT: CPT | Performed by: STUDENT IN AN ORGANIZED HEALTH CARE EDUCATION/TRAINING PROGRAM

## 2022-01-01 PROCEDURE — 99214 OFFICE O/P EST MOD 30 MIN: CPT | Performed by: STUDENT IN AN ORGANIZED HEALTH CARE EDUCATION/TRAINING PROGRAM

## 2022-01-01 PROCEDURE — 0B9F8ZX DRAINAGE OF RIGHT LOWER LUNG LOBE, VIA NATURAL OR ARTIFICIAL OPENING ENDOSCOPIC, DIAGNOSTIC: ICD-10-PCS | Performed by: INTERNAL MEDICINE

## 2022-01-01 PROCEDURE — 87641 MR-STAPH DNA AMP PROBE: CPT | Performed by: INTERNAL MEDICINE

## 2022-01-01 PROCEDURE — 25010000002 RITUXIMAB-ARRX 500 MG/50ML SOLUTION 50 ML VIAL: Performed by: INTERNAL MEDICINE

## 2022-01-01 PROCEDURE — 0 TECHNETIUM ALBUMIN AGGREGATED: Performed by: INTERNAL MEDICINE

## 2022-01-01 PROCEDURE — 84443 ASSAY THYROID STIM HORMONE: CPT | Performed by: INTERNAL MEDICINE

## 2022-01-01 PROCEDURE — 97165 OT EVAL LOW COMPLEX 30 MIN: CPT

## 2022-01-01 PROCEDURE — 0BH17EZ INSERTION OF ENDOTRACHEAL AIRWAY INTO TRACHEA, VIA NATURAL OR ARTIFICIAL OPENING: ICD-10-PCS | Performed by: INTERNAL MEDICINE

## 2022-01-01 PROCEDURE — 97166 OT EVAL MOD COMPLEX 45 MIN: CPT

## 2022-01-01 PROCEDURE — 85610 PROTHROMBIN TIME: CPT | Performed by: EMERGENCY MEDICINE

## 2022-01-01 PROCEDURE — 93970 EXTREMITY STUDY: CPT

## 2022-01-01 PROCEDURE — 80048 BASIC METABOLIC PNL TOTAL CA: CPT | Performed by: NURSE PRACTITIONER

## 2022-01-01 PROCEDURE — 87102 FUNGUS ISOLATION CULTURE: CPT | Performed by: INTERNAL MEDICINE

## 2022-01-01 PROCEDURE — 25010000002 DEXAMETHASONE PER 1 MG: Performed by: ANESTHESIOLOGY

## 2022-01-01 PROCEDURE — 0BBC8ZX EXCISION OF RIGHT UPPER LUNG LOBE, VIA NATURAL OR ARTIFICIAL OPENING ENDOSCOPIC, DIAGNOSTIC: ICD-10-PCS | Performed by: INTERNAL MEDICINE

## 2022-01-01 PROCEDURE — 63710000001 METFORMIN 500 MG TABLET: Performed by: ORTHOPAEDIC SURGERY

## 2022-01-01 PROCEDURE — 82728 ASSAY OF FERRITIN: CPT | Performed by: INTERNAL MEDICINE

## 2022-01-01 PROCEDURE — C9290 INJ, BUPIVACAINE LIPOSOME: HCPCS | Performed by: ORTHOPAEDIC SURGERY

## 2022-01-01 PROCEDURE — 84156 ASSAY OF PROTEIN URINE: CPT | Performed by: INTERNAL MEDICINE

## 2022-01-01 PROCEDURE — 63710000001 METFORMIN 500 MG TABLET: Performed by: NURSE PRACTITIONER

## 2022-01-01 PROCEDURE — 80048 BASIC METABOLIC PNL TOTAL CA: CPT | Performed by: ORTHOPAEDIC SURGERY

## 2022-01-01 PROCEDURE — 86927 PLASMA FRESH FROZEN: CPT

## 2022-01-01 PROCEDURE — 83090 ASSAY OF HOMOCYSTEINE: CPT | Performed by: PHYSICIAN ASSISTANT

## 2022-01-01 PROCEDURE — U0004 COV-19 TEST NON-CDC HGH THRU: HCPCS

## 2022-01-01 PROCEDURE — 88348 ELECTRON MICROSCOPY DX: CPT

## 2022-01-01 PROCEDURE — 85652 RBC SED RATE AUTOMATED: CPT | Performed by: INTERNAL MEDICINE

## 2022-01-01 PROCEDURE — 88313 SPECIAL STAINS GROUP 2: CPT

## 2022-01-01 PROCEDURE — 87116 MYCOBACTERIA CULTURE: CPT | Performed by: INTERNAL MEDICINE

## 2022-01-01 PROCEDURE — 90715 TDAP VACCINE 7 YRS/> IM: CPT | Performed by: PHYSICIAN ASSISTANT

## 2022-01-01 PROCEDURE — 25010000002 NEOSTIGMINE 5 MG/10ML SOLUTION: Performed by: NURSE ANESTHETIST, CERTIFIED REGISTERED

## 2022-01-01 DEVICE — GLENSPHR TRABECULARMETAL REV 36MM: Type: IMPLANTABLE DEVICE | Site: SHOULDER | Status: FUNCTIONAL

## 2022-01-01 DEVICE — STEM HUM/SHLDR TRABECULARMETAL REV 8X130MM: Type: IMPLANTABLE DEVICE | Site: HUMERUS | Status: FUNCTIONAL

## 2022-01-01 DEVICE — TOTL SHLDER REV: Type: IMPLANTABLE DEVICE | Site: HUMERUS | Status: FUNCTIONAL

## 2022-01-01 DEVICE — LINER HUM/SHLDR TRABECULARMETAL REV POLY 60DEG 36MM PLS3: Type: IMPLANTABLE DEVICE | Site: SHOULDER | Status: FUNCTIONAL

## 2022-01-01 DEVICE — INVERSE/REVERSE SCREW SYSTEM, 4.5-33
Type: IMPLANTABLE DEVICE | Site: SHOULDER | Status: FUNCTIONAL
Brand: INVERSE/REVERSE

## 2022-01-01 DEVICE — BASEPLT GLEN TRABECULARMETAL REV 15MM: Type: IMPLANTABLE DEVICE | Site: SHOULDER | Status: FUNCTIONAL

## 2022-01-01 DEVICE — SPACR HUM REV TM PLS 9MM: Type: IMPLANTABLE DEVICE | Site: SHOULDER | Status: FUNCTIONAL

## 2022-01-01 RX ORDER — CHLORTHALIDONE 25 MG/1
25 TABLET ORAL DAILY
Status: DISCONTINUED | OUTPATIENT
Start: 2022-01-01 | End: 2022-01-01 | Stop reason: HOSPADM

## 2022-01-01 RX ORDER — ALPRAZOLAM 0.5 MG/1
0.5 TABLET ORAL 3 TIMES DAILY PRN
Status: DISCONTINUED | OUTPATIENT
Start: 2022-01-01 | End: 2022-01-01

## 2022-01-01 RX ORDER — CHLORHEXIDINE GLUCONATE 0.12 MG/ML
15 RINSE ORAL EVERY 12 HOURS SCHEDULED
Status: DISCONTINUED | OUTPATIENT
Start: 2022-01-01 | End: 2022-01-01 | Stop reason: HOSPADM

## 2022-01-01 RX ORDER — BENZONATATE 100 MG/1
200 CAPSULE ORAL 3 TIMES DAILY PRN
Status: DISCONTINUED | OUTPATIENT
Start: 2022-01-01 | End: 2022-01-01 | Stop reason: HOSPADM

## 2022-01-01 RX ORDER — HEPARIN SODIUM 1000 [USP'U]/ML
5000 INJECTION, SOLUTION INTRAVENOUS; SUBCUTANEOUS ONCE
Status: COMPLETED | OUTPATIENT
Start: 2022-01-01 | End: 2022-01-01

## 2022-01-01 RX ORDER — ACETAMINOPHEN 325 MG/1
650 TABLET ORAL EVERY 4 HOURS PRN
Status: DISCONTINUED | OUTPATIENT
Start: 2022-01-01 | End: 2022-01-01 | Stop reason: HOSPADM

## 2022-01-01 RX ORDER — LIDOCAINE HYDROCHLORIDE 10 MG/ML
20 INJECTION, SOLUTION INFILTRATION; PERINEURAL ONCE
Status: COMPLETED | OUTPATIENT
Start: 2022-01-01 | End: 2022-01-01

## 2022-01-01 RX ORDER — ALBUMIN (HUMAN) 12.5 G/50ML
50 SOLUTION INTRAVENOUS ONCE
Status: DISCONTINUED | OUTPATIENT
Start: 2022-01-01 | End: 2022-01-01

## 2022-01-01 RX ORDER — ANTICOAGULANT CITRATE DEXTROSE SOLUTION FORMULA A 12.25; 11; 3.65 G/500ML; G/500ML; G/500ML
1000 SOLUTION INTRAVENOUS DAILY
Status: COMPLETED | OUTPATIENT
Start: 2022-01-01 | End: 2022-01-01

## 2022-01-01 RX ORDER — QUETIAPINE FUMARATE 300 MG/1
TABLET, FILM COATED ORAL
Qty: 90 TABLET | Refills: 3 | Status: ON HOLD | OUTPATIENT
Start: 2022-01-01 | End: 2022-01-01

## 2022-01-01 RX ORDER — CHOLECALCIFEROL (VITAMIN D3) 125 MCG
1000 CAPSULE ORAL DAILY
Status: DISCONTINUED | OUTPATIENT
Start: 2022-01-01 | End: 2022-01-01

## 2022-01-01 RX ORDER — LAMOTRIGINE 100 MG/1
200 TABLET ORAL DAILY
Status: DISCONTINUED | OUTPATIENT
Start: 2022-01-01 | End: 2022-01-01

## 2022-01-01 RX ORDER — LACTULOSE 10 G/15ML
30 SOLUTION ORAL 2 TIMES DAILY
Status: DISCONTINUED | OUTPATIENT
Start: 2022-01-01 | End: 2022-01-01

## 2022-01-01 RX ORDER — ANASTROZOLE 1 MG/1
1 TABLET ORAL DAILY
Status: DISCONTINUED | OUTPATIENT
Start: 2022-01-01 | End: 2022-01-01 | Stop reason: HOSPADM

## 2022-01-01 RX ORDER — HYDRALAZINE HYDROCHLORIDE 20 MG/ML
10 INJECTION INTRAMUSCULAR; INTRAVENOUS EVERY 4 HOURS PRN
Status: DISCONTINUED | OUTPATIENT
Start: 2022-01-01 | End: 2022-01-01 | Stop reason: HOSPADM

## 2022-01-01 RX ORDER — AZITHROMYCIN 250 MG/1
TABLET, FILM COATED ORAL
Qty: 6 TABLET | Refills: 0 | Status: SHIPPED | OUTPATIENT
Start: 2022-01-01 | End: 2022-01-01

## 2022-01-01 RX ORDER — SIMVASTATIN 80 MG
1 TABLET ORAL DAILY
COMMUNITY
End: 2022-01-01

## 2022-01-01 RX ORDER — SODIUM CHLORIDE 0.9 % (FLUSH) 0.9 %
3 SYRINGE (ML) INJECTION EVERY 12 HOURS SCHEDULED
Status: DISCONTINUED | OUTPATIENT
Start: 2022-01-01 | End: 2022-01-01 | Stop reason: HOSPADM

## 2022-01-01 RX ORDER — DIPHENHYDRAMINE HYDROCHLORIDE 50 MG/ML
12.5 INJECTION INTRAMUSCULAR; INTRAVENOUS
Status: DISCONTINUED | OUTPATIENT
Start: 2022-01-01 | End: 2022-01-01 | Stop reason: HOSPADM

## 2022-01-01 RX ORDER — PAROXETINE HYDROCHLORIDE 20 MG/1
40 TABLET, FILM COATED ORAL DAILY
Status: DISCONTINUED | OUTPATIENT
Start: 2022-01-01 | End: 2022-01-01 | Stop reason: HOSPADM

## 2022-01-01 RX ORDER — FAMOTIDINE 10 MG/ML
20 INJECTION, SOLUTION INTRAVENOUS AS NEEDED
Status: ACTIVE | OUTPATIENT
Start: 2022-01-01 | End: 2022-01-01

## 2022-01-01 RX ORDER — OMEPRAZOLE 40 MG/1
40 CAPSULE, DELAYED RELEASE ORAL DAILY
Qty: 90 CAPSULE | Refills: 3 | Status: SHIPPED | OUTPATIENT
Start: 2022-01-01

## 2022-01-01 RX ORDER — HYDRALAZINE HYDROCHLORIDE 50 MG/1
100 TABLET, FILM COATED ORAL 3 TIMES DAILY
Status: DISCONTINUED | OUTPATIENT
Start: 2022-01-01 | End: 2022-01-01

## 2022-01-01 RX ORDER — FENTANYL CITRATE 50 UG/ML
INJECTION, SOLUTION INTRAMUSCULAR; INTRAVENOUS
Status: COMPLETED
Start: 2022-01-01 | End: 2022-01-01

## 2022-01-01 RX ORDER — IPRATROPIUM BROMIDE AND ALBUTEROL SULFATE 2.5; .5 MG/3ML; MG/3ML
3 SOLUTION RESPIRATORY (INHALATION) ONCE AS NEEDED
Status: DISCONTINUED | OUTPATIENT
Start: 2022-01-01 | End: 2022-01-01 | Stop reason: HOSPADM

## 2022-01-01 RX ORDER — ROSUVASTATIN CALCIUM 5 MG/1
5 TABLET, COATED ORAL NIGHTLY
Status: DISCONTINUED | OUTPATIENT
Start: 2022-01-01 | End: 2022-01-01

## 2022-01-01 RX ORDER — CHOLECALCIFEROL (VITAMIN D3) 125 MCG
1000 CAPSULE ORAL DAILY
Status: DISCONTINUED | OUTPATIENT
Start: 2022-01-01 | End: 2022-01-01 | Stop reason: HOSPADM

## 2022-01-01 RX ORDER — ONDANSETRON 4 MG/1
4 TABLET, FILM COATED ORAL EVERY 6 HOURS PRN
Status: DISCONTINUED | OUTPATIENT
Start: 2022-01-01 | End: 2022-01-01 | Stop reason: HOSPADM

## 2022-01-01 RX ORDER — LACTULOSE 10 G/15ML
10 SOLUTION ORAL 3 TIMES DAILY
Status: DISCONTINUED | OUTPATIENT
Start: 2022-01-01 | End: 2022-01-01 | Stop reason: HOSPADM

## 2022-01-01 RX ORDER — ROCURONIUM BROMIDE 10 MG/ML
INJECTION, SOLUTION INTRAVENOUS AS NEEDED
Status: DISCONTINUED | OUTPATIENT
Start: 2022-01-01 | End: 2022-01-01 | Stop reason: SURG

## 2022-01-01 RX ORDER — INSULIN LISPRO 100 [IU]/ML
0-7 INJECTION, SOLUTION INTRAVENOUS; SUBCUTANEOUS
Status: DISCONTINUED | OUTPATIENT
Start: 2022-01-01 | End: 2022-01-01 | Stop reason: HOSPADM

## 2022-01-01 RX ORDER — LACTULOSE 10 G/15ML
30 SOLUTION ORAL DAILY
Status: DISCONTINUED | OUTPATIENT
Start: 2022-01-01 | End: 2022-01-01

## 2022-01-01 RX ORDER — LIDOCAINE HYDROCHLORIDE 10 MG/ML
0.5 INJECTION, SOLUTION EPIDURAL; INFILTRATION; INTRACAUDAL; PERINEURAL ONCE AS NEEDED
Status: DISCONTINUED | OUTPATIENT
Start: 2022-01-01 | End: 2022-01-01 | Stop reason: HOSPADM

## 2022-01-01 RX ORDER — LEVOTHYROXINE SODIUM 20 UG/ML
75 INJECTION, SOLUTION INTRAVENOUS
Status: DISCONTINUED | OUTPATIENT
Start: 2022-01-01 | End: 2022-01-01 | Stop reason: HOSPADM

## 2022-01-01 RX ORDER — CARVEDILOL 12.5 MG/1
TABLET ORAL
Qty: 180 TABLET | Refills: 1 | Status: SHIPPED | OUTPATIENT
Start: 2022-01-01 | End: 2022-01-01 | Stop reason: SDUPTHER

## 2022-01-01 RX ORDER — PREDNISONE 20 MG/1
TABLET ORAL
Qty: 12 TABLET | Refills: 0 | Status: SHIPPED | OUTPATIENT
Start: 2022-01-01 | End: 2022-01-01

## 2022-01-01 RX ORDER — HYDRALAZINE HYDROCHLORIDE 100 MG/1
TABLET, FILM COATED ORAL
Qty: 270 TABLET | Refills: 3 | Status: SHIPPED | OUTPATIENT
Start: 2022-01-01 | End: 2022-01-01 | Stop reason: HOSPADM

## 2022-01-01 RX ORDER — ACETAMINOPHEN 650 MG/1
650 SUPPOSITORY RECTAL EVERY 4 HOURS PRN
Status: DISCONTINUED | OUTPATIENT
Start: 2022-01-01 | End: 2022-01-01 | Stop reason: HOSPADM

## 2022-01-01 RX ORDER — SODIUM CHLORIDE 0.9 % (FLUSH) 0.9 %
10 SYRINGE (ML) INJECTION AS NEEDED
Status: DISCONTINUED | OUTPATIENT
Start: 2022-01-01 | End: 2022-01-01 | Stop reason: HOSPADM

## 2022-01-01 RX ORDER — ASPIRIN 325 MG
325 TABLET, DELAYED RELEASE (ENTERIC COATED) ORAL DAILY
Qty: 30 TABLET | Refills: 0 | Status: SHIPPED | OUTPATIENT
Start: 2022-01-01 | End: 2022-01-01

## 2022-01-01 RX ORDER — LORAZEPAM 2 MG/ML
0.5 INJECTION INTRAMUSCULAR EVERY 4 HOURS PRN
Status: DISCONTINUED | OUTPATIENT
Start: 2022-01-01 | End: 2022-01-01

## 2022-01-01 RX ORDER — ALBUMIN (HUMAN) 12.5 G/50ML
12.5 SOLUTION INTRAVENOUS ONCE
Status: DISCONTINUED | OUTPATIENT
Start: 2022-01-01 | End: 2022-01-01 | Stop reason: HOSPADM

## 2022-01-01 RX ORDER — PANTOPRAZOLE SODIUM 40 MG/1
40 TABLET, DELAYED RELEASE ORAL EVERY MORNING
Status: DISCONTINUED | OUTPATIENT
Start: 2022-01-01 | End: 2022-01-01

## 2022-01-01 RX ORDER — HYDRALAZINE HYDROCHLORIDE 50 MG/1
100 TABLET, FILM COATED ORAL 3 TIMES DAILY
Status: DISCONTINUED | OUTPATIENT
Start: 2022-01-01 | End: 2022-01-01 | Stop reason: HOSPADM

## 2022-01-01 RX ORDER — ANASTROZOLE 1 MG/1
1 TABLET ORAL DAILY
Status: DISCONTINUED | OUTPATIENT
Start: 2022-01-01 | End: 2022-01-01

## 2022-01-01 RX ORDER — HYDROMORPHONE HYDROCHLORIDE 1 MG/ML
0.25 INJECTION, SOLUTION INTRAMUSCULAR; INTRAVENOUS; SUBCUTANEOUS
Status: DISCONTINUED | OUTPATIENT
Start: 2022-01-01 | End: 2022-01-01

## 2022-01-01 RX ORDER — PAROXETINE HYDROCHLORIDE 20 MG/1
40 TABLET, FILM COATED ORAL EVERY MORNING
Status: DISCONTINUED | OUTPATIENT
Start: 2022-01-01 | End: 2022-01-01 | Stop reason: HOSPADM

## 2022-01-01 RX ORDER — CHLORHEXIDINE GLUCONATE 500 MG/1
CLOTH TOPICAL 2 TIMES DAILY
Status: CANCELLED | OUTPATIENT
Start: 2022-01-01

## 2022-01-01 RX ORDER — ENOXAPARIN SODIUM 100 MG/ML
30 INJECTION SUBCUTANEOUS DAILY
Status: DISCONTINUED | OUTPATIENT
Start: 2022-01-01 | End: 2022-01-01 | Stop reason: HOSPADM

## 2022-01-01 RX ORDER — ROSUVASTATIN CALCIUM 5 MG/1
5 TABLET, COATED ORAL DAILY
Status: DISCONTINUED | OUTPATIENT
Start: 2022-01-01 | End: 2022-01-01 | Stop reason: HOSPADM

## 2022-01-01 RX ORDER — PROPOFOL 10 MG/ML
VIAL (ML) INTRAVENOUS
Status: COMPLETED
Start: 2022-01-01 | End: 2022-01-01

## 2022-01-01 RX ORDER — ACETAMINOPHEN 325 MG/1
650 TABLET ORAL ONCE
Status: COMPLETED | OUTPATIENT
Start: 2022-01-01 | End: 2022-01-01

## 2022-01-01 RX ORDER — DEXTROSE MONOHYDRATE 25 G/50ML
25 INJECTION, SOLUTION INTRAVENOUS
Status: DISCONTINUED | OUTPATIENT
Start: 2022-01-01 | End: 2022-01-01 | Stop reason: HOSPADM

## 2022-01-01 RX ORDER — SODIUM BICARBONATE IN D5W 150/1000ML
150 PLASTIC BAG, INJECTION (ML) INTRAVENOUS CONTINUOUS
Status: DISCONTINUED | OUTPATIENT
Start: 2022-01-01 | End: 2022-01-01

## 2022-01-01 RX ORDER — QUETIAPINE FUMARATE 100 MG/1
100 TABLET, FILM COATED ORAL NIGHTLY
Qty: 90 TABLET | Refills: 0 | Status: ON HOLD | OUTPATIENT
Start: 2022-01-01 | End: 2022-01-01

## 2022-01-01 RX ORDER — EPHEDRINE SULFATE 50 MG/ML
5 INJECTION, SOLUTION INTRAVENOUS ONCE AS NEEDED
Status: DISCONTINUED | OUTPATIENT
Start: 2022-01-01 | End: 2022-01-01 | Stop reason: HOSPADM

## 2022-01-01 RX ORDER — LAMOTRIGINE 100 MG/1
300 TABLET ORAL NIGHTLY
Status: DISCONTINUED | OUTPATIENT
Start: 2022-01-01 | End: 2022-01-01

## 2022-01-01 RX ORDER — PANTOPRAZOLE SODIUM 40 MG/1
40 TABLET, DELAYED RELEASE ORAL EVERY MORNING
Status: DISCONTINUED | OUTPATIENT
Start: 2022-01-01 | End: 2022-01-01 | Stop reason: HOSPADM

## 2022-01-01 RX ORDER — GUAIFENESIN 600 MG/1
1200 TABLET, EXTENDED RELEASE ORAL 2 TIMES DAILY
Qty: 28 TABLET | Refills: 0 | Status: SHIPPED | OUTPATIENT
Start: 2022-01-01 | End: 2022-01-01

## 2022-01-01 RX ORDER — CARVEDILOL 12.5 MG/1
12.5 TABLET ORAL 2 TIMES DAILY
Status: DISCONTINUED | OUTPATIENT
Start: 2022-01-01 | End: 2022-01-01 | Stop reason: HOSPADM

## 2022-01-01 RX ORDER — MEPERIDINE HYDROCHLORIDE 25 MG/ML
25 INJECTION INTRAMUSCULAR; INTRAVENOUS; SUBCUTANEOUS
Status: ACTIVE | OUTPATIENT
Start: 2022-01-01 | End: 2022-01-01

## 2022-01-01 RX ORDER — FENTANYL CITRATE 50 UG/ML
50 INJECTION, SOLUTION INTRAMUSCULAR; INTRAVENOUS
Status: DISCONTINUED | OUTPATIENT
Start: 2022-01-01 | End: 2022-01-01

## 2022-01-01 RX ORDER — NICOTINE POLACRILEX 4 MG
15 LOZENGE BUCCAL
Status: DISCONTINUED | OUTPATIENT
Start: 2022-01-01 | End: 2022-01-01 | Stop reason: HOSPADM

## 2022-01-01 RX ORDER — ROSUVASTATIN CALCIUM 5 MG/1
TABLET, COATED ORAL
Qty: 90 TABLET | Refills: 3 | Status: SHIPPED | OUTPATIENT
Start: 2022-01-01

## 2022-01-01 RX ORDER — DEXMEDETOMIDINE HYDROCHLORIDE 4 UG/ML
.2-1.5 INJECTION, SOLUTION INTRAVENOUS
Status: DISCONTINUED | OUTPATIENT
Start: 2022-01-01 | End: 2022-01-01

## 2022-01-01 RX ORDER — LAMOTRIGINE 200 MG/1
TABLET ORAL
Qty: 225 TABLET | Refills: 3 | Status: SHIPPED | OUTPATIENT
Start: 2022-01-01

## 2022-01-01 RX ORDER — ASPIRIN 325 MG
325 TABLET ORAL ONCE
Status: COMPLETED | OUTPATIENT
Start: 2022-01-01 | End: 2022-01-01

## 2022-01-01 RX ORDER — LAMOTRIGINE 100 MG/1
200 TABLET ORAL NIGHTLY
Status: DISCONTINUED | OUTPATIENT
Start: 2022-01-01 | End: 2022-01-01 | Stop reason: HOSPADM

## 2022-01-01 RX ORDER — EPHEDRINE SULFATE 50 MG/ML
INJECTION, SOLUTION INTRAVENOUS AS NEEDED
Status: DISCONTINUED | OUTPATIENT
Start: 2022-01-01 | End: 2022-01-01 | Stop reason: SURG

## 2022-01-01 RX ORDER — ACETAMINOPHEN 325 MG/1
650 TABLET ORAL AS NEEDED
Status: DISCONTINUED | OUTPATIENT
Start: 2022-01-01 | End: 2022-01-01 | Stop reason: HOSPADM

## 2022-01-01 RX ORDER — FAMOTIDINE 10 MG/ML
20 INJECTION, SOLUTION INTRAVENOUS ONCE
Status: COMPLETED | OUTPATIENT
Start: 2022-01-01 | End: 2022-01-01

## 2022-01-01 RX ORDER — SODIUM CHLORIDE, SODIUM LACTATE, POTASSIUM CHLORIDE, CALCIUM CHLORIDE 600; 310; 30; 20 MG/100ML; MG/100ML; MG/100ML; MG/100ML
9 INJECTION, SOLUTION INTRAVENOUS CONTINUOUS
Status: DISCONTINUED | OUTPATIENT
Start: 2022-01-01 | End: 2022-01-01 | Stop reason: HOSPADM

## 2022-01-01 RX ORDER — CARVEDILOL 12.5 MG/1
TABLET ORAL
Qty: 90 TABLET | Refills: 7 | Status: SHIPPED | OUTPATIENT
Start: 2022-01-01 | End: 2022-01-01 | Stop reason: HOSPADM

## 2022-01-01 RX ORDER — SODIUM CHLORIDE 9 MG/ML
40 INJECTION, SOLUTION INTRAVENOUS AS NEEDED
Status: DISCONTINUED | OUTPATIENT
Start: 2022-01-01 | End: 2022-01-01

## 2022-01-01 RX ORDER — LIDOCAINE HYDROCHLORIDE 20 MG/ML
INJECTION, SOLUTION INFILTRATION; PERINEURAL AS NEEDED
Status: DISCONTINUED | OUTPATIENT
Start: 2022-01-01 | End: 2022-01-01 | Stop reason: SURG

## 2022-01-01 RX ORDER — ATORVASTATIN CALCIUM 80 MG/1
80 TABLET, FILM COATED ORAL NIGHTLY
Status: DISCONTINUED | OUTPATIENT
Start: 2022-01-01 | End: 2022-01-01 | Stop reason: HOSPADM

## 2022-01-01 RX ORDER — ALPRAZOLAM 0.5 MG/1
0.5 TABLET ORAL 3 TIMES DAILY PRN
Status: DISCONTINUED | OUTPATIENT
Start: 2022-01-01 | End: 2022-01-01 | Stop reason: HOSPADM

## 2022-01-01 RX ORDER — SODIUM CHLORIDE 0.9 % (FLUSH) 0.9 %
10 SYRINGE (ML) INJECTION AS NEEDED
Status: DISCONTINUED | OUTPATIENT
Start: 2022-01-01 | End: 2022-01-01

## 2022-01-01 RX ORDER — ALBUMIN, HUMAN INJ 5% 5 %
3000 SOLUTION INTRAVENOUS DAILY
Status: DISPENSED | OUTPATIENT
Start: 2022-01-01 | End: 2022-01-01

## 2022-01-01 RX ORDER — HYDROCODONE BITARTRATE AND ACETAMINOPHEN 5; 325 MG/1; MG/1
1 TABLET ORAL EVERY 6 HOURS PRN
Status: DISCONTINUED | OUTPATIENT
Start: 2022-01-01 | End: 2022-01-01 | Stop reason: HOSPADM

## 2022-01-01 RX ORDER — LIDOCAINE HYDROCHLORIDE 10 MG/ML
10 INJECTION, SOLUTION INFILTRATION; PERINEURAL ONCE
Status: COMPLETED | OUTPATIENT
Start: 2022-01-01 | End: 2022-01-01

## 2022-01-01 RX ORDER — ASPIRIN 300 MG/1
300 SUPPOSITORY RECTAL DAILY
Status: DISCONTINUED | OUTPATIENT
Start: 2022-01-01 | End: 2022-01-01 | Stop reason: HOSPADM

## 2022-01-01 RX ORDER — HYDROCODONE BITARTRATE AND ACETAMINOPHEN 5; 325 MG/1; MG/1
1 TABLET ORAL EVERY 6 HOURS
Status: DISCONTINUED | OUTPATIENT
Start: 2022-01-01 | End: 2022-01-01

## 2022-01-01 RX ORDER — HYDRALAZINE HYDROCHLORIDE 50 MG/1
100 TABLET, FILM COATED ORAL 3 TIMES DAILY
COMMUNITY

## 2022-01-01 RX ORDER — SODIUM CHLORIDE 9 MG/ML
75 INJECTION, SOLUTION INTRAVENOUS CONTINUOUS
Status: DISCONTINUED | OUTPATIENT
Start: 2022-01-01 | End: 2022-01-01 | Stop reason: HOSPADM

## 2022-01-01 RX ORDER — FLUMAZENIL 0.1 MG/ML
0.2 INJECTION INTRAVENOUS AS NEEDED
Status: DISCONTINUED | OUTPATIENT
Start: 2022-01-01 | End: 2022-01-01 | Stop reason: HOSPADM

## 2022-01-01 RX ORDER — FENTANYL CITRATE 50 UG/ML
50 INJECTION, SOLUTION INTRAMUSCULAR; INTRAVENOUS ONCE
Status: COMPLETED | OUTPATIENT
Start: 2022-01-01 | End: 2022-01-01

## 2022-01-01 RX ORDER — MAGNESIUM HYDROXIDE 1200 MG/15ML
LIQUID ORAL AS NEEDED
Status: DISCONTINUED | OUTPATIENT
Start: 2022-01-01 | End: 2022-01-01 | Stop reason: HOSPADM

## 2022-01-01 RX ORDER — SODIUM CHLORIDE 9 MG/ML
200 INJECTION, SOLUTION INTRAVENOUS ONCE
Status: DISCONTINUED | OUTPATIENT
Start: 2022-01-01 | End: 2022-01-01 | Stop reason: HOSPADM

## 2022-01-01 RX ORDER — OXYCODONE AND ACETAMINOPHEN 10; 325 MG/1; MG/1
1 TABLET ORAL EVERY 4 HOURS PRN
Status: DISCONTINUED | OUTPATIENT
Start: 2022-01-01 | End: 2022-01-01 | Stop reason: HOSPADM

## 2022-01-01 RX ORDER — ONDANSETRON 2 MG/ML
4 INJECTION INTRAMUSCULAR; INTRAVENOUS EVERY 6 HOURS PRN
Status: DISCONTINUED | OUTPATIENT
Start: 2022-01-01 | End: 2022-01-01 | Stop reason: HOSPADM

## 2022-01-01 RX ORDER — INSULIN LISPRO 100 [IU]/ML
0-9 INJECTION, SOLUTION INTRAVENOUS; SUBCUTANEOUS
Status: DISCONTINUED | OUTPATIENT
Start: 2022-01-01 | End: 2022-01-01

## 2022-01-01 RX ORDER — SODIUM CHLORIDE 0.9 % (FLUSH) 0.9 %
10 SYRINGE (ML) INJECTION EVERY 12 HOURS SCHEDULED
Status: DISCONTINUED | OUTPATIENT
Start: 2022-01-01 | End: 2022-01-01

## 2022-01-01 RX ORDER — CLINDAMYCIN HYDROCHLORIDE 300 MG/1
300 CAPSULE ORAL 3 TIMES DAILY
Qty: 15 CAPSULE | Refills: 0 | Status: SHIPPED | OUTPATIENT
Start: 2022-01-01 | End: 2022-01-01 | Stop reason: HOSPADM

## 2022-01-01 RX ORDER — HYDROCODONE BITARTRATE AND ACETAMINOPHEN 5; 325 MG/1; MG/1
1 TABLET ORAL EVERY 4 HOURS PRN
Status: DISCONTINUED | OUTPATIENT
Start: 2022-01-01 | End: 2022-01-01 | Stop reason: HOSPADM

## 2022-01-01 RX ORDER — CARVEDILOL 12.5 MG/1
12.5 TABLET ORAL 2 TIMES DAILY
Status: DISCONTINUED | OUTPATIENT
Start: 2022-01-01 | End: 2022-01-01

## 2022-01-01 RX ORDER — FLUOXETINE HYDROCHLORIDE 20 MG/1
1 CAPSULE ORAL DAILY
COMMUNITY
End: 2022-01-01

## 2022-01-01 RX ORDER — PROPOFOL 10 MG/ML
VIAL (ML) INTRAVENOUS AS NEEDED
Status: DISCONTINUED | OUTPATIENT
Start: 2022-01-01 | End: 2022-01-01 | Stop reason: SURG

## 2022-01-01 RX ORDER — PAROXETINE HYDROCHLORIDE 20 MG/1
40 TABLET, FILM COATED ORAL DAILY
Status: DISCONTINUED | OUTPATIENT
Start: 2022-01-01 | End: 2022-01-01

## 2022-01-01 RX ORDER — LORAZEPAM 2 MG/ML
1 INJECTION INTRAMUSCULAR EVERY 4 HOURS PRN
Status: DISCONTINUED | OUTPATIENT
Start: 2022-01-01 | End: 2022-01-01 | Stop reason: HOSPADM

## 2022-01-01 RX ORDER — INSULIN LISPRO 100 [IU]/ML
0-9 INJECTION, SOLUTION INTRAVENOUS; SUBCUTANEOUS EVERY 6 HOURS
Status: DISCONTINUED | OUTPATIENT
Start: 2022-01-01 | End: 2022-01-01 | Stop reason: HOSPADM

## 2022-01-01 RX ORDER — HEPARIN SODIUM 1000 [USP'U]/ML
3000 INJECTION, SOLUTION INTRAVENOUS; SUBCUTANEOUS AS NEEDED
Status: COMPLETED | OUTPATIENT
Start: 2022-01-01 | End: 2022-01-01

## 2022-01-01 RX ORDER — FERROUS SULFATE 325(65) MG
325 TABLET ORAL
Status: DISCONTINUED | OUTPATIENT
Start: 2022-01-01 | End: 2022-01-01

## 2022-01-01 RX ORDER — BENZONATATE 200 MG/1
200 CAPSULE ORAL
COMMUNITY
Start: 2021-01-01 | End: 2022-01-01

## 2022-01-01 RX ORDER — LEVOTHYROXINE SODIUM 0.1 MG/1
100 TABLET ORAL DAILY
Status: DISCONTINUED | OUTPATIENT
Start: 2022-01-01 | End: 2022-01-01

## 2022-01-01 RX ORDER — METRONIDAZOLE 500 MG/100ML
500 INJECTION, SOLUTION INTRAVENOUS EVERY 8 HOURS
Status: DISCONTINUED | OUTPATIENT
Start: 2022-01-01 | End: 2022-01-01 | Stop reason: HOSPADM

## 2022-01-01 RX ORDER — ONDANSETRON 2 MG/ML
INJECTION INTRAMUSCULAR; INTRAVENOUS AS NEEDED
Status: DISCONTINUED | OUTPATIENT
Start: 2022-01-01 | End: 2022-01-01 | Stop reason: SURG

## 2022-01-01 RX ORDER — FENTANYL CITRATE 50 UG/ML
75 INJECTION, SOLUTION INTRAMUSCULAR; INTRAVENOUS
Status: DISPENSED | OUTPATIENT
Start: 2022-01-01 | End: 2022-01-01

## 2022-01-01 RX ORDER — FERROUS SULFATE 325(65) MG
325 TABLET ORAL
Qty: 60 TABLET | Refills: 3 | Status: SHIPPED | OUTPATIENT
Start: 2022-01-01

## 2022-01-01 RX ORDER — PAROXETINE HYDROCHLORIDE 40 MG/1
TABLET, FILM COATED ORAL
Qty: 90 TABLET | Refills: 1 | Status: SHIPPED | OUTPATIENT
Start: 2022-01-01

## 2022-01-01 RX ORDER — DIPHENHYDRAMINE HYDROCHLORIDE 50 MG/ML
50 INJECTION INTRAMUSCULAR; INTRAVENOUS ONCE
Status: COMPLETED | OUTPATIENT
Start: 2022-01-01 | End: 2022-01-01

## 2022-01-01 RX ORDER — LORAZEPAM 2 MG/ML
1 INJECTION INTRAMUSCULAR EVERY 4 HOURS PRN
Status: DISCONTINUED | OUTPATIENT
Start: 2022-01-01 | End: 2022-01-01

## 2022-01-01 RX ORDER — HYDROCODONE BITARTRATE AND ACETAMINOPHEN 7.5; 325 MG/1; MG/1
1 TABLET ORAL ONCE
Status: COMPLETED | OUTPATIENT
Start: 2022-01-01 | End: 2022-01-01

## 2022-01-01 RX ORDER — METHYLPREDNISOLONE SODIUM SUCCINATE 125 MG/2ML
60 INJECTION, POWDER, LYOPHILIZED, FOR SOLUTION INTRAMUSCULAR; INTRAVENOUS EVERY 6 HOURS
Status: DISCONTINUED | OUTPATIENT
Start: 2022-01-01 | End: 2022-01-01 | Stop reason: HOSPADM

## 2022-01-01 RX ORDER — OXYCODONE AND ACETAMINOPHEN 10; 325 MG/1; MG/1
1-2 TABLET ORAL EVERY 4 HOURS PRN
Qty: 36 TABLET | Refills: 0 | Status: SHIPPED | OUTPATIENT
Start: 2022-01-01 | End: 2022-01-01

## 2022-01-01 RX ORDER — HYDRALAZINE HYDROCHLORIDE 20 MG/ML
5 INJECTION INTRAMUSCULAR; INTRAVENOUS
Status: DISCONTINUED | OUTPATIENT
Start: 2022-01-01 | End: 2022-01-01 | Stop reason: HOSPADM

## 2022-01-01 RX ORDER — LIDOCAINE HYDROCHLORIDE 20 MG/ML
INJECTION, SOLUTION EPIDURAL; INFILTRATION; INTRACAUDAL; PERINEURAL AS NEEDED
Status: DISCONTINUED | OUTPATIENT
Start: 2022-01-01 | End: 2022-01-01 | Stop reason: HOSPADM

## 2022-01-01 RX ORDER — DIPHENHYDRAMINE HCL 25 MG
25 CAPSULE ORAL EVERY 6 HOURS PRN
Status: DISCONTINUED | OUTPATIENT
Start: 2022-01-01 | End: 2022-01-01 | Stop reason: HOSPADM

## 2022-01-01 RX ORDER — DIAZEPAM 5 MG/1
5 TABLET ORAL EVERY 6 HOURS PRN
Status: DISCONTINUED | OUTPATIENT
Start: 2022-01-01 | End: 2022-01-01 | Stop reason: HOSPADM

## 2022-01-01 RX ORDER — NALOXONE HCL 0.4 MG/ML
0.2 VIAL (ML) INJECTION AS NEEDED
Status: DISCONTINUED | OUTPATIENT
Start: 2022-01-01 | End: 2022-01-01 | Stop reason: HOSPADM

## 2022-01-01 RX ORDER — CARVEDILOL 12.5 MG/1
12.5 TABLET ORAL 2 TIMES DAILY
Qty: 90 TABLET | Refills: 0 | Status: SHIPPED | OUTPATIENT
Start: 2022-01-01 | End: 2022-01-01

## 2022-01-01 RX ORDER — HYDROCHLOROTHIAZIDE 12.5 MG/1
1 CAPSULE, GELATIN COATED ORAL DAILY
COMMUNITY
End: 2022-01-01

## 2022-01-01 RX ORDER — FERROUS SULFATE 300 MG/5ML
300 LIQUID (ML) ORAL EVERY OTHER DAY
Status: DISCONTINUED | OUTPATIENT
Start: 2022-01-01 | End: 2022-01-01

## 2022-01-01 RX ORDER — PHENYLEPHRINE HYDROCHLORIDE 10 MG/ML
INJECTION INTRAVENOUS AS NEEDED
Status: DISCONTINUED | OUTPATIENT
Start: 2022-01-01 | End: 2022-01-01 | Stop reason: SURG

## 2022-01-01 RX ORDER — PAROXETINE HYDROCHLORIDE 40 MG/1
40 TABLET, FILM COATED ORAL EVERY MORNING
Qty: 90 TABLET | Refills: 1 | Status: SHIPPED | OUTPATIENT
Start: 2022-01-01 | End: 2022-01-01

## 2022-01-01 RX ORDER — FERROUS SULFATE 325(65) MG
325 TABLET ORAL EVERY OTHER DAY
Status: DISCONTINUED | OUTPATIENT
Start: 2022-01-01 | End: 2022-01-01

## 2022-01-01 RX ORDER — ONDANSETRON 2 MG/ML
4 INJECTION INTRAMUSCULAR; INTRAVENOUS ONCE AS NEEDED
Status: DISCONTINUED | OUTPATIENT
Start: 2022-01-01 | End: 2022-01-01 | Stop reason: HOSPADM

## 2022-01-01 RX ORDER — LEVOTHYROXINE SODIUM 0.1 MG/1
100 TABLET ORAL DAILY
Qty: 90 TABLET | Refills: 3 | Status: SHIPPED | OUTPATIENT
Start: 2022-01-01

## 2022-01-01 RX ORDER — LIDOCAINE HYDROCHLORIDE 10 MG/ML
INJECTION, SOLUTION INFILTRATION; PERINEURAL AS NEEDED
Status: DISCONTINUED | OUTPATIENT
Start: 2022-01-01 | End: 2022-01-01 | Stop reason: HOSPADM

## 2022-01-01 RX ORDER — CYANOCOBALAMIN 1000 UG/ML
1000 INJECTION, SOLUTION INTRAMUSCULAR; SUBCUTANEOUS
Status: DISCONTINUED | OUTPATIENT
Start: 2022-01-01 | End: 2022-01-01

## 2022-01-01 RX ORDER — LEVOTHYROXINE SODIUM 0.1 MG/1
100 TABLET ORAL DAILY
Status: DISCONTINUED | OUTPATIENT
Start: 2022-01-01 | End: 2022-01-01 | Stop reason: HOSPADM

## 2022-01-01 RX ORDER — CARVEDILOL 6.25 MG/1
6.25 TABLET ORAL 2 TIMES DAILY
Status: DISCONTINUED | OUTPATIENT
Start: 2022-01-01 | End: 2022-01-01

## 2022-01-01 RX ORDER — ALBUMIN, HUMAN INJ 5% 5 %
3000 SOLUTION INTRAVENOUS DAILY
Status: DISCONTINUED | OUTPATIENT
Start: 2022-01-01 | End: 2022-01-01

## 2022-01-01 RX ORDER — METHYLPREDNISOLONE SODIUM SUCCINATE 125 MG/2ML
125 INJECTION, POWDER, LYOPHILIZED, FOR SOLUTION INTRAMUSCULAR; INTRAVENOUS EVERY 8 HOURS
Status: DISCONTINUED | OUTPATIENT
Start: 2022-01-01 | End: 2022-01-01

## 2022-01-01 RX ORDER — LABETALOL HYDROCHLORIDE 5 MG/ML
5 INJECTION, SOLUTION INTRAVENOUS
Status: DISCONTINUED | OUTPATIENT
Start: 2022-01-01 | End: 2022-01-01 | Stop reason: HOSPADM

## 2022-01-01 RX ORDER — PROMETHAZINE HYDROCHLORIDE 25 MG/1
25 TABLET ORAL ONCE AS NEEDED
Status: DISCONTINUED | OUTPATIENT
Start: 2022-01-01 | End: 2022-01-01 | Stop reason: HOSPADM

## 2022-01-01 RX ORDER — PIOGLITAZONEHYDROCHLORIDE 15 MG/1
1 TABLET ORAL DAILY
COMMUNITY
End: 2022-01-01

## 2022-01-01 RX ORDER — GLYCOPYRROLATE 0.2 MG/ML
INJECTION INTRAMUSCULAR; INTRAVENOUS AS NEEDED
Status: DISCONTINUED | OUTPATIENT
Start: 2022-01-01 | End: 2022-01-01 | Stop reason: SURG

## 2022-01-01 RX ORDER — FERROUS SULFATE 325(65) MG
325 TABLET ORAL
Status: DISCONTINUED | OUTPATIENT
Start: 2022-01-01 | End: 2022-01-01 | Stop reason: HOSPADM

## 2022-01-01 RX ORDER — VANCOMYCIN HYDROCHLORIDE 1 G/200ML
1000 INJECTION, SOLUTION INTRAVENOUS ONCE
Status: COMPLETED | OUTPATIENT
Start: 2022-01-01 | End: 2022-01-01

## 2022-01-01 RX ORDER — CHLORHEXIDINE GLUCONATE 500 MG/1
CLOTH TOPICAL TAKE AS DIRECTED
COMMUNITY
End: 2022-01-01 | Stop reason: HOSPADM

## 2022-01-01 RX ORDER — WOUND DRESSING ADHESIVE - LIQUID
LIQUID MISCELLANEOUS AS NEEDED
Status: DISCONTINUED | OUTPATIENT
Start: 2022-01-01 | End: 2022-01-01 | Stop reason: HOSPADM

## 2022-01-01 RX ORDER — HEPARIN SODIUM 1000 [USP'U]/ML
3000 INJECTION, SOLUTION INTRAVENOUS; SUBCUTANEOUS ONCE
Status: DISCONTINUED | OUTPATIENT
Start: 2022-01-01 | End: 2022-01-01

## 2022-01-01 RX ORDER — CARVEDILOL 6.25 MG/1
6.25 TABLET ORAL 2 TIMES DAILY
Qty: 60 TABLET | Refills: 0 | Status: SHIPPED | OUTPATIENT
Start: 2022-01-01

## 2022-01-01 RX ORDER — ASPIRIN 325 MG
325 TABLET ORAL DAILY
Status: DISCONTINUED | OUTPATIENT
Start: 2022-01-01 | End: 2022-01-01 | Stop reason: HOSPADM

## 2022-01-01 RX ORDER — BUMETANIDE 0.25 MG/ML
4 INJECTION INTRAMUSCULAR; INTRAVENOUS ONCE
Status: COMPLETED | OUTPATIENT
Start: 2022-01-01 | End: 2022-01-01

## 2022-01-01 RX ORDER — NOREPINEPHRINE BIT/0.9 % NACL 8 MG/250ML
.02-.3 INFUSION BOTTLE (ML) INTRAVENOUS
Status: DISCONTINUED | OUTPATIENT
Start: 2022-01-01 | End: 2022-01-01

## 2022-01-01 RX ORDER — SODIUM CHLORIDE 9 MG/ML
50 INJECTION, SOLUTION INTRAVENOUS ONCE
Status: COMPLETED | OUTPATIENT
Start: 2022-01-01 | End: 2022-01-01

## 2022-01-01 RX ORDER — VANCOMYCIN HYDROCHLORIDE 1 G/200ML
1000 INJECTION, SOLUTION INTRAVENOUS EVERY 12 HOURS
Status: DISCONTINUED | OUTPATIENT
Start: 2022-01-01 | End: 2022-01-01 | Stop reason: HOSPADM

## 2022-01-01 RX ORDER — PROMETHAZINE HYDROCHLORIDE 25 MG/1
25 SUPPOSITORY RECTAL ONCE AS NEEDED
Status: DISCONTINUED | OUTPATIENT
Start: 2022-01-01 | End: 2022-01-01 | Stop reason: HOSPADM

## 2022-01-01 RX ORDER — CLOTRIMAZOLE 10 MG/1
10 LOZENGE ORAL; TOPICAL
Status: DISCONTINUED | OUTPATIENT
Start: 2022-01-01 | End: 2022-01-01

## 2022-01-01 RX ORDER — PAROXETINE HYDROCHLORIDE 40 MG/1
40 TABLET, FILM COATED ORAL EVERY MORNING
Qty: 90 TABLET | Refills: 1 | Status: SHIPPED | OUTPATIENT
Start: 2022-01-01 | End: 2022-01-01 | Stop reason: SDUPTHER

## 2022-01-01 RX ORDER — BUPIVACAINE HYDROCHLORIDE AND EPINEPHRINE 2.5; 5 MG/ML; UG/ML
INJECTION, SOLUTION EPIDURAL; INFILTRATION; INTRACAUDAL; PERINEURAL
Status: COMPLETED | OUTPATIENT
Start: 2022-01-01 | End: 2022-01-01

## 2022-01-01 RX ORDER — CHLORTHALIDONE 25 MG/1
25 TABLET ORAL DAILY
COMMUNITY

## 2022-01-01 RX ORDER — AMLODIPINE BESYLATE 5 MG/1
7.5 TABLET ORAL 2 TIMES DAILY
Qty: 90 TABLET | Refills: 0 | Status: SHIPPED | OUTPATIENT
Start: 2022-01-01 | End: 2022-01-01

## 2022-01-01 RX ORDER — LAMOTRIGINE 25 MG/1
25 TABLET ORAL DAILY
Status: DISCONTINUED | OUTPATIENT
Start: 2022-01-01 | End: 2022-01-01 | Stop reason: HOSPADM

## 2022-01-01 RX ORDER — DIPHENHYDRAMINE HCL 25 MG
25 CAPSULE ORAL
Status: DISCONTINUED | OUTPATIENT
Start: 2022-01-01 | End: 2022-01-01 | Stop reason: HOSPADM

## 2022-01-01 RX ORDER — SODIUM CHLORIDE, SODIUM LACTATE, POTASSIUM CHLORIDE, CALCIUM CHLORIDE 600; 310; 30; 20 MG/100ML; MG/100ML; MG/100ML; MG/100ML
100 INJECTION, SOLUTION INTRAVENOUS CONTINUOUS
Status: DISCONTINUED | OUTPATIENT
Start: 2022-01-01 | End: 2022-01-01 | Stop reason: HOSPADM

## 2022-01-01 RX ORDER — NEOSTIGMINE METHYLSULFATE 0.5 MG/ML
INJECTION, SOLUTION INTRAVENOUS AS NEEDED
Status: DISCONTINUED | OUTPATIENT
Start: 2022-01-01 | End: 2022-01-01 | Stop reason: SURG

## 2022-01-01 RX ORDER — LEVOTHYROXINE SODIUM 0.05 MG/1
100 TABLET ORAL
Status: DISCONTINUED | OUTPATIENT
Start: 2022-01-01 | End: 2022-01-01 | Stop reason: HOSPADM

## 2022-01-01 RX ORDER — LORAZEPAM 2 MG/ML
0.5 INJECTION INTRAMUSCULAR EVERY 12 HOURS
Status: DISCONTINUED | OUTPATIENT
Start: 2022-01-01 | End: 2022-01-01

## 2022-01-01 RX ORDER — AMLODIPINE BESYLATE 5 MG/1
7.5 TABLET ORAL 2 TIMES DAILY
Qty: 90 TABLET | Refills: 0 | Status: SHIPPED | OUTPATIENT
Start: 2022-01-01 | End: 2022-01-01 | Stop reason: SDUPTHER

## 2022-01-01 RX ORDER — HYDROCODONE BITARTRATE AND ACETAMINOPHEN 5; 325 MG/1; MG/1
1 TABLET ORAL EVERY 6 HOURS
COMMUNITY
Start: 2022-01-01 | End: 2022-01-01 | Stop reason: SDDI

## 2022-01-01 RX ORDER — FENTANYL CITRATE 50 UG/ML
50 INJECTION, SOLUTION INTRAMUSCULAR; INTRAVENOUS
Status: DISCONTINUED | OUTPATIENT
Start: 2022-01-01 | End: 2022-01-01 | Stop reason: HOSPADM

## 2022-01-01 RX ORDER — SODIUM CHLORIDE 9 MG/ML
3000 INJECTION, SOLUTION INTRAVENOUS ONCE
Status: DISCONTINUED | OUTPATIENT
Start: 2022-01-01 | End: 2022-01-01

## 2022-01-01 RX ORDER — MIDAZOLAM HYDROCHLORIDE 1 MG/ML
0.5 INJECTION INTRAMUSCULAR; INTRAVENOUS
Status: COMPLETED | OUTPATIENT
Start: 2022-01-01 | End: 2022-01-01

## 2022-01-01 RX ORDER — DIPHENHYDRAMINE HYDROCHLORIDE 50 MG/ML
50 INJECTION INTRAMUSCULAR; INTRAVENOUS AS NEEDED
Status: ACTIVE | OUTPATIENT
Start: 2022-01-01 | End: 2022-01-01

## 2022-01-01 RX ORDER — HEPARIN SODIUM 1000 [USP'U]/ML
3000 INJECTION, SOLUTION INTRAVENOUS; SUBCUTANEOUS AS NEEDED
Status: DISCONTINUED | OUTPATIENT
Start: 2022-01-01 | End: 2022-01-01 | Stop reason: HOSPADM

## 2022-01-01 RX ORDER — NALOXONE HCL 0.4 MG/ML
0.4 VIAL (ML) INJECTION
Status: DISCONTINUED | OUTPATIENT
Start: 2022-01-01 | End: 2022-01-01 | Stop reason: HOSPADM

## 2022-01-01 RX ORDER — AMLODIPINE BESYLATE 5 MG/1
TABLET ORAL
Qty: 270 TABLET | Refills: 1 | Status: SHIPPED | OUTPATIENT
Start: 2022-01-01 | End: 2022-01-01 | Stop reason: HOSPADM

## 2022-01-01 RX ORDER — AMLODIPINE BESYLATE 5 MG/1
7.5 TABLET ORAL 2 TIMES DAILY
Status: DISCONTINUED | OUTPATIENT
Start: 2022-01-01 | End: 2022-01-01 | Stop reason: HOSPADM

## 2022-01-01 RX ORDER — SODIUM CHLORIDE 0.9 % (FLUSH) 0.9 %
10 SYRINGE (ML) INJECTION EVERY 12 HOURS SCHEDULED
Status: DISCONTINUED | OUTPATIENT
Start: 2022-01-01 | End: 2022-01-01 | Stop reason: HOSPADM

## 2022-01-01 RX ORDER — PAROXETINE HYDROCHLORIDE 40 MG/1
40 TABLET, FILM COATED ORAL EVERY MORNING
Qty: 90 TABLET | Refills: 1 | OUTPATIENT
Start: 2022-01-01

## 2022-01-01 RX ORDER — DIPHENOXYLATE HYDROCHLORIDE AND ATROPINE SULFATE 2.5; .025 MG/1; MG/1
1 TABLET ORAL DAILY
Status: DISCONTINUED | OUTPATIENT
Start: 2022-01-01 | End: 2022-01-01 | Stop reason: HOSPADM

## 2022-01-01 RX ORDER — ROPIVACAINE HYDROCHLORIDE 5 MG/ML
INJECTION, SOLUTION EPIDURAL; INFILTRATION; PERINEURAL
Status: COMPLETED | OUTPATIENT
Start: 2022-01-01 | End: 2022-01-01

## 2022-01-01 RX ORDER — DEXAMETHASONE SODIUM PHOSPHATE 10 MG/ML
INJECTION INTRAMUSCULAR; INTRAVENOUS AS NEEDED
Status: DISCONTINUED | OUTPATIENT
Start: 2022-01-01 | End: 2022-01-01 | Stop reason: SURG

## 2022-01-01 RX ORDER — CARVEDILOL 6.25 MG/1
6.25 TABLET ORAL 2 TIMES DAILY
Status: DISCONTINUED | OUTPATIENT
Start: 2022-01-01 | End: 2022-01-01 | Stop reason: HOSPADM

## 2022-01-01 RX ORDER — DEXAMETHASONE SODIUM PHOSPHATE 4 MG/ML
INJECTION, SOLUTION INTRA-ARTICULAR; INTRALESIONAL; INTRAMUSCULAR; INTRAVENOUS; SOFT TISSUE
Status: COMPLETED | OUTPATIENT
Start: 2022-01-01 | End: 2022-01-01

## 2022-01-01 RX ORDER — BENZONATATE 100 MG/1
200 CAPSULE ORAL 2 TIMES DAILY PRN
Qty: 14 CAPSULE | Refills: 0 | Status: SHIPPED | OUTPATIENT
Start: 2022-01-01 | End: 2022-01-01

## 2022-01-01 RX ORDER — CIPROFLOXACIN 500 MG/1
TABLET, FILM COATED ORAL
COMMUNITY
Start: 2022-01-01 | End: 2022-01-01 | Stop reason: HOSPADM

## 2022-01-01 RX ORDER — SODIUM CHLORIDE 9 MG/ML
30 INJECTION, SOLUTION INTRAVENOUS CONTINUOUS PRN
Status: DISCONTINUED | OUTPATIENT
Start: 2022-01-01 | End: 2022-01-01 | Stop reason: HOSPADM

## 2022-01-01 RX ORDER — SODIUM CHLORIDE 0.9 % (FLUSH) 0.9 %
3-10 SYRINGE (ML) INJECTION AS NEEDED
Status: DISCONTINUED | OUTPATIENT
Start: 2022-01-01 | End: 2022-01-01 | Stop reason: HOSPADM

## 2022-01-01 RX ORDER — HYDROMORPHONE HYDROCHLORIDE 1 MG/ML
0.5 INJECTION, SOLUTION INTRAMUSCULAR; INTRAVENOUS; SUBCUTANEOUS
Status: DISCONTINUED | OUTPATIENT
Start: 2022-01-01 | End: 2022-01-01 | Stop reason: HOSPADM

## 2022-01-01 RX ORDER — PANTOPRAZOLE SODIUM 40 MG/10ML
40 INJECTION, POWDER, LYOPHILIZED, FOR SOLUTION INTRAVENOUS
Status: DISCONTINUED | OUTPATIENT
Start: 2022-01-01 | End: 2022-01-01 | Stop reason: HOSPADM

## 2022-01-01 RX ORDER — CHLORTHALIDONE 25 MG/1
25 TABLET ORAL DAILY
Qty: 90 TABLET | Refills: 2 | Status: SHIPPED | OUTPATIENT
Start: 2022-01-01 | End: 2022-01-01 | Stop reason: HOSPADM

## 2022-01-01 RX ORDER — LORATADINE 10 MG/1
10 TABLET ORAL DAILY
Qty: 30 TABLET | Refills: 0 | Status: SHIPPED | OUTPATIENT
Start: 2022-01-01 | End: 2022-01-01

## 2022-01-01 RX ADMIN — FERROUS SULFATE TAB 325 MG (65 MG ELEMENTAL FE) 325 MG: 325 (65 FE) TAB at 09:13

## 2022-01-01 RX ADMIN — SODIUM CHLORIDE 500 MG: 900 INJECTION INTRAVENOUS at 09:13

## 2022-01-01 RX ADMIN — CARVEDILOL 6.25 MG: 6.25 TABLET, FILM COATED ORAL at 12:12

## 2022-01-01 RX ADMIN — HYDROMORPHONE HYDROCHLORIDE 1 MG: 1 INJECTION, SOLUTION INTRAMUSCULAR; INTRAVENOUS; SUBCUTANEOUS at 22:18

## 2022-01-01 RX ADMIN — DIPHENHYDRAMINE HYDROCHLORIDE 50 MG: 50 INJECTION, SOLUTION INTRAMUSCULAR; INTRAVENOUS at 16:22

## 2022-01-01 RX ADMIN — CARVEDILOL 6.25 MG: 6.25 TABLET, FILM COATED ORAL at 09:46

## 2022-01-01 RX ADMIN — INSULIN LISPRO 2 UNITS: 100 INJECTION, SOLUTION INTRAVENOUS; SUBCUTANEOUS at 17:15

## 2022-01-01 RX ADMIN — LORAZEPAM 1 MG: 2 INJECTION INTRAMUSCULAR; INTRAVENOUS at 21:46

## 2022-01-01 RX ADMIN — KIT FOR THE PREPARATION OF TECHNETIUM TC 99M ALBUMIN AGGREGATED 1 DOSE: 2.5 INJECTION, POWDER, FOR SOLUTION INTRAVENOUS at 10:00

## 2022-01-01 RX ADMIN — LAMOTRIGINE 300 MG: 100 TABLET ORAL at 21:56

## 2022-01-01 RX ADMIN — HYDRALAZINE HYDROCHLORIDE 100 MG: 50 TABLET, FILM COATED ORAL at 08:23

## 2022-01-01 RX ADMIN — Medication 1000 MCG: at 12:12

## 2022-01-01 RX ADMIN — ROSUVASTATIN CALCIUM 5 MG: 5 TABLET, FILM COATED ORAL at 20:29

## 2022-01-01 RX ADMIN — ANTICOAGULANT CITRATE DEXTROSE SOLUTION FORMULA A 1000 ML: 12.25; 11; 3.65 SOLUTION INTRAVENOUS at 06:51

## 2022-01-01 RX ADMIN — HYDRALAZINE HYDROCHLORIDE 10 MG: 20 INJECTION, SOLUTION INTRAMUSCULAR; INTRAVENOUS at 03:36

## 2022-01-01 RX ADMIN — INSULIN LISPRO 2 UNITS: 100 INJECTION, SOLUTION INTRAVENOUS; SUBCUTANEOUS at 12:49

## 2022-01-01 RX ADMIN — CHLORHEXIDINE GLUCONATE 15 ML: 1.2 RINSE ORAL at 22:50

## 2022-01-01 RX ADMIN — CHLORTHALIDONE 25 MG: 25 TABLET ORAL at 12:34

## 2022-01-01 RX ADMIN — INSULIN LISPRO 6 UNITS: 100 INJECTION, SOLUTION INTRAVENOUS; SUBCUTANEOUS at 00:31

## 2022-01-01 RX ADMIN — CARVEDILOL 12.5 MG: 12.5 TABLET, FILM COATED ORAL at 08:08

## 2022-01-01 RX ADMIN — ANASTROZOLE 1 MG: 1 TABLET ORAL at 08:42

## 2022-01-01 RX ADMIN — Medication 10 ML: at 21:00

## 2022-01-01 RX ADMIN — FENTANYL CITRATE 75 MCG: 0.05 INJECTION, SOLUTION INTRAMUSCULAR; INTRAVENOUS at 22:36

## 2022-01-01 RX ADMIN — LIDOCAINE HYDROCHLORIDE 20 ML: 10 INJECTION, SOLUTION INFILTRATION; PERINEURAL at 17:19

## 2022-01-01 RX ADMIN — PAROXETINE HYDROCHLORIDE HEMIHYDRATE 40 MG: 20 TABLET, FILM COATED ORAL at 16:13

## 2022-01-01 RX ADMIN — BUMETANIDE 4 MG: 0.25 INJECTION, SOLUTION INTRAMUSCULAR; INTRAVENOUS at 09:12

## 2022-01-01 RX ADMIN — LIDOCAINE HYDROCHLORIDE 20 ML: 10 INJECTION, SOLUTION INFILTRATION; PERINEURAL at 15:15

## 2022-01-01 RX ADMIN — HYDRALAZINE HYDROCHLORIDE 10 MG: 20 INJECTION, SOLUTION INTRAMUSCULAR; INTRAVENOUS at 21:36

## 2022-01-01 RX ADMIN — HYDRALAZINE HYDROCHLORIDE 100 MG: 50 TABLET, FILM COATED ORAL at 16:25

## 2022-01-01 RX ADMIN — METFORMIN HYDROCHLORIDE 500 MG: 500 TABLET ORAL at 13:39

## 2022-01-01 RX ADMIN — AZITHROMYCIN 500 MG: 500 INJECTION, POWDER, LYOPHILIZED, FOR SOLUTION INTRAVENOUS at 00:12

## 2022-01-01 RX ADMIN — INSULIN LISPRO 2 UNITS: 100 INJECTION, SOLUTION INTRAVENOUS; SUBCUTANEOUS at 11:41

## 2022-01-01 RX ADMIN — INSULIN LISPRO 2 UNITS: 100 INJECTION, SOLUTION INTRAVENOUS; SUBCUTANEOUS at 18:23

## 2022-01-01 RX ADMIN — CARVEDILOL 6.25 MG: 6.25 TABLET, FILM COATED ORAL at 20:29

## 2022-01-01 RX ADMIN — METFORMIN HYDROCHLORIDE 1000 MG: 1000 TABLET, FILM COATED ORAL at 08:09

## 2022-01-01 RX ADMIN — LACTULOSE 30 G: 10 SOLUTION ORAL at 12:12

## 2022-01-01 RX ADMIN — ATORVASTATIN CALCIUM 80 MG: 80 TABLET, FILM COATED ORAL at 22:22

## 2022-01-01 RX ADMIN — LAMOTRIGINE 200 MG: 100 TABLET ORAL at 08:40

## 2022-01-01 RX ADMIN — LACTULOSE 30 G: 10 SOLUTION ORAL at 09:13

## 2022-01-01 RX ADMIN — Medication 1000 MCG: at 09:10

## 2022-01-01 RX ADMIN — METRONIDAZOLE 500 MG: 500 INJECTION, SOLUTION INTRAVENOUS at 18:25

## 2022-01-01 RX ADMIN — FENTANYL CITRATE 50 MCG: 0.05 INJECTION, SOLUTION INTRAMUSCULAR; INTRAVENOUS at 08:00

## 2022-01-01 RX ADMIN — Medication 1 TABLET: at 08:09

## 2022-01-01 RX ADMIN — PANTOPRAZOLE SODIUM 40 MG: 40 INJECTION, POWDER, FOR SOLUTION INTRAVENOUS at 09:10

## 2022-01-01 RX ADMIN — ROSUVASTATIN CALCIUM 5 MG: 5 TABLET, FILM COATED ORAL at 20:09

## 2022-01-01 RX ADMIN — INSULIN LISPRO 2 UNITS: 100 INJECTION, SOLUTION INTRAVENOUS; SUBCUTANEOUS at 12:16

## 2022-01-01 RX ADMIN — SODIUM CHLORIDE 500 MG: 900 INJECTION INTRAVENOUS at 09:25

## 2022-01-01 RX ADMIN — ALBUMIN HUMAN 3000 ML: 0.25 SOLUTION INTRAVENOUS at 06:50

## 2022-01-01 RX ADMIN — SODIUM CHLORIDE 500 MG: 900 INJECTION INTRAVENOUS at 17:33

## 2022-01-01 RX ADMIN — TECHNETIUM TC 99M SESTAMIBI 1 DOSE: 1 INJECTION INTRAVENOUS at 10:00

## 2022-01-01 RX ADMIN — LEVOTHYROXINE SODIUM 100 MCG: 0.05 TABLET ORAL at 05:22

## 2022-01-01 RX ADMIN — LACTULOSE 30 G: 10 SOLUTION ORAL at 08:57

## 2022-01-01 RX ADMIN — ROSUVASTATIN CALCIUM 5 MG: 5 TABLET, FILM COATED ORAL at 21:04

## 2022-01-01 RX ADMIN — HYDRALAZINE HYDROCHLORIDE 100 MG: 50 TABLET, FILM COATED ORAL at 20:02

## 2022-01-01 RX ADMIN — LORAZEPAM 1 MG: 2 INJECTION INTRAMUSCULAR; INTRAVENOUS at 18:11

## 2022-01-01 RX ADMIN — HYDRALAZINE HYDROCHLORIDE 100 MG: 50 TABLET, FILM COATED ORAL at 21:23

## 2022-01-01 RX ADMIN — ACETAMINOPHEN 650 MG: 325 TABLET, FILM COATED ORAL at 20:01

## 2022-01-01 RX ADMIN — CALCIUM GLUCONATE 4 G: 98 INJECTION, SOLUTION INTRAVENOUS at 10:34

## 2022-01-01 RX ADMIN — VANCOMYCIN HYDROCHLORIDE 1000 MG: 1 INJECTION, SOLUTION INTRAVENOUS at 16:34

## 2022-01-01 RX ADMIN — PROPOFOL 180 MCG/KG/MIN: 10 INJECTION, EMULSION INTRAVENOUS at 14:13

## 2022-01-01 RX ADMIN — CARVEDILOL 6.25 MG: 6.25 TABLET, FILM COATED ORAL at 20:07

## 2022-01-01 RX ADMIN — ROSUVASTATIN CALCIUM 5 MG: 5 TABLET, FILM COATED ORAL at 12:33

## 2022-01-01 RX ADMIN — LACTULOSE 30 G: 10 SOLUTION ORAL at 21:58

## 2022-01-01 RX ADMIN — CHLORHEXIDINE GLUCONATE 15 ML: 1.2 RINSE ORAL at 21:19

## 2022-01-01 RX ADMIN — Medication 10 ML: at 12:34

## 2022-01-01 RX ADMIN — ANASTROZOLE 1 MG: 1 TABLET ORAL at 16:13

## 2022-01-01 RX ADMIN — FERROUS SULFATE TAB 325 MG (65 MG ELEMENTAL FE) 325 MG: 325 (65 FE) TAB at 10:55

## 2022-01-01 RX ADMIN — PANTOPRAZOLE SODIUM 40 MG: 40 TABLET, DELAYED RELEASE ORAL at 07:07

## 2022-01-01 RX ADMIN — LACTULOSE 30 G: 10 SOLUTION ORAL at 20:09

## 2022-01-01 RX ADMIN — EPHEDRINE SULFATE 10 MG: 50 INJECTION INTRAVENOUS at 07:41

## 2022-01-01 RX ADMIN — ENOXAPARIN SODIUM 30 MG: 30 INJECTION SUBCUTANEOUS at 08:08

## 2022-01-01 RX ADMIN — CHLORHEXIDINE GLUCONATE 15 ML: 1.2 RINSE ORAL at 20:25

## 2022-01-01 RX ADMIN — PANTOPRAZOLE SODIUM 40 MG: 40 TABLET, DELAYED RELEASE ORAL at 06:50

## 2022-01-01 RX ADMIN — GLYCOPYRROLATE 0.4 MG: 0.2 INJECTION INTRAMUSCULAR; INTRAVENOUS at 08:08

## 2022-01-01 RX ADMIN — CARVEDILOL 6.25 MG: 6.25 TABLET, FILM COATED ORAL at 08:23

## 2022-01-01 RX ADMIN — GADOBENATE DIMEGLUMINE 14 ML: 529 INJECTION, SOLUTION INTRAVENOUS at 19:44

## 2022-01-01 RX ADMIN — LACTULOSE 30 G: 10 SOLUTION ORAL at 16:06

## 2022-01-01 RX ADMIN — LORAZEPAM 1 MG: 2 INJECTION INTRAMUSCULAR; INTRAVENOUS at 03:03

## 2022-01-01 RX ADMIN — ATORVASTATIN CALCIUM 80 MG: 80 TABLET, FILM COATED ORAL at 21:51

## 2022-01-01 RX ADMIN — INSULIN LISPRO 2 UNITS: 100 INJECTION, SOLUTION INTRAVENOUS; SUBCUTANEOUS at 16:55

## 2022-01-01 RX ADMIN — ROSUVASTATIN CALCIUM 5 MG: 5 TABLET, FILM COATED ORAL at 20:02

## 2022-01-01 RX ADMIN — FENTANYL CITRATE 75 MCG: 0.05 INJECTION, SOLUTION INTRAMUSCULAR; INTRAVENOUS at 19:42

## 2022-01-01 RX ADMIN — FENTANYL CITRATE 50 MCG: 0.05 INJECTION, SOLUTION INTRAMUSCULAR; INTRAVENOUS at 08:59

## 2022-01-01 RX ADMIN — FENTANYL CITRATE 75 MCG: 0.05 INJECTION, SOLUTION INTRAMUSCULAR; INTRAVENOUS at 12:18

## 2022-01-01 RX ADMIN — DEXAMETHASONE SODIUM PHOSPHATE 6 MG: 10 INJECTION INTRAMUSCULAR; INTRAVENOUS at 06:57

## 2022-01-01 RX ADMIN — LACTULOSE 10 G: 10 SOLUTION ORAL at 08:09

## 2022-01-01 RX ADMIN — HYDROMORPHONE HYDROCHLORIDE 1 MG: 1 INJECTION, SOLUTION INTRAMUSCULAR; INTRAVENOUS; SUBCUTANEOUS at 17:01

## 2022-01-01 RX ADMIN — LAMOTRIGINE 200 MG: 100 TABLET ORAL at 09:13

## 2022-01-01 RX ADMIN — LAMOTRIGINE 300 MG: 100 TABLET ORAL at 20:12

## 2022-01-01 RX ADMIN — PROPOFOL 100 MG: 10 INJECTION, EMULSION INTRAVENOUS at 14:13

## 2022-01-01 RX ADMIN — LACTULOSE 30 G: 10 SOLUTION ORAL at 09:10

## 2022-01-01 RX ADMIN — Medication 1000 MCG: at 09:08

## 2022-01-01 RX ADMIN — INSULIN LISPRO 2 UNITS: 100 INJECTION, SOLUTION INTRAVENOUS; SUBCUTANEOUS at 17:11

## 2022-01-01 RX ADMIN — CLOTRIMAZOLE 10 MG: 10 LOZENGE ORAL at 17:12

## 2022-01-01 RX ADMIN — EPHEDRINE SULFATE 10 MG: 50 INJECTION INTRAMUSCULAR; INTRAVENOUS; SUBCUTANEOUS at 14:35

## 2022-01-01 RX ADMIN — SODIUM CHLORIDE 500 MG: 900 INJECTION INTRAVENOUS at 22:46

## 2022-01-01 RX ADMIN — ACETAMINOPHEN 650 MG: 325 TABLET, FILM COATED ORAL at 16:19

## 2022-01-01 RX ADMIN — CARVEDILOL 12.5 MG: 12.5 TABLET, FILM COATED ORAL at 21:12

## 2022-01-01 RX ADMIN — HYDROMORPHONE HYDROCHLORIDE 0.5 MG: 1 INJECTION, SOLUTION INTRAMUSCULAR; INTRAVENOUS; SUBCUTANEOUS at 08:30

## 2022-01-01 RX ADMIN — Medication 1000 MCG: at 16:01

## 2022-01-01 RX ADMIN — LACTULOSE 30 G: 10 SOLUTION ORAL at 20:34

## 2022-01-01 RX ADMIN — PAROXETINE HYDROCHLORIDE HEMIHYDRATE 40 MG: 20 TABLET, FILM COATED ORAL at 09:46

## 2022-01-01 RX ADMIN — ALBUMIN HUMAN 3000 ML: 0.25 SOLUTION INTRAVENOUS at 13:00

## 2022-01-01 RX ADMIN — HEPARIN SODIUM 3000 UNITS: 1000 INJECTION INTRAVENOUS; SUBCUTANEOUS at 21:49

## 2022-01-01 RX ADMIN — MIDAZOLAM 1 MG: 1 INJECTION INTRAMUSCULAR; INTRAVENOUS at 06:05

## 2022-01-01 RX ADMIN — PANTOPRAZOLE SODIUM 40 MG: 40 INJECTION, POWDER, FOR SOLUTION INTRAVENOUS at 08:57

## 2022-01-01 RX ADMIN — ASPIRIN 325 MG: 325 TABLET ORAL at 22:22

## 2022-01-01 RX ADMIN — LORAZEPAM 1 MG: 2 INJECTION INTRAMUSCULAR; INTRAVENOUS at 21:42

## 2022-01-01 RX ADMIN — ANTICOAGULANT CITRATE DEXTROSE SOLUTION FORMULA A 1000 ML: 12.25; 11; 3.65 SOLUTION INTRAVENOUS at 08:58

## 2022-01-01 RX ADMIN — INSULIN LISPRO 6 UNITS: 100 INJECTION, SOLUTION INTRAVENOUS; SUBCUTANEOUS at 18:31

## 2022-01-01 RX ADMIN — HYDROMORPHONE HYDROCHLORIDE 1 MG: 1 INJECTION, SOLUTION INTRAMUSCULAR; INTRAVENOUS; SUBCUTANEOUS at 00:50

## 2022-01-01 RX ADMIN — OXYCODONE AND ACETAMINOPHEN 1 TABLET: 325; 10 TABLET ORAL at 02:14

## 2022-01-01 RX ADMIN — ALBUMIN HUMAN 3000 ML: 0.25 SOLUTION INTRAVENOUS at 08:40

## 2022-01-01 RX ADMIN — FERROUS SULFATE TAB 325 MG (65 MG ELEMENTAL FE) 325 MG: 325 (65 FE) TAB at 08:39

## 2022-01-01 RX ADMIN — CARVEDILOL 6.25 MG: 6.25 TABLET, FILM COATED ORAL at 10:31

## 2022-01-01 RX ADMIN — ANASTROZOLE 1 MG: 1 TABLET ORAL at 09:13

## 2022-01-01 RX ADMIN — PANTOPRAZOLE SODIUM 40 MG: 40 TABLET, DELAYED RELEASE ORAL at 06:34

## 2022-01-01 RX ADMIN — INSULIN LISPRO 2 UNITS: 100 INJECTION, SOLUTION INTRAVENOUS; SUBCUTANEOUS at 06:57

## 2022-01-01 RX ADMIN — LAMOTRIGINE 200 MG: 100 TABLET ORAL at 08:23

## 2022-01-01 RX ADMIN — LAMOTRIGINE 200 MG: 100 TABLET ORAL at 20:54

## 2022-01-01 RX ADMIN — HYDROMORPHONE HYDROCHLORIDE 1 MG: 1 INJECTION, SOLUTION INTRAMUSCULAR; INTRAVENOUS; SUBCUTANEOUS at 13:03

## 2022-01-01 RX ADMIN — FENTANYL CITRATE 75 MCG: 0.05 INJECTION, SOLUTION INTRAMUSCULAR; INTRAVENOUS at 20:50

## 2022-01-01 RX ADMIN — MIDAZOLAM 1 MG: 1 INJECTION INTRAMUSCULAR; INTRAVENOUS at 06:15

## 2022-01-01 RX ADMIN — ALPRAZOLAM 0.5 MG: 0.5 TABLET ORAL at 00:21

## 2022-01-01 RX ADMIN — LAMOTRIGINE 300 MG: 100 TABLET ORAL at 20:34

## 2022-01-01 RX ADMIN — ROSUVASTATIN CALCIUM 5 MG: 5 TABLET, FILM COATED ORAL at 21:58

## 2022-01-01 RX ADMIN — FENTANYL CITRATE 75 MCG: 0.05 INJECTION, SOLUTION INTRAMUSCULAR; INTRAVENOUS at 11:28

## 2022-01-01 RX ADMIN — PANTOPRAZOLE SODIUM 40 MG: 40 INJECTION, POWDER, FOR SOLUTION INTRAVENOUS at 09:26

## 2022-01-01 RX ADMIN — PROPOFOL 100 MG: 10 INJECTION, EMULSION INTRAVENOUS at 06:55

## 2022-01-01 RX ADMIN — SODIUM BICARBONATE 150 MEQ/1,000 ML IN DEXTROSE 5 % INTRAVENOUS 150 MEQ: SOLUTION at 06:39

## 2022-01-01 RX ADMIN — LEVOTHYROXINE SODIUM 100 MCG: 0.1 TABLET ORAL at 08:09

## 2022-01-01 RX ADMIN — FENTANYL CITRATE 75 MCG: 0.05 INJECTION, SOLUTION INTRAMUSCULAR; INTRAVENOUS at 09:25

## 2022-01-01 RX ADMIN — ANASTROZOLE 1 MG: 1 TABLET ORAL at 16:11

## 2022-01-01 RX ADMIN — LAMOTRIGINE 300 MG: 100 TABLET ORAL at 20:22

## 2022-01-01 RX ADMIN — FENTANYL CITRATE 75 MCG: 0.05 INJECTION, SOLUTION INTRAMUSCULAR; INTRAVENOUS at 02:50

## 2022-01-01 RX ADMIN — HYDRALAZINE HYDROCHLORIDE 100 MG: 50 TABLET, FILM COATED ORAL at 22:46

## 2022-01-01 RX ADMIN — LACTULOSE 30 G: 10 SOLUTION ORAL at 22:46

## 2022-01-01 RX ADMIN — MUPIROCIN 1 APPLICATION: 20 OINTMENT TOPICAL at 06:01

## 2022-01-01 RX ADMIN — LEVOTHYROXINE SODIUM 100 MCG: 0.1 TABLET ORAL at 16:06

## 2022-01-01 RX ADMIN — QUETIAPINE FUMARATE 150 MG: 100 TABLET ORAL at 20:18

## 2022-01-01 RX ADMIN — ANASTROZOLE 1 MG: 1 TABLET ORAL at 09:46

## 2022-01-01 RX ADMIN — HEPARIN SODIUM 3000 UNITS: 1000 INJECTION INTRAVENOUS; SUBCUTANEOUS at 16:10

## 2022-01-01 RX ADMIN — GLYCOPYRROLATE 0.2 MG: 0.2 INJECTION INTRAMUSCULAR; INTRAVENOUS at 14:13

## 2022-01-01 RX ADMIN — SODIUM CHLORIDE 500 MG: 900 INJECTION INTRAVENOUS at 08:42

## 2022-01-01 RX ADMIN — ANASTROZOLE 1 MG: 1 TABLET ORAL at 09:10

## 2022-01-01 RX ADMIN — CHLORHEXIDINE GLUCONATE 15 ML: 1.2 RINSE ORAL at 20:51

## 2022-01-01 RX ADMIN — CEFTRIAXONE SODIUM 2 G: 2 INJECTION, POWDER, FOR SOLUTION INTRAMUSCULAR; INTRAVENOUS at 09:45

## 2022-01-01 RX ADMIN — HYDRALAZINE HYDROCHLORIDE 100 MG: 50 TABLET, FILM COATED ORAL at 20:54

## 2022-01-01 RX ADMIN — Medication 10 ML: at 08:34

## 2022-01-01 RX ADMIN — CEFTRIAXONE SODIUM 1 G: 1 INJECTION, POWDER, FOR SOLUTION INTRAMUSCULAR; INTRAVENOUS at 23:18

## 2022-01-01 RX ADMIN — CALCIUM GLUCONATE 4 G: 98 INJECTION, SOLUTION INTRAVENOUS at 21:11

## 2022-01-01 RX ADMIN — FERROUS SULFATE TAB 325 MG (65 MG ELEMENTAL FE) 325 MG: 325 (65 FE) TAB at 09:07

## 2022-01-01 RX ADMIN — PANTOPRAZOLE SODIUM 40 MG: 40 INJECTION, POWDER, FOR SOLUTION INTRAVENOUS at 16:02

## 2022-01-01 RX ADMIN — HYDRALAZINE HYDROCHLORIDE 100 MG: 50 TABLET, FILM COATED ORAL at 16:31

## 2022-01-01 RX ADMIN — Medication 1000 MCG: at 10:54

## 2022-01-01 RX ADMIN — PANTOPRAZOLE SODIUM 40 MG: 40 TABLET, DELAYED RELEASE ORAL at 13:43

## 2022-01-01 RX ADMIN — SODIUM CHLORIDE 500 MG: 900 INJECTION INTRAVENOUS at 09:47

## 2022-01-01 RX ADMIN — FERROUS SULFATE TAB 325 MG (65 MG ELEMENTAL FE) 325 MG: 325 (65 FE) TAB at 16:01

## 2022-01-01 RX ADMIN — ACETAMINOPHEN 650 MG: 325 TABLET, FILM COATED ORAL at 19:36

## 2022-01-01 RX ADMIN — CEFTRIAXONE SODIUM 1 G: 1 INJECTION, POWDER, FOR SOLUTION INTRAMUSCULAR; INTRAVENOUS at 10:31

## 2022-01-01 RX ADMIN — INSULIN LISPRO 2 UNITS: 100 INJECTION, SOLUTION INTRAVENOUS; SUBCUTANEOUS at 06:33

## 2022-01-01 RX ADMIN — CHLORHEXIDINE GLUCONATE 15 ML: 1.2 RINSE ORAL at 09:06

## 2022-01-01 RX ADMIN — Medication 10 ML: at 09:25

## 2022-01-01 RX ADMIN — LEVOTHYROXINE SODIUM 100 MCG: 0.1 TABLET ORAL at 09:13

## 2022-01-01 RX ADMIN — LAMOTRIGINE 300 MG: 100 TABLET ORAL at 21:04

## 2022-01-01 RX ADMIN — CEFTRIAXONE SODIUM 1 G: 1 INJECTION, POWDER, FOR SOLUTION INTRAMUSCULAR; INTRAVENOUS at 10:50

## 2022-01-01 RX ADMIN — FENTANYL CITRATE 75 MCG: 0.05 INJECTION, SOLUTION INTRAMUSCULAR; INTRAVENOUS at 09:55

## 2022-01-01 RX ADMIN — LEVOTHYROXINE SODIUM 100 MCG: 0.1 TABLET ORAL at 08:40

## 2022-01-01 RX ADMIN — FENTANYL CITRATE 75 MCG: 0.05 INJECTION, SOLUTION INTRAMUSCULAR; INTRAVENOUS at 08:29

## 2022-01-01 RX ADMIN — SODIUM CHLORIDE 500 MG: 900 INJECTION INTRAVENOUS at 10:43

## 2022-01-01 RX ADMIN — HYDRALAZINE HYDROCHLORIDE 100 MG: 50 TABLET, FILM COATED ORAL at 16:44

## 2022-01-01 RX ADMIN — LEVOTHYROXINE SODIUM 100 MCG: 0.1 TABLET ORAL at 08:57

## 2022-01-01 RX ADMIN — LEVOTHYROXINE SODIUM 100 MCG: 0.1 TABLET ORAL at 10:55

## 2022-01-01 RX ADMIN — INSULIN LISPRO 2 UNITS: 100 INJECTION, SOLUTION INTRAVENOUS; SUBCUTANEOUS at 17:32

## 2022-01-01 RX ADMIN — ANASTROZOLE 1 MG: 1 TABLET ORAL at 10:37

## 2022-01-01 RX ADMIN — TETANUS TOXOID, REDUCED DIPHTHERIA TOXOID AND ACELLULAR PERTUSSIS VACCINE, ADSORBED 0.5 ML: 5; 2.5; 8; 8; 2.5 SUSPENSION INTRAMUSCULAR at 08:37

## 2022-01-01 RX ADMIN — INSULIN LISPRO 6 UNITS: 100 INJECTION, SOLUTION INTRAVENOUS; SUBCUTANEOUS at 19:02

## 2022-01-01 RX ADMIN — ASPIRIN 325 MG: 325 TABLET ORAL at 08:34

## 2022-01-01 RX ADMIN — PAROXETINE HYDROCHLORIDE HEMIHYDRATE 40 MG: 20 TABLET, FILM COATED ORAL at 08:23

## 2022-01-01 RX ADMIN — DEXMEDETOMIDINE HYDROCHLORIDE 0.2 MCG/KG/HR: 4 INJECTION, SOLUTION INTRAVENOUS at 10:58

## 2022-01-01 RX ADMIN — METFORMIN HYDROCHLORIDE 500 MG: 500 TABLET ORAL at 18:04

## 2022-01-01 RX ADMIN — LEVOTHYROXINE SODIUM 100 MCG: 0.1 TABLET ORAL at 12:12

## 2022-01-01 RX ADMIN — LORAZEPAM 0.5 MG: 2 INJECTION INTRAMUSCULAR; INTRAVENOUS at 06:11

## 2022-01-01 RX ADMIN — FENTANYL CITRATE 75 MCG: 0.05 INJECTION, SOLUTION INTRAMUSCULAR; INTRAVENOUS at 03:10

## 2022-01-01 RX ADMIN — LIDOCAINE HYDROCHLORIDE 60 MG: 20 INJECTION, SOLUTION INFILTRATION; PERINEURAL at 06:55

## 2022-01-01 RX ADMIN — LAMOTRIGINE 300 MG: 100 TABLET ORAL at 22:46

## 2022-01-01 RX ADMIN — INSULIN LISPRO 2 UNITS: 100 INJECTION, SOLUTION INTRAVENOUS; SUBCUTANEOUS at 01:29

## 2022-01-01 RX ADMIN — LACTULOSE 10 G: 10 SOLUTION ORAL at 21:23

## 2022-01-01 RX ADMIN — PAROXETINE HYDROCHLORIDE HEMIHYDRATE 40 MG: 20 TABLET, FILM COATED ORAL at 08:09

## 2022-01-01 RX ADMIN — CARVEDILOL 6.25 MG: 6.25 TABLET, FILM COATED ORAL at 08:39

## 2022-01-01 RX ADMIN — HYDRALAZINE HYDROCHLORIDE 100 MG: 50 TABLET, FILM COATED ORAL at 21:04

## 2022-01-01 RX ADMIN — ANTICOAGULANT CITRATE DEXTROSE SOLUTION FORMULA A 1000 ML: 12.25; 11; 3.65 SOLUTION INTRAVENOUS at 10:35

## 2022-01-01 RX ADMIN — Medication 1000 MCG: at 09:46

## 2022-01-01 RX ADMIN — PANTOPRAZOLE SODIUM 40 MG: 40 INJECTION, POWDER, FOR SOLUTION INTRAVENOUS at 08:28

## 2022-01-01 RX ADMIN — LAMOTRIGINE 300 MG: 100 TABLET ORAL at 20:02

## 2022-01-01 RX ADMIN — Medication 1000 MCG: at 15:31

## 2022-01-01 RX ADMIN — HYDROMORPHONE HYDROCHLORIDE 0.25 MG: 10 INJECTION INTRAMUSCULAR; INTRAVENOUS; SUBCUTANEOUS at 02:36

## 2022-01-01 RX ADMIN — DEXAMETHASONE SODIUM PHOSPHATE 4 MG: 4 INJECTION, SOLUTION INTRAMUSCULAR; INTRAVENOUS at 06:32

## 2022-01-01 RX ADMIN — CYANOCOBALAMIN 1000 MCG: 1000 INJECTION, SOLUTION INTRAMUSCULAR; SUBCUTANEOUS at 23:11

## 2022-01-01 RX ADMIN — FERROUS SULFATE TAB 325 MG (65 MG ELEMENTAL FE) 325 MG: 325 (65 FE) TAB at 08:26

## 2022-01-01 RX ADMIN — FENTANYL CITRATE 50 MCG: 0.05 INJECTION, SOLUTION INTRAMUSCULAR; INTRAVENOUS at 07:45

## 2022-01-01 RX ADMIN — HYDROMORPHONE HYDROCHLORIDE 1 MG: 1 INJECTION, SOLUTION INTRAMUSCULAR; INTRAVENOUS; SUBCUTANEOUS at 17:41

## 2022-01-01 RX ADMIN — METHYLPREDNISOLONE SODIUM SUCCINATE 125 MG: 125 INJECTION, POWDER, FOR SOLUTION INTRAMUSCULAR; INTRAVENOUS at 19:29

## 2022-01-01 RX ADMIN — CALCIUM GLUCONATE 4 G: 98 INJECTION, SOLUTION INTRAVENOUS at 06:50

## 2022-01-01 RX ADMIN — QUETIAPINE FUMARATE 150 MG: 100 TABLET ORAL at 21:04

## 2022-01-01 RX ADMIN — SODIUM CHLORIDE 30 ML/HR: 9 INJECTION, SOLUTION INTRAVENOUS at 13:52

## 2022-01-01 RX ADMIN — INSULIN LISPRO 4 UNITS: 100 INJECTION, SOLUTION INTRAVENOUS; SUBCUTANEOUS at 18:41

## 2022-01-01 RX ADMIN — CARVEDILOL 6.25 MG: 6.25 TABLET, FILM COATED ORAL at 21:56

## 2022-01-01 RX ADMIN — LACTULOSE 10 G: 10 SOLUTION ORAL at 13:35

## 2022-01-01 RX ADMIN — FENTANYL CITRATE 75 MCG: 0.05 INJECTION, SOLUTION INTRAMUSCULAR; INTRAVENOUS at 18:12

## 2022-01-01 RX ADMIN — VANCOMYCIN HYDROCHLORIDE 1000 MG: 1 INJECTION, SOLUTION INTRAVENOUS at 06:37

## 2022-01-01 RX ADMIN — LORAZEPAM 1 MG: 2 INJECTION INTRAMUSCULAR; INTRAVENOUS at 11:24

## 2022-01-01 RX ADMIN — PANTOPRAZOLE SODIUM 40 MG: 40 INJECTION, POWDER, FOR SOLUTION INTRAVENOUS at 09:07

## 2022-01-01 RX ADMIN — LAMOTRIGINE 200 MG: 100 TABLET ORAL at 09:09

## 2022-01-01 RX ADMIN — INSULIN LISPRO 2 UNITS: 100 INJECTION, SOLUTION INTRAVENOUS; SUBCUTANEOUS at 18:30

## 2022-01-01 RX ADMIN — LAMOTRIGINE 300 MG: 100 TABLET ORAL at 20:08

## 2022-01-01 RX ADMIN — ROSUVASTATIN CALCIUM 5 MG: 5 TABLET, FILM COATED ORAL at 20:51

## 2022-01-01 RX ADMIN — LAMOTRIGINE 25 MG: 25 TABLET ORAL at 13:38

## 2022-01-01 RX ADMIN — LEVOTHYROXINE SODIUM 100 MCG: 0.1 TABLET ORAL at 08:23

## 2022-01-01 RX ADMIN — LAMOTRIGINE 300 MG: 100 TABLET ORAL at 20:29

## 2022-01-01 RX ADMIN — LACTULOSE 10 G: 10 SOLUTION ORAL at 17:14

## 2022-01-01 RX ADMIN — LACTULOSE 30 G: 10 SOLUTION ORAL at 09:17

## 2022-01-01 RX ADMIN — ANASTROZOLE 1 MG: 1 TABLET ORAL at 08:23

## 2022-01-01 RX ADMIN — PANTOPRAZOLE SODIUM 40 MG: 40 TABLET, DELAYED RELEASE ORAL at 06:14

## 2022-01-01 RX ADMIN — LORAZEPAM 1 MG: 2 INJECTION INTRAMUSCULAR; INTRAVENOUS at 03:09

## 2022-01-01 RX ADMIN — HYDRALAZINE HYDROCHLORIDE 10 MG: 20 INJECTION, SOLUTION INTRAMUSCULAR; INTRAVENOUS at 08:28

## 2022-01-01 RX ADMIN — LEVOTHYROXINE SODIUM 100 MCG: 0.1 TABLET ORAL at 09:46

## 2022-01-01 RX ADMIN — PHENYLEPHRINE HYDROCHLORIDE 100 MCG: 10 INJECTION, SOLUTION INTRAVENOUS at 06:58

## 2022-01-01 RX ADMIN — SODIUM BICARBONATE 150 MEQ/1,000 ML IN DEXTROSE 5 % INTRAVENOUS 150 MEQ: SOLUTION at 13:56

## 2022-01-01 RX ADMIN — PAROXETINE HYDROCHLORIDE HEMIHYDRATE 40 MG: 20 TABLET, FILM COATED ORAL at 10:47

## 2022-01-01 RX ADMIN — METRONIDAZOLE 500 MG: 500 INJECTION, SOLUTION INTRAVENOUS at 02:35

## 2022-01-01 RX ADMIN — LORAZEPAM 1 MG: 2 INJECTION INTRAMUSCULAR; INTRAVENOUS at 13:02

## 2022-01-01 RX ADMIN — CEFTRIAXONE SODIUM 2 G: 2 INJECTION, POWDER, FOR SOLUTION INTRAMUSCULAR; INTRAVENOUS at 10:27

## 2022-01-01 RX ADMIN — INSULIN LISPRO 4 UNITS: 100 INJECTION, SOLUTION INTRAVENOUS; SUBCUTANEOUS at 17:15

## 2022-01-01 RX ADMIN — LAMOTRIGINE 200 MG: 100 TABLET ORAL at 12:12

## 2022-01-01 RX ADMIN — INSULIN LISPRO 4 UNITS: 100 INJECTION, SOLUTION INTRAVENOUS; SUBCUTANEOUS at 13:36

## 2022-01-01 RX ADMIN — SODIUM BICARBONATE 75 MEQ: 84 INJECTION, SOLUTION INTRAVENOUS at 22:38

## 2022-01-01 RX ADMIN — ANASTROZOLE 1 MG: 1 TABLET ORAL at 13:38

## 2022-01-01 RX ADMIN — SODIUM CHLORIDE, POTASSIUM CHLORIDE, SODIUM LACTATE AND CALCIUM CHLORIDE 100 ML/HR: 600; 310; 30; 20 INJECTION, SOLUTION INTRAVENOUS at 12:07

## 2022-01-01 RX ADMIN — METHYLPREDNISOLONE SODIUM SUCCINATE 60 MG: 125 INJECTION, POWDER, FOR SOLUTION INTRAMUSCULAR; INTRAVENOUS at 17:11

## 2022-01-01 RX ADMIN — PANTOPRAZOLE SODIUM 40 MG: 40 INJECTION, POWDER, FOR SOLUTION INTRAVENOUS at 10:55

## 2022-01-01 RX ADMIN — EPHEDRINE SULFATE 10 MG: 50 INJECTION INTRAMUSCULAR; INTRAVENOUS; SUBCUTANEOUS at 14:28

## 2022-01-01 RX ADMIN — ONDANSETRON 4 MG: 2 INJECTION INTRAMUSCULAR; INTRAVENOUS at 08:08

## 2022-01-01 RX ADMIN — IOPAMIDOL 70 ML: 755 INJECTION, SOLUTION INTRAVENOUS at 18:09

## 2022-01-01 RX ADMIN — LORAZEPAM 1 MG: 2 INJECTION INTRAMUSCULAR; INTRAVENOUS at 06:49

## 2022-01-01 RX ADMIN — LIDOCAINE HYDROCHLORIDE 10 ML: 10 INJECTION, SOLUTION INFILTRATION; PERINEURAL at 11:32

## 2022-01-01 RX ADMIN — CHLORHEXIDINE GLUCONATE 15 ML: 1.2 RINSE ORAL at 08:58

## 2022-01-01 RX ADMIN — PROPOFOL INJECTABLE EMULSION 45 MCG/KG/MIN: 10 INJECTION, EMULSION INTRAVENOUS at 11:34

## 2022-01-01 RX ADMIN — FENTANYL CITRATE 75 MCG: 0.05 INJECTION, SOLUTION INTRAMUSCULAR; INTRAVENOUS at 19:44

## 2022-01-01 RX ADMIN — CARVEDILOL 6.25 MG: 6.25 TABLET, FILM COATED ORAL at 08:27

## 2022-01-01 RX ADMIN — ROSUVASTATIN CALCIUM 5 MG: 5 TABLET, FILM COATED ORAL at 20:12

## 2022-01-01 RX ADMIN — LEVOTHYROXINE SODIUM 100 MCG: 0.1 TABLET ORAL at 09:09

## 2022-01-01 RX ADMIN — INSULIN LISPRO 2 UNITS: 100 INJECTION, SOLUTION INTRAVENOUS; SUBCUTANEOUS at 08:23

## 2022-01-01 RX ADMIN — Medication 10 ML: at 01:40

## 2022-01-01 RX ADMIN — ATORVASTATIN CALCIUM 80 MG: 80 TABLET, FILM COATED ORAL at 21:12

## 2022-01-01 RX ADMIN — PAROXETINE HYDROCHLORIDE HEMIHYDRATE 40 MG: 20 TABLET, FILM COATED ORAL at 09:11

## 2022-01-01 RX ADMIN — Medication 1 TABLET: at 13:39

## 2022-01-01 RX ADMIN — CHLORHEXIDINE GLUCONATE 15 ML: 1.2 RINSE ORAL at 08:36

## 2022-01-01 RX ADMIN — AMLODIPINE BESYLATE 7.5 MG: 5 TABLET ORAL at 12:33

## 2022-01-01 RX ADMIN — ALBUMIN HUMAN 3000 ML: 0.25 SOLUTION INTRAVENOUS at 10:35

## 2022-01-01 RX ADMIN — HYDROMORPHONE HYDROCHLORIDE 1 MG: 1 INJECTION, SOLUTION INTRAMUSCULAR; INTRAVENOUS; SUBCUTANEOUS at 04:48

## 2022-01-01 RX ADMIN — FERROUS SULFATE TAB 325 MG (65 MG ELEMENTAL FE) 325 MG: 325 (65 FE) TAB at 09:23

## 2022-01-01 RX ADMIN — CALCIUM GLUCONATE 4 G: 98 INJECTION, SOLUTION INTRAVENOUS at 13:00

## 2022-01-01 RX ADMIN — HYDRALAZINE HYDROCHLORIDE 100 MG: 50 TABLET, FILM COATED ORAL at 09:17

## 2022-01-01 RX ADMIN — HYDRALAZINE HYDROCHLORIDE 100 MG: 50 TABLET, FILM COATED ORAL at 16:13

## 2022-01-01 RX ADMIN — ALBUMIN HUMAN 3000 ML: 0.25 SOLUTION INTRAVENOUS at 21:14

## 2022-01-01 RX ADMIN — FERROUS SULFATE TAB 325 MG (65 MG ELEMENTAL FE) 325 MG: 325 (65 FE) TAB at 09:46

## 2022-01-01 RX ADMIN — EPHEDRINE SULFATE 10 MG: 50 INJECTION INTRAVENOUS at 08:05

## 2022-01-01 RX ADMIN — LORAZEPAM 1 MG: 2 INJECTION INTRAMUSCULAR; INTRAVENOUS at 11:50

## 2022-01-01 RX ADMIN — PAROXETINE HYDROCHLORIDE HEMIHYDRATE 40 MG: 20 TABLET, FILM COATED ORAL at 16:02

## 2022-01-01 RX ADMIN — ACETAMINOPHEN 650 MG: 325 TABLET, FILM COATED ORAL at 16:25

## 2022-01-01 RX ADMIN — LORAZEPAM 1 MG: 2 INJECTION INTRAMUSCULAR; INTRAVENOUS at 17:40

## 2022-01-01 RX ADMIN — CEFTRIAXONE SODIUM 2 G: 2 INJECTION, POWDER, FOR SOLUTION INTRAMUSCULAR; INTRAVENOUS at 11:13

## 2022-01-01 RX ADMIN — Medication 1000 MCG: at 09:22

## 2022-01-01 RX ADMIN — ANTICOAGULANT CITRATE DEXTROSE SOLUTION FORMULA A 1000 ML: 12.25; 11; 3.65 SOLUTION INTRAVENOUS at 13:28

## 2022-01-01 RX ADMIN — Medication 1000 MCG: at 09:13

## 2022-01-01 RX ADMIN — LAMOTRIGINE 200 MG: 100 TABLET ORAL at 08:57

## 2022-01-01 RX ADMIN — AMLODIPINE BESYLATE 7.5 MG: 5 TABLET ORAL at 20:54

## 2022-01-01 RX ADMIN — CARVEDILOL 6.25 MG: 6.25 TABLET, FILM COATED ORAL at 09:13

## 2022-01-01 RX ADMIN — ANASTROZOLE 1 MG: 1 TABLET ORAL at 12:32

## 2022-01-01 RX ADMIN — LORAZEPAM 1 MG: 2 INJECTION INTRAMUSCULAR; INTRAVENOUS at 06:51

## 2022-01-01 RX ADMIN — HYDRALAZINE HYDROCHLORIDE 100 MG: 50 TABLET, FILM COATED ORAL at 15:34

## 2022-01-01 RX ADMIN — CARVEDILOL 6.25 MG: 6.25 TABLET, FILM COATED ORAL at 20:12

## 2022-01-01 RX ADMIN — INSULIN LISPRO 4 UNITS: 100 INJECTION, SOLUTION INTRAVENOUS; SUBCUTANEOUS at 09:04

## 2022-01-01 RX ADMIN — LACTULOSE 30 G: 10 SOLUTION ORAL at 15:33

## 2022-01-01 RX ADMIN — METHYLPREDNISOLONE SODIUM SUCCINATE 60 MG: 125 INJECTION, POWDER, FOR SOLUTION INTRAMUSCULAR; INTRAVENOUS at 20:25

## 2022-01-01 RX ADMIN — CHLORHEXIDINE GLUCONATE 15 ML: 1.2 RINSE ORAL at 10:02

## 2022-01-01 RX ADMIN — LAMOTRIGINE 200 MG: 100 TABLET ORAL at 10:57

## 2022-01-01 RX ADMIN — CEFTRIAXONE SODIUM 2 G: 2 INJECTION, POWDER, FOR SOLUTION INTRAMUSCULAR; INTRAVENOUS at 11:37

## 2022-01-01 RX ADMIN — VANCOMYCIN HYDROCHLORIDE 1000 MG: 1 INJECTION, SOLUTION INTRAVENOUS at 03:49

## 2022-01-01 RX ADMIN — QUETIAPINE FUMARATE 150 MG: 100 TABLET ORAL at 20:09

## 2022-01-01 RX ADMIN — OXYCODONE AND ACETAMINOPHEN 1 TABLET: 325; 10 TABLET ORAL at 07:01

## 2022-01-01 RX ADMIN — ALPRAZOLAM 0.5 MG: 0.5 TABLET ORAL at 03:43

## 2022-01-01 RX ADMIN — LORAZEPAM 1 MG: 2 INJECTION INTRAMUSCULAR; INTRAVENOUS at 22:29

## 2022-01-01 RX ADMIN — INSULIN LISPRO 2 UNITS: 100 INJECTION, SOLUTION INTRAVENOUS; SUBCUTANEOUS at 08:26

## 2022-01-01 RX ADMIN — ANASTROZOLE 1 MG: 1 TABLET ORAL at 08:08

## 2022-01-01 RX ADMIN — INSULIN LISPRO 4 UNITS: 100 INJECTION, SOLUTION INTRAVENOUS; SUBCUTANEOUS at 10:49

## 2022-01-01 RX ADMIN — HYDRALAZINE HYDROCHLORIDE 100 MG: 50 TABLET, FILM COATED ORAL at 09:13

## 2022-01-01 RX ADMIN — HYDRALAZINE HYDROCHLORIDE 100 MG: 50 TABLET, FILM COATED ORAL at 09:46

## 2022-01-01 RX ADMIN — CLOTRIMAZOLE 10 MG: 10 LOZENGE ORAL at 21:26

## 2022-01-01 RX ADMIN — INSULIN LISPRO 2 UNITS: 100 INJECTION, SOLUTION INTRAVENOUS; SUBCUTANEOUS at 21:51

## 2022-01-01 RX ADMIN — AMLODIPINE BESYLATE 7.5 MG: 2.5 TABLET ORAL at 00:17

## 2022-01-01 RX ADMIN — HYDRALAZINE HYDROCHLORIDE 10 MG: 20 INJECTION, SOLUTION INTRAMUSCULAR; INTRAVENOUS at 09:52

## 2022-01-01 RX ADMIN — INSULIN LISPRO 4 UNITS: 100 INJECTION, SOLUTION INTRAVENOUS; SUBCUTANEOUS at 18:49

## 2022-01-01 RX ADMIN — CALCIUM GLUCONATE 4 G: 98 INJECTION, SOLUTION INTRAVENOUS at 08:40

## 2022-01-01 RX ADMIN — Medication 1000 MCG: at 08:23

## 2022-01-01 RX ADMIN — LACTULOSE 30 G: 10 SOLUTION ORAL at 10:55

## 2022-01-01 RX ADMIN — LAMOTRIGINE 200 MG: 100 TABLET ORAL at 03:09

## 2022-01-01 RX ADMIN — HYDRALAZINE HYDROCHLORIDE 100 MG: 50 TABLET, FILM COATED ORAL at 20:29

## 2022-01-01 RX ADMIN — CARVEDILOL 6.25 MG: 6.25 TABLET, FILM COATED ORAL at 20:35

## 2022-01-01 RX ADMIN — HEPARIN SODIUM 3000 UNITS: 1000 INJECTION INTRAVENOUS; SUBCUTANEOUS at 22:41

## 2022-01-01 RX ADMIN — ROSUVASTATIN CALCIUM 5 MG: 5 TABLET, FILM COATED ORAL at 22:50

## 2022-01-01 RX ADMIN — SODIUM CHLORIDE 500 MG: 900 INJECTION INTRAVENOUS at 09:07

## 2022-01-01 RX ADMIN — HYDROMORPHONE HYDROCHLORIDE 1 MG: 1 INJECTION, SOLUTION INTRAMUSCULAR; INTRAVENOUS; SUBCUTANEOUS at 07:25

## 2022-01-01 RX ADMIN — Medication 300 MG: at 09:10

## 2022-01-01 RX ADMIN — FENTANYL CITRATE 75 MCG: 0.05 INJECTION, SOLUTION INTRAMUSCULAR; INTRAVENOUS at 06:05

## 2022-01-01 RX ADMIN — LORAZEPAM 1 MG: 2 INJECTION INTRAMUSCULAR; INTRAVENOUS at 00:46

## 2022-01-01 RX ADMIN — ANASTROZOLE 1 MG: 1 TABLET ORAL at 08:27

## 2022-01-01 RX ADMIN — HYDRALAZINE HYDROCHLORIDE 100 MG: 50 TABLET, FILM COATED ORAL at 08:38

## 2022-01-01 RX ADMIN — PANTOPRAZOLE SODIUM 40 MG: 40 TABLET, DELAYED RELEASE ORAL at 07:01

## 2022-01-01 RX ADMIN — PAROXETINE HYDROCHLORIDE HEMIHYDRATE 40 MG: 20 TABLET, FILM COATED ORAL at 13:38

## 2022-01-01 RX ADMIN — METHYLPREDNISOLONE SODIUM SUCCINATE 60 MG: 125 INJECTION, POWDER, FOR SOLUTION INTRAMUSCULAR; INTRAVENOUS at 02:35

## 2022-01-01 RX ADMIN — HEPARIN SODIUM 3000 UNITS: 1000 INJECTION INTRAVENOUS; SUBCUTANEOUS at 08:27

## 2022-01-01 RX ADMIN — SODIUM CHLORIDE, POTASSIUM CHLORIDE, SODIUM LACTATE AND CALCIUM CHLORIDE: 600; 310; 30; 20 INJECTION, SOLUTION INTRAVENOUS at 08:24

## 2022-01-01 RX ADMIN — LACTULOSE 30 G: 10 SOLUTION ORAL at 20:50

## 2022-01-01 RX ADMIN — AMLODIPINE BESYLATE 5 MG: 2.5 TABLET ORAL at 08:07

## 2022-01-01 RX ADMIN — HYDROMORPHONE HYDROCHLORIDE 1 MG: 1 INJECTION, SOLUTION INTRAMUSCULAR; INTRAVENOUS; SUBCUTANEOUS at 04:36

## 2022-01-01 RX ADMIN — HYDROMORPHONE HYDROCHLORIDE 0.5 MG: 1 INJECTION, SOLUTION INTRAMUSCULAR; INTRAVENOUS; SUBCUTANEOUS at 05:12

## 2022-01-01 RX ADMIN — INSULIN LISPRO 4 UNITS: 100 INJECTION, SOLUTION INTRAVENOUS; SUBCUTANEOUS at 12:14

## 2022-01-01 RX ADMIN — INSULIN LISPRO 2 UNITS: 100 INJECTION, SOLUTION INTRAVENOUS; SUBCUTANEOUS at 09:05

## 2022-01-01 RX ADMIN — SODIUM CHLORIDE 75 ML/HR: 9 INJECTION, SOLUTION INTRAVENOUS at 03:14

## 2022-01-01 RX ADMIN — ROCURONIUM BROMIDE 40 MG: 50 INJECTION INTRAVENOUS at 06:55

## 2022-01-01 RX ADMIN — LIDOCAINE HYDROCHLORIDE 40 ML: 10 INJECTION, SOLUTION INFILTRATION; PERINEURAL at 14:13

## 2022-01-01 RX ADMIN — INSULIN LISPRO 6 UNITS: 100 INJECTION, SOLUTION INTRAVENOUS; SUBCUTANEOUS at 06:36

## 2022-01-01 RX ADMIN — HYDRALAZINE HYDROCHLORIDE 100 MG: 50 TABLET, FILM COATED ORAL at 12:12

## 2022-01-01 RX ADMIN — QUETIAPINE FUMARATE 150 MG: 100 TABLET ORAL at 21:56

## 2022-01-01 RX ADMIN — HYDRALAZINE HYDROCHLORIDE 100 MG: 50 TABLET, FILM COATED ORAL at 20:33

## 2022-01-01 RX ADMIN — SODIUM BICARBONATE 75 MEQ: 84 INJECTION, SOLUTION INTRAVENOUS at 11:36

## 2022-01-01 RX ADMIN — BUPIVACAINE HYDROCHLORIDE AND EPINEPHRINE BITARTRATE 15 ML: 2.5; .005 INJECTION, SOLUTION EPIDURAL; INFILTRATION; INTRACAUDAL; PERINEURAL at 06:32

## 2022-01-01 RX ADMIN — INSULIN LISPRO 4 UNITS: 100 INJECTION, SOLUTION INTRAVENOUS; SUBCUTANEOUS at 07:05

## 2022-01-01 RX ADMIN — CARVEDILOL 12.5 MG: 12.5 TABLET, FILM COATED ORAL at 21:22

## 2022-01-01 RX ADMIN — FENTANYL CITRATE 50 MCG: 0.05 INJECTION, SOLUTION INTRAMUSCULAR; INTRAVENOUS at 15:08

## 2022-01-01 RX ADMIN — CHLORHEXIDINE GLUCONATE 15 ML: 1.2 RINSE ORAL at 20:23

## 2022-01-01 RX ADMIN — LEVOTHYROXINE SODIUM 100 MCG: 0.1 TABLET ORAL at 08:27

## 2022-01-01 RX ADMIN — ROSUVASTATIN CALCIUM 5 MG: 5 TABLET, FILM COATED ORAL at 20:35

## 2022-01-01 RX ADMIN — PAROXETINE HYDROCHLORIDE HEMIHYDRATE 40 MG: 20 TABLET, FILM COATED ORAL at 12:12

## 2022-01-01 RX ADMIN — METRONIDAZOLE 500 MG: 500 INJECTION, SOLUTION INTRAVENOUS at 10:20

## 2022-01-01 RX ADMIN — PAROXETINE HYDROCHLORIDE HEMIHYDRATE 40 MG: 20 TABLET, FILM COATED ORAL at 09:13

## 2022-01-01 RX ADMIN — LAMOTRIGINE 200 MG: 100 TABLET ORAL at 09:46

## 2022-01-01 RX ADMIN — HYDRALAZINE HYDROCHLORIDE 100 MG: 50 TABLET, FILM COATED ORAL at 16:49

## 2022-01-01 RX ADMIN — CARVEDILOL 6.25 MG: 6.25 TABLET, FILM COATED ORAL at 21:04

## 2022-01-01 RX ADMIN — ANASTROZOLE 1 MG: 1 TABLET ORAL at 12:12

## 2022-01-01 RX ADMIN — CHLORHEXIDINE GLUCONATE 15 ML: 1.2 RINSE ORAL at 09:10

## 2022-01-01 RX ADMIN — SODIUM CHLORIDE 500 MG: 900 INJECTION INTRAVENOUS at 09:09

## 2022-01-01 RX ADMIN — FENTANYL CITRATE 75 MCG: 0.05 INJECTION, SOLUTION INTRAMUSCULAR; INTRAVENOUS at 00:41

## 2022-01-01 RX ADMIN — FENTANYL CITRATE 50 MCG: 0.05 INJECTION, SOLUTION INTRAMUSCULAR; INTRAVENOUS at 11:52

## 2022-01-01 RX ADMIN — PANTOPRAZOLE SODIUM 40 MG: 40 TABLET, DELAYED RELEASE ORAL at 06:16

## 2022-01-01 RX ADMIN — ACETAMINOPHEN 650 MG: 325 TABLET, FILM COATED ORAL at 13:24

## 2022-01-01 RX ADMIN — METHYLPREDNISOLONE SODIUM SUCCINATE 125 MG: 125 INJECTION, POWDER, FOR SOLUTION INTRAMUSCULAR; INTRAVENOUS at 11:24

## 2022-01-01 RX ADMIN — FENTANYL CITRATE 75 MCG: 0.05 INJECTION, SOLUTION INTRAMUSCULAR; INTRAVENOUS at 17:10

## 2022-01-01 RX ADMIN — Medication 1000 MCG: at 08:27

## 2022-01-01 RX ADMIN — LAMOTRIGINE 200 MG: 100 TABLET ORAL at 16:01

## 2022-01-01 RX ADMIN — ROSUVASTATIN CALCIUM 5 MG: 5 TABLET, FILM COATED ORAL at 20:23

## 2022-01-01 RX ADMIN — SODIUM CHLORIDE 50 ML/HR: 9 INJECTION, SOLUTION INTRAVENOUS at 16:40

## 2022-01-01 RX ADMIN — ACETAMINOPHEN 650 MG: 325 TABLET, FILM COATED ORAL at 21:51

## 2022-01-01 RX ADMIN — LEVOTHYROXINE SODIUM 100 MCG: 0.1 TABLET ORAL at 09:22

## 2022-01-01 RX ADMIN — FENTANYL CITRATE 75 MCG: 0.05 INJECTION, SOLUTION INTRAMUSCULAR; INTRAVENOUS at 19:02

## 2022-01-01 RX ADMIN — LAMOTRIGINE 300 MG: 100 TABLET ORAL at 20:51

## 2022-01-01 RX ADMIN — VANCOMYCIN HYDROCHLORIDE 500 MG: 500 INJECTION, POWDER, LYOPHILIZED, FOR SOLUTION INTRAVENOUS at 00:05

## 2022-01-01 RX ADMIN — EPHEDRINE SULFATE 5 MG: 50 INJECTION INTRAVENOUS at 07:47

## 2022-01-01 RX ADMIN — SODIUM BICARBONATE 150 MEQ/1,000 ML IN DEXTROSE 5 % INTRAVENOUS 150 MEQ: SOLUTION at 05:00

## 2022-01-01 RX ADMIN — HYDRALAZINE HYDROCHLORIDE 100 MG: 50 TABLET, FILM COATED ORAL at 21:56

## 2022-01-01 RX ADMIN — Medication 0.02 MCG/KG/MIN: at 12:16

## 2022-01-01 RX ADMIN — HYDROCODONE BITARTRATE AND ACETAMINOPHEN 1 TABLET: 7.5; 325 TABLET ORAL at 11:31

## 2022-01-01 RX ADMIN — EPHEDRINE SULFATE 5 MG: 50 INJECTION INTRAVENOUS at 07:32

## 2022-01-01 RX ADMIN — LEVOTHYROXINE SODIUM 100 MCG: 0.1 TABLET ORAL at 09:10

## 2022-01-01 RX ADMIN — ASPIRIN 325 MG: 325 TABLET ORAL at 09:25

## 2022-01-01 RX ADMIN — LACTULOSE 30 G: 10 SOLUTION ORAL at 20:22

## 2022-01-01 RX ADMIN — FENTANYL CITRATE 75 MCG: 0.05 INJECTION, SOLUTION INTRAMUSCULAR; INTRAVENOUS at 12:30

## 2022-01-01 RX ADMIN — FENTANYL CITRATE 75 MCG: 0.05 INJECTION, SOLUTION INTRAMUSCULAR; INTRAVENOUS at 21:18

## 2022-01-01 RX ADMIN — Medication 1000 MCG: at 09:01

## 2022-01-01 RX ADMIN — LACTULOSE 10 G: 10 SOLUTION ORAL at 09:24

## 2022-01-01 RX ADMIN — OXYCODONE AND ACETAMINOPHEN 1 TABLET: 325; 10 TABLET ORAL at 13:55

## 2022-01-01 RX ADMIN — TECHNETIUM TC 99M SESTAMIBI 1 DOSE: 1 INJECTION INTRAVENOUS at 08:35

## 2022-01-01 RX ADMIN — FENTANYL CITRATE 75 MCG: 0.05 INJECTION, SOLUTION INTRAMUSCULAR; INTRAVENOUS at 02:54

## 2022-01-01 RX ADMIN — PAROXETINE HYDROCHLORIDE HEMIHYDRATE 40 MG: 20 TABLET, FILM COATED ORAL at 08:42

## 2022-01-01 RX ADMIN — ANTICOAGULANT CITRATE DEXTROSE SOLUTION FORMULA A 1000 ML: 12.25; 11; 3.65 SOLUTION INTRAVENOUS at 21:11

## 2022-01-01 RX ADMIN — FENTANYL CITRATE 75 MCG: 0.05 INJECTION, SOLUTION INTRAMUSCULAR; INTRAVENOUS at 17:15

## 2022-01-01 RX ADMIN — Medication 1000 MCG: at 08:37

## 2022-01-01 RX ADMIN — LACTULOSE 30 G: 10 SOLUTION ORAL at 21:04

## 2022-01-01 RX ADMIN — SODIUM CHLORIDE 500 MG: 900 INJECTION INTRAVENOUS at 09:01

## 2022-01-01 RX ADMIN — ACETAMINOPHEN 650 MG: 325 TABLET, FILM COATED ORAL at 03:32

## 2022-01-01 RX ADMIN — Medication 10 ML: at 22:23

## 2022-01-01 RX ADMIN — FENTANYL CITRATE 75 MCG: 0.05 INJECTION, SOLUTION INTRAMUSCULAR; INTRAVENOUS at 22:25

## 2022-01-01 RX ADMIN — CEFTRIAXONE SODIUM 2 G: 2 INJECTION, POWDER, FOR SOLUTION INTRAMUSCULAR; INTRAVENOUS at 11:02

## 2022-01-01 RX ADMIN — HYDROMORPHONE HYDROCHLORIDE 1 MG: 1 INJECTION, SOLUTION INTRAMUSCULAR; INTRAVENOUS; SUBCUTANEOUS at 02:09

## 2022-01-01 RX ADMIN — CHLORHEXIDINE GLUCONATE 15 ML: 1.2 RINSE ORAL at 20:46

## 2022-01-01 RX ADMIN — PROPOFOL INJECTABLE EMULSION 10 MCG/KG/MIN: 10 INJECTION, EMULSION INTRAVENOUS at 08:01

## 2022-01-01 RX ADMIN — FENTANYL CITRATE 50 MCG: 50 INJECTION, SOLUTION INTRAMUSCULAR; INTRAVENOUS at 07:45

## 2022-01-01 RX ADMIN — DIATRIZOATE MEGLUMINE AND DIATRIZOATE SODIUM 10 ML: 660; 100 LIQUID ORAL; RECTAL at 13:27

## 2022-01-01 RX ADMIN — EPHEDRINE SULFATE 5 MG: 50 INJECTION INTRAVENOUS at 08:02

## 2022-01-01 RX ADMIN — TRANEXAMIC ACID 1000 MG: 100 INJECTION INTRAVENOUS at 07:04

## 2022-01-01 RX ADMIN — METRONIDAZOLE 500 MG: 500 INJECTION, SOLUTION INTRAVENOUS at 02:40

## 2022-01-01 RX ADMIN — HYDRALAZINE HYDROCHLORIDE 10 MG: 20 INJECTION, SOLUTION INTRAMUSCULAR; INTRAVENOUS at 19:16

## 2022-01-01 RX ADMIN — CLOTRIMAZOLE 10 MG: 10 LOZENGE ORAL at 14:57

## 2022-01-01 RX ADMIN — CHLORHEXIDINE GLUCONATE 15 ML: 1.2 RINSE ORAL at 20:58

## 2022-01-01 RX ADMIN — FENTANYL CITRATE 75 MCG: 0.05 INJECTION, SOLUTION INTRAMUSCULAR; INTRAVENOUS at 18:35

## 2022-01-01 RX ADMIN — QUETIAPINE FUMARATE 150 MG: 100 TABLET ORAL at 20:54

## 2022-01-01 RX ADMIN — LORAZEPAM 1 MG: 2 INJECTION INTRAMUSCULAR; INTRAVENOUS at 22:37

## 2022-01-01 RX ADMIN — CARVEDILOL 6.25 MG: 6.25 TABLET, FILM COATED ORAL at 20:02

## 2022-01-01 RX ADMIN — QUETIAPINE FUMARATE 150 MG: 100 TABLET ORAL at 03:20

## 2022-01-01 RX ADMIN — PANTOPRAZOLE SODIUM 40 MG: 40 TABLET, DELAYED RELEASE ORAL at 12:33

## 2022-01-01 RX ADMIN — HYDRALAZINE HYDROCHLORIDE 100 MG: 50 TABLET, FILM COATED ORAL at 20:12

## 2022-01-01 RX ADMIN — QUETIAPINE FUMARATE 150 MG: 100 TABLET ORAL at 20:29

## 2022-01-01 RX ADMIN — Medication 10 ML: at 09:30

## 2022-01-01 RX ADMIN — HYDRALAZINE HYDROCHLORIDE 100 MG: 50 TABLET, FILM COATED ORAL at 20:19

## 2022-01-01 RX ADMIN — LORAZEPAM 1 MG: 2 INJECTION INTRAMUSCULAR; INTRAVENOUS at 00:56

## 2022-01-01 RX ADMIN — QUETIAPINE FUMARATE 150 MG: 100 TABLET ORAL at 21:47

## 2022-01-01 RX ADMIN — LEVOTHYROXINE SODIUM 100 MCG: 0.1 TABLET ORAL at 16:13

## 2022-01-01 RX ADMIN — ASPIRIN 325 MG: 325 TABLET ORAL at 09:23

## 2022-01-01 RX ADMIN — HEPARIN SODIUM 5000 UNITS: 1000 INJECTION INTRAVENOUS; SUBCUTANEOUS at 17:19

## 2022-01-01 RX ADMIN — HYDROMORPHONE HYDROCHLORIDE 1 MG: 1 INJECTION, SOLUTION INTRAMUSCULAR; INTRAVENOUS; SUBCUTANEOUS at 11:50

## 2022-01-01 RX ADMIN — ROPIVACAINE HYDROCHLORIDE 15 ML: 5 INJECTION EPIDURAL; INFILTRATION; PERINEURAL at 06:32

## 2022-01-01 RX ADMIN — FENTANYL CITRATE 75 MCG: 0.05 INJECTION, SOLUTION INTRAMUSCULAR; INTRAVENOUS at 16:16

## 2022-01-01 RX ADMIN — CARVEDILOL 6.25 MG: 6.25 TABLET, FILM COATED ORAL at 20:19

## 2022-01-01 RX ADMIN — PAROXETINE HYDROCHLORIDE HEMIHYDRATE 40 MG: 20 TABLET, FILM COATED ORAL at 09:21

## 2022-01-01 RX ADMIN — FERROUS SULFATE TAB 325 MG (65 MG ELEMENTAL FE) 325 MG: 325 (65 FE) TAB at 08:23

## 2022-01-01 RX ADMIN — VANCOMYCIN HYDROCHLORIDE 500 MG: 500 INJECTION, POWDER, LYOPHILIZED, FOR SOLUTION INTRAVENOUS at 19:52

## 2022-01-01 RX ADMIN — LAMOTRIGINE 200 MG: 100 TABLET ORAL at 08:26

## 2022-01-01 RX ADMIN — HYDROMORPHONE HYDROCHLORIDE 1 MG: 1 INJECTION, SOLUTION INTRAMUSCULAR; INTRAVENOUS; SUBCUTANEOUS at 21:59

## 2022-01-01 RX ADMIN — LAMOTRIGINE 25 MG: 25 TABLET ORAL at 08:09

## 2022-01-01 RX ADMIN — PAROXETINE HYDROCHLORIDE HEMIHYDRATE 40 MG: 20 TABLET, FILM COATED ORAL at 16:11

## 2022-01-01 RX ADMIN — PAROXETINE HYDROCHLORIDE HEMIHYDRATE 40 MG: 20 TABLET, FILM COATED ORAL at 12:32

## 2022-01-01 RX ADMIN — NEOSTIGMINE METHYLSULFATE 3 MG: 0.5 INJECTION INTRAVENOUS at 08:08

## 2022-01-01 RX ADMIN — CARVEDILOL 12.5 MG: 12.5 TABLET, FILM COATED ORAL at 09:22

## 2022-01-01 RX ADMIN — QUETIAPINE FUMARATE 150 MG: 100 TABLET ORAL at 20:12

## 2022-01-01 RX ADMIN — SODIUM BICARBONATE 150 MEQ/1,000 ML IN DEXTROSE 5 % INTRAVENOUS 150 MEQ: SOLUTION at 18:08

## 2022-01-01 RX ADMIN — FAMOTIDINE 20 MG: 10 INJECTION INTRAVENOUS at 06:01

## 2022-01-01 RX ADMIN — QUETIAPINE FUMARATE 150 MG: 100 TABLET ORAL at 22:07

## 2022-01-01 RX ADMIN — METHYLPREDNISOLONE SODIUM SUCCINATE 125 MG: 125 INJECTION, POWDER, FOR SOLUTION INTRAMUSCULAR; INTRAVENOUS at 02:42

## 2022-01-01 RX ADMIN — HYDRALAZINE HYDROCHLORIDE 100 MG: 50 TABLET, FILM COATED ORAL at 16:17

## 2022-01-01 RX ADMIN — LAMOTRIGINE 300 MG: 100 TABLET ORAL at 20:18

## 2022-01-01 RX ADMIN — Medication 10 ML: at 16:13

## 2022-01-01 RX ADMIN — HYDRALAZINE HYDROCHLORIDE 100 MG: 50 TABLET, FILM COATED ORAL at 08:09

## 2022-01-01 RX ADMIN — OXYCODONE AND ACETAMINOPHEN 1 TABLET: 325; 10 TABLET ORAL at 21:53

## 2022-01-01 RX ADMIN — FENTANYL CITRATE 75 MCG: 0.05 INJECTION, SOLUTION INTRAMUSCULAR; INTRAVENOUS at 23:13

## 2022-01-01 RX ADMIN — FENTANYL CITRATE 75 MCG: 0.05 INJECTION, SOLUTION INTRAMUSCULAR; INTRAVENOUS at 04:28

## 2022-01-01 RX ADMIN — LAMOTRIGINE 200 MG: 100 TABLET ORAL at 16:13

## 2022-01-01 RX ADMIN — PROPOFOL INJECTABLE EMULSION 45 MCG/KG/MIN: 10 INJECTION, EMULSION INTRAVENOUS at 17:06

## 2022-01-01 RX ADMIN — HYDRALAZINE HYDROCHLORIDE 100 MG: 50 TABLET, FILM COATED ORAL at 08:26

## 2022-01-01 RX ADMIN — CHLORHEXIDINE GLUCONATE 15 ML: 1.2 RINSE ORAL at 09:25

## 2022-01-01 RX ADMIN — FENTANYL CITRATE 50 MCG: 50 INJECTION INTRAMUSCULAR; INTRAVENOUS at 06:05

## 2022-01-01 RX ADMIN — RITUXIMAB-ARRX 650 MG: 500 INJECTION, SOLUTION INTRAVENOUS at 17:18

## 2022-01-01 RX ADMIN — FENTANYL CITRATE 75 MCG: 0.05 INJECTION, SOLUTION INTRAMUSCULAR; INTRAVENOUS at 19:52

## 2022-01-01 RX ADMIN — INSULIN LISPRO 2 UNITS: 100 INJECTION, SOLUTION INTRAVENOUS; SUBCUTANEOUS at 01:13

## 2022-01-01 RX ADMIN — PANTOPRAZOLE SODIUM 40 MG: 40 TABLET, DELAYED RELEASE ORAL at 05:52

## 2022-01-01 RX ADMIN — FENTANYL CITRATE 75 MCG: 0.05 INJECTION, SOLUTION INTRAMUSCULAR; INTRAVENOUS at 01:19

## 2022-01-01 RX ADMIN — PANTOPRAZOLE SODIUM 40 MG: 40 TABLET, DELAYED RELEASE ORAL at 06:05

## 2022-01-01 RX ADMIN — ROSUVASTATIN CALCIUM 5 MG: 5 TABLET, FILM COATED ORAL at 20:19

## 2022-01-01 RX ADMIN — REGADENOSON 0.4 MG: 0.08 INJECTION, SOLUTION INTRAVENOUS at 10:00

## 2022-01-04 PROBLEM — D05.12 BREAST NEOPLASM, TIS (DCIS), LEFT: Status: ACTIVE | Noted: 2020-07-28

## 2022-01-04 PROBLEM — L93.0 LUPUS ERYTHEMATOSUS: Status: ACTIVE | Noted: 2022-01-01

## 2022-01-04 PROBLEM — C50.919 MALIGNANT NEOPLASM OF BREAST (HCC): Status: ACTIVE | Noted: 2022-01-01

## 2022-01-04 PROBLEM — D64.9 NORMOCYTIC ANEMIA: Status: ACTIVE | Noted: 2021-11-04

## 2022-01-04 PROBLEM — Z79.811 AROMATASE INHIBITOR USE: Status: ACTIVE | Noted: 2021-04-29

## 2022-01-04 PROBLEM — I10 HYPERTENSION: Status: ACTIVE | Noted: 2022-01-01

## 2022-01-04 NOTE — PROGRESS NOTES
Chief Complaint  Anxiety (1 month follow up ), Hyperlipidemia, Diabetes, and Cough    Subjective          Yi Lee presents to [facility]  Patient became symptomatic December 21 with a sore throat negative Covid test.  Certainly should take Hernando DM and was evaluated at [facility] clinic 28th December with short of breath and congestion went to urgent care [facility] with a normal chest x-ray Covid negative flu negative treated with steroid Dosepak as well as an inhaler that she did not get in some Tessalon Perles which did not work.  Feels like she is coughing terribly bringing up sputum no fever no chills wheezing all the time.    Otherwise she has not gotten Paxil yet she has history of severe depression.  We will send Paxil to [facility] as opposed to mail order pharmacy.      Objective   Vital Signs:   There were no vitals taken for this visit.    Physical Exam  Vitals reviewed.   Constitutional:       Appearance: Normal appearance. She is well-developed.   HENT:      Head: Normocephalic and atraumatic.      Right Ear: Tympanic membrane and external ear normal.      Left Ear: Tympanic membrane and external ear normal.   Eyes:      Conjunctiva/sclera: Conjunctivae normal.      Pupils: Pupils are equal, round, and reactive to light.   Neck:      Thyroid: No thyromegaly.      Vascular: No JVD.   Cardiovascular:      Rate and Rhythm: Normal rate and regular rhythm.      Heart sounds: Normal heart sounds.   Pulmonary:      Effort: Pulmonary effort is normal.      Breath sounds: Examination of the right-upper field reveals wheezing. Examination of the left-upper field reveals wheezing. Examination of the right-middle field reveals wheezing. Examination of the left-middle field reveals wheezing. Examination of the right-lower field reveals wheezing. Examination of the left-lower field reveals wheezing. Wheezing present.      Comments: Wheezes in all lung  fields with coughing.  Abdominal:      General: Bowel sounds are normal.      Palpations: Abdomen is soft.   Musculoskeletal:         General: Normal range of motion.      Cervical back: Normal range of motion and neck supple.   Lymphadenopathy:      Cervical: No cervical adenopathy.   Skin:     General: Skin is warm and dry.      Findings: No rash.   Neurological:      Mental Status: She is alert and oriented to person, place, and time.      Cranial Nerves: No cranial nerve deficit.      Coordination: Coordination normal.   Psychiatric:         Behavior: Behavior normal.         Thought Content: Thought content normal.         Judgment: Judgment normal.        Result Review :                 Assessment and Plan    Diagnoses and all orders for this visit:    1. Mild intermittent asthmatic bronchitis without complication (Primary)  Comments:  Hycodan cough syrup 2.5 mL every 4 as needed.  She has been able to tolerate hydrocodone in the past.  Prednisone taper dose over 8-day  Orders:  -     HYDROcodone-homatropine (HYCODAN) 5-1.5 MG/5ML syrup; Take 2.5 mL by mouth Every 4 (Four) Hours As Needed for Cough.  Dispense: 120 mL; Refill: 0    2. Acute bronchitis, unspecified organism  Comments:  Azithromycin Z-Edmundo to 50 mg every 6.  Empiric treatment given nonresolution the past 2 to 3 weeks.    3. Current moderate episode of major depressive disorder, unspecified whether recurrent (HCC)  Comments:  Paxil 40 mg daily new Rx sent to local pharmacy [facility].    Other orders  -     azithromycin (Zithromax) 250 MG tablet; Take 2 tablets the first day, then 1 tablet daily for 4 days.  Dispense: 6 tablet; Refill: 0  -     predniSONE (DELTASONE) 20 MG tablet; Take 1 tablet by mouth 2 (Two) Times a Day for 4 days, THEN 1 tablet Daily for 4 days.  Dispense: 12 tablet; Refill: 0  -     PARoxetine (Paxil) 40 MG tablet; Take 1 tablet by mouth Every Morning. For depression  Dispense: 90 tablet; Refill: 1        Follow Up   Return in  about 2 weeks (around 1/18/2022) for Recheck.  Patient was given instructions and counseling regarding her condition or for health maintenance advice. Please see specific information pulled into the AVS if appropriate.

## 2022-01-04 NOTE — TELEPHONE ENCOUNTER
Caller: Yi Lee    Relationship to patient: Self    Best call back number: [phone]    Patient is needing: PATIENT STATES THE PHARMACY DIDN'T HAVE THE HYDROcodone-homatropine (HYCODAN) 5-1.5 MG/5ML syrup AT [id number] - [place], [place] - [address] AT [facility] [address] [facility] - [phone] [facility] [phone]   [phone] AND IS WANTING TO KNOW IF IT CAN BE CALLED INTO THE PHARMACY BELOW. PHARMACY TOLD PATIENT THEY HAVE THE 10'S AND SHE ISN'T SURE WHAT THAT MEANS.       [employer] PHONE NUMBER: [phone] [facility]

## 2022-03-03 NOTE — TELEPHONE ENCOUNTER
"Fax request received fro [facility] for clarification on lactulose - asking if rx for constipation.      See 12/21/21 rx for lactulose -- \"for constipation\"    Call to [facility] @ [phone] and spoke with Yony.  Confirm is for constipation.   "

## 2022-04-15 NOTE — PROGRESS NOTES
"Chief Complaint  Med Refill/DM2    Subjective          Yi Lee presents to [facility]  History of Present Illness  This is a 75 y/o female presenting to office for DM2 f/u.     HTN-- continues on medication regimen. Patient denies any dizziness or CP.     Continues on synthroid for hypothyroidism. Denies any fatigue or increased weight gain.     DM2-- continues on metformin and pioglitazone. Patient reports she does not check BG daily. Patient reports last eye exam completed 2 months ago-- had cataracts removal 2 months ago. Continues checking feet regularly-- no issues with wounds.     Depression-- continues on paxil and seroquel. Patient reports mood is stable. Denies any suicidal ideation. Continues on xanax PRN for anxiety-- patient reports using this sparingly. PDMP reviewed.     Bipolar type 1-- continues on lamictal. Reports mood stable.     Objective   Vital Signs:   /65 (BP Location: Left arm, Patient Position: Sitting, Cuff Size: Adult)   Pulse 59   Temp 97.5 °F (36.4 °C) (Temporal)   Ht 162.6 cm (64.02\")   Wt 74 kg (163 lb 3.2 oz)   SpO2 94%   BMI 28.00 kg/m²     BMI is above normal parameters. Recommendations: educational material discussed/shared in after visit summary       Physical Exam  Vitals and nursing note reviewed.   Constitutional:       Appearance: Normal appearance. She is obese.   HENT:      Head: Normocephalic and atraumatic.   Eyes:      Extraocular Movements: Extraocular movements intact.      Conjunctiva/sclera: Conjunctivae normal.      Pupils: Pupils are equal, round, and reactive to light.   Cardiovascular:      Rate and Rhythm: Normal rate and regular rhythm.      Pulses: Normal pulses.      Heart sounds: Normal heart sounds. No murmur heard.    No friction rub. No gallop.   Pulmonary:      Effort: Pulmonary effort is normal. No respiratory distress.      Breath sounds: Normal breath sounds. No stridor. No wheezing, rhonchi or rales. "   Musculoskeletal:         General: No swelling or deformity. Normal range of motion.      Cervical back: Normal range of motion and neck supple.   Skin:     General: Skin is warm and dry.      Coloration: Skin is not jaundiced.      Findings: No bruising.   Neurological:      General: No focal deficit present.      Mental Status: She is alert and oriented to person, place, and time. Mental status is at baseline.      Motor: No weakness.   Psychiatric:         Mood and Affect: Mood normal.         Behavior: Behavior normal.         Thought Content: Thought content normal.         Judgment: Judgment normal.        Result Review :   The following data was reviewed by: EZRA Saldivar on 04/15/2022:  Common labs    Common Labsle 8/26/21 11/4/21 12/7/21 12/7/21      0814 0814   Glucose    121 (A)   BUN    19   Creatinine    1.14 (A)   eGFR Non  Am    47 (A)   Sodium    136   Potassium    3.5   Chloride    104   Calcium    9.5   Albumin    3.60   Total Bilirubin    0.2   Alkaline Phosphatase    62   AST (SGOT)    25   ALT (SGPT)    23   WBC 4.74 5.39 5.22    Hemoglobin 11.1 (A) 11.5 (A) 10.8 (A)    Hematocrit 34.9 (A) 36.1 33.6 (A)    Platelets 264 303 253    (A) Abnormal value                   Assessment and Plan    Diagnoses and all orders for this visit:    1. Primary hypertension (Primary)  Assessment & Plan:  Hypertension is improving with treatment.  Continue current treatment regimen.  Blood pressure will be reassessed at the next regular appointment.      2. Type 2 diabetes mellitus with diabetic neuropathy, without long-term current use of insulin (HCC)  Assessment & Plan:  Diabetes is improving with treatment.   Continue current treatment regimen.  Dietary recommendations for ADA diet.  Regular aerobic exercise.  Discussed ways to avoid symptomatic hypoglycemia.  Discussed sick day management.  Discussed foot care.  Reminded to get yearly retinal exam.  Diabetes will be reassessed in 3  months.    Orders:  -     Hemoglobin A1c    3. Bipolar I disorder, single manic episode (HCC)  Assessment & Plan:  Psychological condition is stable. .  Continue current treatment regimen.  Psychological condition  will be reassessed at the next regular appointment.      4. Current moderate episode of major depressive disorder, unspecified whether recurrent (HCC)  Assessment & Plan:  Patient's depression is recurrent and is moderate without psychosis. Their depression is currently in full remission and the condition is improving with treatment. This will be reassessed at the next regular appointment. F/U as described:patient will continue current medication therapy.      5. Acquired hypothyroidism  Assessment & Plan:  Labs today.   Continues on synthroid.     Orders:  -     TSH Rfx On Abnormal To Free T4    Other orders  -     PARoxetine (Paxil) 40 MG tablet; Take 1 tablet by mouth Every Morning. For depression  Dispense: 90 tablet; Refill: 1      Follow Up   Return in about 3 months (around 7/15/2022) for Recheck DM2.  Patient was given instructions and counseling regarding her condition or for health maintenance advice. Please see specific information pulled into the AVS if appropriate.

## 2022-04-15 NOTE — ASSESSMENT & PLAN NOTE
Psychological condition is stable. .  Continue current treatment regimen.  Psychological condition  will be reassessed at the next regular appointment.

## 2022-04-15 NOTE — ASSESSMENT & PLAN NOTE
Patient's depression is recurrent and is moderate without psychosis. Their depression is currently in full remission and the condition is improving with treatment. This will be reassessed at the next regular appointment. F/U as described:patient will continue current medication therapy.

## 2022-04-18 NOTE — PROGRESS NOTES
Please let patient know:    A1C is at 5.9 which is at goal. Continue current regimen.     TSH is at goal as well-- no further changes needed at this time.

## 2022-04-22 NOTE — TELEPHONE ENCOUNTER
----- Message from Fran Falcon MD sent at 4/22/2022  1:14 PM EDT -----  I think it is working for her lets continue to use it at an odd dosing but sounds like it is challenging to manage her blood pressure.      ----- Message -----  From: Cony Sanabria APRN  Sent: 4/22/2022  11:08 AM EDT  To: Fran Falcon MD    You have not seen this patient in a while.  However, I last saw her in October.  We have had a very difficult time controlling her blood pressures and ultimately, she was started on high-dose amlodipine.  At some point, this was increased to 7.5 twice daily.  I wrote in my office note that I discussed this with you.  I keep getting notifications from the pharmacy regarding this dose.  Do you think it is okay to continue taking it this way?

## 2022-05-16 NOTE — PROGRESS NOTES
"Chief Complaint  Cough (Pt presents here today with a cough, Pt states it started last Friday. )    Subjective          Yi Lee presents to [facility]  History of Present Illness    Patient is a pleasant 76-year-old female who typically sees Dr. Albert here in the office.  Patient is new to me and she presents to the clinic today with complaints of a dry cough that started on Friday which was 4 days ago.  Patient states the cough is worse at bedtime.  Patient denies any history of fever, chills, shortness of breath or loss of taste or smell at this time.  Patient states that back in February 2022 she was diagnosed with COVID.  Patient is COVID vaccinated and she does work outside of the house.  History of smoking.    Objective   Vital Signs:  /64 (BP Location: Right arm, Patient Position: Sitting, Cuff Size: Adult)   Pulse 55   Temp 98.9 °F (37.2 °C)   Resp 18   Ht 162.6 cm (64.02\")   Wt 72.6 kg (160 lb)   SpO2 96%   BMI 27.45 kg/m²           Physical Exam  Vitals and nursing note reviewed.   Constitutional:       Appearance: Normal appearance.   HENT:      Head: Normocephalic.      Nose:      Comments: C/o cough with congestion x 3 days.      Mouth/Throat:      Mouth: Mucous membranes are moist.   Eyes:      Pupils: Pupils are equal, round, and reactive to light.   Cardiovascular:      Rate and Rhythm: Bradycardia present.      Comments: No peripheral edema noted.   Pulmonary:      Effort: Pulmonary effort is normal. No respiratory distress.      Breath sounds: Normal breath sounds. No stridor. No wheezing, rhonchi or rales.   Chest:      Chest wall: No tenderness.   Musculoskeletal:         General: Normal range of motion.   Skin:     General: Skin is warm.      Capillary Refill: Capillary refill takes less than 2 seconds.   Neurological:      Mental Status: She is alert and oriented to person, place, and time.   Psychiatric:         Behavior: Behavior normal.      "   Result Review :            Office Visit with Nanci Mace APRN (04/15/2022)  TSH Rfx On Abnormal To Free T4 (04/15/2022 11:53 AM)  Hemoglobin A1c (04/15/2022 12:11 PM)  TSH Rfx On Abnormal To Free T4 (04/15/2022 12:11 PM)       Assessment and Plan    Diagnoses and all orders for this visit:    1. Cough (Primary)  -     POCT SARS-CoV-2 Antigen SALLY    Other orders  -     azithromycin (Zithromax Z-Edmundo) 250 MG tablet; Take 2 tablets by mouth on day 1, then 1 tablet daily on days 2-5  Dispense: 6 tablet; Refill: 0  -     benzonatate (Tessalon Perles) 100 MG capsule; Take 2 capsules by mouth 2 (Two) Times a Day As Needed for Cough for up to 7 days.  Dispense: 14 capsule; Refill: 0  -     guaiFENesin (Mucinex) 600 MG 12 hr tablet; Take 2 tablets by mouth 2 (Two) Times a Day for 7 days.  Dispense: 28 tablet; Refill: 0  -     loratadine (Claritin) 10 MG tablet; Take 1 tablet by mouth Daily for 30 days.  Dispense: 30 tablet; Refill: 0      Patient is COVID test and flu test were both negative.  Patient will stop at the pharmacy and  her prescription and take as directed.  I gave patient a work note for her to return on Wednesday.  If patient develops any worsening symptoms such as high fever that does not come down with Tylenol or ibuprofen or shortness of breath she will go to the emergency room.  Patient verbalizes understanding of treatment plan at this time.       Follow Up   Return if symptoms worsen or fail to improve.  Patient was given instructions and counseling regarding her condition or for health maintenance advice. Please see specific information pulled into the AVS if appropriate.

## 2022-06-02 NOTE — PROGRESS NOTES
"Chief Complaint  Fall (Pt presents here today for a fall she had Sunday and now Is concerned she may have broken ribs.) and Rib Pain    Subjective        Yi Lee presents to [facility]  76 year old female presenting in office after a recent fall with rib pain. Last seen in office by EZRA Macias for a cough on 5/16/22. PCP is Dr. Albert.     Stated fall on Sunday in kitchen unwitnessed. Tripped wearing Crocs. Landed on left side. Denies striking head, dizziness, palpitations. Every day more painful to breath and reach for objects. States pain at one spot and now spreading to \"bra-line\" in back. Breathing hurts when taking deep breaths, worse when flat on back. Denies cough and fevers.      Objective   Vital Signs:  /63 (BP Location: Left arm, Patient Position: Sitting, Cuff Size: Large Adult)   Pulse 61   Temp 98.2 °F (36.8 °C)   Resp 18   Ht 162.6 cm (64.02\")   Wt 73.9 kg (163 lb)   SpO2 96%   BMI 27.96 kg/m²         Physical Exam  Constitutional:       Appearance: Normal appearance. She is normal weight.   HENT:      Head: Normocephalic.   Cardiovascular:      Rate and Rhythm: Normal rate.   Pulmonary:      Effort: Pulmonary effort is normal. No respiratory distress.      Breath sounds: Normal breath sounds.   Chest:      Chest wall: Tenderness present.       Abdominal:      Palpations: Abdomen is soft.   Musculoskeletal:         General: Normal range of motion.        Back:    Neurological:      General: No focal deficit present.      Mental Status: She is alert.   Psychiatric:         Mood and Affect: Mood normal.         Behavior: Behavior normal.         Thought Content: Thought content normal.         Judgment: Judgment normal.        Result Review :  The following data was reviewed by: EZRA Rock on 06/02/2022:  Common labs    Common Labsle 11/4/21 12/7/21 12/7/21 4/15/22     0814 0814    Glucose   121 (A)    BUN   19    Creatinine   1.14 (A)    eGFR " Non  Am   47 (A)    Sodium   136    Potassium   3.5    Chloride   104    Calcium   9.5    Albumin   3.60    Total Bilirubin   0.2    Alkaline Phosphatase   62    AST (SGOT)   25    ALT (SGPT)   23    WBC 5.39 5.22     Hemoglobin 11.5 (A) 10.8 (A)     Hematocrit 36.1 33.6 (A)     Platelets 303 253     Hemoglobin A1C    5.9 (A)   (A) Abnormal value       Comments are available for some flowsheets but are not being displayed.                     Assessment and Plan   Diagnoses and all orders for this visit:    1. Status post fall (Primary)  Comments:  -Avoid wearing non-skid footwear.  Orders:  -     XR Chest PA & Lateral; Future    2. Rib pain  Comments:  -Ibuprofen once a day for inflammation pain. -Acetaminophen over-the-counter as needed for pain. -Splint when deep breathing.  Orders:  -     XR Chest PA & Lateral; Future             Follow Up {Instructions Charge Capture  Follow-up Communications :23}  Return if symptoms worsen or fail to improve.  Patient was given instructions and counseling regarding her condition or for health maintenance advice. Please see specific information pulled into the AVS if appropriate.

## 2022-06-02 NOTE — PROGRESS NOTES
No rib fracture seen with this image. No other cardiovascular or pulmonary concerns seen with this image. Recommend rest, gentle stretches, ice and medicate as discussed during visit.

## 2022-06-08 NOTE — TELEPHONE ENCOUNTER
----- Message from EZRA Rock sent at 6/2/2022 12:48 PM EDT -----  No rib fracture seen with this image. No other cardiovascular or pulmonary concerns seen with this image. Recommend rest, gentle stretches, ice and medicate as discussed during visit.

## 2022-06-14 NOTE — PROGRESS NOTES
New Left Shoulder      Patient: Yi Lee        YOB: 1945    Medical Record Number: [id number]        Chief Complaints: left shoulder pain      History of Present Illness: This is a 76-year-old female who presents complaining of left shoulder pain she states she had a rotator cuff repair about 10 years ago recently her last several months has had worsening of her symptoms pain with activity night pain which is one of her big complaints.  She was seen by Dr. Gallardo's nurse practitioner at local orthopedics she wanted another opinion because she did not like what they told her.  Her symptoms are significant they are activity limiting moderate intermittent aching worse with activity somewhat better with rest her past medical history is well listed below reviewed by me    Allergies:   Allergies   Allergen Reactions   • Morphine Shortness Of Breath   • Penicillins Hives and Swelling   • Ace Inhibitors Cough   • Telmisartan Cough       Medications:   Home Medications:  Current Outpatient Medications on File Prior to Visit   Medication Sig   • acetaminophen (TYLENOL) 325 MG tablet Take 650 mg by mouth As Needed for Mild Pain .   • amLODIPine (NORVASC) 5 MG tablet Take 1.5 tablets by mouth 2 (Two) Times a Day.   • anastrozole (ARIMIDEX) 1 MG tablet Take 1 mg by mouth Daily.   • carvedilol (COREG) 12.5 MG tablet TAKE 1 TABLET BY MOUTH  TWICE DAILY   • chlorthalidone (HYGROTON) 25 MG tablet Take 1 tablet by mouth Daily.   • hydrALAZINE (APRESOLINE) 100 MG tablet TAKE 1 TABLET BY MOUTH 3  TIMES DAILY   • hydroCHLOROthiazide (MICROZIDE) 12.5 MG capsule Take 1 capsule by mouth Daily.   • lactulose (CHRONULAC) 10 GM/15ML solution TAKE 35 TO 45 ML&apos;S BY MOUTH DAILY FOR CONSTIPATION   • lactulose (Generlac) 10 GM/15ML solution solution (encephalopathy) TAKE 35 TO 45 ML BY MOUTH DAILY FOR CONSTIPATION   • lamoTRIgine (LaMICtal) 200 MG tablet TAKE 2 AND 1/2 TABLETS BY  MOUTH AT NIGHT   • levothyroxine  (SYNTHROID, LEVOTHROID) 100 MCG tablet TAKE 1 TABLET BY MOUTH  DAILY   • metFORMIN (GLUCOPHAGE) 500 MG tablet TAKE 2 TABLETS BY MOUTH  DAILY WITH BREAKFAST   • omeprazole (priLOSEC) 40 MG capsule TAKE 1 CAPSULE BY MOUTH  DAILY   • PARoxetine (Paxil) 40 MG tablet Take 1 tablet by mouth Every Morning. For depression   • QUEtiapine (SEROquel) 300 MG tablet TAKE 1 TABLET BY MOUTH AT  NIGHT   • rosuvastatin (CRESTOR) 5 MG tablet TAKE 1 TABLET BY MOUTH AT  NIGHT FOR CHOLESTEROL   • ALPRAZolam (Xanax) 0.5 MG tablet Take 1 tablet by mouth 3 (Three) Times a Day As Needed for Anxiety.   • Cholecalciferol (VITAMIN D3) 2000 UNITS tablet Take 1,000 Units by mouth Daily.   • ferrous sulfate 325 (65 FE) MG EC tablet Take 650 mg by mouth.   • glucose blood (FREESTYLE LITE) test strip Use to test everyday as directed   • hydrOXYzine (ATARAX) 25 MG tablet Take 25 mg by mouth At Night As Needed for Anxiety.   • Lancets (FREESTYLE) lancets As directed to check blood glucose   • loratadine (Claritin) 10 MG tablet Take 1 tablet by mouth Daily for 30 days.   • pioglitazone (ACTOS) 15 MG tablet Take 1 tablet by mouth Daily.   • simvastatin (ZOCOR) 80 MG tablet Take 1 tablet by mouth Daily.   • [DISCONTINUED] metoprolol succinate XL (TOPROL-XL) 50 MG 24 hr tablet TAKE 1 TABLET BY MOUTH DAILY     No current facility-administered medications on file prior to visit.     Current Medications:  Scheduled Meds:  Continuous Infusions:No current facility-administered medications for this visit.    PRN Meds:.    Past Medical History:   Diagnosis Date   • Abdominal pain, LLQ    • Anemia    • Anemia    • Arthralgia of hip 11/22/2015   • Ataxic gait 11/22/2015    Description: Evthad Mederos, Neuro.  Some vestibular dysfunction present 2013/2014   • Bipolar I disorder, single manic episode (HCC)    • Bradycardia    • Cardiac murmur    • Colon polyp    • Coronary artery disease    • Degeneration, intervertebral disc, thoracolumbar    • Depression     • Diabetes mellitus (HCC)    • Disease of thyroid gland    • Diverticulosis    • Ductal carcinoma in situ (DCIS) of left breast    • Esophageal spasm    • Fatigue    • GERD (gastroesophageal reflux disease)    • H/O bone density study 10/17/2007    Dr. Giles   • Hemorrhoids    • History of mammogram     02/2012, per GYN Reshma Aranda   • History of Papanicolaou smear of cervix     DR. GILES GYN   • Hyperlipidemia    • Hypertension    • Impaired cognition 11/22/2015    Description: Testing done 05/14   • Optic nerve contusion    • Pain of lower extremity 11/22/2015   • Systolic murmur    • Vitamin B12 deficiency    • Vitamin D deficiency         Past Surgical History:   Procedure Laterality Date   • BREAST LUMPECTOMY Left     benign   • CHOLECYSTECTOMY     • COLONOSCOPY  2007    done 02/12, repeat 5 yrs, Dr Grigsby; tics in sigmoid colon, IH   • COLONOSCOPY N/A 11/3/2016    polyp, IH   • COLONOSCOPY N/A 2/24/2020    Procedure: COLONOSCOPY TO CECUM WITH COLD POLYPECTOMY;  Surgeon: Eddie Grigsby MD;  Location: [facility] ENDOSCOPY;  Service: Gastroenterology;  Laterality: N/A;  pre: hx of polyps  post: polyp, hemorrhoids, diverticulosis   • COLONOSCOPY N/A 12/7/2021    Procedure: COLONOSCOPY TO CECUM  - COLD BIOPSY POLYPECTOMIES;  Surgeon: Eddie Grigsby MD;  Location: [facility] ENDOSCOPY;  Service: Gastroenterology;  Laterality: N/A;  IRON DEFICIENCY ANEMIA, HX POLYPS, CONSTIPATION   ---DIVERTICULOSIS, POLYPS   • D & C AND LAPAROSCOPY     • DILATATION AND CURETTAGE     • ENDOSCOPY N/A 2/24/2020    Procedure: ESOPHAGOGASTRODUODENOSCOPY with biopsies;  Surgeon: Eddie Grigsby MD;  Location: [facility] ENDOSCOPY;  Service: Gastroenterology;  Laterality: N/A;  pre: iron deficiency anemia  post: gastriits   • ENDOSCOPY N/A 12/7/2021    Procedure: ESOPHAGOGASTRODUODENOSCOPY, WITH BIOPSIES;  Surgeon: Eddie Grigsby MD;  Location: [facility] ENDOSCOPY;  Service: Gastroenterology;  Laterality: N/A;  GERD,WT LOSS, IRON DEFICIENCY ANEMIA ,   "DYSPEPSIA  ---HIATAL HERNIA, GASTRITIS   • ESOPHAGOGASTRIC FUNDOPLASTY     • ROTATOR CUFF REPAIR Right    • TOTAL KNEE ARTHROPLASTY Left 2018    Procedure: LT TOTAL KNEE ARTHROPLASTY;  Surgeon: Selwyn Pinto MD;  Location: [facility];  Service:         Social History     Occupational History   • Occupation: commercial cleaning   Tobacco Use   • Smoking status: Former Smoker     Packs/day: 1.00     Years: 7.00     Pack years: 7.00     Types: Cigarettes     Quit date:      Years since quittin.4   • Smokeless tobacco: Never Used   • Tobacco comment: caffeine use: 2 CUPS COFFEE/ 3 DIET PEPSIS DAILY   Vaping Use   • Vaping Use: Never used   Substance and Sexual Activity   • Alcohol use: Yes     Comment: OCC   • Drug use: No   • Sexual activity: Never      Social History     Social History Narrative   • Not on file        Family History   Problem Relation Age of Onset   • Colon polyps Father    • Heart disease Father    • Prostate cancer Father    • Hepatitis Other    • Coronary artery disease Other    • Hypertension Brother    • Lung cancer Maternal Aunt    • Lung cancer Paternal Uncle    • Stroke Paternal Grandmother    • Cerebral aneurysm Paternal Grandmother    • Stroke Paternal Grandfather    • Cerebral aneurysm Paternal Grandfather    • Lung cancer Maternal Aunt    • Malig Hyperthermia Neg Hx              Review of Systems:     Review of Systems      Physical Exam: 76 y.o. female  General Appearance:    Alert, cooperative, in no acute distress                   Vitals:    22 1356   Temp: 98 °F (36.7 °C)   Weight: 73.9 kg (163 lb)   Height: 162.6 cm (64\")   PainSc:   9      Patient is alert and read ×3 no acute distress appears her above-listed at height weight and age.  Affect is normal respiratory rate is normal unlabored. Heart rate regular rate rhythm, sclera, dentition and hearing are normal for the purpose of this exam.    Ortho Exam  Physical exam the left shoulder reveals no " overlying skin changes no lymphedema lymphadenopathy the patient can actively flex to about 150 passively I get them to 160 abduction is similar external rotation is 40 internal rotation to there buttock.  Rotator cuff strength is 4+ over 5 with isometric strength testing no overlying skin changes.  Patient has reasonable cervical range of motion for their age no radicular symptoms and a normal elbow exam.  There are good distal pulses.  Procedures          Radiology:   AP, Scapular Y and Axillary Lateral of the left shoulder were ordered/reviewed to evauate shoulder pain.  I have no comparative films she has had has degenerative changes with flattening of the humeral head and narrowing of the joint space  Imaging Results (Most Recent)     Procedure Component Value Units Date/Time    XR Shoulder 2+ View Left [[id number]] Resulted: 06/14/22 1359     Updated: 06/14/22 1359    Impression:      Ordering physician's impression is located in the Encounter Note dated 06/14/22. X-ray performed in the DR room.          Assessment/Plan: Left shoulder glenohumeral arthritis I told her she has 2 options 1 would be injection the Seco be arthroplasty.  She was given an injection the last she saw local orthopedics she states that really did not give her any relief she is considering arthroplasty I told her I be happy to refer her to Dr. Brandon she states she wants to go back to Dr. Gallardo because there therapy is right there told her that this was fine

## 2022-08-04 NOTE — DISCHARGE INSTRUCTIONS
CHLORHEXIDINE CLOTH INSTRUCTIONS  The morning of surgery follow these instructions using the Chlorhexidine cloths you've been given.  These steps reduce bacteria on the body.  Do not use the cloths near your eyes, ears mouth, genitalia or on open wounds.  Throw the cloths away after use but do not try to flush them down a toilet.      Open and remove one cloth at a time from the package.    Leave the cloth unfolded and begin the bathing.  Massage the skin with the cloths using gentle pressure to remove bacteria.  Do not scrub harshly.   Follow the steps below with one 2% CHG cloth per area (6 total cloths).  One cloth for neck, shoulders and chest.  One cloth for both arms, hands, fingers and underarms (do underarms last).  One cloth for the abdomen followed by groin.  One cloth for right leg and foot including between the toes.  One cloth for left leg and foot including between the toes.  The last cloth is to be used for the back of the neck, back and buttocks.    Allow the CHG to air dry 3 minutes on the skin which will give it time to work and decrease the chance of irritation.  The skin may feel sticky until it is dry.  Do not rinse with water or any other liquid or you will lose the beneficial effects of the CHG.  If mild skin irritation occurs, do rinse the skin to remove the CHG.  Report this to the nurse at time of admission.  Do not apply lotions, creams, ointments, deodorants or perfumes after using the clothes. Dress in clean clothes before coming to the hospital.    BACTROBAN NASAL OINTMENT  There are many germs normally in your nose. Bactroban is an ointment that will help reduce these germs. Please follow these instructions for Bactroban use:      ____The day before surgery in the morning  Date________    ____The day before surgery in the evening              Date________    ____The day of surgery in the morning    Date________    **Squirt ½ package of Bactroban Ointment onto a cotton applicator and  apply to inside of 1st nostril.  Squirt the remaining Bactroban and apply to the inside of the other nostril.    Take the following medications the morning of surgery:  AMLODIPINE, CARVEDILOL, HYDRALAZINE, LEVOTHYROXINE    SURGERY WILL CALL WITH YOUR ARRIVAL TIME THE DAY PRIOR TO SURGERY    If you are on prescription narcotic pain medication to control your pain you may also take that medication the morning of surgery.    General Instructions:  Do not eat solid food after midnight the night before surgery.  You may drink clear liquids day of surgery but must stop at least one hour before your hospital arrival time.  It is beneficial for you to have a clear drink that contains carbohydrates the day of surgery.  We suggest a 12 to 20 ounce bottle of Gatorade or Powerade for non-diabetic patients or a 12 to 20 ounce bottle of G2 or Powerade Zero for diabetic patients. (Pediatric patients, are not advised to drink a 12 to 20 ounce carbohydrate drink)    Clear liquids are liquids you can see through.  Nothing red in color.     Plain water                               Sports drinks  Sodas                                   Gelatin (Jell-O)  Fruit juices without pulp such as white grape juice and apple juice  Popsicles that contain no fruit or yogurt  Tea or coffee (no cream or milk added)  Gatorade / Powerade  G2 / Powerade Zero    Patients who avoid smoking, chewing tobacco and alcohol for 4 weeks prior to surgery have a reduced risk of post-operative complications.  Quit smoking as many days before surgery as you can.  Do not smoke, use chewing tobacco or drink alcohol the day of surgery.   If applicable bring your C-PAP/ BI-PAP machine.  Bring any papers given to you in the doctor’s office.  Wear clean comfortable clothes.  Do not wear contact lenses, false eyelashes or make-up.  Bring a case for your glasses.   Bring crutches or walker if applicable.  Remove all piercings.  Leave jewelry and any other valuables at  home.  Hair extensions with metal clips must be removed prior to surgery.  The Pre-Admission Testing nurse will instruct you to bring medications if unable to obtain an accurate list in Pre-Admission Testing.        Preventing a Surgical Site Infection:  For 2 to 3 days before surgery, avoid shaving with a razor because the razor can irritate skin and make it easier to develop an infection.    Any areas of open skin can increase the risk of a post-operative wound infection by allowing bacteria to enter and travel throughout the body.  Notify your surgeon if you have any skin wounds / rashes even if it is not near the expected surgical site.  The area will need assessed to determine if surgery should be delayed until it is healed.  The night prior to surgery shower using a fresh bar of anti-bacterial soap (such as Dial) and clean washcloth.  Sleep in a clean bed with clean clothing.  Do not allow pets to sleep with you.  Shower on the morning of surgery using a fresh bar of anti-bacterial soap (such as Dial) and clean washcloth.  Dry with a clean towel and dress in clean clothing.  Ask your surgeon if you will be receiving antibiotics prior to surgery.  Make sure you, your family, and all healthcare providers clean their hands with soap and water or an alcohol based hand  before caring for you or your wound.    Day of surgery:  Your arrival time is approximately two hours before your scheduled surgery time.  Upon arrival, a Pre-op nurse and Anesthesiologist will review your health history, obtain vital signs, and answer questions you may have.  The only belongings needed at this time will be a list of your home medications and if applicable your C-PAP/BI-PAP machine.  A Pre-op nurse will start an IV and you may receive medication in preparation for surgery, including something to help you relax.     Please be aware that surgery does come with discomfort.  We want to make every effort to control your  discomfort so please discuss any uncontrolled symptoms with your nurse.   Your doctor will most likely have prescribed pain medications.      If you are going home after surgery you will receive individualized written care instructions before being discharged.  A responsible adult must drive you to and from the hospital on the day of your surgery and stay with you for 24 hours.  Discharge prescriptions can be filled by the hospital pharmacy during regular pharmacy hours.  If you are having surgery late in the day/evening your prescription may be e-prescribed to your pharmacy.  Please verify your pharmacy hours or chose a 24 hour pharmacy to avoid not having access to your prescription because your pharmacy has closed for the day.    If you are staying overnight following surgery, you will be transported to your hospital room following the recovery period.  [facility] has all private rooms.    If you have any questions please call Pre-Admission Testing at [phone].  Deductibles and co-payments are collected on the day of service. Please be prepared to pay the required co-pay, deductible or deposit on the day of service as defined by your plan.    Patient Education for Self-Quarantine Process    Following your COVID testing, we strongly recommend that you wear a mask when you are with other people and practice social distancing.   Limit your activities to only required outings.  Wash your hands with soap and water frequently for at least 20 seconds.   Avoid touching your eyes, nose and mouth with unwashed hands.  Do not share anything - utensils, drinking glasses, food from the same bowl.   Sanitize household surfaces daily. Include all high touch areas (door handles, light switches, phones, countertops, etc.)    Call your surgeon immediately if you experience any of the following symptoms:  Sore Throat  Shortness of Breath or difficulty breathing  Cough  Chills  Body soreness or muscle  pain  Headache  Fever  New loss of taste or smell  Do not arrive for your surgery ill.  Your procedure will need to be rescheduled to another time.  You will need to call your physician before the day of surgery to avoid any unnecessary exposure to hospital staff as well as other patients.      Vital Signs Last 24 Hrs  HR: 66 (19 Jan 2022 09:26) (66 - 66)  BP: 113/64 (19 Jan 2022 09:26) (113/64 - 113/64)    General: NAD  heart: RRR  lungs: clear b/l  abd: soft, gravid    FH: 150

## 2022-08-08 PROBLEM — Z96.612 S/P REVERSE TOTAL SHOULDER ARTHROPLASTY, LEFT: Status: ACTIVE | Noted: 2022-01-01

## 2022-08-08 NOTE — TELEPHONE ENCOUNTER
"The patient called today, she says she is having surgery tomorrow and has a question about medication. She says she picked up her prescription for Amlodipine 5mg- 1 1/12 tablets twice daily. I advised her per your note 10/13/21 she is to increase amlodipine 7.5mg daily to BID. She was unaware and has only been taking it once a day despite the directions indicated on the bottle. She reports her BP's have been \"fine\", she couldn't give me readings. Please advise   "

## 2022-08-08 NOTE — TELEPHONE ENCOUNTER
If her blood pressures are controlled, she can just continue what ever she is currently taking it.

## 2022-08-09 NOTE — ANESTHESIA PREPROCEDURE EVALUATION
Anesthesia Evaluation     Patient summary reviewed   no history of anesthetic complications:  NPO Solid Status: > 8 hours  NPO Liquid Status: > 2 hours           Airway   Mallampati: I  TM distance: >3 FB  Neck ROM: full  No difficulty expected  Dental - normal exam   (+) edentulous    Pulmonary     breath sounds clear to auscultation  (+) a smoker Former, COPD,   (-) shortness of breath, recent URI  Cardiovascular   Exercise tolerance: good (4-7 METS)    ECG reviewed  Rhythm: regular  Rate: normal    (+) hypertension, valvular problems/murmurs (mild-mod) MR, CAD, dysrhythmias (Hx of Mobitz type1 on holter), hyperlipidemia,   (-) past MI, angina    ROS comment: 2020 TTE - Interpretation Summary    · Left ventricular ejection fraction appears to be 61 - 65%.  · There is moderate asymmetric basal septal hypertrophy with protrusion into the LVOT. There is turbulence at the level of the LVOT, although there is no obvious LVOT obstruction. There is no systolic anterior motion of the mitral valve  · Normal right ventricular cavity size and systolic function noted.  · The left atrial cavity is severely dilated.  · The aortic valve appears to be bicuspid with a prominent pseudoraphe  · Aortic valve maximum pressure gradient is 15 mmHg. Aortic valve mean pressure gradient is 9 mmHg.  · Mild to moderate mitral valve regurgitation is present.  · Mild tricuspid valve regurgitation is present.  · Calculated right ventricular systolic pressure from tricuspid regurgitation is 35 mmHg.  · There is no evidence of pericardial effusion        Neuro/Psych  (+) psychiatric history Depression and Bipolar,    (-) seizures, CVA  GI/Hepatic/Renal/Endo    (+)  GERD,  renal disease (stg2, crt 1.3) CRI, diabetes mellitus, thyroid problem hypothyroidism  (-)  obesity    Musculoskeletal     (+) back pain,   (-) neck pain, neck stiffness  Abdominal    Substance History      OB/GYN          Other   arthritis,    history of cancer (L breast)  remission                    Anesthesia Plan    ASA 3     general     (+ISB USGSS for POPC)  intravenous induction     Anesthetic plan, risks, benefits, and alternatives have been provided, discussed and informed consent has been obtained with: patient.    Plan discussed with CRNA.

## 2022-08-09 NOTE — ANESTHESIA PROCEDURE NOTES
Peripheral Block    Pre-sedation assessment completed: 8/9/2022 6:24 AM    Patient reassessed immediately prior to procedure    Patient location during procedure: holding area  Start time: 8/9/2022 6:28 AM  Stop time: 8/9/2022 6:33 AM  Reason for block: at surgeon's request and post-op pain management  Performed by  Anesthesiologist: Jose Antonio Ruiz DO  Preanesthetic Checklist  Completed: patient identified, IV checked, site marked, risks and benefits discussed, surgical consent, monitors and equipment checked, pre-op evaluation and timeout performed  Prep:  Pt Position: sitting  Sterile barriers:gloves, mask, cap and washed/disinfected hands  Prep: ChloraPrep  Patient monitoring: blood pressure monitoring, continuous pulse oximetry and EKG  Procedure    Sedation: yes  Performed under: local infiltration  Guidance:ultrasound guided    ULTRASOUND INTERPRETATION. Using ultrasound guidance a gauge needle was placed in close proximity to the brachial plexus nerve, at which point, under ultrasound guidance anesthetic was injected in the area of the nerve and spread of the anesthesia was seen on ultrasound in close proximity thereto.  There were no abnormalities seen on ultrasound; a digital image was taken; and the patient tolerated the procedure with no complications. Images:still images obtained, printed/placed on chart    Laterality:left  Block Type:interscalene  Injection Technique:single-shot  Needle Type:echogenic  Needle Gauge:22 G  Resistance on Injection: none    Medications Used: dexamethasone (DECADRON) injection, 4 mg  ropivacaine (NAROPIN) 0.5 % injection, 15 mL  bupivacaine-EPINEPHrine PF (MARCAINE w/EPI) 0.25% -[id number] injection, 15 mL  Med administered at 8/9/2022 6:32 AM      Medications  Comment:Ultrasound Interpretation:  Using ultrasound guidance the needle was placed in close proximity to the brachial plexus and anesthetic was injected in the area of the nerve and spread of the anesthetic  was seen on ultrasound in close proximity thereto.  There were no abnormalities seen on ultrasound; a digital image was taken; and the patient tolerated the procedure with no complications.      Post Assessment  Injection Assessment: negative aspiration for heme, no paresthesia on injection and incremental injection  Patient Tolerance:comfortable throughout block  Complications:no  Additional Notes  Ultrasound guidance was used to both view and verify local anesthetic placement and disbursement. In addition, it was used to evaluate the respective anatomy to determine needle approach and placement.

## 2022-08-09 NOTE — BRIEF OP NOTE
TOTAL SHOULDER REVERSE ARTHROPLASTY  Progress Note    Yi Lee  8/9/2022    Pre-op Diagnosis:   Lt rct, djd       Post-Op Diagnosis Codes:   same, poor bone quality    Procedure/CPT® Codes:        Procedure(s):  TOTAL SHOULDER REVERSE ARTHROPLASTY    Surgeon(s):  Yony Gallardo MD    Anesthesia: General    Staff:   Circulator: Barb Rodriguez RN  Scrub Person: Vicki Pang  Vendor Representative: Ana Vallejo  Assistant: Yenni Medina APRN  Assistant: Yenni Medina APRN      Estimated Blood Loss: minimal    Urine Voided: * No values recorded between 8/9/2022  6:48 AM and 8/9/2022  8:10 AM *    Specimens:                None          Drains: * No LDAs found *    Findings: above        Complications: none known    Assistant: Yenni Medina APRN  was responsible for performing the following activities: Retraction and their skilled assistance was necessary for the success of this case.    ADELA Gallardo MD     Date: 8/9/2022  Time: 08:10 EDT

## 2022-08-09 NOTE — PLAN OF CARE
Goal Outcome Evaluation:  VSS- Pt alert and oriented. POD 0 of a left reverse total shoulder. Dressing C/D/I. Neurovascular checks wdl. Pt having some numbness and tingling due to intrascalene block. Pt left arm in sling. Pt is eating, drinking and voiding well. Pt ambulates with assist of 1 to bathroom. Pt cont on IV fluids and IV vancomycin. BS elevated to 277. Metformin 100mg given as patient states this Is what she takes at home. Pt to discharge to rehab.

## 2022-08-09 NOTE — ANESTHESIA PROCEDURE NOTES
Airway  Urgency: elective    Date/Time: 8/9/2022 6:56 AM  Airway not difficult    General Information and Staff    Patient location during procedure: OR  Anesthesiologist: Wes Delarosa MD  CRNA/CAA: Viry Jerome CRNA    Indications and Patient Condition  Indications for airway management: airway protection    Preoxygenated: yes  MILS not maintained throughout  Mask difficulty assessment: 1 - vent by mask    Final Airway Details  Final airway type: endotracheal airway      Successful airway: ETT  Cuffed: yes   Successful intubation technique: direct laryngoscopy  Facilitating devices/methods: intubating stylet  Endotracheal tube insertion site: oral  Blade: Phillip  Blade size: 3  ETT size (mm): 7.0  Cormack-Lehane Classification: grade I - full view of glottis  Placement verified by: chest auscultation   Cuff volume (mL): 8  Measured from: lips  Number of attempts at approach: 1  Assessment: lips, teeth, and gum same as pre-op and atraumatic intubation    Additional Comments  PreO2 100% face mask, IV induction, easy mask, DVL x1, cords noted, tube through, cuff up, EBBSH, +etCO2, = chest movement, tube secured in place, atraumatic,  No teeth and lips intact as preop.

## 2022-08-09 NOTE — ANESTHESIA POSTPROCEDURE EVALUATION
Patient: Yi Lee    Procedure Summary     Date: 08/09/22 Room / Location:  [facility] [address] [place] [address] [facility]    Anesthesia Start: 0648 Anesthesia Stop: 0826    Procedure: TOTAL SHOULDER REVERSE ARTHROPLASTY (Left Shoulder) Diagnosis:     Surgeons: Yony Gallardo MD Provider: Wes Delarosa MD    Anesthesia Type: general ASA Status: 3          Anesthesia Type: general    Vitals  Vitals Value Taken Time   /58 08/09/22 0945   Temp 36.7 °C (98.1 °F) 08/09/22 0920   Pulse 57 08/09/22 1001   Resp 17 08/09/22 0930   SpO2 97 % 08/09/22 1001   Vitals shown include unvalidated device data.        Post Anesthesia Care and Evaluation    Patient location during evaluation: bedside  Patient participation: complete - patient participated  Level of consciousness: awake and alert  Pain score: 0  Pain management: adequate    Airway patency: patent  Anesthetic complications: No anesthetic complications  PONV Status: controlled  Cardiovascular status: acceptable and hemodynamically stable  Respiratory status: acceptable  Hydration status: acceptable    Comments: /59   Pulse 59   Temp 37 °C (98.6 °F)   Resp 16   LMP  (LMP Unknown)   SpO2 95%     POPC supplied by [facility] with continued effect in expected distribution

## 2022-08-10 NOTE — DISCHARGE PLACEMENT REQUEST
"Yi Lee (76 y.o. Female)             Date of Birth   1945    Social Security Number       Address   [address] [place] [address]    Home Phone   [phone]    MRN   [id number]       Confucianist   Jewish    Marital Status                               Admission Date   8/9/22    Admission Type   Elective    Admitting Provider   Yony Gallardo MD    Attending Provider   Yony Gallardo MD    Department, Room/Bed   [address], P783/1       Discharge Date       Discharge Disposition   Home or Self Care    Discharge Destination                               Attending Provider: Yony Gallardo MD    Allergies: Morphine, Penicillins, Ace Inhibitors, Telmisartan    Isolation: None   Infection: COVID Screen (preop/placement) (08/08/22)   Code Status: CPR   Advance Care Planning Activity    Ht: 162.6 cm (64.02\")   Wt: 75.2 kg (165 lb 12.8 oz)    Admission Cmt: None   Principal Problem: None                Active Insurance as of 8/9/2022     Primary Coverage     Payor Plan Insurance Group Employer/Plan Group    MEDICARE MEDICARE A & B      Payor Plan Address Payor Plan Phone Number Payor Plan Fax Number Effective Dates    [address] [phone]  12/1/2011 - None Entered    [facility] [id number]       Subscriber Name Subscriber Birth Date Member ID       YI LEE 1945 [id number]           Secondary Coverage     Payor Plan Insurance Group Employer/Plan Group    ANTHEM BLUE CROSS [facility] SUPP [id number]     Payor Plan Address Payor Plan Phone Number Payor Plan Fax Number Effective Dates    [address]   12/1/2016 - None Entered    [facility] [id number]       Subscriber Name Subscriber Birth Date Member ID       YI LEE 1945 [id number]                 Emergency Contacts      (Rel.) Home Phone Work Phone Mobile Phone    Marin Lee (Son) [phone] -- [phone]    ADRYAN VALDEZ (Brother) [phone] -- [phone] "

## 2022-08-10 NOTE — THERAPY EVALUATION
Patient Name: Yi Lee  : 1945    MRN: [id number]                              Today's Date: 8/10/2022       Admit Date: 2022    Visit Dx:     ICD-10-CM ICD-9-CM   1. Chronic left shoulder pain  M25.512 719.41    G89.29 338.29   2. S/P reverse total shoulder arthroplasty, left  Z96.612 V43.61     Patient Active Problem List   Diagnosis   • Abnormal creatinine clearance glomerular filtration   • Benign essential hypertension   • Chronic obstructive pulmonary disease (HCC)   • Depression   • Type 2 diabetes mellitus with diabetic neuropathy, without long-term current use of insulin (HCC)   • Gastroesophageal reflux disease   • Hyperlipidemia   • Hypothyroidism   • Obesity (BMI 30-39.9)   • Ataxic gait   • Abdominal pain, right lateral   • Iron deficiency anemia due to chronic blood loss   • Cystic kidney disease   • Vitamin B12 deficiency   • OA (osteoarthritis) of knee   • Iron deficiency anemia   • Bipolar I disorder, single manic episode ([facility])   • Ductal carcinoma in situ (DCIS) of left breast   • Bradycardia   • Systolic murmur   • Bicuspid aortic valve   • Renal insufficiency   • 1st degree AV block   • Dizziness   • Fatigue   • Vitamin D deficiency   • Bruising   • Weight loss, abnormal   • Loss of appetite   • Constipation   • Aromatase inhibitor use   • Breast neoplasm, Tis (DCIS), left   • Lupus erythematosus   • Malignant neoplasm of breast (HCC)   • Normocytic anemia   • Hypertension   • S/P reverse total shoulder arthroplasty, left     Past Medical History:   Diagnosis Date   • Abdominal pain, LLQ    • Anemia    • Arthralgia of hip 2015   • Asthma    • Ataxic gait 2015    Description: Javi Mederos, Neuro.  Some vestibular dysfunction present    • Bipolar I disorder, single manic episode (HCC)    • Bradycardia    • Cardiac murmur    • Colon polyp    • Coronary artery disease    • Degeneration, intervertebral disc, thoracolumbar    • Depression    • Diabetes  mellitus (HCC)    • Disease of thyroid gland    • Diverticulosis    • Ductal carcinoma in situ (DCIS) of left breast    • Esophageal spasm    • Fatigue    • GERD (gastroesophageal reflux disease)    • H/O bone density study 10/17/2007    Dr. Giles   • Hemorrhoids    • History of mammogram     02/2012, per GYN Reshma Aranda   • History of Papanicolaou smear of cervix     DR. GILES GYN   • History of transfusion    • Hyperlipidemia    • Hypertension    • Impaired cognition 11/22/2015    Description: Testing done 05/14   • Optic nerve contusion    • Pain of lower extremity 11/22/2015   • Pseudoaneurysm following procedure (HCC)     DURING CARDIAC CATHETERIZATION   • Shoulder pain     LEFT   • Skin tear of forearm without complication, right, initial encounter    • Stage 2 chronic kidney disease    • Systolic murmur    • Vitamin B12 deficiency    • Vitamin D deficiency      Past Surgical History:   Procedure Laterality Date   • BREAST LUMPECTOMY Left     benign   • CHOLECYSTECTOMY     • COLONOSCOPY  2007    done 02/12, repeat 5 yrs, Dr Grigsby; tics in sigmoid colon, IH   • COLONOSCOPY N/A 11/3/2016    polyp, IH   • COLONOSCOPY N/A 2/24/2020    Procedure: COLONOSCOPY TO CECUM WITH COLD POLYPECTOMY;  Surgeon: Eddie Grigsby MD;  Location: [facility] ENDOSCOPY;  Service: Gastroenterology;  Laterality: N/A;  pre: hx of polyps  post: polyp, hemorrhoids, diverticulosis   • COLONOSCOPY N/A 12/7/2021    Procedure: COLONOSCOPY TO CECUM  - COLD BIOPSY POLYPECTOMIES;  Surgeon: Eddie Grigsby MD;  Location: [facility] ENDOSCOPY;  Service: Gastroenterology;  Laterality: N/A;  IRON DEFICIENCY ANEMIA, HX POLYPS, CONSTIPATION   ---DIVERTICULOSIS, POLYPS   • D & C AND LAPAROSCOPY     • DILATATION AND CURETTAGE     • ENDOSCOPY N/A 2/24/2020    Procedure: ESOPHAGOGASTRODUODENOSCOPY with biopsies;  Surgeon: Eddie Grigsby MD;  Location: [facility] ENDOSCOPY;  Service: Gastroenterology;  Laterality: N/A;  pre: iron deficiency anemia  post: gastriits   •  ENDOSCOPY N/A 12/7/2021    Procedure: ESOPHAGOGASTRODUODENOSCOPY, WITH BIOPSIES;  Surgeon: Eddie Grigsby MD;  Location: [facility] ENDOSCOPY;  Service: Gastroenterology;  Laterality: N/A;  GERD,WT LOSS, IRON DEFICIENCY ANEMIA ,  DYSPEPSIA  ---HIATAL HERNIA, GASTRITIS   • ESOPHAGOGASTRIC FUNDOPLASTY     • ROTATOR CUFF REPAIR Right 2009   • TOTAL KNEE ARTHROPLASTY Left 2/13/2018    Procedure: LT TOTAL KNEE ARTHROPLASTY;  Surgeon: Selwyn Pinto MD;  Location: [facility] MAIN OR;  Service:    • TOTAL SHOULDER ARTHROPLASTY W/ DISTAL CLAVICLE EXCISION Left 8/9/2022    Procedure: TOTAL SHOULDER REVERSE ARTHROPLASTY;  Surgeon: Yony Gallardo MD;  Location: [facility] OR OSC;  Service: Orthopedics;  Laterality: Left;      General Information     Row Name 08/10/22 1001          OT Time and Intention    Document Type evaluation  -SM (r) SA (t) SM (c)     Mode of Treatment individual therapy;occupational therapy  -SM (r) SA (t) SM (c)     Row Name 08/10/22 1001          General Information    Patient Profile Reviewed yes  -SM (r) SA (t) SM (c)     Prior Level of Function independent:;ADL's;home management  -SM (r) SA (t) SM (c)     Existing Precautions/Restrictions fall;non-weight bearing;left;shoulder  -SM (r) SA (t) SM (c)     Barriers to Rehab none identified  -SM (r) SA (t) SM (c)     Row Name 08/10/22 1001          Occupational Profile    Environmental Supports and Barriers (Occupational Profile) pt lives alone and uses no DME or AD at baseline  -SM (r) SA (t) SM (c)     Row Name 08/10/22 1001          Living Environment    People in Home alone  -SM (r) SA (t) SM (c)     Row Name 08/10/22 1001          Cognition    Orientation Status (Cognition) oriented x 4  -SM (r) SA (t) SM (c)     Row Name 08/10/22 1001          Safety Issues, Functional Mobility    Safety Issues Affecting Function (Mobility) safety precaution awareness  -SM (r) SA (t) SM (c)     Impairments Affecting Function (Mobility) balance;endurance/activity  tolerance;pain;range of motion (ROM);strength  -SM (r) SA (t) SM (c)           User Key  (r) = Recorded By, (t) = Taken By, (c) = Cosigned By    Initials Name Provider Type    Trinidad Garcia, OT Occupational Therapist    Pema Gonzalez, OT Student OT Student                 Mobility/ADL's     Row [facility] 08/10/22 1002          Bed Mobility    Comment, (Bed Mobility) pt UIC upon OT arrival and departure  -SM (r) SA (t) SM (c)     Row Name 08/10/22 1002          Transfers    Transfers sit-stand transfer;stand-sit transfer  -SM (r) SA (t) SM (c)     Sit-Stand [place] (Transfers) contact guard  -SM (r) SA (t) SM (c)     Stand-Sit [place] (Transfers) standby assist  -SM (r) SA (t) SM (c)     Row Name 08/10/22 1002          Sit-Stand Transfer    Comment, (Sit-Stand Transfer) x3, no overt LOB but general unsteadiness  -SM (r) SA (t) SM (c)     Row Name 08/10/22 1002          Functional Mobility    Functional Mobility- Ind. Level contact guard assist  -SM (r) SA (t) SM (c)     Functional Mobility-Distance (Feet) 3  -SM (r) SA (t) SM (c)     Functional Mobility- Comment pt walking around chair in completion of exercises  -SM (r) SA (t) SM (c)     Row Name 08/10/22 1002          Activities of Daily Living    BADL Assessment/Intervention upper body dressing;lower body dressing;feeding  -SM (r) SA (t) SM (c)     Row Name 08/10/22 1002          Mobility    Extremity Weight-bearing Status left upper extremity  -SM (r) SA (t) SM (c)     Left Upper Extremity (Weight-bearing Status) non weight-bearing (NWB)  -SM (r) SA (t) SM (c)     Row Name 08/10/22 1002          Upper Body Dressing Assessment/Training    [place] Level (Upper Body Dressing) doff;don;pull-over garment;verbal cues;nonverbal cues (demo/gesture);standby assist  -SM (r) SA (t) SM (c)     Position (Upper Body Dressing) supported sitting  -SM (r) SA (t) SM (c)     Comment, (Upper Body Dressing) pt educated on how to complete independently utiziling  yolande technique and maintaining shoulder precautions  -SM (r) SA (t) SM (c)     Row Name 08/10/22 1002          Lower Body Dressing Assessment/Training    [place] Level (Lower Body Dressing) don;pants/bottoms;verbal cues;nonverbal cues (demo/gesture);standby assist  -SM (r) SA (t) SM (c)     Position (Lower Body Dressing) unsupported standing;supported sitting  -SM (r) SA (t) SM (c)     Comment, (Lower Body Dressing) pt educated on how to complete independently while maintaining shoulder precautions  -SM (r) SA (t) SM (c)     Row Name 08/10/22 1002          Self-Feeding Assessment/Training    [place] Level (Feeding) feeding skills;independent  -SM (r) SA (t) SM (c)     Position (Self-Feeding) supported sitting  -SM (r) SA (t) SM (c)           User Key  (r) = Recorded By, (t) = Taken By, (c) = Cosigned By    Initials Name Provider Type    Trinidad Garcia OT Occupational Therapist    Pema Gonzalez OT Student OT Student               Obj/Interventions     Row Name 08/10/22 1006          Sensory Assessment (Somatosensory)    Sensory Assessment (Somatosensory) sensation intact  -SM (r) SA (t) SM (c)     Sensory Assessment pt reports no numbness or tingling, L block worn off  -SM (r) SA (t) SM (c)     Row Name 08/10/22 1006          Vision Assessment/Intervention    Visual Impairment/Limitations WFL  -SM (r) SA (t) SM (c)     Row Name 08/10/22 1006          Range of Motion Comprehensive    Comment, General Range of Motion RUE WFL, LUE not tested 2/2 shoulder precautions  -SM (r) SA (t) SM (c)     Row Name 08/10/22 1006          Strength Comprehensive (MMT)    Comment, General Manual Muscle Testing (MMT) Assessment RUE at least 3/5, LUE not tested 2/2 WB status  -SM (r) SA (t) SM (c)     Row Name 08/10/22 1006          Shoulder (Therapeutic Exercise)    Shoulder (Therapeutic Exercise) pendulum exercises  -SM (r) SA (t) SM (c)     Shoulder Pendulum Exercises (Therapeutic Exercise) left;standing  x10  clockwise, x10 counter clockwise  -SM (r) SA (t) SM (c)     Row Name 08/10/22 1006          Elbow/Forearm (Therapeutic Exercise)    Elbow/Forearm (Therapeutic Exercise) AROM (active range of motion)  -SM (r) SA (t) SM (c)     Elbow/Forearm AROM (Therapeutic Exercise) left;flexion;extension;supination;pronation;10 repetitions;sitting  -SM (r) SA (t) SM (c)     Row Name 08/10/22 1006          Wrist (Therapeutic Exercise)    Wrist (Therapeutic Exercise) AROM (active range of motion)  -SM (r) SA (t) SM (c)     Wrist AROM (Therapeutic Exercise) left;flexion;extension;10 repetitions  -SM (r) SA (t) SM (c)     Row Name 08/10/22 1006          Hand (Therapeutic Exercise)    Hand (Therapeutic Exercise) AROM (active range of motion)  -SM (r) SA (t) SM (c)     Hand AROM/AAROM (Therapeutic Exercise) left;AROM (active range of motion);finger flexion;finger extension;10 repetitions  -SM (r) SA (t) SM (c)     Row Name 08/10/22 1006          Motor Skills    Motor Skills functional endurance  -SM (r) SA (t) SM (c)     Functional Endurance fair  -SM (r) SA (t) SM (c)     Therapeutic Exercise shoulder;elbow/forearm;wrist;hand  pt educated on and completed one set of HEP  -SM (r) SA (t) SM (c)     [facility] Name 08/10/22 1006          Balance    Balance Assessment sitting static balance;sitting dynamic balance;standing static balance;standing dynamic balance  -SM (r) SA (t) SM (c)     Static Sitting Balance supervision  -SM (r) SA (t) SM (c)     Dynamic Sitting Balance standby assist  -SM (r) SA (t) SM (c)     Position, Sitting Balance supported;sitting in chair  -SM (r) SA (t) SM (c)     Static Standing Balance contact guard  -SM (r) SA (t) SM (c)     Dynamic Standing Balance contact guard  -SM (r) SA (t) SM (c)     Position/Device Used, Standing Balance unsupported  -SM (r) SA (t) SM (c)     Comment, Balance pt with no overt LOB throughout session but reports dizziness and general unsteadiness when standing for pendulum exercises and LBD   -SM (r) SA (t) SM (c)           User Key  (r) = Recorded By, (t) = Taken By, (c) = Cosigned By    Initials Name Provider Type    Trinidad Garcia, OT Occupational Therapist    Pema Gonzalez, [facility] Student OT Student               Goals/Plan     Row Name 08/10/22 1015          Transfer Goal 1 (OT)    Activity/Assistive Device (Transfer Goal 1, OT) sit-to-stand/stand-to-sit;bed-to-chair/chair-to-bed;toilet  -SM (r) SA (t) SM (c)     [place] Level/Cues Needed (Transfer Goal 1, OT) standby assist  -SM (r) SA (t) SM (c)     Time Frame (Transfer Goal 1, OT) short term goal (STG);2 weeks  -SM (r) SA (t) SM (c)     Progress/Outcome (Transfer Goal 1, OT) goal ongoing  -SM (r) SA (t) SM (c)     Row Name 08/10/22 1015          Dressing Goal 1 (OT)    Activity/Device (Dressing Goal 1, OT) upper body dressing  -SM (r) SA (t) SM (c)     [place]/Cues Needed (Dressing Goal 1, OT) verbal cues required;nonverbal cues (demo/gesture) required;standby assist  -SM (r) SA (t) SM (c)     Time Frame (Dressing Goal 1, OT) short term goal (STG);2 weeks  -SM (r) SA (t) SM (c)     Strategies/Barriers (Dressing Goal 1, OT) pt will complete using yolande technique and maintaining shoulder precautions  -SM (r) SA (t) SM (c)     Progress/Outcome (Dressing Goal 1, OT) goal ongoing  -SM (r) SA (t) SM (c)     Row Name 08/10/22 1015          Toileting Goal 1 (OT)    Activity/Device (Toileting Goal 1, OT) toileting skills, all;commode  -SM (r) SA (t) SM (c)     [place] Level/Cues Needed (Toileting Goal 1, OT) standby assist  -SM (r) SA (t) SM (c)     Time Frame (Toileting Goal 1, OT) short term goal (STG);2 weeks  -SM (r) SA (t) SM (c)     Progress/Outcome (Toileting Goal 1, OT) goal ongoing  -SM (r) SA (t) SM (c)     Row Name 08/10/22 1015          ROM Goal 1 (OT)    ROM Goal 1 (OT) Pt will demonstrate understanding of and complete one set of HEP per session  -SM (r) SA (t) SM (c)     Time Frame (ROM Goal 1, OT) short term goal (STG);2  weeks  -SM (r) SA (t) SM (c)     Progress/Outcome (ROM Goal 1, OT) goal ongoing  -SM (r) SA (t) SM (c)           User Key  (r) = Recorded By, (t) = Taken By, (c) = Cosigned By    Initials Name Provider Type    Trinidad Garcia, OT Occupational Therapist    Pema Gonzalez, OT Student OT Student               Clinical Impression     Row Name 08/10/22 1009          Pain Assessment    Pretreatment Pain Rating 8/10  -SM (r) SA (t) SM (c)     Posttreatment Pain Rating 8/10  -SM (r) SA (t) SM (c)     Pain Location - Side/Orientation Left  -SM (r) SA (t) SM (c)     Pain Location incisional  -SM (r) SA (t) SM (c)     Pain Location - shoulder  -SM (r) SA (t) SM (c)     Pain Intervention(s) Cold applied;Rest;Ambulation/increased activity  -SM (r) SA (t) SM (c)     Row Name 08/10/22 1009          Plan of Care Review    Plan of Care Reviewed With patient;sibling  -SM (r) SA (t) SM (c)     Outcome Evaluation Pt is a 76 y.o. F POD 1 L reverse TSA; prior to admit, pt reports living alone and being independent with ADLs. Pt today seen by OT and educated on WB status and shoulder precautions. Pt was the educated on and practiced HEP, UBD with yolande technique, and LBD while maintaining shoulder precautions. Throughout session, pt performed x3 STS with CGA for stability as pt reported light dizziness and unsteadiness when standing. OT and pt discussed d/c placement as pt is fearful of going home alone and would like to be d/c to SNF for brief stay then going home to continue with either HH or OP OT. OT recommends this or d/c home with assist and further OT services.  -SM (r) SA (t) SM (c)     Row Name 08/10/22 1009          Therapy Assessment/Plan (OT)    Rehab Potential (OT) good, to achieve stated therapy goals  -SM (r) SA (t) SM (c)     Criteria for Skilled Therapeutic Interventions Met (OT) yes;meets criteria;skilled treatment is necessary  -SM (r) SA (t) SM (c)     Therapy Frequency (OT) 5 times/wk  -SM (r) SA (t) SM (c)      Row Name 08/10/22 1009          Therapy Plan Review/Discharge Plan (OT)    Anticipated Discharge Disposition (OT) home with assist;home with home health;home with outpatient therapy services;skilled nursing facility  -SM (r) SA (t) SM (c)     Row Name 08/10/22 1009          Vital Signs    Pre SpO2 (%) 91  -SM (r) SA (t) SM (c)     O2 Delivery Pre Treatment room air  -SM (r) SA (t) SM (c)     Post SpO2 (%) 93  -SM (r) SA (t) SM (c)     O2 Delivery Post Treatment room air  -SM (r) SA (t) SM (c)     Row Name 08/10/22 1009          Positioning and Restraints    Pre-Treatment Position sitting in chair/recliner  -SM (r) SA (t) SM (c)     Post Treatment Position chair  -SM (r) SA (t) SM (c)     In Chair sitting;call light within reach;encouraged to call for assist;exit alarm on;with family/caregiver  -SM (r) SA (t) SM (c)           User Key  (r) = Recorded By, (t) = Taken By, (c) = Cosigned By    Initials Name Provider Type    Trinidad Garcia, OT Occupational Therapist    Pema Gonzalez OT Student OT Student               Outcome Measures     Row Name 08/10/22 1016          How much help from another is currently needed...    Putting on and taking off regular lower body clothing? 3  -SM (r) SA (t) SM (c)     Bathing (including washing, rinsing, and drying) 3  -SM (r) SA (t) SM (c)     Toileting (which includes using toilet bed pan or urinal) 3  -SM (r) SA (t) SM (c)     Putting on and taking off regular upper body clothing 3  -SM (r) SA (t) SM (c)     Taking care of personal grooming (such as brushing teeth) 3  -SM (r) SA (t) SM (c)     Eating meals 4  -SM (r) SA (t) SM (c)     AM-PAC 6 Clicks Score (OT) 19  -SM (r) SA (t)     Row Name 08/10/22 0740          How much help from another person do you currently need...    Turning from your back to your side while in flat bed without using bedrails? 4  -JF     Moving from lying on back to sitting on the side of a flat bed without bedrails? 3  -JF     Moving to and  from a bed to a chair (including a wheelchair)? 3  -JF     Standing up from a chair using your arms (e.g., wheelchair, bedside chair)? 3  -JF     Climbing 3-5 steps with a railing? 3  -JF     To walk in hospital room? 3  -JF     AM-PAC 6 Clicks Score (PT) 19  -JF     Highest level of mobility 6 --> Walked 10 steps or more  -JF     Row Name 08/10/22 1016          Functional Assessment    Outcome Measure Options AM-PAC 6 Clicks Daily Activity (OT)  -SM (r) SA (t) SM (c)           User Key  (r) = Recorded By, (t) = Taken By, (c) = Cosigned By    Initials Name Provider Type    Trinidad Garcia, OT Occupational Therapist    Emi Titus, RN Registered Nurse    Pema Gonzalez, OT Student OT Student                Occupational Therapy Education                 Title: PT OT SLP Therapies (Done)     Topic: Occupational Therapy (Done)     Point: ADL training (Done)     Description:   Instruct learner(s) on proper safety adaptation and remediation techniques during self care or transfers.   Instruct in proper use of assistive devices.              Learning Progress Summary           Patient Acceptance, E,TB, VU,DU by  at 8/10/2022 1020    Comment: role of OT, shoulder precautions, WB status, HEP, yolande technique, dressing with precautions, d/c recommendations, plan of care                   Point: Home exercise program (Done)     Description:   Instruct learner(s) on appropriate technique for monitoring, assisting and/or progressing therapeutic exercises/activities.              Learning Progress Summary           Patient Acceptance, E,TB, VU,DU by  at 8/10/2022 1020    Comment: role of OT, shoulder precautions, WB status, HEP, yolande technique, dressing with precautions, d/c recommendations, plan of care                   Point: Precautions (Done)     Description:   Instruct learner(s) on prescribed precautions during self-care and functional transfers.              Learning Progress Summary           Patient  Acceptance, E,TB, VU,DU by  at 8/10/2022 1020    Comment: role of OT, shoulder precautions, WB status, HEP, yolande technique, dressing with precautions, d/c recommendations, plan of care                   Point: Body mechanics (Done)     Description:   Instruct learner(s) on proper positioning and spine alignment during self-care, functional mobility activities and/or exercises.              Learning Progress Summary           Patient Acceptance, E,TB, VU,DU by  at 8/10/2022 1020    Comment: role of OT, shoulder precautions, WB status, HEP, yolande technique, dressing with precautions, d/c recommendations, plan of care                               User Key     Initials Effective Dates Name Provider Type Discipline     05/23/22 -  Pema Wells, OT Student OT Student OT              OT Recommendation and Plan  Therapy Frequency (OT): 5 times/wk  Plan of Care Review  Plan of Care Reviewed With: patient, sibling  Outcome Evaluation: Pt is a 76 y.o. F POD 1 L reverse TSA; prior to admit, pt reports living alone and being independent with ADLs. Pt today seen by OT and educated on WB status and shoulder precautions. Pt was the educated on and practiced HEP, UBD with yolande technique, and LBD while maintaining shoulder precautions. Throughout session, pt performed x3 STS with CGA for stability as pt reported light dizziness and unsteadiness when standing. OT and pt discussed d/c placement as pt is fearful of going home alone and would like to be d/c to SNF for brief stay then going home to continue with either HH or OP OT. OT recommends this or d/c home with assist and further OT services.     Time Calculation:    Time Calculation- OT     Row Name 08/10/22 1021             Time Calculation- OT    OT Start Time 0838  -SM (r) SA (t) SM (c)      OT Stop Time 0908  -SM (r) SA (t) SM (c)      OT Time Calculation (min) 30 min  -SM (r) SA (t)      Total Timed Code Minutes- OT 23 minute(s)  -SM (r) SA (t) SM (c)      OT Received  On 08/10/22  -SM (r) SA (t) SM (c)      OT - Next Appointment 08/11/22  -SM (r) SA (t) SM (c)      OT Goal Re-Cert Due Date 08/24/22  -SM (r) SA (t) SM (c)              Timed Charges    50874 - OT Therapeutic Exercise Minutes 10  -SM (r) SA (t) SM (c)      73724 - OT Self Care/Mgmt Minutes 13  -SM (r) SA (t) SM (c)              Untimed Charges    OT Eval/Re-eval Minutes 7  -SM (r) SA (t) SM (c)              Total Minutes    Timed Charges Total Minutes 23  -SM (r) SA (t)      Untimed Charges Total Minutes 7  -SM (r) SA (t)       Total Minutes 30  -SM (r) SA (t)            User Key  (r) = Recorded By, (t) = Taken By, (c) = Cosigned By    Initials Name Provider Type     Trinidad Freeman, OT Occupational Therapist    [facility], OT Student OT Student              Therapy Charges for Today     Code Description Service Date Service Provider Modifiers Qty    84400636093 HC OT THER PROC EA 15 MIN 8/10/2022 [facility] OT Student GO 1    20380908997 HC OT SELF CARE/MGMT/TRAIN EA 15 MIN 8/10/2022 [facility] OT Student GO 1    95535630403 HC OT EVAL LOW COMPLEXITY 2 8/10/2022 [facility] OT Student GO 1               [facility] OT Student  8/10/2022

## 2022-08-10 NOTE — PLAN OF CARE
Goal Outcome Evaluation:  VSS- Pt alert and oriented. POD 1 of Left reverse shoulder. Dressing Changed. Incision c/d/I. Pt continues with arm in sling. Ambulating to bathroom. Pt pain controlled with oral pain meds. IV pain meds were given this morning and made patient very sleepy. Pt has been bradycardic some this morning. Called report to [facility]. Pt to discharge with brother to [facility].

## 2022-08-10 NOTE — PLAN OF CARE
Goal Outcome Evaluation:  Plan of Care Reviewed With: patient        Progress: improving  Outcome Evaluation: VSS, dressing c/d/i, sling, ambulating well, po pain meds effective, anticipating home today.

## 2022-08-10 NOTE — DISCHARGE INSTRUCTIONS
Total Shoulder Joint Replacement Discharge Instructions:    I. ACTIVITIES:  1. Exercises:  Complete exercise program as taught by the hospital physical therapist 2 times per day  Wear sling when in bed and when out of bed (except when doing exercises)  Exercise program will be advanced by the physical therapist  During the day be up ambulating every 2 hours (while awake) for short distances  Complete the ankle pump exercises at least 10 times per hour (while awake)  Elevate operative arm when in bed and for at least 30 minutes during the day.   Use cold packs 20-30 minutes approximately 5 times per day. This should be done before and after completing your exercises and at any time you are experiencing pain/ stiffness in your operative extremity.      2. Activities of Daily Living:  No tub baths, hot tubs, or swimming pools for  4 weeks  May shower and let water run over the incision on post-operative day #5 if no drainage. Do not scrub or rub the incision. Simply let the water run over the incision and pat dry.    II. Restrictions  No pushing, pulling, lifting, or weight bearing on operative extremity.  Continue to follow shoulder precautions as instructed by hospital physical therapist  Your surgeon will discuss with you when you will be able to drive again.  First week stay inside on even terrain. May go up and down stairs one stair at a time utilizing the hand rail  After one week, you may venture outside.    III. Precautions:  Everyone that comes near you should wash their hands  No elective dental, genital-urinary, or colon procedures or surgical procedures for 12 weeks after surgery unless absolutely necessary.   If dental work or surgical procedure is deemed absolutely necessary, you will need to contact your surgeon as you will need to take antibiotics 1 hour prior to any dental work (including teeth cleanings).  Please discuss with your surgeon prophylactic antibiotics as the length of time this  intervention will be necessary for you varies with each patient’s health history and situation.  Avoid sick people. If you must be around someone who is ill, they should wear a mask.  Avoid visits to the Emergency Room or Urgent Care unless you are having a life threatening event.     IV. INCISION CARE:  Wash your hands prior to dressing changes  Change the dressing as needed to keep incision clean and dry. Utilize dry gauze and paper tape. Avoid touching the side of the gauze that goes against the incision with your hands.  No creams or ointments to the incision  May remove dressing once the incision is free of drainage  Do not touch or pick at the incision  Check incision every day and notify surgeon immediately if any of the following signs or symptoms are noted:  Increase in redness  Increase in swelling around the incision and of the entire extremity  Increase in pain  Drainage oozing from the incision  Pulling apart of the edges of the incision  Increase in overall body temperature (greater than 100.5 degrees)  Your surgeon will instruct you regarding suture or staple removal    V. Medications:     1. Stool Softeners: You will be at greater risk of constipation after surgery due to being less mobile and the pain medications.   Take stool softeners as instructed by your surgeon while on pain medications. Over the counter Colace 100 mg 1-2 capsules twice daily.   If stools become too loose or too frequent, please decreases the dosage or stop the stool softener.  If constipation occurs despite use of stool softeners, you are to continue the stool softeners and add a laxative (Milk of Magnesia 1 ounce daily as needed)  Drink plenty of fluids, and eat fruits and vegetables during your recovery time    2. Pain Medications utilized after surgery are narcotics and the law requires that the following information be given to all patients that are prescribed narcotics:  CLASSIFICATION: Pain medications are called Opioids  and are narcotics  LEGALITIES: It is illegal to share narcotics with others and to drive within 24 hours of taking narcotics  POTENTIAL SIDE EFFECTS: Potential side effects of opioids include: nausea, vomiting, itching, dizziness, drowsiness, dry mouth, constipation, and difficulty urinating.   POTENTIAL ADVERSE EFFECTS:   Opioid tolerance can develop with use of pain medications and this simply means that it requires more and more of the medication to control pain; however, this is seen more in patients that use opioids for longer periods of time.  Opioid dependence can develop with use of Opioids and this simply means that to stop the medication can cause withdrawal symptoms; however, this is seen with patients that use Opioids for longer periods of time.  Opioid addiction can develop with use of Opioids and the incidence of this is very unlikely in patients who take the medications as ordered and stop the medications as instructed.  Opioid overdose can be dangerous, but is unlikely when the medication is taken as ordered and stopped when ordered. It is important not to mix opioids with alcohol or with and type of sedative such as Benadryl as this can lead to over sedation and respiratory difficulty.  DOSAGE:   Pain medications will need to be taken consistently for the first week to decrease pain and promote adequate pain relief and participation in physical therapy.  After the initial surgical pain begins to resolve, you may begin to decrease the pain medication. By the end of 6 weeks, you should be off of pain medications.  Refills will not be given by the office during evening hours, on weekends, or after 6 weeks post-op.  To seek refills on pain medications during the initial 6 week post-operative period, you must call the office 48 hours in advance to request the refill. The office will then notify you when to  the prescription. DO NOT wait until you are out of the medication to request a  refill.    V. FOLLOW-UP VISITS:  You will need to follow up in the office with your surgeon in  10-14 days. Please call this number  to schedule this appointment.  You will need to  follow up with your primary care physician in 4 weeks.  If you have any concerns or suspected complications prior to your follow up visit, please call your surgeons office. Do not wait until your appointment time if you suspect complications. These will need to be addressed in the office promptly.

## 2022-08-10 NOTE — NURSING NOTE
When taking patient IV out it was noted that pt was slow to stop bleeding. Called [facility] and spoke with Darlene to inform to watch patient for bleeding.

## 2022-08-10 NOTE — DISCHARGE SUMMARY
Orthopedic Discharge Summary      Patient: Yi Lee      YOB: 1945    Medical Record Number: [id number]    Attending Physician: Yony Gallardo MD  Consulting Physician(s):   Date of Admission: 8/9/2022  5:34 AM  Date of Discharge: 8/10/22      Patient Active Problem List   Diagnosis   • Abnormal creatinine clearance glomerular filtration   • Benign essential hypertension   • Chronic obstructive pulmonary disease (HCC)   • Depression   • Type 2 diabetes mellitus with diabetic neuropathy, without long-term current use of insulin (HCC)   • Gastroesophageal reflux disease   • Hyperlipidemia   • Hypothyroidism   • Obesity (BMI 30-39.9)   • Ataxic gait   • Abdominal pain, right lateral   • Iron deficiency anemia due to chronic blood loss   • Cystic kidney disease   • Vitamin B12 deficiency   • OA (osteoarthritis) of knee   • Iron deficiency anemia   • Bipolar I disorder, single manic episode (HCC)   • Ductal carcinoma in situ (DCIS) of left breast   • Bradycardia   • Systolic murmur   • Bicuspid aortic valve   • Renal insufficiency   • 1st degree AV block   • Dizziness   • Fatigue   • Vitamin D deficiency   • Bruising   • Weight loss, abnormal   • Loss of appetite   • Constipation   • Aromatase inhibitor use   • Breast neoplasm, Tis (DCIS), left   • Lupus erythematosus   • Malignant neoplasm of breast (HCC)   • Normocytic anemia   • Hypertension   • S/P reverse total shoulder arthroplasty, left       Allergies:   Allergies   Allergen Reactions   • Morphine Shortness Of Breath   • Penicillins Hives and Swelling   • Ace Inhibitors Cough   • Telmisartan Cough       Current Medications:     Discharge Medications      New Medications      Instructions Start Date   aspirin  MG tablet   325 mg, Oral, Daily      clindamycin 300 MG capsule  Commonly known as: Cleocin   300 mg, Oral, 3 Times Daily      oxyCODONE-acetaminophen  MG per tablet  Commonly known as: PERCOCET   1-2 tablets, Oral,  Every 4 Hours PRN         Changes to Medications      Instructions Start Date   lactulose 10 GM/15ML solution  Commonly known as: CHRONULAC  What changed: See the new instructions.   TAKE 35 TO 45 ML&apos;S BY MOUTH DAILY FOR CONSTIPATION         Continue These Medications      Instructions Start Date   acetaminophen 325 MG tablet  Commonly known as: TYLENOL   650 mg, Oral, As Needed      ALPRAZolam 0.5 MG tablet  Commonly known as: Xanax   0.5 mg, Oral, 3 Times Daily PRN      amLODIPine 5 MG tablet  Commonly known as: NORVASC   TAKE 1 AND 1/2 TABLETS BY MOUTH TWICE DAILY      anastrozole 1 MG tablet  Commonly known as: ARIMIDEX   1 mg, Oral, Daily      carvedilol 12.5 MG tablet  Commonly known as: COREG   TAKE 1 TABLET BY MOUTH  TWICE DAILY      chlorthalidone 25 MG tablet  Commonly known as: HYGROTON   25 mg, Oral, Daily      ferrous sulfate 325 (65 FE) MG EC tablet   650 mg, Oral      freestyle lancets   As directed to check blood glucose      glucose blood test strip  Commonly known as: FREESTYLE LITE   Use to test everyday as directed       hydrALAZINE 100 MG tablet  Commonly known as: APRESOLINE   TAKE 1 TABLET BY MOUTH 3  TIMES DAILY      lactulose 10 GM/15ML solution solution (encephalopathy)  Commonly known as: Generlac   TAKE 35 TO 45 ML BY MOUTH DAILY FOR CONSTIPATION      lamoTRIgine 200 MG tablet  Commonly known as: LaMICtal   TAKE 2 AND 1/2 TABLETS BY  MOUTH AT NIGHT      levothyroxine 100 MCG tablet  Commonly known as: SYNTHROID, LEVOTHROID   100 mcg, Oral, Daily      metFORMIN 500 MG tablet  Commonly known as: GLUCOPHAGE   1,000 mg, Oral, Daily With Breakfast      omeprazole 40 MG capsule  Commonly known as: priLOSEC   40 mg, Oral, Daily      PARoxetine 40 MG tablet  Commonly known as: Paxil   40 mg, Oral, Every Morning, For depression      QUEtiapine 300 MG tablet  Commonly known as: SEROquel   TAKE 1 TABLET BY MOUTH AT  NIGHT      rosuvastatin 5 MG tablet  Commonly known as: CRESTOR   TAKE 1 TABLET  BY MOUTH AT  NIGHT FOR CHOLESTEROL      Vitamin D3 50 MCG (2000 UT) tablet   1,000 Units, Oral, Daily         Stop These Medications    Chlorhexidine Gluconate Cloth 2 % pads     mupirocin 2 % nasal ointment  Commonly known as: BACTROBAN                Past Medical History:   Diagnosis Date   • Abdominal pain, LLQ    • Anemia    • Arthralgia of hip 11/22/2015   • Asthma    • Ataxic gait 11/22/2015    Description: Eval Dr Lillian Mederos, Neuro.  Some vestibular dysfunction present 2013/2014   • Bipolar I disorder, single manic episode (HCC)    • Bradycardia    • Cardiac murmur    • Colon polyp    • Coronary artery disease    • Degeneration, intervertebral disc, thoracolumbar    • Depression    • Diabetes mellitus (HCC)    • Disease of thyroid gland    • Diverticulosis    • Ductal carcinoma in situ (DCIS) of left breast    • Esophageal spasm    • Fatigue    • GERD (gastroesophageal reflux disease)    • H/O bone density study 10/17/2007    Dr. Giles   • Hemorrhoids    • History of mammogram     02/2012, per GYN Reshma Aranda   • History of Papanicolaou smear of cervix     DR. GILES GYN   • History of transfusion    • Hyperlipidemia    • Hypertension    • Impaired cognition 11/22/2015    Description: Testing done 05/14   • Optic nerve contusion    • Pain of lower extremity 11/22/2015   • Pseudoaneurysm following procedure (HCC)     DURING CARDIAC CATHETERIZATION   • Shoulder pain     LEFT   • Skin tear of forearm without complication, right, initial encounter    • Stage 2 chronic kidney disease    • Systolic murmur    • Vitamin B12 deficiency    • Vitamin D deficiency         Past Surgical History:   Procedure Laterality Date   • BREAST LUMPECTOMY Left     benign   • CHOLECYSTECTOMY     • COLONOSCOPY  2007    done 02/12, repeat 5 yrs, Dr Grigsby; tics in sigmoid colon, IH   • COLONOSCOPY N/A 11/3/2016    polyp, IH   • COLONOSCOPY N/A 2/24/2020    Procedure: COLONOSCOPY TO CECUM WITH COLD POLYPECTOMY;  Surgeon: Eddie Grigsby,  MD;  Location: [facility] ENDOSCOPY;  Service: Gastroenterology;  Laterality: N/A;  pre: hx of polyps  post: polyp, hemorrhoids, diverticulosis   • COLONOSCOPY N/A 2021    Procedure: COLONOSCOPY TO CECUM  - COLD BIOPSY POLYPECTOMIES;  Surgeon: Eddie Grigsby MD;  Location: [facility] ENDOSCOPY;  Service: Gastroenterology;  Laterality: N/A;  IRON DEFICIENCY ANEMIA, HX POLYPS, CONSTIPATION   ---DIVERTICULOSIS, POLYPS   • D & C AND LAPAROSCOPY     • DILATATION AND CURETTAGE     • ENDOSCOPY N/A 2020    Procedure: ESOPHAGOGASTRODUODENOSCOPY with biopsies;  Surgeon: Eddie Grigsby MD;  Location: [facility] ENDOSCOPY;  Service: Gastroenterology;  Laterality: N/A;  pre: iron deficiency anemia  post: gastriits   • ENDOSCOPY N/A 2021    Procedure: ESOPHAGOGASTRODUODENOSCOPY, WITH BIOPSIES;  Surgeon: Eddie Grigsby MD;  Location: [facility] ENDOSCOPY;  Service: Gastroenterology;  Laterality: N/A;  GERD,WT LOSS, IRON DEFICIENCY ANEMIA ,  DYSPEPSIA  ---HIATAL HERNIA, GASTRITIS   • ESOPHAGOGASTRIC FUNDOPLASTY     • ROTATOR CUFF REPAIR Right    • TOTAL KNEE ARTHROPLASTY Left 2018    Procedure: LT TOTAL KNEE ARTHROPLASTY;  Surgeon: Selwyn Pinto MD;  Location: [facility] MAIN OR;  Service:         Social History     Occupational History   • Occupation: commercial cleaning   Tobacco Use   • Smoking status: Former Smoker     Packs/day: 1.00     Years: 7.00     Pack years: 7.00     Types: Cigarettes     Quit date:      Years since quittin.6   • Smokeless tobacco: Never Used   • Tobacco comment: caffeine use: 2 CUPS COFFEE/ 3 DIET PEPSIS DAILY   Vaping Use   • Vaping Use: Never used   Substance and Sexual Activity   • Alcohol use: Yes     Comment: OCC   • Drug use: No   • Sexual activity: Never      Social History     Social History Narrative   • Not on file        Family History   Problem Relation Age of Onset   • Colon polyps Father    • Heart disease Father    • Prostate cancer Father    • Hepatitis Other    • Coronary  artery disease Other    • Hypertension Brother    • Lung cancer Maternal Aunt    • Lung cancer Paternal Uncle    • Stroke Paternal Grandmother    • Cerebral aneurysm Paternal Grandmother    • Stroke Paternal Grandfather    • Cerebral aneurysm Paternal Grandfather    • Lung cancer Maternal Aunt    • Malig Hyperthermia Neg Hx          Physical Exam: 76 y.o. female  Awake and alert. afeb vss  Doing well. Pain managed  Sling in place. Dressing dry and intact  No numbness or tingling   Good pulses and cap refill  Discussed postop and rehab plans    Hospital Course:  76 y.o. female admitted to [facility] to services of Yony Gallardo MD with S/P reverse total shoulder arthroplasty, left [Z96.612] on 8/9/2022.  Antibiotic and VTE prophylaxis were per [facility] protocols. Post-operatively the patient transferred to the post-operative floor where the patient underwent mobilization therapy that included active as well as passive ROM exercises. Opioids were titrated to achieve appropriate pain management to allow for participation in mobilization exercises. Vital signs are now stable. The incision is intact without signs or symptoms of infection. Operative extremity neurovascular status remains intact.   Appropriate education re: incision care, activity levels, medications, and follow up visits was completed and all questions were answered. The patient is now deemed stable for discharge to Home.      DIAGNOSTIC TESTS:   reviewed      Discharge and Follow up Instructions:     Total Shoulder Joint Replacement Discharge Instructions:    I. ACTIVITIES:  1. Exercises:  ? Complete exercise program as taught by the hospital physical therapist 2 times per day  ? Wear sling when in bed and when out of bed (except when doing exercises)  ? Exercise program will be advanced by the physical therapist  ? During the day be up ambulating every 2 hours (while awake) for short distances  ? Complete the ankle pump exercises at least 10  times per hour (while awake)  ? Elevate operative arm when in bed and for at least 30 minutes during the day.   ? Use cold packs 20-30 minutes approximately 5 times per day. This should be done before and after completing your exercises and at any time you are experiencing pain/ stiffness in your operative extremity.      2. Activities of Daily Living:  ? No tub baths, hot tubs, or swimming pools for 4 weeks  ? May shower and let water run over the incision on post-operative day #5 if no drainage. Do not scrub or rub the incision. Simply let the water run over the incision and pat dry.    II. Restrictions  ? No pushing, pulling, lifting, or weight bearing on operative extremity.  ? Continue to follow shoulder precautions as instructed by hospital physical therapist  ? Your surgeon will discuss with you when you will be able to drive again.  ? First week stay inside on even terrain. May go up and down stairs one stair at a time utilizing the hand rail  ? After one week, you may venture outside.    III. Precautions:  ? Everyone that comes near you should wash their hands  ? No elective dental, genital-urinary, or colon procedures or surgical procedures for 12 weeks after surgery unless absolutely necessary.  ?  If dental work or surgical procedure is deemed absolutely necessary, you will need to contact your surgeon as you will need to take antibiotics 1 hour prior to any dental work (including teeth cleanings).  ? Please discuss with your surgeon prophylactic antibiotics as the length of time this intervention will be necessary for you varies with each patient’s health history and situation.  ? Avoid sick people. If you must be around someone who is ill, they should wear a mask.  ? Avoid visits to the Emergency Room or Urgent Care unless you are having a life threatening event.     IV. INCISION CARE:  ? Wash your hands prior to dressing changes  ? Change the dressing as needed to keep incision clean and dry. Utilize  dry gauze and paper tape. Avoid touching the side of the gauze that goes against the incision with your hands.  ? No creams or ointments to the incision  ? May remove dressing once the incision is free of drainage  ? Do not touch or pick at the incision  ? Check incision every day and notify surgeon immediately if any of the following signs or symptoms are noted:  o Increase in redness  o Increase in swelling around the incision and of the entire extremity  o Increase in pain  o Drainage oozing from the incision  o Pulling apart of the edges of the incision  o Increase in overall body temperature (greater than 100.5 degrees)  ? Your surgeon will instruct you regarding suture or staple removal    V. Medications:     1. Stool Softeners: You will be at greater risk of constipation after surgery due to being less mobile and the pain medications.   ? Take stool softeners as instructed by your surgeon while on pain medications. Over the counter Colace 100 mg 1-2 capsules twice daily.   ? If stools become too loose or too frequent, please decreases the dosage or stop the stool softener.  ? If constipation occurs despite use of stool softeners, you are to continue the stool softeners and add a laxative (Milk of Magnesia 1 ounce daily as needed)  ? Drink plenty of fluids, and eat fruits and vegetables during your recovery time    2. Pain Medications utilized after surgery are narcotics and the law requires that the following information be given to all patients that are prescribed narcotics:  ? CLASSIFICATION: Pain medications are called Opioids and are narcotics  ? LEGALITIES: It is illegal to share narcotics with others and to drive within 24 hours of taking narcotics  ? POTENTIAL SIDE EFFECTS: Potential side effects of opioids include: nausea, vomiting, itching, dizziness, drowsiness, dry mouth, constipation, and difficulty urinating.  ?  POTENTIAL ADVERSE EFFECTS:   o Opioid tolerance can develop with use of pain  medications and this simply means that it requires more and more of the medication to control pain; however, this is seen more in patients that use opioids for longer periods of time.  o Opioid dependence can develop with use of Opioids and this simply means that to stop the medication can cause withdrawal symptoms; however, this is seen with patients that use Opioids for longer periods of time.  o Opioid addiction can develop with use of Opioids and the incidence of this is very unlikely in patients who take the medications as ordered and stop the medications as instructed.  o Opioid overdose can be dangerous, but is unlikely when the medication is taken as ordered and stopped when ordered. It is important not to mix opioids with alcohol or with and type of sedative such as Benadryl as this can lead to over sedation and respiratory difficulty.  ? DOSAGE:   o Pain medications will need to be taken consistently for the first week to decrease pain and promote adequate pain relief and participation in physical therapy.  o After the initial surgical pain begins to resolve, you may begin to decrease the pain medication. By the end of 6 weeks, you should be off of pain medications.  o Refills will not be given by the office during evening hours, on weekends, or after 6 weeks post-op.  o To seek refills on pain medications during the initial 6 week post-operative period, you must call the office 48 hours in advance to request the refill. The office will then notify you when to  the prescription. DO NOT wait until you are out of the medication to request a refill.    V. FOLLOW-UP VISITS:  ? You will need to follow up in the office with your surgeon in  10-14 days. Please call this number  to schedule this appointment.  ? You will need to  follow up with your primary care physician in 4 weeks.  ? If you have any concerns or suspected complications prior to your follow up visit, please call your surgeons  office. Do not wait until your appointment time if you suspect complications. These will need to be addressed in the office promptly.  ?         EZRA Whitfield

## 2022-08-10 NOTE — THERAPY DISCHARGE NOTE
Acute Care - Occupational Therapy Discharge  [facility]    Patient Name: Yi Lee  : 1945    MRN: [id number]                              Today's Date: 8/10/2022       Admit Date: 2022    Visit Dx:     ICD-10-CM ICD-9-CM   1. Chronic left shoulder pain  M25.512 719.41    G89.29 338.29   2. S/P reverse total shoulder arthroplasty, left  Z96.612 V43.61     Patient Active Problem List   Diagnosis   • Abnormal creatinine clearance glomerular filtration   • Benign essential hypertension   • Chronic obstructive pulmonary disease (HCC)   • Depression   • Type 2 diabetes mellitus with diabetic neuropathy, without long-term current use of insulin (HCC)   • Gastroesophageal reflux disease   • Hyperlipidemia   • Hypothyroidism   • Obesity (BMI 30-39.9)   • Ataxic gait   • Abdominal pain, right lateral   • Iron deficiency anemia due to chronic blood loss   • Cystic kidney disease   • Vitamin B12 deficiency   • OA (osteoarthritis) of knee   • Iron deficiency anemia   • Bipolar I disorder, single manic episode (HCC)   • Ductal carcinoma in situ (DCIS) of left breast   • Bradycardia   • Systolic murmur   • Bicuspid aortic valve   • Renal insufficiency   • 1st degree AV block   • Dizziness   • Fatigue   • Vitamin D deficiency   • Bruising   • Weight loss, abnormal   • Loss of appetite   • Constipation   • Aromatase inhibitor use   • Breast neoplasm, Tis (DCIS), left   • Lupus erythematosus   • Malignant neoplasm of breast (HCC)   • Normocytic anemia   • Hypertension   • S/P reverse total shoulder arthroplasty, left     Past Medical History:   Diagnosis Date   • Abdominal pain, LLQ    • Anemia    • Arthralgia of hip 2015   • Asthma    • Ataxic gait 2015    Description: Javi Mederos, Neuro.  Some vestibular dysfunction present    • Bipolar I disorder, single manic episode (HCC)    • Bradycardia    • Cardiac murmur    • Colon polyp    • Coronary artery disease    • Degeneration,  intervertebral disc, thoracolumbar    • Depression    • Diabetes mellitus (HCC)    • Disease of thyroid gland    • Diverticulosis    • Ductal carcinoma in situ (DCIS) of left breast    • Esophageal spasm    • Fatigue    • GERD (gastroesophageal reflux disease)    • H/O bone density study 10/17/2007    Dr. Giles   • Hemorrhoids    • History of mammogram     02/2012, per GYN Reshma Aranda   • History of Papanicolaou smear of cervix     DR. GILES GYN   • History of transfusion    • Hyperlipidemia    • Hypertension    • Impaired cognition 11/22/2015    Description: Testing done 05/14   • Optic nerve contusion    • Pain of lower extremity 11/22/2015   • Pseudoaneurysm following procedure (HCC)     DURING CARDIAC CATHETERIZATION   • Shoulder pain     LEFT   • Skin tear of forearm without complication, right, initial encounter    • Stage 2 chronic kidney disease    • Systolic murmur    • Vitamin B12 deficiency    • Vitamin D deficiency      Past Surgical History:   Procedure Laterality Date   • BREAST LUMPECTOMY Left     benign   • CHOLECYSTECTOMY     • COLONOSCOPY  2007    done 02/12, repeat 5 yrs, Dr Grigsby; tics in sigmoid colon, IH   • COLONOSCOPY N/A 11/3/2016    polyp, IH   • COLONOSCOPY N/A 2/24/2020    Procedure: COLONOSCOPY TO CECUM WITH COLD POLYPECTOMY;  Surgeon: Eddie Grigsby MD;  Location: [facility] ENDOSCOPY;  Service: Gastroenterology;  Laterality: N/A;  pre: hx of polyps  post: polyp, hemorrhoids, diverticulosis   • COLONOSCOPY N/A 12/7/2021    Procedure: COLONOSCOPY TO CECUM  - COLD BIOPSY POLYPECTOMIES;  Surgeon: Eddie Grigsby MD;  Location: [facility] ENDOSCOPY;  Service: Gastroenterology;  Laterality: N/A;  IRON DEFICIENCY ANEMIA, HX POLYPS, CONSTIPATION   ---DIVERTICULOSIS, POLYPS   • D & C AND LAPAROSCOPY     • DILATATION AND CURETTAGE     • ENDOSCOPY N/A 2/24/2020    Procedure: ESOPHAGOGASTRODUODENOSCOPY with biopsies;  Surgeon: Eddie Grigsby MD;  Location: [facility] ENDOSCOPY;  Service: Gastroenterology;   Laterality: N/A;  pre: iron deficiency anemia  post: gastriits   • ENDOSCOPY N/A 12/7/2021    Procedure: ESOPHAGOGASTRODUODENOSCOPY, WITH BIOPSIES;  Surgeon: Eddie Grigsby MD;  Location: [facility] ENDOSCOPY;  Service: Gastroenterology;  Laterality: N/A;  GERD,WT LOSS, IRON DEFICIENCY ANEMIA ,  DYSPEPSIA  ---HIATAL HERNIA, GASTRITIS   • ESOPHAGOGASTRIC FUNDOPLASTY     • ROTATOR CUFF REPAIR Right 2009   • TOTAL KNEE ARTHROPLASTY Left 2/13/2018    Procedure: LT TOTAL KNEE ARTHROPLASTY;  Surgeon: Selwyn Pinto MD;  Location: [facility] MAIN OR;  Service:    • TOTAL SHOULDER ARTHROPLASTY W/ DISTAL CLAVICLE EXCISION Left 8/9/2022    Procedure: TOTAL SHOULDER REVERSE ARTHROPLASTY;  Surgeon: Yony Gallardo MD;  Location: [facility] OR OSC;  Service: Orthopedics;  Laterality: Left;      General Information     Row Name 08/10/22 1001          OT Time and Intention    Document Type evaluation  -SM (r) SA (t) SM (c)     Mode of Treatment individual therapy;occupational therapy  -SM (r) SA (t) SM (c)     Row Name 08/10/22 1001          General Information    Patient Profile Reviewed yes  -SM (r) SA (t) SM (c)     Prior Level of Function independent:;ADL's;home management  -SM (r) SA (t) SM (c)     Existing Precautions/Restrictions fall;non-weight bearing;left;shoulder  -SM (r) SA (t) SM (c)     Barriers to Rehab none identified  -SM (r) SA (t) SM (c)     Row Name 08/10/22 1001          Occupational Profile    Environmental Supports and Barriers (Occupational Profile) pt lives alone and uses no DME or AD at baseline  -SM (r) SA (t) SM (c)     Row Name 08/10/22 1001          Living Environment    People in Home alone  -SM (r) SA (t) SM (c)     Row Name 08/10/22 1001          Cognition    Orientation Status (Cognition) oriented x 4  -SM (r) SA (t) SM (c)     Row Name 08/10/22 1001          Safety Issues, Functional Mobility    Safety Issues Affecting Function (Mobility) safety precaution awareness  -SM (r) SA (t) SM (c)      Impairments Affecting Function (Mobility) balance;endurance/activity tolerance;pain;range of motion (ROM);strength  -SM (r) SA (t) SM (c)           User Key  (r) = Recorded By, (t) = Taken By, (c) = Cosigned By    Initials Name Provider Type    Trinidad Garcia, OT Occupational Therapist    Pema Gonzalez OT Student OT Student               Mobility/ADL's     [facility] 08/10/22 1002          Bed Mobility    Comment, (Bed Mobility) pt UIC upon OT arrival and departure  -SM (r) SA (t) SM (c)     Row Name 08/10/22 1002          Transfers    Transfers sit-stand transfer;stand-sit transfer  -SM (r) SA (t) SM (c)     Sit-Stand [place] (Transfers) contact guard  -SM (r) SA (t) SM (c)     Stand-Sit [place] (Transfers) standby assist  -SM (r) SA (t) SM (c)     Row Name 08/10/22 1002          Sit-Stand Transfer    Comment, (Sit-Stand Transfer) x3, no overt LOB but general unsteadiness  -SM (r) SA (t) SM (c)     Row Name 08/10/22 1002          Functional Mobility    Functional Mobility- Ind. Level contact guard assist  -SM (r) SA (t) SM (c)     Functional Mobility-Distance (Feet) 3  -SM (r) SA (t) SM (c)     Functional Mobility- Comment pt walking around chair in completion of exercises  -SM (r) SA (t) SM (c)     Row Name 08/10/22 1002          Activities of Daily Living    BADL Assessment/Intervention upper body dressing;lower body dressing;feeding  -SM (r) SA (t) SM (c)     Row Name 08/10/22 1002          Mobility    Extremity Weight-bearing Status left upper extremity  -SM (r) SA (t) SM (c)     Left Upper Extremity (Weight-bearing Status) non weight-bearing (NWB)  -SM (r) SA (t) SM (c)     Row Name 08/10/22 1002          Upper Body Dressing Assessment/Training    [place] Level (Upper Body Dressing) doff;don;pull-over garment;verbal cues;nonverbal cues (demo/gesture);standby assist  -SM (r) SA (t) SM (c)     Position (Upper Body Dressing) supported sitting  -SM (r) SA (t) SM (c)     Comment, (Upper Body  Dressing) pt educated on how to complete independently utiziling yolande technique and maintaining shoulder precautions  -SM (r) SA (t) SM (c)     Row Name 08/10/22 1002          Lower Body Dressing Assessment/Training    [place] Level (Lower Body Dressing) don;pants/bottoms;verbal cues;nonverbal cues (demo/gesture);standby assist  -SM (r) SA (t) SM (c)     Position (Lower Body Dressing) unsupported standing;supported sitting  -SM (r) SA (t) SM (c)     Comment, (Lower Body Dressing) pt educated on how to complete independently while maintaining shoulder precautions  -SM (r) SA (t) SM (c)     Row Name 08/10/22 1002          Self-Feeding Assessment/Training    [place] Level (Feeding) feeding skills;independent  -SM (r) SA (t) SM (c)     Position (Self-Feeding) supported sitting  -SM (r) SA (t) SM (c)           User Key  (r) = Recorded By, (t) = Taken By, (c) = Cosigned By    Initials Name Provider Type    Trinidad Garcia OT Occupational Therapist    Pema Gonzalez OT Student OT Student               Obj/Interventions     Row Name 08/10/22 1006          Sensory Assessment (Somatosensory)    Sensory Assessment (Somatosensory) sensation intact  -SM (r) SA (t) SM (c)     Sensory Assessment pt reports no numbness or tingling, L block worn off  -SM (r) SA (t) SM (c)     Row Name 08/10/22 1006          Vision Assessment/Intervention    Visual Impairment/Limitations WFL  -SM (r) SA (t) SM (c)     Row Name 08/10/22 1006          Range of Motion Comprehensive    Comment, General Range of Motion RUE WFL, LUE not tested 2/2 shoulder precautions  -SM (r) SA (t) SM (c)     Row Name 08/10/22 1006          Strength Comprehensive (MMT)    Comment, General Manual Muscle Testing (MMT) Assessment RUE at least 3/5, LUE not tested 2/2 WB status  -SM (r) SA (t) SM (c)     Row Name 08/10/22 1006          Shoulder (Therapeutic Exercise)    Shoulder (Therapeutic Exercise) pendulum exercises  -SM (r) SA (t) SM (c)     Shoulder  Pendulum Exercises (Therapeutic Exercise) left;standing  x10 clockwise, x10 counter clockwise  -SM (r) SA (t) SM (c)     Row Name 08/10/22 1006          Elbow/Forearm (Therapeutic Exercise)    Elbow/Forearm (Therapeutic Exercise) AROM (active range of motion)  -SM (r) SA (t) SM (c)     Elbow/Forearm AROM (Therapeutic Exercise) left;flexion;extension;supination;pronation;10 repetitions;sitting  -SM (r) SA (t) SM (c)     Row Name 08/10/22 1006          Wrist (Therapeutic Exercise)    Wrist (Therapeutic Exercise) AROM (active range of motion)  -SM (r) SA (t) SM (c)     Wrist AROM (Therapeutic Exercise) left;flexion;extension;10 repetitions  -SM (r) SA (t) SM (c)     Row Name 08/10/22 1006          Hand (Therapeutic Exercise)    Hand (Therapeutic Exercise) AROM (active range of motion)  -SM (r) SA (t) SM (c)     Hand AROM/AAROM (Therapeutic Exercise) left;AROM (active range of motion);finger flexion;finger extension;10 repetitions  -SM (r) SA (t) SM (c)     Row Name 08/10/22 1006          Motor Skills    Motor Skills functional endurance  -SM (r) SA (t) SM (c)     Functional Endurance fair  -SM (r) SA (t) SM (c)     Therapeutic Exercise shoulder;elbow/forearm;wrist;hand  pt educated on and completed one set of HEP  -SM (r) SA (t) SM (c)     Row Name 08/10/22 1006          Balance    Balance Assessment sitting static balance;sitting dynamic balance;standing static balance;standing dynamic balance  -SM (r) SA (t) SM (c)     Static Sitting Balance supervision  -SM (r) SA (t) SM (c)     Dynamic Sitting Balance standby assist  -SM (r) SA (t) SM (c)     Position, Sitting Balance supported;sitting in chair  -SM (r) SA (t) SM (c)     Static Standing Balance contact guard  -SM (r) SA (t) SM (c)     Dynamic Standing Balance contact guard  -SM (r) SA (t) SM (c)     Position/Device Used, Standing Balance unsupported  -SM (r) SA (t) SM (c)     Comment, Balance pt with no overt LOB throughout session but reports dizziness and general  unsteadiness when standing for pendulum exercises and LBD  -SM (r) SA (t) SM (c)           User Key  (r) = Recorded By, (t) = Taken By, (c) = Cosigned By    Initials Name Provider Type    Trinidad Garcia, OT Occupational Therapist    Pema Gonzalez, OT Student OT Student               Goals/Plan     Row Name 08/10/22 1015          Dressing Goal 1 (OT)    Activity/Device (Dressing Goal 1, OT) upper body dressing  -SM (r) SA (t) SM (c)     [place]/Cues Needed (Dressing Goal 1, OT) verbal cues required;nonverbal cues (demo/gesture) required;standby assist  -SM (r) SA (t) SM (c)     Time Frame (Dressing Goal 1, OT) short term goal (STG);2 weeks  -SM (r) SA (t) SM (c)     Strategies/Barriers (Dressing Goal 1, OT) pt will complete using yolande technique and maintaining shoulder precautions  -SM (r) SA (t) SM (c)     Progress/Outcome (Dressing Goal 1, OT) goal met  -SM (r) SA (t) SM (c)     Row Name 08/10/22 1015          ROM Goal 1 (OT)    ROM Goal 1 (OT) Pt will demonstrate understanding of and complete one set of HEP  -SM (r) SA (t) SM (c)     Time Frame (ROM Goal 1, OT) short term goal (STG);2 weeks  -SM (r) SA (t) SM (c)     Progress/Outcome (ROM Goal 1, OT) goal met  -SM (r) SA (t) SM (c)           User Key  (r) = Recorded By, (t) = Taken By, (c) = Cosigned By    Initials Name Provider Type    Trinidad Garcia OT Occupational Therapist    Pema Gonzalez, OT Student OT Student               Clinical Impression     Row Name 08/10/22 1009          Pain Assessment    Pretreatment Pain Rating 8/10  -SM (r) SA (t) SM (c)     Posttreatment Pain Rating 8/10  -SM (r) SA (t) SM (c)     Pain Location - Side/Orientation Left  -SM (r) SA (t) SM (c)     Pain Location incisional  -SM (r) SA (t) SM (c)     Pain Location - shoulder  -SM (r) SA (t) SM (c)     Pain Intervention(s) Cold applied;Rest;Ambulation/increased activity  -SM (r) SA (t) SM (c)     Row Name 08/10/22 100          Plan of Care Review    Plan of  Care Reviewed With patient;sibling  -SM (r) SA (t) SM (c)     Outcome Evaluation Pt is a 76 y.o. F POD 1 L reverse TSA; prior to admit, pt reports living alone and being independent with ADLs. Pt today seen by OT and educated on WB status and shoulder precautions. Pt was the educated on and practiced HEP, UBD with yolande technique, and LBD while maintaining shoulder precautions. Throughout session, pt performed x3 STS with CGA for stability as pt reported light dizziness and unsteadiness when standing. OT and pt discussed d/c placement as pt is fearful of going home alone and would like to be d/c to SNF for brief stay then going home to continue with either HH or OP OT. OT recommends this or d/c home with assist and further OT services.  -SM (r) SA (t) SM (c)     Row Name 08/10/22 1009          Therapy Assessment/Plan (OT)    Rehab Potential (OT) good, to achieve stated therapy goals  -SM (r) SA (t) SM (c)     Criteria for Skilled Therapeutic Interventions Met (OT) yes;meets criteria;skilled treatment is necessary  -SM (r) SA (t) SM (c)     Therapy Frequency (OT) evaluation only  -SM (r) SA (t) SM (c)     Row Name 08/10/22 1009          Therapy Plan Review/Discharge Plan (OT)    Anticipated Discharge Disposition (OT) home with assist;home with home health;home with outpatient therapy services;skilled nursing facility  -SM (r) SA (t) SM (c)     Row Name 08/10/22 1009          Vital Signs    Pre SpO2 (%) 91  -SM (r) SA (t) SM (c)     O2 Delivery Pre Treatment room air  -SM (r) SA (t) SM (c)     Post SpO2 (%) 93  -SM (r) SA (t) SM (c)     O2 Delivery Post Treatment room air  -SM (r) SA (t) SM (c)     Row Name 08/10/22 1009          Positioning and Restraints    Pre-Treatment Position sitting in chair/recliner  -SM (r) SA (t) SM (c)     Post Treatment Position chair  -SM (r) SA (t) SM (c)     In Chair sitting;call light within reach;encouraged to call for assist;exit alarm on;with family/caregiver  -SM (r) SA (t) SM (c)            User Key  (r) = Recorded By, (t) = Taken By, (c) = Cosigned By    Initials Name Provider Type    Trinidad Garcia, OT Occupational Therapist    Pema Gonzalez, OT Student OT Student               Outcome Measures     Row Name 08/10/22 1016          How much help from another is currently needed...    Putting on and taking off regular lower body clothing? 3  -SM (r) SA (t) SM (c)     Bathing (including washing, rinsing, and drying) 3  -SM (r) SA (t) SM (c)     Toileting (which includes using toilet bed pan or urinal) 3  -SM (r) SA (t) SM (c)     Putting on and taking off regular upper body clothing 3  -SM (r) SA (t) SM (c)     Taking care of personal grooming (such as brushing teeth) 3  -SM (r) SA (t) SM (c)     Eating meals 4  -SM (r) SA (t) SM (c)     AM-PAC 6 Clicks Score (OT) 19  -SM (r) SA (t)     Row Name 08/10/22 0740          How much help from another person do you currently need...    Turning from your back to your side while in flat bed without using bedrails? 4  -JF     Moving from lying on back to sitting on the side of a flat bed without bedrails? 3  -JF     Moving to and from a bed to a chair (including a wheelchair)? 3  -JF     Standing up from a chair using your arms (e.g., wheelchair, bedside chair)? 3  -JF     Climbing 3-5 steps with a railing? 3  -JF     To walk in hospital room? 3  -JF     AM-PAC 6 Clicks Score (PT) 19  -JF     Highest level of mobility 6 --> Walked 10 steps or more  -JF     Row Name 08/10/22 1016          Functional Assessment    Outcome Measure Options AM-PAC 6 Clicks Daily Activity (OT)  -SM (r) SA (t) SM (c)           User Key  (r) = Recorded By, (t) = Taken By, (c) = Cosigned By    Initials Name Provider Type    Trinidad Garcia OT Occupational Therapist    Emi Titus, RN Registered Nurse    Pema Gonzalez, OT Student OT Student              Occupational Therapy Education                 Title: PT OT SLP Therapies (Done)     Topic: Occupational  Therapy (Done)     Point: ADL training (Done)     Description:   Instruct learner(s) on proper safety adaptation and remediation techniques during self care or transfers.   Instruct in proper use of assistive devices.              Learning Progress Summary           Patient Acceptance, E,TB, VU,DU by  at 8/10/2022 1020    Comment: role of OT, shoulder precautions, WB status, HEP, yolande technique, dressing with precautions, d/c recommendations, plan of care                   Point: Home exercise program (Done)     Description:   Instruct learner(s) on appropriate technique for monitoring, assisting and/or progressing therapeutic exercises/activities.              Learning Progress Summary           Patient Acceptance, E,TB, VU,DU by  at 8/10/2022 1020    Comment: role of OT, shoulder precautions, WB status, HEP, yolande technique, dressing with precautions, d/c recommendations, plan of care                   Point: Precautions (Done)     Description:   Instruct learner(s) on prescribed precautions during self-care and functional transfers.              Learning Progress Summary           Patient Acceptance, E,TB, VU,DU by  at 8/10/2022 1020    Comment: role of OT, shoulder precautions, WB status, HEP, yolande technique, dressing with precautions, d/c recommendations, plan of care                   Point: Body mechanics (Done)     Description:   Instruct learner(s) on proper positioning and spine alignment during self-care, functional mobility activities and/or exercises.              Learning Progress Summary           Patient Acceptance, E,TB, VU,DU by  at 8/10/2022 1020    Comment: role of OT, shoulder precautions, WB status, HEP, yolande technique, dressing with precautions, d/c recommendations, plan of care                               User Key     Initials Effective Dates Name Provider Type Discipline     05/23/22 -  Pema Wells OT Student OT Student OT              OT Recommendation and Plan  Therapy  Frequency (OT): evaluation only  Plan of Care Review  Plan of Care Reviewed With: patient, sibling  Outcome Evaluation: Pt is a 76 y.o. F POD 1 L reverse TSA; prior to admit, pt reports living alone and being independent with ADLs. Pt today seen by OT and educated on WB status and shoulder precautions. Pt was the educated on and practiced HEP, UBD with yolande technique, and LBD while maintaining shoulder precautions. Throughout session, pt performed x3 STS with CGA for stability as pt reported light dizziness and unsteadiness when standing. OT and pt discussed d/c placement as pt is fearful of going home alone and would like to be d/c to SNF for brief stay then going home to continue with either HH or OP OT. OT recommends this or d/c home with assist and further OT services.  Plan of Care Reviewed With: patient, sibling  Outcome Evaluation: Pt is a 76 y.o. F POD 1 L reverse TSA; prior to admit, pt reports living alone and being independent with ADLs. Pt today seen by OT and educated on WB status and shoulder precautions. Pt was the educated on and practiced HEP, UBD with yolande technique, and LBD while maintaining shoulder precautions. Throughout session, pt performed x3 STS with CGA for stability as pt reported light dizziness and unsteadiness when standing. OT and pt discussed d/c placement as pt is fearful of going home alone and would like to be d/c to SNF for brief stay then going home to continue with either HH or OP OT. OT recommends this or d/c home with assist and further OT services.     Time Calculation:    Time Calculation- OT     Row Name 08/10/22 1021             Time Calculation- OT    OT Start Time 0838  -SM (r) SA (t) SM (c)      OT Stop Time 0908  -SM (r) SA (t) SM (c)      OT Time Calculation (min) 30 min  -SM (r) SA (t)      Total Timed Code Minutes- OT 23 minute(s)  -SM (r) SA (t) SM (c)      OT Received On 08/10/22  -SM (r) SA (t) SM (c)              Timed Charges    98531 - OT Therapeutic Exercise  Minutes 10  -SM (r) SA (t) SM (c)      76886 - OT Self Care/Mgmt Minutes 13  -SM (r) SA (t) SM (c)              Untimed Charges    OT Eval/Re-eval Minutes 7  -SM (r) SA (t) SM (c)              Total Minutes    Timed Charges Total Minutes 23  -SM (r) SA (t)      Untimed Charges Total Minutes 7  -SM (r) SA (t)       Total Minutes 30  -SM (r) SA (t)            User Key  (r) = Recorded By, (t) = Taken By, (c) = Cosigned By    Initials Name Provider Type    Trinidad Garcia, OT Occupational Therapist    [facility] Hospital[facility], OT Student OT Student              Therapy Charges for Today     Code Description Service Date Service Provider Modifiers Qty    [id number]  OT THER PROC EA 15 MIN 8/10/2022 Pema Wells OT Student GO 1    [id number]  OT SELF CARE/MGMT/TRAIN EA 15 MIN 8/10/2022 Pema Wells OT Student GO 1    [id number]  OT EVAL LOW COMPLEXITY 2 8/10/2022 WellsPema OT Student GO 1             OT Discharge Summary  Anticipated Discharge Disposition (OT): home with assist, home with home health, home with outpatient therapy services, skilled nursing facility    Pema BINH Wells  8/10/2022

## 2022-08-10 NOTE — OUTREACH NOTE
Prep Survey    Flowsheet Row Responses   [facility] facility patient discharged from? [place]   Is LACE score < 7 ? No   Emergency Room discharge w/ pulse ox? No   Eligibility Not Eligible   What are the reasons patient is not eligible? [facility]   Does the patient have one of the following disease processes/diagnoses(primary or secondary)? Total Joint Replacement   Prep survey completed? Yes          TANNER ROBLERO - Registered Nurse

## 2022-08-10 NOTE — PROGRESS NOTES
Continued Stay Note  [facility]     Patient Name: Yi Lee  MRN: [id number]  Today's Date: 8/10/2022    Admit Date: 8/9/2022     Discharge Plan     Row Name 08/10/22 1606       Plan    Plan [facility]    Patient/Family in Agreement with Plan yes    Plan Comments Pt is pre registered to d/c to [facility]. Per [facility] pt is approved with bed available pending negative Covid test.    Final Discharge Disposition Code 03 - skilled nursing facility (SNF)    Final Note Covid test negative, pt to d/c to [facility]. Pt transported to SNF in car with family.               Discharge Codes    No documentation.               Expected Discharge Date and Time     Expected Discharge Date Expected Discharge Time    Aug 10, 2022             Vicky Abdalla RN

## 2022-08-10 NOTE — PLAN OF CARE
Goal Outcome Evaluation:  Plan of Care Reviewed With: patient, sibling           Outcome Evaluation: Pt is a 76 y.o. F POD 1 L reverse TSA; prior to admit, pt reports living alone and being independent with ADLs. Pt today seen by OT and educated on WB status and shoulder precautions. Pt was the educated on and practiced HEP, UBD with yolande technique, and LBD while maintaining shoulder precautions. Throughout session, pt performed x3 STS with CGA for stability as pt reported light dizziness and unsteadiness when standing. OT and pt discussed d/c placement as pt is fearful of going home alone and would like to be d/c to SNF for brief stay then going home to continue with either HH or OP OT. OT recommends this or d/c home with assist and further OT services.    OT wore appropriate PPE and completed hand hygiene.

## 2022-08-24 NOTE — ED PROVIDER NOTES
MD ATTESTATION NOTE    The SCARLETT and I have discussed this patient's history, physical exam, and treatment plan.  I have reviewed the documentation and personally had a face to face interaction with the patient. I affirm the documentation and agree with the treatment and plan.  The attached note describes my personal findings.      I provided a substantive portion of the care of the patient.  I personally performed the physical exam in its entirety, and below are my findings.  For this patient encounter, the patient wore surgical mask, I wore full protective PPE including N95 and eye protection    Brief HPI: 76-year-old female who suffered a fall just prior to arrival causing her to strike her head and nose a negative counter.  The patient denies loss of consciousness.  However states she was temporarily dazed.  Patient denies neck pain or blood thinner use.  Patient denies other injuries.    General : 76-year-old patient is awake alert and oriented  HEENT: Bruising and hematoma to forehead: Laceration to nasal bridge: No septal hematoma: No dental injury: Abrasion to chin: Minimal C-spine tenderness  CV: Heart is regular with no murmurs: Chest nontender  Respiratory: CTA bilaterally  Abd: Soft and nontender  Ext: No acute abnormalities with full range of motion and palpation without difficulty except for the patient's left shoulder where she has had recent surgery.  The incision is clean dry and intact and she has no bony tenderness or deformity in her left shoulder  Skin: No rash  Neuro: Cranial nerves II through XII grossly intact as tested.  No acute lateralizing deficits.  Psych: Normal mood and affect      Plan: CT head/C-spine and face and then repair the patient's laceration  Patient CT head and C-spine are negative acute.  The CT of her face does show bilateral nasal bone fractures.  The patient was sutured by Wei Humphreys and is stable for discharge and outpatient ENT follow-up.     Dipak Hwang,  MD  08/24/22 1535

## 2022-08-24 NOTE — ED PROVIDER NOTES
EMERGENCY DEPARTMENT ENCOUNTER    Room Number:  38/38  Date of encounter:  8/24/2022  PCP: Ankit Albert MD  Historian: Patient, son      I used full protective equipment while examining this patient.  This includes face mask, gloves and protective eyewear.  I washed my hands before entering the room and immediately upon leaving the room      HPI:  Chief Complaint: Fall  A complete HPI/ROS/PMH/PSH/SH/FH are unobtainable due to: Nothing    Context: Yi Lee is a 76 y.o. female who presents to the ED c/o injuries sustained in a mechanical fall prior to arrival.  Patient states she lost her balance in the bathroom and fell into a bathroom counter.  Patient hit her forehead and nose on the edge of the counter.  Patient denies any loss of consciousness.  She states she was temporarily dazed.  She does not take any blood thinners.  She denies any neck pain.  She was unable to control the bleeding at home.  She denies any other injuries in the fall.  She states she was in her normal state of health prior to the fall.  She does complain of a throbbing, global, frontal headache.  Last tetanus shot greater than 5 years ago.    Review of Medical Records  I reviewed admission from 8/9/2022.  Patient had her left shoulder replaced.    PAST MEDICAL HISTORY  Active Ambulatory Problems     Diagnosis Date Noted   • Abnormal creatinine clearance glomerular filtration 11/22/2015   • Benign essential hypertension 11/22/2015   • Chronic obstructive pulmonary disease (HCC) 11/22/2015   • Depression 11/22/2015   • Type 2 diabetes mellitus with diabetic neuropathy, without long-term current use of insulin (HCC) 11/22/2015   • Gastroesophageal reflux disease 11/22/2015   • Hyperlipidemia 11/22/2015   • Hypothyroidism 11/22/2015   • Obesity (BMI 30-39.9) 11/22/2015   • Ataxic gait 11/22/2015   • Abdominal pain, right lateral 04/25/2016   • Iron deficiency anemia due to chronic blood loss 01/27/2017   • Cystic kidney disease 07/28/2017    • Vitamin B12 deficiency 07/28/2017   • OA (osteoarthritis) of knee 02/13/2018   • Iron deficiency anemia 01/15/2020   • Bipolar I disorder, single manic episode (HCC) 06/17/2020   • Ductal carcinoma in situ (DCIS) of left breast 07/31/2020   • Bradycardia 07/31/2020   • Systolic murmur 07/31/2020   • Bicuspid aortic valve 03/18/2021   • Renal insufficiency 03/29/2021   • 1st degree AV block 03/29/2021   • Dizziness 03/29/2021   • Fatigue    • Vitamin D deficiency    • Bruising 10/13/2021   • Weight loss, abnormal 11/24/2021   • Loss of appetite 11/24/2021   • Constipation 11/24/2021   • Aromatase inhibitor use 04/29/2021   • Breast neoplasm, Tis (DCIS), left 07/28/2020   • Lupus erythematosus 01/04/2022   • Malignant neoplasm of breast (HCC) 01/04/2022   • Normocytic anemia 11/04/2021   • Hypertension 01/04/2022   • S/P reverse total shoulder arthroplasty, left 08/08/2022     Resolved Ambulatory Problems     Diagnosis Date Noted   • Acute bronchitis 11/22/2015   • Pain of lower extremity 11/22/2015   • Ataxic gait 11/22/2015   • Back pain 11/22/2015   • Impaired cognition 11/22/2015   • Cough 11/22/2015   • Arthralgia of hip 11/22/2015   • Memory loss 11/22/2015     Past Medical History:   Diagnosis Date   • Abdominal pain, LLQ    • Anemia    • Asthma    • Cardiac murmur    • Colon polyp    • Coronary artery disease    • Degeneration, intervertebral disc, thoracolumbar    • Diabetes mellitus ([facility])    • Disease of thyroid gland    • Diverticulosis    • Esophageal spasm    • GERD (gastroesophageal reflux disease)    • H/O bone density study 10/17/2007   • Hemorrhoids    • History of mammogram    • History of Papanicolaou smear of cervix    • History of transfusion    • Optic nerve contusion    • Pseudoaneurysm following procedure (HCC)    • Shoulder pain    • Skin tear of forearm without complication, right, initial encounter    • Stage 2 chronic kidney disease          PAST SURGICAL HISTORY  Past Surgical History:    Procedure Laterality Date   • BREAST LUMPECTOMY Left     benign   • CHOLECYSTECTOMY     • COLONOSCOPY  2007    done 02/12, repeat 5 yrs, Dr Grigsby; tics in sigmoid colon, IH   • COLONOSCOPY N/A 11/3/2016    polyp, IH   • COLONOSCOPY N/A 2/24/2020    Procedure: COLONOSCOPY TO CECUM WITH COLD POLYPECTOMY;  Surgeon: Eddie Grigsby MD;  [facility];  Service: Gastroenterology;  Laterality: N/A;  pre: hx of polyps  post: polyp, hemorrhoids, diverticulosis   • COLONOSCOPY N/A 12/7/2021    Procedure: COLONOSCOPY TO CECUM  - COLD BIOPSY POLYPECTOMIES;  Surgeon: Eddie Grigsby MD;  [facility];  Service: Gastroenterology;  Laterality: N/A;  IRON DEFICIENCY ANEMIA, HX POLYPS, CONSTIPATION   ---DIVERTICULOSIS, POLYPS   • D & C AND LAPAROSCOPY     • DILATATION AND CURETTAGE     • ENDOSCOPY N/A 2/24/2020    Procedure: ESOPHAGOGASTRODUODENOSCOPY with biopsies;  Surgeon: Eddie Grigsby MD;  [facility];  Service: Gastroenterology;  Laterality: N/A;  pre: iron deficiency anemia  post: gastriits   • ENDOSCOPY N/A 12/7/2021    Procedure: ESOPHAGOGASTRODUODENOSCOPY, WITH BIOPSIES;  Surgeon: Eddie Grigsby MD;  Location: [facility] ENDOSCOPY;  Service: Gastroenterology;  Laterality: N/A;  GERD,WT LOSS, IRON DEFICIENCY ANEMIA ,  DYSPEPSIA  ---HIATAL HERNIA, GASTRITIS   • ESOPHAGOGASTRIC FUNDOPLASTY     • ROTATOR CUFF REPAIR Right 2009   • TOTAL KNEE ARTHROPLASTY Left 2/13/2018    Procedure: LT TOTAL KNEE ARTHROPLASTY;  Surgeon: Selwyn Pinto MD;  Location: [facility] MAIN OR;  Service:    • TOTAL SHOULDER ARTHROPLASTY W/ DISTAL CLAVICLE EXCISION Left 8/9/2022    Procedure: TOTAL SHOULDER REVERSE ARTHROPLASTY;  Surgeon: Yony Gallardo MD;  Location: [facility] OR OSC;  Service: Orthopedics;  Laterality: Left;         FAMILY HISTORY  Family History   Problem Relation Age of Onset   • Colon polyps Father    • Heart disease Father    • Prostate cancer Father    • Hepatitis Other    • Coronary artery  disease Other    • Hypertension Brother    • Lung cancer Maternal Aunt    • Lung cancer Paternal Uncle    • Stroke Paternal Grandmother    • Cerebral aneurysm Paternal Grandmother    • Stroke Paternal Grandfather    • Cerebral aneurysm Paternal Grandfather    • Lung cancer Maternal Aunt    • Malig Hyperthermia Neg Hx          SOCIAL HISTORY  Social History     Socioeconomic History   • Marital status:    • Number of children: 1   Tobacco Use   • Smoking status: Former Smoker     Packs/day: 1.00     Years: 7.00     Pack years: 7.00     Types: Cigarettes     Quit date:      Years since quittin.6   • Smokeless tobacco: Never Used   • Tobacco comment: caffeine use: 2 CUPS COFFEE/ 3 DIET PEPSIS DAILY   Vaping Use   • Vaping Use: Never used   Substance and Sexual Activity   • Alcohol use: Yes     Comment: OCC   • Drug use: No   • Sexual activity: Never         ALLERGIES  Morphine, Penicillins, Ace inhibitors, and Telmisartan        REVIEW OF SYSTEMS  All systems reviewed and negative except for those discussed in HPI.       PHYSICAL EXAM    I have reviewed the triage vital signs and nursing notes.    ED Triage Vitals [22 0820]   Temp Heart Rate Resp BP SpO2   98.1 °F (36.7 °C) 63 16 130/60 97 %      Temp src Heart Rate Source Patient Position BP Location FiO2 (%)   Tympanic -- -- -- --       Physical Exam    GENERAL: Alert, oriented, not distressed  HENT: Medium sized hematoma to the forehead.  Laceration to bridge of nose with mild active bleeding.  No orbital tenderness.   Small abrasion to chin.  No malocclusion.  No dental injury.  No cervical tenderness or vertebral step-off.  EYES: no scleral icterus, EOMI  CV: regular rhythm, regular rate, no murmur  RESPIRATORY: normal effort, CTA  ABDOMEN: soft, nontender  MUSCULOSKELETAL:  No significant bony tenderness to the extremities.Small abrasion to right knee without bony tenderness.  He surgical incision to left shoulder appears free of infection,  no evidence of dehiscence.   NEURO: alert, moves all extremities, follows commands  SKIN: warm, dry      Laceration Repair    Date/Time: 8/24/2022 3:17 PM  Performed by: Wei Humphreys PA  Authorized by: Dipak Hwang MD     Consent:     Consent obtained:  Verbal    Consent given by:  Patient  Anesthesia:     Anesthesia method:  Local infiltration    Local anesthetic:  Lidocaine 1% w/o epi  Laceration details:     Location:  Face    Face location:  Nose    Length (cm):  2.7  Treatment:     Area cleansed with:  Chlorhexidine    Amount of cleaning:  Standard    Irrigation solution:  Sterile saline  Skin repair:     Repair method:  Sutures    Suture size:  5-0    Suture material:  Nylon    Suture technique:  Simple interrupted    Number of sutures:  7  Approximation:     Approximation:  Close  Repair type:     Repair type:  Simple  Post-procedure details:     Dressing:  Antibiotic ointment        RADIOLOGY  CT Head Without Contrast, CT Cervical Spine Without Contrast, CT Facial Bones Without Contrast    Result Date: 8/24/2022  EMERGENCY NONCONTRAST HEAD CT NONCONTRAST FACIAL CT NONCONTRAST CERVICAL SPINE CT 08/24/2022  CLINICAL HISTORY: Fell, contusion of forehead. Periorbital bruising, head, face and neck pain.  HEAD CT TECHNIQUE: Spiral CT images were obtained from the base of skull to the vertex without intravenous contrast and images were reformatted and submitted in 3 mm thick axial, sagittal and coronal CT sections with brain algorithm, 2 mm thick axial CT sections with high-resolution bone algorithm.  COMPARISON: This is correlated to prior head CT on 03/26/2021.  FINDINGS: There is minimal low-density in the periventricular white matter, consistent with minimal small vessel disease. The remainder of the brain parenchyma is normal in attenuation. The ventricles are normal in size. I see no focal mass effect. There is no midline shift. No extra-axial fluid collections are identified. There is no evidence of  acute intracranial hemorrhage. There is a moderate size scalp hematoma over the anterior-inferior frontal bone extending to the periorbital region from today's head and facial trauma. No underlying acute skull fracture is identified. There is evidence of acute fracture of nasal bones. Paranasal sinuses and mastoid air cells and middle ear cavities are clear.      1. No acute intracranial abnormality seen. There is minimal small vessel disease in the periventricular white matter. 2. Moderate size scalp hematoma over the anterior-inferior frontal bone from today's head trauma. There is evidence of fractures in the nasal bones that will be described on the facial CT report below, and see the below report. The remainder of the head CT is normal with no acute skull fracture or intracranial hemorrhage identified.  FACIAL CT TECHNIQUE: Spiral CT images were obtained through the facial bones in the axial imaging plain. The images were reformatted and submitted in 2 mm thick axial, sagittal and coronal CT sections with soft tissue algorithm and 1 mm thick axial, sagittal and coronal CT sections with high-resolution bone algorithm.  FINDINGS: There is a moderate size scalp hematoma over the anterior-inferior frontal bone. There is an acute comminuted fracture of the right nasal bone with multiple right nasal bone fracture fragments and the fracture plane extends into the nasal bridge and there is an additional fracture of the distal left nasal bone. There is some thickening in the anterior aspect of the cartilaginous nasal septum, may be some blood in the anterior nasal cavity. No additional facial fractures are seen.  IMPRESSION: 1. Moderate size scalp hematoma over the anterior-inferior frontal bone extending into the periorbital and paranasal region. There is an acute comminuted fracture of the right nasal bone with a fragmented right nasal bone with multiple right nasal bone fracture fragments and fracture plane extending  into the nasal bridge and fracture of the left nasal bone as well. There is some thickening of the anterior aspect of the cartilaginous nasal septum deep to the nasal bone fractures where there may be some swelling and there is some blood in the nasal cavity. 2. The remainder of the facial CT is within normal limits.   CERVICAL SPINE CT TECHNIQUE: Spiral CT images were obtained from skull base down to the T2 thoracic level. The images were reformatted and submitted in 2 mm thick axial and sagittal CT sections with soft tissue algorithm and 1 mm thick axial, sagittal and coronal CT sections with high-resolution bone algorithm.  COMPARISON: There are no prior cervical spine imaging studies from [facility] for comparison.  FINDINGS: The atlantooccipital articulation is normal in appearance.  At C1-C2, there are arthritic changes at the atlantodental interval with marginal spurring off the anterior ring of C1 and odontoid, small 4 mm subchondral cyst at the anterior base of the odontoid. Otherwise, the C1-C2 level is normal in appearance.  At C2-C3, the posterior disc margin and facets and uncovertebral joints are normal in appearance with no canal or foraminal narrowing.  At C3-C4, there is a right paracentral disc bulge, tiny disc protrusion mildly narrows the right side of the canal. Facets and uncovertebral joints are normal. There is no foraminal narrowing.  At C4-C5, there is moderate disc space narrowing and there are degenerative disc and endplate changes, diffuse posterior disc osteophyte complex abuts the ventral surface of the cord, mild to moderately narrowing the canal. There is only minimal facet overgrowth and combination with uncovertebral joint spurs contribute to mild left and there is moderate right bony foraminal narrowing.  At C5-C6, there is moderate disc space narrowing and degenerative endplate changes, diffuse posterior disc osteophyte complex abuts the ventral surface of cord,  mild to moderately narrowing the canal. There is only minimal facet overgrowth. There is bilateral uncovertebral joint hypertrophy and there is mild-to-moderate right and there is moderate-to-severe left bony foraminal narrowing.  At C6-C7, there is disc space narrowing and degenerative endplate changes, posterior disc osteophyte complex minimally narrows the canal. Facets are normal. There is bilateral uncovertebral joint hypertrophy. There is mild-to-moderate left and minimal right foraminal narrowing.  At C7-T1, minimal facet overgrowth. Posterior disc margin is normal. There is no canal or foraminal narrowing.  No acute fracture is seen in the cervical spine.  IMPRESSION: 1. No acute fracture is seen in the cervical spine. There is mild cervical spondylosis as described.  Radiation dose reduction techniques were utilized, including automated exposure control and exposure modulation based on body size.  This report was finalized on 8/24/2022 12:24 PM by Dr. Selwyn Kerr M.D.        I ordered the above noted radiological studies. Reviewed by me and discussed with radiologist.  See dictation for official radiology interpretation.      MEDICATIONS GIVEN IN ER    Medications   Tetanus-Diphth-Acell Pertussis (BOOSTRIX) injection 0.5 mL (0.5 mL Intramuscular Given 8/24/22 0837)   lidocaine (XYLOCAINE) 1 % injection 10 mL (10 mL Injection Given by Other 8/24/22 1132)   HYDROcodone-acetaminophen (NORCO) 7.5-325 MG per tablet 1 tablet (1 tablet Oral Given 8/24/22 1131)         PROGRESS, DATA ANALYSIS, CONSULTS, AND MEDICAL DECISION MAKING    All labs have been independently reviewed by me.  All radiology studies have been reviewed by me and discussed with radiologist dictating the report.   EKG's independently viewed and interpreted by me.  Discussion below represents my analysis of pertinent findings related to patient's condition, differential diagnosis, treatment plan and final disposition.    I have discussed case  with Dr. Hwang, emergency room physician.  He has performed his own bedside examination and agrees with treatment plan.    ED Course as of 08/24/22 1537   Wed Aug 24, 2022   0831 Patient presents with injuries sustained in a mechanical fall prior to arrival.  Patient has facial trauma, nasal laceration.  No neurodeficits.    Patient presents with fall with closed head injury, no alarm signs or symptoms but given patient age, high risk of morbidity and mortality, obtaining CT head imaging.  Patient denies any neck pain but given patient age, high risk of occult cervical spine fracture, obtaining CT cervical imaging.   [EE]   1008 I discussed head CT findings with Dr. Kerr.  No acute intracranial or cervical fractures.  Patient does have bilateral nasal bone fractures. [EE]   1150 Updated patient and family on findings.  She has no neurodeficits.  She does have appropriate ENT follow-up.  We will discharge. [EE]      ED Course User Index  [EE] Wei Humphreys PA       AS OF 15:17 EDT VITALS:    BP - 166/73  HR - 62  TEMP - 98.1 °F (36.7 °C) (Tympanic)  O2 SATS - 98%        DIAGNOSIS  Final diagnoses:   Fall, initial encounter   Open fracture of nasal bone, initial encounter   Laceration of nose, initial encounter   Contusion of scalp, initial encounter         DISPOSITION  Discharged      Dictated utilizing Dragon dictation     Wei Humphreys PA  08/24/22 1537
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
## 2022-08-30 PROBLEM — Z48.02 VISIT FOR SUTURE REMOVAL: Status: ACTIVE | Noted: 2022-01-01

## 2022-08-30 PROBLEM — S02.2XXD CLOSED FRACTURE OF NASAL BONE WITH ROUTINE HEALING: Status: ACTIVE | Noted: 2022-01-01

## 2022-08-30 NOTE — ASSESSMENT & PLAN NOTE
Patient recommended to use walker or cane for stability.   Advised to clean living area of rugs or fall hazards.

## 2022-08-30 NOTE — ASSESSMENT & PLAN NOTE
7 sutures removed from nasal region.   Bacitracin and bandage applied.   Advised to keep area clean with mild soap and water.   Return PRN.

## 2022-08-30 NOTE — PROGRESS NOTES
"Chief Complaint  Suture / Staple Removal (nose)    Subjective        Yi Lee presents to [facility] PRIMARY CARE  History of Present Illness  This is a 75 y/o female presenting to office for f/u ED visit on 8/24/22. Patient had sustained a fall at home when falling in the bathroom and hitting head and nose on edge of counter. Patient had 7 sutures placed in nose region. Patient's imaging noted to have bilateral nasal bone fractures. Patient's CT of head and C-spine did not show acute abnormalities. Patient presents today for suture removal. Patient noted to have extensive facial bruising as well.       Objective   Vital Signs:  /80 (BP Location: Right arm, Patient Position: Sitting, Cuff Size: Adult)   Pulse 58   Temp 97.7 °F (36.5 °C) (Temporal)   Resp 16   Ht 157.5 cm (62.01\")   Wt 72.6 kg (160 lb)   SpO2 98%   BMI 29.26 kg/m²   Estimated body mass index is 29.26 kg/m² as calculated from the following:    Height as of this encounter: 157.5 cm (62.01\").    Weight as of this encounter: 72.6 kg (160 lb).          Physical Exam  Constitutional:       Appearance: Normal appearance. She is obese.   HENT:      Head: Normocephalic and atraumatic.      Right Ear: External ear normal.      Left Ear: External ear normal.   Pulmonary:      Effort: Pulmonary effort is normal.   Skin:     Findings: Bruising present.             Comments: Extensive bruising around right facial region and nasal region   Neurological:      Mental Status: She is alert and oriented to person, place, and time. Mental status is at baseline.      Motor: Weakness present.   Psychiatric:         Mood and Affect: Mood normal.         Thought Content: Thought content normal.         Judgment: Judgment normal.        Result Review :  The following data was reviewed by: EZRA Saldivar on 08/30/2022:  Common labs    Common Labsle 4/15/22 8/4/22 8/4/22 8/10/22 8/10/22     1327 1327 0550 0550   Glucose   133 (A)  157 " (A)   BUN   27 (A)  26 (A)   Creatinine   1.39 (A)  1.12 (A)   Sodium   138  134 (A)   Potassium   5.4 (A)  4.2   Chloride   107  101   Calcium   9.5  9.5   Albumin   4.10     Total Bilirubin   0.2     Alkaline Phosphatase   85     AST (SGOT)   18     ALT (SGPT)   15     WBC  5.81      Hemoglobin  10.5 (A)  9.2 (A)    Hematocrit  34.0  26.9 (A)    Platelets  182      Hemoglobin A1C 5.9 (A)       (A) Abnormal value       Comments are available for some flowsheets but are not being displayed.                    Assessment and Plan   Diagnoses and all orders for this visit:    1. Visit for suture removal (Primary)  Assessment & Plan:  7 sutures removed from nasal region.   Bacitracin and bandage applied.   Advised to keep area clean with mild soap and water.   Return PRN.       2. Closed fracture of nasal bone with routine healing  Assessment & Plan:  F/u with ENT for further treatment and evaluation.       3. Fall, subsequent encounter  Assessment & Plan:  Patient recommended to use walker or cane for stability.   Advised to clean living area of rugs or fall hazards.            I spent 30 minutes caring for Yi on this date of service. This time includes time spent by me in the following activities:preparing for the visit, reviewing tests, obtaining and/or reviewing a separately obtained history, performing a medically appropriate examination and/or evaluation , counseling and educating the patient/family/caregiver, ordering medications, tests, or procedures, documenting information in the medical record and care coordination  Follow Up   Return if symptoms worsen or fail to improve.  Patient was given instructions and counseling regarding her condition or for health maintenance advice. Please see specific information pulled into the AVS if appropriate.

## 2022-09-16 NOTE — PROGRESS NOTES
Date of Office Visit: 2022  Encounter Provider: EZRA Frazier  Place of Service: [facility] CARDIOLOGY  Patient Name: Yi Lee  :1945    Chief Complaint   Patient presents with   • Hypertension   :     HPI: Yi Lee is a 76 y.o. female.  She is a patient of Dr. Falcon's with hypertension and hyperlipidemia.  In 2020, she was evaluated for a systolic murmur.  She was also bradycardic for which Dr. Falcon recommended decreasing the dose of the metoprolol.  In 2021, an echocardiogram revealed normal LV systolic function, LV outflow track thickening with no significant obstructive disease, a bicuspid aortic valve with mild stenosis, and mild to moderate mitral regurgitation.     In 2021, she struggled with elevated blood pressures which at 1 point landed her in the ER.  Ultimately, she underwent a renal artery ultrasound which was normal.  Around that time, she was also referred to Dr. Hawkins for bradycardia and mild sick sinus syndrome.  However, given her asymptomatic nature, he noted to simply ignore them and minimize beta-blocker use.   I last saw her in the office in 2021 at which time she was overall feeling well.  She reported mild shortness of breath with inclines.  Her biggest concern was elevated blood pressures in the morning which were reportedly in the 170s and 180s systolic prior to taking her medication.  I discussed the plan of care with Dr. Falcon.  Ultimately, she was advised to take 7.5 mg of amlodipine in the morning and 7.5 mg in the evening.  She was advised to follow-up in January as scheduled with Dr. Falcon.    From a cardiac standpoint, she has overall been doing well.  She denies any chest pain, shortness of breath, palpitations, edema, dizziness, or syncope.  On , she underwent a left total shoulder reverse arthroplasty and was subsequently discharged to the [facility].  She is back at  her house now.  Reportedly she saw nephrology in July at which time her blood pressure was 111/66 and her heart rate was 49.  Her nephrologist advised her to follow-up with her cardiologist and recommended reducing the dose of the carvedilol.  She is not really sure what her blood pressures have been at home she has not been checking them.  She is also not sure what they were at the [facility] although she tells me they frequently held her blood pressure medications.  On 8/24, she presented to the ED following a mechanical fall at home.  Reportedly she hit her forehead and nose on the edge of the counter.  She denied any loss of consciousness.  She underwent multiple CT scans all of which were stable.  Her blood pressure at that time was 180/80.  Of note, she has not yet had her blood pressure medications today.    Past Medical History:   Diagnosis Date   • Abdominal pain, LLQ    • Anemia    • Arthralgia of hip 11/22/2015   • Asthma    • Ataxic gait 11/22/2015    Description: Eval Dr Lillian Mederos, Neuro.  Some vestibular dysfunction present 2013/2014   • Bipolar I disorder, single manic episode (HCC)    • Bradycardia    • Cardiac murmur    • Colon polyp    • Coronary artery disease    • Degeneration, intervertebral disc, thoracolumbar    • Depression    • Diabetes mellitus (HCC)    • Disease of thyroid gland    • Diverticulosis    • Ductal carcinoma in situ (DCIS) of left breast    • Esophageal spasm    • Fatigue    • GERD (gastroesophageal reflux disease)    • H/O bone density study 10/17/2007    Dr. Giles   • Hemorrhoids    • History of mammogram     02/2012, per GYN Reshma Aranda   • History of Papanicolaou smear of cervix     DR. GILES GYN   • History of transfusion    • Hyperlipidemia    • Hypertension    • Impaired cognition 11/22/2015    Description: Testing done 05/14   • Optic nerve contusion    • Pain of lower extremity 11/22/2015   • Pseudoaneurysm following procedure (HCC)     DURING CARDIAC  CATHETERIZATION   • Shoulder pain     LEFT   • Skin tear of forearm without complication, right, initial encounter    • Stage 2 chronic kidney disease    • Systolic murmur    • Vitamin B12 deficiency    • Vitamin D deficiency        Past Surgical History:   Procedure Laterality Date   • BREAST LUMPECTOMY Left     benign   • CHOLECYSTECTOMY     • COLONOSCOPY  2007    done 02/12, repeat 5 yrs, Dr Grigsby; tics in sigmoid colon, IH   • COLONOSCOPY N/A 11/3/2016    polyp, IH   • COLONOSCOPY N/A 2/24/2020    Procedure: COLONOSCOPY TO CECUM WITH COLD POLYPECTOMY;  Surgeon: Eddie Grigsby MD;  Location: [facility] ENDOSCOPY;  Service: Gastroenterology;  Laterality: N/A;  pre: hx of polyps  post: polyp, hemorrhoids, diverticulosis   • COLONOSCOPY N/A 12/7/2021    Procedure: COLONOSCOPY TO CECUM  - COLD BIOPSY POLYPECTOMIES;  Surgeon: Eddie Grigsby MD;  Location: [facility] ENDOSCOPY;  Service: Gastroenterology;  Laterality: N/A;  IRON DEFICIENCY ANEMIA, HX POLYPS, CONSTIPATION   ---DIVERTICULOSIS, POLYPS   • D & C AND LAPAROSCOPY     • DILATATION AND CURETTAGE     • ENDOSCOPY N/A 2/24/2020    Procedure: ESOPHAGOGASTRODUODENOSCOPY with biopsies;  Surgeon: Eddie Grigsby MD;  Location: [facility] ENDOSCOPY;  Service: Gastroenterology;  Laterality: N/A;  pre: iron deficiency anemia  post: gastriits   • ENDOSCOPY N/A 12/7/2021    Procedure: ESOPHAGOGASTRODUODENOSCOPY, WITH BIOPSIES;  Surgeon: Eddie Grigsby MD;  Location: [facility] ENDOSCOPY;  Service: Gastroenterology;  Laterality: N/A;  GERD,WT LOSS, IRON DEFICIENCY ANEMIA ,  DYSPEPSIA  ---HIATAL HERNIA, GASTRITIS   • ESOPHAGOGASTRIC FUNDOPLASTY     • ROTATOR CUFF REPAIR Right 2009   • TOTAL KNEE ARTHROPLASTY Left 2/13/2018    Procedure: LT TOTAL KNEE ARTHROPLASTY;  Surgeon: Selwyn Pinto MD;  Location: [facility] MAIN OR;  Service:    • TOTAL SHOULDER ARTHROPLASTY W/ DISTAL CLAVICLE EXCISION Left 8/9/2022    Procedure: TOTAL SHOULDER REVERSE ARTHROPLASTY;  Surgeon: Yony Gallardo MD;   Location: [facility];  Service: Orthopedics;  Laterality: Left;       Social History     Socioeconomic History   • Marital status:    • Number of children: 1   Tobacco Use   • Smoking status: Former Smoker     Packs/day: 1.00     Years: 7.00     Pack years: 7.00     Types: Cigarettes     Quit date:      Years since quittin.7   • Smokeless tobacco: Never Used   • Tobacco comment: caffeine use: 2 CUPS COFFEE/ 3 DIET PEPSIS DAILY   Vaping Use   • Vaping Use: Never used   Substance and Sexual Activity   • Alcohol use: Yes     Comment: OCC   • Drug use: No   • Sexual activity: Never       Family History   Problem Relation Age of Onset   • Colon polyps Father    • Heart disease Father    • Prostate cancer Father    • Hepatitis Other    • Coronary artery disease Other    • Hypertension Brother    • Lung cancer Maternal Aunt    • Lung cancer Paternal Uncle    • Stroke Paternal Grandmother    • Cerebral aneurysm Paternal Grandmother    • Stroke Paternal Grandfather    • Cerebral aneurysm Paternal Grandfather    • Lung cancer Maternal Aunt    • Malig Hyperthermia Neg Hx        Review of Systems   Constitutional: Positive for malaise/fatigue.   Cardiovascular: Negative.  Negative for chest pain, dyspnea on exertion, leg swelling, orthopnea, paroxysmal nocturnal dyspnea and syncope.   Respiratory: Negative.    Hematologic/Lymphatic: Negative for bleeding problem.   Musculoskeletal: Positive for falls and joint pain (shoulder).   Gastrointestinal: Negative for melena.   Neurological: Negative for dizziness and light-headedness.       Allergies   Allergen Reactions   • Morphine Shortness Of Breath   • Penicillins Hives and Swelling   • Ace Inhibitors Cough   • Telmisartan Cough         Current Outpatient Medications:   •  acetaminophen (TYLENOL) 325 MG tablet, Take 650 mg by mouth As Needed for Mild Pain ., Disp: , Rfl:   •  ALPRAZolam (Xanax) 0.5 MG tablet, Take 1 tablet by mouth 3 (Three) Times a Day As  Needed for Anxiety., Disp: 270 tablet, Rfl: 0  •  amLODIPine (NORVASC) 5 MG tablet, TAKE 1 AND 1/2 TABLETS BY MOUTH TWICE DAILY, Disp: 270 tablet, Rfl: 1  •  anastrozole (ARIMIDEX) 1 MG tablet, Take 1 mg by mouth Daily., Disp: , Rfl:   •  carvedilol (COREG) 12.5 MG tablet, TAKE 1 TABLET BY MOUTH  TWICE DAILY, Disp: 90 tablet, Rfl: 7  •  chlorthalidone (HYGROTON) 25 MG tablet, Take 1 tablet by mouth Daily., Disp: 90 tablet, Rfl: 2  •  Cholecalciferol (VITAMIN D3) 2000 UNITS tablet, Take 1,000 Units by mouth Daily., Disp: , Rfl:   •  ciprofloxacin (CIPRO) 500 MG tablet, TAKE 1 TABLET BY MOUTH EVERY 12 HOURS FOR 5 DAYS, Disp: , Rfl:   •  clindamycin (Cleocin) 300 MG capsule, Take 1 capsule by mouth 3 (Three) Times a Day., Disp: 15 capsule, Rfl: 0  •  ferrous sulfate 325 (65 FE) MG EC tablet, Take 650 mg by mouth., Disp: , Rfl:   •  glucose blood (FREESTYLE LITE) test strip, Use to test everyday as directed, Disp: 100 each, Rfl: 5  •  hydrALAZINE (APRESOLINE) 100 MG tablet, TAKE 1 TABLET BY MOUTH 3  TIMES DAILY, Disp: 270 tablet, Rfl: 3  •  lactulose (CHRONULAC) 10 GM/15ML solution, TAKE 35 TO 45 ML&apos;S BY MOUTH DAILY FOR CONSTIPATION (Patient taking differently: Take  by mouth 3 (Three) Times a Day.), Disp: 1892 mL, Rfl: 11  •  lactulose (Generlac) 10 GM/15ML solution solution (encephalopathy), TAKE 35 TO 45 ML BY MOUTH DAILY FOR CONSTIPATION, Disp: 4050 mL, Rfl: 1  •  lamoTRIgine (LaMICtal) 200 MG tablet, TAKE 2 AND 1/2 TABLETS BY  MOUTH AT NIGHT, Disp: 225 tablet, Rfl: 3  •  Lancets (FREESTYLE) lancets, As directed to check blood glucose, Disp: 100 each, Rfl: 12  •  levothyroxine (SYNTHROID, LEVOTHROID) 100 MCG tablet, TAKE 1 TABLET BY MOUTH  DAILY, Disp: 90 tablet, Rfl: 3  •  metFORMIN (GLUCOPHAGE) 500 MG tablet, TAKE 2 TABLETS BY MOUTH  DAILY WITH BREAKFAST, Disp: 180 tablet, Rfl: 3  •  omeprazole (priLOSEC) 40 MG capsule, TAKE 1 CAPSULE BY MOUTH  DAILY, Disp: 90 capsule, Rfl: 3  •  PARoxetine (PAXIL) 40 MG  "tablet, TAKE 1 TABLET BY MOUTH IN  THE MORNING FOR DEPRESSION, Disp: 90 tablet, Rfl: 1  •  QUEtiapine (SEROquel) 300 MG tablet, TAKE 1 TABLET BY MOUTH AT  NIGHT, Disp: 90 tablet, Rfl: 3  •  rosuvastatin (CRESTOR) 5 MG tablet, TAKE 1 TABLET BY MOUTH AT  NIGHT FOR CHOLESTEROL, Disp: 90 tablet, Rfl: 3      Objective:     Vitals:    09/16/22 0907   BP: 171/71   Pulse: 58   Weight: 73.5 kg (162 lb)   Height: 157.5 cm (62\")     Body mass index is 29.63 kg/m².    PHYSICAL EXAM:    Neck:      Vascular: No JVD.   Pulmonary:      Effort: Pulmonary effort is normal.      Breath sounds: Normal breath sounds.   Cardiovascular:      Normal rate. Regular rhythm.      Murmurs: There is no murmur.      No gallop. No click. No rub.   Pulses:     Intact distal pulses.           ECG 12 Lead    Date/Time: 9/16/2022 8:43 AM  Performed by: Cony Sanabria APRN  Authorized by: Cony Sanabria APRN   Comparison: compared with previous ECG from 8/4/2022  Similar to previous ECG  Rhythm: sinus rhythm  Rate: normal  BPM: 58  Conduction: 1st degree AV block              Assessment:       Diagnosis Plan   1. Bicuspid aortic valve     2. Primary hypertension  ECG 12 Lead     Orders Placed This Encounter   Procedures   • ECG 12 Lead     This order was created via procedure documentation     Order Specific Question:   Release to patient     Answer:   Routine Release          Plan:       1.  Bicuspid aortic valve.  Echocardiogram in January 2021 demonstrated normal LV function, a bicuspid aortic valve with no significant stenosis, and mild to moderate MR. She denies any dyspnea.  I would recommend repeating an echocardiogram next year.      2.  Hypertension.  Her blood pressure is high today.  Although she has yet taken her medications this morning.  It is really difficult to know what her blood pressures are averaging given that she has not been checking them at home.  I would not necessarily make a medication change based on the vital " signs provided from her nephrologist.  Especially given that she is not experiencing symptoms of low blood pressure, and we have previously struggled with resistant hypertension.  She did have a recent fall.  However, this was in the middle of the night when she got up to use the restroom.  I suggested that she resume her medications as prescribed.  I also advised that she start checking her blood pressures regularly at home and keeping a log.  She will return in 1 week for blood pressure check and bring her blood pressure log with her.      Overall I think she is stable.  Further recommendations will be made pending her blood pressure check next week.  Otherwise, she will follow-up with Dr. Falcon in 3 months.      As always, it has been a pleasure to participate in your patient's care.      Sincerely,         EZRA Landa

## 2022-09-29 NOTE — PROGRESS NOTES
Patient here today for blood pressure check.  She did bring some readings from home, but did not bring her cuff.    Of note, she has not had any of her morning meds yet.    Reading today was 150/60.    Readings from home as follows:    9/16 161/78    9/17 120/49    9/18 141/65  AM   150/70  PM    9/19 136/63  AM   161/49  PM    9/20 140/67  AM   154/82  PM    9/21 139/57  AM   150/72  PM     9/22 134/59  AM   153/64  PM

## 2022-09-29 NOTE — PROGRESS NOTES
Considering she has not yet had her medications, I think this blood pressure is okay.  I would not recommend any changes.

## 2022-09-29 NOTE — PROGRESS NOTES
Agree with plan to continue with current medical regimen.  No changes at this time and follow-up as scheduled.  Thank you.

## 2022-10-04 PROBLEM — R06.09 DYSPNEA ON EXERTION: Status: ACTIVE | Noted: 2022-01-01

## 2022-10-05 NOTE — PLAN OF CARE
Goal Outcome Evaluation:  Plan of Care Reviewed With: patient           Outcome Evaluation: Pt is a 75 y/o F who arrives to [facility] with dyspnea on exertion. Pt lives alone with 0 ROMI/stairs, has RW/SPC if needed and family available to assist. Pt today was SV for bed mobility, SBA for STS and ambulates with CGA and HHA. Pt mildly unsteady with ambulation, but no overt LOB. Pt returned to supine in bed at the end of the session. Pt demo's dec'd balance and endurance. Rec D/C home with 24/7 assist and follow up with OPPT to address higher level balance.

## 2022-10-05 NOTE — DISCHARGE INSTRUCTIONS
Start taking ferrous sulfate 325 mg daily.  An ambulatory referral to hematology has been placed for follow-up, you should receive a call regarding this appointment.  Start monitoring your blood pressure 1-2 times daily, record these findings and take them with you to your next PCP and cardiology appointments.  Follow-up with your primary care doctor in 1 to 2 weeks.  Follow-up with cardiology as directed, the office should contact you regarding a follow-up appointment.    Return to the emergency department with worsening symptoms, uncontrolled pain, inability to tolerate oral liquids, fever greater than 101°F not controlled by Tylenol or as needed with emergent concerns.

## 2022-10-05 NOTE — THERAPY EVALUATION
Patient Name: Yi Lee  : 1945    MRN: [id number]                              Today's Date: 10/5/2022       Admit Date: 10/4/2022    Visit Dx:     ICD-10-CM ICD-9-CM   1. Exertional dyspnea  R06.09 786.09   2. Chronic anemia  D64.9 285.9     Patient Active Problem List   Diagnosis   • Abnormal creatinine clearance glomerular filtration   • Benign essential hypertension   • Chronic obstructive pulmonary disease (HCC)   • Depression   • Type 2 diabetes mellitus with diabetic neuropathy, without long-term current use of insulin (HCC)   • Gastroesophageal reflux disease   • Hyperlipidemia   • Hypothyroidism   • Obesity (BMI 30-39.9)   • Ataxic gait   • Abdominal pain, right lateral   • Iron deficiency anemia due to chronic blood loss   • Cystic kidney disease   • Vitamin B12 deficiency   • OA (osteoarthritis) of knee   • Iron deficiency anemia   • Bipolar I disorder, single manic episode (HCC)   • Ductal carcinoma in situ (DCIS) of left breast   • Bradycardia   • Systolic murmur   • Bicuspid aortic valve   • Renal insufficiency   • 1st degree AV block   • Dizziness   • Fatigue   • Vitamin D deficiency   • Bruising   • Weight loss, abnormal   • Loss of appetite   • Constipation   • Aromatase inhibitor use   • Breast neoplasm, Tis (DCIS), left   • Lupus erythematosus   • Malignant neoplasm of breast (HCC)   • Normocytic anemia   • Hypertension   • S/P reverse total shoulder arthroplasty, left   • Closed fracture of nasal bone with routine healing   • Visit for suture removal   • Fall   • Dyspnea on exertion     Past Medical History:   Diagnosis Date   • Abdominal pain, LLQ    • Anemia    • Arthralgia of hip 2015   • Asthma    • Ataxic gait 2015    Description: Javi Mederos, Neuro.  Some vestibular dysfunction present    • Bipolar I disorder, single manic episode (HCC)    • Bradycardia    • Cardiac murmur    • Colon polyp    • Coronary artery disease    • Degeneration,  intervertebral disc, thoracolumbar    • Depression    • Diabetes mellitus (HCC)    • Disease of thyroid gland    • Diverticulosis    • Ductal carcinoma in situ (DCIS) of left breast    • Esophageal spasm    • Fatigue    • GERD (gastroesophageal reflux disease)    • H/O bone density study 10/17/2007    Dr. Giles   • Hemorrhoids    • History of mammogram     02/2012, per GYN Reshma Aranda   • History of Papanicolaou smear of cervix     DR. GILES GYN   • History of transfusion    • Hyperlipidemia    • Hypertension    • Impaired cognition 11/22/2015    Description: Testing done 05/14   • Optic nerve contusion    • Pain of lower extremity 11/22/2015   • Pseudoaneurysm following procedure (HCC)     DURING CARDIAC CATHETERIZATION   • Shoulder pain     LEFT   • Skin tear of forearm without complication, right, initial encounter    • Stage 2 chronic kidney disease    • Systolic murmur    • Vitamin B12 deficiency    • Vitamin D deficiency      Past Surgical History:   Procedure Laterality Date   • BREAST LUMPECTOMY Left     benign   • CHOLECYSTECTOMY     • COLONOSCOPY  2007    done 02/12, repeat 5 yrs, Dr Grigsby; tics in sigmoid colon, IH   • COLONOSCOPY N/A 11/03/2016    polyp, IH   • COLONOSCOPY N/A 02/24/2020    Procedure: COLONOSCOPY TO CECUM WITH COLD POLYPECTOMY;  Surgeon: Eddie Grigsby MD;  Location: [facility] ENDOSCOPY;  Service: Gastroenterology;  Laterality: N/A;  pre: hx of polyps  post: polyp, hemorrhoids, diverticulosis   • COLONOSCOPY N/A 12/07/2021    Procedure: COLONOSCOPY TO CECUM  - COLD BIOPSY POLYPECTOMIES;  Surgeon: Eddie Grigsby MD;  Location: [facility] ENDOSCOPY;  Service: Gastroenterology;  Laterality: N/A;  IRON DEFICIENCY ANEMIA, HX POLYPS, CONSTIPATION   ---DIVERTICULOSIS, POLYPS   • D & C AND LAPAROSCOPY     • DILATATION AND CURETTAGE     • ENDOSCOPY N/A 02/24/2020    Procedure: ESOPHAGOGASTRODUODENOSCOPY with biopsies;  Surgeon: Eddie Grigsby MD;  Location: [facility] ENDOSCOPY;  Service: Gastroenterology;   Laterality: N/A;  pre: iron deficiency anemia  post: gastriits   • ENDOSCOPY N/A 12/07/2021    Procedure: ESOPHAGOGASTRODUODENOSCOPY, WITH BIOPSIES;  Surgeon: Eddie Grigsby MD;  Location: [facility] ENDOSCOPY;  Service: Gastroenterology;  Laterality: N/A;  GERD,WT LOSS, IRON DEFICIENCY ANEMIA ,  DYSPEPSIA  ---HIATAL HERNIA, GASTRITIS   • ESOPHAGOGASTRIC FUNDOPLASTY     • JOINT REPLACEMENT     • ROTATOR CUFF REPAIR Right 2009   • TOTAL KNEE ARTHROPLASTY Left 02/13/2018    Procedure: LT TOTAL KNEE ARTHROPLASTY;  Surgeon: Selwyn Pinto MD;  Location: [facility] MAIN OR;  Service:    • TOTAL SHOULDER ARTHROPLASTY W/ DISTAL CLAVICLE EXCISION Left 08/09/2022    Procedure: TOTAL SHOULDER REVERSE ARTHROPLASTY;  Surgeon: Yony Gallardo MD;  Location: [facility] OR OSC;  Service: Orthopedics;  Laterality: Left;      General Information     Row Name 10/05/22 1415          Physical Therapy Time and Intention    Document Type evaluation  -DB     Mode of Treatment individual therapy;physical therapy  -DB     Row Name 10/05/22 1415          General Information    Patient Profile Reviewed yes  -DB     Prior Level of Function independent:  -DB     Existing Precautions/Restrictions fall  -DB     Barriers to Rehab none identified  -DB     Row Name 10/05/22 1415          Living Environment    People in Home alone  has family available to assist  -DB     Row Name 10/05/22 1415          Home Main Entrance    Number of Stairs, Main Entrance none  -DB     Row Name 10/05/22 1415          Stairs Within Home, Primary    Number of Stairs, Within Home, Primary none  -DB     Row Name 10/05/22 1415          Cognition    Orientation Status (Cognition) oriented x 4  -DB     Row Name 10/05/22 1415          Safety Issues, Functional Mobility    Impairments Affecting Function (Mobility) balance;shortness of breath  -DB           User Key  (r) = Recorded By, (t) = Taken By, (c) = Cosigned By    Initials Name Provider Type    DB Vera Herron, PT  Physical Therapist               Mobility     Row Name 10/05/22 1415          Bed Mobility    Bed Mobility supine-sit;sit-supine  -DB     Supine-Sit [place] (Bed Mobility) supervision  -DB     Sit-Supine [place] (Bed Mobility) supervision  -DB     Assistive Device (Bed Mobility) bed rails;head of bed elevated  -DB     Row Name 10/05/22 1415          Sit-Stand Transfer    Sit-Stand [place] (Transfers) standby assist  -DB     Row Name 10/05/22 1415          Gait/Stairs (Locomotion)    [place] Level (Gait) contact guard  -DB     Assistive Device (Gait) other (see comments)  HHA  -DB     Distance in Feet (Gait) 100'  -DB     Deviations/Abnormal Patterns (Gait) gait speed decreased;stride length decreased;base of support, narrow  -DB     Bilateral Gait Deviations heel strike decreased  -DB     Comment, (Gait/Stairs) mildly unsteady  -DB           User Key  (r) = Recorded By, (t) = Taken By, (c) = Cosigned By    Initials Name Provider Type    DB Vera Herron, PT Physical Therapist               Obj/Interventions     Row Name 10/05/22 1416          Range of Motion Comprehensive    General Range of Motion no range of motion deficits identified  -DB     Row Name 10/05/22 1416          Strength Comprehensive (MMT)    Comment, General Manual Muscle Testing (MMT) Assessment generalized weakness  -DB     Row Name 10/05/22 1416          Balance    Balance Interventions sitting;standing;sit to stand  -DB           User Key  (r) = Recorded By, (t) = Taken By, (c) = Cosigned By    Initials Name Provider Type    DB Vera Herron, PT Physical Therapist               Goals/Plan     Row Name 10/05/22 1421          Bed Mobility Goal 1 (PT)    Activity/Assistive Device (Bed Mobility Goal 1, PT) bed mobility activities, all  -DB     [place] Level/Cues Needed (Bed Mobility Goal 1, PT) independent  -DB     Time Frame (Bed Mobility Goal 1, PT) 1 week  -DB     Row Name 10/05/22 1421          Transfer Goal 1 (PT)     Activity/Assistive Device (Transfer Goal 1, PT) transfers, all  -DB     [place] Level/Cues Needed (Transfer Goal 1, PT) independent  -DB     Time Frame (Transfer Goal 1, PT) 1 week  -DB     Row Name 10/05/22 1421          Gait Training Goal 1 (PT)    Activity/Assistive Device (Gait Training Goal 1, PT) gait (walking locomotion)  -DB     Distance (Gait Training Goal 1, PT) 200'  -DB     Time Frame (Gait Training Goal 1, PT) 1 week  -DB     Row Name 10/05/22 1421          Therapy Assessment/Plan (PT)    Planned Therapy Interventions (PT) balance training;bed mobility training;gait training;home exercise program;neuromuscular re-education;postural re-education;transfer training;stair training;strengthening;patient/family education  -DB           User Key  (r) = Recorded By, (t) = Taken By, (c) = Cosigned By    Initials Name Provider Type    DB Vera Herron, PT Physical Therapist               Clinical Impression     Row Name 10/05/22 1417          Pain    Pain Intervention(s) Ambulation/increased activity;Repositioned  -DB     Row Name 10/05/22 1417          Plan of Care Review    Plan of Care Reviewed With patient  -DB     Outcome Evaluation Pt is a 75 y/o F who arrives to [facility] with dyspnea on exertion. Pt lives alone with 0 ROMI/stairs, has RW/SPC if needed and family available to assist. Pt today was SV for bed mobility, SBA for STS and ambulates with CGA and HHA. Pt mildly unsteady with ambulation, but no overt LOB. Pt returned to supine in bed at the end of the session. Pt demo's dec'd balance and endurance. Rec D/C home with 24/7 assist and follow up with OPPT to address higher level balance.  -DB     Row Name 10/05/22 1417          Therapy Assessment/Plan (PT)    Rehab Potential (PT) good, to achieve stated therapy goals  -DB     Criteria for Skilled Interventions Met (PT) yes  -DB     Therapy Frequency (PT) 5 times/wk  -DB     Row Name 10/05/22 1417          Vital Signs    O2 Delivery Pre Treatment  room air  -DB     O2 Delivery Intra Treatment room air  -DB     O2 Delivery Post Treatment room air  -DB     Pre Patient Position Supine  -DB     Intra Patient Position Standing  -DB     Post Patient Position Supine  -DB     Row Name 10/05/22 1417          Positioning and Restraints    Pre-Treatment Position in bed  -DB     Post Treatment Position bed  -DB     In Bed supine;call light within reach;encouraged to call for assist  -DB           User Key  (r) = Recorded By, (t) = Taken By, (c) = Cosigned By    Initials Name Provider Type    Vera Elizondo PT Physical Therapist               Outcome Measures     Row Name 10/05/22 1422          How much help from another person do you currently need...    Turning from your back to your side while in flat bed without using bedrails? 4  -DB     Moving from lying on back to sitting on the side of a flat bed without bedrails? 4  -DB     Moving to and from a bed to a chair (including a wheelchair)? 4  -DB     Standing up from a chair using your arms (e.g., wheelchair, bedside chair)? 4  -DB     Climbing 3-5 steps with a railing? 3  -DB     To walk in hospital room? 3  -DB     AM-PAC 6 Clicks Score (PT) 22  -DB     Highest level of mobility 7 --> Walked 25 feet or more  -DB     Row Name 10/05/22 1422          Functional Assessment    Outcome Measure Options AM-PAC 6 Clicks Basic Mobility (PT)  -DB           User Key  (r) = Recorded By, (t) = Taken By, (c) = Cosigned By    Initials Name Provider Type    Vera Elizondo PT Physical Therapist                             Physical Therapy Education                 Title: PT OT SLP Therapies (In Progress)     Topic: Physical Therapy (Done)     Point: Mobility training (Done)     Learning Progress Summary           Patient Acceptance, E, VU by DB at 10/5/2022 1422                   Point: Home exercise program (Done)     Learning Progress Summary           Patient Acceptance, E, VU by DB at 10/5/2022 1422                    Point: Body mechanics (Done)     Learning Progress Summary           Patient Acceptance, E, VU by DB at 10/5/2022 1422                   Point: Precautions (Done)     Learning Progress Summary           Patient Acceptance, E, VU by DB at 10/5/2022 1422                               User Key     Initials Effective Dates Name Provider Type Discipline    DB 06/16/21 -  Vera Herron PT Physical Therapist PT              PT Recommendation and Plan  Planned Therapy Interventions (PT): balance training, bed mobility training, gait training, home exercise program, neuromuscular re-education, postural re-education, transfer training, stair training, strengthening, patient/family education  Plan of Care Reviewed With: patient  Outcome Evaluation: Pt is a 75 y/o F who arrives to [facility] with dyspnea on exertion. Pt lives alone with 0 ROMI/stairs, has RW/SPC if needed and family available to assist. Pt today was SV for bed mobility, SBA for STS and ambulates with CGA and HHA. Pt mildly unsteady with ambulation, but no overt LOB. Pt returned to supine in bed at the end of the session. Pt demo's dec'd balance and endurance. Rec D/C home with 24/7 assist and follow up with OPPT to address higher level balance.     Time Calculation:    PT Charges     Row Name 10/05/22 1422             Time Calculation    Start Time 1329  -DB      Stop Time 1337  -DB      Time Calculation (min) 8 min  -DB      PT Received On 10/05/22  -DB      PT - Next Appointment 10/06/22  -DB      PT Goal Re-Cert Due Date 10/12/22  -DB              Time Calculation- PT    Total Timed Code Minutes- PT 0 minute(s)  -DB            User Key  (r) = Recorded By, (t) = Taken By, (c) = Cosigned By    Initials Name Provider Type    DB Vera Herron PT Physical Therapist              Therapy Charges for Today     Code Description Service Date Service Provider Modifiers Qty    [id number] HC PT EVAL MOD COMPLEXITY 2 10/5/2022 Vera Herron PT GP 1          PT  G-Codes  Outcome Measure Options: AM-PAC 6 Clicks Basic Mobility (PT)  AM-PAC 6 Clicks Score (PT): 22    Vera Herron, PT  10/5/2022

## 2022-10-05 NOTE — NURSING NOTE
Nursing report ED to floor  Yi Lee  76 y.o.  female    HPI :   Chief Complaint   Patient presents with   • Shortness of Breath       Admitting doctor:   Rei Colon MD    Admitting diagnosis:   The primary encounter diagnosis was Exertional dyspnea. A diagnosis of Chronic anemia was also pertinent to this visit.    Code status:   Current Code Status     Date Active Code Status Order ID Comments User Context       10/4/2022 2324 CPR (Attempt to Resuscitate) [id number]  Vanessa Chakraborty, APRN ED     Advance Care Planning Activity      Questions for Current Code Status     Question Answer    Code Status (Patient has no pulse and is not breathing) CPR (Attempt to Resuscitate)    Medical Interventions (Patient has pulse or is breathing) Full Support          Allergies:   Morphine, Penicillins, Ace inhibitors, and Telmisartan    Isolation:   No active isolations    Intake and Output  No intake or output data in the 24 hours ending 10/05/22 0009    Weight:   There were no vitals filed for this visit.    Most recent vitals:   Vitals:    10/04/22 2141 10/04/22 2159 10/04/22 2216 10/04/22 2246   BP: 156/65  155/59 165/67   Pulse:  62 63 64   Resp:       Temp:       TempSrc:       SpO2:  98% 98% 97%   Height:           Active LDAs/IV Access:   Lines, Drains & Airways     Active LDAs     Name Placement date Placement time Site Days    Peripheral IV 10/04/22 2234 Left Antecubital 10/04/22  2234  Antecubital  less than 1                Labs (abnormal labs have a star):   Labs Reviewed   CBC WITH AUTO DIFFERENTIAL - Abnormal; Notable for the following components:       Result Value    RBC 3.24 (*)     Hemoglobin 9.2 (*)     Hematocrit 29.1 (*)     RDW 11.7 (*)     Neutrophil % 79.3 (*)     Lymphocyte % 8.8 (*)     Lymphocytes, Absolute 0.54 (*)     All other components within normal limits   BNP (IN-HOUSE) - Normal    Narrative:     Among patients with dyspnea, NT-proBNP is highly sensitive for the detection of acute  "congestive heart failure. In addition NT-proBNP of <300 pg/ml effectively rules out acute congestive heart failure with 99% negative predictive value.    Results may be falsely decreased if patient taking Biotin.     MAGNESIUM - Normal   PROCALCITONIN - Normal    Narrative:     As a Marker for Sepsis (Non-Neonates):    1. <0.5 ng/mL represents a low risk of severe sepsis and/or septic shock.  2. >2 ng/mL represents a high risk of severe sepsis and/or septic shock.    As a Marker for Lower Respiratory Tract Infections that require antibiotic therapy:    PCT on Admission    Antibiotic Therapy       6-12 Hrs later    >0.5                Strongly Recommended  >0.25 - <0.5        Recommended   0.1 - 0.25          Discouraged              Remeasure/reassess PCT  <0.1                Strongly Discouraged     Remeasure/reassess PCT    As 28 day mortality risk marker: \"Change in Procalcitonin Result\" (>80% or <=80%) if Day 0 (or Day 1) and Day 4 values are available. Refer to http://www.[facility] [place]-pct-calculator.com    Change in PCT <=80%  A decrease of PCT levels below or equal to 80% defines a positive change in PCT test result representing a higher risk for 28-day all-cause mortality of patients diagnosed with severe sepsis for septic shock.    Change in PCT >80%  A decrease of PCT levels of more than 80% defines a negative change in PCT result representing a lower risk for 28-day all-cause mortality of patients diagnosed with severe sepsis or septic shock.      COMPREHENSIVE METABOLIC PANEL   TROPONIN (IN-HOUSE)   BASIC METABOLIC PANEL   CBC (NO DIFF)   LIPID PANEL   CBC AND DIFFERENTIAL    Narrative:     The following orders were created for panel order CBC & Differential.  Procedure                               Abnormality         Status                     ---------                               -----------         ------                     CBC Auto Differential[[id number]]        Abnormal            Final result          "        Please view results for these tests on the individual orders.       EKG:   ECG 12 Lead   Preliminary Result   HEART RATE= 63  bpm   RR Interval= 952  ms   MN Interval= 256  ms   P Horizontal Axis= -28  deg   P Front Axis= 0  deg   QRSD Interval= 101  ms   QT Interval= 406  ms   QRS Axis= 18  deg   T Wave Axis= 35  deg   - ABNORMAL ECG -   Sinus rhythm   Prolonged MN interval   Baseline wander in lead(s) V6   Electronically Signed By:    Date and Time of Study: 2022-10-04 21:36:43      ECG 12 Lead    (Results Pending)       Meds given in ED:   Medications   sodium chloride 0.9 % flush 10 mL (has no administration in time range)   sodium chloride 0.9 % flush 10 mL (has no administration in time range)       Imaging results:  XR Chest 1 View    Result Date: 10/4/2022  No acute findings.  This report was finalized on 10/4/2022 9:43 PM by Dr. Mary Delaney M.D.        Ambulatory status:   - br    Social issues:   Social History     Socioeconomic History   • Marital status:    • Number of children: 1   Tobacco Use   • Smoking status: Former Smoker     Packs/day: 1.00     Years: 7.00     Pack years: 7.00     Types: Cigarettes     Quit date: 1972     Years since quittin.7   • Smokeless tobacco: Never Used   • Tobacco comment: caffeine use: 2 CUPS COFFEE/ 3 DIET PEPSIS DAILY   Vaping Use   • Vaping Use: Never used   Substance and Sexual Activity   • Alcohol use: Yes     Comment: OCC   • Drug use: No   • Sexual activity: Never       NIH Stroke Scale:         Jeffrey Yanez RN  10/05/22 00:09 EDT

## 2022-10-05 NOTE — PLAN OF CARE
Goal Outcome Evaluation:  Plan of Care Reviewed With: patient     Patient admitted to observation unit for evaluation of dyspnea on exertion. Alert and oriented x4. Patient reports symptoms after ambulating short distances. NS@ 75 ml/hr infusing. Hgb 8.3 on AM labs. APRN aware. SB to NSR with 1° AV block on tele. VSS per chart. NPO since MN pending cardiology consult. No acute events to report.

## 2022-10-05 NOTE — ED PROVIDER NOTES
EMERGENCY DEPARTMENT ENCOUNTER    CHIEF COMPLAINT  Chief Complaint: Shortness of breath  History given by: Patient  History limited by: None  Room Number: 12/12  PMD: Ankit Albert MD      HPI:  Pt is a 76 y.o. female who presents complaining of shortness of breath that is made much worse by exertion and has been present and worsening over the past 4 days.  The patient currently denies any associated chest pain, back pain, cough, fever/chills, or nausea and vomiting.  She reports no known cardiovascular or pulmonary abnormality.    Duration: Intermittent episodes for the past 4 days  Onset: Gradual  Location: Cardio/pulmonary  Radiation: None  Quality: Dyspnea on exertion  Intensity/Severity: Moderate  Progression: Reports significantly worsened today  Associated Symptoms: None  Aggravating Factors: Exertion  Alleviating Factors: Rest  Previous Episodes: None  Treatment before arrival: None    PAST MEDICAL HISTORY  Active Ambulatory Problems     Diagnosis Date Noted   • Abnormal creatinine clearance glomerular filtration 11/22/2015   • Benign essential hypertension 11/22/2015   • Chronic obstructive pulmonary disease (HCC) 11/22/2015   • Depression 11/22/2015   • Type 2 diabetes mellitus with diabetic neuropathy, without long-term current use of insulin (HCC) 11/22/2015   • Gastroesophageal reflux disease 11/22/2015   • Hyperlipidemia 11/22/2015   • Hypothyroidism 11/22/2015   • Obesity (BMI 30-39.9) 11/22/2015   • Ataxic gait 11/22/2015   • Abdominal pain, right lateral 04/25/2016   • Iron deficiency anemia due to chronic blood loss 01/27/2017   • Cystic kidney disease 07/28/2017   • Vitamin B12 deficiency 07/28/2017   • OA (osteoarthritis) of knee 02/13/2018   • Iron deficiency anemia 01/15/2020   • Bipolar I disorder, single manic episode (HCC) 06/17/2020   • Ductal carcinoma in situ (DCIS) of left breast 07/31/2020   • Bradycardia 07/31/2020   • Systolic murmur 07/31/2020   • Bicuspid aortic valve 03/18/2021    • Renal insufficiency 03/29/2021   • 1st degree AV block 03/29/2021   • Dizziness 03/29/2021   • Fatigue    • Vitamin D deficiency    • Bruising 10/13/2021   • Weight loss, abnormal 11/24/2021   • Loss of appetite 11/24/2021   • Constipation 11/24/2021   • Aromatase inhibitor use 04/29/2021   • Breast neoplasm, Tis (DCIS), left 07/28/2020   • Lupus erythematosus 01/04/2022   • Malignant neoplasm of breast (HCC) 01/04/2022   • Normocytic anemia 11/04/2021   • Hypertension 01/04/2022   • S/P reverse total shoulder arthroplasty, left 08/08/2022   • Closed fracture of nasal bone with routine healing 08/30/2022   • Visit for suture removal 08/30/2022   • Fall 08/30/2022     Resolved Ambulatory Problems     Diagnosis Date Noted   • Acute bronchitis 11/22/2015   • Pain of lower extremity 11/22/2015   • Ataxic gait 11/22/2015   • Back pain 11/22/2015   • Impaired cognition 11/22/2015   • Cough 11/22/2015   • Arthralgia of hip 11/22/2015   • Memory loss 11/22/2015     Past Medical History:   Diagnosis Date   • Abdominal pain, LLQ    • Anemia    • Asthma    • Cardiac murmur    • Colon polyp    • Coronary artery disease    • Degeneration, intervertebral disc, thoracolumbar    • Diabetes mellitus (HCC)    • Disease of thyroid gland    • Diverticulosis    • Esophageal spasm    • GERD (gastroesophageal reflux disease)    • H/O bone density study 10/17/2007   • Hemorrhoids    • History of mammogram    • History of Papanicolaou smear of cervix    • History of transfusion    • Optic nerve contusion    • Pseudoaneurysm following procedure (HCC)    • Shoulder pain    • Skin tear of forearm without complication, right, initial encounter    • Stage 2 chronic kidney disease        PAST SURGICAL HISTORY  Past Surgical History:   Procedure Laterality Date   • BREAST LUMPECTOMY Left     benign   • CHOLECYSTECTOMY     • COLONOSCOPY  2007    done 02/12, repeat 5 yrs, Dr Grigsby; tics in sigmoid colon, IH   • COLONOSCOPY N/A 11/3/2016     polyp, IH   • COLONOSCOPY N/A 2/24/2020    Procedure: COLONOSCOPY TO CECUM WITH COLD POLYPECTOMY;  Surgeon: Eddie Grigsby MD;  Location: [facility] ENDOSCOPY;  Service: Gastroenterology;  Laterality: N/A;  pre: hx of polyps  post: polyp, hemorrhoids, diverticulosis   • COLONOSCOPY N/A 12/7/2021    Procedure: COLONOSCOPY TO CECUM  - COLD BIOPSY POLYPECTOMIES;  Surgeon: Eddie Grigsby MD;  Location: [facility] ENDOSCOPY;  Service: Gastroenterology;  Laterality: N/A;  IRON DEFICIENCY ANEMIA, HX POLYPS, CONSTIPATION   ---DIVERTICULOSIS, POLYPS   • D & C AND LAPAROSCOPY     • DILATATION AND CURETTAGE     • ENDOSCOPY N/A 2/24/2020    Procedure: ESOPHAGOGASTRODUODENOSCOPY with biopsies;  Surgeon: Eddie Grigsby MD;  Location: [facility] ENDOSCOPY;  Service: Gastroenterology;  Laterality: N/A;  pre: iron deficiency anemia  post: gastriits   • ENDOSCOPY N/A 12/7/2021    Procedure: ESOPHAGOGASTRODUODENOSCOPY, WITH BIOPSIES;  Surgeon: Eddie Grigsby MD;  Location: [facility] ENDOSCOPY;  Service: Gastroenterology;  Laterality: N/A;  GERD,WT LOSS, IRON DEFICIENCY ANEMIA ,  DYSPEPSIA  ---HIATAL HERNIA, GASTRITIS   • ESOPHAGOGASTRIC FUNDOPLASTY     • ROTATOR CUFF REPAIR Right 2009   • TOTAL KNEE ARTHROPLASTY Left 2/13/2018    Procedure: LT TOTAL KNEE ARTHROPLASTY;  Surgeon: Selwyn Pinto MD;  Location: [facility] MAIN OR;  Service:    • TOTAL SHOULDER ARTHROPLASTY W/ DISTAL CLAVICLE EXCISION Left 8/9/2022    Procedure: TOTAL SHOULDER REVERSE ARTHROPLASTY;  Surgeon: Yony Gallardo MD;  Location: [facility] OR OSC;  Service: Orthopedics;  Laterality: Left;       FAMILY HISTORY  Family History   Problem Relation Age of Onset   • Colon polyps Father    • Heart disease Father    • Prostate cancer Father    • Hepatitis Other    • Coronary artery disease Other    • Hypertension Brother    • Lung cancer Maternal Aunt    • Lung cancer Paternal Uncle    • Stroke Paternal Grandmother    • Cerebral aneurysm Paternal Grandmother    • Stroke Paternal Grandfather     • Cerebral aneurysm Paternal Grandfather    • Lung cancer Maternal Aunt    • Malig Hyperthermia Neg Hx        SOCIAL HISTORY  Social History     Socioeconomic History   • Marital status:    • Number of children: 1   Tobacco Use   • Smoking status: Former Smoker     Packs/day: 1.00     Years: 7.00     Pack years: 7.00     Types: Cigarettes     Quit date:      Years since quittin.7   • Smokeless tobacco: Never Used   • Tobacco comment: caffeine use: 2 CUPS COFFEE/ 3 DIET PEPSIS DAILY   Vaping Use   • Vaping Use: Never used   Substance and Sexual Activity   • Alcohol use: Yes     Comment: OCC   • Drug use: No   • Sexual activity: Never       ALLERGIES  Morphine, Penicillins, Ace inhibitors, and Telmisartan    REVIEW OF SYSTEMS  Review of Systems   Constitutional: Negative for fever.   HENT: Negative for sore throat.    Eyes: Negative.    Respiratory: Positive for shortness of breath. Negative for cough.    Cardiovascular: Negative for chest pain.   Gastrointestinal: Negative for abdominal pain, diarrhea and vomiting.   Genitourinary: Negative for dysuria.   Musculoskeletal: Negative for neck pain.   Skin: Negative for rash.   Allergic/Immunologic: Negative.    Neurological: Negative for weakness, numbness and headaches.   Hematological: Negative.    Psychiatric/Behavioral: Negative.    All other systems reviewed and are negative.      PHYSICAL EXAM  ED Triage Vitals   Temp Heart Rate Resp BP SpO2   10/04/22 2051 10/04/22 2051 10/04/22 2051 10/04/22 2052 10/04/22 2051   97.9 °F (36.6 °C) 66 22 (!) 176/110 97 %      Temp src Heart Rate Source Patient Position BP Location FiO2 (%)   10/04/22 2051 -- -- -- --   Tympanic           Physical Exam  Vitals and nursing note reviewed.   Constitutional:       General: She is not in acute distress.  HENT:      Head: Normocephalic and atraumatic.   Eyes:      Pupils: Pupils are equal, round, and reactive to light.   Cardiovascular:      Rate and Rhythm: Normal  rate and regular rhythm.      Heart sounds: Normal heart sounds.   Pulmonary:      Effort: Pulmonary effort is normal. No respiratory distress.      Breath sounds: Normal breath sounds.   Abdominal:      Palpations: Abdomen is soft.      Tenderness: There is no abdominal tenderness. There is no guarding or rebound.   Musculoskeletal:         General: Normal range of motion.      Cervical back: Normal range of motion and neck supple.   Skin:     General: Skin is warm and dry.      Findings: No rash.   Neurological:      Mental Status: She is alert and oriented to person, place, and time.      Sensory: Sensation is intact.   Psychiatric:         Mood and Affect: Mood and affect normal.         LAB RESULTS  Lab Results (last 24 hours)     Procedure Component Value Units Date/Time    Comprehensive Metabolic Panel [[id number]] Collected: 10/04/22 2150    Specimen: Blood Updated: 10/04/22 2209    Troponin [[id number]] Collected: 10/04/22 2150    Specimen: Blood Updated: 10/04/22 2209    BNP [[id number]]  (Normal) Collected: 10/04/22 2150    Specimen: Blood Updated: 10/04/22 2251     proBNP 1,018.0 pg/mL     Narrative:      Among patients with dyspnea, NT-proBNP is highly sensitive for the detection of acute congestive heart failure. In addition NT-proBNP of <300 pg/ml effectively rules out acute congestive heart failure with 99% negative predictive value.    Results may be falsely decreased if patient taking Biotin.      Magnesium [[id number]] Collected: 10/04/22 2150    Specimen: Blood Updated: 10/04/22 2209    Procalcitonin [[id number]]  (Normal) Collected: 10/04/22 2150    Specimen: Blood Updated: 10/04/22 2251     Procalcitonin 0.15 ng/mL     Narrative:      As a Marker for Sepsis (Non-Neonates):    1. <0.5 ng/mL represents a low risk of severe sepsis and/or septic shock.  2. >2 ng/mL represents a high risk of severe sepsis and/or septic shock.    As a Marker for Lower Respiratory Tract Infections that require antibiotic  "therapy:    PCT on Admission    Antibiotic Therapy       6-12 Hrs later    >0.5                Strongly Recommended  >0.25 - <0.5        Recommended   0.1 - 0.25          Discouraged              Remeasure/reassess PCT  <0.1                Strongly Discouraged     Remeasure/reassess PCT    As 28 day mortality risk marker: \"Change in Procalcitonin Result\" (>80% or <=80%) if Day 0 (or Day 1) and Day 4 values are available. Refer to http://www.[facility] [place]p[place]cal[facility]    Change in PCT <=80%  A decrease of PCT levels below or equal to 80% defines a positive change in PCT test result representing a higher risk for 28-day all-cause mortality of patients diagnosed with severe sepsis for septic shock.    Change in PCT >80%  A decrease of PCT levels of more than 80% defines a negative change in PCT result representing a lower risk for 28-day all-cause mortality of patients diagnosed with severe sepsis or septic shock.       CBC & Differential [[id number]]  (Abnormal) Collected: 10/04/22 2234    Specimen: Blood Updated: 10/04/22 2246    Narrative:      The following orders were created for panel order CBC & Differential.  Procedure                               Abnormality         Status                     ---------                               -----------         ------                     CBC Auto Differential[[id number]]        Abnormal            Final result                 Please view results for these tests on the individual orders.    CBC Auto Differential [[id number]]  (Abnormal) Collected: 10/04/22 2234    Specimen: Blood Updated: 10/04/22 2246     WBC 6.15 10*3/mm3      RBC 3.24 10*6/mm3      Hemoglobin 9.2 g/dL      Hematocrit 29.1 %      MCV 89.8 fL      MCH 28.4 pg      MCHC 31.6 g/dL      RDW 11.7 %      RDW-SD 37.3 fl      MPV 9.8 fL      Platelets 194 10*3/mm3      Neutrophil % 79.3 %      Lymphocyte % 8.8 %      Monocyte % 9.6 %      Eosinophil % 1.1 %      Basophil % 0.7 %      Neutrophils, Absolute 4.88 " 10*3/mm3      Lymphocytes, Absolute 0.54 10*3/mm3      Monocytes, Absolute 0.59 10*3/mm3      Eosinophils, Absolute 0.07 10*3/mm3      Basophils, Absolute 0.04 10*3/mm3           I ordered the above labs and reviewed the results    RADIOLOGY  XR Chest 1 View   Final Result   No acute findings.       This report was finalized on 10/4/2022 9:43 PM by Dr. Mary Delaney M.D.               I ordered the above noted radiological studies. Interpreted by radiologist.  Reviewed by me in PACS.       PROCEDURES  Procedures  EKG          EKG time: 2136  Rhythm/Rate: NSR, 63  P waves and NE: nml  QRS, axis: nml, nml   ST and T waves: nml     Interpreted Contemporaneously by me, independently viewed  unchanged compared to prior 8/4/22      PROGRESS AND CONSULTS  ED Course as of 10/04/22 2303   Tue Oct 04, 2022   2243 On reevaluation, the patient is resting comfortably with stable vital signs.  At rest, she appears to be symptom-free.  I did inform the patient that her EKG as well as chest x-ray are unremarkable however we are awaiting her labs to result.  Regardless, I would like to admit her to the hospital for further work-up and management of her exertional dyspnea.  The patient understands and is in agreement with that plan and all questions have been answered. [BM]   2302 Case discussed with EZRA Amor, who agrees to admit the patient to the observation unit with Dr. Colon. [BM]      ED Course User Index  [BM] Gerald Ramírez MD     The patient was wearing a facemask upon entrance into the room and remained in such throughout their visit.  I was wearing PPE including a facemask, eye protection, as well as gloves at any point entering the room and throughout the visit      MEDICAL DECISION MAKING  Results were reviewed/discussed with the patient and they were also made aware of online access. Pt also made aware that some labs, such as cultures, will not be resulted during ER visit and follow up with PMD is  necessary.     MDM  Number of Diagnoses or Management Options     Amount and/or Complexity of Data Reviewed  Clinical lab tests: reviewed and ordered  Tests in the radiology section of CPT®: ordered and reviewed  Review and summarize past medical records: yes (Upon medical records review, the patient was last seen and evaluated on 8/24/2022 secondary to a fall with a facial injury)  Discuss the patient with other providers: yes (EZRA Amor, who agrees to admit the patient to the observation unit with Dr. Colon)  Independent visualization of images, tracings, or specimens: yes (Unremarkable chest x-ray)           DIAGNOSIS  Final diagnoses:   Exertional dyspnea   Chronic anemia       DISPOSITION  ADMISSION    Discussed treatment plan and reason for admission with pt/family and admitting physician.  Pt/family voiced understanding of the plan for admission for further testing/treatment as needed.         Latest Documented Vital Signs:  As of 23:03 EDT  BP- 155/59 HR- 63 Temp- 97.9 °F (36.6 °C) (Tympanic) O2 sat- 98%         Gerald Ramírez MD  10/04/22 8246

## 2022-10-05 NOTE — H&P
[facility]   HISTORY AND PHYSICAL    Patient Name: Yi Lee  : 1945  MRN: [id number]  Primary Care Physician:  Ankit Albert MD  Date of admission: 10/4/2022    Subjective   Subjective     Chief Complaint: Dyspnea on exertion    History of Present Illness  Yi Lee is a 76-year-old female that was admitted to the observation unit for further evaluation of her dyspnea on exertion.  She states she has had worsening dyspnea on exertion over the past 4 days.  She denies chest pain, lower extremity edema, cough, nausea, vomiting, or back pain.  She has a past medical history including, but not limited to, anemia, bipolar, cardiac murmur, depression, diabetes, GERD, hyperlipidemia, and hypertension.  Review of Systems   Constitutional: Positive for fatigue.   HENT: Negative.    Eyes: Negative.    Respiratory: Positive for shortness of breath. Negative for cough and chest tightness.    Cardiovascular: Negative.  Negative for chest pain, palpitations and leg swelling.   Gastrointestinal: Negative.  Negative for nausea.   Endocrine: Negative.    Genitourinary: Negative.    Musculoskeletal: Negative.  Negative for back pain.   Skin: Negative.    Allergic/Immunologic: Negative.    Neurological: Negative.  Negative for dizziness.   Hematological: Negative.    Psychiatric/Behavioral: Negative.         Personal History     Past Medical History:   Diagnosis Date   • Abdominal pain, LLQ    • Anemia    • Arthralgia of hip 2015   • Asthma    • Ataxic gait 2015    Description: Javi Mederos, Neuro.  Some vestibular dysfunction present    • Bipolar I disorder, single manic episode (HCC)    • Bradycardia    • Cardiac murmur    • Colon polyp    • Coronary artery disease    • Degeneration, intervertebral disc, thoracolumbar    • Depression    • Diabetes mellitus (HCC)    • Disease of thyroid gland    • Diverticulosis    • Ductal carcinoma in situ (DCIS) of left breast    • Esophageal spasm     • Fatigue    • GERD (gastroesophageal reflux disease)    • H/O bone density study 10/17/2007    Dr. Giles   • Hemorrhoids    • History of mammogram     02/2012, per GYN Reshma Aranda   • History of Papanicolaou smear of cervix     DR. GILES GYN   • History of transfusion    • Hyperlipidemia    • Hypertension    • Impaired cognition 11/22/2015    Description: Testing done 05/14   • Optic nerve contusion    • Pain of lower extremity 11/22/2015   • Pseudoaneurysm following procedure (HCC)     DURING CARDIAC CATHETERIZATION   • Shoulder pain     LEFT   • Skin tear of forearm without complication, right, initial encounter    • Stage 2 chronic kidney disease    • Systolic murmur    • Vitamin B12 deficiency    • Vitamin D deficiency        Past Surgical History:   Procedure Laterality Date   • BREAST LUMPECTOMY Left     benign   • CHOLECYSTECTOMY     • COLONOSCOPY  2007    done 02/12, repeat 5 yrs, Dr Grigsby; tics in sigmoid colon, IH   • COLONOSCOPY N/A 11/3/2016    polyp, IH   • COLONOSCOPY N/A 2/24/2020    Procedure: COLONOSCOPY TO CECUM WITH COLD POLYPECTOMY;  Surgeon: Eddie Grigsby MD;  Location: [facility] ENDOSCOPY;  Service: Gastroenterology;  Laterality: N/A;  pre: hx of polyps  post: polyp, hemorrhoids, diverticulosis   • COLONOSCOPY N/A 12/7/2021    Procedure: COLONOSCOPY TO CECUM  - COLD BIOPSY POLYPECTOMIES;  Surgeon: Eddie Grigsby MD;  Location: [facility] ENDOSCOPY;  Service: Gastroenterology;  Laterality: N/A;  IRON DEFICIENCY ANEMIA, HX POLYPS, CONSTIPATION   ---DIVERTICULOSIS, POLYPS   • D & C AND LAPAROSCOPY     • DILATATION AND CURETTAGE     • ENDOSCOPY N/A 2/24/2020    Procedure: ESOPHAGOGASTRODUODENOSCOPY with biopsies;  Surgeon: Eddie Grigsby MD;  Location: [facility] ENDOSCOPY;  Service: Gastroenterology;  Laterality: N/A;  pre: iron deficiency anemia  post: gastriits   • ENDOSCOPY N/A 12/7/2021    Procedure: ESOPHAGOGASTRODUODENOSCOPY, WITH BIOPSIES;  Surgeon: Eddie Grigsby MD;  Location: [facility] ENDOSCOPY;   Service: Gastroenterology;  Laterality: N/A;  GERD,WT LOSS, IRON DEFICIENCY ANEMIA ,  DYSPEPSIA  ---HIATAL HERNIA, GASTRITIS   • ESOPHAGOGASTRIC FUNDOPLASTY     • ROTATOR CUFF REPAIR Right 2009   • TOTAL KNEE ARTHROPLASTY Left 2/13/2018    Procedure: LT TOTAL KNEE ARTHROPLASTY;  Surgeon: Selwyn Pinto MD;  Location: [facility] OR;  Service:    • TOTAL SHOULDER ARTHROPLASTY W/ DISTAL CLAVICLE EXCISION Left 8/9/2022    Procedure: TOTAL SHOULDER REVERSE ARTHROPLASTY;  Surgeon: Yony Gallardo MD;  Location: [facility];  Service: Orthopedics;  Laterality: Left;       Family History: family history includes Cerebral aneurysm in her paternal grandfather and paternal grandmother; Colon polyps in her father; Coronary artery disease in an other family member; Heart disease in her father; Hepatitis in an other family member; Hypertension in her brother; Lung cancer in her maternal aunt, maternal aunt, and paternal uncle; Prostate cancer in her father; Stroke in her paternal grandfather and paternal grandmother. Otherwise pertinent FHx was reviewed and not pertinent to current issue.    Social History:  reports that she quit smoking about 50 years ago. Her smoking use included cigarettes. She has a 7.00 pack-year smoking history. She has never used smokeless tobacco. She reports current alcohol use. She reports that she does not use drugs.    Home Medications:  ALPRAZolam, PARoxetine, QUEtiapine, Vitamin D3, acetaminophen, amLODIPine, anastrozole, carvedilol, chlorthalidone, ciprofloxacin, clindamycin, ferrous sulfate, freestyle, glucose blood, hydrALAZINE, lactulose, lamoTRIgine, levothyroxine, metFORMIN, metoprolol succinate XL, omeprazole, and rosuvastatin    Allergies:  Allergies   Allergen Reactions   • Morphine Shortness Of Breath   • Penicillins Hives and Swelling   • Ace Inhibitors Cough   • Telmisartan Cough       Objective    Objective     Vitals:   Temp:  [97.9 °F (36.6 °C)] 97.9 °F (36.6 °C)  Heart Rate:   [62-66] 64  Resp:  [22] 22  BP: (155-176)/() 165/67    Physical Exam  Vitals and nursing note reviewed.   Constitutional:       General: She is not in acute distress.     Appearance: Normal appearance.   Cardiovascular:      Rate and Rhythm: Normal rate and regular rhythm.      Pulses: Normal pulses.      Heart sounds: Murmur heard.   Pulmonary:      Effort: Pulmonary effort is normal.      Breath sounds: Normal breath sounds.   Abdominal:      General: Bowel sounds are normal.      Palpations: Abdomen is soft.   Musculoskeletal:         General: Normal range of motion.   Skin:     General: Skin is warm and dry.      Capillary Refill: Capillary refill takes less than 2 seconds.   Neurological:      General: No focal deficit present.      Mental Status: She is alert and oriented to person, place, and time.   Psychiatric:         Mood and Affect: Mood normal.         Behavior: Behavior normal.         Thought Content: Thought content normal.         Judgment: Judgment normal.         Result Review    Result Review:  I have personally reviewed the results from the time of this admission to 10/4/2022 23:24 EDT and agree with these findings:  [x]  Laboratory list / accordion  []  Microbiology  [x]  Radiology  [x]  EKG/Telemetry   []  Cardiology/Vascular   []  Pathology  []  Old records  []  Other:      Assessment & Plan   Assessment / Plan     Brief Patient Summary:  Yi Lee is a 76 y.o. female who was admitted to the observation unit for further evaluation of her dyspnea on exertion    Active Hospital Problems:  Active Hospital Problems    Diagnosis    • Dyspnea on exertion      Plan:   Dyspnea on exertion  -Cardiac monitor  -Continuous pulse ox  -Cardiology consult  -N.p.o. after midnight  -Troponin <0.010     Hypertension  -Chronic condition  -Vital signs every 4 hours  -Continue home blood pressure medication    Hyperlipidemia  -Chronic condition  -Lipid profile in a.m.    Diabetes  -Hemoglobin A1c  pending  -Hypo glycemia protocol  -Hyperglycemia protocol with low dosage correction insulin regimen    Bipolar  -Monitor patient  -Continue home psychiatric medications    DVT prophylaxis:  Mechanical DVT prophylaxis orders are present.    CODE STATUS:    Code Status (Patient has no pulse and is not breathing): CPR (Attempt to Resuscitate)  Medical Interventions (Patient has pulse or is breathing): Full Support    Admission Status:  I believe this patient meets Observation status.    Vanessa Chakraborty, APRN

## 2022-10-05 NOTE — PROGRESS NOTES
ED OBSERVATION PROGRESS/DISCHARGE SUMMARY    Date of Admission: 10/4/2022   LOS: 0 days   PCP: Ankit Albert MD      Subjective    No acute events overnight.  Patient states she has no shortness of breath.  She states she only gets short of breath when she is exerting herself mostly at work.  She denies any chest pain or chest discomfort.  Denies any palpitations.  Denies any abdominal pain or nausea.  Denies any fevers or chills.    Hospital Outcome:   76-year-old female mated to the observation unit for further evaluation of exertional dyspnea with generalized weakness and fatigue.  ER evaluation significant for chest x-ray revealing no acute findings.  Laboratory evaluation in the ER reveals a BNP of 1018, creatinine of 1.6 and a hemoglobin of 9.2.  She was seen and evaluated by cardiology who recommended stress testing that was normal with no evidence of ischemia and echocardiogram that showed an EF of 66% with normal systolic and diastolic function.  D-dimer was ordered that was elevated at 1.54.  CTA of the chest negative for PE but did note some groundglass opacities she is in note of borderline prominent nonspecific mediastinal lymph nodes.  COVID test negative  Patient was also noted with a drop in the hemoglobin from 9.2-8.3.  Patient denies any signs or symptoms of active bleeding.  A bedside Hemoccult was performed that was negative.  An iron profile reveals an iron level of 29 and a saturation of 8.  Patient started on daily ferrous sulfate and an ambulatory referral to hematology has been placed.    ROS:  General: no fevers, chills  Respiratory: no cough, dyspnea  Cardiovascular: no chest pain, palpitations  Abdomen: No abdominal pain, nausea, vomiting, or diarrhea  Neurologic: No focal weakness    Objective   Physical Exam:  I have reviewed the vital signs.  Temp:  [97.9 °F (36.6 °C)-98.8 °F (37.1 °C)] 98.2 °F (36.8 °C)  Heart Rate:  [57-66] 60  Resp:  [16-22] 18  BP: (133-176)/()  166/58  General Appearance:  76-year-old female, well-nourished, no acute distress on room air  Head:    Normocephalic, atraumatic  Eyes:    Sclerae anicteric  Neck:   Supple, no mass  Lungs: Clear to auscultation bilaterally, respirations unlabored  Heart: Regular rate and rhythm, S1 and S2 normal, no murmur, rub or gallop  Abdomen:  Soft, non-tender, bowel sounds active, nondistended  Extremities: No clubbing, cyanosis, or edema to lower extremities  Pulses:  2+ and symmetric in distal lower extremities  Skin: No rashes   Neurologic: Oriented x3, Normal strength to extremities    Results Review:    I have reviewed the labs, radiology results and diagnostic studies.    Results from last 7 days   Lab Units 10/05/22  0518   WBC 10*3/mm3 4.06   HEMOGLOBIN g/dL 8.3*   HEMATOCRIT % 27.0*   PLATELETS 10*3/mm3 219     Results from last 7 days   Lab Units 10/05/22  1541 10/05/22  0100   SODIUM mmol/L 138 135*   POTASSIUM mmol/L 4.0 4.3   CHLORIDE mmol/L 104 102   CO2 mmol/L 24.0 21.5*   BUN mg/dL 20 29*   CREATININE mg/dL 1.31* 1.60*   CALCIUM mg/dL 9.4 9.4   BILIRUBIN mg/dL  --  0.2   ALK PHOS U/L  --  88   ALT (SGPT) U/L  --  10   AST (SGOT) U/L  --  14   GLUCOSE mg/dL 146* 110*     Imaging Results (Last 24 Hours)     Procedure Component Value Units Date/Time    XR Chest 1 View [[id number]] Collected: 10/04/22 2141     Updated: 10/04/22 2146    Narrative:      SINGLE VIEW OF THE CHEST     HISTORY: Shortness of breath     COMPARISON: 08/04/2022     FINDINGS:  Cardiomegaly is present. No pneumothorax or pleural effusion is seen. No  definite vascular congestion is identified. There is evidence of prior  granulomatous disease. No definite acute infiltrates are identified.  There are changes of prior left shoulder arthroplasty.       Impression:      No acute findings.     This report was finalized on 10/4/2022 9:43 PM by Dr. Mary Delaney M.D.             I have reviewed the  medications.  ---------------------------------------------------------------------------------------------  Assessment & Plan   Assessment/Problem List    Dyspnea on exertion      Plan:    Dyspnea on exertion  Iron deficiency anemia  -Serial troponins negative  -EKG nonischemic  -CTA of the chest with bilateral groundglass opacities that are nonspecific with borderline prominent nonspecific mediastinal lymph nodes.  -Respiratory panel negative  -Echocardiogram shows EF of 66% with normal systolic and diastolic function, and moderately calcified aortic valve leaflets.  -Stress test normal  -Cardiology consulted, see their note for further details  -Patient also noted with iron deficiency anemia, started on ferrous sulfate daily with an outpatient follow-up with hematology placed    Hypertension  -Continue home blood pressure medication  -Patient to monitor blood pressures daily and follow-up with cardiology and PCP for further evaluation    Hyperlipidemia  -Chronic condition  -Lipid panel unremarkable     Diabetes  -Hemoglobin A1c 5.9  -Tinea home medications as prescribed     Bipolar  -No acute issues  -Continue home psychiatric medications    Disposition: HOME    Follow-up after Discharge: Primary care physician 1 week, cardiology 1 month    This note will serve as discharge summary    I wore an face mask, eye protection, and gloves during this patient encounter. Patient also wearing a surgical mask. Hand hygeine performed before and after seeing the patient.    Mery Zuniga PA-C 10/05/22 22:07 EDT

## 2022-10-05 NOTE — CONSULTS
[facility] Consult Note       Encounter Date:10/05/22  Patient:Yi Lee  :1945  MRN:[id number]    Date of Admission: 10/4/2022  Date of Encounter Visit: 10/05/22  Encounter Provider: Fran Falcon MD  Referring Provider: Rei Colon MD  Place of Service: [facility]  Patient Care Team:  Ankit Albert MD as PCP - General (Family Medicine)  Ria Valenzuela MD as Consulting Physician (Nephrology)      Consulted for:  Dyspnea on exertion    Chief Complaint: Dyspnea on exertion       History of Presenting Illness:      Ms. Lee is a 76 y.o. woman with past medical history notable for bicuspid aortic valve with mild stenoses, COPD, left ventricular outflow tract obstruction, lupus, hypertension, mild renal insufficiency, and mixed hyperlipidemia who presents to the emergency room with progressive dyspnea on exertion or shortness of breath.  She was actually recently seen in our office last month and at that time was doing reasonably well.  Her blood pressure had been at times elevated but on our last assessment it was doing reasonably well and also that day she had not taken her blood pressure medicines.  She reports having fairly good blood pressures at home.  And really had been doing fairly well but over the last couple of days she has had progressive and significant dyspnea on exertion which has been severe.  Even walking across the room was giving her a fair amount of shortness of breath.      Review of Systems:  Review of Systems   Constitutional: Positive for malaise/fatigue.   HENT: Negative.    Eyes: Negative.    Cardiovascular: Positive for dyspnea on exertion.   Respiratory: Positive for shortness of breath.    Endocrine: Negative.    Hematologic/Lymphatic: Negative.    Skin: Negative.    Musculoskeletal: Negative.    Gastrointestinal: Negative.    Genitourinary: Negative.    Neurological: Negative.    Psychiatric/Behavioral: Negative.     Allergic/Immunologic: Negative.        Medications:    Current Facility-Administered Medications:   •  ALPRAZolam (XANAX) tablet 0.5 mg, 0.5 mg, Oral, TID PRN, Cecilio Vieira MD  •  amLODIPine (NORVASC) tablet 7.5 mg, 7.5 mg, Oral, BID, Vanessa Chakraborty APRN  •  anastrozole (ARIMIDEX) tablet 1 mg, 1 mg, Oral, Daily, Vanessa Chakraborty APRN  •  chlorthalidone (HYGROTON) tablet 25 mg, 25 mg, Oral, Daily, Vanessa Chakraborty APRN  •  dextrose (D50W) (25 g/50 mL) IV injection 25 g, 25 g, Intravenous, Q15 Min PRN, Vanessa Chakraborty APRN  •  dextrose (GLUTOSE) oral gel 15 g, 15 g, Oral, Q15 Min PRN, Vanessa Chakraborty APRN  •  glucagon (human recombinant) (GLUCAGEN DIAGNOSTIC) injection 1 mg, 1 mg, Intramuscular, Q15 Min PRN, Vanessa Chakraborty APRN  •  hydrALAZINE (APRESOLINE) tablet 100 mg, 100 mg, Oral, TID, Cecilio Vieira MD  •  HYDROcodone-acetaminophen (NORCO) 5-325 MG per tablet 1 tablet, 1 tablet, Oral, Q6H PRN, Vanessa Chakraborty APRN  •  insulin lispro (ADMELOG) injection 0-7 Units, 0-7 Units, Subcutaneous, TID AC, Vanessa Chakraborty APRN  •  lamoTRIgine (LaMICtal) tablet 200 mg, 200 mg, Oral, Nightly, Vanessa Chakraborty APRN, 200 mg at 10/05/22 0309  •  levothyroxine (SYNTHROID, LEVOTHROID) tablet 100 mcg, 100 mcg, Oral, Q AM, Vanessa Chakraborty APRN, 100 mcg at 10/05/22 0522  •  pantoprazole (PROTONIX) EC tablet 40 mg, 40 mg, Oral, QAM, Vanessa Chakraborty APRN  •  PARoxetine (PAXIL) tablet 40 mg, 40 mg, Oral, Daily, Vanessa Chakraborty APRN  •  QUEtiapine (SEROquel) tablet 150 mg, 150 mg, Oral, Nightly, Vanessa Chakraborty APRN, 150 mg at 10/05/22 0320  •  rosuvastatin (CRESTOR) tablet 5 mg, 5 mg, Oral, Daily, Vanessa Chakraborty APRN  •  sodium chloride 0.9 % flush 10 mL, 10 mL, Intravenous, Q12H, Vanessa Chakraborty APRN, 10 mL at 10/05/22 0140  •  sodium chloride 0.9 % flush 10 mL, 10 mL, Intravenous, PRN, Vanessa Chakraborty APRN  •  sodium chloride 0.9 % infusion, 75 mL/hr, Intravenous, Continuous, Vanessa Chakraborty APRN, Last Rate: 75 mL/hr at 10/05/22  0748, 75 mL/hr at 10/05/22 0748    Allergies   Allergen Reactions   • Morphine Shortness Of Breath   • Penicillins Hives and Swelling   • Ace Inhibitors Cough   • Telmisartan Cough       Past Medical History:   Diagnosis Date   • Abdominal pain, LLQ    • Anemia    • Arthralgia of hip 11/22/2015   • Asthma    • Ataxic gait 11/22/2015    Description: Eval Dr Lillian Mederos, Neuro.  Some vestibular dysfunction present 2013/2014   • Bipolar I disorder, single manic episode (HCC)    • Bradycardia    • Cardiac murmur    • Colon polyp    • Coronary artery disease    • Degeneration, intervertebral disc, thoracolumbar    • Depression    • Diabetes mellitus (HCC)    • Disease of thyroid gland    • Diverticulosis    • Ductal carcinoma in situ (DCIS) of left breast    • Esophageal spasm    • Fatigue    • GERD (gastroesophageal reflux disease)    • H/O bone density study 10/17/2007    Dr. Giles   • Hemorrhoids    • History of mammogram     02/2012, per GYN Reshma Aranda   • History of Papanicolaou smear of cervix     DR. GILES GYN   • History of transfusion    • Hyperlipidemia    • Hypertension    • Impaired cognition 11/22/2015    Description: Testing done 05/14   • Optic nerve contusion    • Pain of lower extremity 11/22/2015   • Pseudoaneurysm following procedure (HCC)     DURING CARDIAC CATHETERIZATION   • Shoulder pain     LEFT   • Skin tear of forearm without complication, right, initial encounter    • Stage 2 chronic kidney disease    • Systolic murmur    • Vitamin B12 deficiency    • Vitamin D deficiency        Past Surgical History:   Procedure Laterality Date   • BREAST LUMPECTOMY Left     benign   • CHOLECYSTECTOMY     • COLONOSCOPY  2007    done 02/12, repeat 5 yrs, Dr Grigsby; tics in sigmoid colon, IH   • COLONOSCOPY N/A 11/03/2016    polyp, IH   • COLONOSCOPY N/A 02/24/2020    Procedure: COLONOSCOPY TO CECUM WITH COLD POLYPECTOMY;  Surgeon: Eddie Grigsby MD;  Location: [facility] ENDOSCOPY;  Service: Gastroenterology;   Laterality: N/A;  pre: hx of polyps  post: polyp, hemorrhoids, diverticulosis   • COLONOSCOPY N/A 2021    Procedure: COLONOSCOPY TO CECUM  - COLD BIOPSY POLYPECTOMIES;  Surgeon: Eddie Grigsby MD;  Location: [facility] ENDOSCOPY;  Service: Gastroenterology;  Laterality: N/A;  IRON DEFICIENCY ANEMIA, HX POLYPS, CONSTIPATION   ---DIVERTICULOSIS, POLYPS   • D & C AND LAPAROSCOPY     • DILATATION AND CURETTAGE     • ENDOSCOPY N/A 2020    Procedure: ESOPHAGOGASTRODUODENOSCOPY with biopsies;  Surgeon: Eddie Grigsby MD;  Location: [facility] ENDOSCOPY;  Service: Gastroenterology;  Laterality: N/A;  pre: iron deficiency anemia  post: gastriits   • ENDOSCOPY N/A 2021    Procedure: ESOPHAGOGASTRODUODENOSCOPY, WITH BIOPSIES;  Surgeon: Eddie Grigsby MD;  Location: [facility] ENDOSCOPY;  Service: Gastroenterology;  Laterality: N/A;  GERD,WT LOSS, IRON DEFICIENCY ANEMIA ,  DYSPEPSIA  ---HIATAL HERNIA, GASTRITIS   • ESOPHAGOGASTRIC FUNDOPLASTY     • JOINT REPLACEMENT     • ROTATOR CUFF REPAIR Right    • TOTAL KNEE ARTHROPLASTY Left 2018    Procedure: LT TOTAL KNEE ARTHROPLASTY;  Surgeon: Selwyn Pinto MD;  Location: [facility] MAIN OR;  Service:    • TOTAL SHOULDER ARTHROPLASTY W/ DISTAL CLAVICLE EXCISION Left 2022    Procedure: TOTAL SHOULDER REVERSE ARTHROPLASTY;  Surgeon: Yony Gallardo MD;  Location: [facility] OR [facility];  Service: Orthopedics;  Laterality: Left;       Social History     Socioeconomic History   • Marital status:    • Number of children: 1   Tobacco Use   • Smoking status: Former Smoker     Packs/day: 1.00     Years: 7.00     Pack years: 7.00     Types: Cigarettes     Quit date:      Years since quittin.7   • Smokeless tobacco: Never Used   • Tobacco comment: caffeine use: 2 CUPS COFFEE/ 3 DIET PEPSIS DAILY   Vaping Use   • Vaping Use: Never used   Substance and Sexual Activity   • Alcohol use: Yes     Comment: OCC   • Drug use: No   • Sexual activity: Never       Family  History   Problem Relation Age of Onset   • Colon polyps Father    • Heart disease Father    • Prostate cancer Father    • Hepatitis Other    • Coronary artery disease Other    • Hypertension Brother    • Lung cancer Maternal Aunt    • Lung cancer Paternal Uncle    • Stroke Paternal Grandmother    • Cerebral aneurysm Paternal Grandmother    • Stroke Paternal Grandfather    • Cerebral aneurysm Paternal Grandfather    • Lung cancer Maternal Aunt    • Malig Hyperthermia Neg Hx        The following portions of the patient's history were reviewed and updated as appropriate: allergies, current medications, past family history, past medical history, past social history, past surgical history and problem list.         Objective:      Temp:  [97.9 °F (36.6 °C)-98.8 °F (37.1 °C)] 98.8 °F (37.1 °C)  Heart Rate:  [57-66] 57  Resp:  [16-22] 17  BP: (133-176)/() 167/63   No intake or output data in the 24 hours ending 10/05/22 0847  Body mass index is 28.91 kg/m².      10/05/22  0109   Weight: 76.4 kg (168 lb 6.4 oz)           Physical Exam:  Constitutional: Well appearing, well developed, no acute distress   HENT: Oropharynx clear and membrane moist  Eyes: Normal conjunctiva, no sclera icterus.  Neck: Supple, no carotid bruit bilaterally.  Cardiovascular: Regular rate and rhythm, Early peaking systolic murmur over the right upper sternal border, No bilateral lower extremity edema.  Pulmonary: Normal respiratory effort, normal lung sounds, no wheezing.  Abdominal: Soft, nontender, no hepatosplenomegaly, liver is non-pulsatile.  Neurological: Alert and orient x 3.   Skin: Warm, dry, no ecchymosis, no rash.  Psych: Appropriate mood and affect. Normal judgment and insight.         Lab Review:   Results from last 7 days   Lab Units 10/05/22  0100   SODIUM mmol/L 135*   POTASSIUM mmol/L 4.3   CHLORIDE mmol/L 102   CO2 mmol/L 21.5*   BUN mg/dL 29*   CREATININE mg/dL 1.60*   GLUCOSE mg/dL 110*   CALCIUM mg/dL 9.4   AST (SGOT) U/L  14   ALT (SGPT) U/L 10     Results from last 7 days   Lab Units 10/05/22  0100   TROPONIN T ng/mL <0.010     Results from last 7 days   Lab Units 10/05/22  0518 10/04/22  2234   WBC 10*3/mm3 4.06 6.15   HEMOGLOBIN g/dL 8.3* 9.2*   HEMATOCRIT % 27.0* 29.1*   PLATELETS 10*3/mm3 219 194         Results from last 7 days   Lab Units 10/04/22  2150   MAGNESIUM mg/dL 2.0     Results from last 7 days   Lab Units 10/05/22  0100   CHOLESTEROL mg/dL 139   TRIGLYCERIDES mg/dL 107   HDL CHOL mg/dL 50     The 10-year ASCVD risk score (Miller ROTH Jr., et al., 2013) is: 53%    Values used to calculate the score:      Age: 76 years      Sex: Female      Is Non- : No      Diabetic: Yes      Tobacco smoker: No      Systolic Blood Pressure: 167 mmHg      Is BP treated: Yes      HDL Cholesterol: 50 mg/dL      Total Cholesterol: 139 mg/dL     Results from last 7 days   Lab Units 10/04/22  2150   PROBNP pg/mL 1,018.0         Previous Cardiac Testing       Holter Monitor 3/29/2021:  · Patient diary submitted.No symptoms reported during the monitoring period. No complications noted.   · The predominant rhythm noted during the testing period was sinus rhythm.   · Premature atrial contractions occured rarely. There was no evidence of atrial arrhythmias. There were no episodes of supraventricular tachycardia.   · Premature ventricular contractions occured rarely. There were no episodes of ventricular tachycardia.   · Sinoatrial node conduction was normal. 2nd degree (Mobitz 1) atrioventricular block noted. There were frequent episodes of type I second-degree AV block throughout the study.    Echocardiogram 1/15/2021 with images reviewed by myself:  · Left ventricular ejection fraction appears to be 61 - 65%.  · There is moderate asymmetric basal septal hypertrophy with protrusion into the LVOT. There is turbulence at the level of the LVOT, although there is no obvious LVOT obstruction. There is no systolic anterior motion  of the mitral valve  · Normal right ventricular cavity size and systolic function noted.  · The left atrial cavity is severely dilated.  · The aortic valve appears to be bicuspid with a prominent pseudoraphe  · Aortic valve maximum pressure gradient is 15 mmHg. Aortic valve mean pressure gradient is 9 mmHg.  · Mild to moderate mitral valve regurgitation is present.  · Mild tricuspid valve regurgitation is present.  · Calculated right ventricular systolic pressure from tricuspid regurgitation is 35 mmHg.  · There is no evidence of pericardial effusion    ·       EKG this AM    EKG on arrival       EKG baseline     Reviewed her EKG above which shows sinus rhythm with first-degree AV block no change from prior         Assessment:           Dyspnea on exertion         Plan:       Ms. Lee is a 76 y.o. woman with past medical history notable for bicuspid aortic valve with mild stenoses, COPD, left ventricular outflow tract obstruction, lupus, hypertension, mild renal insufficiency, and mixed hyperlipidemia who presents to the emergency room with progressive dyspnea on exertion or shortness of breath.  Her presentation is really hard to pin down.  She is got potential multiple reasons for her significant shortness of breath and dyspnea exertion including her underlying COPD, her anemia, and blood pressure.  Overall her blood pressures not terrible although its not a great goal it is fairly reasonable given her age and also underlying renal insufficiency.  Might be further adjustments we can do as an outpatient but it has been over a year since we have last looked at her heart I would recommend getting a repeat echocardiogram to reevaluate valvular heart disease although was only mild when assessed and January 2021.  Furthermore we have not done an ischemic evaluation on her would be reasonable to rule out with a stress test.      Dyspnea on exertion:  · Could be anginal equivalent will rule out with ischemic evaluation  with stress test  · Will also follow-up with echocardiogram to evaluate aortic stenoses  · Could be related to underlying anemia or chronic lung disease    Essential hypertension:  · Blood pressure is not quite at goal we will continue to closely monitor  · Further titration can be accomplished as an outpatient    First-degree AV block:  · Noted on prior ECGs not particularly worse and prior monitor results showing fairly good heart rate response no significant conduction disease would continue to monitor for the time being but also not particularly because of symptoms    Nonrheumatic aortic stenosis:  · Related to bicuspid aortic valve  · Follow-up on echocardiogram    Thank you for allowing me to participate in the care of Yi ANA Lee. Feel free to contact me directly with any further questions or concerns.    Fran Falcon MD  [facility]  10/05/22  08:47 EDT

## 2022-10-05 NOTE — ED TRIAGE NOTES
Pt to ED via personal vehicle with c/o feeling very tired and SOA. States she becomes SOA 1-2 days ago but worsening today. Pt states she is not on blood thinners.     Pt in mask. This RN in ppe.

## 2022-10-05 NOTE — PLAN OF CARE
Goal Outcome Evaluation:              Outcome Evaluation: Pt complains of SOA upon exertion.  Pt remains on room air.  Stress test and echo today.  CTA planned for today.  No complaints of pain.  VSS.  Will continue to monitor.

## 2022-10-06 NOTE — OUTREACH NOTE
Call Center TCM Note    Flowsheet Row Responses   [facility] patient discharged from? [place]   Does the patient have one of the following disease processes/diagnoses(primary or secondary)? Other   TCM attempt successful? Yes   Call start time 1431   Call end time 1445   Discharge diagnosis Dyspnea on exertion   Meds reviewed with patient/caregiver? Yes   Does the patient have all medications ordered at discharge? N/A   Is the patient taking all medications as directed (includes completed medication regime)? Yes   Medication comments Confirmed that Pt does have Iron ready for  and Pt is aware.    Comments HOSP DC FU appt 10/10/22 @ 2:15 pm.    Has home health visited the patient within 72 hours of discharge? N/A   Psychosocial issues? No   Did the patient receive a copy of their discharge instructions? Yes   Nursing interventions Reviewed instructions with patient   What is the patient's perception of their health status since discharge? Improving   Is the patient/caregiver able to teach back signs and symptoms related to disease process for when to call PCP? Yes   Is the patient/caregiver able to teach back signs and symptoms related to disease process for when to call 911? Yes   Is the patient/caregiver able to teach back the hierarchy of who to call/visit for symptoms/problems? PCP, Specialist, Home health nurse, Urgent Care, ED, 911 Yes   TCM call completed? Yes          Marita Clifford RN    10/6/2022, 14:46 EDT

## 2022-10-06 NOTE — OUTREACH NOTE
Prep Survey    Flowsheet Row Responses   [facility] facility patient discharged from? [place]   Is LACE score < 7 ? No   Emergency Room discharge w/ pulse ox? No   Eligibility [facility]   Date of Admission 10/04/22   Date of Discharge 10/05/22   Discharge Disposition Home or Self Care   Discharge diagnosis Dyspnea on exertion   Does the patient have one of the following disease processes/diagnoses(primary or secondary)? Other   Does the patient have Home health ordered? No   Is there a DME ordered? No   Prep survey completed? Yes          TANNER A - Registered Nurse

## 2022-10-20 NOTE — OUTREACH NOTE
Medical Week 3 Survey    Flowsheet Row Responses   [facility] patient discharged from? [place]   Does the patient have one of the following disease processes/diagnoses(primary or secondary)? Other   Week 3 attempt successful? No   Unsuccessful attempts Attempt 1   Revoke Decline to participate          CHELSI BROWN - Registered Nurse

## 2022-10-31 NOTE — PROGRESS NOTES
Subjective     REASON FOR CONSULTATION: Iron deficiency anemia   Provide an opinion on any further workup or treatment                             REQUESTING PHYSICIAN: JOSH Webb    RECORDS OBTAINED:  Records of the patients history including those obtained from the referring provider were reviewed and summarized in detail.    HISTORY OF PRESENT ILLNESS:  The patient is a 76 y.o. year old female who is here for an opinion about the above issue.  She was referred to us from the emergency room where she had presented recently  (10/5/2022) with some shortness of breath and falls.  She had a CT angiogram showing no evidence of pulmonary embolus but she was noted to be anemic with a hemoglobin of 8.3 and an iron saturation of 8%.  The initiated iron supplement but the patient reports that she did not take it for more than a week or so.    She had recent upper and lower GI endoscopy with Dr. Eddie Grigsby 12/7/2021.  She had no obvious bleeding lesions identified although she did have diverticular disease in the colon    Her main complaint today is ataxia with multiple falls.  She also has been in the ER in August after a fall suffering a broken nose.    History of Present Illness     Past Medical History:   Diagnosis Date   • Abdominal pain, LLQ    • Abnormal Pap smear of cervix    • Anemia    • Arthralgia of hip 11/22/2015   • Asthma    • Ataxic gait 11/22/2015    Description: Evthad Mederos, Neuro.  Some vestibular dysfunction present 2013/2014   • Bipolar I disorder, single manic episode (HCC)    • Bradycardia    • Cardiac murmur    • Colon polyp    • Coronary artery disease    • Degeneration, intervertebral disc, thoracolumbar    • Depression    • Diabetes mellitus (HCC)    • Disease of thyroid gland     Hypothyroidism   • Diverticulosis    • Ductal carcinoma in situ (DCIS) of left breast    • Esophageal spasm    • Fatigue    • GERD (gastroesophageal reflux disease)    • H/O bone density study  10/17/2007    Dr. Giles   • Hemorrhoids    • History of mammogram     02/2012, per GYN Reshma Aranda   • History of Papanicolaou smear of cervix     DR. GILES G[facility]   • History of transfusion    • Hyperlipidemia    • Hypertension    • Impaired cognition 11/22/2015    Description: Testing done 05/14   • Lupus (HCC)    • Optic nerve contusion    • Pain of lower extremity 11/22/2015   • Pseudoaneurysm following procedure (HCC)     DURING CARDIAC CATHETERIZATION   • Shoulder pain     LEFT   • Skin tear of forearm without complication, right, initial encounter    • Stage 2 chronic kidney disease    • Systolic murmur    • Vitamin B12 deficiency    • Vitamin D deficiency         Past Surgical History:   Procedure Laterality Date   • BREAST BIOPSY  2011   • BREAST LUMPECTOMY Left 2020    benign   • CARDIAC CATHETERIZATION     • CHOLECYSTECTOMY     • COLONOSCOPY  2007    done 02/12, repeat 5 yrs, Dr Grigsby; tics in sigmoid colon, IH   • COLONOSCOPY N/A 11/03/2016    polyp, IH   • COLONOSCOPY N/A 02/24/2020    Procedure: COLONOSCOPY TO CECUM WITH COLD POLYPECTOMY;  Surgeon: Eddie Grigsby MD;  Location: [facility] ENDOSCOPY;  Service: Gastroenterology;  Laterality: N/A;  pre: hx of polyps  post: polyp, hemorrhoids, diverticulosis   • COLONOSCOPY N/A 12/07/2021    Procedure: COLONOSCOPY TO CECUM  - COLD BIOPSY POLYPECTOMIES;  Surgeon: Eddie Grigsby MD;  Location: [facility] ENDOSCOPY;  Service: Gastroenterology;  Laterality: N/A;  IRON DEFICIENCY ANEMIA, HX POLYPS, CONSTIPATION   ---DIVERTICULOSIS, POLYPS   • COLONOSCOPY  2010    normal   • COLPOSCOPY W/ BIOPSY / CURETTAGE     • D & C AND LAPAROSCOPY  1974   • DILATATION AND CURETTAGE     • ENDOSCOPY N/A 02/24/2020    Procedure: ESOPHAGOGASTRODUODENOSCOPY with biopsies;  Surgeon: Eddie Grigsby MD;  Location: [facility] ENDOSCOPY;  Service: Gastroenterology;  Laterality: N/A;  pre: iron deficiency anemia  post: gastriits   • ENDOSCOPY N/A 12/07/2021    Procedure: ESOPHAGOGASTRODUODENOSCOPY,  WITH BIOPSIES;  Surgeon: Eddie Grigsby MD;  Location: [facility] ENDOSCOPY;  Service: Gastroenterology;  Laterality: N/A;  GERD,WT LOSS, IRON DEFICIENCY ANEMIA ,  DYSPEPSIA  ---HIATAL HERNIA, GASTRITIS   • ESOPHAGOGASTRIC FUNDOPLASTY     • HYSTEROSCOPY     • JOINT REPLACEMENT     • ROTATOR CUFF REPAIR Right 2009   • ROTATOR CUFF REPAIR Left    • TOTAL KNEE ARTHROPLASTY Left 02/13/2018    Procedure: LT TOTAL KNEE ARTHROPLASTY;  Surgeon: Selwyn Pinto MD;  Location: [facility] MAIN OR;  Service:    • TOTAL SHOULDER ARTHROPLASTY W/ DISTAL CLAVICLE EXCISION Left 08/09/2022    Procedure: TOTAL SHOULDER REVERSE ARTHROPLASTY;  Surgeon: Yony Gallardo MD;  Location: [facility] OR OSC;  Service: Orthopedics;  Laterality: Left;        Current Outpatient Medications on File Prior to Visit   Medication Sig Dispense Refill   • acetaminophen (TYLENOL) 325 MG tablet Take 650 mg by mouth As Needed for Mild Pain .     • ALPRAZolam (Xanax) 0.5 MG tablet Take 1 tablet by mouth 3 (Three) Times a Day As Needed for Anxiety. 270 tablet 0   • amLODIPine (NORVASC) 5 MG tablet TAKE 1 AND 1/2 TABLETS BY MOUTH TWICE DAILY 270 tablet 1   • anastrozole (ARIMIDEX) 1 MG tablet Take 1 mg by mouth Daily.     • carvedilol (COREG) 12.5 MG tablet TAKE 1 TABLET BY MOUTH  TWICE DAILY 90 tablet 7   • chlorthalidone (HYGROTON) 25 MG tablet Take 1 tablet by mouth Daily. 90 tablet 2   • ferrous sulfate 325 (65 FE) MG EC tablet Take 650 mg by mouth.     • glucose blood (FREESTYLE LITE) test strip Use to test everyday as directed 100 each 5   • hydrALAZINE (APRESOLINE) 100 MG tablet TAKE 1 TABLET BY MOUTH 3  TIMES DAILY 270 tablet 3   • HYDROcodone-acetaminophen (NORCO) 5-325 MG per tablet Take 1 tablet by mouth Every 6 (Six) Hours.     • lactulose (CHRONULAC) 10 GM/15ML solution TAKE 35 TO 45 ML&apos;S BY MOUTH DAILY FOR CONSTIPATION (Patient taking differently: Take  by mouth 3 (Three) Times a Day.) 1892 mL 11   • lactulose (Generlac) 10 GM/15ML solution  solution (encephalopathy) TAKE 35 TO 45 ML BY MOUTH DAILY FOR CONSTIPATION 4050 mL 1   • lamoTRIgine (LaMICtal) 200 MG tablet TAKE 2 AND 1/2 TABLETS BY  MOUTH AT NIGHT 225 tablet 3   • Lancets (FREESTYLE) lancets As directed to check blood glucose 100 each 12   • levothyroxine (SYNTHROID, LEVOTHROID) 100 MCG tablet TAKE 1 TABLET BY MOUTH  DAILY 90 tablet 3   • metFORMIN (GLUCOPHAGE) 500 MG tablet TAKE 2 TABLETS BY MOUTH  DAILY WITH BREAKFAST 180 tablet 3   • omeprazole (priLOSEC) 40 MG capsule TAKE 1 CAPSULE BY MOUTH  DAILY 90 capsule 3   • PARoxetine (PAXIL) 40 MG tablet TAKE 1 TABLET BY MOUTH IN  THE MORNING FOR DEPRESSION 90 tablet 1   • QUEtiapine (SEROquel) 300 MG tablet TAKE 1 TABLET BY MOUTH AT  NIGHT (Patient taking differently: Take 1/2 a tablet at night) 90 tablet 3   • rosuvastatin (CRESTOR) 5 MG tablet TAKE 1 TABLET BY MOUTH AT  NIGHT FOR CHOLESTEROL 90 tablet 3   • [DISCONTINUED] metoprolol succinate XL (TOPROL-XL) 50 MG 24 hr tablet TAKE 1 TABLET BY MOUTH DAILY 90 tablet 0     No current facility-administered medications on file prior to visit.        ALLERGIES:    Allergies   Allergen Reactions   • Morphine Shortness Of Breath   • Penicillins Hives and Swelling   • Ace Inhibitors Cough   • Telmisartan Cough        Social History     Socioeconomic History   • Marital status:    • Number of children: 1   Tobacco Use   • Smoking status: Former     Packs/day: 1.00     Years: 7.00     Pack years: 7.00     Types: Cigarettes     Quit date:      Years since quittin.8   • Smokeless tobacco: Never   • Tobacco comments:     caffeine use: 2 CUPS COFFEE/ 3 DIET PEPSIS DAILY   Vaping Use   • Vaping Use: Never used   Substance and Sexual Activity   • Alcohol use: Yes     Comment: OCC   • Drug use: No   • Sexual activity: Never        Family History   Problem Relation Age of Onset   • Colon polyps Father    • Heart disease Father    • Prostate cancer Father    • Emphysema Father    • Lung disease  "Father    • Hypertension Brother    • Lung cancer Maternal Aunt    • Lung cancer Maternal Aunt    • Brain cancer Maternal Uncle    • Lung cancer Paternal Uncle    • Stroke Paternal Grandmother    • Cerebral aneurysm Paternal Grandmother    • Stroke Paternal Grandfather    • Cerebral aneurysm Paternal Grandfather    • Hepatitis Other    • Coronary artery disease Other    • Malig Hyperthermia Neg Hx         Review of Systems   Constitutional: Positive for activity change and fatigue. Negative for chills and fever.   HENT: Negative for mouth sores, trouble swallowing and voice change.    Eyes: Negative for pain and visual disturbance.   Respiratory: Negative for cough, shortness of breath and wheezing.    Cardiovascular: Negative for chest pain and palpitations.   Gastrointestinal: Negative for abdominal pain, constipation, diarrhea, nausea and vomiting.   Genitourinary: Negative for difficulty urinating, frequency and urgency.   Musculoskeletal: Positive for gait problem. Negative for arthralgias and joint swelling.        Ataxic gait   Skin: Positive for pallor. Negative for rash.   Neurological: Positive for weakness. Negative for dizziness, seizures and headaches.   Hematological: Negative for adenopathy. Bruises/bleeds easily.   Psychiatric/Behavioral: Positive for confusion. Negative for behavioral problems. The patient is not nervous/anxious.         Objective     Vitals:    10/31/22 1008   BP: 138/70   Pulse: 65   Resp: 16   Temp: 97.7 °F (36.5 °C)   TempSrc: Temporal   SpO2: 96%   Weight: 69.9 kg (154 lb)   Height: 158 cm (62.21\")  Comment: new ht   PainSc: 0-No pain     Current Status 10/31/2022   ECOG score 0       Physical Exam  Constitutional:       General: She is not in acute distress.     Appearance: She is well-developed.   HENT:      Head: Normocephalic.   Eyes:      General: No scleral icterus.     Conjunctiva/sclera: Conjunctivae normal.      Pupils: Pupils are equal, round, and reactive to light. "   Neck:      Thyroid: No thyromegaly.      Vascular: No JVD.   Cardiovascular:      Rate and Rhythm: Normal rate and regular rhythm.      Heart sounds: Murmur heard.     No friction rub. No gallop.   Pulmonary:      Effort: Pulmonary effort is normal.      Breath sounds: Normal breath sounds. No wheezing or rales.   Abdominal:      General: There is no distension.      Palpations: Abdomen is soft. There is no mass.      Tenderness: There is no abdominal tenderness.   Musculoskeletal:         General: No deformity. Normal range of motion.      Cervical back: Normal range of motion and neck supple.   Lymphadenopathy:      Cervical: No cervical adenopathy.   Skin:     General: Skin is warm and dry.      Findings: Bruising present. No erythema or rash.   Neurological:      Mental Status: She is alert and oriented to person, place, and time.      Cranial Nerves: No cranial nerve deficit.      Motor: Weakness present.      Gait: Gait abnormal.      Deep Tendon Reflexes: Reflexes are normal and symmetric.   Psychiatric:         Behavior: Behavior normal.         Judgment: Judgment normal.           RECENT LABS:  Hematology WBC   Date Value Ref Range Status   10/31/2022 6.02 3.40 - 10.80 10*3/mm3 Final   11/04/2021 5.39 4.5 - 11.0 10*3/uL Final     RBC   Date Value Ref Range Status   10/31/2022 3.46 (L) 3.77 - 5.28 10*6/mm3 Final   11/04/2021 3.83 (L) 4.0 - 5.2 10*6/uL Final     Hemoglobin   Date Value Ref Range Status   10/31/2022 9.6 (L) 12.0 - 15.9 g/dL Final   11/04/2021 11.5 (L) 12.0 - 16.0 g/dL Final     Hematocrit   Date Value Ref Range Status   10/31/2022 30.0 (L) 34.0 - 46.6 % Final   11/04/2021 36.1 36.0 - 46.0 % Final     Platelets   Date Value Ref Range Status   10/31/2022 191 140 - 450 10*3/mm3 Final   11/04/2021 303 140 - 440 10*3/uL Final        Lab Results   Component Value Date    GLUCOSE 146 (H) 10/05/2022    BUN 20 10/05/2022    CREATININE 1.31 (H) 10/05/2022    EGFRIFNONA 47 (L) 12/07/2021    EGFRIFAFRI  42 (L) 08/11/2021    BCR 15.3 10/05/2022    K 4.0 10/05/2022    CO2 24.0 10/05/2022    CALCIUM 9.4 10/05/2022    PROTENTOTREF 6.2 08/11/2021    ALBUMIN 3.90 10/05/2022    LABIL2 2.1 08/11/2021    LABIL2 1.4 08/11/2021    AST 14 10/05/2022    ALT 10 10/05/2022     Lab Results   Component Value Date    IRON 29 (L) 10/05/2022    TIBC 365 10/05/2022    FERRITIN 46.9 11/04/2021   SAT 8%    CT HEAD 8/24/2022  IMPRESSION:  1. No acute intracranial abnormality seen. There is minimal small vessel  disease in the periventricular white matter.   2. Moderate size scalp hematoma over the anterior-inferior frontal bone  from today's head trauma. There is evidence of fractures in the nasal  bones that will be described on the facial CT report below, and see the  below report. The remainder of the head CT is normal with no acute skull  fracture or intracranial hemorrhage identified.    Assessment & Plan     1.  Iron deficiency anemia.  Patient had taken oral iron very briefly after her ER visit and her hemoglobin has improved.  We recommended that she resume oral iron twice daily with food.  2.  Ataxia with frequent falls.  The patient had a CT scan of the head in August after a fall which was unremarkable but she reports her ataxia seems to be getting worse.    Recommendations  1.  Patient will have repeat iron studies drawn today.  2.  She will be started on ferrous sulfate 325 mg p.o. twice daily with food.  3.  She will be scheduled to return to our office in 6 weeks to assess response with repeat CBC and iron studies performed 1 week prior to that visit.  4.  We encouraged the patient to discuss with her primary care office her worsening ataxia.  I wonder if she may need some further imaging or possibly referral to neurology.

## 2022-11-01 PROBLEM — R27.0 ATAXIA: Status: ACTIVE | Noted: 2022-01-01

## 2022-11-01 NOTE — ED NOTES
"Pt to arrival ED via PV with complaints of loss of balance stating \"I woke up around 0900 this morning and ran into the door frame.\" Pt fell into the kitchen counter x1 hr ago hitting her head and denies use of blood thinner. Pt is currently A/Ox3 not oriented to time. Pt complains of dizziness, blurred vision and balance issues. Pt assisted to wheelchair from car stating \"I had trouble seeing while driving here.\"     Pt wearing masking upon entrance to ED. This RN wearing proper PPE during duration of encounter.   "

## 2022-11-02 PROBLEM — E43 SEVERE MALNUTRITION (HCC): Status: ACTIVE | Noted: 2022-01-01

## 2022-11-02 NOTE — CONSULTS
Nutrition Services    Patient Name:  Yi Lee  YOB: 1945  MRN: [id number]  Admit Date:  11/1/2022      Comment: Visited pt while sitting in the chair who was bouncing her legs up and down very quickly. She stated she needed to go to the bathroom. RD called nurse aid to help her up. She reports her appetite is fine and that she only eats 1 meal per day. RD observed her lunch tray-- 50% was eaten. She reports her UBW is 165# and was shocked to hear that her weight had decreased to 151#. She is agreeable to eating orange magic cup ONS BID to help meet nutrition needs. RD had to end assessment prematurely so she could go to the bathroom. Will continue to follow     Severe Malnutrition  based on ASPEN/AND, pt meets criteria for nutrition dx of severe malnutiriton of chronic disease    CLINICAL NUTRITION ASSESSMENT      Reason for Assessment Nurse Admission Screen     Diagnosis/Problem   Ataxia     Patient has been losing feeling in her right leg, getting dizzy, and having blurred/tunnel vision.    Medical/Surgical History Past Medical History:   Diagnosis Date   • Abdominal pain, LLQ    • Abnormal Pap smear of cervix    • Anemia    • Arthralgia of hip 11/22/2015   • Asthma    • Ataxic gait 11/22/2015    Description: Eval Dr Lillian Mederos, Neuro.  Some vestibular dysfunction present 2013/2014   • Bipolar I disorder, single manic episode (HCC)    • Bradycardia    • Cardiac murmur    • Colon polyp    • Coronary artery disease    • Degeneration, intervertebral disc, thoracolumbar    • Depression    • Diabetes mellitus (HCC)    • Disease of thyroid gland     Hypothyroidism   • Diverticulosis    • Ductal carcinoma in situ (DCIS) of left breast    • Esophageal spasm    • Fatigue    • GERD (gastroesophageal reflux disease)    • H/O bone density study 10/17/2007    Dr. Ervin   • Heart murmur    • Hemorrhoids    • History of mammogram     02/2012, per GYN Reshma Aranda   • History of Papanicolaou smear of  [facility]   • History of transfusion    • Hyperlipidemia    • Hypertension    • Impaired cognition 11/22/2015    Description: Testing done 05/14   • Lupus (HCC)    • Optic nerve contusion    • Pain of lower extremity 11/22/2015   • Pseudoaneurysm following procedure (HCC)     DURING CARDIAC CATHETERIZATION   • Shoulder pain     LEFT   • Skin tear of forearm without complication, right, initial encounter    • Stage 2 chronic kidney disease    • Systolic murmur    • Thyroid disease    • Vitamin B12 deficiency    • Vitamin D deficiency        Past Surgical History:   Procedure Laterality Date   • BREAST BIOPSY  2011   • BREAST LUMPECTOMY Left 2020    benign   • CARDIAC CATHETERIZATION     • CHOLECYSTECTOMY     • COLONOSCOPY  2007    done 02/12, repeat 5 yrs, Dr Grigsby; tics in sigmoid colon, IH   • COLONOSCOPY N/A 11/03/2016    polyp, IH   • COLONOSCOPY N/A 02/24/2020    Procedure: COLONOSCOPY TO CECUM WITH COLD POLYPECTOMY;  Surgeon: Eddie Grigsby MD;  Location: [facility] ENDOSCOPY;  Service: Gastroenterology;  Laterality: N/A;  pre: hx of polyps  post: polyp, hemorrhoids, diverticulosis   • COLONOSCOPY N/A 12/07/2021    Procedure: COLONOSCOPY TO CECUM  - COLD BIOPSY POLYPECTOMIES;  Surgeon: Eddie Grigsby MD;  Location: [facility] ENDOSCOPY;  Service: Gastroenterology;  Laterality: N/A;  IRON DEFICIENCY ANEMIA, HX POLYPS, CONSTIPATION   ---DIVERTICULOSIS, POLYPS   • COLONOSCOPY  2010    normal   • COLPOSCOPY W/ BIOPSY / CURETTAGE     • D & C AND LAPAROSCOPY  1974   • DILATATION AND CURETTAGE     • ENDOSCOPY N/A 02/24/2020    Procedure: ESOPHAGOGASTRODUODENOSCOPY with biopsies;  Surgeon: Eddie Grigsby MD;  Location: [facility] ENDOSCOPY;  Service: Gastroenterology;  Laterality: N/A;  pre: iron deficiency anemia  post: gastriits   • ENDOSCOPY N/A 12/07/2021    Procedure: ESOPHAGOGASTRODUODENOSCOPY, WITH BIOPSIES;  Surgeon: Eddie Grigsby MD;  Location: [facility] ENDOSCOPY;  Service: Gastroenterology;  Laterality: N/A;   "GERD,WT LOSS, IRON DEFICIENCY ANEMIA ,  DYSPEPSIA  ---HIATAL HERNIA, GASTRITIS   • ESOPHAGOGASTRIC FUNDOPLASTY     • HYSTEROSCOPY     • JOINT REPLACEMENT     • ROTATOR CUFF REPAIR Right 2009   • ROTATOR CUFF REPAIR Left    • TOTAL KNEE ARTHROPLASTY Left 02/13/2018    Procedure: LT TOTAL KNEE ARTHROPLASTY;  Surgeon: Selwyn Pinto MD;  Location: [facility];  Service:    • TOTAL SHOULDER ARTHROPLASTY W/ DISTAL CLAVICLE EXCISION Left 08/09/2022    Procedure: TOTAL SHOULDER REVERSE ARTHROPLASTY;  Surgeon: Yony Gallardo MD;  Location: [facility];  Service: Orthopedics;  Laterality: Left;        Encounter Information        Nutrition/Diet History:  Visited pt while sitting in the chair who was bouncing her legs up and down very quickly. She stated she needed to go to the bathroom. RD called nurse aid to help her up. She reports her appetite is fine and that she only eats 1 meal per day. RD observed her lunch tray-- 50% was eaten. She reports her UBW is 165# and was shocked to hear that her weight had decreased to 151#. She is agreeable to eating orange magic cup ONS BID to help meet nutrition needs. RD had to end assessment prematurely so she could go to the bathroom. Will continue to follow    Food Preferences:    Supplements:    Factors Affecting Intake: decreased appetite     Anthropometrics        Current Height  Current Weight  BMI kg/m2 Height: 162.6 cm (64\")  Weight: 68.5 kg (151 lb) (11/02/22 1355)  Body mass index is 25.92 kg/m².       Admission Weight 151# (68.5 kg)    Ideal Body Weight (IBW) 120# (54.7 kg)    Usual Body Weight (UBW)    Weight Change/Trend 14# (8.5%) wt loss x 2 months        Weight History Wt Readings from Last 30 Encounters:   11/02/22 1355 68.5 kg (151 lb)   11/02/22 0203 68.8 kg (151 lb 10.8 oz)   10/31/22 1008 69.9 kg (154 lb)   10/05/22 0109 76.4 kg (168 lb 6.4 oz)   09/16/22 0907 73.5 kg (162 lb)   08/30/22 1000 72.6 kg (160 lb)   08/24/22 0829 74.8 kg (165 lb)   08/09/22 " 1027 75.2 kg (165 lb 12.8 oz)   08/04/22 1405 75.2 kg (165 lb 12.8 oz)   06/14/22 1356 73.9 kg (163 lb)   06/02/22 0901 73.9 kg (163 lb)   05/16/22 1332 72.6 kg (160 lb)   04/15/22 1132 74 kg (163 lb 3.2 oz)   12/07/21 0636 71.2 kg (157 lb)   11/15/21 1022 71.1 kg (156 lb 12.8 oz)   10/13/21 1455 74 kg (163 lb 3.2 oz)   09/17/21 1054 72.6 kg (160 lb)   08/11/21 0817 75.3 kg (166 lb)   06/11/21 1337 76.7 kg (169 lb)   05/03/21 1318 77.1 kg (170 lb)   04/28/21 0758 78.5 kg (173 lb)   03/29/21 1027 78.9 kg (174 lb)   03/22/21 1005 78.9 kg (174 lb)   03/18/21 0934 79.2 kg (174 lb 9.6 oz)   02/24/21 1020 77.6 kg (171 lb)   02/04/21 1308 78.8 kg (173 lb 12.8 oz)   01/27/21 1033 78 kg (172 lb)   11/23/20 1451 79.4 kg (175 lb)   08/03/20 0821 81.4 kg (179 lb 6.4 oz)   07/31/20 0811 82.1 kg (181 lb)   06/30/20 1457 82.6 kg (182 lb)             Tests/Procedures        Tests/Procedures CT scan Head- no signs of stroke but signs of mild small vessel disease      Labs       Pertinent Labs    Results from last 7 days   Lab Units 11/02/22  0640 11/01/22  1806 10/31/22  1042   SODIUM mmol/L 134* 133* 136   POTASSIUM mmol/L 4.0 4.2 4.7   CHLORIDE mmol/L 100 100 102   CO2 mmol/L 22.0 21.7* 20.3*   BUN mg/dL 20 24* 19   CREATININE mg/dL 1.46* 1.75* 1.43*   CALCIUM mg/dL 9.2 10.5 9.8   BILIRUBIN mg/dL  --  0.3 0.3   ALK PHOS U/L  --  103 89   ALT (SGPT) U/L  --  12 8   AST (SGOT) U/L  --  17 15   GLUCOSE mg/dL 121* 136* 170*     Results from last 7 days   Lab Units 11/02/22  0640 11/01/22  1806   HEMOGLOBIN g/dL  --  10.2*   HEMATOCRIT %  --  33.0*   WBC 10*3/mm3  --  4.77   TRIGLYCERIDES mg/dL 108  --    ALBUMIN g/dL  --  4.20     Results from last 7 days   Lab Units 11/01/22  1806 10/31/22  0956   PLATELETS 10*3/mm3 300 191     COVID19   Date Value Ref Range Status   10/05/2022 Not Detected Not Detected - Ref. Range Final     Lab Results   Component Value Date    HGBA1C 6.10 (H) 11/02/2022          Medications           Scheduled  "Medications aspirin, 325 mg, Oral, Daily   Or  aspirin, 300 mg, Rectal, Daily  atorvastatin, 80 mg, Oral, Nightly  insulin lispro, 0-7 Units, Subcutaneous, 4x Daily With Meals & Nightly  sodium chloride, 10 mL, Intravenous, Q12H       Infusions     PRN Medications •  acetaminophen **OR** acetaminophen  •  dextrose  •  dextrose  •  glucagon (human recombinant)  •  sodium chloride     Physical Findings        Physical Appearance alert, oriented     NFPE Pending   --  Malnutrition Severity Assessment      Patient meets criteria for : Severe Malnutrition (based on ASPEN/AND, pt meets criteria for nutrition dx of severe malnutiriton of chronic disease)  Malnutrition Type (last 8 hours)     Malnutrition Severity Assessment     Row Name 11/02/22 1507       Malnutrition Severity Assessment    Malnutrition Type Chronic Disease - Related Malnutrition    Row Name 11/02/22 1507       Insufficient Energy Intake     Insufficient Energy Intake Findings Severe    Insufficient Energy Intake  <75% of est. energy requirement for > or equal to 1 month    Row Name 11/02/22 1507       Unintentional Weight Loss     Unintentional Weight Loss Findings Severe  14# (8.5%) wt loss x 2 months    Unintentional Weight Loss  Weight loss of 7.5% in three months    Row Name 11/02/22 1507       Criteria Met (Must meet criteria for severity in at least 2 of these categories: M Wasting, Fat Loss, Fluid, Secondary Signs, Wt. Status, Intake)    Patient meets criteria for  Severe Malnutrition  based on ASPEN/AND, pt meets criteria for nutrition dx of severe malnutiriton of chronic disease                   Edema  no edema   Gastrointestinal last bowel movement: 11/2   Tubes/Drains none   Oral/Mouth Cavity WNL   Skin skin intact   --  Estimated/Assessed Needs       Energy Requirements    Height for Calculation  Height: 162.6 cm (64\")   Weight for Calculation 151# (68.5 kg)    Method for Estimation  25 kcal/kg, 30 kcal/kg   EST Needs (kcal/day) 6410-7267    "    Protein Requirements    Weight for Calculation 151# (68.5 kg)    EST Protein Needs (g/kg) 1.0 - 1.2 gm/kg   EST Daily Needs (g/day) 68-82       Fluid Requirements     Method for Estimation 1 mL/kcal    Estimated Needs (mL/day) 6022-4469       Fluid Deficit    Current Na Level (mEq/L)    Desired Na Level (mEq/L)    Estimated Fluid Deficit (L)           Current Nutrition Orders & Evaluation of Intake       Oral Nutrition     Food Allergies NKFA   Current PO Diet Diet Regular   Supplement n/a   PO Evaluation     Trending % PO Intake 50% x 2 meals        --  PES STATEMENT / NUTRITION DIAGNOSIS      Nutrition Dx Problem  Problem: Malnutrition  Etiology: Medical Diagnosis and Factors Affecting Nutrition  Signs/Symptoms: Report of Minimal PO Intake, Unintended Weight Change and Report/Observation    Comment:    --  NUTRITION INTERVENTION      Intervention Goal(s) Nutrition support treatment, Reduce/improve symptoms, Meet estimated needs, Disease management/therapy, Increase intake and Maintain weight         RD Intervention/Action Interview for preferences, Supplement provided, Encourage intake, Follow Tx Progress and Care plan reviewed         Prescription/Orders:       PO Diet       Supplements Orange magic cup BID       Enteral Nutrition       Parenteral Nutrition    New Prescription Ordered? yes   --      Monitor/Evaluation Per protocol, I&O, PO intake, Supplement intake, Pertinent labs, Weight, Skin status, GI status, Symptoms, POC/GOC   Education Will instruct as appropriate   --    RD to follow per protocol.      Electronically signed by:  Mariela Jennings RD  11/02/22 15:06 EDT

## 2022-11-02 NOTE — THERAPY EVALUATION
Patient Name: Yi Lee  : 1945    MRN: [id number]                              Today's Date: 2022       Admit Date: 2022    Visit Dx:     ICD-10-CM ICD-9-CM   1. Ataxia  R27.0 781.3     Patient Active Problem List   Diagnosis   • Abnormal creatinine clearance glomerular filtration   • Benign essential hypertension   • Chronic obstructive pulmonary disease (HCC)   • Depression   • Type 2 diabetes mellitus with diabetic neuropathy, without long-term current use of insulin (HCC)   • Gastroesophageal reflux disease   • Hyperlipidemia   • Hypothyroidism   • Obesity (BMI 30-39.9)   • Ataxic gait   • Abdominal pain, right lateral   • Iron deficiency anemia due to chronic blood loss   • Cystic kidney disease   • Vitamin B12 deficiency   • OA (osteoarthritis) of knee   • Iron deficiency anemia   • Bipolar I disorder, single manic episode (HCC)   • Ductal carcinoma in situ (DCIS) of left breast   • Bradycardia   • Systolic murmur   • Bicuspid aortic valve   • Renal insufficiency   • 1st degree AV block   • Dizziness   • Fatigue   • Vitamin D deficiency   • Bruising   • Weight loss, abnormal   • Loss of appetite   • Constipation   • Aromatase inhibitor use   • Breast neoplasm, Tis (DCIS), left   • Lupus erythematosus   • Malignant neoplasm of breast (HCC)   • Normocytic anemia   • Hypertension   • S/P reverse total shoulder arthroplasty, left   • Closed fracture of nasal bone with routine healing   • Visit for suture removal   • Fall   • Dyspnea on exertion   • Ataxia     Past Medical History:   Diagnosis Date   • Abdominal pain, LLQ    • Abnormal Pap smear of cervix    • Anemia    • Arthralgia of hip 2015   • Asthma    • Ataxic gait 2015    Description: Javi Mederos, Neuro.  Some vestibular dysfunction present    • Bipolar I disorder, single manic episode (HCC)    • Bradycardia    • Cardiac murmur    • Colon polyp    • Coronary artery disease    • Degeneration,  intervertebral disc, thoracolumbar    • Depression    • Diabetes mellitus (HCC)    • Disease of thyroid gland     Hypothyroidism   • Diverticulosis    • Ductal carcinoma in situ (DCIS) of left breast    • Esophageal spasm    • Fatigue    • GERD (gastroesophageal reflux disease)    • H/O bone density study 10/17/2007    Dr. Giles   • Heart murmur    • Hemorrhoids    • History of mammogram     02/2012, per GYN Reshma Aranda   • History of Papanicolaou smear of cervix     DR. GILES GYN   • History of transfusion    • Hyperlipidemia    • Hypertension    • Impaired cognition 11/22/2015    Description: Testing done 05/14   • Lupus (HCC)    • Optic nerve contusion    • Pain of lower extremity 11/22/2015   • Pseudoaneurysm following procedure (HCC)     DURING CARDIAC CATHETERIZATION   • Shoulder pain     LEFT   • Skin tear of forearm without complication, right, initial encounter    • Stage 2 chronic kidney disease    • Systolic murmur    • Thyroid disease    • Vitamin B12 deficiency    • Vitamin D deficiency      Past Surgical History:   Procedure Laterality Date   • BREAST BIOPSY  2011   • BREAST LUMPECTOMY Left 2020    benign   • CARDIAC CATHETERIZATION     • CHOLECYSTECTOMY     • COLONOSCOPY  2007    done 02/12, repeat 5 yrs, Dr Grigsby; tics in sigmoid colon, IH   • COLONOSCOPY N/A 11/03/2016    polyp, IH   • COLONOSCOPY N/A 02/24/2020    Procedure: COLONOSCOPY TO CECUM WITH COLD POLYPECTOMY;  Surgeon: Eddie Grigsby MD;  Location: [facility] ENDOSCOPY;  Service: Gastroenterology;  Laterality: N/A;  pre: hx of polyps  post: polyp, hemorrhoids, diverticulosis   • COLONOSCOPY N/A 12/07/2021    Procedure: COLONOSCOPY TO CECUM  - COLD BIOPSY POLYPECTOMIES;  Surgeon: Eddie Grigsby MD;  Location: [facility] ENDOSCOPY;  Service: Gastroenterology;  Laterality: N/A;  IRON DEFICIENCY ANEMIA, HX POLYPS, CONSTIPATION   ---DIVERTICULOSIS, POLYPS   • COLONOSCOPY  2010    normal   • COLPOSCOPY W/ BIOPSY / CURETTAGE     • D & C AND LAPAROSCOPY   1974   • DILATATION AND CURETTAGE     • ENDOSCOPY N/A 02/24/2020    Procedure: ESOPHAGOGASTRODUODENOSCOPY with biopsies;  Surgeon: Eddie Grigsby MD;  Location: [facility] ENDOSCOPY;  Service: Gastroenterology;  Laterality: N/A;  pre: iron deficiency anemia  post: gastriits   • ENDOSCOPY N/A 12/07/2021    Procedure: ESOPHAGOGASTRODUODENOSCOPY, WITH BIOPSIES;  Surgeon: Eddie Grigsby MD;  Location: [facility] ENDOSCOPY;  Service: Gastroenterology;  Laterality: N/A;  GERD,WT LOSS, IRON DEFICIENCY ANEMIA ,  DYSPEPSIA  ---HIATAL HERNIA, GASTRITIS   • ESOPHAGOGASTRIC FUNDOPLASTY     • HYSTEROSCOPY     • JOINT REPLACEMENT     • ROTATOR CUFF REPAIR Right 2009   • ROTATOR CUFF REPAIR Left    • TOTAL KNEE ARTHROPLASTY Left 02/13/2018    Procedure: LT TOTAL KNEE ARTHROPLASTY;  Surgeon: Selwyn Pinto MD;  Location: [facility] MAIN OR;  Service:    • TOTAL SHOULDER ARTHROPLASTY W/ DISTAL CLAVICLE EXCISION Left 08/09/2022    Procedure: TOTAL SHOULDER REVERSE ARTHROPLASTY;  Surgeon: Yony Gallardo MD;  Location: [facility] OR OSC;  Service: Orthopedics;  Laterality: Left;      General Information     Row Name 11/02/22 1336          OT Time and Intention    Document Type evaluation  -RB     Mode of Treatment co-treatment;occupational therapy;physical therapy  -RB     Row Name 11/02/22 1336          General Information    Patient Profile Reviewed yes  -RB     Prior Level of Function independent:;ADL's;transfer  multiple falls reported  -RB     Existing Precautions/Restrictions fall  -RB     Barriers to Rehab medically complex;previous functional deficit  -RB     Row Name 11/02/22 1336          Living Environment    People in Home alone  -RB     Row Name 11/02/22 1336          Home Main Entrance    Number of Stairs, Main Entrance --  ramp  -RB     Row Name 11/02/22 1336          Cognition    Orientation Status (Cognition) oriented x 4  -RB     Row Name 11/02/22 1336          Safety Issues, Functional Mobility    Safety Issues Affecting  Function (Mobility) awareness of need for assistance;safety precaution awareness;safety precautions follow-through/compliance;judgment;problem-solving;insight into deficits/self-awareness  -     Impairments Affecting Function (Mobility) balance;coordination;endurance/activity tolerance;strength  -RB           User Key  (r) = Recorded By, (t) = Taken By, (c) = Cosigned By    Initials Name Provider Type    RB Deena Markham OT Occupational Therapist                 Mobility/ADL's     Row Name 11/02/22 1337          Bed Mobility    Bed Mobility supine-sit  -RB     Supine-Sit [place] (Bed Mobility) supervision  -RB     Row Name 11/02/22 1338 11/02/22 1337       Transfers    Transfers toilet transfer  -RB sit-stand transfer;stand-sit transfer;bed-chair transfer  -RB    Row Name 11/02/22 1337          Bed-Chair Transfer    Bed-Chair [place] (Transfers) contact guard  -RB     Row Name 11/02/22 1337          Sit-Stand Transfer    Sit-Stand [place] (Transfers) contact guard  -RB     Row Name 11/02/22 1337          Stand-Sit Transfer    Stand-Sit [place] (Transfers) contact guard  -RB     Row Name 11/02/22 1338          Toilet Transfer    Type (Toilet Transfer) sit-stand;stand-sit  -RB     [place] Level (Toilet Transfer) minimum assist (75% patient effort)  -     Assistive Device (Toilet Transfer) grab bars/safety frame  -RB     Row Name 11/02/22 1337          Functional Mobility    Functional Mobility- Ind. Level minimum assist (75% patient effort);1 person + 1 person to manage equipment;verbal cues required;nonverbal cues required (demo/gesture)  -     Functional Mobility- Safety Issues balance decreased during turns;sequencing ability decreased;step length decreased  -RB     Row Name 11/02/22 1337          Activities of Daily Living    BADL Assessment/Intervention lower body dressing  -     Row Name 11/02/22 1337          Lower Body Dressing Assessment/Training    [place] Level  (Lower Body Dressing) lower body dressing skills;shoes/slippers;set up  -RB     Position (Lower Body Dressing) supported sitting  -RB           User Key  (r) = Recorded By, (t) = Taken By, (c) = Cosigned By    Initials Name Provider Type    Deena Mehta OT Occupational Therapist               Obj/Interventions     Row Name 11/02/22 1338          Sensory Assessment (Somatosensory)    Sensory Assessment baseline BLE neuropathy  -RB     Row Name 11/02/22 1338          Vision Assessment/Intervention    Visual Impairment/Limitations WFL  -RB     Row Name 11/02/22 1338          Range of Motion Comprehensive    General Range of Motion no range of motion deficits identified  -RB     Row Name 11/02/22 1338          Strength Comprehensive (MMT)    Comment, General Manual Muscle Testing (MMT) Assessment LUE shoulder weakness compared to R side. impaired finger to nose and LUE hand opposition due to neuro deficits  -RB     Row Name 11/02/22 1338          Balance    Comment, Balance Supervision sitting balance. CGA/Min A static/dynamic standing balance. Unsteady with standing  -RB           User Key  (r) = Recorded By, (t) = Taken By, (c) = Cosigned By    Initials Name Provider Type    Deena Mehta OT Occupational Therapist               Goals/Plan     Row Name 11/02/22 1340          Bed Mobility Goal 1 (OT)    Activity/Assistive Device (Bed Mobility Goal 1, OT) bed mobility activities, all  -RB     [place] Level/Cues Needed (Bed Mobility Goal 1, OT) supervision required  -RB     Time Frame (Bed Mobility Goal 1, OT) short term goal (STG);2 weeks  -RB     Progress/Outcomes (Bed Mobility Goal 1, OT) continuing progress toward goal  -RB     Row Name 11/02/22 1340          Transfer Goal 1 (OT)    Activity/Assistive Device (Transfer Goal 1, OT) transfers, all  -RB     [place] Level/Cues Needed (Transfer Goal 1, OT) supervision required  -RB     Time Frame (Transfer Goal 1, OT) short term goal (STG);2 weeks   -RB     Progress/Outcome (Transfer Goal 1, OT) continuing progress toward goal  -RB     Row Name 11/02/22 1340          Dressing Goal 1 (OT)    Activity/Device (Dressing Goal 1, OT) dressing skills, all  -RB     [place]/Cues Needed (Dressing Goal 1, OT) supervision required  -RB     Time Frame (Dressing Goal 1, OT) short term goal (STG);2 weeks  -RB     Progress/Outcome (Dressing Goal 1, OT) continuing progress toward goal  -RB     Row Name 11/02/22 1340          Toileting Goal 1 (OT)    Activity/Device (Toileting Goal 1, OT) toileting skills, all  -RB     [place] Level/Cues Needed (Toileting Goal 1, OT) supervision required  -RB     Time Frame (Toileting Goal 1, OT) short term goal (STG);2 weeks  -RB     Progress/Outcome (Toileting Goal 1, OT) continuing progress toward goal  -RB     Row Name 11/02/22 1340          Grooming Goal 1 (OT)    Activity/Device (Grooming Goal 1, OT) grooming skills, all  -RB     [place] (Grooming Goal 1, OT) supervision required  -RB     Time Frame (Grooming Goal 1, OT) short term goal (STG);2 weeks  -RB     Progress/Outcome (Grooming Goal 1, OT) continuing progress toward goal  -RB     Row Name 11/02/22 1340          Therapy Assessment/Plan (OT)    Planned Therapy Interventions (OT) transfer/mobility retraining;strengthening exercise;ROM/therapeutic exercise;activity tolerance training;BADL retraining;adaptive equipment training;functional balance retraining;occupation/activity based interventions;patient/caregiver education/training;neuromuscular control/coordination retraining  -RB           User Key  (r) = Recorded By, (t) = Taken By, (c) = Cosigned By    Initials Name Provider Type    RB Deena Markham, OT Occupational Therapist               Clinical Impression     Row Name 11/02/22 1339          Pain Assessment    Pretreatment Pain Rating 0/10 - no pain  -RB     Posttreatment Pain Rating 0/10 - no pain  -RB     Row Name 11/02/22 1338          Plan of Care Review     Plan of Care Reviewed With patient  -RB     Progress no change  -RB     Row Name 11/02/22 1339          Therapy Assessment/Plan (OT)    Rehab Potential (OT) good, to achieve stated therapy goals  -RB     Criteria for Skilled Therapeutic Interventions Met (OT) yes;skilled treatment is necessary  -RB     Therapy Frequency (OT) 5 times/wk  -RB     Row Name 11/02/22 1339          Therapy Plan Review/Discharge Plan (OT)    Anticipated Discharge Disposition (OT) home with 24/7 care;home with home health  refusing D/C therapy  -RB     Row Name 11/02/22 1339          Vital Signs    O2 Delivery Pre Treatment room air  -RB     O2 Delivery Intra Treatment room air  -RB     O2 Delivery Post Treatment room air  -RB     Pre Patient Position Supine  -RB     Intra Patient Position Standing  -RB     Post Patient Position Sitting  -RB     Row Name 11/02/22 1339          Positioning and Restraints    Post Treatment Position chair  -RB     In Chair reclined;call light within reach;encouraged to call for assist;exit alarm on  -RB           User Key  (r) = Recorded By, (t) = Taken By, (c) = Cosigned By    Initials Name Provider Type    RB Deena Markham, OT Occupational Therapist               Outcome Measures     Row Name 11/02/22 1341          How much help from another is currently needed...    Putting on and taking off regular lower body clothing? 3  -RB     Bathing (including washing, rinsing, and drying) 3  -RB     Toileting (which includes using toilet bed pan or urinal) 3  -RB     Putting on and taking off regular upper body clothing 3  -RB     Taking care of personal grooming (such as brushing teeth) 3  -RB     Eating meals 3  -RB     AM-PAC 6 Clicks Score (OT) 18  -RB     Row Name 11/02/22 1159 11/02/22 0815       How much help from another person do you currently need...    Turning from your back to your side while in flat bed without using bedrails? 4  -EM 4  -JM    Moving from lying on back to sitting on the side of a  flat bed without bedrails? 3  -EM 3  -JM    Moving to and from a bed to a chair (including a wheelchair)? 3  -EM 3  -JM    Standing up from a chair using your arms (e.g., wheelchair, bedside chair)? 3  -EM 3  -JM    Climbing 3-5 steps with a railing? 3  -EM 3  -JM    To walk in hospital room? 3  -EM 3  -JM    AM-PAC 6 Clicks Score (PT) 19  -EM 19  -JM    Highest level of mobility 6 --> Walked 10 steps or more  -EM 6 --> Walked 10 steps or more  -JM    Row Name 11/02/22 1341 11/02/22 1159       Modified Sean Scale    Modified Sean Scale 4 - Moderately severe disability.  Unable to walk without assistance, and unable to attend to own bodily needs without assistance.  -RB 4 - Moderately severe disability.  Unable to walk without assistance, and unable to attend to own bodily needs without assistance.  -EM    Row Name 11/02/22 1341 11/02/22 1159       Functional Assessment    Outcome Measure Options AM-PAC 6 Clicks Daily Activity (OT);Modified Sean  -RB Modified Sean  -EM          User Key  (r) = Recorded By, (t) = Taken By, (c) = Cosigned By    Initials Name Provider Type    Abi Gusman, PT Physical Therapist    Emi Sims, RN Registered Nurse    Deena Mehta, OT Occupational Therapist                Occupational Therapy Education     Title: PT OT SLP Therapies (In Progress)     Topic: Occupational Therapy (Not Started)     Point: ADL training (Not Started)     Description:   Instruct learner(s) on proper safety adaptation and remediation techniques during self care or transfers.   Instruct in proper use of assistive devices.              Learner Progress:  Not documented in this visit.          Point: Home exercise program (Not Started)     Description:   Instruct learner(s) on appropriate technique for monitoring, assisting and/or progressing therapeutic exercises/activities.              Learner Progress:  Not documented in this visit.          Point: Precautions (Not Started)      Description:   Instruct learner(s) on prescribed precautions during self-care and functional transfers.              Learner Progress:  Not documented in this visit.          Point: Body mechanics (Not Started)     Description:   Instruct learner(s) on proper positioning and spine alignment during self-care, functional mobility activities and/or exercises.              Learner Progress:  Not documented in this visit.                          OT Recommendation and Plan  Planned Therapy Interventions (OT): transfer/mobility retraining, strengthening exercise, ROM/therapeutic exercise, activity tolerance training, BADL retraining, adaptive equipment training, functional balance retraining, occupation/activity based interventions, patient/caregiver education/training, neuromuscular control/coordination retraining  Therapy Frequency (OT): 5 times/wk  Plan of Care Review  Plan of Care Reviewed With: patient  Progress: no change     Time Calculation:    Time Calculation- OT     Row Name 11/02/22 1335             Time Calculation- OT    OT Start Time 0938  -RB      OT Stop Time 0958  -RB      OT Time Calculation (min) 20 min  -RB      Total Timed Code Minutes- OT 8 minute(s)  -RB      OT Received On 11/02/22  -RB      OT - Next Appointment 11/03/22  -RB      OT Goal Re-Cert Due Date 11/16/22  -RB         Timed Charges    50801 - OT Therapeutic Activity Minutes 8  -RB         Untimed Charges    OT Eval/Re-eval Minutes 12  -RB         Total Minutes    Timed Charges Total Minutes 8  -RB      Untimed Charges Total Minutes 12  -RB       Total Minutes 20  -RB            User Key  (r) = Recorded By, (t) = Taken By, (c) = Cosigned By    Initials Name Provider Type    RB Deena Markham OT Occupational Therapist              Therapy Charges for Today     Code Description Service Date Service Provider Modifiers Qty    [id number]  OT EVAL MOD COMPLEXITY 2 11/2/2022 Deena Markham OT GO 1    [id number]  OT THERAPEUTIC ACT  EA 15 MIN 11/2/2022 Deena Markham, OT GO 1               Deena Markham OT  11/2/2022

## 2022-11-02 NOTE — THERAPY EVALUATION
Patient Name: Yi Lee  : 1945    MRN: [id number]                              Today's Date: 2022       Admit Date: 2022    Visit Dx:     ICD-10-CM ICD-9-CM   1. Ataxia  R27.0 781.3     Patient Active Problem List   Diagnosis   • Abnormal creatinine clearance glomerular filtration   • Benign essential hypertension   • Chronic obstructive pulmonary disease (HCC)   • Depression   • Type 2 diabetes mellitus with diabetic neuropathy, without long-term current use of insulin (HCC)   • Gastroesophageal reflux disease   • Hyperlipidemia   • Hypothyroidism   • Obesity (BMI 30-39.9)   • Ataxic gait   • Abdominal pain, right lateral   • Iron deficiency anemia due to chronic blood loss   • Cystic kidney disease   • Vitamin B12 deficiency   • OA (osteoarthritis) of knee   • Iron deficiency anemia   • Bipolar I disorder, single manic episode (HCC)   • Ductal carcinoma in situ (DCIS) of left breast   • Bradycardia   • Systolic murmur   • Bicuspid aortic valve   • Renal insufficiency   • 1st degree AV block   • Dizziness   • Fatigue   • Vitamin D deficiency   • Bruising   • Weight loss, abnormal   • Loss of appetite   • Constipation   • Aromatase inhibitor use   • Breast neoplasm, Tis (DCIS), left   • Lupus erythematosus   • Malignant neoplasm of breast (HCC)   • Normocytic anemia   • Hypertension   • S/P reverse total shoulder arthroplasty, left   • Closed fracture of nasal bone with routine healing   • Visit for suture removal   • Fall   • Dyspnea on exertion   • Ataxia     Past Medical History:   Diagnosis Date   • Abdominal pain, LLQ    • Abnormal Pap smear of cervix    • Anemia    • Arthralgia of hip 2015   • Asthma    • Ataxic gait 2015    Description: Javi Mederos, Neuro.  Some vestibular dysfunction present    • Bipolar I disorder, single manic episode (HCC)    • Bradycardia    • Cardiac murmur    • Colon polyp    • Coronary artery disease    • Degeneration,  intervertebral disc, thoracolumbar    • Depression    • Diabetes mellitus (HCC)    • Disease of thyroid gland     Hypothyroidism   • Diverticulosis    • Ductal carcinoma in situ (DCIS) of left breast    • Esophageal spasm    • Fatigue    • GERD (gastroesophageal reflux disease)    • H/O bone density study 10/17/2007    Dr. Giles   • Hemorrhoids    • History of mammogram     02/2012, per GYN Reshma Aranda   • History of Papanicolaou smear of cervix     DR. GILES GYN   • History of transfusion    • Hyperlipidemia    • Hypertension    • Impaired cognition 11/22/2015    Description: Testing done 05/14   • Lupus (HCC)    • Optic nerve contusion    • Pain of lower extremity 11/22/2015   • Pseudoaneurysm following procedure (HCC)     DURING CARDIAC CATHETERIZATION   • Shoulder pain     LEFT   • Skin tear of forearm without complication, right, initial encounter    • Stage 2 chronic kidney disease    • Systolic murmur    • Vitamin B12 deficiency    • Vitamin D deficiency      Past Surgical History:   Procedure Laterality Date   • BREAST BIOPSY  2011   • BREAST LUMPECTOMY Left 2020    benign   • CARDIAC CATHETERIZATION     • CHOLECYSTECTOMY     • COLONOSCOPY  2007    done 02/12, repeat 5 yrs, Dr Grigsby; tics in sigmoid colon, IH   • COLONOSCOPY N/A 11/03/2016    polyp, IH   • COLONOSCOPY N/A 02/24/2020    Procedure: COLONOSCOPY TO CECUM WITH COLD POLYPECTOMY;  Surgeon: Eddie Grigsby MD;  Location: [facility] ENDOSCOPY;  Service: Gastroenterology;  Laterality: N/A;  pre: hx of polyps  post: polyp, hemorrhoids, diverticulosis   • COLONOSCOPY N/A 12/07/2021    Procedure: COLONOSCOPY TO CECUM  - COLD BIOPSY POLYPECTOMIES;  Surgeon: Eddie Grigsby MD;  Location:  [facility] ENDOSCOPY;  Service: Gastroenterology;  Laterality: N/A;  IRON DEFICIENCY ANEMIA, HX POLYPS, CONSTIPATION   ---DIVERTICULOSIS, POLYPS   • COLONOSCOPY  2010    normal   • COLPOSCOPY W/ BIOPSY / CURETTAGE     • D & C AND LAPAROSCOPY  1974   • DILATATION AND CURETTAGE     •  ENDOSCOPY N/A 02/24/2020    Procedure: ESOPHAGOGASTRODUODENOSCOPY with biopsies;  Surgeon: Eddie Grigsby MD;  Location: [facility] ENDOSCOPY;  Service: Gastroenterology;  Laterality: N/A;  pre: iron deficiency anemia  post: gastriits   • ENDOSCOPY N/A 12/07/2021    Procedure: ESOPHAGOGASTRODUODENOSCOPY, WITH BIOPSIES;  Surgeon: Eddie Grigsby MD;  Location: [facility] ENDOSCOPY;  Service: Gastroenterology;  Laterality: N/A;  GERD,WT LOSS, IRON DEFICIENCY ANEMIA ,  DYSPEPSIA  ---HIATAL HERNIA, GASTRITIS   • ESOPHAGOGASTRIC FUNDOPLASTY     • HYSTEROSCOPY     • JOINT REPLACEMENT     • ROTATOR CUFF REPAIR Right 2009   • ROTATOR CUFF REPAIR Left    • TOTAL KNEE ARTHROPLASTY Left 02/13/2018    Procedure: LT TOTAL KNEE ARTHROPLASTY;  Surgeon: Selwyn Pinto MD;  Location: [facility] MAIN OR;  Service:    • TOTAL SHOULDER ARTHROPLASTY W/ DISTAL CLAVICLE EXCISION Left 08/09/2022    Procedure: TOTAL SHOULDER REVERSE ARTHROPLASTY;  Surgeon: Yony Gallardo MD;  Location: [facility] OR [facility]SC;  Service: Orthopedics;  Laterality: Left;      General Information     Row Name 11/02/22 1149          Physical Therapy Time and Intention    Document Type evaluation  -EM     Mode of Treatment physical therapy;co-treatment;occupational therapy  -EM     Row Name 11/02/22 1149          General Information    Patient Profile Reviewed yes  -EM     Prior Level of Function independent:  pt reports multiple falls and multiple near falls  -EM     Existing Precautions/Restrictions fall  -EM     Row Name 11/02/22 1149          Living Environment    People in Home alone  -EM     Row Name 11/02/22 1149          Home Main Entrance    Number of Stairs, Main Entrance --  has a ramp  -EM     Row Name 11/02/22 1149          Stairs Within Home, Primary    Number of Stairs, Within Home, Primary none  -EM     Row Name 11/02/22 1149          Cognition    Orientation Status (Cognition) oriented x 4  -EM     Row Name 11/02/22 1149          Safety Issues, Functional Mobility     Safety Issues Affecting Function (Mobility) judgment;safety precaution awareness  -EM     Impairments Affecting Function (Mobility) balance;coordination;endurance/activity tolerance;strength  -EM           User Key  (r) = Recorded By, (t) = Taken By, (c) = Cosigned By    Initials Name Provider Type    Abi Gusman PT Physical Therapist               Mobility     Row Name 11/02/22 1150          Bed Mobility    Bed Mobility supine-sit  -EM     Supine-Sit [place] (Bed Mobility) standby assist  -EM     Row Name 11/02/22 1150          Sit-Stand Transfer    Sit-Stand [place] (Transfers) contact guard  -EM     Row Name 11/02/22 1150          Gait/Stairs (Locomotion)    [place] Level (Gait) contact guard;minimum assist (75% patient effort)  -EM     Distance in Feet (Gait) 120  -EM     Deviations/Abnormal Patterns (Gait) ataxic;stride length decreased;base of support, wide  -EM     Comment, (Gait/Stairs) wide path of deviation when ambulating, mostly able to self correct but unsteady, especially with changes of direction  -EM           User Key  (r) = Recorded By, (t) = Taken By, (c) = Cosigned By    Initials Name Provider Type    Abi Gusman PT Physical Therapist               Obj/Interventions     Row Name 11/02/22 1152          Range of Motion Comprehensive    General Range of Motion no range of motion deficits identified  -EM     Row Name 11/02/22 1152          Strength Comprehensive (MMT)    General Manual Muscle Testing (MMT) Assessment no strength deficits identified  -EM     Comment, General Manual Muscle Testing (MMT) Assessment no focal deficits identified, uncoordinated L>R, some difficulty following directions during testing  -EM     Row Name 11/02/22 1152          Motor Skills    Motor Skills coordination  -EM     Coordination heel to shin;minimal impairment  -EM     Row Name 11/02/22 1152          Balance    Balance Assessment sitting static balance;sitting dynamic  balance;standing static balance;standing dynamic balance  -EM     Static Sitting Balance supervision  -EM     Dynamic Sitting Balance supervision  -EM     Position, Sitting Balance sitting edge of bed  -EM     Static Standing Balance contact guard  -EM     Dynamic Standing Balance contact guard  -EM     Row Name 11/02/22 1152          Sensory Assessment (Somatosensory)    Sensory Assessment (Somatosensory) other (see comments)  pt reports peripheral neuropathy in dorian feet up to ankles  -EM           User Key  (r) = Recorded By, (t) = Taken By, (c) = Cosigned By    Initials Name Provider Type    Abi Gusman PT Physical Therapist               Goals/Plan     Row Name 11/02/22 1158          Transfer Goal 1 (PT)    Activity/Assistive Device (Transfer Goal 1, PT) transfers, all  -EM     [place] Level/Cues Needed (Transfer Goal 1, PT) standby assist  -EM     Time Frame (Transfer Goal 1, PT) 1 week  -EM     Row Name 11/02/22 1158          Gait Training Goal 1 (PT)    Activity/Assistive Device (Gait Training Goal 1, PT) gait (walking locomotion)  with appropriate AD if needed  -EM     [place] Level (Gait Training Goal 1, PT) standby assist  -EM     Distance (Gait Training Goal 1, PT) 150  -EM     Row Name 11/02/22 1158          Therapy Assessment/Plan (PT)    Planned Therapy Interventions (PT) balance training;gait training;home exercise program;patient/family education;transfer training;strengthening  -EM           User Key  (r) = Recorded By, (t) = Taken By, (c) = Cosigned By    Initials Name Provider Type    Abi Gusman PT Physical Therapist               Clinical Impression     Row Name 11/02/22 1153          Pain    Pretreatment Pain Rating 0/10 - no pain  -EM     Row Name 11/02/22 1153          Plan of Care Review    Plan of Care Reviewed With patient  -EM     Outcome Evaluation Pt is 75 yo female admitted to [facility] with TIA, ataxia. PMH significant for fall and head traumsa in Oct  "2022, HTN, DM, breast cancer. Patient reports she lives alone, has ramp to enter, reports multiple falls and states she is \"always crashing into things\", does not use AD and still driving. Today, pt agreeable to therapy but appears anxious, up to EOB with SBA, sit to stand with CGA, ambulated with min/CGA with wide JAN and unsteady gait. Pt demonstrates impairments consisting of generalized weakness, decreased balance, decreased coordination, decreased activity tolerance and would benefit from skilled PT. Pt d/c plan is to return home alone, denies need for outpt PT or HHPT but pt is a falls risk and would benefit from f/u therapy services to address balance deficit.  -EM     Row Name 11/02/22 1153          Therapy Assessment/Plan (PT)    Patient/Family Therapy Goals Statement (PT) go home  -EM     Rehab Potential (PT) good, to achieve stated therapy goals  -EM     Criteria for Skilled Interventions Met (PT) yes;skilled treatment is necessary  -EM     Therapy Frequency (PT) 6 times/wk  -EM     Row Name 11/02/22 1153          Positioning and Restraints    Pre-Treatment Position in bed  -EM     Post Treatment Position chair  -EM     In Chair reclined;call light within reach;exit alarm on;encouraged to call for assist  -EM           User Key  (r) = Recorded By, (t) = Taken By, (c) = Cosigned By    Initials Name Provider Type    EM Abi Aviles, PT Physical Therapist               Outcome Measures     Row Name 11/02/22 1159 11/02/22 0815       How much help from another person do you currently need...    Turning from your back to your side while in flat bed without using bedrails? 4  -EM 4  -JM    Moving from lying on back to sitting on the side of a flat bed without bedrails? 3  -EM 3  -JM    Moving to and from a bed to a chair (including a wheelchair)? 3  -EM 3  -JM    Standing up from a chair using your arms (e.g., wheelchair, bedside chair)? 3  -EM 3  -JM    Climbing 3-5 steps with a railing? 3  -EM 3  -JM "    To walk in hospital room? 3  -EM 3  -JM    AM-PAC 6 Clicks Score (PT) 19  -EM 19  -JM    Highest level of mobility 6 --> Walked 10 steps or more  -EM 6 --> Walked 10 steps or more  -    Row Name 11/02/22 1159          Modified [place] Scale    Modified [place] Scale 4 - Moderately severe disability.  Unable to walk without assistance, and unable to attend to own bodily needs without assistance.  -     Row Name 11/02/22 1159          Functional Assessment    Outcome Measure Options Modified [place]  -           User Key  (r) = Recorded By, (t) = Taken By, (c) = Cosigned By    Initials Name Provider Type    EM Abi Aviles, PT Physical Therapist    Emi Sims RN Registered Nurse                             Physical Therapy Education     Title: PT OT SLP Therapies (In Progress)     Topic: Physical Therapy (In Progress)     Point: Mobility training (Done)     Learning Progress Summary           Patient Acceptance, E, VU by  at 11/2/2022 1200                   Point: Home exercise program (Not Started)     Learner Progress:  Not documented in this visit.          Point: Body mechanics (Not Started)     Learner Progress:  Not documented in this visit.          Point: Precautions (Done)     Learning Progress Summary           Patient Acceptance, E, VU by  at 11/2/2022 1200                               User Key     Initials Effective Dates Name Provider Type [facility] 06/16/21 -  Abi Aviles, PT Physical Therapist PT              PT Recommendation and Plan  Planned Therapy Interventions (PT): balance training, gait training, home exercise program, patient/family education, transfer training, strengthening  Plan of Care Reviewed With: patient  Outcome Evaluation: Pt is 75 yo female admitted to [facility] with TIA, ataxia. PMH significant for fall and head traumsa in Oct 2022, HTN, DM, breast cancer. Patient reports she lives alone, has ramp to enter, reports multiple falls and states she is  "\"always crashing into things\", does not use AD and still driving. Today, pt agreeable to therapy but appears anxious, up to EOB with SBA, sit to stand with CGA, ambulated with min/CGA with wide JAN and unsteady gait. Pt demonstrates impairments consisting of generalized weakness, decreased balance, decreased coordination, decreased activity tolerance and would benefit from skilled PT. Pt d/c plan is to return home alone, denies need for outpt PT or HHPT but pt is a falls risk and would benefit from f/u therapy services to address balance deficit.     Time Calculation:    PT Charges     Row Name 11/02/22 1200             Time Calculation    Start Time 0938  -EM      Stop Time 1000  -EM      Time Calculation (min) 22 min  -EM      PT Received On 11/02/22  -EM      PT - Next Appointment 11/03/22  -EM      PT Goal Re-Cert Due Date 11/09/22  -EM         Time Calculation- PT    Total Timed Code Minutes- PT 15 minute(s)  -EM         Timed Charges    68774 - PT Therapeutic Activity Minutes 15  -EM         Total Minutes    Timed Charges Total Minutes 15  -EM       Total Minutes 15  -EM            User Key  (r) = Recorded By, (t) = Taken By, (c) = Cosigned By    Initials Name Provider Type    EM Abi Aviles, PT Physical Therapist              Therapy Charges for Today     Code Description Service Date Service Provider Modifiers Qty    45854812330  PT THERAPEUTIC ACT EA 15 MIN 11/2/2022 Abi Aviles, PT GP 1    [id number] HC PT EVAL MOD COMPLEXITY 2 11/2/2022 Abi Aviles PT GP 1          PT G-Codes  Outcome Measure Options: Modified [place]  AM-PAC 6 Clicks Score (PT): 19  Modified [place] Scale: 4 - Moderately severe disability.  Unable to walk without assistance, and unable to attend to own bodily needs without assistance.    Abi Aviles PT  11/2/2022    "

## 2022-11-02 NOTE — ED PROVIDER NOTES
EMERGENCY DEPARTMENT ENCOUNTER    Room Number:  12/12  PCP: Ankit Albert MD  Historian: Patient  History Limited By: Nothing      HPI  Chief Complaint: Ataxia  Context: Yi Lee is a 76 y.o. female who presents to the ED c/o ataxia.  Patient states she has had several recent falls.  States she was in her normal state of health last night before bed.  Woke up this morning and states she felt lightheaded and was ataxic.  Had no focal weakness or numbness.  Has had no chest pain or shortness of breath.  No double vision.  No slurred speech.  Patient drove herself here.        Location: Ataxia  Radiation: N/A  Character: Persistent  Duration: Last normal last night  Severity: Moderate  Progression: Not improving  Aggravating Factors: Walking  Alleviating Factors: Nothing        MEDICAL RECORD REVIEW    Patient with history of iron deficiency anemia          PAST MEDICAL HISTORY  Active Ambulatory Problems     Diagnosis Date Noted   • Abnormal creatinine clearance glomerular filtration 11/22/2015   • Benign essential hypertension 11/22/2015   • Chronic obstructive pulmonary disease (HCC) 11/22/2015   • Depression 11/22/2015   • Type 2 diabetes mellitus with diabetic neuropathy, without long-term current use of insulin (HCC) 11/22/2015   • Gastroesophageal reflux disease 11/22/2015   • Hyperlipidemia 11/22/2015   • Hypothyroidism 11/22/2015   • Obesity (BMI 30-39.9) 11/22/2015   • Ataxic gait 11/22/2015   • Abdominal pain, right lateral 04/25/2016   • Iron deficiency anemia due to chronic blood loss 01/27/2017   • Cystic kidney disease 07/28/2017   • Vitamin B12 deficiency 07/28/2017   • OA (osteoarthritis) of knee 02/13/2018   • Iron deficiency anemia 01/15/2020   • Bipolar I disorder, single manic episode (HCC) 06/17/2020   • Ductal carcinoma in situ (DCIS) of left breast 07/31/2020   • Bradycardia 07/31/2020   • Systolic murmur 07/31/2020   • Bicuspid aortic valve 03/18/2021   • Renal insufficiency 03/29/2021   •  1st degree AV block 03/29/2021   • Dizziness 03/29/2021   • Fatigue    • Vitamin D deficiency    • Bruising 10/13/2021   • Weight loss, abnormal 11/24/2021   • Loss of appetite 11/24/2021   • Constipation 11/24/2021   • Aromatase inhibitor use 04/29/2021   • Breast neoplasm, Tis (DCIS), left 07/28/2020   • Lupus erythematosus 01/04/2022   • Malignant neoplasm of breast (HCC) 01/04/2022   • Normocytic anemia 11/04/2021   • Hypertension 01/04/2022   • S/P reverse total shoulder arthroplasty, left 08/08/2022   • Closed fracture of nasal bone with routine healing 08/30/2022   • Visit for suture removal 08/30/2022   • Fall 08/30/2022   • Dyspnea on exertion 10/04/2022     Resolved Ambulatory Problems     Diagnosis Date Noted   • Acute bronchitis 11/22/2015   • Pain of lower extremity 11/22/2015   • Ataxic gait 11/22/2015   • Back pain 11/22/2015   • Impaired cognition 11/22/2015   • Cough 11/22/2015   • Arthralgia of hip 11/22/2015   • Memory loss 11/22/2015     Past Medical History:   Diagnosis Date   • Abdominal pain, LLQ    • Abnormal Pap smear of cervix    • Anemia    • Asthma    • Cardiac murmur    • Colon polyp    • Coronary artery disease    • Degeneration, intervertebral disc, thoracolumbar    • Diabetes mellitus (HCC)    • Disease of thyroid gland    • Diverticulosis    • Esophageal spasm    • GERD (gastroesophageal reflux disease)    • H/O bone density study 10/17/2007   • Hemorrhoids    • History of mammogram    • History of Papanicolaou smear of cervix    • History of transfusion    • Lupus (HCC)    • Optic nerve contusion    • Pseudoaneurysm following procedure (HCC)    • Shoulder pain    • Skin tear of forearm without complication, right, initial encounter    • Stage 2 chronic kidney disease          PAST SURGICAL HISTORY  Past Surgical History:   Procedure Laterality Date   • BREAST BIOPSY  2011   • BREAST LUMPECTOMY Left 2020    benign   • CARDIAC CATHETERIZATION     • CHOLECYSTECTOMY     • COLONOSCOPY   2007    done 02/12, repeat 5 yrs, Dr Grigsby; tics in sigmoid colon, IH   • COLONOSCOPY N/A 11/03/2016    polyp, IH   • COLONOSCOPY N/A 02/24/2020    Procedure: COLONOSCOPY TO CECUM WITH COLD POLYPECTOMY;  Surgeon: Eddie Grigsby MD;  Location: [facility] ENDOSCOPY;  Service: Gastroenterology;  Laterality: N/A;  pre: hx of polyps  post: polyp, hemorrhoids, diverticulosis   • COLONOSCOPY N/A 12/07/2021    Procedure: COLONOSCOPY TO CECUM  - COLD BIOPSY POLYPECTOMIES;  Surgeon: Eddie Grigsby MD;  Location: [facility] ENDOSCOPY;  Service: Gastroenterology;  Laterality: N/A;  IRON DEFICIENCY ANEMIA, HX POLYPS, CONSTIPATION   ---DIVERTICULOSIS, POLYPS   • COLONOSCOPY  2010    normal   • COLPOSCOPY W/ BIOPSY / CURETTAGE     • D & C AND LAPAROSCOPY  1974   • DILATATION AND CURETTAGE     • ENDOSCOPY N/A 02/24/2020    Procedure: ESOPHAGOGASTRODUODENOSCOPY with biopsies;  Surgeon: Eddie Grigsby MD;  Location: [facility] ENDOSCOPY;  Service: Gastroenterology;  Laterality: N/A;  pre: iron deficiency anemia  post: gastriits   • ENDOSCOPY N/A 12/07/2021    Procedure: ESOPHAGOGASTRODUODENOSCOPY, WITH BIOPSIES;  Surgeon: Eddie Grigsby MD;  Location: [facility] ENDOSCOPY;  Service: Gastroenterology;  Laterality: N/A;  GERD,WT LOSS, IRON DEFICIENCY ANEMIA ,  DYSPEPSIA  ---HIATAL HERNIA, GASTRITIS   • ESOPHAGOGASTRIC FUNDOPLASTY     • HYSTEROSCOPY     • JOINT REPLACEMENT     • ROTATOR CUFF REPAIR Right 2009   • ROTATOR CUFF REPAIR Left    • TOTAL KNEE ARTHROPLASTY Left 02/13/2018    Procedure: LT TOTAL KNEE ARTHROPLASTY;  Surgeon: Selwyn Pinto MD;  Location: [facility] MAIN OR;  Service:    • TOTAL SHOULDER ARTHROPLASTY W/ DISTAL CLAVICLE EXCISION Left 08/09/2022    Procedure: TOTAL SHOULDER REVERSE ARTHROPLASTY;  Surgeon: Yony Gallardo MD;  Location: [facility] OR [facility];  Service: Orthopedics;  Laterality: Left;         FAMILY HISTORY  Family History   Problem Relation Age of Onset   • Colon polyps Father    • Heart disease Father    • Prostate cancer  Father    • Emphysema Father    • Lung disease Father    • Hypertension Brother    • Lung cancer Maternal Aunt    • Lung cancer Maternal Aunt    • Brain cancer Maternal Uncle    • Lung cancer Paternal Uncle    • Stroke Paternal Grandmother    • Cerebral aneurysm Paternal Grandmother    • Stroke Paternal Grandfather    • Cerebral aneurysm Paternal Grandfather    • Hepatitis Other    • Coronary artery disease Other    • Malig Hyperthermia Neg Hx          SOCIAL HISTORY  Social History     Socioeconomic History   • Marital status:    • Number of children: 1   Tobacco Use   • Smoking status: Former     Packs/day: 1.00     Years: 7.00     Pack years: 7.00     Types: Cigarettes     Quit date:      Years since quittin.8   • Smokeless tobacco: Never   • Tobacco comments:     caffeine use: 2 CUPS COFFEE/ 3 DIET PEPSIS DAILY   Vaping Use   • Vaping Use: Never used   Substance and Sexual Activity   • Alcohol use: Yes     Comment: OCC   • Drug use: No   • Sexual activity: Never         ALLERGIES  Penicillins, Morphine, Ace inhibitors, and Telmisartan        REVIEW OF SYSTEMS  Review of Systems   Constitutional: Negative for fever.   HENT: Negative for sore throat.    Eyes: Negative.    Respiratory: Negative for cough and shortness of breath.    Cardiovascular: Negative for chest pain.   Gastrointestinal: Negative for abdominal pain, diarrhea and vomiting.   Genitourinary: Negative for dysuria.   Musculoskeletal: Negative for neck pain.   Skin: Negative for rash.   Allergic/Immunologic: Negative.    Neurological: Positive for dizziness and weakness. Negative for numbness and headaches.   Hematological: Negative.    Psychiatric/Behavioral: Negative.    All other systems reviewed and are negative.           PHYSICAL EXAM  ED Triage Vitals   Temp Heart Rate Resp BP SpO2   22 1755 22 1755 22 1755 22 1802 22 1755   97.9 °F (36.6 °C) 65 18 159/66 97 %      Temp src Heart Rate Source  Patient Position BP Location FiO2 (%)   -- -- -- -- --              Physical Exam  Vitals and nursing note reviewed.   Constitutional:       General: She is not in acute distress.  HENT:      Head: Normocephalic and atraumatic.   Eyes:      Extraocular Movements: EOM normal.      Pupils: Pupils are equal, round, and reactive to light.   Cardiovascular:      Rate and Rhythm: Normal rate and regular rhythm.      Heart sounds: Normal heart sounds.   Pulmonary:      Effort: Pulmonary effort is normal. No respiratory distress.      Breath sounds: Normal breath sounds.   Abdominal:      Palpations: Abdomen is soft.      Tenderness: There is no abdominal tenderness. There is no guarding or rebound.   Musculoskeletal:         General: No edema. Normal range of motion.      Cervical back: Normal range of motion and neck supple.   Skin:     General: Skin is warm and dry.      Findings: No rash.   Neurological:      Mental Status: She is alert and oriented to person, place, and time.      Sensory: Sensation is intact. No sensory deficit.      Motor: Motor strength is normal. No weakness.      Comments: Patient does have ataxia when she walks   Psychiatric:         Mood and Affect: Mood and affect normal.     nih 1    Patient was wearing a face mask when I entered the room and they continued to wear a mask throughout their stay in the ED.  I wore PPE, including  gloves, face mask with shield or face mask with goggles whenever I was in the room with patient.       LAB RESULTS  Recent Results (from the past 24 hour(s))   POC Glucose Once    Collection Time: 11/01/22  6:04 PM    Specimen: Blood   Result Value Ref Range    Glucose 141 (H) 70 - 130 mg/dL   Green Top (Gel)    Collection Time: 11/01/22  6:06 PM   Result Value Ref Range    Extra Tube Hold for add-ons.    Lavender Top    Collection Time: 11/01/22  6:06 PM   Result Value Ref Range    Extra Tube hold for add-on    Gold Top - SST    Collection Time: 11/01/22  6:06 PM    Result Value Ref Range    Extra Tube Hold for add-ons.    Light Blue Top    Collection Time: 11/01/22  6:06 PM   Result Value Ref Range    Extra Tube Hold for add-ons.    Comprehensive Metabolic Panel    Collection Time: 11/01/22  6:06 PM    Specimen: Blood   Result Value Ref Range    Glucose 136 (H) 65 - 99 mg/dL    BUN 24 (H) 8 - 23 mg/dL    Creatinine 1.75 (H) 0.57 - 1.00 mg/dL    Sodium 133 (L) 136 - 145 mmol/L    Potassium 4.2 3.5 - 5.2 mmol/L    Chloride 100 98 - 107 mmol/L    CO2 21.7 (L) 22.0 - 29.0 mmol/L    Calcium 10.5 8.6 - 10.5 mg/dL    Total Protein 7.8 6.0 - 8.5 g/dL    Albumin 4.20 3.50 - 5.20 g/dL    ALT (SGPT) 12 1 - 33 U/L    AST (SGOT) 17 1 - 32 U/L    Alkaline Phosphatase 103 39 - 117 U/L    Total Bilirubin 0.3 0.0 - 1.2 mg/dL    Globulin 3.6 gm/dL    A/G Ratio 1.2 g/dL    BUN/Creatinine Ratio 13.7 7.0 - 25.0    Anion Gap 11.3 5.0 - 15.0 mmol/L    eGFR 30.1 (L) >60.0 mL/min/1.73   Troponin    Collection Time: 11/01/22  6:06 PM    Specimen: Blood   Result Value Ref Range    Troponin T 0.014 0.000 - 0.030 ng/mL   CBC Auto Differential    Collection Time: 11/01/22  6:06 PM    Specimen: Blood   Result Value Ref Range    WBC 4.77 3.40 - 10.80 10*3/mm3    RBC 3.76 (L) 3.77 - 5.28 10*6/mm3    Hemoglobin 10.2 (L) 12.0 - 15.9 g/dL    Hematocrit 33.0 (L) 34.0 - 46.6 %    MCV 87.8 79.0 - 97.0 fL    MCH 27.1 26.6 - 33.0 pg    MCHC 30.9 (L) 31.5 - 35.7 g/dL    RDW 11.9 (L) 12.3 - 15.4 %    RDW-SD 38.1 37.0 - 54.0 fl    MPV 9.1 6.0 - 12.0 fL    Platelets 300 140 - 450 10*3/mm3    Neutrophil % 76.4 (H) 42.7 - 76.0 %    Lymphocyte % 12.4 (L) 19.6 - 45.3 %    Monocyte % 9.6 5.0 - 12.0 %    Eosinophil % 0.2 (L) 0.3 - 6.2 %    Basophil % 1.0 0.0 - 1.5 %    Immature Grans % 0.4 0.0 - 0.5 %    Neutrophils, Absolute 3.64 1.70 - 7.00 10*3/mm3    Lymphocytes, Absolute 0.59 (L) 0.70 - 3.10 10*3/mm3    Monocytes, Absolute 0.46 0.10 - 0.90 10*3/mm3    Eosinophils, Absolute 0.01 0.00 - 0.40 10*3/mm3    Basophils,  Absolute 0.05 0.00 - 0.20 10*3/mm3    Immature Grans, Absolute 0.02 0.00 - 0.05 10*3/mm3    nRBC 0.0 0.0 - 0.2 /100 WBC   ECG 12 Lead Stroke Evaluation    Collection Time: 11/01/22  6:33 PM   Result Value Ref Range    QT Interval 407 ms       Ordered the above labs and reviewed the results.        RADIOLOGY  CT Head Without Contrast   Preliminary Result   1. No convincing acute intracranial abnormality is seen.   2. There is mild small vessel disease in the frontal periventricular   white matter, calcified plaques in the intracranial segment of the   distal right vertebral artery, cavernous segments of the internal   carotid arteries bilaterally. There is a tiny 7 x 4 mm lucent lesion in   the posterior right parietal bone likely a benign calvarial hemangioma.   The remainder of the head CT is normal. The etiology of the patient's   acute onset dizziness and difficulty walking is not clearly established   on this exam and if there remains any clinical suspicion of an acute   stroke I recommend an MRI of the brain for more complete assessment.        Radiation dose reduction techniques were utilized, including automated   exposure control and exposure modulation based on body size.              MRI Angiogram Head Without Contrast    (Results Pending)   MRI Angiogram Neck Without Contrast    (Results Pending)   MRI Brain Without Contrast    (Results Pending)        Ordered the above noted radiological studies. Reviewed by me in PACS.            PROCEDURES  Procedures      EKG:          EKG time: 1833  Rhythm/Rate: Normal sinus rhythm 63  P waves and AR: First-degree AV block  QRS, axis: Normal QRS  ST and T waves: Normal ST segments    Interpreted Contemporaneously by me, independently viewed  Unchanged compared to prior 10/5/2022        MEDICATIONS GIVEN IN ER  Medications   aspirin tablet 325 mg (has no administration in time range)   sodium chloride 0.9 % flush 10 mL (has no administration in time range)   sodium  chloride 0.9 % flush 10 mL (has no administration in time range)   atorvastatin (LIPITOR) tablet 80 mg (has no administration in time range)   aspirin tablet 325 mg (has no administration in time range)     Or   aspirin suppository 300 mg (has no administration in time range)   acetaminophen (TYLENOL) tablet 650 mg (has no administration in time range)     Or   acetaminophen (TYLENOL) suppository 650 mg (has no administration in time range)             PROGRESS AND CONSULTS  ED Course as of 11/01/22 2219 Tue Nov 01, 2022 2137 21:37 EDT  Patient presents with ataxia.  Has been having some falls recently however this morning has been having significant ataxia.  Has had no slurred speech no blurry vision.  Her last normal was last night.  Patient does not have vertigo per se.  Discussed with Dr. Rodriguez.  Not a tpa candidate as last normal was last night.  Patient could not have CT angio because of her GFR.  Patient will be admitted.  Discussed with Dr. Malone. [SL]      ED Course User Index  [SL] Efren Rivera MD           MEDICAL DECISION MAKING      MDM  Number of Diagnoses or Management Options     Amount and/or Complexity of Data Reviewed  Clinical lab tests: reviewed and ordered (White blood cell count 4.7)  Tests in the radiology section of CPT®: reviewed and ordered (CT head negative)  Decide to obtain previous medical records or to obtain history from someone other than the patient: yes  Discuss the patient with other providers: yes (Discussed with Dr. Rodriguez and then Dr. Malone who will admit.)               DIAGNOSIS  Final diagnoses:   Ataxia           DISPOSITION  admit        Latest Documented Vital Signs:  As of 22:19 EDT  BP- 169/80 HR- 62 Temp- 97.9 °F (36.6 °C) O2 sat- 96%                       Efren Rivera MD  11/01/22 [id number]

## 2022-11-02 NOTE — PLAN OF CARE
Goal Outcome Evaluation:   Discussed with RN and patient. Pt passed nursing swallow screen and is tolerating a regular diet. No difficulties noted with swallowing or changes in speech/cognition per pt. ST to sign off at this time. Please reconsult as needed.

## 2022-11-02 NOTE — PLAN OF CARE
"Goal Outcome Evaluation:  Plan of Care Reviewed With: patient           Outcome Evaluation: Pt is 77 yo female admitted to [facility] with TIA, ataxia. PMH significant for fall and head traumsa in Oct 2022, HTN, DM, breast cancer. Patient reports she lives alone, has ramp to enter, reports multiple falls and states she is \"always crashing into things\", does not use AD and still driving. Today, pt agreeable to therapy but appears anxious, up to EOB with SBA, sit to stand with CGA, ambulated with min/CGA with wide JAN and unsteady gait. Pt demonstrates impairments consisting of generalized weakness, decreased balance, decreased coordination, decreased activity tolerance and would benefit from skilled PT. Pt d/c plan is to return home alone, denies need for outpt PT or HHPT but pt is a falls risk and would benefit from f/u therapy services to address balance deficit.  "

## 2022-11-02 NOTE — PLAN OF CARE
Vss. No neuro changes this shift. Tylenol given for c/o headache.        Problem: Fall Injury Risk  Goal: Absence of Fall and Fall-Related Injury  11/2/2022 0628 by Mimi Mast RN  Outcome: Ongoing, Progressing  11/2/2022 0627 by Mimi Mast RN  Outcome: Ongoing, Progressing  Intervention: Identify and Manage Contributors  Recent Flowsheet Documentation  Taken 11/2/2022 0218 by Mimi Mast RN  Medication Review/Management: medications reviewed  Intervention: Promote Injury-Free Environment  Recent Flowsheet Documentation  Taken 11/2/2022 0623 by Mimi Mast, RN  Safety Promotion/Fall Prevention:   assistive device/personal items within reach   clutter free environment maintained   fall prevention program maintained   safety round/check completed   nonskid shoes/slippers when out of bed   activity supervised  Taken 11/2/2022 0421 by Mimi Mast, RN  Safety Promotion/Fall Prevention:   assistive device/personal items within reach   clutter free environment maintained   fall prevention program maintained   safety round/check completed  Taken 11/2/2022 0218 by Mimi Mast, RN  Safety Promotion/Fall Prevention:   activity supervised   assistive device/personal items within reach   clutter free environment maintained   fall prevention program maintained   nonskid shoes/slippers when out of bed   safety round/check completed   Goal Outcome Evaluation:

## 2022-11-02 NOTE — NURSING NOTE
Nursing report ED to floor  Yi Lee  76 y.o.  female    HPI :   Chief Complaint   Patient presents with   • Dizziness   • Balance Issues   • Loss of Vision   • Fall       Admitting doctor:   Jonathan Malone MD    Admitting diagnosis:   The encounter diagnosis was Ataxia.    Code status:   Current Code Status     Date Active Code Status Order ID Comments User Context       11/1/2022 2209 CPR (Attempt to Resuscitate) [id number]  Jonathan Malone MD ED      Question Answer    Code Status (Patient has no pulse and is not breathing) CPR (Attempt to Resuscitate)    Medical Interventions (Patient has pulse or is breathing) Full Support    Release to patient Routine Release                Allergies:   Penicillins, Morphine, Ace inhibitors, and Telmisartan    Isolation:   No active isolations    Intake and Output  No intake or output data in the 24 hours ending 11/01/22 2216    Weight:   There were no vitals filed for this visit.    Most recent vitals:   Vitals:    11/01/22 1755 11/01/22 1802 11/01/22 2121   BP:  159/66 169/80   Pulse: 65  62   Resp: 18     Temp: 97.9 °F (36.6 °C)     SpO2: 97%  96%       Active LDAs/IV Access:   Lines, Drains & Airways     Active LDAs     None                Labs (abnormal labs have a star):   Labs Reviewed   COMPREHENSIVE METABOLIC PANEL - Abnormal; Notable for the following components:       Result Value    Glucose 136 (*)     BUN 24 (*)     Creatinine 1.75 (*)     Sodium 133 (*)     CO2 21.7 (*)     eGFR 30.1 (*)     All other components within normal limits    Narrative:     GFR Normal >60  Chronic Kidney Disease <60  Kidney Failure <15    The GFR formula is only valid for adults with stable renal function between ages 18 and 70.   CBC WITH AUTO DIFFERENTIAL - Abnormal; Notable for the following components:    RBC 3.76 (*)     Hemoglobin 10.2 (*)     Hematocrit 33.0 (*)     MCHC 30.9 (*)     RDW 11.9 (*)     Neutrophil % 76.4 (*)     Lymphocyte % 12.4 (*)      Eosinophil % 0.2 (*)     Lymphocytes, Absolute 0.59 (*)     All other components within normal limits   POCT GLUCOSE FINGERSTICK - Abnormal; Notable for the following components:    Glucose 141 (*)     All other components within normal limits   TROPONIN (IN-HOUSE) - Normal    Narrative:     Troponin T Reference Range:  <= 0.03 ng/mL-   Negative for AMI  >0.03 ng/mL-     Abnormal for myocardial necrosis.  Clinicians would have to utilize clinical acumen, EKG, Troponin and serial changes to determine if it is an Acute Myocardial Infarction or myocardial injury due to an underlying chronic condition.       Results may be falsely decreased if patient taking Biotin.     RAINBOW DRAW    Narrative:     The following orders were created for panel order [place] Draw.  Procedure                               Abnormality         Status                     ---------                               -----------         ------                     Green Top (Gel)[[id number]]                                  Final result               Lavender Top[[id number]]                                     Final result               Gold Top - SST[[id number]]                                   Final result               Light Blue Top[[id number]]                                   Final result                 Please view results for these tests on the individual orders.   HEMOGLOBIN A1C   LIPID PANEL   POCT GLUCOSE FINGERSTICK   POCT GLUCOSE FINGERSTICK   POCT GLUCOSE FINGERSTICK   POCT GLUCOSE FINGERSTICK   GREEN TOP   LAVENDER TOP   GOLD TOP - SST   LIGHT BLUE TOP   CBC AND DIFFERENTIAL    Narrative:     The following orders were created for panel order CBC & Differential.  Procedure                               Abnormality         Status                     ---------                               -----------         ------                     CBC Auto Differential[[id number]]        Abnormal            Final result                 Please view  results for these tests on the individual orders.       EKG:   ECG 12 Lead Stroke Evaluation   Preliminary Result   HEART RATE= 63  bpm   RR Interval= 952  ms   WY Interval= 275  ms   P Horizontal Axis= -56  deg   P Front Axis= 186  deg   QRSD Interval= 102  ms   QT Interval= 407  ms   QRS Axis= 11  deg   T Wave Axis= 53  deg   - ABNORMAL ECG -   Sinus or ectopic atrial rhythm   Prolonged WY interval   Baseline wander in lead(s) V3   Electronically Signed By:    Date and Time of Study: 2022-11-01 18:33:46          Meds given in ED:   Medications   aspirin tablet 325 mg (has no administration in time range)   sodium chloride 0.9 % flush 10 mL (has no administration in time range)   sodium chloride 0.9 % flush 10 mL (has no administration in time range)   atorvastatin (LIPITOR) tablet 80 mg (has no administration in time range)   aspirin tablet 325 mg (has no administration in time range)     Or   aspirin suppository 300 mg (has no administration in time range)   acetaminophen (TYLENOL) tablet 650 mg (has no administration in time range)     Or   acetaminophen (TYLENOL) suppository 650 mg (has no administration in time range)       Imaging results:  CT Head Without Contrast    Result Date: 11/1/2022  1. No convincing acute intracranial abnormality is seen. 2. There is mild small vessel disease in the frontal periventricular white matter, calcified plaques in the intracranial segment of the distal right vertebral artery, cavernous segments of the internal carotid arteries bilaterally. There is a tiny 7 x 4 mm lucent lesion in the posterior right parietal bone likely a benign calvarial hemangioma. The remainder of the head CT is normal. The etiology of the patient's acute onset dizziness and difficulty walking is not clearly established on this exam and if there remains any clinical suspicion of an acute stroke I recommend an MRI of the brain for more complete assessment.  Radiation dose reduction techniques were  utilized, including automated exposure control and exposure modulation based on body size.         Ambulatory status:   - up assist x2    Social issues:   Social History     Socioeconomic History   • Marital status:    • Number of children: 1   Tobacco Use   • Smoking status: Former     Packs/day: 1.00     Years: 7.00     Pack years: 7.00     Types: Cigarettes     Quit date:      Years since quittin.8   • Smokeless tobacco: Never   • Tobacco comments:     caffeine use: 2 CUPS COFFEE/ 3 DIET PEPSIS DAILY   Vaping Use   • Vaping Use: Never used   Substance and Sexual Activity   • Alcohol use: Yes     Comment: OCC   • Drug use: No   • Sexual activity: Never       NIH Stroke Scale:  Interval: baseline      Wei Morales RN  22 22:16 EDT    \

## 2022-11-02 NOTE — PLAN OF CARE
The pt was admitted to [facility] 2/2 to falls and ataxia. Stroke work up pending this AM. Pmhx includes HTN, DM, breast cancer, recent LUE shoulder replacement and multiple other falls. The pt reports she lives alone with ramp access and does not use any AD for mobility. Today, supervision provided for bed mobility. During her assessment she was found to have LUE weakness and coordination deficits. Difficulty with digit opposition and finger to nose. CGA/Min A to stand and mobilize in her room. She demonstrated impaired balance with a commode transfer and dynamic standing balance. She reports she is not interested in therapy after d/c - OT/PT educated the pt on safety and her high falls risk. 24/7 supervision + HH therapy recommended, at the least, in her home d/c environment.    Patient was not wearing a face mask during this therapy encounter. Therapist used appropriate personal protective equipment including mask and gloves. Mask used was standard procedure mask. Appropriate PPE was worn during the entire therapy session. Hand hygiene was completed before and after therapy session. Patient is not in enhanced droplet precautions.

## 2022-11-02 NOTE — PROGRESS NOTES
[facility] Acute Inpt Rehab Note     Referral received via stroke order set.  Please note this is a screening only, rehab admissions will not actively be evaluating this patient.  If felt patient is appropriate for our services once therapies begin, please call our office at [phone], to initiate a full referral.    Thank you,  Ana Crandall RN  Rehab Admission Nurse

## 2022-11-02 NOTE — PROGRESS NOTES
Discharge Planning Assessment  [facility]     Patient Name: Yi Lee  MRN: [id number]  Today's Date: 11/2/2022    Admit Date: 11/1/2022    Plan: Home   Discharge Needs Assessment     Row Name 11/02/22 1430       Living Environment    People in Home alone    Current Living Arrangements home    Primary Care Provided by self    Provides Primary Care For no one    Family Caregiver if Needed child(jeffrey), adult    Family Caregiver Names Son Marin [phone]    Quality of Family Relationships helpful;involved;supportive    Able to Return to Prior Arrangements yes       Resource/Environmental Concerns    Resource/Environmental Concerns none       Transition Planning    Patient/Family Anticipates Transition to home    Patient/Family Anticipated Services at Transition none    Transportation Anticipated family or friend will provide       Discharge Needs Assessment    Equipment Currently Used at Home none;walker, rolling;cane, straight    Concerns to be Addressed no discharge needs identified;denies needs/concerns at this time    Provided Post Acute Provider List? Refused    Refused Provider List Comment Pt refuses HH/outpatient PT    Discharge Coordination/Progress Home               Discharge Plan     Row Name 11/02/22 1431       Plan    Plan Home    Patient/Family in Agreement with Plan yes    Plan Comments CCP met with pt at bedside to discuss d/c planning. Pt resides alone in a two level home with exterior ramp at entry, and uses no DME but has cane and walker if needed. Pt denies h/o HH and declines HH or outpatient PT referrals despite therapy recommendation. Pt has [facility] for past rehab. Pt agreeable to Meds to Beds. CCP to follow. Angella Savage LCSW              Continued Care and Services - Admitted Since 11/1/2022    Coordination has not been started for this encounter.     Selected Continued Care - Prior Encounters Includes continued care and service providers with selected services  from prior encounters from 8/3/2022 to 11/2/2022    Discharged on 8/10/2022 Admission date: 8/9/2022 - Discharge disposition: Skilled Nursing Facility (DC - External)    Destination     Service Provider Selected Services Address Phone Fax Patient Preferred    [facility] [address] [place] [address] [phone] --                    Expected Discharge Date and Time     Expected Discharge Date Expected Discharge Time    Nov 4, 2022          Demographic Summary     Row Name 11/02/22 1430       General Information    Admission Type observation    Arrived From home    Required Notices Provided Observation Status Notice    Referral Source admission list    Reason for Consult discharge planning    Preferred Language English               Functional Status     Row Name 11/02/22 1430       Functional Status    Usual Activity Tolerance good    Current Activity Tolerance moderate       Functional Status, IADL    Medications independent    Meal Preparation independent    Housekeeping independent    Laundry independent    Shopping independent               Psychosocial    No documentation.                Abuse/Neglect    No documentation.                Legal    No documentation.                Substance Abuse    No documentation.                Patient Forms    No documentation.                   Teresa Savage LCSW

## 2022-11-02 NOTE — CONSULTS
"Neurology Consult Note    Consult Date: 11/2/2022    Referring MD: Jonathan Malone*    Reason for Consult I have been asked to see the patient in neurological consultation to render advice and opinion regarding falls    Yi Lee is a 76 y.o. female who is right-handed who is here after an episode of increased shakiness and falls.  She says that she cannot control her legs that they are moving on her own for about an hour, she was very unsteady, and fell and hit her head.  She has been having problems with increased shakiness and falls over the last month.  But her problems with walking proceed this for the past several months.  Past medical history includes hypertension, diabetes, hypothyroidism, hyperlipidemia, remote history of breast cancer status postlumpectomy to 3 years ago, iron deficiency anemia, COPD, depression/anxiety and bipolar disorder on antipsychotics for many years.    In the ER BP slightly elevated 159/66.  She had a head CT that shows white matter disease and old lacunar strokes one being in the right caudate, nothing acute, negative for hemorrhage.  She also had CTA that was negative for vascular abnormality    Today she is feeling better than on arrival.  She is still unsteady when she walks but comments \"but I can walk\".  She has pretty significant postural tremor that she says is a bit worse than usual but she has been dealing with this for what sounds like months to years.  She comments that she is afraid to fall.  She has been using a cane that she had leftover after her knee surgery.  She denies new medications or change medications.    Social history: Does not drink, does not smoke.  Lives by herself.  Works in the evenings and cleans    Family history: History of strokes, no neurodegenerative disorders or tremors  Past Medical/Surgical Hx:  Past Medical History:   Diagnosis Date   • Abdominal pain, LLQ    • Abnormal Pap smear of cervix    • Anemia    • Arthralgia of hip " 11/22/2015   • Asthma    • Ataxic gait 11/22/2015    Description: Eval Dr Lillian Mederos, Neuro.  Some vestibular dysfunction present 2013/2014   • Bipolar I disorder, single manic episode (HCC)    • Bradycardia    • Cardiac murmur    • Colon polyp    • Coronary artery disease    • Degeneration, intervertebral disc, thoracolumbar    • Depression    • Diabetes mellitus (HCC)    • Disease of thyroid gland     Hypothyroidism   • Diverticulosis    • Ductal carcinoma in situ (DCIS) of left breast    • Esophageal spasm    • Fatigue    • GERD (gastroesophageal reflux disease)    • H/O bone density study 10/17/2007    Dr. Giles   • Hemorrhoids    • History of mammogram     02/2012, per GYN Reshma Aranda   • History of Papanicolaou smear of cervix     DR. GILES GYN   • History of transfusion    • Hyperlipidemia    • Hypertension    • Impaired cognition 11/22/2015    Description: Testing done 05/14   • Lupus (HCC)    • Optic nerve contusion    • Pain of lower extremity 11/22/2015   • Pseudoaneurysm following procedure (HCC)     DURING CARDIAC CATHETERIZATION   • Shoulder pain     LEFT   • Skin tear of forearm without complication, right, initial encounter    • Stage 2 chronic kidney disease    • Systolic murmur    • Vitamin B12 deficiency    • Vitamin D deficiency      Past Surgical History:   Procedure Laterality Date   • BREAST BIOPSY  2011   • BREAST LUMPECTOMY Left 2020    benign   • CARDIAC CATHETERIZATION     • CHOLECYSTECTOMY     • COLONOSCOPY  2007    done 02/12, repeat 5 yrs, Dr Grigsby; tics in sigmoid colon, IH   • COLONOSCOPY N/A 11/03/2016    polyp, IH   • COLONOSCOPY N/A 02/24/2020    Procedure: COLONOSCOPY TO CECUM WITH COLD POLYPECTOMY;  Surgeon: Eddie Grigsby MD;  Location: [facility] ENDOSCOPY;  Service: Gastroenterology;  Laterality: N/A;  pre: hx of polyps  post: polyp, hemorrhoids, diverticulosis   • COLONOSCOPY N/A 12/07/2021    Procedure: COLONOSCOPY TO CECUM  - COLD BIOPSY POLYPECTOMIES;  Surgeon: Calista  MD Eddie;  Location: [facility] ENDOSCOPY;  Service: Gastroenterology;  Laterality: N/A;  IRON DEFICIENCY ANEMIA, HX POLYPS, CONSTIPATION   ---DIVERTICULOSIS, POLYPS   • COLONOSCOPY  2010    normal   • COLPOSCOPY W/ BIOPSY / CURETTAGE     • D & C AND LAPAROSCOPY  1974   • DILATATION AND CURETTAGE     • ENDOSCOPY N/A 02/24/2020    Procedure: ESOPHAGOGASTRODUODENOSCOPY with biopsies;  Surgeon: Eddie Grigsby MD;  Location: [facility] ENDOSCOPY;  Service: Gastroenterology;  Laterality: N/A;  pre: iron deficiency anemia  post: gastriits   • ENDOSCOPY N/A 12/07/2021    Procedure: ESOPHAGOGASTRODUODENOSCOPY, WITH BIOPSIES;  Surgeon: Eddie Grigsby MD;  Location: [facility] ENDOSCOPY;  Service: Gastroenterology;  Laterality: N/A;  GERD,WT LOSS, IRON DEFICIENCY ANEMIA ,  DYSPEPSIA  ---HIATAL HERNIA, GASTRITIS   • ESOPHAGOGASTRIC FUNDOPLASTY     • HYSTEROSCOPY     • JOINT REPLACEMENT     • ROTATOR CUFF REPAIR Right 2009   • ROTATOR CUFF REPAIR Left    • TOTAL KNEE ARTHROPLASTY Left 02/13/2018    Procedure: LT TOTAL KNEE ARTHROPLASTY;  Surgeon: Selwyn Pinto MD;  Location: [facility] MAIN OR;  Service:    • TOTAL SHOULDER ARTHROPLASTY W/ DISTAL CLAVICLE EXCISION Left 08/09/2022    Procedure: TOTAL SHOULDER REVERSE ARTHROPLASTY;  Surgeon: Yony Gallardo MD;  Location: [facility] OR OSC;  Service: Orthopedics;  Laterality: Left;       Medications On Admission  Medications Prior to Admission   Medication Sig Dispense Refill Last Dose   • ALPRAZolam (Xanax) 0.5 MG tablet Take 1 tablet by mouth 3 (Three) Times a Day As Needed for Anxiety. (Patient taking differently: Take 1 tablet by mouth 3 (Three) Times a Day As Needed for Anxiety. Pt states she only takes 1/2 a pill prn) 270 tablet 0 Patient Taking Differently   • anastrozole (ARIMIDEX) 1 MG tablet Take 1 mg by mouth Daily.   11/1/2022 at 0900   • carvedilol (COREG) 12.5 MG tablet TAKE 1 TABLET BY MOUTH  TWICE DAILY 90 tablet 7 10/31/2022 at 0900   • ferrous sulfate 325 (65 FE) MG tablet  Take 1 tablet by mouth Daily With Breakfast & Dinner. 60 tablet 3 11/1/2022 at 0900   • hydrALAZINE (APRESOLINE) 100 MG tablet TAKE 1 TABLET BY MOUTH 3  TIMES DAILY 270 tablet 3 11/1/2022 at 1700   • lactulose (CHRONULAC) 10 GM/15ML solution TAKE 35 TO 45 ML&apos;S BY MOUTH DAILY FOR CONSTIPATION (Patient taking differently: Take  by mouth 3 (Three) Times a Day.) 1892 mL 11 11/1/2022 at 1200   • lamoTRIgine (LaMICtal) 200 MG tablet TAKE 2 AND 1/2 TABLETS BY  MOUTH AT NIGHT (Patient taking differently: Take 1 tablet by mouth. Pt states she takes 1 in the morning and 1 1/2 at night) 225 tablet 3 11/1/2022 at 0900   • levothyroxine (SYNTHROID, LEVOTHROID) 100 MCG tablet TAKE 1 TABLET BY MOUTH  DAILY 90 tablet 3 11/1/2022 at 0900   • metFORMIN (GLUCOPHAGE) 500 MG tablet TAKE 2 TABLETS BY MOUTH  DAILY WITH BREAKFAST 180 tablet 3 11/1/2022 at 0900   • omeprazole (priLOSEC) 40 MG capsule TAKE 1 CAPSULE BY MOUTH  DAILY 90 capsule 3 11/1/2022 at 0900   • PARoxetine (PAXIL) 40 MG tablet TAKE 1 TABLET BY MOUTH IN  THE MORNING FOR DEPRESSION 90 tablet 1 11/1/2022 at 0900   • amLODIPine (NORVASC) 5 MG tablet TAKE 1 AND 1/2 TABLETS BY MOUTH TWICE DAILY 270 tablet 1 10/31/2022 at 0900   • chlorthalidone (HYGROTON) 25 MG tablet Take 1 tablet by mouth Daily. 90 tablet 2 10/31/2022 at 0900   • glucose blood (FREESTYLE LITE) test strip Use to test everyday as directed 100 each 5    • lactulose (Generlac) 10 GM/15ML solution solution (encephalopathy) TAKE 35 TO 45 ML BY MOUTH DAILY FOR CONSTIPATION 4050 mL 1    • Lancets (FREESTYLE) lancets As directed to check blood glucose 100 each 12    • QUEtiapine (SEROquel) 300 MG tablet TAKE 1 TABLET BY MOUTH AT  NIGHT (Patient taking differently: Take 1/2 a tablet at night) 90 tablet 3 10/31/2022 at 2100   • rosuvastatin (CRESTOR) 5 MG tablet TAKE 1 TABLET BY MOUTH AT  NIGHT FOR CHOLESTEROL 90 tablet 3 10/31/2022 at 2100       Allergies:  Allergies   Allergen Reactions   • Penicillins Hives and  "Swelling   • Morphine Confusion   • Ace Inhibitors Cough   • Telmisartan Cough       Social Hx:  Social History     Socioeconomic History   • Marital status:    • Number of children: 1   Tobacco Use   • Smoking status: Former     Packs/day: 1.00     Years: 7.00     Pack years: 7.00     Types: Cigarettes     Quit date:      Years since quittin.8   • Smokeless tobacco: Never   • Tobacco comments:     caffeine use: 2 CUPS COFFEE/ 3 DIET PEPSIS DAILY   Vaping Use   • Vaping Use: Never used   Substance and Sexual Activity   • Alcohol use: Yes     Comment: OCC   • Drug use: No   • Sexual activity: Never       Family Hx:  Family History   Problem Relation Age of Onset   • Colon polyps Father    • Heart disease Father    • Prostate cancer Father    • Emphysema Father    • Lung disease Father    • Hypertension Brother    • Lung cancer Maternal Aunt    • Lung cancer Maternal Aunt    • Brain cancer Maternal Uncle    • Lung cancer Paternal Uncle    • Stroke Paternal Grandmother    • Cerebral aneurysm Paternal Grandmother    • Stroke Paternal Grandfather    • Cerebral aneurysm Paternal Grandfather    • Hepatitis Other    • Coronary artery disease Other    • Malig Hyperthermia Neg Hx        Review of systems  Constitutional: [No fevers, chills]  Eye: [No vision loss, diplopia]  HEENT: [no hearing loss, vertigo]  Cardiovascular: [No Chest pain, palpitations]  Neurologic: [No weakness, numbness]  Psychiatric: [No anxiety, depression]    All other systems reviewed and are negative    Exam    /61 (BP Location: Left arm, Patient Position: Lying)   Pulse 69   Temp 98.2 °F (36.8 °C) (Oral)   Resp 18   Ht 162.6 cm (64\")   Wt 68.8 kg (151 lb 10.8 oz)   LMP  (LMP Unknown)   SpO2 97%   BMI 26.04 kg/m²     gen: NAD, vitals reviewed  CVS: RRR, S1  Pulm: Normal respiratory effort, no distress  MS: oriented x3, recent/remote memory intact, normal attention/concentration, language intact, no neglect, normal fund of " knowledge  CN: visual acuity grossly normal, visual fields full, PERRL, EOMI, facial sensation equal, no facial droop, hearing symmetric, palate elevates symmetrically, head held down and low, reduced strength to neck flexion and extension  Motor: No drift of bilateral arms or legs, question cogwheeling left arm normal tone  Sensation: Impaired sensation glove and stocking distribution bilateral lower extremities, no proprioceptive sense, severe decrease of vibratory sense bilateral lower extremities   reflexes: 2+ throughout upper and lower extremities, downgoing plantars  Coordination: no dysmetria with finger to nose bilaterally  Gait: Quick to stand from chair, small hurried steps    DATA:    Lab Results   Component Value Date    GLUCOSE 121 (H) 11/02/2022    CALCIUM 9.2 11/02/2022     (L) 11/02/2022    K 4.0 11/02/2022    CO2 22.0 11/02/2022     11/02/2022    BUN 20 11/02/2022    CREATININE 1.46 (H) 11/02/2022    EGFRIFAFRI 42 (L) 08/11/2021    EGFRIFNONA 47 (L) 12/07/2021    BCR 13.7 11/02/2022    ANIONGAP 12.0 11/02/2022     Lab Results   Component Value Date    WBC 4.77 11/01/2022    HGB 10.2 (L) 11/01/2022    HCT 33.0 (L) 11/01/2022    MCV 87.8 11/01/2022     11/01/2022     Lab Results   Component Value Date    LDL 53 11/02/2022    LDL 69 10/05/2022    LDL 75 08/11/2021     Lab Results   Component Value Date    HGBA1C 6.10 (H) 11/02/2022     Lab Results   Component Value Date    INR 0.98 06/05/2019    INR 0.98 02/07/2018    PROTIME 12.8 02/07/2018       Lab review: b12 394, folate 9.7    Imaging review: Reviewed head CT and there is small vessel disease with some calcified plaques in the cavernous segments of the internal carotid arteries bilaterally and right vert, likely benign calvarial hemangioma, no acute finding, no hemorrhage    Diagnoses:  1.  Sensory ataxia  2.  Falls    Impression: 76-year-old female with past medical history of hypertension, COPD, diabetes, hyperlipidemia,  "hypothyroidism, remote history of breast cancer status post lumpectomy and did not require chemo or radiation, bipolar disorder, lupus said to be \"in remission\".  She presented after a fall.  She has had progressive imbalance over the last few months, repeated falls, as well as increased shakiness.  She describes episode of likely akathisia of her legs yesterday.  She has evidence of significant sensory loss on exam and subtle oral dyskinesias.  Most suspicious of superimposed dyskinesias on her chronic gait instability, but need to r/o other acute insults    Plan:  1.  MRI brain ordered  2.  Ordered RPR, copper, MMA, homocysteine  3.  PT/OT  4.  If no other contributing etiology found can consider lowering or discontinuing seroquel if option.    I spent at least 30 minutes interviewing, examining, and counseling patient.  I independently reviewed documentation, laboratory and diagnostic findings, external documentation where applicable, and formulated treatment plan which was discussed with the patient.                "

## 2022-11-02 NOTE — PROGRESS NOTES
inadvertantly overwrote note 22 original from revision history:    Name: Yi Lee ADMIT: 2022   : 1945  PCP: Ankit Albert MD    MRN: [id number] LOS: 0 days   AGE/SEX: 76 y.o. female  ROOM: [facility]      Subjective      Subjective      no new complaints. balance problems for 4 months.      Review of Systems   Constitutional: Negative for fever.   Respiratory: Negative for cough and shortness of breath.    Cardiovascular: Negative for chest pain.   Gastrointestinal: Negative for abdominal pain, diarrhea, nausea and vomiting.   Genitourinary: Negative for dysuria.         Objective      Objective      Vital Signs  Temp:  [97.8 °F (36.6 °C)-98.2 °F (36.8 °C)] 98 °F (36.7 °C)  Heart Rate:  [55-69] 55  Resp:  [14-18] 16  BP: (132-172)/(61-80) 168/73  SpO2:  [96 %-99 %] 96 %  on   ;   Device (Oxygen Therapy): room air  Body mass index is 25.92 kg/m².     Physical Exam  Constitutional:       General: She is not in acute distress.     Appearance: Normal appearance. She is not toxic-appearing.   HENT:      Head: Normocephalic and atraumatic.   Cardiovascular:      Rate and Rhythm: Normal rate and regular rhythm.   Pulmonary:      Effort: Pulmonary effort is normal. No respiratory distress.      Breath sounds: Normal breath sounds. No wheezing, rhonchi or rales.   Abdominal:      General: Bowel sounds are normal.      Palpations: Abdomen is soft.      Tenderness: There is no abdominal tenderness. There is no guarding or rebound.   Musculoskeletal:         General: No swelling.      Right lower leg: No edema.      Left lower leg: No edema.   Skin:     General: Skin is warm and dry.   Neurological:      Mental Status: She is alert and oriented to person, place, and time.   Psychiatric:         Mood and Affect: Mood normal.         Behavior: Behavior normal.         Thought Content: Thought content normal.      Results Review  I reviewed the patient's new clinical results.        Results from last 7 days    Lab Units 11/01/22  1806 10/31/22  0956   WBC 10*3/mm3 4.77 6.02   HEMOGLOBIN g/dL 10.2* 9.6*   PLATELETS 10*3/mm3 300 191             Results from last 7 days   Lab Units 11/02/22  0640 11/01/22  1806 10/31/22  1042   SODIUM mmol/L 134* 133* 136   POTASSIUM mmol/L 4.0 4.2 4.7   CHLORIDE mmol/L 100 100 102   CO2 mmol/L 22.0 21.7* 20.3*   BUN mg/dL 20 24* 19   CREATININE mg/dL 1.46* 1.75* 1.43*   GLUCOSE mg/dL 121* 136* 170*            Lab Results   Component Value Date     ANIONGAP 12.0 11/02/2022      Estimated Creatinine Clearance: 31.6 mL/min (A) (by C-G formula based on SCr of 1.46 mg/dL (H)).           Results from last 7 days   Lab Units 11/01/22  1806 10/31/22  1042   ALBUMIN g/dL 4.20 3.80   BILIRUBIN mg/dL 0.3 0.3   ALK PHOS U/L 103 89   AST (SGOT) U/L 17 15   ALT (SGPT) U/L 12 8             Results from last 7 days   Lab Units 11/02/22  0640 11/01/22  1806 10/31/22  1042   CALCIUM mg/dL 9.2 10.5 9.8   ALBUMIN g/dL  --  4.20 3.80             Hemoglobin A1C   Date/Time Value Ref Range Status   11/02/2022 0640 6.10 (H) 4.80 - 5.60 % Final              Glucose   Date/Time Value Ref Range Status   11/02/2022 1649 122 70 - 130 mg/dL Final       Comment:       Meter: [id number] : [id number] [facility]   11/02/2022 1118 142 (H) 70 - 130 mg/dL Final       Comment:       Meter: [id number] : [id number] [facility]   11/02/2022 0716 125 70 - 130 mg/dL Final       Comment:       Meter: [id number] : [id number] [facility]   11/02/2022 0624 118 70 - 130 mg/dL Final       Comment:       Meter: [id number] : [id number] José Miguel Hurley [facility]   11/02/2022 0316 107 70 - 130 mg/dL Final       Comment:       Meter: [id number] : [id number] José Miguel Hurley [facility]   11/01/2022 1804 141 (H) 70 - 130 mg/dL Final       Comment:       Meter: [id number] : [id number] Dylon KAMINSKI [facility]         CT Head Without Contrast     Result Date: 11/2/2022  1. No convincing acute intracranial  abnormality is seen. 2. There is mild small vessel disease in the frontal periventricular white matter, calcified plaques in the intracranial segment of the distal right vertebral artery, cavernous segments of the internal carotid arteries bilaterally. There is a tiny 7 x 4 mm lucent lesion in the posterior right parietal bone likely a benign calvarial hemangioma. The remainder of the head CT is normal. The etiology of the patient's acute onset dizziness and difficulty walking is not clearly established on this exam and if there remains any clinical suspicion of an acute stroke I recommend an MRI of the brain for a more complete assessment.  Radiation dose reduction techniques were utilized, including automated exposure control and exposure modulation based on body size.  This report was finalized on 11/2/2022 1:21 PM by Dr. Selwyn Kerr M.D.          Scheduled Meds  aspirin, 325 mg, Oral, Daily   Or  aspirin, 300 mg, Rectal, Daily  atorvastatin, 80 mg, Oral, Nightly  insulin lispro, 0-7 Units, Subcutaneous, 4x Daily With Meals & Nightly  sodium chloride, 10 mL, Intravenous, Q12H     Continuous Infusions   PRN Meds  •  acetaminophen **OR** acetaminophen  •  dextrose  •  dextrose  •  glucagon (human recombinant)  •  sodium chloride      Diet  Diet Regular     I have personally reviewed:  [x]?  Laboratory   []?  Microbiology   [x]?  Radiology   []?  EKG/Telemetry   []?  Cardiology/Vascular   []?  Pathology   []?  Records     Results for orders placed during the hospital encounter of 11/01/22     Adult Transthoracic Echo Complete w/ Color, Spectral and Contrast if Necessary Per Protocol     Interpretation Summary  •  Saline test results are negative.           Assessment/Plan            Active Hospital Problems     Diagnosis   POA   • **Ataxia [R27.0]   Yes   • Fall [W19.XXXA]   Yes   • Ductal carcinoma in situ (DCIS) of left breast [D05.12]   Yes   • Iron deficiency anemia [D50.9]   Yes   • Cystic kidney disease  [Q61.9]   Not Applicable   • Benign essential hypertension [I10]   Yes   • Type 2 diabetes mellitus with diabetic neuropathy, without long-term current use of insulin (HCC) [E11.40]   Yes   • Gastroesophageal reflux disease [K21.9]   Yes   • Hyperlipidemia [E78.5]   Yes   • Hypothyroidism [E03.9]   Yes   • Chronic obstructive pulmonary disease (HCC) [J44.9]   Yes       Resolved Hospital Problems   No resolved problems to display.      76 y.o. female history of breast cancer, DM, HTN presents with frequent falls and ataxia for four months     ataxia and falls  -appreciate neurology  -workup in progress  -MRI pending  -B12 low normal start PO B12. homocysteine elevated. check MMA     DM2  -a1c 6.10     HTN  -control acceptable     anemia, Fe def     hypothyoridism  -TSH normal     SCDs for DVT prophylaxis     Discussed with patient and nursing staff     Discharge: ROHINI Sow MD  [facility]  11/02/22

## 2022-11-03 NOTE — PLAN OF CARE
"Goal Outcome Evaluation:  Plan of Care Reviewed With: patient        Progress: no change  Outcome Evaluation: Pt seen for PT this AM. Pt req SV to sit up to EOB. Pt impulsive and unsteady during standing and amb of 400' x 2 w/ no AD req CGA. Pt ataxic w/ varying path veering L and R in hallway. Pt cued to reduce speed for safety w/ pt following cues although still unsteady. Pt educ to for use of fww for safety a few times w/ pt responding that \"walkers are for old people\". Pt admits to several falls recently at home. Pt urged to consider HH PT after dc to improve safety w/ pt declining need for services after dc.     Patient was intermittently wearing a face mask during this therapy encounter. Therapist used appropriate personal protective equipment including eye protection, mask, and gloves.  Mask used was standard procedure mask. Appropriate PPE was worn during the entire therapy session. Hand hygiene was completed before and after therapy session. Patient is not in enhanced droplet precautions.    "

## 2022-11-03 NOTE — PROGRESS NOTES
Continued Stay Note  [facility]     Patient Name: Yi Lee  MRN: [id number]  Today's Date: 11/3/2022    Admit Date: 11/1/2022    Plan: Home with HH vs SNF, referrals pending   Discharge Plan     Row Name 11/03/22 1241       Plan    Plan Home with HH vs SNF, referrals pending    Patient/Family in Agreement with Plan yes    Provided Post Acute Provider List? Yes    Post Acute Provider List Home Health;Nursing Home    Plan Comments CCP followed up with pt today following continued recommendations from PT/OT for rehab at d/c. Pt still considering her options, but agreed to HH and SNF referrals, no preferences aside from higher ratings and east end facilities (referrals pending per CMS review). CCP to follow. Angella Savage LCSW               Discharge Codes    No documentation.               Expected Discharge Date and Time     Expected Discharge Date Expected Discharge Time    Nov 4, 2022             Teresa Savage LCSW

## 2022-11-03 NOTE — DISCHARGE PLACEMENT REQUEST
"Yi Lee (76 y.o. Female)     Date of Birth   1945    Social Security Number       Address   [address] [place] [address]    Home Phone   [phone]    MRN   [id number]       Episcopal   Anglican    Marital Status                               Admission Date   11/1/22    Admission Type   Emergency    Admitting Provider   Jonathan Malone MD    Attending Provider   Master Sow MD    Department, Room/Bed   [address], P582/1       Discharge Date       Discharge Disposition       Discharge Destination                               Attending Provider: Master Sow MD    Allergies: Penicillins, Morphine, Ace Inhibitors, Telmisartan    Isolation: None   Infection: None   Code Status: CPR    Ht: 162.6 cm (64\")   Wt: 68.5 kg (151 lb)    Admission Cmt: None   Principal Problem: Ataxia [R27.0]                 Active Insurance as of 11/1/2022     Primary Coverage     Payor Plan Insurance Group Employer/Plan Group    MEDICARE MEDICARE A & B      Payor Plan Address Payor Plan Phone Number Payor Plan Fax Number Effective Dates    [address] [phone]  12/1/2011 - None Entered    [facility] [id number]       Subscriber Name Subscriber Birth Date Member ID       YI LEE 1945 [id number]           Secondary Coverage     Payor Plan Insurance Group Employer/Plan Group    [facility] SUPP [id number]     Payor Plan Address Payor Plan Phone Number Payor Plan Fax Number Effective Dates    [address]   12/1/2016 - None Entered    [facility] [id number]       Subscriber Name Subscriber Birth Date Member ID       YI LEE 1945 [id number]                 Emergency Contacts      (Rel.) Home Phone Work Phone Mobile Phone    Marin Lee (Son) [phone] -- [phone]    ADRYAN VALDEZ (Brother) [phone] -- [phone]          "

## 2022-11-03 NOTE — THERAPY TREATMENT NOTE
Patient Name: Yi Lee  : 1945    MRN: [id number]                              Today's Date: 11/3/2022       Admit Date: 2022    Visit Dx:     ICD-10-CM ICD-9-CM   1. Ataxia  R27.0 781.3     Patient Active Problem List   Diagnosis   • Abnormal creatinine clearance glomerular filtration   • Benign essential hypertension   • Chronic obstructive pulmonary disease (HCC)   • Depression   • Type 2 diabetes mellitus with diabetic neuropathy, without long-term current use of insulin (HCC)   • Gastroesophageal reflux disease   • Hyperlipidemia   • Hypothyroidism   • Obesity (BMI 30-39.9)   • Ataxic gait   • Abdominal pain, right lateral   • Iron deficiency anemia due to chronic blood loss   • Cystic kidney disease   • Vitamin B12 deficiency   • OA (osteoarthritis) of knee   • Iron deficiency anemia   • Bipolar I disorder, single manic episode ([facility])   • Ductal carcinoma in situ (DCIS) of left breast   • Bradycardia   • Systolic murmur   • Bicuspid aortic valve   • Renal insufficiency   • 1st degree AV block   • Dizziness   • Fatigue   • Vitamin D deficiency   • Bruising   • Weight loss, abnormal   • Loss of appetite   • Constipation   • Aromatase inhibitor use   • Breast neoplasm, Tis (DCIS), left   • Lupus erythematosus   • Malignant neoplasm of breast (HCC)   • Normocytic anemia   • Hypertension   • S/P reverse total shoulder arthroplasty, left   • Closed fracture of nasal bone with routine healing   • Visit for suture removal   • Fall   • Dyspnea on exertion   • Ataxia   • Severe malnutrition (HCC)     Past Medical History:   Diagnosis Date   • Abdominal pain, LLQ    • Abnormal Pap smear of cervix    • Anemia    • Arthralgia of hip 2015   • Asthma    • Ataxic gait 2015    Description: Javi Mederso, Neuro.  Some vestibular dysfunction present    • Bipolar I disorder, single manic episode (HCC)    • Bradycardia    • Cardiac murmur    • Colon polyp    • Coronary artery disease     • Degeneration, intervertebral disc, thoracolumbar    • Depression    • Diabetes mellitus (HCC)    • Disease of thyroid gland     Hypothyroidism   • Diverticulosis    • Ductal carcinoma in situ (DCIS) of left breast    • Esophageal spasm    • Fatigue    • GERD (gastroesophageal reflux disease)    • H/O bone density study 10/17/2007    Dr. Giles   • Heart murmur    • Hemorrhoids    • History of mammogram     02/2012, per GYN Reshma Aranda   • History of Papanicolaou smear of cervix     DR. GILES GYN   • History of transfusion    • Hyperlipidemia    • Hypertension    • Impaired cognition 11/22/2015    Description: Testing done 05/14   • Lupus (HCC)    • Optic nerve contusion    • Pain of lower extremity 11/22/2015   • Pseudoaneurysm following procedure (HCC)     DURING CARDIAC CATHETERIZATION   • Shoulder pain     LEFT   • Skin tear of forearm without complication, right, initial encounter    • Stage 2 chronic kidney disease    • Systolic murmur    • Thyroid disease    • Vitamin B12 deficiency    • Vitamin D deficiency      Past Surgical History:   Procedure Laterality Date   • BREAST BIOPSY  2011   • BREAST LUMPECTOMY Left 2020    benign   • CARDIAC CATHETERIZATION     • CHOLECYSTECTOMY     • COLONOSCOPY  2007    done 02/12, repeat 5 yrs, Dr Grigsby; tics in sigmoid colon, IH   • COLONOSCOPY N/A 11/03/2016    polyp, IH   • COLONOSCOPY N/A 02/24/2020    Procedure: COLONOSCOPY TO CECUM WITH COLD POLYPECTOMY;  Surgeon: Eddie Grigsby MD;  Location: [facility] ENDOSCOPY;  Service: Gastroenterology;  Laterality: N/A;  pre: hx of polyps  post: polyp, hemorrhoids, diverticulosis   • COLONOSCOPY N/A 12/07/2021    Procedure: COLONOSCOPY TO CECUM  - COLD BIOPSY POLYPECTOMIES;  Surgeon: Eddie Grigsby MD;  Location: [facility] ENDOSCOPY;  Service: Gastroenterology;  Laterality: N/A;  IRON DEFICIENCY ANEMIA, HX POLYPS, CONSTIPATION   ---DIVERTICULOSIS, POLYPS   • COLONOSCOPY  2010    normal   • COLPOSCOPY W/ BIOPSY / CURETTAGE     • D & C  AND LAPAROSCOPY  1974   • DILATATION AND CURETTAGE     • ENDOSCOPY N/A 02/24/2020    Procedure: ESOPHAGOGASTRODUODENOSCOPY with biopsies;  Surgeon: Eddie Grigsby MD;  Location: [facility] ENDOSCOPY;  Service: Gastroenterology;  Laterality: N/A;  pre: iron deficiency anemia  post: gastriits   • ENDOSCOPY N/A 12/07/2021    Procedure: ESOPHAGOGASTRODUODENOSCOPY, WITH BIOPSIES;  Surgeon: Eddie Grigsby MD;  Location: [facility] ENDOSCOPY;  Service: Gastroenterology;  Laterality: N/A;  GERD,WT LOSS, IRON DEFICIENCY ANEMIA ,  DYSPEPSIA  ---HIATAL HERNIA, GASTRITIS   • ESOPHAGOGASTRIC FUNDOPLASTY     • HYSTEROSCOPY     • JOINT REPLACEMENT     • ROTATOR CUFF REPAIR Right 2009   • ROTATOR CUFF REPAIR Left    • TOTAL KNEE ARTHROPLASTY Left 02/13/2018    Procedure: LT TOTAL KNEE ARTHROPLASTY;  Surgeon: Selwyn Pinto MD;  Location: [facility] MAIN OR;  Service:    • TOTAL SHOULDER ARTHROPLASTY W/ DISTAL CLAVICLE EXCISION Left 08/09/2022    Procedure: TOTAL SHOULDER REVERSE ARTHROPLASTY;  Surgeon: Yony Gallardo MD;  Location: [facility] OR OSC;  Service: Orthopedics;  Laterality: Left;      General Information     Row Name 11/03/22 1237          Physical Therapy Time and Intention    Document Type therapy note (daily note)  -PH     Mode of Treatment physical therapy  -PH     Row Name 11/03/22 1237          General Information    Existing Precautions/Restrictions fall  -PH     Barriers to Rehab medically complex;previous functional deficit  -PH     Row Name 11/03/22 1237          Cognition    Orientation Status (Cognition) oriented x 4  -PH     Row Name 11/03/22 1237          Safety Issues, Functional Mobility    Safety Issues Affecting Function (Mobility) awareness of need for assistance;impulsivity;insight into deficits/self-awareness;judgment;safety precaution awareness;safety precautions follow-through/compliance;problem-solving;sequencing abilities  -PH     Impairments Affecting Function (Mobility) balance;endurance/activity  tolerance;strength;coordination  -PH     Comment, Safety Issues/Impairments (Mobility) gt belt and non skid socks donned  -PH           User Key  (r) = Recorded By, (t) = Taken By, (c) = Cosigned By    Initials Name Provider Type    PH Jenna Benjamin PTA Physical Therapist Assistant               Mobility     Row Name 11/03/22 1238          Bed Mobility    Bed Mobility supine-sit  -PH     Supine-Sit [place] (Bed Mobility) standby assist  -PH     Comment, (Bed Mobility) pt impulsive  -PH     Row Name 11/03/22 1238          Bed-Chair Transfer    Bed-Chair [place] (Transfers) contact guard  -PH     Assistive Device (Bed-Chair Transfers) other (see comments)  No AD  -PH     Row Name 11/03/22 1238          Sit-Stand Transfer    Sit-Stand [place] (Transfers) contact guard  -PH     Comment, (Sit-Stand Transfer) 2x; from EOB and from chair in BR  -PH     Row Name 11/03/22 1238          Gait/Stairs (Locomotion)    [place] Level (Gait) contact guard  -PH     Assistive Device (Gait) other (see comments)  No AD  -PH     Distance in Feet (Gait) 400' x 2  -PH     Deviations/Abnormal Patterns (Gait) ataxic;stride length decreased;base of support, wide  -PH     Comment, (Gait/Stairs) wide path deviation again today during amb w/ pt req a few cues to correction w/ unsteadiness noted; cues to reduce speed for safety; No LOB  -PH           User Key  (r) = Recorded By, (t) = Taken By, (c) = Cosigned By    Initials Name Provider Type    Jenna Haq PTA Physical Therapist Assistant               Obj/Interventions     Row Name 11/03/22 1241          Balance    Static Sitting Balance supervision  -PH     Static Standing Balance contact guard  -PH     Position/Device Used, Standing Balance other (see comments)  No AD  -PH     Comment, Balance pt unsteady w/ no LOB  -PH           User Key  (r) = Recorded By, (t) = Taken By, (c) = Cosigned By    Initials Name Provider Type    PH Cassidy,  "SMITHA West Physical Therapist Assistant               Goals/Plan    No documentation.                Clinical Impression     Row Name 11/03/22 1243          Pain    Pretreatment Pain Rating 0/10 - no pain  -PH     Row Name 11/03/22 1243          Plan of Care Review    Plan of Care Reviewed With patient  -PH     Progress no change  -PH     Outcome Evaluation Pt seen for PT this AM. Pt req SV to sit up to EOB. Pt impulsive and unsteady during standing and amb of 400' x 2 w/ no AD req CGA. Pt ataxic w/ varying path veering L and R in hallway. Pt cued to reduce speed for safety w/ pt following cues although still unsteady. Pt educ to for use of fww for safety a few times w/ pt responding that \"walkers are for old people\". Pt admits to several falls recently at home. Pt urged to consider  PT after dc to improve safety w/ pt declining need for services after dc.  -PH     Row Name 11/03/22 1243          Positioning and Restraints    Pre-Treatment Position in bed  -PH     Post Treatment Position chair  -PH     In Chair reclined;call light within reach;encouraged to call for assist;exit alarm on;notified [facility]  -           User Key  (r) = Recorded By, (t) = Taken By, (c) = Cosigned By    Initials Name Provider Type    PH Jenna Benjamin PTA Physical Therapist Assistant               Outcome Measures     Row Name 11/03/22 1246 11/03/22 0845       How much help from another person do you currently need...    Turning from your back to your side while in flat bed without using bedrails? 4  -PH 4  -JM    Moving from lying on back to sitting on the side of a flat bed without bedrails? 3  -PH 3  -JM    Moving to and from a bed to a chair (including a wheelchair)? 3  -PH 3  -JM    Standing up from a chair using your arms (e.g., wheelchair, bedside chair)? 3  -PH 3  -JM    Climbing 3-5 steps with a railing? 2  -PH 2  -JM    To walk in hospital room? 3  -PH 2  -JM    AM-PAC 6 Clicks Score (PT) 18  -PH 17  -JM    Highest " "level of mobility 6 --> Walked 10 steps or more  - 5 --> Static standing  -    Row Name 11/03/22 1246          Functional Assessment    Outcome Measure Options AM-PAC 6 Clicks Basic Mobility (PT)  -           User Key  (r) = Recorded By, (t) = Taken By, (c) = Cosigned By    Initials Name Provider Type    Emi Sims, RN Registered Nurse     Jenna Benjamin PTA Physical Therapist Assistant                             Physical Therapy Education     Title: PT OT SLP Therapies (In Progress)     Topic: Physical Therapy (In Progress)     Point: Mobility training (Done)     Learning Progress Summary           Patient Acceptance, E,D, VU,NR by  at 11/3/2022 1247    Acceptance, E, VU by  at 11/2/2022 1200                   Point: Home exercise program (Not Started)     Learner Progress:  Not documented in this visit.          Point: Body mechanics (Done)     Learning Progress Summary           Patient Acceptance, E,D, VU,NR by  at 11/3/2022 1247                   Point: Precautions (Done)     Learning Progress Summary           Patient Acceptance, E,D, VU,NR by  at 11/3/2022 1247    Acceptance, E, VU by EM at 11/2/2022 1200                               User Key     Initials Effective Dates Name Provider Type Discipline     06/16/21 -  Abi Aviles PT Physical Therapist PT     06/16/21 -  Jenna Benjamin PTA Physical Therapist Assistant PT              PT Recommendation and Plan     Plan of Care Reviewed With: patient  Progress: no change  Outcome Evaluation: Pt seen for PT this AM. Pt req SV to sit up to EOB. Pt impulsive and unsteady during standing and amb of 400' x 2 w/ no AD req CGA. Pt ataxic w/ varying path veering L and R in hallway. Pt cued to reduce speed for safety w/ pt following cues although still unsteady. Pt educ to for use of fww for safety a few times w/ pt responding that \"walkers are for old people\". Pt admits to several falls recently at home. Pt urged to " consider HH PT after dc to improve safety w/ pt declining need for services after dc.     Time Calculation:    PT Charges     Row Name 11/03/22 1248             Time Calculation    Start Time 0835  -PH      Stop Time 0859  -PH      Time Calculation (min) 24 min  -PH      PT Received On 11/03/22  -PH      PT - Next Appointment 11/04/22  -PH         Timed Charges    67904 - PT Therapeutic Activity Minutes 24  -PH         Total Minutes    Timed Charges Total Minutes 24  -PH       Total Minutes 24  -PH            User Key  (r) = Recorded By, (t) = Taken By, (c) = Cosigned By    Initials Name Provider Type    PH Jenna Benjamin PTA Physical Therapist Assistant              Therapy Charges for Today     Code Description Service Date Service Provider Modifiers Qty    [id number]  PT THERAPEUTIC ACT EA 15 MIN 11/3/2022 Jenna Benjamin PTA GP 2          PT G-Codes  Outcome Measure Options: AM-PAC 6 Clicks Basic Mobility (PT)  AM-PAC 6 Clicks Score (PT): 18  AM-PAC 6 Clicks Score (OT): 18  Modified [place] Scale: 4 - Moderately severe disability.  Unable to walk without assistance, and unable to attend to own bodily needs without assistance.    Jenna Benjamin PTA  11/3/2022

## 2022-11-03 NOTE — PLAN OF CARE
"The pt participated in OT this AM. She was able to transfer herself to the EOB. CGA for OOB mobility - remains unsteady, especially with dynamic movements. Cues provided to slow her pacing and take her time to improve balance. She completed bathroom toileting, LBD and grooming - she demo'd impaired coordination and difficulty manipulating items with droppage noted. Education provided throughout the session on her living alone and \"6 or so\" falls reported over the last month. She declined the use of a walker as she stated \"it's for old people\". She declines the need for further therapy services once discharged.     Patient was wearing a face mask during this therapy encounter. Therapist used appropriate personal protective equipment including mask and gloves. Mask used was standard procedure mask. Appropriate PPE was worn during the entire therapy session. Hand hygiene was completed before and after therapy session. Patient is not in enhanced droplet precautions.        "

## 2022-11-03 NOTE — PROGRESS NOTES
Name: Yi Lee ADMIT: 2022   : 1945  PCP: Ankit Albert MD    MRN: [id number] LOS: 0 days   AGE/SEX: 76 y.o. female  ROOM: [facility]     Subjective   Subjective   no new complaints.    Review of Systems   Constitutional: Negative for fever.   Respiratory: Negative for cough and shortness of breath.    Cardiovascular: Negative for chest pain.   Gastrointestinal: Negative for abdominal pain, diarrhea, nausea and vomiting.   Genitourinary: Negative for dysuria.      Objective   Objective   Vital Signs  Temp:  [97.7 °F (36.5 °C)-98.5 °F (36.9 °C)] 98.5 °F (36.9 °C)  Heart Rate:  [50-69] 53  Resp:  [16-18] 18  BP: (132-191)/(61-81) 157/76  SpO2:  [96 %-98 %] 97 %  on   ;   Device (Oxygen Therapy): room air  Body mass index is 25.92 kg/m².    Physical Exam  Constitutional:       General: She is not in acute distress.     Appearance: Normal appearance. She is not toxic-appearing.   HENT:      Head: Normocephalic and atraumatic.   Cardiovascular:      Rate and Rhythm: Normal rate and regular rhythm.   Pulmonary:      Effort: Pulmonary effort is normal. No respiratory distress.      Breath sounds: Normal breath sounds. No wheezing, rhonchi or rales.   Abdominal:      General: Bowel sounds are normal.      Palpations: Abdomen is soft.      Tenderness: There is no abdominal tenderness. There is no guarding or rebound.   Musculoskeletal:         General: No swelling.      Right lower leg: No edema.      Left lower leg: No edema.   Skin:     General: Skin is warm and dry.   Neurological:      Mental Status: She is alert and oriented to person, place, and time.   Psychiatric:         Mood and Affect: Mood normal.         Behavior: Behavior normal.         Thought Content: Thought content normal.     Results Review  I reviewed the patient's new clinical results.  Results from last 7 days   Lab Units 22  1806 10/31/22  0956   WBC 10*3/mm3 4.77 6.02   HEMOGLOBIN g/dL 10.2* 9.6*   PLATELETS 10*3/mm3 300 191      Results from last 7 days   Lab Units 11/02/22  0640 11/01/22  1806 10/31/22  1042   SODIUM mmol/L 134* 133* 136   POTASSIUM mmol/L 4.0 4.2 4.7   CHLORIDE mmol/L 100 100 102   CO2 mmol/L 22.0 21.7* 20.3*   BUN mg/dL 20 24* 19   CREATININE mg/dL 1.46* 1.75* 1.43*   GLUCOSE mg/dL 121* 136* 170*     Lab Results   Component Value Date    ANIONGAP 12.0 11/02/2022     Estimated Creatinine Clearance: 31.6 mL/min (A) (by C-G formula based on SCr of 1.46 mg/dL (H)).    Results from last 7 days   Lab Units 11/01/22  1806 10/31/22  1042   ALBUMIN g/dL 4.20 3.80   BILIRUBIN mg/dL 0.3 0.3   ALK PHOS U/L 103 89   AST (SGOT) U/L 17 15   ALT (SGPT) U/L 12 8     Results from last 7 days   Lab Units 11/02/22  0640 11/01/22  1806 10/31/22  1042   CALCIUM mg/dL 9.2 10.5 9.8   ALBUMIN g/dL  --  4.20 3.80       Hemoglobin A1C   Date/Time Value Ref Range Status   11/02/2022 0640 6.10 (H) 4.80 - 5.60 % Final     Glucose   Date/Time Value Ref Range Status   11/03/2022 1150 142 (H) 70 - 130 mg/dL Final     Comment:     Meter: [id number] : [id number] Nilesh Tike NA   11/03/2022 0719 98 70 - 130 mg/dL Final     Comment:     Meter: [id number] : [id number] Nilesh Tike NA   11/02/2022 2054 199 (H) 70 - 130 mg/dL Final     Comment:     Meter: [id number] : [id number] Sam Macedo NA   11/02/2022 1649 122 70 - 130 mg/dL Final     Comment:     Meter: [id number] : [id number] Hans Dangelo CNA   11/02/2022 1118 142 (H) 70 - 130 mg/dL Final     Comment:     Meter: [id number] : [id number] Hans SOLOA   11/02/2022 0716 125 70 - 130 mg/dL Final     Comment:     Meter: [id number] : [id number] Hans Dangelo  [facility]   11/02/2022 0624 118 70 - 130 mg/dL Final     Comment:     Meter: [id number] : [id number] José Miguel [facility]       CT Head Without Contrast    Result Date: 11/2/2022  1. No convincing acute intracranial abnormality is seen. 2. There is mild small vessel disease in the frontal periventricular white  matter, calcified plaques in the intracranial segment of the distal right vertebral artery, cavernous segments of the internal carotid arteries bilaterally. There is a tiny 7 x 4 mm lucent lesion in the posterior right parietal bone likely a benign calvarial hemangioma. The remainder of the head CT is normal. The etiology of the patient's acute onset dizziness and difficulty walking is not clearly established on this exam and if there remains any clinical suspicion of an acute stroke I recommend an MRI of the brain for a more complete assessment.  Radiation dose reduction techniques were utilized, including automated exposure control and exposure modulation based on body size.  This report was finalized on 11/2/2022 1:21 PM by Dr. Selwyn Kerr M.D.      MRI Brain With & Without Contrast    Result Date: 11/3/2022  1. No convincing acute intracranial abnormality is seen and no significant change when compared to yesterday's head CT 11/01/2022 and prior MRI of the brain on 11/19/2014. 2. There is mild small vessel disease in the cerebral white matter. There is a 7 x 8 mm hemangioma in the posterior medial right parietal bone.  The remainder of the MRI of the brain is normal with no acute infarct identified.  This report was finalized on 11/3/2022 7:35 AM by Dr. Selwyn Kerr M.D.      Scheduled Meds  anastrozole, 1 mg, Oral, Daily  aspirin, 325 mg, Oral, Daily   Or  aspirin, 300 mg, Rectal, Daily  atorvastatin, 80 mg, Oral, Nightly  carvedilol, 12.5 mg, Oral, BID  [START ON 11/4/2022] ferrous sulfate, 325 mg, Oral, Daily With Breakfast  hydrALAZINE, 100 mg, Oral, TID  insulin lispro, 0-7 Units, Subcutaneous, 4x Daily With Meals & Nightly  [START ON 11/4/2022] levothyroxine, 100 mcg, Oral, Daily  [START ON 11/4/2022] pantoprazole, 40 mg, Oral, QAM  PARoxetine, 40 mg, Oral, Daily  sodium chloride, 10 mL, Intravenous, Q12H  vitamin B-12, 1,000 mcg, Oral, Daily    Continuous Infusions   PRN Meds  •  acetaminophen **OR**  acetaminophen  •  ALPRAZolam  •  dextrose  •  dextrose  •  glucagon (human recombinant)  •  sodium chloride     Diet  Diet Regular    I have personally reviewed:  []  Laboratory   []  Microbiology   [x]  Radiology MRI no acute intracranial abnormality  []  EKG/Telemetry   []  Cardiology/Vascular   []  Pathology   []  Records    Results for orders placed during the hospital encounter of 11/01/22    Adult Transthoracic Echo Complete w/ Color, Spectral and Contrast if Necessary Per Protocol    Interpretation Summary  •  Saline test results are negative.        Assessment/Plan     Active Hospital Problems    Diagnosis  POA   • **Ataxia [R27.0]  Yes   • Severe malnutrition (HCC) [E43]  Yes   • Fall [W19.XXXA]  Yes   • Ductal carcinoma in situ (DCIS) of left breast [D05.12]  Yes   • Iron deficiency anemia [D50.9]  Yes   • Cystic kidney disease [Q61.9]  Not Applicable   • Benign essential hypertension [I10]  Yes   • Type 2 diabetes mellitus with diabetic neuropathy, without long-term current use of insulin (HCC) [E11.40]  Yes   • Gastroesophageal reflux disease [K21.9]  Yes   • Hyperlipidemia [E78.5]  Yes   • Hypothyroidism [E03.9]  Yes   • Chronic obstructive pulmonary disease (HCC) [J44.9]  Yes      Resolved Hospital Problems   No resolved problems to display.     76 y.o. female history of breast cancer, DM, HTN presents with frequent falls and ataxia for four months    ataxia and falls  -neurology workup in progress  -MRI nothing acute  -PO B12    DM2  -a1c 6.10    HTN  -resume home BP meds    anemia, Fe def  -PO Fe    hypothyoridism  -TSH normal    SCDs for DVT prophylaxis    Discussed with patient and nursing staff and Mile Emery neurology APRN    Discharge: TBD. Patient would like to go home.    Master Sow MD  [facility]  11/03/22

## 2022-11-03 NOTE — PROGRESS NOTES
Continued Stay Note  [facility]     Patient Name: Yi Lee  MRN: [id number]  Today's Date: 11/3/2022    Admit Date: 11/1/2022    Plan: Home with [facility]   Discharge Plan     Row Name 11/03/22 1326       Plan    Plan Home with [facility]    Patient/Family in Agreement with Plan yes    Plan Comments Pt most recently ambulated 800 ft CGA with PT who now recommend HH, pt agreeable. [facility] has accepted. Pt has cane and walker at home. No additional needs noted. Angella Savage LCSW                                                    Discharge Codes    No documentation.               Expected Discharge Date and Time     Expected Discharge Date Expected Discharge Time    Nov 4, 2022             Teresa Savage LCSW

## 2022-11-03 NOTE — NURSING NOTE
Ortho BP's Sitting 176/[phone]                      Standing 145/[phone]) 1351

## 2022-11-03 NOTE — PLAN OF CARE
Goal Outcome Evaluation:   Patient stable throughout shift. Patient is alert and oriented, no neuro changes. Patient complains of minor headache, relieved by medication x1. No further concerns.

## 2022-11-03 NOTE — PROGRESS NOTES
DOS: 11/3/2022  NAME: Yi Lee   : 1945  PCP: Ankit Albert MD    Chief Complaint   Patient presents with   • Dizziness   • Balance Issues   • Loss of Vision   • Fall        Stroke    Subjective: Pt seen in follow up, however the problem is new to me.  Denies any new weakness, numbness, speech or visual disturbances, or headaches.  States she still feeling off balance when she walks.  No family at bedside    Objective:  Vital signs:      Vitals:    22 0009 22 0423 22 0430 22 0857   BP:  170/69 158/77 162/71   BP Location:  Left arm  Right arm   Patient Position:  Lying  Sitting   Pulse: 50 60  69   Resp: 18 18  18   Temp: 98.1 °F (36.7 °C) 98.1 °F (36.7 °C)  97.7 °F (36.5 °C)   TempSrc: Oral Oral  Oral   SpO2: 98% 98%  97%   Weight:       Height:           Current Facility-Administered Medications:   •  acetaminophen (TYLENOL) tablet 650 mg, 650 mg, Oral, Q4H PRN, 650 mg at 22 **OR** acetaminophen (TYLENOL) suppository 650 mg, 650 mg, Rectal, Q4H PRN, Jonathan Malone MD  •  aspirin tablet 325 mg, 325 mg, Oral, Daily, 325 mg at 22 0925 **OR** aspirin suppository 300 mg, 300 mg, Rectal, Daily, Jonathan Malone MD  •  atorvastatin (LIPITOR) tablet 80 mg, 80 mg, Oral, Nightly, Jonathan Malone MD, 80 mg at 22  •  cyanocobalamin injection 1,000 mcg, 1,000 mcg, Intramuscular, Q28 Days, Master Sow MD, 1,000 mcg at 22 2311  •  dextrose (D50W) (25 g/50 mL) IV injection 25 g, 25 g, Intravenous, Q15 Min PRN, Mindy Worthy APRN  •  dextrose (GLUTOSE) oral gel 15 g, 15 g, Oral, Q15 Min PRN, Deacon, Mindy M, APRN  •  glucagon (human recombinant) (GLUCAGEN DIAGNOSTIC) injection 1 mg, 1 mg, Intramuscular, Q15 Min PRN, Mindy Worthy, APRN  •  insulin lispro (ADMELOG) injection 0-7 Units, 0-7 Units, Subcutaneous, 4x Daily With Meals & Nightly, Mindy Worthy, APRN, 2 Units at 22 2151  •  sodium  chloride 0.9 % flush 10 mL, 10 mL, Intravenous, Q12H, Jonathan Malone MD, 10 mL at 11/03/22 0925  •  sodium chloride 0.9 % flush 10 mL, 10 mL, Intravenous, PRN, Jonathan Malone MD    PRN meds  •  acetaminophen **OR** acetaminophen  •  dextrose  •  dextrose  •  glucagon (human recombinant)  •  sodium chloride    No current facility-administered medications on file prior to encounter.     Current Outpatient Medications on File Prior to Encounter   Medication Sig   • ALPRAZolam (Xanax) 0.5 MG tablet Take 1 tablet by mouth 3 (Three) Times a Day As Needed for Anxiety. (Patient taking differently: Take 1 tablet by mouth 3 (Three) Times a Day As Needed for Anxiety. Pt states she only takes 1/2 a pill prn)   • anastrozole (ARIMIDEX) 1 MG tablet Take 1 mg by mouth Daily.   • carvedilol (COREG) 12.5 MG tablet TAKE 1 TABLET BY MOUTH  TWICE DAILY   • ferrous sulfate 325 (65 FE) MG tablet Take 1 tablet by mouth Daily With Breakfast & Dinner.   • hydrALAZINE (APRESOLINE) 100 MG tablet TAKE 1 TABLET BY MOUTH 3  TIMES DAILY   • lactulose (CHRONULAC) 10 GM/15ML solution TAKE 35 TO 45 ML&apos;S BY MOUTH DAILY FOR CONSTIPATION (Patient taking differently: Take  by mouth 3 (Three) Times a Day.)   • lamoTRIgine (LaMICtal) 200 MG tablet TAKE 2 AND 1/2 TABLETS BY  MOUTH AT NIGHT (Patient taking differently: Take 1 tablet by mouth. Pt states she takes 1 in the morning and 1 1/2 at night)   • levothyroxine (SYNTHROID, LEVOTHROID) 100 MCG tablet TAKE 1 TABLET BY MOUTH  DAILY   • metFORMIN (GLUCOPHAGE) 500 MG tablet TAKE 2 TABLETS BY MOUTH  DAILY WITH BREAKFAST   • omeprazole (priLOSEC) 40 MG capsule TAKE 1 CAPSULE BY MOUTH  DAILY   • PARoxetine (PAXIL) 40 MG tablet TAKE 1 TABLET BY MOUTH IN  THE MORNING FOR DEPRESSION   • amLODIPine (NORVASC) 5 MG tablet TAKE 1 AND 1/2 TABLETS BY MOUTH TWICE DAILY   • chlorthalidone (HYGROTON) 25 MG tablet Take 1 tablet by mouth Daily.   • glucose blood (FREESTYLE LITE) test strip Use  to test everyday as directed   • lactulose (Generlac) 10 GM/15ML solution solution (encephalopathy) TAKE 35 TO 45 ML BY MOUTH DAILY FOR CONSTIPATION   • Lancets (FREESTYLE) lancets As directed to check blood glucose   • QUEtiapine (SEROquel) 300 MG tablet TAKE 1 TABLET BY MOUTH AT  NIGHT (Patient taking differently: Take 1/2 a tablet at night)   • rosuvastatin (CRESTOR) 5 MG tablet TAKE 1 TABLET BY MOUTH AT  NIGHT FOR CHOLESTEROL   • [DISCONTINUED] metoprolol succinate XL (TOPROL-XL) 50 MG 24 hr tablet TAKE 1 TABLET BY MOUTH DAILY       General appearance: Elderly female, NAD, alert and cooperative, dentures in  HEENT: Normocephalic, atraumatic, no masses or tenderness  Resp: Even and unlabored  Extremities: No obvious edema   Skin: warm, dry    Neurological:   MS: oriented x3, recent/remote memory intact, normal attention/concentration, language intact, no neglect, normal fund of knowledge  CN: visual acuity grossly normal, visual fields full, PERRL, EOMI, facial sensation equal, no facial droop, hearing symmetric, palate elevates symmetrically, tongue midline  Motor: 5/5 in all 4 ext., normal tone  Reflexes: Brisk reflexes in lower extremities, 1+ in upper extremity  Sensory: Decreased cold temperature sensation of lateral legs bilaterally, normal vibratory sensation in all 4 extremities  Coordination: Normal finger to nose test, + Romberg  Gait and station: moderately ataxic, stood quickly pushing with both hands  Rapid alternating movements: normal finger to thumb tap    Laboratory results:  Lab Results   Component Value Date    TSH 3.030 11/02/2022     Lab Results   Component Value Date    HGBA1C 6.10 (H) 11/02/2022     Lab Results   Component Value Date    SGZQYXOZ97 392 10/31/2022     Lab Results   Component Value Date    CHOL 115 11/02/2022    CHOL 139 10/05/2022    CHLPL 150 08/11/2021    CHLPL 178 01/27/2021    CHLPL 172 12/26/2019     Lab Results   Component Value Date    TRIG 108 11/02/2022    TRIG 107  10/05/2022    TRIG 70 08/11/2021     Lab Results   Component Value Date    HDL 42 11/02/2022    HDL 50 10/05/2022    HDL 61 (H) 08/11/2021     Lab Results   Component Value Date    LDL 53 11/02/2022    LDL 69 10/05/2022    LDL 75 08/11/2021     Lab Results   Component Value Date    WBC 4.77 11/01/2022    HGB 10.2 (L) 11/01/2022    HCT 33.0 (L) 11/01/2022    MCV 87.8 11/01/2022     11/01/2022     Lab Results   Component Value Date    GLUCOSE 121 (H) 11/02/2022    BUN 20 11/02/2022    CREATININE 1.46 (H) 11/02/2022    EGFRIFNONA 47 (L) 12/07/2021    EGFRIFAFRI 42 (L) 08/11/2021    BCR 13.7 11/02/2022    K 4.0 11/02/2022    CO2 22.0 11/02/2022    CALCIUM 9.2 11/02/2022    PROTENTOTREF 6.2 08/11/2021    ALBUMIN 4.20 11/01/2022    LABIL2 2.1 08/11/2021    LABIL2 1.4 08/11/2021    AST 17 11/01/2022    ALT 12 11/01/2022     No results found for: PTT  Lab Results   Component Value Date    INR 0.98 06/05/2019    INR 0.98 02/07/2018    PROTIME 12.8 02/07/2018     Brief Urine Lab Results  (Last result in the past 365 days)      Color   Clarity   Blood   Leuk Est   Nitrite   Protein   CREAT   Urine HCG        08/04/22 1444 Yellow   Clear   Negative   Moderate (2+)   Negative   Negative                 Review and interpretation of imaging:  CT Head Without Contrast    Result Date: 11/2/2022  1. No convincing acute intracranial abnormality is seen. 2. There is mild small vessel disease in the frontal periventricular white matter, calcified plaques in the intracranial segment of the distal right vertebral artery, cavernous segments of the internal carotid arteries bilaterally. There is a tiny 7 x 4 mm lucent lesion in the posterior right parietal bone likely a benign calvarial hemangioma. The remainder of the head CT is normal. The etiology of the patient's acute onset dizziness and difficulty walking is not clearly established on this exam and if there remains any clinical suspicion of an acute stroke I recommend an MRI of  the brain for a more complete assessment.  Radiation dose reduction techniques were utilized, including automated exposure control and exposure modulation based on body size.  This report was finalized on 11/2/2022 1:21 PM by Dr. Selwyn Kerr M.D.      MRI Brain With & Without Contrast    Result Date: 11/3/2022  1. No convincing acute intracranial abnormality is seen and no significant change when compared to yesterday's head CT 11/01/2022 and prior MRI of the brain on 11/19/2014. 2. There is mild small vessel disease in the cerebral white matter. There is a 7 x 8 mm hemangioma in the posterior medial right parietal bone.  The remainder of the MRI of the brain is normal with no acute infarct identified.  This report was finalized on 11/3/2022 7:35 AM by Dr. Selwyn Kerr M.D.      Results for orders placed during the hospital encounter of 11/01/22    Adult Transthoracic Echo Complete w/ Color, Spectral and Contrast if Necessary Per Protocol    Interpretation Summary  •  Saline test results are negative.      Impression/Assessment:  This is a 76-year-old female with past medical history of vitamin B12 deficiency, hypertension, hyperlipidemia, diabetes type 2, hypothyroidism, CAD, bipolar disorder who presented to the hospital on 11/1/2022 with complaints of suffering a fall.  She was complaining of dizziness, blurred vision, and balance issues upon arrival.  She reported that she actually drove her self here but had trouble seeing because of the blurred vision.  Patient reports about a couple months ago she noticed that she was starting to stumble a lot more while walking.  She states that she has had several times where she gets extremely fatigued, feels shaky in her legs, diaphoretic, and lightheaded where she has to immediately sit down.  During this time she becomes off balance.  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  She does notice palpitations when this happens.  She denies any associated unilateral weakness, numbness or tingling, speech disturbances, headaches, or facial drooping.  She states she falls about 2 or 3 times a week.  She states she fell after one of her episodes back in August where she became extremely fatigued and lightheaded and fell straight on her face.  She denies ever fully losing consciousness, urinating on herself, or having any associated convulsions.  She was recently seen by hematology for iron deficiency anemia after she had an ER visit on 10/5 where her hemoglobin was noted to be 8.3.  She presented then with shortness of breath and complaints of falls.  Her hemoglobin on this admission was 10.2.  On 10/31 she was 9.6.    She states she also deals with major blood sugar fluctuations.  She does not check her blood sugar unless she feels very poorly or usually her blood pressure is very low or when she can feel when it is very high.  Her A1c this admission is 6.10.  She states she has not had any recent illnesses.  Denies starting any new recent medications.  Per review of her med rec currently she is taking Paxil 40 mg, Lamictal 200 mg 2-1/2 tablets nightly, and Xanax 0.5 mg 3 times daily.    Diagnosis:  Ataxia, suspect sensory ataxia due to peripheral neuropathy  Orthostatic hypotension  Bradycardia  Recurrent falls  Palpitations  Iron deficiency anemia  B12 deficiency    Work up to date:  CTH WO: Negative for acute findings, mild small vessel disease.  7 x 4 lucent lesion in the posterior right parietal bone suspicious for a benign calvarial hemangioma.  MRI Brain W/WO: No acute intracranial abnormalities. Small T2 hyperintense enhancing lesion in the right posterior medial parietal bone measuring 7 to 8 mm felt to be a calvarial hemangioma.  Labs: RPR nonreactive, homocysteine 24, B12 392, MMA pending, copper pending  Orthostatics: BP sitting 136/84, HR 68,  standing 145/77, HR 78    Plan:  Her symptoms are multifactorial.  She had positive orthostatics.  Her HR has dropped as low as the 30s this admission.  Her ataxia is likely secondary to sensory ataxia from her peripheral neuropathy.  She has B12 deficiency and was recently diagnosed with iron deficiency anemia as well.  Her diabetes is fairly well controlled as her A1c level was 6.10 but she does report she does not check her blood sugars at all.  Discussed the need for thigh-high compression stockings, increasing her salt intake, staying hydrated with water with at least 1 L a day, greatly reducing her soft drink intake, and considering an abdominal binder.  We will order a Zio patch at discharge.    Currently she is taking hydralazine 100 mg 3 times daily, Norvasc 7.5 mg twice daily, and Coreg 12.5 mg twice daily.  Will discuss with Dr. Sow about reducing some of these.  She states she is followed by Dr. Falcon in the outpatient setting and has a follow-up appointment in the next couple of weeks.  Her B12 level was low normal, she has been started on B12 replacement p.o. 1000 mcgs daily by primary.  Recommend this be rechecked within the next 6 weeks.  I am going to arrange her follow-up in our office for her peripheral neuropathy with Dr. Ryan Gallardo.  PT/OT-likely she is going to need some form of rehab at discharge.  She is very high risk for falls.  Currently she lives alone.  We asked that she start using her walker.  She is also still working as a  for offices at night.  We will sign off, will see again per request.    Case discussed with patient, THI Middleton, Dr. Sow, and Dr. Rodriguez, and he agrees with plan above.     EZRA Berman

## 2022-11-03 NOTE — THERAPY TREATMENT NOTE
Patient Name: Yi Lee  : 1945    MRN: [id number]                              Today's Date: 11/3/2022       Admit Date: 2022    Visit Dx:     ICD-10-CM ICD-9-CM   1. Ataxia  R27.0 781.3     Patient Active Problem List   Diagnosis   • Abnormal creatinine clearance glomerular filtration   • Benign essential hypertension   • Chronic obstructive pulmonary disease (HCC)   • Depression   • Type 2 diabetes mellitus with diabetic neuropathy, without long-term current use of insulin (HCC)   • Gastroesophageal reflux disease   • Hyperlipidemia   • Hypothyroidism   • Obesity (BMI 30-39.9)   • Ataxic gait   • Abdominal pain, right lateral   • Iron deficiency anemia due to chronic blood loss   • Cystic kidney disease   • Vitamin B12 deficiency   • OA (osteoarthritis) of knee   • Iron deficiency anemia   • Bipolar I disorder, single manic episode ([facility])   • Ductal carcinoma in situ (DCIS) of left breast   • Bradycardia   • Systolic murmur   • Bicuspid aortic valve   • Renal insufficiency   • 1st degree AV block   • Dizziness   • Fatigue   • Vitamin D deficiency   • Bruising   • Weight loss, abnormal   • Loss of appetite   • Constipation   • Aromatase inhibitor use   • Breast neoplasm, Tis (DCIS), left   • Lupus erythematosus   • Malignant neoplasm of breast (HCC)   • Normocytic anemia   • Hypertension   • S/P reverse total shoulder arthroplasty, left   • Closed fracture of nasal bone with routine healing   • Visit for suture removal   • Fall   • Dyspnea on exertion   • Ataxia   • Severe malnutrition (HCC)     Past Medical History:   Diagnosis Date   • Abdominal pain, LLQ    • Abnormal Pap smear of cervix    • Anemia    • Arthralgia of hip 2015   • Asthma    • Ataxic gait 2015    Description: Javi Mederos, Neuro.  Some vestibular dysfunction present    • Bipolar I disorder, single manic episode (HCC)    • Bradycardia    • Cardiac murmur    • Colon polyp    • Coronary artery disease     • Degeneration, intervertebral disc, thoracolumbar    • Depression    • Diabetes mellitus (HCC)    • Disease of thyroid gland     Hypothyroidism   • Diverticulosis    • Ductal carcinoma in situ (DCIS) of left breast    • Esophageal spasm    • Fatigue    • GERD (gastroesophageal reflux disease)    • H/O bone density study 10/17/2007    Dr. Giles   • Heart murmur    • Hemorrhoids    • History of mammogram     02/2012, per GYN Reshma Aranda   • History of Papanicolaou smear of cervix     DR. GILES GYN   • History of transfusion    • Hyperlipidemia    • Hypertension    • Impaired cognition 11/22/2015    Description: Testing done 05/14   • Lupus (HCC)    • Optic nerve contusion    • Pain of lower extremity 11/22/2015   • Pseudoaneurysm following procedure (HCC)     DURING CARDIAC CATHETERIZATION   • Shoulder pain     LEFT   • Skin tear of forearm without complication, right, initial encounter    • Stage 2 chronic kidney disease    • Systolic murmur    • Thyroid disease    • Vitamin B12 deficiency    • Vitamin D deficiency      Past Surgical History:   Procedure Laterality Date   • BREAST BIOPSY  2011   • BREAST LUMPECTOMY Left 2020    benign   • CARDIAC CATHETERIZATION     • CHOLECYSTECTOMY     • COLONOSCOPY  2007    done 02/12, repeat 5 yrs, Dr Grigsby; tics in sigmoid colon, IH   • COLONOSCOPY N/A 11/03/2016    polyp, IH   • COLONOSCOPY N/A 02/24/2020    Procedure: COLONOSCOPY TO CECUM WITH COLD POLYPECTOMY;  Surgeon: Eddie Grigsby MD;  Location: [facility] ENDOSCOPY;  Service: Gastroenterology;  Laterality: N/A;  pre: hx of polyps  post: polyp, hemorrhoids, diverticulosis   • COLONOSCOPY N/A 12/07/2021    Procedure: COLONOSCOPY TO CECUM  - COLD BIOPSY POLYPECTOMIES;  Surgeon: Eddie Grigsby MD;  Location: [facility] ENDOSCOPY;  Service: Gastroenterology;  Laterality: N/A;  IRON DEFICIENCY ANEMIA, HX POLYPS, CONSTIPATION   ---DIVERTICULOSIS, POLYPS   • COLONOSCOPY  2010    normal   • COLPOSCOPY W/ BIOPSY / CURETTAGE     • D & C  AND LAPAROSCOPY  1974   • DILATATION AND CURETTAGE     • ENDOSCOPY N/A 02/24/2020    Procedure: ESOPHAGOGASTRODUODENOSCOPY with biopsies;  Surgeon: Eddie Grigsby MD;  Location: [facility] ENDOSCOPY;  Service: Gastroenterology;  Laterality: N/A;  pre: iron deficiency anemia  post: gastriits   • ENDOSCOPY N/A 12/07/2021    Procedure: ESOPHAGOGASTRODUODENOSCOPY, WITH BIOPSIES;  Surgeon: Eddie Grigsby MD;  Location: [facility] ENDOSCOPY;  Service: Gastroenterology;  Laterality: N/A;  GERD,WT LOSS, IRON DEFICIENCY ANEMIA ,  DYSPEPSIA  ---HIATAL HERNIA, GASTRITIS   • ESOPHAGOGASTRIC FUNDOPLASTY     • HYSTEROSCOPY     • JOINT REPLACEMENT     • ROTATOR CUFF REPAIR Right 2009   • ROTATOR CUFF REPAIR Left    • TOTAL KNEE ARTHROPLASTY Left 02/13/2018    Procedure: LT TOTAL KNEE ARTHROPLASTY;  Surgeon: Selwyn Pinto MD;  Location: [facility] MAIN OR;  Service:    • TOTAL SHOULDER ARTHROPLASTY W/ DISTAL CLAVICLE EXCISION Left 08/09/2022    Procedure: TOTAL SHOULDER REVERSE ARTHROPLASTY;  Surgeon: Yony Gallardo MD;  Location: [facility] OR OSC;  Service: Orthopedics;  Laterality: Left;      General Information     Row Name 11/03/22 0957          OT Time and Intention    Document Type therapy note (daily note)  -RB     Mode of Treatment co-treatment;physical therapy;occupational therapy  -RB     Row Name 11/03/22 0957          General Information    Patient Profile Reviewed yes  -RB     Existing Precautions/Restrictions fall  -RB     Row Name 11/03/22 0957          Cognition    Orientation Status (Cognition) oriented x 4  -RB           User Key  (r) = Recorded By, (t) = Taken By, (c) = Cosigned By    Initials Name Provider Type    RB Deena Markham OT Occupational Therapist                 Mobility/ADL's     Row Name 11/03/22 0958          Bed Mobility    Bed Mobility supine-sit  -RB     Supine-Sit [place] (Bed Mobility) standby assist  -RB     Row Name 11/03/22 0958          Transfers    Transfers sit-stand transfer;stand-sit  transfer;toilet transfer;bed-chair transfer  -RB     Row Name 11/03/22 0958          Bed-Chair Transfer    Bed-Chair [place] (Transfers) contact guard;verbal cues  -RB     Row Name 11/03/22 0958          Sit-Stand Transfer    Sit-Stand [place] (Transfers) contact guard;verbal cues  -RB     Row Name 11/03/22 0958          Stand-Sit Transfer    Stand-Sit [place] (Transfers) contact guard;verbal cues  -RB     Row Name 11/03/22 0958          Toilet Transfer    Type (Toilet Transfer) stand-sit;sit-stand  -RB     [place] Level (Toilet Transfer) contact guard;verbal cues  -RB     Comment, (Toilet Transfer) used  grab bar with both hands to assist into sitting and standing  -RB     Row Name 11/03/22 0958          Functional Mobility    Functional Mobility- Ind. Level contact guard assist;verbal cues required  -RB     Functional Mobility- Safety Issues balance decreased during turns;step length decreased  -RB     Row Name 11/03/22 0958          Activities of Daily Living    BADL Assessment/Intervention grooming;toileting;lower body dressing  -RB     Row Name 11/03/22 0958          Lower Body Dressing Assessment/Training    [place] Level (Lower Body Dressing) lower body dressing skills;set up;verbal cues  -RB     Position (Lower Body Dressing) supported sitting;supported standing  -RB     Row Name 11/03/22 0958          Grooming Assessment/Training    [place] Level (Grooming) grooming skills;verbal cues;set up  -RB     Position (Grooming) supported sitting;sink side  -RB     Comment, (Grooming) droppage of items noticed, impaired sequencing  -RB     Row Name 11/03/22 0958          Toileting Assessment/Training    [place] Level (Toileting) toileting skills;contact guard assist  -RB     Position (Toileting) supported sitting;supported standing  -RB           User Key  (r) = Recorded By, (t) = Taken By, (c) = Cosigned By    Initials Name Provider Type    Deena Mehta OT Occupational  "Therapist               Obj/Interventions     Row Name 11/03/22 0959          Balance    Static Sitting Balance supervision  -RB     Dynamic Sitting Balance contact guard  -RB     Position, Sitting Balance sitting in chair;sitting edge of bed  -RB     Static Standing Balance contact guard  -RB     Dynamic Standing Balance contact guard;verbal cues  -RB     Balance Interventions occupation based/functional task  -RB     Comment, Balance generally unsteady  -RB           User Key  (r) = Recorded By, (t) = Taken By, (c) = Cosigned By    Initials Name Provider Type    RB Deena Markham OT Occupational Therapist               Goals/Plan    No documentation.                Clinical Impression     Row Name 11/03/22 1000          Pain Assessment    Pretreatment Pain Rating 0/10 - no pain  -RB     Posttreatment Pain Rating 0/10 - no pain  -RB     Row Name 11/03/22 1000          Plan of Care Review    Plan of Care Reviewed With patient  -RB     Progress no change  -RB     Outcome Evaluation The pt participated in OT this AM. She was able to transfer herself to the EOB. CGA for OOB mobility - remains unsteady, especially with dynamic movements. Cues provided to slow her pacing and take her time to improve balance. She completed bathroom toileting, LBD and grooming - she demo'd impaired coordination and difficulty manipulating items with droppage noted. Education provided throughout the session on her living alone and \"6 or so\" falls reported over the last month. She declines the need for further therapy services once discharged.     Patient was wearing a face mask during this therapy encounter. Therapist used appropriate personal protective equipment including mask and gloves. Mask used was standard procedure mask. Appropriate PPE was worn during the entire therapy session. Hand hygiene was completed before and after therapy session. Patient is not in enhanced droplet precautions.  -RB     Row Name 11/03/22 1000          " Therapy Assessment/Plan (OT)    Rehab Potential (OT) good, to achieve stated therapy goals  -RB     Criteria for Skilled Therapeutic Interventions Met (OT) yes;skilled treatment is necessary  -RB     Therapy Frequency (OT) 5 times/wk  -RB     Row Name 11/03/22 1000          Therapy Plan Review/Discharge Plan (OT)    Anticipated Discharge Disposition (OT) home with 24/7 care;home with home health  -RB     Row Name 11/03/22 1000          Vital Signs    O2 Delivery Pre Treatment room air  -RB     O2 Delivery Intra Treatment room air  -RB     O2 Delivery Post Treatment room air  -RB     Pre Patient Position Supine  -RB     Intra Patient Position Standing  -RB     Post Patient Position Sitting  -RB     Row Name 11/03/22 1000          Positioning and Restraints    Pre-Treatment Position in bed  -RB     Post Treatment Position chair  -RB     In Chair notified nsg;reclined;sitting;call light within reach;encouraged to call for assist;exit alarm on;legs elevated  -RB           User Key  (r) = Recorded By, (t) = Taken By, (c) = Cosigned By    Initials Name Provider Type    Deena Mehta OT Occupational Therapist               Outcome Measures     Row Name 11/03/22 0845          How much help from another person do you currently need...    Turning from your back to your side while in flat bed without using bedrails? 4  -JM     Moving from lying on back to sitting on the side of a flat bed without bedrails? 3  -JM     Moving to and from a bed to a chair (including a wheelchair)? 3  -JM     Standing up from a chair using your arms (e.g., wheelchair, bedside chair)? 3  -JM     Climbing 3-5 steps with a railing? 2  -JM     To walk in hospital room? 2  -JM     AM-PAC 6 Clicks Score (PT) 17  -JM     Highest level of mobility 5 --> Static standing  -           User Key  (r) = Recorded By, (t) = Taken By, (c) = Cosigned By    Initials Name Provider Type    Emi Sims, RN Registered Nurse                Occupational  Therapy Education     Title: PT OT SLP Therapies (In Progress)     Topic: Occupational Therapy (Not Started)     Point: ADL training (Not Started)     Description:   Instruct learner(s) on proper safety adaptation and remediation techniques during self care or transfers.   Instruct in proper use of assistive devices.              Learner Progress:  Not documented in this visit.          Point: Home exercise program (Not Started)     Description:   Instruct learner(s) on appropriate technique for monitoring, assisting and/or progressing therapeutic exercises/activities.              Learner Progress:  Not documented in this visit.          Point: Precautions (Not Started)     Description:   Instruct learner(s) on prescribed precautions during self-care and functional transfers.              Learner Progress:  Not documented in this visit.          Point: Body mechanics (Not Started)     Description:   Instruct learner(s) on proper positioning and spine alignment during self-care, functional mobility activities and/or exercises.              Learner Progress:  Not documented in this visit.                          OT Recommendation and Plan  Planned Therapy Interventions (OT): transfer/mobility retraining, strengthening exercise, ROM/therapeutic exercise, activity tolerance training, BADL retraining, adaptive equipment training, functional balance retraining, occupation/activity based interventions, patient/caregiver education/training, neuromuscular control/coordination retraining  Therapy Frequency (OT): 5 times/wk  Plan of Care Review  Plan of Care Reviewed With: patient  Progress: no change  Outcome Evaluation: The pt participated in OT this AM. She was able to transfer herself to the EOB. CGA for OOB mobility - remains unsteady, especially with dynamic movements. Cues provided to slow her pacing and take her time to improve balance. She completed bathroom toileting, LBD and grooming - she demo'd impaired  "coordination and difficulty manipulating items with droppage noted. Education provided throughout the session on her living alone and \"6 or so\" falls reported over the last month. She declines the need for further therapy services once discharged.     Patient was wearing a face mask during this therapy encounter. Therapist used appropriate personal protective equipment including mask and gloves. Mask used was standard procedure mask. Appropriate PPE was worn during the entire therapy session. Hand hygiene was completed before and after therapy session. Patient is not in enhanced droplet precautions.     Time Calculation:    Time Calculation- OT     Row Name 11/03/22 0957             Time Calculation- OT    OT Start Time 0834  -RB      OT Stop Time 0859  -RB      OT Time Calculation (min) 25 min  -RB      Total Timed Code Minutes- OT 25 minute(s)  -RB      OT Received On 11/03/22  -RB      OT - Next Appointment 11/04/22  -RB         Timed Charges    92770 - OT Therapeutic Activity Minutes 10  -RB      95390 - OT Self Care/Mgmt Minutes 15  -RB         Total Minutes    Timed Charges Total Minutes 25  -RB       Total Minutes 25  -RB            User Key  (r) = Recorded By, (t) = Taken By, (c) = Cosigned By    Initials Name Provider Type    RB Deena Markham OT Occupational Therapist              Therapy Charges for Today     Code Description Service Date Service Provider Modifiers Qty    95567527703 HC OT EVAL MOD COMPLEXITY 2 11/2/2022 Deena Markham OT GO 1    08810634555 HC OT THERAPEUTIC ACT EA 15 MIN 11/2/2022 Deena Markham OT GO 1    69603004653 HC OT SELF CARE/MGMT/TRAIN EA 15 MIN 11/3/2022 Deena Markham OT GO 1    95925605652 HC OT THERAPEUTIC ACT EA 15 MIN 11/3/2022 Deena Markham OT GO 1               Deena Markham OT  11/3/2022  "

## 2022-11-04 NOTE — THERAPY TREATMENT NOTE
Patient Name: Yi Lee  : 1945    MRN: [id number]                              Today's Date: 2022       Admit Date: 2022    Visit Dx:     ICD-10-CM ICD-9-CM   1. Ataxia  R27.0 781.3     Patient Active Problem List   Diagnosis   • Abnormal creatinine clearance glomerular filtration   • Benign essential hypertension   • Chronic obstructive pulmonary disease (HCC)   • Depression   • Type 2 diabetes mellitus with diabetic neuropathy, without long-term current use of insulin (HCC)   • Gastroesophageal reflux disease   • Hyperlipidemia   • Hypothyroidism   • Obesity (BMI 30-39.9)   • Ataxic gait   • Abdominal pain, right lateral   • Iron deficiency anemia due to chronic blood loss   • Cystic kidney disease   • Vitamin B12 deficiency   • OA (osteoarthritis) of knee   • Iron deficiency anemia   • Bipolar I disorder, single manic episode ([facility])   • Ductal carcinoma in situ (DCIS) of left breast   • Bradycardia   • Systolic murmur   • Bicuspid aortic valve   • Renal insufficiency   • 1st degree AV block   • Dizziness   • Fatigue   • Vitamin D deficiency   • Bruising   • Weight loss, abnormal   • Loss of appetite   • Constipation   • Aromatase inhibitor use   • Breast neoplasm, Tis (DCIS), left   • Lupus erythematosus   • Malignant neoplasm of breast (HCC)   • Normocytic anemia   • Hypertension   • S/P reverse total shoulder arthroplasty, left   • Closed fracture of nasal bone with routine healing   • Visit for suture removal   • Fall   • Dyspnea on exertion   • Ataxia   • Severe malnutrition (HCC)     Past Medical History:   Diagnosis Date   • Abdominal pain, LLQ    • Abnormal Pap smear of cervix    • Anemia    • Arthralgia of hip 2015   • Asthma    • Ataxic gait 2015    Description: Javi Mederos, Neuro.  Some vestibular dysfunction present    • Bipolar I disorder, single manic episode (HCC)    • Bradycardia    • Cardiac murmur    • Colon polyp    • Coronary artery disease     • Degeneration, intervertebral disc, thoracolumbar    • Depression    • Diabetes mellitus (HCC)    • Disease of thyroid gland     Hypothyroidism   • Diverticulosis    • Ductal carcinoma in situ (DCIS) of left breast    • Esophageal spasm    • Fatigue    • GERD (gastroesophageal reflux disease)    • H/O bone density study 10/17/2007    Dr. Giles   • Heart murmur    • Hemorrhoids    • History of mammogram     02/2012, per GYN Reshma Aranda   • History of Papanicolaou smear of cervix     DR. GILES GYN   • History of transfusion    • Hyperlipidemia    • Hypertension    • Impaired cognition 11/22/2015    Description: Testing done 05/14   • Lupus (HCC)    • Optic nerve contusion    • Pain of lower extremity 11/22/2015   • Pseudoaneurysm following procedure (HCC)     DURING CARDIAC CATHETERIZATION   • Shoulder pain     LEFT   • Skin tear of forearm without complication, right, initial encounter    • Stage 2 chronic kidney disease    • Systolic murmur    • Thyroid disease    • Vitamin B12 deficiency    • Vitamin D deficiency      Past Surgical History:   Procedure Laterality Date   • BREAST BIOPSY  2011   • BREAST LUMPECTOMY Left 2020    benign   • CARDIAC CATHETERIZATION     • CHOLECYSTECTOMY     • COLONOSCOPY  2007    done 02/12, repeat 5 yrs, Dr Grigsby; tics in sigmoid colon, IH   • COLONOSCOPY N/A 11/03/2016    polyp, IH   • COLONOSCOPY N/A 02/24/2020    Procedure: COLONOSCOPY TO CECUM WITH COLD POLYPECTOMY;  Surgeon: Eddie Grigsby MD;  Location: [facility] ENDOSCOPY;  Service: Gastroenterology;  Laterality: N/A;  pre: hx of polyps  post: polyp, hemorrhoids, diverticulosis   • COLONOSCOPY N/A 12/07/2021    Procedure: COLONOSCOPY TO CECUM  - COLD BIOPSY POLYPECTOMIES;  Surgeon: Eddie Grigsby MD;  Location: [facility] ENDOSCOPY;  Service: Gastroenterology;  Laterality: N/A;  IRON DEFICIENCY ANEMIA, HX POLYPS, CONSTIPATION   ---DIVERTICULOSIS, POLYPS   • COLONOSCOPY  2010    normal   • COLPOSCOPY W/ BIOPSY / CURETTAGE     • D & C  AND LAPAROSCOPY  1974   • DILATATION AND CURETTAGE     • ENDOSCOPY N/A 02/24/2020    Procedure: ESOPHAGOGASTRODUODENOSCOPY with biopsies;  Surgeon: Eddie Grigsby MD;  Location: [facility] ENDOSCOPY;  Service: Gastroenterology;  Laterality: N/A;  pre: iron deficiency anemia  post: gastriits   • ENDOSCOPY N/A 12/07/2021    Procedure: ESOPHAGOGASTRODUODENOSCOPY, WITH BIOPSIES;  Surgeon: Eddie Grigsby MD;  Location: [facility] ENDOSCOPY;  Service: Gastroenterology;  Laterality: N/A;  GERD,WT LOSS, IRON DEFICIENCY ANEMIA ,  DYSPEPSIA  ---HIATAL HERNIA, GASTRITIS   • ESOPHAGOGASTRIC FUNDOPLASTY     • HYSTEROSCOPY     • JOINT REPLACEMENT     • ROTATOR CUFF REPAIR Right 2009   • ROTATOR CUFF REPAIR Left    • TOTAL KNEE ARTHROPLASTY Left 02/13/2018    Procedure: LT TOTAL KNEE ARTHROPLASTY;  Surgeon: Selwyn Pinto MD;  Location: [facility] MAIN OR;  Service:    • TOTAL SHOULDER ARTHROPLASTY W/ DISTAL CLAVICLE EXCISION Left 08/09/2022    Procedure: TOTAL SHOULDER REVERSE ARTHROPLASTY;  Surgeon: Yony Gallardo MD;  Location: [facility] OR OSC;  Service: Orthopedics;  Laterality: Left;      General Information     Row Name 11/04/22 0938          Physical Therapy Time and Intention    Document Type therapy note (daily note)  -CS     Mode of Treatment co-treatment;physical therapy;occupational therapy  -CS     Row Name 11/04/22 0938          General Information    Existing Precautions/Restrictions fall  -CS     Row Name 11/04/22 0938          Cognition    Orientation Status (Cognition) oriented x 4  -CS     Row Name 11/04/22 0938          Safety Issues, Functional Mobility    Impairments Affecting Function (Mobility) balance;endurance/activity tolerance;strength;coordination  -CS           User Key  (r) = Recorded By, (t) = Taken By, (c) = Cosigned By    Initials Name Provider Type    CS Annabella Heredia, PT Physical Therapist               Mobility     Row Name 11/04/22 0939          Bed Mobility    Comment, (Bed Mobility) received in  bathroom with OT upon arrival; [facility] at end of session  -CS     Row Name 11/04/22 0939          Sit-Stand Transfer    Sit-Stand Beemer (Transfers) standby assist  -CS     Assistive Device (Sit-Stand Transfers) walker, front-wheeled  -CS     Comment, (Sit-Stand Transfer) from chair in bathroom  -CS     Row Name 11/04/22 0939          Gait/Stairs (Locomotion)    Beemer Level (Gait) contact guard  -CS     Assistive Device (Gait) walker, front-wheeled  -CS     Distance in Feet (Gait) 200'  -CS     Deviations/Abnormal Patterns (Gait) ataxic;stride length decreased;base of support, wide  -CS     Comment, (Gait/Stairs) trialed RW this visit with improved gait mechanics; pt still unsteady but no overt LOB - pt continues to veer both directions and run into objects in hallway with verbal cues provided for safety  -CS           User Key  (r) = Recorded By, (t) = Taken By, (c) = Cosigned By    Initials Name Provider Type    CS Annabella Heredia, PT Physical Therapist               Obj/Interventions     Row Name 11/04/22 0941          Balance    Balance Assessment sitting static balance;sitting dynamic balance;standing static balance;standing dynamic balance  -CS     Static Sitting Balance supervision  -CS     Dynamic Sitting Balance supervision  -CS     Position, Sitting Balance unsupported;sitting in chair  -CS     Static Standing Balance contact guard  -CS     Dynamic Standing Balance contact guard  -CS     Position/Device Used, Standing Balance supported;walker, front-wheeled  -CS           User Key  (r) = Recorded By, (t) = Taken By, (c) = Cosigned By    Initials Name Provider Type    CS Annabella Heredia, PT Physical Therapist               Goals/Plan    No documentation.                Clinical Impression     Row Name 11/04/22 0941          Pain    Pretreatment Pain Rating 0/10 - no pain  -CS     Posttreatment Pain Rating 0/10 - no pain  -CS     Row Name 11/04/22 0941          Plan of Care Review    Plan of Care  Reviewed With patient  -CS     Progress improving  -CS     Outcome Evaluation Pt received in bathroom with OT upon arrival. Pt stood and ambulated in hallway this visit requiring CGA. RW trialed with improve gait mechanics but still slightly unsteady veering both direction and running into objects. PT provided verbal cues for safety. Pt in agreement to using RW for mobility at D/C to improve safety and address balance deficits. Pt was educated on fall risks, home safety, and potentially purchasing a life alert button. Pt plans to return home with HHPT to address strength and balance. PT recommends possible assisted living at D/C if at all possible for the pt's safety.  -CS     Row Name 11/04/22 0941          Therapy Assessment/Plan (PT)    Criteria for Skilled Interventions Met (PT) yes;meets criteria  -CS     Therapy Frequency (PT) 6 times/wk  -CS     Row Name 11/04/22 0941          Positioning and Restraints    Pre-Treatment Position bathroom  -CS     Post Treatment Position chair  -CS     In Chair reclined;call light within reach;encouraged to call for assist;exit alarm on;with nsg  -CS           User Key  (r) = Recorded By, (t) = Taken By, (c) = Cosigned By    Initials Name Provider Type    CS Annabella Heredia, PT Physical Therapist               Outcome Measures     Row Name 11/04/22 0945          How much help from another person do you currently need...    Turning from your back to your side while in flat bed without using bedrails? 4  -CS     Moving from lying on back to sitting on the side of a flat bed without bedrails? 3  -CS     Moving to and from a bed to a chair (including a wheelchair)? 3  -CS     Standing up from a chair using your arms (e.g., wheelchair, bedside chair)? 3  -CS     Climbing 3-5 steps with a railing? 2  -CS     To walk in hospital room? 3  -CS     AM-PAC 6 Clicks Score (PT) 18  -CS     Highest level of mobility 6 --> Walked 10 steps or more  -CS     Row Name 11/04/22 0958           Functional Assessment    Outcome Measure Options AM-PAC 6 Clicks Basic Mobility (PT)  -           User Key  (r) = Recorded By, (t) = Taken By, (c) = Cosigned By    Initials Name Provider Type    CS Annabella Heredia, PT Physical Therapist                             Physical Therapy Education     Title: PT OT SLP Therapies (In Progress)     Topic: Physical Therapy (In Progress)     Point: Mobility training (Done)     Learning Progress Summary           Patient Acceptance, E,TB, VU,DU by  at 11/4/2022 0945    Acceptance, E,D, VU,NR by  at 11/3/2022 1247    Acceptance, E, VU by  at 11/2/2022 1200                   Point: Home exercise program (Not Started)     Learner Progress:  Not documented in this visit.          Point: Body mechanics (Done)     Learning Progress Summary           Patient Acceptance, E,TB, VU,DU by  at 11/4/2022 0945    Acceptance, E,D, VU,NR by  at 11/3/2022 1247                   Point: Precautions (Done)     Learning Progress Summary           Patient Acceptance, E,TB, VU,DU by  at 11/4/2022 0945    Acceptance, E,D, VU,NR by  at 11/3/2022 1247    Acceptance, E, VU by  at 11/2/2022 1200                               User Key     Initials Effective Dates Name Provider Type Discipline     06/16/21 -  Abi Aviles, PT Physical Therapist PT     06/16/21 -  Jenna Benjamin PTA Physical Therapist Assistant PT     09/22/22 -  Annabella Heredia PT Physical Therapist PT              PT Recommendation and Plan     Plan of Care Reviewed With: patient  Progress: improving  Outcome Evaluation: Pt received in bathroom with OT upon arrival. Pt stood and ambulated in hallway this visit requiring CGA. RW trialed with improve gait mechanics but still slightly unsteady veering both direction and running into objects. PT provided verbal cues for safety. Pt in agreement to using RW for mobility at D/C to improve safety and address balance deficits. Pt was educated on fall risks,  home safety, and potentially purchasing a life alert button. Pt plans to return home with HHPT to address strength and balance. PT recommends possible assisted living at D/C if at all possible for the pt's safety.     Time Calculation:    PT Charges     Row Name 11/04/22 0946             Time Calculation    Start Time 0908  -CS      Stop Time 0920  -CS      Time Calculation (min) 12 min  -CS      PT Received On 11/04/22  -CS      PT - Next Appointment 11/05/22  -CS         Time Calculation- PT    Total Timed Code Minutes- PT 10 minute(s)  -CS         Timed Charges    03133 - PT Therapeutic Activity Minutes 10  -CS         Total Minutes    Timed Charges Total Minutes 10  -CS       Total Minutes 10  -CS            User Key  (r) = Recorded By, (t) = Taken By, (c) = Cosigned By    Initials Name Provider Type    CS Annabella Heredia, PT Physical Therapist              Therapy Charges for Today     Code Description Service Date Service Provider Modifiers Qty    [id number] HC PT THERAPEUTIC ACT EA 15 MIN 11/4/2022 Annabella Heredia, PT GP 1          PT G-Codes  Outcome Measure Options: AM-PAC 6 Clicks Basic Mobility (PT)  AM-PAC 6 Clicks Score (PT): 18  AM-PAC 6 Clicks Score (OT): 18  Modified Sean Scale: 4 - Moderately severe disability.  Unable to walk without assistance, and unable to attend to own bodily needs without assistance.    Annabella Heredia PT  11/4/2022

## 2022-11-04 NOTE — PROGRESS NOTES
Case Management Discharge Note      Final Note: Home with [facility]    Provided Post Acute Provider List?: Yes  Post Acute Provider List: Home Health, Nursing Home  Refused Provider List Comment: Pt refuses HH/outpatient PT    Selected Continued Care - Admitted Since 11/1/2022     Destination    No services have been selected for the patient.              Durable Medical Equipment    No services have been selected for the patient.              Dialysis/Infusion    No services have been selected for the patient.              Home Medical Care     Service Provider Selected Services Address Phone Fax Patient Preferred     [facility] [address] [place], [place] [id number] [phone] --          Therapy    No services have been selected for the patient.              Community Resources    No services have been selected for the patient.              Community & DME    No services have been selected for the patient.                Selected Continued Care - Prior Encounters Includes continued care and service providers with selected services from prior encounters from 8/3/2022 to 11/4/2022    Discharged on 8/10/2022 Admission date: 8/9/2022 - Discharge disposition: Skilled Nursing Facility (DC - External)    Destination     Service Provider Selected Services Address Phone Fax Patient Preferred    [facility] [address] [place] [address] [phone] --                    Transportation Services  Private: Car    Final Discharge Disposition Code: 06 - home with home health care

## 2022-11-04 NOTE — THERAPY TREATMENT NOTE
Patient Name: Yi Lee  : 1945    MRN: [id number]                              Today's Date: 2022       Admit Date: 2022    Visit Dx:     ICD-10-CM ICD-9-CM   1. Ataxia  R27.0 781.3     Patient Active Problem List   Diagnosis   • Abnormal creatinine clearance glomerular filtration   • Benign essential hypertension   • Chronic obstructive pulmonary disease (HCC)   • Depression   • Type 2 diabetes mellitus with diabetic neuropathy, without long-term current use of insulin (HCC)   • Gastroesophageal reflux disease   • Hyperlipidemia   • Hypothyroidism   • Obesity (BMI 30-39.9)   • Ataxic gait   • Abdominal pain, right lateral   • Iron deficiency anemia due to chronic blood loss   • Cystic kidney disease   • Vitamin B12 deficiency   • OA (osteoarthritis) of knee   • Iron deficiency anemia   • Bipolar I disorder, single manic episode ([facility])   • Ductal carcinoma in situ (DCIS) of left breast   • Bradycardia   • Systolic murmur   • Bicuspid aortic valve   • Renal insufficiency   • 1st degree AV block   • Dizziness   • Fatigue   • Vitamin D deficiency   • Bruising   • Weight loss, abnormal   • Loss of appetite   • Constipation   • Aromatase inhibitor use   • Breast neoplasm, Tis (DCIS), left   • Lupus erythematosus   • Malignant neoplasm of breast (HCC)   • Normocytic anemia   • Hypertension   • S/P reverse total shoulder arthroplasty, left   • Closed fracture of nasal bone with routine healing   • Visit for suture removal   • Fall   • Dyspnea on exertion   • Ataxia   • Severe malnutrition (HCC)     Past Medical History:   Diagnosis Date   • Abdominal pain, LLQ    • Abnormal Pap smear of cervix    • Anemia    • Arthralgia of hip 2015   • Asthma    • Ataxic gait 2015    Description: Javi Mederos, Neuro.  Some vestibular dysfunction present    • Bipolar I disorder, single manic episode (HCC)    • Bradycardia    • Cardiac murmur    • Colon polyp    • Coronary artery disease     • Degeneration, intervertebral disc, thoracolumbar    • Depression    • Diabetes mellitus (HCC)    • Disease of thyroid gland     Hypothyroidism   • Diverticulosis    • Ductal carcinoma in situ (DCIS) of left breast    • Esophageal spasm    • Fatigue    • GERD (gastroesophageal reflux disease)    • H/O bone density study 10/17/2007    Dr. Giles   • Heart murmur    • Hemorrhoids    • History of mammogram     02/2012, per GYN Reshma Aranda   • History of Papanicolaou smear of cervix     DR. GILES GYN   • History of transfusion    • Hyperlipidemia    • Hypertension    • Impaired cognition 11/22/2015    Description: Testing done 05/14   • Lupus (HCC)    • Optic nerve contusion    • Pain of lower extremity 11/22/2015   • Pseudoaneurysm following procedure (HCC)     DURING CARDIAC CATHETERIZATION   • Shoulder pain     LEFT   • Skin tear of forearm without complication, right, initial encounter    • Stage 2 chronic kidney disease    • Systolic murmur    • Thyroid disease    • Vitamin B12 deficiency    • Vitamin D deficiency      Past Surgical History:   Procedure Laterality Date   • BREAST BIOPSY  2011   • BREAST LUMPECTOMY Left 2020    benign   • CARDIAC CATHETERIZATION     • CHOLECYSTECTOMY     • COLONOSCOPY  2007    done 02/12, repeat 5 yrs, Dr Grigsby; tics in sigmoid colon, IH   • COLONOSCOPY N/A 11/03/2016    polyp, IH   • COLONOSCOPY N/A 02/24/2020    Procedure: COLONOSCOPY TO CECUM WITH COLD POLYPECTOMY;  Surgeon: Eddie Grigsby MD;  Location: [facility] ENDOSCOPY;  Service: Gastroenterology;  Laterality: N/A;  pre: hx of polyps  post: polyp, hemorrhoids, diverticulosis   • COLONOSCOPY N/A 12/07/2021    Procedure: COLONOSCOPY TO CECUM  - COLD BIOPSY POLYPECTOMIES;  Surgeon: Eddie Grigsby MD;  Location: [facility] ENDOSCOPY;  Service: Gastroenterology;  Laterality: N/A;  IRON DEFICIENCY ANEMIA, HX POLYPS, CONSTIPATION   ---DIVERTICULOSIS, POLYPS   • COLONOSCOPY  2010    normal   • COLPOSCOPY W/ BIOPSY / CURETTAGE     • D & C  AND LAPAROSCOPY  1974   • DILATATION AND CURETTAGE     • ENDOSCOPY N/A 02/24/2020    Procedure: ESOPHAGOGASTRODUODENOSCOPY with biopsies;  Surgeon: Eddie Grigsby MD;  [place] [facility];  Service: Gastroenterology;  Laterality: N/A;  pre: iron deficiency anemia  post: gastriits   • ENDOSCOPY N/A 12/07/2021    Procedure: ESOPHAGOGASTRODUODENOSCOPY, WITH BIOPSIES;  Surgeon: Eddie Grigsby MD;  Location: [facility];  Service: Gastroenterology;  Laterality: N/A;  GERD,WT LOSS, IRON DEFICIENCY ANEMIA ,  DYSPEPSIA  ---HIATAL HERNIA, GASTRITIS   • ESOPHAGOGASTRIC FUNDOPLASTY     • HYSTEROSCOPY     • JOINT REPLACEMENT     • ROTATOR CUFF REPAIR Right 2009   • ROTATOR CUFF REPAIR Left    • TOTAL KNEE ARTHROPLASTY Left 02/13/2018    Procedure: LT TOTAL KNEE ARTHROPLASTY;  Surgeon: Selwyn Pinto MD;  [facility] [facility];  Service:    • TOTAL SHOULDER ARTHROPLASTY W/ DISTAL CLAVICLE EXCISION Left 08/09/2022    Procedure: TOTAL SHOULDER REVERSE ARTHROPLASTY;  Surgeon: Yony Gallardo MD;  [facility] [facility];  Service: Orthopedics;  Laterality: Left;      General Information     Row Name 11/04/22 0932          OT Time and Intention    Document Type therapy note (daily note)  -RB     Mode of Treatment co-treatment;occupational therapy;physical therapy  -RB     Row Name 11/04/22 0932          General Information    Patient Profile Reviewed yes  -RB     Existing Precautions/Restrictions fall  -RB     Row Name 11/04/22 0932          Cognition    Orientation Status (Cognition) oriented x 4  -RB     Row Name 11/04/22 0932          Safety Issues, Functional Mobility    Safety Issues Affecting Function (Mobility) awareness of need for assistance;insight into deficits/self-awareness;problem-solving;safety precaution awareness;positioning of assistive device;safety precautions follow-through/compliance  -RB           User Key  (r) = Recorded By, (t) = Taken By, (c) = Cosigned By    Initials Name Provider Type     RB Deena Markham OT Occupational Therapist                 Mobility/ADL's     Row Name 11/04/22 0932          Bed Mobility    Bed Mobility supine-sit  -RB     Supine-Sit [place] (Bed Mobility) supervision  -RB     Row Name 11/04/22 0932          Transfers    Transfers sit-stand transfer;stand-sit transfer;toilet transfer;bed-chair transfer  -RB     Row Name 11/04/22 0932          Bed-Chair Transfer    Bed-Chair [place] (Transfers) contact guard  -RB     Assistive Device (Bed-Chair Transfers) walker, front-wheeled  -RB     Row Name 11/04/22 0932          Sit-Stand Transfer    Sit-Stand [place] (Transfers) contact guard  -RB     Assistive Device (Sit-Stand Transfers) walker, front-wheeled  -RB     Row Name 11/04/22 0932          Stand-Sit Transfer    Stand-Sit [place] (Transfers) contact guard  -RB     Assistive Device (Stand-Sit Transfers) walker, front-wheeled  -RB     Row Name 11/04/22 0932          Toilet Transfer    Type (Toilet Transfer) stand-sit;sit-stand  -RB     [place]rens Level (Toilet Transfer) contact guard  -RB     Assistive Device (Toilet Transfer) walker, front-wheeled  -RB     Row Name 11/04/22 0932          Functional Mobility    Functional Mobility- Ind. Level contact guard assist  -RB     Functional Mobility- Device walker, front-wheeled  -RB     Row Name 11/04/22 0932          Activities of Daily Living    BADL Assessment/Intervention lower body dressing;grooming  -RB     Row Name 11/04/22 0932          Lower Body Dressing Assessment/Training    [place] Level (Lower Body Dressing) lower body dressing skills;supervision  -RB     Position (Lower Body Dressing) edge of bed sitting  -RB     Row Name 11/04/22 0932          Grooming Assessment/Training    [place] Level (Grooming) grooming skills;set up  -RB     Position (Grooming) supported sitting;sink side  -RB           User Key  (r) = Recorded By, (t) = Taken By, (c) = Cosigned By    Initials Name Provider  Type    RB Deena Markham OT Occupational Therapist               Obj/Interventions     Row Name 11/04/22 0933          Balance    Comment, Balance Improved balance with the use of a walker. one LOB in hallway she was able to self correct  -RB           User Key  (r) = Recorded By, (t) = Taken By, (c) = Cosigned By    Initials Name Provider Type    RB Deena Markham OT Occupational Therapist               Goals/Plan    No documentation.                Clinical Impression     Row Name 11/04/22 0934          Pain Assessment    Pretreatment Pain Rating 0/10 - no pain  -RB     Posttreatment Pain Rating 0/10 - no pain  -RB     Row Name 11/04/22 0934          Plan of Care Review    Plan of Care Reviewed With patient  -RB     Progress improving  -RB     Outcome Evaluation The pt demonstrated improved participation this AM with OT/PT. She now is in agreement to use a walker for balance support. She completed bed mobility with supervision. CGA for OOB mobility/transfers. She completed sinkside ADL's with improved fine motor coordination. CGA + walker for mobility with slight unsteadiness reported. She was educated on her high falls risk - she is also in agreement now with  therapy for balance training as well as purchasing a life alert button due to her high falls risk.     Patient was wearing a face mask during this therapy encounter. Therapist used appropriate personal protective equipment including mask and gloves. Mask used was standard procedure mask. Appropriate PPE was worn during the entire therapy session. Hand hygiene was completed before and after therapy session. Patient is not in enhanced droplet precautions.  -RB     Row Name 11/04/22 0934          Therapy Assessment/Plan (OT)    Rehab Potential (OT) good, to achieve stated therapy goals  -RB     Criteria for Skilled Therapeutic Interventions Met (OT) yes;skilled treatment is necessary  -RB     Therapy Frequency (OT) 5 times/wk  -RB     Row Name  11/04/22 0934          Therapy Plan Review/Discharge Plan (OT)    Anticipated Discharge Disposition (OT) home with 24/7 care;home with home health  -RB     Row Name 11/04/22 0934          Vital Signs    O2 Delivery Pre Treatment room air  -RB     O2 Delivery Intra Treatment room air  -RB     O2 Delivery Post Treatment room air  -RB     Pre Patient Position Supine  -RB     Intra Patient Position Standing  -RB     Post Patient Position Sitting  -RB     Row Name 11/04/22 0934          Positioning and Restraints    Pre-Treatment Position in bed  -RB     Post Treatment Position chair  -RB     In Chair notified nsg;reclined;sitting;call light within reach;encouraged to call for assist;exit alarm on;legs elevated  -RB           User Key  (r) = Recorded By, (t) = Taken By, (c) = Cosigned By    Initials Name Provider Type    Deena Mehta OT Occupational Therapist               Outcome Measures    No documentation.                 Occupational Therapy Education     Title: PT OT SLP Therapies (In Progress)     Topic: Occupational Therapy (Not Started)     Point: ADL training (Not Started)     Description:   Instruct learner(s) on proper safety adaptation and remediation techniques during self care or transfers.   Instruct in proper use of assistive devices.              Learner Progress:  Not documented in this visit.          Point: Home exercise program (Not Started)     Description:   Instruct learner(s) on appropriate technique for monitoring, assisting and/or progressing therapeutic exercises/activities.              Learner Progress:  Not documented in this visit.          Point: Precautions (Not Started)     Description:   Instruct learner(s) on prescribed precautions during self-care and functional transfers.              Learner Progress:  Not documented in this visit.          Point: Body mechanics (Not Started)     Description:   Instruct learner(s) on proper positioning and spine alignment during  self-care, functional mobility activities and/or exercises.              Learner Progress:  Not documented in this visit.                          OT Recommendation and Plan  Planned Therapy Interventions (OT): transfer/mobility retraining, strengthening exercise, ROM/therapeutic exercise, activity tolerance training, BADL retraining, adaptive equipment training, functional balance retraining, occupation/activity based interventions, patient/caregiver education/training, neuromuscular control/coordination retraining  Therapy Frequency (OT): 5 times/wk  Plan of Care Review  Plan of Care Reviewed With: patient  Progress: improving  Outcome Evaluation: The pt demonstrated improved participation this AM with OT/PT. She now is in agreement to use a walker for balance support. She completed bed mobility with supervision. CGA for OOB mobility/transfers. She completed sinkside ADL's with improved fine motor coordination. CGA + walker for mobility with slight unsteadiness reported. She was educated on her high falls risk - she is also in agreement now with  therapy for balance training as well as purchasing a life alert button due to her high falls risk.     Patient was wearing a face mask during this therapy encounter. Therapist used appropriate personal protective equipment including mask and gloves. Mask used was standard procedure mask. Appropriate PPE was worn during the entire therapy session. Hand hygiene was completed before and after therapy session. Patient is not in enhanced droplet precautions.     Time Calculation:    Time Calculation- OT     Row Name 11/04/22 0932             Time Calculation- OT    OT Start Time 0854  -RB      OT Stop Time 0921  -RB      OT Time Calculation (min) 27 min  -RB      Total Timed Code Minutes- OT 27 minute(s)  -RB      OT Received On 11/04/22  -RB      OT - Next Appointment 11/07/22  -RB         Timed Charges    67962 - OT Self Care/Mgmt Minutes 27  -RB         Total Minutes     Timed Charges Total Minutes 27  -RB       Total Minutes 27  -RB            User Key  (r) = Recorded By, (t) = Taken By, (c) = Cosigned By    Initials Name Provider Type    RB Deena Markham OT Occupational Therapist              Therapy Charges for Today     Code Description Service Date Service Provider Modifiers Qty    59420571938 HC OT SELF CARE/MGMT/TRAIN EA 15 MIN 11/3/2022 Deena Markham OT GO 1    87919644499 HC OT THERAPEUTIC ACT EA 15 MIN 11/3/2022 Deena Markham OT GO 1    [id number] HC OT SELF CARE/MGMT/TRAIN EA 15 MIN 11/4/2022 Deena Markham OT GO 2               Deena Markham OT  11/4/2022

## 2022-11-04 NOTE — NURSING NOTE
Call received from [employer] stating pt HR is in low 30's. Pt is sitting in chair with eyes closed but easy to arouse. Obtained V/S sitting B/P- 169/76 HR-56, standing B/P- 131/75 HR-39. She states she felt a little dizzy but not too bad. Secure chat sent to Dr Sow to make aware he states he will place a cardiology consult and decrease Coreg to 6.25mg. Pt has discharge orders and is supposed to be driving self home per MD approval from [facility] note. Currently waiting for Cardiology to see pt. If they state she can leave then it would be okay per Dr. Mcclelland.

## 2022-11-04 NOTE — CONSULTS
Patient Name: Yi Lee  :1945  76 y.o.    Date of Admission: 2022  Date of Consultation:  22  Encounter Provider: Wei Gonzalez MD  Place of Service: [facility] CARDIOLOGY  Referring Provider: Jonathan Duenas*  Patient Care Team:  Ankit Albert MD as PCP - General (Family Medicine)  Ria Valenzuela MD as Consulting Physician (Nephrology)  Mery Zuniga PA-C as Referring Physician (Physician Assistant)  Linus Machuca MD as Consulting Physician (Hematology and Oncology)      Chief complaint:    History of Present Illness: Ms Lee is a 76 year of patient of Dr. Falcon's with history of hypertension, hyperlipidemia, chronic kidney disease, diabetes and left-sided breast cancer.     She had known bradycardia in the past for which her beta-blocker doses were adjusted, however she required beta-blockade due to resistant hypertension.  She is evaluated by electrophysiology and at that time her sinus bradycardia was deemed asymptomatic and a pacer was not warranted.    She was admitted to [facility] 22 with reports of persistent dizziness, ataxia and blurred vision resulting in a mehcanical fall. Workup was notable for a benign hemangioma and orthostatic hypotension as well as borderline B12 deficiency. She was also noted to have transient bradycardia in the 30's, for which we have been consulted. Her coreg was reduced to 6.25mg per primary service and Zio has already been ordered for discharge.          Echocardiogram 22  Saline test results are negative.      Previous Cardiac Testing:    Stress Test 10/5/22  • Myocardial perfusion imaging indicates a normal myocardial perfusion study with no evidence of ischemia.  • Left ventricular ejection fraction is hyperdynamic (Calculated EF > 70%). .  • Impressions are consistent with a low risk study.    Echocardiogram 10/5/22  • Calculated left ventricular EF = 66% Estimated  left ventricular EF was in agreement with the calculated left ventricular EF. Left ventricular systolic function is normal.  • Left ventricular wall thickness is consistent with mild concentric hypertrophy.  • Left ventricular diastolic function was normal.  • Normal right ventricular cavity size and systolic function noted.  • The left atrial cavity is moderately dilated  • The interatrial septum appears redundant.  • The aortic valve leaflets are moderately calcified  • Calculated right ventricular systolic pressure from tricuspid regurgitation is 19 mmHg.  • There is no evidence of pericardial effusion      Holter 3/29/21  Patient diary submitted.No symptoms reported during the monitoring period. No complications noted. The predominant rhythm noted during the testing period was sinus rhythm. Premature atrial contractions occured rarely. There was no evidence of atrial arrhythmias. There were no episodes of supraventricular tachycardia. Premature ventricular contractions occured rarely. There were no episodes of ventricular tachycardia. Sinoatrial node conduction was normal. 2nd degree (Mobitz 1) atrioventricular block noted. There were frequent episodes of type I second-degree AV block throughout the study.          Past Medical History:   Diagnosis Date   • Abdominal pain, LLQ    • Abnormal Pap smear of cervix    • Anemia    • Arthralgia of hip 11/22/2015   • Asthma    • Ataxic gait 11/22/2015    Description: Eval Dr Lillian Mederos, Neuro.  Some vestibular dysfunction present 2013/2014   • Bipolar I disorder, single manic episode (HCC)    • Bradycardia    • Cardiac murmur    • Colon polyp    • Coronary artery disease    • Degeneration, intervertebral disc, thoracolumbar    • Depression    • Diabetes mellitus (HCC)    • Disease of thyroid gland     Hypothyroidism   • Diverticulosis    • Ductal carcinoma in situ (DCIS) of left breast    • Esophageal spasm    • Fatigue    • GERD (gastroesophageal reflux disease)    •  H/O bone density study 10/17/2007    Dr. Giles   • Heart murmur    • Hemorrhoids    • History of mammogram     02/2012, per GYN Reshma Aranda   • History of Papanicolaou smear of cervix     DR. GILES GYN   • History of transfusion    • Hyperlipidemia    • Hypertension    • Impaired cognition 11/22/2015    Description: Testing done 05/14   • Lupus (HCC)    • Optic nerve contusion    • Pain of lower extremity 11/22/2015   • Pseudoaneurysm following procedure (HCC)     DURING CARDIAC CATHETERIZATION   • Shoulder pain     LEFT   • Skin tear of forearm without complication, right, initial encounter    • Stage 2 chronic kidney disease    • Systolic murmur    • Thyroid disease    • Vitamin B12 deficiency    • Vitamin D deficiency        Past Surgical History:   Procedure Laterality Date   • BREAST BIOPSY  2011   • BREAST LUMPECTOMY Left 2020    benign   • CARDIAC CATHETERIZATION     • CHOLECYSTECTOMY     • COLONOSCOPY  2007    done 02/12, repeat 5 yrs, Dr Grigsby; tics in sigmoid colon, IH   • COLONOSCOPY N/A 11/03/2016    polyp, IH   • COLONOSCOPY N/A 02/24/2020    Procedure: COLONOSCOPY TO CECUM WITH COLD POLYPECTOMY;  Surgeon: Eddie Grigsby MD;  Location: [facility] ENDOSCOPY;  Service: Gastroenterology;  Laterality: N/A;  pre: hx of polyps  post: polyp, hemorrhoids, diverticulosis   • COLONOSCOPY N/A 12/07/2021    Procedure: COLONOSCOPY TO CECUM  - COLD BIOPSY POLYPECTOMIES;  Surgeon: Eddie Grigsby MD;  Location: [facility] ENDOSCOPY;  Service: Gastroenterology;  Laterality: N/A;  IRON DEFICIENCY ANEMIA, HX POLYPS, CONSTIPATION   ---DIVERTICULOSIS, POLYPS   • COLONOSCOPY  2010    normal   • COLPOSCOPY W/ BIOPSY / CURETTAGE     • D & C AND LAPAROSCOPY  1974   • DILATATION AND CURETTAGE     • ENDOSCOPY N/A 02/24/2020    Procedure: ESOPHAGOGASTRODUODENOSCOPY with biopsies;  Surgeon: Eddie Grigsby MD;  Location: [facility] ENDOSCOPY;  Service: Gastroenterology;  Laterality: N/A;  pre: iron deficiency anemia  post: gastriits   •  ENDOSCOPY N/A 12/07/2021    Procedure: ESOPHAGOGASTRODUODENOSCOPY, WITH BIOPSIES;  Surgeon: Eddie Grigsby MD;  Location: [facility] ENDOSCOPY;  Service: Gastroenterology;  Laterality: N/A;  GERD,WT LOSS, IRON DEFICIENCY ANEMIA ,  DYSPEPSIA  ---HIATAL HERNIA, GASTRITIS   • ESOPHAGOGASTRIC FUNDOPLASTY     • HYSTEROSCOPY     • JOINT REPLACEMENT     • ROTATOR CUFF REPAIR Right 2009   • ROTATOR CUFF REPAIR Left    • TOTAL KNEE ARTHROPLASTY Left 02/13/2018    Procedure: LT TOTAL KNEE ARTHROPLASTY;  Surgeon: Selwyn Pinto MD;  Location: [facility] MAIN OR;  Service:    • TOTAL SHOULDER ARTHROPLASTY W/ DISTAL CLAVICLE EXCISION Left 08/09/2022    Procedure: TOTAL SHOULDER REVERSE ARTHROPLASTY;  Surgeon: Yony Gallardo MD;  Location: [facility] OR OSC;  Service: Orthopedics;  Laterality: Left;         Prior to Admission medications    Medication Sig Start Date End Date Taking? Authorizing Provider   ALPRAZolam (Xanax) 0.5 MG tablet Take 1 tablet by mouth 3 (Three) Times a Day As Needed for Anxiety.  Patient taking differently: Take 1 tablet by mouth 3 (Three) Times a Day As Needed for Anxiety. Pt states she only takes 1/2 a pill prn 9/17/21  Yes Ankit Albert MD   anastrozole (ARIMIDEX) 1 MG tablet Take 1 mg by mouth Daily. 10/8/20  Yes ProviderOsvaldo MD   carvedilol (COREG) 12.5 MG tablet TAKE 1 TABLET BY MOUTH  TWICE DAILY 5/16/22  Yes Cony Sanabria APRN   carvedilol (COREG) 6.25 MG tablet Take 1 tablet by mouth 2 (Two) Times a Day. 11/4/22  Yes Master Sow MD   cyanocobalamin (VITAMIN B-12) 1000 MCG tablet Take 1 tablet by mouth Daily. 11/4/22  Yes Master Sow MD   ferrous sulfate 325 (65 FE) MG tablet Take 1 tablet by mouth Daily With Breakfast & Dinner. 10/31/22  Yes Linus Machuca MD   hydrALAZINE (APRESOLINE) 100 MG tablet TAKE 1 TABLET BY MOUTH 3  TIMES DAILY 4/27/22  Yes Cony Sanabria APRN   lactulose (CHRONULAC) 10 GM/15ML solution TAKE 35 TO 45 ML&apos;S BY MOUTH DAILY FOR  CONSTIPATION  Patient taking differently: Take  by mouth 3 (Three) Times a Day. 12/21/21  Yes Eddie Grigsby MD   lamoTRIgine (LaMICtal) 200 MG tablet TAKE 2 AND 1/2 TABLETS BY  MOUTH AT NIGHT  Patient taking differently: Take 1 tablet by mouth. Pt states she takes 1 in the morning and 1 1/2 at night 8/9/22  Yes Ankit Albert MD   levothyroxine (SYNTHROID, LEVOTHROID) 100 MCG tablet TAKE 1 TABLET BY MOUTH  DAILY 11/8/21  Yes Ankit Albert MD   metFORMIN (GLUCOPHAGE) 500 MG tablet TAKE 2 TABLETS BY MOUTH  DAILY WITH BREAKFAST 8/9/22  Yes Ankit Albert MD   omeprazole (priLOSEC) 40 MG capsule TAKE 1 CAPSULE BY MOUTH  DAILY 9/30/22  Yes Syl Alex APRN   PARoxetine (PAXIL) 40 MG tablet TAKE 1 TABLET BY MOUTH IN  THE MORNING FOR DEPRESSION 8/19/22  Yes Nanci Mace APRN   amLODIPine (NORVASC) 5 MG tablet TAKE 1 AND 1/2 TABLETS BY MOUTH TWICE DAILY 6/27/22   Cony Sanabria APRN   chlorthalidone (HYGROTON) 25 MG tablet Take 1 tablet by mouth Daily. 4/14/22   Cony Sanabria APRN   glucose blood (FREESTYLE LITE) test strip Use to test everyday as directed 2/5/19   Ankit Albert MD   lactulose (Generlac) 10 GM/15ML solution solution (encephalopathy) TAKE 35 TO 45 ML BY MOUTH DAILY FOR CONSTIPATION 10/7/21   Syl Alex APRN   Lancets (FREESTYLE) lancets As directed to check blood glucose 2/5/19   Ankit Albert MD   QUEtiapine (SEROquel) 300 MG tablet TAKE 1 TABLET BY MOUTH AT  NIGHT  Patient taking differently: Take 1/2 a tablet at night 11/8/21   Ankit Albert MD   rosuvastatin (CRESTOR) 5 MG tablet TAKE 1 TABLET BY MOUTH AT  NIGHT FOR CHOLESTEROL 8/9/22   Ankit Albert MD   metoprolol succinate XL (TOPROL-XL) 50 MG 24 hr tablet TAKE 1 TABLET BY MOUTH DAILY 3/18/21 3/26/21  Cony Sanabria APRN       Allergies   Allergen Reactions   • Penicillins Hives and Swelling   • Morphine Confusion   • Ace Inhibitors Cough   • Telmisartan Cough       Social History     Socioeconomic History    • Marital status:    • Number of children: 1   Tobacco Use   • Smoking status: Former     Packs/day: 1.00     Years: 7.00     Pack years: 7.00     Types: Cigarettes     Quit date:      Years since quittin.8   • Smokeless tobacco: Never   • Tobacco comments:     caffeine use: 2 CUPS COFFEE/ 3 DIET PEPSIS DAILY   Vaping Use   • Vaping Use: Never used   Substance and Sexual Activity   • Alcohol use: Yes     Comment: OCC   • Drug use: No   • Sexual activity: Never       Family History   Problem Relation Age of Onset   • Heart disease Mother    • Colon polyps Father    • Heart disease Father    • Prostate cancer Father    • Emphysema Father    • Lung disease Father    • Heart disease Brother    • Hypertension Brother    • Lung cancer Maternal Aunt    • Lung cancer Maternal Aunt    • Brain cancer Maternal Uncle    • Lung cancer Paternal Uncle    • Stroke Paternal Grandmother    • Cerebral aneurysm Paternal Grandmother    • Stroke Paternal Grandfather    • Cerebral aneurysm Paternal Grandfather    • Hepatitis Other    • Coronary artery disease Other    • Malig Hyperthermia Neg Hx        REVIEW OF SYSTEMS:   All systems reviewed.  Pertinent positives identified in HPI.  All other systems are negative.      Objective:     Vitals:    22 1521 22 1522 22 1535 22 1536   BP:       BP Location:       Patient Position:       Pulse: 59 58  54   Resp:       Temp:       TempSrc:       SpO2:   (!) 75% 93%   Weight:       Height:         Body mass index is 25.92 kg/m².    General Appearance:    Alert, cooperative, in no acute distress   Head:    Normocephalic, without obvious abnormality, atraumatic   Eyes:            Lids and lashes normal, conjunctivae and sclerae normal, no icterus, no pallor, corneas clear, PERRLA   Ears:    Ears appear intact with no abnormalities noted   Throat:   No oral lesions, no thrush, oral mucosa moist   Neck:   No adenopathy, supple, trachea midline, no  thyromegaly, no carotid bruit, no JVD   Back:     No kyphosis present, no scoliosis present, no skin lesions, erythema or scars, no tenderness to percussion or palpation, range of motion normal   Lungs:     Clear to auscultation, respirations regular, even and unlabored    Heart:    Regular rhythm and normal rate, normal S1 and S2, 3/6 systolic ejection murmur at right upper sternal border, no gallop, no rub, no click   Chest Wall:    No abnormalities observed   Abdomen:     Normal bowel sounds, no masses, no organomegaly, soft, nontender, nondistended, no guarding, no rebound  tenderness   Extremities:   Moves all extremities well, no edema, no cyanosis, no redness   Pulses:   Pulses palpable and equal bilaterally. Normal radial, carotid, femoral, dorsalis pedis and posterior tibial pulses bilaterally. Normal abdominal aorta   Skin:  Psychiatric:   No bleeding, bruising or rash    Alert and oriented x 3, normal mood and affect   Lab Review:     Results from last 7 days   Lab Units 11/04/22  0621 11/02/22  0640 11/01/22  1806   SODIUM mmol/L 137   < > 133*   POTASSIUM mmol/L 4.2   < > 4.2   CHLORIDE mmol/L 102   < > 100   CO2 mmol/L 24.7   < > 21.7*   BUN mg/dL 17   < > 24*   CREATININE mg/dL 1.14*   < > 1.75*   CALCIUM mg/dL 9.9   < > 10.5   BILIRUBIN mg/dL  --   --  0.3   ALK PHOS U/L  --   --  103   ALT (SGPT) U/L  --   --  12   AST (SGOT) U/L  --   --  17   GLUCOSE mg/dL 134*   < > 136*    < > = values in this interval not displayed.     Results from last 7 days   Lab Units 11/01/22  1806   TROPONIN T ng/mL 0.014     Results from last 7 days   Lab Units 11/01/22  1806   WBC 10*3/mm3 4.77   HEMOGLOBIN g/dL 10.2*   HEMATOCRIT % 33.0*   PLATELETS 10*3/mm3 300             Results from last 7 days   Lab Units 11/02/22  0640   CHOLESTEROL mg/dL 115   TRIGLYCERIDES mg/dL 108   HDL CHOL mg/dL 42   LDL CHOL mg/dL 53               EKG 11/1/22    Previous EKG 10/5/22        I personally viewed and interpreted the patient's  EKG/Telemetry data.    Telemetry was reviewed, was reading erroneously is a heart rate in the 30s.  The longest R to R notable observed was 1.2 seconds, compatible with heart rate of 50 bpm.  I was unable to find telemetry strip with conclusive heart rate in the 20s to 30s    Assessment and Plan:       Patient Active Problem List   Diagnosis   • Abnormal creatinine clearance glomerular filtration   • Benign essential hypertension   • Chronic obstructive pulmonary disease (HCC)   • Depression   • Type 2 diabetes mellitus with diabetic neuropathy, without long-term current use of insulin ([facility])   • Gastroesophageal reflux disease   • Hyperlipidemia   • Hypothyroidism   • Obesity (BMI 30-39.9)   • Ataxic gait   • Abdominal pain, right lateral   • Iron deficiency anemia due to chronic blood loss   • Cystic kidney disease   • Vitamin B12 deficiency   • OA (osteoarthritis) of knee   • Iron deficiency anemia   • Bipolar I disorder, single manic episode ([facility])   • Ductal carcinoma in situ (DCIS) of left breast   • Bradycardia   • Systolic murmur   • Bicuspid aortic valve   • Renal insufficiency   • 1st degree AV block   • Dizziness   • Fatigue   • Vitamin D deficiency   • Bruising   • Weight loss, abnormal   • Loss of appetite   • Constipation   • Aromatase inhibitor use   • Breast neoplasm, Tis (DCIS), left   • Lupus erythematosus   • Malignant neoplasm of breast (HCC)   • Normocytic anemia   • Hypertension   • S/P reverse total shoulder arthroplasty, left   • Closed fracture of nasal bone with routine healing   • Visit for suture removal   • Fall   • Dyspnea on exertion   • Ataxia   • Severe malnutrition (HCC)     1.  Bradycardia, asymptomatic   hypertension, resistant  -Seems like her presentation complaints of more related sensory ataxia for which she was evaluated neurology.  - She has had asymptomatic bradycardia, longstanding, for several years and has been evaluated by EP in the past and due to the asymptomatic nature  pacer was not needed  - Other etiology of falls could be related to orthostasis and her bradycardia, however it remains unclear at this time given her mixed picture.  - Agree with decreasing her carvedilol dose to 6.25, understanding that she does require a small component of beta-blockade due to her resistant hypertension  - Reasonable to do a Zio patch at discharge    2.  Moderately calcified aortic valve without stenosis  - Asymptomatic, no stenosis noted, noncontributory    Thank you for the consult.  Agree with Zio patch monitoring.  Patient to follow-up with cardiology in 2 to 4 weeks, after Zio patch is complete.  Cardiology will sign off at this time.      Wei Gonzalez MD  11/04/22  16:17 EDT

## 2022-11-04 NOTE — PROGRESS NOTES
[facility] to provide Home care services. Patient agreeable and denies other HH. PCP and contact information confirmed. VO received from Kelly for PCP Dr Albert to [facility] for HH.

## 2022-11-04 NOTE — TELEPHONE ENCOUNTER
Caller: IRA     Relationship: [facility]     Best call back number: [phone]    What orders are you requesting (i.e. lab or imaging): TO EVALUATE FOR PHYSICAL THERAPY     In what timeframe would the patient need to come in: ASAP     Where will you receive your lab/imaging services:     Additional notes: PATIENT IS DISCHARGING FROM HOSPITAL TODAY

## 2022-11-04 NOTE — PLAN OF CARE
Goal Outcome Evaluation:  Plan of Care Reviewed With: patient        Progress: improving  Outcome Evaluation: Pt received in bathroom with OT upon arrival. Pt stood and ambulated in hallway this visit requiring CGA. RW trialed with improve gait mechanics but still slightly unsteady veering both direction and running into objects. PT provided verbal cues for safety. Pt in agreement to using RW for mobility at D/C to improve safety and address balance deficits. Pt was educated on fall risks, home safety, and potentially purchasing a life alert button. Pt plans to return home with HHPT to address strength and balance. PT recommends possible assisted living at D/C if at all possible for the pt's safety.

## 2022-11-04 NOTE — PLAN OF CARE
Problem: Fall Injury Risk  Goal: Absence of Fall and Fall-Related Injury  Outcome: Adequate for Care Transition     Problem: Diabetes Comorbidity  Goal: Blood Glucose Level Within Targeted Range  Outcome: Adequate for Care Transition     Problem: Hypertension Comorbidity  Goal: Blood Pressure in Desired Range  Outcome: Adequate for Care Transition     Problem: Adjustment to Illness (Stroke, Ischemic/Transient Ischemic Attack)  Goal: Optimal Coping  Outcome: Adequate for Care Transition     Problem: Bowel Elimination Impaired (Stroke, Ischemic/Transient Ischemic Attack)  Goal: Effective Bowel Elimination  Outcome: Adequate for Care Transition     Problem: Cerebral Tissue Perfusion (Stroke, Ischemic/Transient Ischemic Attack)  Goal: Optimal Cerebral Tissue Perfusion  Outcome: Adequate for Care Transition     Problem: Cognitive Impairment (Stroke, Ischemic/Transient Ischemic Attack)  Goal: Optimal Cognitive Function  Outcome: Adequate for Care Transition     Problem: Communication Impairment (Stroke, Ischemic/Transient Ischemic Attack)  Goal: Improved Communication Skills  Outcome: Adequate for Care Transition     Problem: Functional Ability Impaired (Stroke, Ischemic/Transient Ischemic Attack)  Goal: Optimal Functional Ability  Outcome: Adequate for Care Transition     Problem: Respiratory Compromise (Stroke, Ischemic/Transient Ischemic Attack)  Goal: Effective Oxygenation and Ventilation  Outcome: Adequate for Care Transition     Problem: Sensorimotor Impairment (Stroke, Ischemic/Transient Ischemic Attack)  Goal: Improved Sensorimotor Function  Outcome: Adequate for Care Transition     Problem: Swallowing Impairment (Stroke, Ischemic/Transient Ischemic Attack)  Goal: Optimal Eating and Swallowing without Aspiration  Outcome: Adequate for Care Transition     Problem: Urinary Elimination Impaired (Stroke, Ischemic/Transient Ischemic Attack)  Goal: Effective Urinary Elimination  Outcome: Adequate for Care Transition    Goal Outcome Evaluation:

## 2022-11-04 NOTE — PROGRESS NOTES
[facility] Acute Rehab Stroke Screening Note     Chart reviewed.  Patient ambulating 200' contact guard today with a walker.  Per chart, plan is home with home health.  Will sign off at this time.    Thanks,   Elayne Ramírez RN  Rehab Admission Nurse

## 2022-11-04 NOTE — CASE MANAGEMENT/SOCIAL WORK
Continued Stay Note  [facility]     Patient Name: Yi Lee  MRN: [id number]  Today's Date: 11/4/2022    Admit Date: 11/1/2022    Plan: Home w/ [facility]   Discharge Plan     Row Name 11/04/22 1012       Plan    Plan Home w/ [facility]    Plan Comments CCP spoke with patient at bedside who is agreeable to plan home w/ [facility]. Patient states she can transport herself home. Patient reports she has 3 rolling walkers at home and does not need another. [facility] spoke with LHA MD who states he was unaware patient has 3 walkers at home and there is no need for an additional. LHA MD states patient is safe to transport herself home. Plan remains home w/ [facility]. TRAVIS PRICE               Discharge Codes    No documentation.               Expected Discharge Date and Time     Expected Discharge Date Expected Discharge Time    Nov 4, 2022             TRAVIS Garcia

## 2022-11-04 NOTE — DISCHARGE SUMMARY
[facility]    Date of Discharge:  11/4/2022    PCP: Ankit Albert MD    Discharge Diagnosis:   Active Hospital Problems    Diagnosis  POA   • **Ataxia [R27.0]  Yes   • Severe malnutrition (HCC) [E43]  Yes   • Fall [W19.XXXA]  Yes   • Ductal carcinoma in situ (DCIS) of left breast [D05.12]  Yes   • Iron deficiency anemia [D50.9]  Yes   • Cystic kidney disease [Q61.9]  Not Applicable   • Benign essential hypertension [I10]  Yes   • Type 2 diabetes mellitus with diabetic neuropathy, without long-term current use of insulin (HCC) [E11.40]  Yes   • Gastroesophageal reflux disease [K21.9]  Yes   • Hyperlipidemia [E78.5]  Yes   • Hypothyroidism [E03.9]  Yes   • Chronic obstructive pulmonary disease (HCC) [J44.9]  Yes      Resolved Hospital Problems   No resolved problems to display.          Consults     Date and Time Order Name Status Description    11/1/2022  8:59 PM [facility] (on-call MD unless specified) Details      10/4/2022 11:24 PM Inpatient Cardiology Consult Completed         Hospital Course  76 y.o. female with a history of breast cancer, diabetes mellitus type 2, hypertension presents with frequent falls and ataxia. This is been going on for about 4 months.    CT head and MRI showed nothing acute. B12 was low normal. TSH was normal.    She was seen by neurology and they felt it was due to sensory ataxia (suspected due to be from B12 deficiency) and orthostatic hypotension. She also had some asymptomatic bradycardia and coreg will be reduced and cardiology will be asked to see prior to discharge to see if they have any other recommendations. Zio patch will be placed and she has follow-up with Dr. Falcon next month. Neurology recommended thigh-high compression stockings and increasing her salt intake. They also recommended that she stay hydrated and drink less soft drinks and consider an abdominal binder.    Her B12 level was low normal. Homocystine level was  "elevated. She will have to have her level rechecked in about 6 months.    Neurology recommended cautious reduction of blood pressure medication. They were also concerned about her safety living by herself and recommended SNF. She was evaluated by physical therapy. She was impulsive and unsteady during standing and ambulated 400 feet x 2 so it is unlikely rehab will be covered and in that case patient states she home (3 facilities have denied). She was noted to be ataxic and PT cued her to reduce speed. It was recommended that she use a walker she told PT \"walkers are for old people\". She also declined home health PT per PT note but will try to arrange.    Her A1c was 6.10%.    I discussed the patient's findings and my recommendations with patient, nursing staff and and CCP.    Condition on Discharge: Improved.     Temp:  [98.1 °F (36.7 °C)-98.5 °F (36.9 °C)] 98.1 °F (36.7 °C)  Heart Rate:  [53-77] 65  Resp:  [18] 18  BP: (144-157)/(70-76) 144/72  Body mass index is 25.92 kg/m².    Physical Exam  Constitutional:       General: She is not in acute distress.     Appearance: Normal appearance. She is not toxic-appearing.   HENT:      Head: Normocephalic and atraumatic.   Cardiovascular:      Rate and Rhythm: Normal rate and regular rhythm.   Pulmonary:      Effort: Pulmonary effort is normal. No respiratory distress.      Breath sounds: Normal breath sounds. No wheezing, rhonchi or rales.   Abdominal:      General: Bowel sounds are normal.      Palpations: Abdomen is soft.      Tenderness: There is no abdominal tenderness. There is no guarding or rebound.   Musculoskeletal:         General: No swelling.   Skin:     General: Skin is warm and dry.   Neurological:      General: No focal deficit present.      Mental Status: She is alert and oriented to person, place, and time.   Psychiatric:         Mood and Affect: Mood normal.         Behavior: Behavior normal.         Thought Content: Thought content normal. "     Disposition: Home or Self Care       Discharge Medications      New Medications      Instructions Start Date   vitamin B-12 1000 MCG tablet  Commonly known as: CYANOCOBALAMIN   1,000 mcg, Oral, Daily         Changes to Medications      Instructions Start Date   carvedilol 6.25 MG tablet  Commonly known as: COREG  What changed:   · medication strength  · how much to take   6.25 mg, Oral, 2 Times Daily      lamoTRIgine 200 MG tablet  Commonly known as: LaMICtal  What changed: See the new instructions.   TAKE 2 AND 1/2 TABLETS BY  MOUTH AT NIGHT         Continue These Medications      Instructions Start Date   anastrozole 1 MG tablet  Commonly known as: ARIMIDEX   1 mg, Oral, Daily      ferrous sulfate 325 (65 FE) MG tablet   325 mg, Oral, Daily With Breakfast & Dinner      freestyle lancets   As directed to check blood glucose      glucose blood test strip  Commonly known as: FREESTYLE LITE   Use to test everyday as directed       lactulose 10 GM/15ML solution solution (encephalopathy)  Commonly known as: Generlac   TAKE 35 TO 45 ML BY MOUTH DAILY FOR CONSTIPATION      levothyroxine 100 MCG tablet  Commonly known as: SYNTHROID, LEVOTHROID   100 mcg, Oral, Daily      metFORMIN 500 MG tablet  Commonly known as: GLUCOPHAGE   1,000 mg, Oral, Daily With Breakfast      omeprazole 40 MG capsule  Commonly known as: priLOSEC   40 mg, Oral, Daily      PARoxetine 40 MG tablet  Commonly known as: PAXIL   TAKE 1 TABLET BY MOUTH IN  THE MORNING FOR DEPRESSION      rosuvastatin 5 MG tablet  Commonly known as: CRESTOR   TAKE 1 TABLET BY MOUTH AT  NIGHT FOR CHOLESTEROL         Stop These Medications    amLODIPine 5 MG tablet  Commonly known as: NORVASC     chlorthalidone 25 MG tablet  Commonly known as: HYGROTON     hydrALAZINE 100 MG tablet  Commonly known as: APRESOLINE     lactulose 10 GM/15ML solution  Commonly known as: CHRONULAC     QUEtiapine 300 MG tablet  Commonly known as: SEROquel        ASK your doctor about these  medications      Instructions Start Date   ALPRAZolam 0.5 MG tablet  Commonly known as: Xanax   0.5 mg, Oral, 3 Times Daily PRN            Diet Instructions     Diet: Regular      Discharge Diet: Regular         Activity Instructions     Activity as Tolerated           Additional Instructions for the Follow-ups that You Need to Schedule     Call MD for problems / concerns.   As directed         Follow-up Information     Ankit Albert MD .    Specialty: Family Medicine  Contact information:  [address]  [place] [address]  [phone]             Fran Falcon MD Follow up.    Specialty: Cardiology  Contact information:  [address]  [address]  [place] [address]  [phone]                        Pending Labs     Order Current Status    Copper, Serum In process    Methylmalonic Acid, Serum In process         Master Sow MD  [facility]  11/04/22    Discharge time spent greater than 30 minutes.

## 2022-11-04 NOTE — PLAN OF CARE
The pt demonstrated improved participation this AM with OT/PT. She now is in agreement to use a walker for balance support. She completed bed mobility with supervision. CGA for OOB mobility/transfers. She completed sinkside ADL's with improved fine motor coordination. CGA + walker for mobility with slight unsteadiness reported. She was educated on her high falls risk - she is also in agreement now with HH therapy for balance training as well as purchasing a life alert button due to her high falls risk. She verbalized she knows she needs 24/7 assistance and HH therapy - she is going to speak to family and friends to possibly arrange.     Patient was wearing a face mask during this therapy encounter. Therapist used appropriate personal protective equipment including mask and gloves. Mask used was standard procedure mask. Appropriate PPE was worn during the entire therapy session. Hand hygiene was completed before and after therapy session. Patient is not in enhanced droplet precautions.

## 2022-11-05 NOTE — OUTREACH NOTE
Prep Survey    Flowsheet Row Responses   [facility] [place] patient discharged from? [facility]   Is LACE score < 7 ? No   Emergency Room discharge w/ pulse ox? No   Eligibility [facility]   Date of Admission 11/01/22   Date of Discharge 11/04/22   [place] [facility] [place]   Discharge diagnosis Ataxia  [seen by neurology and they felt it was due to sensory ataxia (suspected due to be from B12 deficiency) and orthostatic hypotension]   Does the patient have one of the following disease processes/diagnoses(primary or secondary)? Other   Does the patient have Home health ordered? Yes   What is the Home health agency?  [facility]    Is there a DME ordered? No   General alerts for this patient Refused SNF   Prep survey completed? Yes          JOMAR GREENWOOD - Registered Nurse

## 2022-11-07 NOTE — TELEPHONE ENCOUNTER
Caller: JOSE L     Relationship: Home Health    Best call back number: [phone]    What orders are you requesting (i.e. lab or imaging): VERBAL ORDER FOR HOME HEALTH EVALUATION FOR PHYSICAL THERAPY     In what timeframe would the patient need to come in: ASAP    Where will you receive your lab/imaging services: [facility]    Additional notes: [facility] NEEDS NEW ORDERS FOR A HOME HEALTH EVALUATION FOR PHYSICAL THERAPY, THE ORIGINAL ORDER IS NO LONGER VALID BECAUSE THE PATIENT WANTED TO WAIT UNTIL 11/07 TO SEE THEM

## 2022-11-07 NOTE — OUTREACH NOTE
Call Center TCM Note    Flowsheet Row Responses   [facility] [place] patient discharged from? [facility] [place]   Does the patient have one of the following disease processes/diagnoses(primary or secondary)? Other   TCM attempt successful? Yes   Call start time 1154   Call end time 1203   General alerts for this patient Refused SNF   Discharge diagnosis Ataxia   Person spoke with today (if not patient) and relationship Marin-son and patient   Meds reviewed with patient/caregiver? Yes   Is the patient having any side effects they believe may be caused by any medication additions or changes? No   Does the patient have all medications ordered at discharge? Yes   Is the patient taking all medications as directed (includes completed medication regime)? Yes   Does the patient have an appointment with their PCP within 7 days of discharge? No appointments available   Nursing Interventions Routed TCM call to PCP office   What is the Home health agency?  [facility]    Has home health visited the patient within 72 hours of discharge? Yes   Psychosocial issues? No   What is the patient's perception of their health status since discharge? Improving   TCM call completed? Yes   Call end time 1203   Would this patient benefit from a Referral to Amb Social Work? No   Is the patient interested in additional calls from an ambulatory ?  NOTE:  applies to high risk patients requiring additional follow-up. No          Yari Pichardo RN    11/7/2022, 12:03 EST

## 2022-11-07 NOTE — TELEPHONE ENCOUNTER
Caller: Yi Lee    Relationship to patient: Self    Best call back number:   [phone]          Patient is needing: PATIENT IS CALLING TO SCHEDULE A HOSPITAL FOLLOW UP.  SHE WAS AT [facility], DISCHARGED 11/04/22.    UNABLE TO WARM TRANSFER.    PLEASE ADVISE.

## 2022-11-08 NOTE — TELEPHONE ENCOUNTER
Called and left the pt a msg to call back to schedule a hospital follow up  Dr Albert is booked she will need to schedule with one of the NP's     OK FOR HUB TO READ AND SCHEDULE WITH NP

## 2022-11-08 NOTE — HOME HEALTH
"Patient states having frequent falls (states \"8 or 9\" falls over the last 2 months) and increased difficulty with mobility for about 2 months.  It came to a point where she drove herself to the hospital and was admittend 11/1/22 to 11/4/22.  The hospital ruled out acute CVA with main diagnoses of ataxia, malnutrition and orthostatic hypotension. She states she also had low HR in the hospital and she is currently wearing a heart monitor from the hospital to further assess.  She refused the need to go to a skilled nursing facility and came home by herself.  She lives by herself and 2 small dogs, has a basement she accesses for laundry tasks.  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 PLOF/PMH:  She states she does not use an assistive device, but states she has been unsteady for some time.  She does her own ADLs with a shower chair and grab bars in the shower for bathing.  She has DM2 (with A1c around 6), B feet neuropathy, left shoulder replacement, left TKR, breast cancer, chronic anemia, chronic constipation,  \"poor memory.\"      Assessment:  Patient is A & O x 3 but seems anxious about her situation.  The hospital had her stop several meds, but she is refusing to stop them until her PCP, Dr. Albert, tells her it is ok to stop them (Communication made to Dr. Albert about her plan to continue her meds against hospital recommendation).  She has advanced neuropathic numbness in B feet, with no light touch sensation to the plantar surface and even deep touch insensate to her big toe (This is most likely her biggest problem for her gait and balance).  She also demonstrated abnormal tracking, with overshooting noted.  No nystagmus noted and pupils had normal reactivity.   Patient would benefit from PT services to work on standing balance program, better med management and falls risk reduction.  Skilled nursing was offered to help her manage her meds better but patient did not want any " further disciplines at this time and agreed to only PT.       Plan for next visit  Review meds  Take seated and standing BPs   Check on hydration status  Standing balance HEP initiation.

## 2022-11-09 NOTE — PROGRESS NOTES
Transitional Care Follow Up Visit  Subjective     Yi Lee is a 76 y.o. female who presents for a transitional care management visit.    Within 48 business hours after discharge our office contacted her via telephone to coordinate her care and needs.      I reviewed and discussed the details of that call along with the discharge summary, hospital problems, inpatient lab results, inpatient diagnostic studies, and consultation reports with Yi.     Current outpatient and discharge medications have been reconciled for the patient.  Reviewed by: EZRA Alvares      Date of TCM Phone Call 11/4/2022   [facility]   Date of Admission 11/1/2022   Date of Discharge 11/4/2022   Discharge Disposition Home-Health Care [facility] [place]     Risk for Readmission (LACE) Score: 11 (11/4/2022  6:00 AM)      History of Present Illness  Patient presents for [facility] hospital follow-up.  This is a 76-year-old female patient of Dr. Albert who went to the ER with ataxic gait and multiple falls over the past 4 months.  History includes breast cancer, COPD, iron deficiency anemia, bipolar disorder, diabetes and hypertension.    She had a CT and MRI which were negative for acute abnormalities or stroke.    She was seen by neurology who according to discharge summary felt that sensory ataxia related to B12 deficiency as well as orthostatic hypotension were contributing to her symptoms.  They recommended compression hose, increasing sodium and hydration in the diet.    She was also seen by cardiology who are decreasing her Coreg to 6.25 mg twice daily due to bradycardia and she was discharged with a Zio patch which she is wearing.  She has a follow-up in place with cardiology next month, 12/21/2022.    Per the discharge summary it was recommended that she be discharged to SNF, states that she denied this.  She refused a walker as well.    On discharge blood pressure medications including amlodipine, chlorthalidone and  "hydralazine were discontinued.  At baseline she takes Seroquel 300 mg nightly for bipolar disorder and insomnia and this was discontinued at discharge as well.    She presents today for follow-up stating that she has not discontinued any of the medications or made any of the dose modifications that were recommended on her discharge summary from the hospital.    She states that she does not wish to discontinue her blood pressure medication regimen until she speaks with cardiology as she has had a lot of trouble in the past getting her blood pressure under control.    She is open to decreasing the Seroquel dose.  She no longer sees psychiatry and states the doctor who oversees her Seroquel.    She does endorse a decreased appetite.  She has not had any falls since discharge.  She is not hydrating well throughout the day.    Denies development of other new issues today.     Course During Hospital Stay:  See HPI.     The following portions of the patient's history were reviewed and updated as appropriate: allergies, current medications, past family history, past medical history, past social history, past surgical history and problem list.    Review of Systems   Musculoskeletal: Positive for gait problem.   All other systems reviewed and are negative.      Objective    /52 (BP Location: Left arm, Patient Position: Sitting, Cuff Size: Adult)   Pulse 65   Temp 98 °F (36.7 °C) (Infrared)   Ht 162.6 cm (64\")   Wt 69.4 kg (153 lb)   LMP  (LMP Unknown)   SpO2 98%   BMI 26.26 kg/m²     Physical Exam  Vitals and nursing note reviewed.   Constitutional:       General: She is not in acute distress.     Appearance: Normal appearance. She is well-developed. She is not ill-appearing, toxic-appearing or diaphoretic.   HENT:      Head: Normocephalic and atraumatic.      Right Ear: External ear normal.      Left Ear: External ear normal.   Eyes:      Pupils: Pupils are equal, round, and reactive to light.   Cardiovascular: "      Rate and Rhythm: Normal rate and regular rhythm.      Pulses: Normal pulses.      Heart sounds: Normal heart sounds.      Comments: No peripheral pitting edema  Pulmonary:      Effort: Pulmonary effort is normal. No respiratory distress.      Breath sounds: Normal breath sounds. No stridor. No wheezing, rhonchi or rales.   Chest:      Chest wall: No tenderness.   Abdominal:      General: Bowel sounds are normal. There is no distension.      Palpations: Abdomen is soft.      Tenderness: There is no abdominal tenderness.   Musculoskeletal:         General: Normal range of motion.      Cervical back: Normal range of motion and neck supple.   Skin:     General: Skin is warm and dry.      Capillary Refill: Capillary refill takes less than 2 seconds.   Neurological:      General: No focal deficit present.      Mental Status: She is alert and oriented to person, place, and time. Mental status is at baseline.   Psychiatric:         Mood and Affect: Mood normal.         Behavior: Behavior normal.         Thought Content: Thought content normal.         Judgment: Judgment normal.         Assessment & Plan   Diagnoses and all orders for this visit:    1. Hospital discharge follow-up (Primary)    2. Ataxic gait    3. Orthostatic hypotension    4. Primary insomnia  -     QUEtiapine (SEROquel) 100 MG tablet; Take 1 tablet by mouth Every Night.  Dispense: 90 tablet; Refill: 0    Patient expressed frustration that she does not have a clear answer to why this is going on.  I expressed that I believe this is multifactorial and there is not one easy answer.    I do believe she needs to start the ferrous sulfate as recommended by hematology and she plans to do so.    We discussed making sure she is hydrating well during the day and getting adequate protein intake.  Currently she is going long periods of time throughout the day without eating.    As far as her blood pressure medication regimen she refuses to stop the amlodipine,  chlorthalidone or hydralazine until she speaks with cardiology.  I understand that she has had a hard time in the past getting control of her hypertension and I think it is reasonable to follow-up with cardiology sooner than her appointment at the end of next month and she is going to try to call and get a sooner appointment for discussion of this.  I did give her some precautions regarding orthostatic hypotension including going ahead and adding the compression hose during the day, making sure she is participating in gradual position changes.  I have asked her to monitor her home blood pressure at twice daily and keep a log for follow-up with the cardiologist.    She will go ahead and decrease the Coreg dose to 6.25 mg twice daily as recommended by cardiology.  She will turn her Zio patch in soon as directed.    Continue working with home health PT/OT.  I believe she would benefit greatly from the addition of gait stabilizing ambulatory device but she does not seem to be agreeable to this at this time.    I believe that Seroquel could be a contributing factor and she is very hesitant to go off of it cold turkey.  We will decrease the dose to 100 mg nightly which she is agreeable to.  I believe that it should be discussed that she should potentially go off of this altogether in the future.  We discussed that this could be posing a falls risk as well.    Follow-up as needed and we will have her back in 1 to 2 months for recheck with PCP, Dr. Albert.

## 2022-11-09 NOTE — PATIENT INSTRUCTIONS
-Keep a chart of home blood pressure twice daily.   -Start wearing the compression hose during.   -Start taking the iron supplement as prescribed by Dr. Machuca.   -Continue working with physical therapy routinely.   -Please make a sooner follow up with cardiology to discuss your blood pressure and  follow up on Holter monitor.  -Please ensure you are drinking plenty of water during the day.   -Please make sure you are eating enough protein during the day and not going long periods of time without eating.

## 2022-11-15 NOTE — OUTREACH NOTE
Medical Week 2 Survey    Flowsheet Row Responses   [facility] patient discharged from? [place]   Does the patient have one of the following disease processes/diagnoses(primary or secondary)? Other   Week 2 attempt successful? No   Unsuccessful attempts Attempt 1          JODIE Luis Nurse

## 2022-11-15 NOTE — TELEPHONE ENCOUNTER
Caller: Yi Lee    Relationship: Self    Best call back number: [phone]    Any additional details: PATIENT STATED SHE NEEDS SOMETHING TO SAY SHE CAN RETURN TO WORK AFTER BEING IN THIS HOSPITAL, WITH NO RESTRICTIONS.    PATIENT WOULD LIKE A CALL REGARDING THIS.

## 2022-11-15 NOTE — TELEPHONE ENCOUNTER
Lolita she saw you for her hospital follow up on 11/9/22-ok to give her a return to work note with no restrictions?

## 2022-11-16 NOTE — TELEPHONE ENCOUNTER
I wouldn't feel comfortable with this yet, no. At the time that I saw her, she was still having a lot of issues with balance and high falls risk and wasn't wanting to use a walker or ambulatory device. She is still working with PT to my knowledge. She was also having some discrepancies with her blood pressure and had a pending Holter monitor to follow up with cardiology on.

## 2022-11-16 NOTE — HOME HEALTH
Patient reports she slipped on a dog pad on Sunday and hit her right forearm on a side table, sustaining a right forearm skin tear (picture taken in [employer]).  She also states she is not taking her blood sugars because her lancing device is broken. She continues to have poor intake, stating she has a poor appetite.  She still plans to go back to work next week if [employer] releases her on her follow-up on 11/21.  Most of the visit today on education for better nutrition and hydration as a baseline for her mobility and ADLs.     Plan for next visit  Follow up on nutrition and hydration.  Home safety review.

## 2022-11-18 NOTE — HOME HEALTH
"Patient states she was \"really unsteady last night when I got out of bed and went to the bathroom.\"  She reports \"bouncing off the walls.\" She again denies any dizziness or light-headedness, just unsteady.  She states feeling ok today, and her gait is generally ataxic but not too bad.  I did a screen for BPPV and did not  any signs of it.  She was a bit orthostatic with her bps again with seated numbers at 114/60 and standing at 92/50.  She had no complaint of dizziness with this drop in BP.  She states trying to eat a bit more as I instructed her.  Her right arm skin tear is looking ok and I placed a non-adherent dressing with elastic netting to support it.  I instructed her to put some vasoline on the wound and keep it covered with the dressing for a few more days.  I gave her some lancets so she could check her blood sugars, especially when she feels unsteady.  She wants to go back to work on is a bit upset Dr. Albert's office not releasing her to do so yet.  She needs to follow-up with Cardiology 11/21/22 and possibly have another heart monitor placed since she lost her other one.      Assessement:  She remains a high falls risk.  Her baseline balance will be impaired due to the neuropathy in her feet, but I am guessing she has some neurological (cerebellar) issues as well due to her impared tracking.  She is A&O x3 but her judgement at time suspect.    Plan for next visit  Finalize home safety teaching  Do dc assessment if no longer homebound."

## 2022-11-21 PROBLEM — N17.9 AKI (ACUTE KIDNEY INJURY) (HCC): Status: ACTIVE | Noted: 2022-01-01

## 2022-11-21 PROBLEM — J18.9 COMMUNITY ACQUIRED PNEUMONIA, UNSPECIFIED LATERALITY: Status: ACTIVE | Noted: 2022-01-01

## 2022-11-21 NOTE — PROGRESS NOTES
Date of Office Visit: 2022  Encounter Provider: EZRA Cantu  Place of Service: [facility] CARDIOLOGY  Patient Name: Yi Lee  :1945    Chief Complaint   Patient presents with   • Follow-up   • Hypertension   :     HPI: Yi Lee is a 76 y.o. female who is a patient of  Dr. Blankenship and is new to me today. She has a history of hypertension and hyperlipidemia. She was evaluated in 2020 for systolic murmur and bradycardia and the dose of metoprolol was decreased. She had an echo in 2021 and had some mild thickening of the outflow track. There was a bicuspid aortic valve with mild stenosis and mild to moderate MR.    She has had some episodes of elevated blood pressure over the last year. She had a renal duplex that was nomal. At that time she had some mild bradycardia and was seen by Dr. Hawkins.  Her beta-blockers were reduced.  She ended up having to go up on amlodipine to 7.5 mg every evening.  She was seen back in the office in September and was doing better.    She was recently admitted to [facility] with dizziness and ataxia as well as blurred vision resulting in a fall.  She had a benign hemangioma and she was orthostatic.  She also had some transient bradycardia.  We ordered a ZIO for discharge.  Her last full 2D echo was in October.  She had some moderate calcification of her aortic valve leaflets mild aortic insufficiency with an EF of 66%.    She comes in today and she has been doing a lot of falling.  She denies shortness of breath.  She did not change her medications to what was recommended when she left the hospital.  She continues to take her blood pressure medicine.  She has been getting lightheaded and while over the weekend fell and has a skin tear on her right forearm.  Previous testing and notes have been reviewed by me.   Past Medical History:   Diagnosis Date   • Abdominal pain, LLQ    • Abnormal Pap smear of cervix     • Anemia    • Arthralgia of hip 11/22/2015   • Asthma    • Ataxic gait 11/22/2015    Description: Eval Dr Lillian Mederos, Neuro.  Some vestibular dysfunction present 2013/2014   • Bipolar I disorder, single manic episode (HCC)    • Bradycardia    • Cardiac murmur    • Colon polyp    • Coronary artery disease    • Degeneration, intervertebral disc, thoracolumbar    • Depression    • Diabetes mellitus (HCC)    • Disease of thyroid gland     Hypothyroidism   • Diverticulosis    • Ductal carcinoma in situ (DCIS) of left breast    • Esophageal spasm    • Fatigue    • GERD (gastroesophageal reflux disease)    • H/O bone density study 10/17/2007    Dr. Giles   • Heart murmur    • Hemorrhoids    • History of mammogram     02/2012, per GYN Reshma Aranda   • History of Papanicolaou smear of cervix     DR. GILES GYN   • History of transfusion    • Hyperlipidemia    • Hypertension    • Impaired cognition 11/22/2015    Description: Testing done 05/14   • Lupus (HCC)    • Optic nerve contusion    • Pain of lower extremity 11/22/2015   • Pseudoaneurysm following procedure (HCC)     DURING CARDIAC CATHETERIZATION   • Shoulder pain     LEFT   • Skin tear of forearm without complication, right, initial encounter    • Stage 2 chronic kidney disease    • Systolic murmur    • Thyroid disease    • Vitamin B12 deficiency    • Vitamin D deficiency        Past Surgical History:   Procedure Laterality Date   • BREAST BIOPSY  2011   • BREAST LUMPECTOMY Left 2020    benign   • CARDIAC CATHETERIZATION     • CHOLECYSTECTOMY     • COLONOSCOPY  2007    done 02/12, repeat 5 yrs, Dr Grigsby; tics in sigmoid colon, IH   • COLONOSCOPY N/A 11/03/2016    polyp, IH   • COLONOSCOPY N/A 02/24/2020    Procedure: COLONOSCOPY TO CECUM WITH COLD POLYPECTOMY;  Surgeon: Eddie Grigsby MD;  Location: [facility] ENDOSCOPY;  Service: Gastroenterology;  Laterality: N/A;  pre: hx of polyps  post: polyp, hemorrhoids, diverticulosis   • COLONOSCOPY N/A 12/07/2021     Procedure: COLONOSCOPY TO CECUM  - COLD BIOPSY POLYPECTOMIES;  Surgeon: Eddie Grigsby MD;  Location: [facility] ENDOSCOPY;  Service: Gastroenterology;  Laterality: N/A;  IRON DEFICIENCY ANEMIA, HX POLYPS, CONSTIPATION   ---DIVERTICULOSIS, POLYPS   • COLONOSCOPY      normal   • COLPOSCOPY W/ BIOPSY / CURETTAGE     • D & C AND LAPAROSCOPY     • DILATATION AND CURETTAGE     • ENDOSCOPY N/A 2020    Procedure: ESOPHAGOGASTRODUODENOSCOPY with biopsies;  Surgeon: Eddie Grigsby MD;  Location: [facility] ENDOSCOPY;  Service: Gastroenterology;  Laterality: N/A;  pre: iron deficiency anemia  post: gastriits   • ENDOSCOPY N/A 2021    Procedure: ESOPHAGOGASTRODUODENOSCOPY, WITH BIOPSIES;  Surgeon: Eddie Grigsby MD;  Location: [facility] ENDOSCOPY;  Service: Gastroenterology;  Laterality: N/A;  GERD,WT LOSS, IRON DEFICIENCY ANEMIA ,  DYSPEPSIA  ---HIATAL HERNIA, GASTRITIS   • ESOPHAGOGASTRIC FUNDOPLASTY     • HYSTEROSCOPY     • JOINT REPLACEMENT     • ROTATOR CUFF REPAIR Right    • ROTATOR CUFF REPAIR Left    • TOTAL KNEE ARTHROPLASTY Left 2018    Procedure: LT TOTAL KNEE ARTHROPLASTY;  Surgeon: Selwyn Pinto MD;  Location: [facility] MAIN OR;  Service:    • TOTAL SHOULDER ARTHROPLASTY W/ DISTAL CLAVICLE EXCISION Left 2022    Procedure: TOTAL SHOULDER REVERSE ARTHROPLASTY;  Surgeon: Yony Gallardo MD;  Location: [facility] OR [facility];  Service: Orthopedics;  Laterality: Left;       Social History     Socioeconomic History   • Marital status:    • Number of children: 1   Tobacco Use   • Smoking status: Former     Packs/day: 1.00     Years: 7.00     Pack years: 7.00     Types: Cigarettes     Quit date:      Years since quittin.9   • Smokeless tobacco: Never   • Tobacco comments:     caffeine use: 2 CUPS COFFEE/ 3 DIET PEPSIS DAILY   Vaping Use   • Vaping Use: Never used   Substance and Sexual Activity   • Alcohol use: Yes     Comment: OCC   • Drug use: No   • Sexual activity: Never       Family  History   Problem Relation Age of Onset   • Heart disease Mother    • Colon polyps Father    • Heart disease Father    • Prostate cancer Father    • Emphysema Father    • Lung disease Father    • Heart disease Brother    • Hypertension Brother    • Lung cancer Maternal Aunt    • Lung cancer Maternal Aunt    • Brain cancer Maternal Uncle    • Lung cancer Paternal Uncle    • Stroke Paternal Grandmother    • Cerebral aneurysm Paternal Grandmother    • Stroke Paternal Grandfather    • Cerebral aneurysm Paternal Grandfather    • Hepatitis Other    • Coronary artery disease Other    • Malig Hyperthermia Neg Hx        Review of Systems   Constitutional: Negative for diaphoresis and malaise/fatigue.   Cardiovascular: Negative for chest pain, claudication, dyspnea on exertion, irregular heartbeat, leg swelling, near-syncope, orthopnea, palpitations, paroxysmal nocturnal dyspnea and syncope.   Respiratory: Negative for cough, shortness of breath and sleep disturbances due to breathing.    Musculoskeletal: Negative for falls.   Neurological: Negative for dizziness and weakness.   Psychiatric/Behavioral: Negative for altered mental status and substance abuse.       Allergies   Allergen Reactions   • Penicillins Hives and Swelling   • Morphine Confusion   • Ace Inhibitors Cough   • Telmisartan Cough         Current Outpatient Medications:   •  ALPRAZolam (Xanax) 0.5 MG tablet, Take 1 tablet by mouth 3 (Three) Times a Day As Needed for Anxiety. (Patient taking differently: Take 0.5 mg by mouth 3 (Three) Times a Day As Needed for Anxiety. Pt states she only takes 1/2 a pill prn), Disp: 270 tablet, Rfl: 0  •  anastrozole (ARIMIDEX) 1 MG tablet, Take 1 mg by mouth Daily., Disp: , Rfl:   •  carvedilol (COREG) 6.25 MG tablet, Take 1 tablet by mouth 2 (Two) Times a Day., Disp: 60 tablet, Rfl: 0  •  cyanocobalamin (VITAMIN B-12) 1000 MCG tablet, Take 1 tablet by mouth Daily., Disp: 30 tablet, Rfl: 0  •  ferrous sulfate 325 (65 FE) MG  "tablet, Take 1 tablet by mouth Daily With Breakfast & Dinner., Disp: 60 tablet, Rfl: 3  •  glucose blood (FREESTYLE LITE) test strip, Use to test everyday as directed, Disp: 100 each, Rfl: 5  •  lactulose (Generlac) 10 GM/15ML solution solution (encephalopathy), TAKE 35 TO 45 ML BY MOUTH DAILY FOR CONSTIPATION, Disp: 4050 mL, Rfl: 1  •  lamoTRIgine (LaMICtal) 200 MG tablet, TAKE 2 AND 1/2 TABLETS BY  MOUTH AT NIGHT (Patient taking differently: Take 200 mg by mouth. Pt states she takes 1 in the morning and 1 1/2 at night), Disp: 225 tablet, Rfl: 3  •  Lancets (FREESTYLE) lancets, As directed to check blood glucose, Disp: 100 each, Rfl: 12  •  levothyroxine (SYNTHROID, LEVOTHROID) 100 MCG tablet, TAKE 1 TABLET BY MOUTH  DAILY, Disp: 90 tablet, Rfl: 3  •  metFORMIN (GLUCOPHAGE) 500 MG tablet, TAKE 2 TABLETS BY MOUTH  DAILY WITH BREAKFAST, Disp: 180 tablet, Rfl: 3  •  omeprazole (priLOSEC) 40 MG capsule, TAKE 1 CAPSULE BY MOUTH  DAILY, Disp: 90 capsule, Rfl: 3  •  PARoxetine (PAXIL) 40 MG tablet, TAKE 1 TABLET BY MOUTH IN  THE MORNING FOR DEPRESSION, Disp: 90 tablet, Rfl: 1  •  QUEtiapine (SEROquel) 100 MG tablet, Take 1 tablet by mouth Every Night., Disp: 90 tablet, Rfl: 0  •  QUEtiapine (SEROquel) 300 MG tablet, TAKE 1 TABLET BY MOUTH AT  NIGHT, Disp: 90 tablet, Rfl: 3  •  rosuvastatin (CRESTOR) 5 MG tablet, TAKE 1 TABLET BY MOUTH AT  NIGHT FOR CHOLESTEROL, Disp: 90 tablet, Rfl: 3      Objective:     Vitals:    11/21/22 1104   BP: 129/60   Pulse: 65   SpO2: 97%   Weight: 70.8 kg (156 lb)   Height: 162.6 cm (64\")     Body mass index is 26.78 kg/m².    PHYSICAL EXAM:    Constitutional:       General: Not in acute distress.     Appearance: Normal appearance. Well-developed.   Eyes:      Pupils: Pupils are equal, round, and reactive to light.   HENT:      Head: Normocephalic.   Neck:      Vascular: No carotid bruit or JVD.   Pulmonary:      Effort: Pulmonary effort is normal. No tachypnea.      Breath sounds: Normal " breath sounds. No wheezing. No rales.   Cardiovascular:      Normal rate. Regular rhythm.      Murmurs: There is a high frequency blowing decrescendo, early diastolic murmur at the URSB, radiating to the apex.      No gallop.   Pulses:     Intact distal pulses.   Edema:     Peripheral edema absent.   Abdominal:      General: Bowel sounds are normal.      Palpations: Abdomen is soft.      Tenderness: There is no abdominal tenderness.   Musculoskeletal: Normal range of motion.      Cervical back: Normal range of motion and neck supple. No edema. Skin:     General: Skin is warm and dry.        Neurological:      Mental Status: Alert and oriented to person, place, and time.           ECG 12 Lead    Date/Time: 11/21/2022 11:19 AM  Performed by: Rika Hernández APRN  Authorized by: Rika Hernández APRN   Comparison: compared with previous ECG from 11/1/2022  Similar to previous ECG  Rhythm: sinus rhythm  Rate: normal  QRS axis: normal    Clinical impression: normal ECG              Assessment:       Diagnosis Plan   1. 1st degree AV block        2. Benign essential hypertension        3. Bicuspid aortic valve        4. Bradycardia        5. Mixed hyperlipidemia        6. Primary hypertension        7. Chronic obstructive pulmonary disease, unspecified COPD type (HCC)          No orders of the defined types were placed in this encounter.         Plan:       I am jackelyn stop her amlodipine.  I think she is probably getting hypotensive.  She is a  and can return to work but I recommend she use an assistive device to get around.  We will bring her back in 2 weeks.  She was wearing a Zio patch but lost it in the garbage.  So we can place another one on her today.         Your medication list          Accurate as of November 21, 2022 11:13 AM. If you have any questions, ask your nurse or doctor.            CHANGE how you take these medications      Instructions Last Dose Given Next Dose Due   ALPRAZolam 0.5 MG  tablet  Commonly known as: Xanax  What changed: additional instructions      Take 1 tablet by mouth 3 (Three) Times a Day As Needed for Anxiety.       lamoTRIgine 200 MG tablet  Commonly known as: LaMICtal  What changed: See the new instructions.      TAKE 2 AND 1/2 TABLETS BY  MOUTH AT NIGHT          CONTINUE taking these medications      Instructions Last Dose Given Next Dose Due   anastrozole 1 MG tablet  Commonly known as: ARIMIDEX      Take 1 mg by mouth Daily.       carvedilol 6.25 MG tablet  Commonly known as: COREG      Take 1 tablet by mouth 2 (Two) Times a Day.       ferrous sulfate 325 (65 FE) MG tablet      Take 1 tablet by mouth Daily With Breakfast & Dinner.       freestyle lancets      As directed to check blood glucose       glucose blood test strip  Commonly known as: FREESTYLE LITE      Use to test everyday as directed       lactulose 10 GM/15ML solution solution (encephalopathy)  Commonly known as: Generlac      TAKE 35 TO 45 ML BY MOUTH DAILY FOR CONSTIPATION       levothyroxine 100 MCG tablet  Commonly known as: SYNTHROID, LEVOTHROID      TAKE 1 TABLET BY MOUTH  DAILY       metFORMIN 500 MG tablet  Commonly known as: GLUCOPHAGE      TAKE 2 TABLETS BY MOUTH  DAILY WITH BREAKFAST       omeprazole 40 MG capsule  Commonly known as: priLOSEC      TAKE 1 CAPSULE BY MOUTH  DAILY       PARoxetine 40 MG tablet  Commonly known as: PAXIL      TAKE 1 TABLET BY MOUTH IN  THE MORNING FOR DEPRESSION       QUEtiapine 100 MG tablet  Commonly known as: SEROquel      Take 1 tablet by mouth Every Night.       QUEtiapine 300 MG tablet  Commonly known as: SEROquel      TAKE 1 TABLET BY MOUTH AT  NIGHT       rosuvastatin 5 MG tablet  Commonly known as: CRESTOR      TAKE 1 TABLET BY MOUTH AT  NIGHT FOR CHOLESTEROL       vitamin B-12 1000 MCG tablet  Commonly known as: CYANOCOBALAMIN      Take 1 tablet by mouth Daily.                As always, it has been a pleasure to participate in your patient's care.      Sincerely,      Rika MUNGUIA

## 2022-11-22 PROBLEM — N18.30 STAGE 3 CHRONIC KIDNEY DISEASE (HCC): Status: ACTIVE | Noted: 2022-01-01

## 2022-11-22 PROBLEM — R04.2 HEMOPTYSIS: Status: ACTIVE | Noted: 2022-01-01

## 2022-11-22 NOTE — ANESTHESIA POSTPROCEDURE EVALUATION
"Patient: Yi Lee    Procedure Summary     Date: 11/22/22 Room / Location:  [facility] /  [facility]    Anesthesia Start: 1406 Anesthesia Stop: 1449    Procedure: BRONCHOSCOPY with fluoroscopy,  BAL, washings, biopsies (Bronchus) Diagnosis:       Hemoptysis      Stage 3 chronic kidney disease, unspecified whether stage 3a or 3b CKD (HCC)      (Hemoptysis [R04.2])      (Stage 3 chronic kidney disease, unspecified whether stage 3a or 3b CKD (HCC) [N18.30])    Surgeons: Arjun Joseph Jr., MD Provider:     Anesthesia Type: general ASA Status: 3          Anesthesia Type: general    Vitals  Vitals Value Taken Time   /54 11/22/22 1457   Temp     Pulse 63 11/22/22 1457   Resp 21 11/22/22 1457   SpO2 99 % 11/22/22 1457           Post Anesthesia Care and Evaluation    Patient location during evaluation: bedside  Patient participation: complete - patient participated  Level of consciousness: awake and alert  Pain management: adequate    Airway patency: patent  Anesthetic complications: No anesthetic complications    Cardiovascular status: acceptable  Respiratory status: acceptable  Hydration status: acceptable    Comments: /54 (BP Location: Left arm, Patient Position: Sitting)   Pulse 63   Temp 37.2 °C (98.9 °F) (Oral)   Resp 21   Ht 162.6 cm (64\")   Wt 68.9 kg (152 lb)   LMP  (LMP Unknown)   SpO2 99%   BMI 26.09 kg/m²           "

## 2022-11-22 NOTE — OUTREACH NOTE
Medical Week 2 Survey    Flowsheet Row Responses   [facility] patient discharged from? [place]   Does the patient have one of the following disease processes/diagnoses(primary or secondary)? Other   Week 2 attempt successful? No   Unsuccessful attempts Attempt 2   Revoke Readmitted          CAROLINE CARRILLO - Registered Nurse

## 2022-11-22 NOTE — TELEPHONE ENCOUNTER
Caller: SADIE RAHMAN    Relationship: Other      Best call back number: [phone]    What orders are you requesting (i.e. lab or imaging):     VERBAL NEEDED TO RESUME CARE     In what timeframe would the patient need to come in: 11/22/2022      Where will you receive your lab/imaging services: [facility]     Additional notes:     BARBER IS CURRENTLY ADMITTED AT [facility]

## 2022-11-22 NOTE — ANESTHESIA PROCEDURE NOTES
Airway  Urgency: elective    Date/Time: 11/22/2022 2:14 PM  Airway not difficult    General Information and Staff    Patient location during procedure: OR    Indications and Patient Condition    Preoxygenated: yes  MILS maintained throughout  Mask difficulty assessment: 1 - vent by mask    Final Airway Details  Final airway type: supraglottic airway      Successful airway: unique  Size 3     Number of attempts at approach: 1  Assessment: lips, teeth, and gum same as pre-op and atraumatic intubation

## 2022-11-22 NOTE — HOME HEALTH
Patient has seen cardiology on 11/21/22 but evidently had some episodes of coughing up blood at home and went to the hospital.  Work up for pneumonia and some other issues ongoing with hospital admit.    addendum on 12/7/22: notification the patient passed away today.  Therefore, transfer turns into a discharge.

## 2022-11-22 NOTE — ANESTHESIA PREPROCEDURE EVALUATION
Anesthesia Evaluation     Patient summary reviewed and Nursing notes reviewed   no history of anesthetic complications:  NPO Solid Status: > 6 hours  NPO Liquid Status: > 6 hours           Airway   Mallampati: II  TM distance: >3 FB  Neck ROM: full  no difficulty expected and No difficulty expected  Dental - normal exam   (+) edentulous    Pulmonary - normal exam    breath sounds clear to auscultation  (+) pneumonia , COPD, shortness of breath,   (-) rhonchi, decreased breath sounds, wheezes, rales, stridor    ROS comment: Hemoptysis    Cardiovascular - normal exam    NYHA Classification: I  Rhythm: regular  Rate: normal    (+) hypertension, valvular problems/murmurs AS, CASTELLON, hyperlipidemia,   (-) murmur, weak pulses, friction rub, systolic click, carotid bruits, JVD, peripheral edema      Neuro/Psych  (+) dizziness/light headedness, psychiatric history Depression and Bipolar,    GI/Hepatic/Renal/Endo    (+) obesity,  GERD,  renal disease CRI, diabetes mellitus type 2 poorly controlled, thyroid problem hypothyroidism    Musculoskeletal (-) negative ROS    Abdominal  - normal exam    Abdomen: soft.   Substance History - negative use     OB/GYN negative ob/gyn ROS         Other   autoimmune disease lupus, blood dyscrasia anemia,   history of cancer active                    Anesthesia Plan    ASA 3     general     intravenous induction     Anesthetic plan, risks, benefits, and alternatives have been provided, discussed and informed consent has been obtained with: patient.        CODE STATUS:    Code Status (Patient has no pulse and is not breathing): CPR (Attempt to Resuscitate)  Medical Interventions (Patient has pulse or is breathing): Full Support  Release to patient: Routine Release

## 2022-11-30 PROBLEM — E44.0 MODERATE MALNUTRITION (HCC): Status: ACTIVE | Noted: 2022-01-01

## 2022-12-01 NOTE — PROGRESS NOTES
[facility] Progress Note      Patient Name: Yi Lee  : 1945  MRN: [id number]  Primary Care Physician:  Ankit Albert MD  Date of admission: 2022    Subjective     Interval History:   Follow-up acute kidney injury on chronic kidney disease.    Seen and examined.  Currently in ICU.  Intubated.  Off sedation.    Review of Systems:   As noted above    Objective     Vitals:   Temp:  [97.5 °F (36.4 °C)-98.8 °F (37.1 °C)] 98.2 °F (36.8 °C)  Heart Rate:  [48-81] 55  Resp:  [20-32] 32  BP: ()/(37-74) 116/50  FiO2 (%):  [39 %-100 %] 39 %    Intake/Output Summary (Last 24 hours) at 2022 0729  Last data filed at 2022 1753  Gross per 24 hour   Intake 875 ml   Output 100 ml   Net 775 ml       Physical Exam:    General Appearance: Intubated  Skin: warm and dry  HEENT: oral mucosa normal, nonicteric sclera  Neck: supple, no JVD  Lungs: Bilateral rhonchi, breathing effort not labored  Heart: RRR, normal S1 and S2, no S3, no rub  Abdomen: soft, nontender, nondistended  : no palpable bladder        Scheduled Meds:     albumin human, 12.5 g, Intravenous, Once   And  sodium chloride, 200 mL, Intravenous, Once  albumin human, 3,000 mL, Intravenous, Daily  anticoagulant citrate (ACD) formula A, 1,000 mL, Apheresis, Daily  calcium gluconate IVPB, 4 g, Intravenous, Daily  cefTRIAXone, 2 g, Intravenous, Q24H  chlorhexidine, 15 mL, Mouth/Throat, Q12H  ferrous sulfate, 325 mg, Oral, Every Other Day  hydrALAZINE, 100 mg, Oral, TID  insulin lispro, 0-9 Units, Subcutaneous, Q6H  lactulose, 30 g, Oral, BID  lamoTRIgine, 200 mg, Oral, Daily  lamoTRIgine, 300 mg, Oral, Nightly  levothyroxine, 100 mcg, Oral, Daily  methylPREDNISolone sodium succinate, 500 mg, Intravenous, Q12H  pantoprazole, 40 mg, Intravenous, Q24H  rosuvastatin, 5 mg, Oral, Nightly  cyanocobalamin, 1,000 mcg, Oral, Daily      IV Meds:   norepinephrine, 0.02-0.3 mcg/kg/min, Last Rate: 0.02 mcg/kg/min (22  1216)  Pharmacy Consult,   sodium chloride, 30 mL/hr, Last Rate: Stopped (11/22/22 1500)        Results Reviewed:   I have personally reviewed the results from the time of this admission to 12/1/2022 07:29 EST     Results from last 7 days   Lab Units 12/01/22 0443 11/30/22 0358 11/29/22  0354 11/27/22  1040 11/24/22  0809   SODIUM mmol/L 141 139 133*   < > 134*   POTASSIUM mmol/L 4.4 3.7 3.9   < > 4.3   CHLORIDE mmol/L 113* 109* 102   < > 103   CO2 mmol/L 14.0* 14.0* 18.8*   < > 23.3   BUN mg/dL 79* 62* 63*   < > 17   CREATININE mg/dL 3.93* 3.37* 2.94*   < > 1.74*   CALCIUM mg/dL 8.8 8.5* 8.5*   < > 8.9   BILIRUBIN mg/dL  --   --   --   --  0.3   ALK PHOS U/L  --   --   --   --  76   ALT (SGPT) U/L  --   --   --   --  8   AST (SGOT) U/L  --   --   --   --  15   GLUCOSE mg/dL 182* 181* 202*   < > 137*    < > = values in this interval not displayed.     Estimated Creatinine Clearance: 11.8 mL/min (A) (by C-G formula based on SCr of 3.93 mg/dL (H)).  Results from last 7 days   Lab Units 12/01/22 0443 11/30/22 2325 11/30/22 0358 11/29/22 0354 11/28/22  0234 11/27/22  1040   MAGNESIUM mg/dL  --  2.1  --   --  2.3 2.0   PHOSPHORUS mg/dL 7.2*  --  5.1* 5.4* 5.5* 3.9     Results from last 7 days   Lab Units 11/28/22  0234 11/27/22  1040   URIC ACID mg/dL 7.9* 6.7*     Results from last 7 days   Lab Units 12/01/22 0443 11/30/22 0358 11/29/22  0655 11/28/22  0234 11/27/22  1932 11/27/22  1040   WBC 10*3/mm3 10.47 13.53* 14.73* 9.13  --  12.21*   HEMOGLOBIN g/dL 6.6* 7.2* 7.2* 8.0* 6.7* 6.0*   PLATELETS 10*3/mm3 194 284 296 210  --  276     Results from last 7 days   Lab Units 11/25/22  0931   INR  1.14*       Assessment / Plan     ASSESSMENT:  1.  Acute kidney injury on chronic kidney disease stage III: Creatinine on 11/24/2022 was 1.74, kidney biopsy was done awaiting for the results with high suspicion of pauci-immune vasculitis based on the high ANCA and pulmonary hemorrhage.  Received first dose of rituximab  11/28/2022.  Biopsy was done on 11/28/2022.  2.  Bilateral pulmonary infiltrate patient had biopsy yesterday suggestive of alveolar hemorrhage  3.  Chronic kidney disease stage III with baseline creatinine 1.4 mg/dL  4.  History of COPD  5.  History of diabetes mellitus type  6.  History of hypertension, reasonably,     PLAN:    1.  Unfortunately kidney biopsy sample is limited and so far no final reading as of yet.  There is 1 glomeruli with necrosis according to my conversation with the pathologist.  Patient received 1 dose of rituximab on November 28, 2022.  She is currently on her third sessions of plasmapheresis.  Discussed with the son and nursing staff Mat on the bedside.  2.  Continue pulse dose of steroid  3.  Blood transfusion   4. Surveillance labs    Thank you for involving us in the care of Yi Lee.  Please feel free to call with any questions.    Magdalene Unger MD  12/01/22  07:29 [facility]    [facility]  [phone]

## 2022-12-01 NOTE — PROGRESS NOTES
[facility] pulm/cc cross coverage note    Called by RN with reports of bradycardia. Patient has adequate BP but heart rate in the 30's.  I am stopping Precedex, propofol, Quetiapine and Carvedilol and obtaining 12 lead EKG now and consult cardiology.  I increased Fetanyl IV pushes for better analgesia and sedation.  Will monitor

## 2022-12-01 NOTE — PROGRESS NOTES
Patient Identification:  NAME:  Yi Lee  Age:  76 y.o.   Sex:  female   :  1945   MRN:  [id number]       Chief complaint: Metabolic encephalopathy, vasculitis  History of present illness: She is more alert today as sedation has been cut back.  She is still anemic and that is being monitored.  Vital signs are stable and blood pressure is good.  She did follow commands for me today, see below.      Past medical history:  Past Medical History:   Diagnosis Date   • Abdominal pain, LLQ    • Abnormal Pap smear of cervix    • JODY (acute kidney injury) (HCC) 2022   • Anemia    • Arthralgia of hip 2015   • Asthma    • Ataxic gait 2015    Description: Eval Dr Lillian Mederos, Neuro.  Some vestibular dysfunction present    • Bipolar I disorder, single manic episode ([facility])    • Bradycardia    • Cardiac murmur    • Colon polyp    • Coronary artery disease    • Degeneration, intervertebral disc, thoracolumbar    • Depression    • Diabetes mellitus ([facility])    • Disease of thyroid gland     Hypothyroidism   • Diverticulosis    • Ductal carcinoma in situ (DCIS) of left breast    • Esophageal spasm    • Fatigue    • GERD (gastroesophageal reflux disease)    • H/O bone density study 10/17/2007    Dr. Giles   • Heart murmur    • Hemorrhoids    • History of mammogram     2012, per GYN Reshma Aranda   • History of Papanicolaou smear of cervix     DR. GILES GYN   • History of transfusion    • Hyperlipidemia    • Hypertension    • Impaired cognition 2015    Description: Testing done    • Lupus ([facility])    • Optic nerve contusion    • Pain of lower extremity 2015   • Pseudoaneurysm following procedure ([facility])     DURING CARDIAC CATHETERIZATION   • Shoulder pain     LEFT   • Skin tear of forearm without complication, right, initial encounter    • Stage 2 chronic kidney disease    • Systolic murmur    • Thyroid disease    • Vitamin B12 deficiency    • Vitamin D deficiency         Allergies:  Penicillins, Ace inhibitors, Codeine, Morphine, and Telmisartan    Home medications:  Medications Prior to Admission   Medication Sig Dispense Refill Last Dose   • ALPRAZolam (Xanax) 0.5 MG tablet Take 1 tablet by mouth 3 (Three) Times a Day As Needed for Anxiety. (Patient taking differently: Take 0.5 mg by mouth 3 (Three) Times a Day As Needed for Anxiety. Pt states she only takes 1/2 a pill prn) 270 tablet 0    • anastrozole (ARIMIDEX) 1 MG tablet Take 1 mg by mouth Daily.      • carvedilol (COREG) 6.25 MG tablet Take 1 tablet by mouth 2 (Two) Times a Day. 60 tablet 0    • chlorthalidone (HYGROTON) 25 MG tablet Take 25 mg by mouth Daily.      • cyanocobalamin (VITAMIN B-12) 1000 MCG tablet Take 1 tablet by mouth Daily. 30 tablet 0    • ferrous sulfate 325 (65 FE) MG tablet Take 1 tablet by mouth Daily With Breakfast & Dinner. 60 tablet 3    • glucose blood (FREESTYLE LITE) test strip Use to test everyday as directed 100 each 5    • hydrALAZINE (APRESOLINE) 50 MG tablet Take 100 mg by mouth 3 (Three) Times a Day.      • lactulose (Generlac) 10 GM/15ML solution solution (encephalopathy) TAKE 35 TO 45 ML BY MOUTH DAILY FOR CONSTIPATION 4050 mL 1    • lamoTRIgine (LaMICtal) 200 MG tablet TAKE 2 AND 1/2 TABLETS BY  MOUTH AT NIGHT (Patient taking differently: Take 200 mg by mouth. Pt states she takes 1 in the morning and 1 1/2 at night) 225 tablet 3    • Lancets (FREESTYLE) lancets As directed to check blood glucose 100 each 12    • levothyroxine (SYNTHROID, LEVOTHROID) 100 MCG tablet TAKE 1 TABLET BY MOUTH  DAILY 90 tablet 3    • metFORMIN (GLUCOPHAGE) 500 MG tablet TAKE 2 TABLETS BY MOUTH  DAILY WITH BREAKFAST 180 tablet 3    • omeprazole (priLOSEC) 40 MG capsule TAKE 1 CAPSULE BY MOUTH  DAILY 90 capsule 3    • PARoxetine (PAXIL) 40 MG tablet TAKE 1 TABLET BY MOUTH IN  THE MORNING FOR DEPRESSION 90 tablet 1    • rosuvastatin (CRESTOR) 5 MG tablet TAKE 1 TABLET BY MOUTH AT  NIGHT FOR CHOLESTEROL 90  tablet 3         Hospital medications:  albumin human, 12.5 g, Intravenous, Once   And  sodium chloride, 200 mL, Intravenous, Once  albumin human, 3,000 mL, Intravenous, Daily  anticoagulant citrate (ACD) formula A, 1,000 mL, Apheresis, Daily  calcium gluconate IVPB, 4 g, Intravenous, Daily  cefTRIAXone, 2 g, Intravenous, Q24H  chlorhexidine, 15 mL, Mouth/Throat, Q12H  ferrous sulfate, 325 mg, Oral, Every Other Day  hydrALAZINE, 100 mg, Oral, TID  insulin lispro, 0-9 Units, Subcutaneous, Q6H  lactulose, 30 g, Oral, BID  lamoTRIgine, 200 mg, Oral, Daily  lamoTRIgine, 300 mg, Oral, Nightly  levothyroxine, 100 mcg, Oral, Daily  [START ON 12/2/2022] methylPREDNISolone sodium succinate, 500 mg, Intravenous, Daily  pantoprazole, 40 mg, Intravenous, Q24H  rosuvastatin, 5 mg, Oral, Nightly  cyanocobalamin, 1,000 mcg, Oral, Daily      Pharmacy Consult,   sodium chloride, 30 mL/hr, Last Rate: Stopped (11/22/22 1500)      •  acetaminophen  •  benzonatate  •  dextrose  •  dextrose  •  fentaNYL citrate (PF)  •  glucagon (human recombinant)  •  heparin (porcine)  •  HYDROcodone-acetaminophen  •  HYDROmorphone  •  LORazepam  •  Pharmacy Consult  •  sodium chloride      Objective:  Vitals Ranges:   Temp:  [97.2 °F (36.2 °C)-98.6 °F (37 °C)] 98.1 °F (36.7 °C)  Heart Rate:  [48-69] 62  Resp:  [12-32] 18  BP: ()/(36-91) 116/47  FiO2 (%):  [39 %-65 %] 39 %      Physical Exam:  Patient did open her eyes to my voice and looked at me to the left following that she would not blink to threat pupils 2 with some brief reaction.  I did get her to squeeze to command on the right.  She squeeze spontaneously on the left.  She definitely wiggle both toes to command reflexes trace toes downgoing    Results review:   I reviewed the patient's new clinical results.    Data review:  Lab Results (last 24 hours)     Procedure Component Value Units Date/Time    Fibrinogen [[id number]]  (Abnormal) Collected: 12/01/22 0735    Specimen: Blood Updated:  12/01/22 0835     Fibrinogen 119 mg/dL     POC Glucose Once [[id number]]  (Abnormal) Collected: 12/01/22 0755    Specimen: Blood Updated: 12/01/22 0758     Glucose 176 mg/dL      Comment: Meter: [id number] : [id number] Luciano WAHL       Renal Function Panel [[id number]]  (Abnormal) Collected: 12/01/22 0443    Specimen: Blood Updated: 12/01/22 0546     Glucose 182 mg/dL      BUN 79 mg/dL      Creatinine 3.93 mg/dL      Sodium 141 mmol/L      Potassium 4.4 mmol/L      Chloride 113 mmol/L      CO2 14.0 mmol/L      Calcium 8.8 mg/dL      Albumin 4.10 g/dL      Phosphorus 7.2 mg/dL      Anion Gap 14.0 mmol/L      BUN/Creatinine Ratio 20.1     eGFR 11.4 mL/min/1.73      Comment: <15 Indicative of kidney failure       Narrative:      GFR Normal >60  Chronic Kidney Disease <60  Kidney Failure <15    The GFR formula is only valid for adults with stable renal function between ages 18 and 70.    Triglycerides [[id number]]  (Normal) Collected: 12/01/22 0443    Specimen: Blood Updated: 12/01/22 0542     Triglycerides 142 mg/dL     CBC & Differential [[id number]]  (Abnormal) Collected: 12/01/22 0443    Specimen: Blood Updated: 12/01/22 0529    Narrative:      The following orders were created for panel order CBC & Differential.  Procedure                               Abnormality         Status                     ---------                               -----------         ------                     CBC Auto Differential[[id number]]        Abnormal            Final result                 Please view results for these tests on the individual orders.    CBC Auto Differential [[id number]]  (Abnormal) Collected: 12/01/22 0443    Specimen: Blood Updated: 12/01/22 0529     WBC 10.47 10*3/mm3      RBC 2.40 10*6/mm3      Hemoglobin 6.6 g/dL      Hematocrit 20.2 %      MCV 84.2 fL      MCH 27.5 pg      MCHC 32.7 g/dL      RDW 13.2 %      RDW-SD 40.7 fl      MPV 8.9 fL      Platelets 194 10*3/mm3      Neutrophil % 94.7 %       Lymphocyte % 2.3 %      Monocyte % 2.0 %      Eosinophil % 0.0 %      Basophil % 0.0 %      Immature Grans % 1.0 %      Neutrophils, Absolute 9.92 10*3/mm3      Lymphocytes, Absolute 0.24 10*3/mm3      Monocytes, Absolute 0.21 10*3/mm3      Eosinophils, Absolute 0.00 10*3/mm3      Basophils, Absolute 0.00 10*3/mm3      Immature Grans, Absolute 0.10 10*3/mm3      nRBC 0.0 /100 WBC     POC Glucose Once [[id number]]  (Abnormal) Collected: 12/01/22 0121    Specimen: Blood Updated: 12/01/22 0123     Glucose 137 mg/dL      Comment: Meter: [id number] : [id number] Jose WAHL       TSH [[id number]]  (Normal) Collected: 11/30/22 2325    Specimen: Blood Updated: 12/01/22 0051     TSH 1.110 uIU/mL     Calcium, Ionized [[id number]]  (Normal) Collected: 11/30/22 2325    Specimen: Blood Updated: 12/01/22 0030     Ionized Calcium 1.34 mmol/L      Ionized Calcium 5.4 mg/dL     Magnesium [[id number]]  (Normal) Collected: 11/30/22 2325    Specimen: Blood Updated: 12/01/22 0022     Magnesium 2.1 mg/dL     MRSA Screen, PCR (Inpatient) - Swab, Nares [[id number]]  (Normal) Collected: 11/30/22 1851    Specimen: Swab from Nares Updated: 11/30/22 2359     MRSA PCR No MRSA Detected    Narrative:      The negative predictive value of this diagnostic test is high and should only be used to consider de-escalating anti-MRSA therapy. A positive result may indicate colonization with MRSA and must be correlated clinically.    POC Glucose Once [[id number]]  (Abnormal) Collected: 11/30/22 1828    Specimen: Blood Updated: 11/30/22 1832     Glucose 242 mg/dL      Comment: Meter: [id number] : [id number] Monica WAHL              Imaging:  Imaging Results (Last 24 Hours)     Procedure Component Value Units Date/Time    XR Chest 1 View [[id number]] Resulted: 12/01/22 0901     Updated: 12/01/22 1028    XR Abdomen KUB [[id number]] Collected: 11/30/22 1805     Updated: 11/30/22 1809    Narrative:      XR ABDOMEN KUB-     INDICATIONS: NG tube  placement     TECHNIQUE: Frontal view of the abdomen     COMPARISON: 11/30/2022 at 1247 hours     FINDINGS:     2 NG tube extends to the proximal stomach at the level of the hiatal  hernia. Advancing the tubes is suggested. The bowel gas pattern is  nonspecific. Follow-up as clinically  indicated.          Impression:       IMPRESSION:      As described.     This report was finalized on 11/30/2022 6:06 PM by Dr. Daren Apodaca M.D.       XR Abdomen KUB [[id number]] Collected: 11/30/22 1320     Updated: 11/30/22 1325    Narrative:      XR ABDOMEN KUB-     INDICATIONS: NG tube placement     TECHNIQUE: Frontal view of the abdomen     COMPARISON: Correlated with CT from 11/21/2022     FINDINGS:     NG tube extends to the proximal stomach at the level of the hiatal  hernia, may be kinked at its distal aspect. Advancing the tube is  suggested. The bowel gas pattern is nonspecific. Follow-up as clinically  indicated.             Impression:         As described.     This report was finalized on 11/30/2022 1:22 PM by Dr. Daren Apodaca M.D.         PPE worn at all times washed before washed afterwards disposed of everything properly is now within 6 feet of her for more than few minutes during my exam no aerosols used at any      Assessment and Plan:     A bit more alert as her sedation has been cut back.  Following some bradycardia.  She is more alert.  She did initially track me to the left and I believe she followed commands to squeeze she definitely follow commands to wiggle both toes.  So, for the time being, metabolic encephalopathy is improved.  She is continue with all treatments for vasculitis.  Solu-Medrol plasma exchange and Cytoxan      Scar Rangel MD  12/01/22  11:15 EST

## 2022-12-01 NOTE — PROGRESS NOTES
[facility]     Mar/chart reviewed  Follow up AHRF with DAH 2/2 vasculitis with JODY  On vent, off sedation a bit agitated, but was following commands earlier  Pt unable to provide subjective    Vital Sign Min/Max for last 24 hours  Temp  Min: 97.5 °F (36.4 °C)  Max: 98.8 °F (37.1 °C)   BP  Min: 72/37  Max: 192/68   Pulse  Min: 48  Max: 69   Resp  Min: 12  Max: 32   SpO2  Min: 93 %  Max: 100 %   No data recorded   Weight  Min: 68.9 kg (152 lb)  Max: 68.9 kg (152 lb)   875/300  Appears illl, on vent  perrl, eomi, normal sclera  mmm, no jvd, trachea midline, neck supple,  chest good air bilaterally, + crackles, no wheezes,   rrr,   soft, nt, nd +bs,  no c/c/ e  Skin warm, dry no rashes    Labs: 12/1: reviewed:  Glucose 182  Bun 79  Cr 3.9  Bicarb 14  Wbc 10  hgb 6.6  plts 194    A/P:  1. Hemoptysis with bilateral pulmonary infiltrates -- most likely due to vasculitis -- increase dose solumedrol,  plasmapharesis and cytoxan   2. JODY with hematuria on CKDII -- as above, nephrology following  3. C-Anca -- 1: 640  4. DMII --ssi  5. Anemia -- continue to monitor, transfuse as needed  6. Encephalopathy -- supportive care  7. Elevated procal -- will cover with rocephin but doubt bacterial pneumonia is reason for worsening.  8. AHRF - intubate -- lung protective ventilation, check echo (read pending)  9. Hypothyroidism - continue replacement  10. Bipolar disorder  11. Bradycardia -- hold meds\    Repeat abg, cxr, trial ps    Discussed with Dr. Shaikh and RN    CC 35 mins.

## 2022-12-01 NOTE — PLAN OF CARE
Patient bradycardic, rhythm going from NSR with a 1st degree block to have long pauses bradying down in the 20-30s at the start of shift. Sedation stopped. Primary team contacted. Cardiology consult called in, cardiology provider ordered labs and stated they'd see patient in the morning. Patient tolerating prn sedation at this time. Patient noted to have hgb of 6.6, 1 unit ordered for transfusion.     Problem: Adult Inpatient Plan of Care  Goal: Plan of Care Review  Outcome: Ongoing, Progressing     Problem: Fall Injury Risk  Goal: Absence of Fall and Fall-Related Injury  Outcome: Ongoing, Progressing  Intervention: Identify and Manage Contributors  Recent Flowsheet Documentation  Taken 12/1/2022 0400 by Donaldo Alfonso RN  Medication Review/Management: medications reviewed  Taken 11/30/2022 2000 by Donaldo Alfonso RN  Medication Review/Management: medications reviewed  Intervention: Promote Injury-Free Environment  Recent Flowsheet Documentation  Taken 12/1/2022 0400 by Donaldo Alfonso, RN  Safety Promotion/Fall Prevention:   activity supervised   fall prevention program maintained   clutter free environment maintained   toileting scheduled   safety round/check completed   nonskid shoes/slippers when out of bed   lighting adjusted  Taken 11/30/2022 2000 by Donaldo Alfonso RN  Safety Promotion/Fall Prevention:   activity supervised   fall prevention program maintained   clutter free environment maintained   toileting scheduled   safety round/check completed   nonskid shoes/slippers when out of bed   lighting adjusted     Problem: Airway Clearance Ineffective  Goal: Effective Airway Clearance  Outcome: Ongoing, Progressing  Intervention: Promote Airway Secretion Clearance  Recent Flowsheet Documentation  Taken 12/1/2022 0400 by Donaldo Alfonso RN  Activity Management: bedrest  Taken 11/30/2022 2000 by Donaldo Alfonso RN  Activity Management: bedrest     Problem: Asthma Comorbidity  Goal: Maintenance of Asthma  Control  Outcome: Ongoing, Progressing  Intervention: Maintain Asthma Symptom Control  Recent Flowsheet Documentation  Taken 12/1/2022 0400 by Donaldo Alfonso RN  Medication Review/Management: medications reviewed  Taken 11/30/2022 2000 by Donaldo Alfonso RN  Medication Review/Management: medications reviewed     Problem: COPD (Chronic Obstructive Pulmonary Disease) Comorbidity  Goal: Maintenance of COPD Symptom Control  Outcome: Ongoing, Progressing  Intervention: Maintain COPD-Symptom Control  Recent Flowsheet Documentation  Taken 12/1/2022 0400 by Donaldo Alfonso RN  Medication Review/Management: medications reviewed  Taken 11/30/2022 2000 by Donaldo Alfonso RN  Medication Review/Management: medications reviewed     Problem: Diabetes Comorbidity  Goal: Blood Glucose Level Within Targeted Range  Outcome: Ongoing, Progressing  Intervention: Monitor and Manage Glycemia  Recent Flowsheet Documentation  Taken 11/30/2022 2000 by Donaldo Alfonso RN  Glycemic Management: blood glucose monitored     Problem: Obstructive Sleep Apnea Risk or Actual Comorbidity Management  Goal: Unobstructed Breathing During Sleep  Outcome: Ongoing, Progressing     Problem: Osteoarthritis Comorbidity  Goal: Maintenance of Osteoarthritis Symptom Control  Outcome: Ongoing, Progressing  Intervention: Maintain Osteoarthritis Symptom Control  Recent Flowsheet Documentation  Taken 12/1/2022 0400 by Donaldo Alfonso RN  Activity Management: bedrest  Medication Review/Management: medications reviewed  Taken 11/30/2022 2000 by Donaldo Alfonso RN  Activity Management: bedrest  Medication Review/Management: medications reviewed   Goal Outcome Evaluation:

## 2022-12-01 NOTE — CONSULTS
[facility] Consult Note       Encounter Date:22  Patient:Yi Lee  :1945  MRN:[id number]    Date of Admission: 2022  Date of Encounter Visit: 22  Encounter Provider: Fran Falcon MD  Referring Provider: Jonathan Duenas*  Place of Service: [facility]  Patient Care Team:  Ankit Albert MD as PCP - General (Family Medicine)  Ria Valenzuela MD as Consulting Physician (Nephrology)  Mery Zuniga PA-C as Referring Physician (Physician Assistant)  Linus Machuca MD as Consulting Physician (Hematology and Oncology)      Consulted for: bradycardia    Chief Complaint: coughing up blood      History of Presenting Illness:      Ms. Lee is a 76 y.o. woman with past medical history notable for hypertension, coronary artery disease, mixed hyperlipidemia, hypothyroidism, sick sinus syndrome, lupus, diabetes type 2, and aortic sclerosis who presented to the emergency room on 2022 with worsening shortness of breath and hemoptysis.  She underwent bronchoscopy to further evaluate her pulmonary infiltrates was concerning for possible vasculitis with ANCA panel and further evaluation of her acute renal failure noted concerning evidence of vasculitis on renal biopsy 2022.  She had been undergoing plasmapheresis which started on 2022.  Unfortunately patient became more confused on 2022 and due to concerns with ability to oxygenate her safely and protect her airway she was intubated.  After intubation she was noted to be fairly bradycardic overnight.  She was on propofol and Precedex as well as continued on her carvedilol from admission all of these were discontinued.  This morning her heart rate seems to be better.  Remains intubated most of the history obtained through chart review as patient unable to provide history    Review of Systems:  Review of Systems   Unable to perform ROS: mental status change        Medications:    Current Facility-Administered Medications:   •  acetaminophen (TYLENOL) tablet 650 mg, 650 mg, Oral, Q4H PRN, More Mckenzie, APRN, 650 mg at 11/27/22 1324  •  albumin human 25 % IV SOLN 12.5 g, 12.5 g, Intravenous, Once **AND** sodium chloride 0.9 % infusion 200 mL, 200 mL, Intravenous, Once, Magdalene Unger MD  •  albumin human 5 % solution 3,000 mL, 3,000 mL, Intravenous, Daily, Selwyn Fernández MD, 3,000 mL at 12/01/22 0650  •  anticoagulant citrate (ACD) formula A (ACD FORMULA A) solution 1,000 mL, 1,000 mL, Apheresis, Daily, Isaac Storey MD, 1,000 mL at 12/01/22 0651  •  benzonatate (TESSALON) capsule 200 mg, 200 mg, Oral, TID PRN, Norris Christina MD  •  calcium gluconate 4 g in sodium chloride 0.9 % 100 mL IVPB, 4 g, Intravenous, Daily, Magdalene Unger MD  •  cefTRIAXone (ROCEPHIN) 2 g in sodium chloride 0.9 % 100 mL IVPB-VTB, 2 g, Intravenous, Q24H, Selwyn Fernández MD, Last Rate: 200 mL/hr at 11/30/22 1102, 2 g at 11/30/22 1102  •  chlorhexidine (PERIDEX) 0.12 % solution 15 mL, 15 mL, Mouth/Throat, Q12H, Norris Christina MD, 15 mL at 11/30/22 2250  •  dextrose (D50W) (25 g/50 mL) IV injection 25 g, 25 g, Intravenous, Q15 Min PRN, Arjun Joseph Jr., MD  •  dextrose (GLUTOSE) oral gel 15 g, 15 g, Oral, Q15 Min PRN, Arjun Joseph Jr., MD  •  fentaNYL citrate (PF) (SUBLIMAZE) injection 75 mcg, 75 mcg, Intravenous, Q1H PRN, Ramakrishna Dee MD, 75 mcg at 12/01/22 0254  •  ferrous sulfate tablet 325 mg, 325 mg, Oral, Every Other Day, Ramakrishna Dee MD  •  glucagon (human recombinant) (GLUCAGEN DIAGNOSTIC) injection 1 mg, 1 mg, Intramuscular, Q15 Min PRN, Arjun Joseph Jr., MD  •  hydrALAZINE (APRESOLINE) tablet 100 mg, 100 mg, Oral, TID, Arjun Joseph Jr., MD, 100 mg at 11/30/22 9539  •  HYDROcodone-acetaminophen (NORCO) 5-325 MG per tablet 1 tablet, 1 tablet, Oral, Q4H PRN, Norris Christina MD  •  HYDROmorphone (DILAUDID)  injection 1 mg, 1 mg, Intravenous, Q3H PRN, Ramakrishna Dee MD, 1 mg at 12/01/22 0725  •  insulin lispro (ADMELOG) injection 0-9 Units, 0-9 Units, Subcutaneous, Q6H, Norris Christina MD, 4 Units at 11/30/22 1849  •  lactulose (CHRONULAC) 10 GM/15ML solution 30 g, 30 g, Oral, BID, Jonathan Hawley MD, 30 g at 11/30/22 2246  •  lamoTRIgine (LaMICtal) tablet 200 mg, 200 mg, Oral, Daily, Arjun Joseph Jr., MD, 200 mg at 11/30/22 1601  •  lamoTRIgine (LaMICtal) tablet 300 mg, 300 mg, Oral, Nightly, Arjun Joseph Jr., MD, 300 mg at 11/30/22 2246  •  levothyroxine (SYNTHROID, LEVOTHROID) tablet 100 mcg, 100 mcg, Oral, Daily, Arjun Joseph Jr., MD, 100 mcg at 11/30/22 1606  •  LORazepam (ATIVAN) injection 1 mg, 1 mg, Intravenous, Q4H PRN, Ramakrishna Dee MD, 1 mg at 11/30/22 2142  •  methylPREDNISolone sod succ 500 mg/100 mL 0.9% NS (mbp), 500 mg, Intravenous, Q12H, Norris Christina MD, 500 mg at 11/30/22 2246  •  norepinephrine (LEVOPHED) 8 mg in 250 mL NS infusion (premix), 0.02-0.3 mcg/kg/min, Intravenous, Titrated, Norris Christina MD, Last Rate: 2.58 mL/hr at 11/30/22 1216, 0.02 mcg/kg/min at 11/30/22 1216  •  pantoprazole (PROTONIX) injection 40 mg, 40 mg, Intravenous, Q24H, Norris Christina MD, 40 mg at 11/30/22 1602  •  Pharmacy Consult, , Does not apply, Continuous PRN, Isaac Storey MD  •  rosuvastatin (CRESTOR) tablet 5 mg, 5 mg, Oral, Nightly, Arjun Joseph Jr., MD, 5 mg at 11/30/22 2250  •  sodium chloride 0.9 % infusion, 30 mL/hr, Intravenous, Continuous PRN, Arjun Joseph Jr., MD, Stopped at 11/22/22 1500  •  vitamin B-12 (CYANOCOBALAMIN) tablet 1,000 mcg, 1,000 mcg, Oral, Daily, Arjun Joseph Jr., MD, 1,000 mcg at 11/30/22 1601    Allergies   Allergen Reactions   • Penicillins Hives and Swelling   • Ace Inhibitors Cough   • Codeine Cough   • Morphine Confusion   • Telmisartan Cough       Past Medical History:   Diagnosis Date   • Abdominal pain, LLQ    •  Abnormal Pap smear of cervix    • JODY (acute kidney injury) ([facility]) 11/21/2022   • Anemia    • Arthralgia of hip 11/22/2015   • Asthma    • Ataxic gait 11/22/2015    Description: Eval Dr Lillian Mederos, Neuro.  Some vestibular dysfunction present 2013/2014   • Bipolar I disorder, single manic episode ([facility])    • Bradycardia    • Cardiac murmur    • Colon polyp    • Coronary artery disease    • Degeneration, intervertebral disc, thoracolumbar    • Depression    • Diabetes mellitus ([facility])    • Disease of thyroid gland     Hypothyroidism   • Diverticulosis    • Ductal carcinoma in situ (DCIS) of left breast    • Esophageal spasm    • Fatigue    • GERD (gastroesophageal reflux disease)    • H/O bone density study 10/17/2007    Dr. Giles   • Heart murmur    • Hemorrhoids    • History of mammogram     02/2012, per GYN Reshma Aranda   • History of Papanicolaou smear of cervix     DR. GILES GYN   • History of transfusion    • Hyperlipidemia    • Hypertension    • Impaired cognition 11/22/2015    Description: Testing done 05/14   • Lupus ([facility])    • Optic nerve contusion    • Pain of lower extremity 11/22/2015   • Pseudoaneurysm following procedure ([facility])     DURING CARDIAC CATHETERIZATION   • Shoulder pain     LEFT   • Skin tear of forearm without complication, right, initial encounter    • Stage 2 chronic kidney disease    • Systolic murmur    • Thyroid disease    • Vitamin B12 deficiency    • Vitamin D deficiency        Past Surgical History:   Procedure Laterality Date   • BREAST BIOPSY  2011   • BREAST LUMPECTOMY Left 2020    benign   • BRONCHOSCOPY N/A 11/22/2022    Procedure: BRONCHOSCOPY with fluoroscopy,  BAL, washings, biopsies;  Surgeon: Arjun Joseph Jr., MD;  Location: [facility] ENDOSCOPY;  Service: Pulmonary;  Laterality: N/A;  PRE OP - HEMOPTYSIS, PULMONARY INFILTRATES  POST OP - SAME   • CARDIAC CATHETERIZATION     • CATARACT EXTRACTION, BILATERAL Bilateral    • CHOLECYSTECTOMY     • COLONOSCOPY  2007    done  02/12, repeat 5 yrs, Dr Grigsby; tics in sigmoid colon, IH   • COLONOSCOPY N/A 11/03/2016    polyp, IH   • COLONOSCOPY N/A 02/24/2020    Procedure: COLONOSCOPY TO CECUM WITH COLD POLYPECTOMY;  Surgeon: Eddie Grigsby MD;  Location: [facility] ENDOSCOPY;  Service: Gastroenterology;  Laterality: N/A;  pre: hx of polyps  post: polyp, hemorrhoids, diverticulosis   • COLONOSCOPY N/A 12/07/2021    Procedure: COLONOSCOPY TO CECUM  - COLD BIOPSY POLYPECTOMIES;  Surgeon: Eddie Grigsby MD;  Location: [facility] ENDOSCOPY;  Service: Gastroenterology;  Laterality: N/A;  IRON DEFICIENCY ANEMIA, HX POLYPS, CONSTIPATION   ---DIVERTICULOSIS, POLYPS   • COLONOSCOPY  2010    normal   • COLPOSCOPY W/ BIOPSY / CURETTAGE     • D & C AND LAPAROSCOPY  1974   • DILATATION AND CURETTAGE     • ENDOSCOPY N/A 02/24/2020    Procedure: ESOPHAGOGASTRODUODENOSCOPY with biopsies;  Surgeon: Eddie Grigsby MD;  Location: [facility] ENDOSCOPY;  Service: Gastroenterology;  Laterality: N/A;  pre: iron deficiency anemia  post: gastriits   • ENDOSCOPY N/A 12/07/2021    Procedure: ESOPHAGOGASTRODUODENOSCOPY, WITH BIOPSIES;  Surgeon: Eddie Grigsby MD;  Location: [facility] ENDOSCOPY;  Service: Gastroenterology;  Laterality: N/A;  GERD,WT LOSS, IRON DEFICIENCY ANEMIA ,  DYSPEPSIA  ---HIATAL HERNIA, GASTRITIS   • ESOPHAGOGASTRIC FUNDOPLASTY     • HYSTEROSCOPY     • JOINT REPLACEMENT     • ROTATOR CUFF REPAIR Right 2009   • ROTATOR CUFF REPAIR Left    • TOTAL KNEE ARTHROPLASTY Left 02/13/2018    Procedure: LT TOTAL KNEE ARTHROPLASTY;  Surgeon: Selwyn Pinto MD;  Location: [facility] MAIN OR;  Service:    • TOTAL SHOULDER ARTHROPLASTY W/ DISTAL CLAVICLE EXCISION Left 08/09/2022    Procedure: TOTAL SHOULDER REVERSE ARTHROPLASTY;  Surgeon: Yony Gallardo MD;  [place] [facility] [place] [facility] [place]  Service: [place]opedics;  Laterality: Left;       Social History     Socioeconomic History   • Marital status:    • Number of children: 1   Tobacco Use   • Smoking status: Former      Packs/day: 1.00     Years: 7.00     Pack years: 7.00     Types: Cigarettes     Quit date:      Years since quittin.9   • Smokeless tobacco: Never   • Tobacco comments:     caffeine use: 2 CUPS COFFEE/ 3 DIET PEPSIS DAILY   Vaping Use   • Vaping Use: Never used   Substance and Sexual Activity   • Alcohol use: Yes     Comment: OCC   • Drug use: No   • Sexual activity: Never       Family History   Problem Relation Age of Onset   • Heart disease Mother    • Colon polyps Father    • Heart disease Father    • Prostate cancer Father    • Emphysema Father    • Lung disease Father    • Heart disease Brother    • Hypertension Brother    • Lung cancer Maternal Aunt    • Lung cancer Maternal Aunt    • Brain cancer Maternal Uncle    • Lung cancer Paternal Uncle    • Stroke Paternal Grandmother    • Cerebral aneurysm Paternal Grandmother    • Stroke Paternal Grandfather    • Cerebral aneurysm Paternal Grandfather    • Hepatitis Other    • Coronary artery disease Other    • Malig Hyperthermia Neg Hx        The following portions of the patient's history were reviewed and updated as appropriate: allergies, current medications, past family history, past medical history, past social history, past surgical history and problem list.         Objective:      Temp:  [97.5 °F (36.4 °C)-98.8 °F (37.1 °C)] 98.3 °F (36.8 °C)  Heart Rate:  [48-81] 62  Resp:  [12-32] 12  BP: ()/(37-74) 99/43  FiO2 (%):  [39 %-100 %] 39 %     Intake/Output Summary (Last 24 hours) at 2022 0746  Last data filed at 2022 1753  Gross per 24 hour   Intake 875 ml   Output 100 ml   Net 775 ml     Body mass index is 26.09 kg/m².      22  0100 22  1341 22  1520   Weight: 69 kg (152 lb 3.2 oz) 68.9 kg (152 lb) 68.9 kg (152 lb)           Physical Exam:  Constitutional:  Frail, chronically ill-appearing, no acute distress   HENT: Oropharynx clear and membrane moist, ET tube in place  Eyes: Normal conjunctiva, no sclera  icterus.  Neck: Supple, no carotid bruit bilaterally.  Cardiovascular: Regular rate and rhythm, Early peaking systolic murmur over the right upper sternal border, No bilateral lower extremity edema.  Pulmonary: Normal respiratory effort, normal lung sounds, no wheezing.  Abdominal: Soft, nontender, no hepatosplenomegaly, liver is non-pulsatile.  Neurological:  Sedated on ventilator unable to assess   Skin: Warm, dry, no ecchymosis, no rash.  Psych:  Unable to assess patient intubated and sedated.         Lab Review:   Results from last 7 days   Lab Units 12/01/22  0443 11/30/22  0358 11/29/22  0354 11/28/22  0234 11/27/22  1040 11/24/22  0809   SODIUM mmol/L 141 139 133* 136 132* 134*   POTASSIUM mmol/L 4.4 3.7 3.9 3.9 3.9 4.3   CHLORIDE mmol/L 113* 109* 102 103 102 103   CO2 mmol/L 14.0* 14.0* 18.8* 20.1* 20.5* 23.3   BUN mg/dL 79* 62* 63* 41* 29* 17   CREATININE mg/dL 3.93* 3.37* 2.94* 2.75* 2.45* 1.74*   GLUCOSE mg/dL 182* 181* 202* 194* 149* 137*   CALCIUM mg/dL 8.8 8.5* 8.5* 9.0 9.2 8.9   AST (SGOT) U/L  --   --   --   --   --  15   ALT (SGPT) U/L  --   --   --   --   --  8         Results from last 7 days   Lab Units 12/01/22  0443 11/30/22  0358 11/29/22  0655 11/28/22  0234 11/27/22  1932 11/27/22  1040 11/24/22  0809   WBC 10*3/mm3 10.47 13.53* 14.73* 9.13  --  12.21* 4.83   HEMOGLOBIN g/dL 6.6* 7.2* 7.2* 8.0* 6.7* 6.0* 7.2*   HEMATOCRIT % 20.2* 22.8* 22.3* 24.5* 21.1* 17.9* 23.0*   PLATELETS 10*3/mm3 194 284 296 210  --  276 231     Results from last 7 days   Lab Units 11/25/22  0931   INR  1.14*     Results from last 7 days   Lab Units 11/30/22  2325 11/28/22  0234 11/27/22  1040   MAGNESIUM mg/dL 2.1 2.3 2.0     Results from last 7 days   Lab Units 12/01/22  0443   TRIGLYCERIDES mg/dL 142     The ASCVD Risk score (Ean DK, et al., 2019) failed to calculate for the following reasons:    The valid total cholesterol range is 130 to 320 mg/dL         Results from last 7 days   Lab Units 11/30/22 2320    TSH uIU/mL 1.110                 Echocardiogram 11/30/2022 with images reviewed by myself final report pending:  · Overall normal left-ventricular function with no significant valvular abnormalities.      Previous testing:      Echocardiogram 10/05/2022:  · Calculated left ventricular EF = 66% Estimated left ventricular EF was in agreement with the calculated left ventricular EF. Left ventricular systolic function is normal.  · Left ventricular wall thickness is consistent with mild concentric hypertrophy.  · Left ventricular diastolic function was normal.  · Normal right ventricular cavity size and systolic function noted.  · The left atrial cavity is moderately dilated  · The interatrial septum appears redundant.  · The aortic valve leaflets are moderately calcified  · Calculated right ventricular systolic pressure from tricuspid regurgitation is 19 mmHg.  · There is no evidence of pericardial effusion    Stress test 10/05/2022:  · Myocardial perfusion imaging indicates a normal myocardial perfusion study with no evidence of ischemia.  · Left ventricular ejection fraction is hyperdynamic (Calculated EF > 70%). .  · Impressions are consistent with a low risk study.     24 hr holter 03/29/2021:  · Underlying heart rhythm was sinus rhythm with an average heart of 76 bpm and a heart rate range of 35 bpm up to 115 bpm   · There were frequent episodes of type I second-degree AV block throughout the study.           Assessment:           Hemoptysis    Type 2 diabetes mellitus with diabetic neuropathy, without long-term current use of insulin (HCC)    Hypothyroidism    Cystic kidney disease    Ductal carcinoma in situ (DCIS) of left breast    Lupus erythematosus    Normocytic anemia    JODY (acute kidney injury) (HCC)    Stage 3 chronic kidney disease (HCC)    Moderate malnutrition (HCC)         Plan:       Ms. Lee is a 76 y.o. woman with past medical history notable for hypertension, coronary artery disease, mixed  hyperlipidemia, hypothyroidism, sick sinus syndrome, lupus, diabetes type 2, and aortic sclerosis who presented to the emergency room on 11/21/2022 with worsening shortness of breath and hemoptysis.  She underwent bronchoscopy to further evaluate her pulmonary infiltrates was concerning for possible vasculitis with ANCA panel and further evaluation of her acute renal failure noted concerning evidence of vasculitis on renal biopsy 11/28/2022.  She had been undergoing plasmapheresis which started on 11/29/2022.  Unfortunately patient became agitated and altered requiring intubation 11/30/2022.  She did have bradycardia after intubation while on Precedex and propofol and carvedilol all of which has been stopped.  Her heart rate has improved.  She does have some underlying mild sick sinus syndrome but does have good heart rate response when tested as an outpatient.  Does not seem to be needing any pacemaker or any adjustments besides holding AV fay blocking agents at this time.  We will continue to closely follow along over the next 24 to 40 hours to monitor heart rate response.      Bradycardia:  · Does have mild sick sinus syndrome and MA prolongation at baseline likely exacerbated by acute illness and AV fay blocking medications  · Will hold carvedilol going forward  · Trying to minimize use of Precedex and propofol as possible    Aortic sclerosis:  · Defines heart murmur heard on exam no further work-up needed          Thank you for allowing me to participate in the care of Yi PEREZ Jesus. Feel free to contact me directly with any further questions or concerns.    Fran Falcon MD  [facility]  12/01/22  07:46 EST

## 2022-12-02 NOTE — PROGRESS NOTES
Nutrition Services    Patient Name:  Yi Lee  YOB: 1945  MRN: [id number]  Admit Date:  11/21/2022    Comment: Nutrition follow up. Remains NPO.  NG still not in optimal position due to hiatal hernia. If able to get it advanced in IR, recommend starting TF's with Novasource Renal with goal at 35ml/hr and water [id number].     Will continue to follow Clinical Course and monitor nutrition support needs.    CLINICAL NUTRITION ASSESSMENT      Reason for Assessment Follow-up Protocol, NPO/Clear Liquid Status     Diagnosis/Problem   Respiratory failure on the vent, vasculitis, AMS, JODY, anemia, DM,    Medical/Surgical History Past Medical History:   Diagnosis Date   • Abdominal pain, LLQ    • Abnormal Pap smear of cervix    • JODY (acute kidney injury) (HCC) 11/21/2022   • Anemia    • Arthralgia of hip 11/22/2015   • Asthma    • Ataxic gait 11/22/2015    Description: Eval Dr Lillian Mederos, Neuro.  Some vestibular dysfunction present 2013/2014   • Bipolar I disorder, single manic episode (HCC)    • Bradycardia    • Cardiac murmur    • Colon polyp    • Coronary artery disease    • Degeneration, intervertebral disc, thoracolumbar    • Depression    • Diabetes mellitus (HCC)    • Disease of thyroid gland     Hypothyroidism   • Diverticulosis    • Ductal carcinoma in situ (DCIS) of left breast    • Esophageal spasm    • Fatigue    • GERD (gastroesophageal reflux disease)    • H/O bone density study 10/17/2007    Dr. Giles   • Heart murmur    • Hemorrhoids    • History of mammogram     02/2012, per GYN Reshma Aranda   • History of Papanicolaou smear of cervix     DR. GILES GYN   • History of transfusion    • Hyperlipidemia    • Hypertension    • Impaired cognition 11/22/2015    Description: Testing done 05/14   • Lupus (HCC)    • Optic nerve contusion    • Pain of lower extremity 11/22/2015   • Pseudoaneurysm following procedure (HCC)     DURING CARDIAC CATHETERIZATION   • Shoulder pain     LEFT   • Skin tear  of forearm without complication, right, initial encounter    • Stage 2 chronic kidney disease    • Systolic murmur    • Thyroid disease    • Vitamin B12 deficiency    • Vitamin D deficiency        Past Surgical History:   Procedure Laterality Date   • BREAST BIOPSY  2011   • BREAST LUMPECTOMY Left 2020    benign   • BRONCHOSCOPY N/A 11/22/2022    Procedure: BRONCHOSCOPY with fluoroscopy,  BAL, washings, biopsies;  Surgeon: Arjun Joseph Jr., MD;  Location: [facility];  Service: Pulmonary;  Laterality: N/A;  PRE OP - HEMOPTYSIS, PULMONARY INFILTRATES  POST OP - SAME   • CARDIAC CATHETERIZATION     • CATARACT EXTRACTION, BILATERAL Bilateral    • CHOLECYSTECTOMY     • COLONOSCOPY  2007    done 02/12, repeat 5 yrs, Dr Grigsby; tics in sigmoid colon, IH   • COLONOSCOPY N/A 11/03/2016    polyp, IH   • COLONOSCOPY N/A 02/24/2020    Procedure: COLONOSCOPY TO CECUM WITH COLD POLYPECTOMY;  Surgeon: Eddie Grigsby MD;  Location: [facility];  Service: Gastroenterology;  Laterality: N/A;  pre: hx of polyps  post: polyp, hemorrhoids, diverticulosis   • COLONOSCOPY N/A 12/07/2021    Procedure: COLONOSCOPY TO CECUM  - COLD BIOPSY POLYPECTOMIES;  Surgeon: Eddie Grigsby MD;  Location: [facility];  Service: Gastroenterology;  Laterality: N/A;  IRON DEFICIENCY ANEMIA, HX POLYPS, CONSTIPATION   ---DIVERTICULOSIS, POLYPS   • COLONOSCOPY  2010    normal   • COLPOSCOPY W/ BIOPSY / CURETTAGE     • D & C AND LAPAROSCOPY  1974   • DILATATION AND CURETTAGE     • ENDOSCOPY N/A 02/24/2020    Procedure: ESOPHAGOGASTRODUODENOSCOPY with biopsies;  Surgeon: Eddie Grigsby MD;  Location: [facility];  Service: Gastroenterology;  Laterality: N/A;  pre: iron deficiency anemia  post: gastriits   • ENDOSCOPY N/A 12/07/2021    Procedure: ESOPHAGOGASTRODUODENOSCOPY, WITH BIOPSIES;  Surgeon: Eddie Grigsby MD;  Location: [facility];  Service: Gastroenterology;  Laterality: N/A;  GERD,WT LOSS, IRON DEFICIENCY ANEMIA ,   "DYSPEPSIA  ---HIATAL HERNIA, GASTRITIS   • ESOPHAGOGASTRIC FUNDOPLASTY     • HYSTEROSCOPY     • JOINT REPLACEMENT     • ROTATOR CUFF REPAIR Right 2009   • ROTATOR CUFF REPAIR Left    • TOTAL KNEE ARTHROPLASTY Left 02/13/2018    Procedure: LT TOTAL KNEE ARTHROPLASTY;  Surgeon: Selwyn Pinto MD;  Location: [facility];  Service:    • TOTAL SHOULDER ARTHROPLASTY W/ DISTAL CLAVICLE EXCISION Left 08/09/2022    Procedure: TOTAL SHOULDER REVERSE ARTHROPLASTY;  Surgeon: Yony Gallardo MD;  Location: [facility];  Service: Orthopedics;  Laterality: Left;        Encounter Information        Nutrition History:  Po intake fair 50%,Usually only eats one meal per day   Food Preferences:    Supplements:    Factors Affecting Intake: altered mental status, altered respiratory status     Anthropometrics        Current Height  Current Weight  BMI kg/m2 Height: 162.6 cm (64\")  Weight: 68.9 kg (152 lb) (11/30/22 1520)  Body mass index is 26.09 kg/m².   Adjusted BMI (if applicable)        Admission Weight        Ideal Body Weight (IBW) 54.7 kg   Adjusted IBW (if applicable)        Usual Body Weight (UBW) 165   Weight Change/Trend Loss 151-175lb, -10-13lbx x 2 mo       Weight History Wt Readings from Last 30 Encounters:   11/30/22 1520 68.9 kg (152 lb)   11/22/22 1341 68.9 kg (152 lb)   11/22/22 0100 69 kg (152 lb 3.2 oz)   11/21/22 1104 70.8 kg (156 lb)   11/09/22 1120 69.4 kg (153 lb)   11/02/22 1355 68.5 kg (151 lb)   11/02/22 0203 68.8 kg (151 lb 10.8 oz)   10/31/22 1008 69.9 kg (154 lb)   10/05/22 0109 76.4 kg (168 lb 6.4 oz)   09/16/22 0907 73.5 kg (162 lb)   08/30/22 1000 72.6 kg (160 lb)   08/24/22 0829 74.8 kg (165 lb)   08/09/22 1027 75.2 kg (165 lb 12.8 oz)   08/04/22 1405 75.2 kg (165 lb 12.8 oz)   06/14/22 1356 73.9 kg (163 lb)   06/02/22 0901 73.9 kg (163 lb)   05/16/22 1332 72.6 kg (160 lb)   04/15/22 1132 74 kg (163 lb 3.2 oz)   12/07/21 0636 71.2 kg (157 lb)   11/15/21 1022 71.1 kg (156 lb 12.8 oz)   10/13/21 " 1455 74 kg (163 lb 3.2 oz)   09/17/21 1054 72.6 kg (160 lb)   08/11/21 0817 75.3 kg (166 lb)   06/11/21 1337 76.7 kg (169 lb)   05/03/21 1318 77.1 kg (170 lb)   04/28/21 0758 78.5 kg (173 lb)   03/29/21 1027 78.9 kg (174 lb)   03/22/21 1005 78.9 kg (174 lb)   03/18/21 0934 79.2 kg (174 lb 9.6 oz)   02/24/21 1020 77.6 kg (171 lb)   02/04/21 1308 78.8 kg (173 lb 12.8 oz)   01/27/21 1033 78 kg (172 lb)   11/23/20 1451 79.4 kg (175 lb)             Tests/Procedures        Tests/Procedures X-Ray plasmapheresis     Labs       Pertinent Labs    Results from last 7 days   Lab Units 12/02/22  0445 12/01/22  1255 12/01/22  0443   SODIUM mmol/L 146* 144 141   POTASSIUM mmol/L 3.6 4.3 4.4   CHLORIDE mmol/L 114* 115* 113*   CO2 mmol/L 15.9* 11.7* 14.0*   BUN mg/dL 92* 75* 79*   CREATININE mg/dL 4.25* 4.04* 3.93*   CALCIUM mg/dL 8.5* 8.7 8.8   GLUCOSE mg/dL 164* 195* 182*     Results from last 7 days   Lab Units 12/02/22  0445 12/01/22  1130 12/01/22  0443 11/30/22  2325 11/29/22  0354 11/28/22  0234 11/27/22  1932 11/27/22  1040   MAGNESIUM mg/dL  --   --   --  2.1  --  2.3  --  2.0   PHOSPHORUS mg/dL 6.2*  --  7.2*  --    < > 5.5*  --  3.9   HEMOGLOBIN g/dL 7.3*   < > 6.6*  --    < > 8.0*   < > 6.0*   HEMATOCRIT % 22.4*   < > 20.2*  --    < > 24.5*   < > 17.9*   WBC 10*3/mm3 12.52*  --  10.47  --    < > 9.13  --  12.21*   TRIGLYCERIDES mg/dL  --   --  142  --   --   --   --   --    ALBUMIN g/dL 3.80  --  4.10  --    < > 3.20*  --  3.20*    < > = values in this interval not displayed.     Results from last 7 days   Lab Units 12/02/22  0445 12/01/22  0443 11/30/22  0358 11/29/22  0655 11/28/22  0234   PLATELETS 10*3/mm3 159 194 284 296 210     COVID19   Date Value Ref Range Status   11/21/2022 Not Detected Not Detected - Ref. Range Final     Lab Results   Component Value Date    HGBA1C 6.10 (H) 11/02/2022          Medications           Scheduled Medications albumin human, 12.5 g, Intravenous, Once   And  sodium chloride, 200 mL,  "Intravenous, Once  albumin human, 3,000 mL, Intravenous, Daily  anticoagulant citrate (ACD) formula A, 1,000 mL, Apheresis, Daily  calcium gluconate IVPB, 4 g, Intravenous, Daily  cefTRIAXone, 2 g, Intravenous, Q24H  chlorhexidine, 15 mL, Mouth/Throat, Q12H  [START ON 12/3/2022] ferrous sulfate, 300 mg, Nasogastric, Every Other Day  insulin lispro, 0-9 Units, Subcutaneous, Q6H  lactulose, 30 g, Nasogastric, BID  lamoTRIgine, 200 mg, Nasogastric, Daily  lamoTRIgine, 300 mg, Nasogastric, Nightly  levothyroxine, 100 mcg, Nasogastric, Daily  methylPREDNISolone sodium succinate, 500 mg, Intravenous, Daily  pantoprazole, 40 mg, Intravenous, Q24H  rosuvastatin, 5 mg, Nasogastric, Nightly  cyanocobalamin, 1,000 mcg, Nasogastric, Daily       Infusions Pharmacy Consult,   sodium bicarbonate, 150 mEq, Last Rate: 150 mEq (12/02/22 0500)  sodium chloride, 30 mL/hr, Last Rate: Stopped (11/22/22 1500)       PRN Medications •  acetaminophen  •  benzonatate  •  dextrose  •  dextrose  •  fentaNYL citrate (PF)  •  glucagon (human recombinant)  •  heparin (porcine)  •  HYDROcodone-acetaminophen  •  HYDROmorphone  •  LORazepam  •  Pharmacy Consult  •  sodium chloride     Physical Findings          Physical Appearance ventilator support   Oral/Mouth Cavity teeth missing   Edema  no edema   Gastrointestinal last bowel movement:11/30   Skin  other: wound, dressing in place   Tubes/Drains NG tube   NFPE Not applicable at this time    -  Malnutrition Severity Assessment      Patient meets criteria for : Moderate (non-severe) Malnutrition       -  Estimated/Assessed Needs       Energy Requirements    Height for Calculation  Height: 162.6 cm (64\")   Weight for Calculation 68.9 kg   Method for Estimation  25 kcal/kg, 30 kcal/kg   EST Needs (kcal/day) 0720-4904       Protein Requirements    Weight for Calculation 68.9 kg   EST Protein Needs (g/kg) 1.0 gm/kg, 1.2 gm/kg   EST Daily Needs (g/day) 68-82       Fluid Requirements     Method for " Estimation 1 mL/kcal    Estimated Needs (mL/day) 1722       Fluid Deficit    Current Na Level (mEq/L)    Desired Na Level (mEq/L)    Estimated Fluid Deficit (L)           Current Nutrition Orders & Evaluation of Intake       Oral Nutrition     Food Allergies NKFA   Current PO Diet NPO Diet NPO Type: Strict NPO   Supplement n/a   PO Evaluation     % PO Intake     # of Days Evaluated    --  PES STATEMENT / NUTRITION DIAGNOSIS      Nutrition Dx Problem  Problem: Needs Alternative Route  Etiology: Factors Affecting Nutrition respiratory failure on the vent  Signs/Symptoms: NPO    Comment:    --  NUTRITION INTERVENTION / PLAN OF CARE      Intervention Goal(s) Maintain nutrition status, Nutrition support treatment, Improved nutrition related labs, Reduce/improve symptoms, Meet estimated needs, Disease management/therapy, Initiate TF/PN, Tolerate TF/PN at goal and No significant weight loss         RD Intervention/Action Follow Tx Progress, Care plan reviewed and Recommend/ordered:         Enteral Prescription:     Enteral Route NG    TF Delivery Method Continuous    Enteral Product Novasource Renal    Modular     Propofol Rate/Kcal     TF Start Rate (mL/hr) 20    TF Goal Rate (mL/hr) 35    Free Water Flush (mL) [id number]    TF Provision at Goal: 1920kcal, 87gm protein, 691mL free water + 180mL water flushes         Calories 100 % needs met         Protein  106 % needs met         Fluid (mL) 871mL total     Prescription Ordered Yes   -      Monitor/Evaluation Per protocol, I&O, Pertinent labs, EN delivery/tolerance, Weight, Skin status, GI status, Symptoms, POC/GOC, Hemodynamic stability   Discharge Plan/Needs Pending clinical course   Education Will instruct as appropriate   --    RD to follow per protocol.      Electronically signed by:  Ivanna Lee RD  12/02/22 14:57 EST

## 2022-12-02 NOTE — PROGRESS NOTES
[facility] Progress Note      Patient Name: Yi Lee  : 1945  MRN: [id number]  Primary Care Physician:  Ankit Albert MD  Date of admission: 2022    Subjective     Interval History:   Follow-up acute kidney injury on chronic kidney disease.    Seen and examined.  Currently in ICU.  Intubated.  Off sedation.  Currently on FiO2 60%.  Urine output 200 cc overnight.    Review of Systems:   As noted above    Objective     Vitals:   Temp:  [97.1 °F (36.2 °C)-98.3 °F (36.8 °C)] 97.7 °F (36.5 °C)  Heart Rate:  [55-93] 69  Resp:  [12-30] 29  BP: ()/(36-61) 127/61  FiO2 (%):  [39 %-70 %] 70 %    Intake/Output Summary (Last 24 hours) at 2022 0534  Last data filed at 2022 1600  Gross per 24 hour   Intake 529.75 ml   Output 300 ml   Net 229.75 ml       Physical Exam:    General Appearance: Intubated  Skin: warm and dry  HEENT: oral mucosa normal, nonicteric sclera  Neck: supple, no JVD  Lungs: Bilateral rhonchi, breathing effort not labored  Heart: RRR, normal S1 and S2, no S3, no rub  Abdomen: soft, nontender, nondistended  : no palpable bladder        Scheduled Meds:     albumin human, 12.5 g, Intravenous, Once   And  sodium chloride, 200 mL, Intravenous, Once  albumin human, 3,000 mL, Intravenous, Daily  anticoagulant citrate (ACD) formula A, 1,000 mL, Apheresis, Daily  calcium gluconate IVPB, 4 g, Intravenous, Daily  cefTRIAXone, 2 g, Intravenous, Q24H  chlorhexidine, 15 mL, Mouth/Throat, Q12H  [START ON 12/3/2022] ferrous sulfate, 300 mg, Nasogastric, Every Other Day  hydrALAZINE, 100 mg, Nasogastric, TID  insulin lispro, 0-9 Units, Subcutaneous, Q6H  lactulose, 30 g, Nasogastric, BID  lamoTRIgine, 200 mg, Nasogastric, Daily  lamoTRIgine, 300 mg, Nasogastric, Nightly  levothyroxine, 100 mcg, Nasogastric, Daily  methylPREDNISolone sodium succinate, 500 mg, Intravenous, Daily  pantoprazole, 40 mg, Intravenous, Q24H  rosuvastatin, 5 mg, Nasogastric,  Nightly  cyanocobalamin, 1,000 mcg, Nasogastric, Daily      IV Meds:   Pharmacy Consult,   sodium bicarbonate, 150 mEq, Last Rate: 150 mEq (12/01/22 1356)  sodium chloride, 30 mL/hr, Last Rate: Stopped (11/22/22 1500)        Results Reviewed:   I have personally reviewed the results from the time of this admission to 12/2/2022 05:34 EST     Results from last 7 days   Lab Units 12/01/22  1255 12/01/22 0443 11/30/22  0358   SODIUM mmol/L 144 141 139   POTASSIUM mmol/L 4.3 4.4 3.7   CHLORIDE mmol/L 115* 113* 109*   CO2 mmol/L 11.7* 14.0* 14.0*   BUN mg/dL 75* 79* 62*   CREATININE mg/dL 4.04* 3.93* 3.37*   CALCIUM mg/dL 8.7 8.8 8.5*   GLUCOSE mg/dL 195* 182* 181*     Estimated Creatinine Clearance: 11.5 mL/min (A) (by C-G formula based on SCr of 4.04 mg/dL (H)).  Results from last 7 days   Lab Units 12/01/22 0443 11/30/22  2325 11/30/22  0358 11/29/22  0354 11/28/22  0234 11/27/22  1040   MAGNESIUM mg/dL  --  2.1  --   --  2.3 2.0   PHOSPHORUS mg/dL 7.2*  --  5.1* 5.4* 5.5* 3.9     Results from last 7 days   Lab Units 11/28/22  0234 11/27/22  1040   URIC ACID mg/dL 7.9* 6.7*     Results from last 7 days   Lab Units 12/02/22  0445 12/01/22  1130 12/01/22  0443 11/30/22  0358 11/29/22  0655 11/28/22  0234   WBC 10*3/mm3 12.52*  --  10.47 13.53* 14.73* 9.13   HEMOGLOBIN g/dL 7.3* 7.4* 6.6* 7.2* 7.2* 8.0*   PLATELETS 10*3/mm3 159  --  194 284 296 210     Results from last 7 days   Lab Units 11/25/22  0931   INR  1.14*       Assessment / Plan     ASSESSMENT:  1.  Acute kidney injury on chronic kidney disease stage III: Creatinine on 11/24/2022 was 1.74, kidney biopsy was done awaiting for the results with high suspicion of pauci-immune vasculitis based on the high ANCA and pulmonary hemorrhage.  Received first dose of rituximab 11/28/2022.  Biopsy was done on 11/28/2022.  2.  Bilateral pulmonary infiltrate patient had biopsy yesterday suggestive of alveolar hemorrhage  3.  Chronic kidney disease stage III with baseline  creatinine 1.4 mg/dL  4.  History of COPD  5.  History of diabetes mellitus type  6.  History of hypertension, reasonably,     PLAN:    1.  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  Patient received 1 dose of rituximab on November 28, 2022.  She is going to receive her fourth session of plasmapheresis today.  Discussed with the son and nursing staff Mat on the bedside.  2.  Continue pulse dose of steroid  3.  Blood transfusion as needed.  Monitor fibrinogen   4. Surveillance labs    Thank you for involving us in the care of Yi Lee.  Please feel free to call with any questions.    Magdalene Unger MD  12/02/22  05:34 [facility]    [facility]  [phone]

## 2022-12-02 NOTE — PROGRESS NOTES
[facility]      Mar/chart reviewed  [facility] JODY  On vent, off sedation a bit agitated,not following commands    unable to provide subjective    Vital Sign Min/Max for last 24 hours  Temp  Min: 97.1 °F (36.2 °C)  Max: 98.1 °F (36.7 °C)   BP  Min: 88/40  Max: 135/56   Pulse  Min: 58  Max: 93   Resp  Min: 15  Max: 30   SpO2  Min: 87 %  Max: 100 %   No data recorded   No data recorded   529/550    Appears illl, on vent  perrl, eomi, normal sclera  mmm, no jvd, trachea midline, neck supple,  chest good air bilaterally, + crackles, no wheezes,   rrr,   soft, nt, nd +bs,  no c/c/ e  Skin warm, dry no rashes    Labs: 12/2: reviewed:  7.235/39/75  Glucose 164  Bun 92  Cr 4.25  Na 146  Bicarb 15.9  Wbc 12  hgb 7.3  plts 159    12/2: cxr: still with extensive bilateral infiltrates    Micro:neg    A/P:  1. Hemoptysis with bilateral pulmonary infiltrates -- most likely due to vasculitis -- increase dose solumedrol,  plasmapharesis and cytoxan   2. JODY with hematuria on CKDII -- as above, nephrology following  3. C-Anca -- 1: 640  4. DMII --ssi  5. Anemia -- continue to monitor, transfuse as needed  6. Encephalopathy -- supportive care  7. Elevated procal -- will cover with rocephin but doubt bacterial pneumonia is reason for worsening.  8. AHRF - intubate -- lung protective ventilation, check echo (read pending)  9. Hypothyroidism - continue replacement  10. Bipolar disorder  11. Bradycardia -- hold meds\    Not ready for extubation, conitnue plasmapheresis today  Steroids at 500 today, cytoxan per nephro  Unable to advance ng further due to large HH, will probably need to see if IR can do this.    CC 34 mins

## 2022-12-02 NOTE — PROGRESS NOTES
Patient Identification:  NAME:  Yi Lee  Age:  76 y.o.   Sex:  female   :  1945   MRN:  [id number]       Chief complaint: Metabolic encephalopathy, hemoptysis with bilateral pulmonary infiltrates secondary to vasculitis    History of present illness: He has received some Ativan overnight.  Also received some Dilaudid.  She is still on the ventilator and is poorly responsive today.  There is no definite spontaneous activity in the extremities.  She is not having any type of seizure activity.  Recall she has had a CT of her head on 2022 that showed no acute abnormality      Past medical history:  Past Medical History:   Diagnosis Date   • Abdominal pain, LLQ    • Abnormal Pap smear of cervix    • JODY (acute kidney injury) (HCC) 2022   • Anemia    • Arthralgia of hip 2015   • Asthma    • Ataxic gait 2015    Description: Eval Dr Lillian Mederos, Neuro.  Some vestibular dysfunction present    • Bipolar I disorder, single manic episode (HCC)    • Bradycardia    • Cardiac murmur    • Colon polyp    • Coronary artery disease    • Degeneration, intervertebral disc, thoracolumbar    • Depression    • Diabetes mellitus (HCC)    • Disease of thyroid gland     Hypothyroidism   • Diverticulosis    • Ductal carcinoma in situ (DCIS) of left breast    • Esophageal spasm    • Fatigue    • GERD (gastroesophageal reflux disease)    • H/O bone density study 10/17/2007    Dr. Giles   • Heart murmur    • Hemorrhoids    • History of mammogram     2012, per GYN Reshma Aranda   • History of Papanicolaou smear of cervix     DR. GILES GYN   • History of transfusion    • Hyperlipidemia    • Hypertension    • Impaired cognition 2015    Description: Testing done    • Lupus (HCC)    • Optic nerve contusion    • Pain of lower extremity 2015   • Pseudoaneurysm following procedure (HCC)     DURING CARDIAC CATHETERIZATION   • Shoulder pain     LEFT   • Skin tear of forearm without  complication, right, initial encounter    • Stage 2 chronic kidney disease    • Systolic murmur    • Thyroid disease    • Vitamin B12 deficiency    • Vitamin D deficiency        Allergies:  Penicillins, Ace inhibitors, Codeine, Morphine, and Telmisartan    Home medications:  Medications Prior to Admission   Medication Sig Dispense Refill Last Dose   • ALPRAZolam (Xanax) 0.5 MG tablet Take 1 tablet by mouth 3 (Three) Times a Day As Needed for Anxiety. (Patient taking differently: Take 0.5 mg by mouth 3 (Three) Times a Day As Needed for Anxiety. Pt states she only takes 1/2 a pill prn) 270 tablet 0    • anastrozole (ARIMIDEX) 1 MG tablet Take 1 mg by mouth Daily.      • carvedilol (COREG) 6.25 MG tablet Take 1 tablet by mouth 2 (Two) Times a Day. 60 tablet 0    • chlorthalidone (HYGROTON) 25 MG tablet Take 25 mg by mouth Daily.      • cyanocobalamin (VITAMIN B-12) 1000 MCG tablet Take 1 tablet by mouth Daily. 30 tablet 0    • ferrous sulfate 325 (65 FE) MG tablet Take 1 tablet by mouth Daily With Breakfast & Dinner. 60 tablet 3    • glucose blood (FREESTYLE LITE) test strip Use to test everyday as directed 100 each 5    • hydrALAZINE (APRESOLINE) 50 MG tablet Take 100 mg by mouth 3 (Three) Times a Day.      • lactulose (Generlac) 10 GM/15ML solution solution (encephalopathy) TAKE 35 TO 45 ML BY MOUTH DAILY FOR CONSTIPATION 4050 mL 1    • lamoTRIgine (LaMICtal) 200 MG tablet TAKE 2 AND 1/2 TABLETS BY  MOUTH AT NIGHT (Patient taking differently: Take 200 mg by mouth. Pt states she takes 1 in the morning and 1 1/2 at night) 225 tablet 3    • Lancets (FREESTYLE) lancets As directed to check blood glucose 100 each 12    • levothyroxine (SYNTHROID, LEVOTHROID) 100 MCG tablet TAKE 1 TABLET BY MOUTH  DAILY 90 tablet 3    • metFORMIN (GLUCOPHAGE) 500 MG tablet TAKE 2 TABLETS BY MOUTH  DAILY WITH BREAKFAST 180 tablet 3    • omeprazole (priLOSEC) 40 MG capsule TAKE 1 CAPSULE BY MOUTH  DAILY 90 capsule 3    • PARoxetine (PAXIL)  40 MG tablet TAKE 1 TABLET BY MOUTH IN  THE MORNING FOR DEPRESSION 90 tablet 1    • rosuvastatin (CRESTOR) 5 MG tablet TAKE 1 TABLET BY MOUTH AT  NIGHT FOR CHOLESTEROL 90 tablet 3         Hospital medications:  albumin human, 12.5 g, Intravenous, Once   And  sodium chloride, 200 mL, Intravenous, Once  albumin human, 3,000 mL, Intravenous, Daily  anticoagulant citrate (ACD) formula A, 1,000 mL, Apheresis, Daily  calcium gluconate IVPB, 4 g, Intravenous, Daily  cefTRIAXone, 2 g, Intravenous, Q24H  chlorhexidine, 15 mL, Mouth/Throat, Q12H  [START ON 12/3/2022] ferrous sulfate, 300 mg, Nasogastric, Every Other Day  insulin lispro, 0-9 Units, Subcutaneous, Q6H  lactulose, 30 g, Nasogastric, BID  lamoTRIgine, 200 mg, Nasogastric, Daily  lamoTRIgine, 300 mg, Nasogastric, Nightly  levothyroxine, 100 mcg, Nasogastric, Daily  methylPREDNISolone sodium succinate, 500 mg, Intravenous, Daily  pantoprazole, 40 mg, Intravenous, Q24H  rosuvastatin, 5 mg, Nasogastric, Nightly  cyanocobalamin, 1,000 mcg, Nasogastric, Daily      Pharmacy Consult,   sodium bicarbonate, 150 mEq, Last Rate: 150 mEq (12/02/22 0500)  sodium chloride, 30 mL/hr, Last Rate: Stopped (11/22/22 1500)      •  acetaminophen  •  benzonatate  •  dextrose  •  dextrose  •  fentaNYL citrate (PF)  •  glucagon (human recombinant)  •  heparin (porcine)  •  HYDROcodone-acetaminophen  •  HYDROmorphone  •  LORazepam  •  Pharmacy Consult  •  sodium chloride      Objective:  Vitals Ranges:   Temp:  [97.7 °F (36.5 °C)-98 °F (36.7 °C)] 98 °F (36.7 °C)  Heart Rate:  [58-93] 70  Resp:  [26-29] 29  BP: ()/(40-99) 118/70  FiO2 (%):  [40 %-70 %] 40 %      Physical Exam:  Patient is sedated.  Pupils 2 and half constricting slightly corneals are present.  No other cranial nerves could be tested.  She is not following any commands.  She is on the ventilator.  She has excellent withdrawal and grimace to pain.  Reflexes trace toes downgoing  Results review:   I reviewed the  patient's new clinical results.    Data review:  Lab Results (last 24 hours)     Procedure Component Value Units Date/Time    POC Glucose Once [[id number]]  (Normal) Collected: 12/02/22 1153    Specimen: Blood Updated: 12/02/22 1158     Glucose 129 mg/dL      Comment: Meter: [id number] : [id number] Ravi WAHL       Blood Gas, Arterial - [[id number]]  (Abnormal) Collected: 12/02/22 1112    Specimen: Arterial Blood Updated: 12/02/22 1120     Site Arterial: left radial     Owen's Test Positive     pH, Arterial 7.151 pH units      Comment: [id number] Meter: [id number] : [id number] María Elena AmandaCritical:Notify Dr jenna avila (02-Dec-22 11:17:31)Read back ok        pCO2, Arterial 45.7 mm Hg      pO2, Arterial 62.6 mm Hg      HCO3, Arterial 15.9 mmol/L      Base Excess, Arterial -12.1 mmol/L      O2 Saturation Calculated 84.1 %      A-a DO2 0.3 mmHg      Barometric Pressure for Blood Gas 755.9 mmHg      Modality Adult Vent     FIO2 40 %      Ventilator Mode PS     Rate 15 Breaths/minute      PEEP 5     PSV 10 cmH2O     POC Glucose Once [[id number]]  (Abnormal) Collected: 12/02/22 0609    Specimen: Blood Updated: 12/02/22 0631     Glucose 179 mg/dL      Comment: Meter: [id number] : [id number] Dick Self VISH       Blood Gas, Arterial - [[id number]]  (Abnormal) Collected: 12/02/22 0607    Specimen: Arterial Blood Updated: 12/02/22 0611     Site Arterial: right brachial     Owen's Test N/A     pH, Arterial 7.235 pH units      Comment: SAT 97 IP 16 Meter: [id number] : [id number] Trish JaimesCritical:Notify THI MASSEY  (02-Dec-22 06:09:17)Read back ok        pCO2, Arterial 39.5 mm Hg      pO2, Arterial 75.4 mm Hg      HCO3, Arterial 16.7 mmol/L      Base Excess, Arterial -9.9 mmol/L      O2 Saturation Calculated 92.2 %      A-a DO2 0.2 mmHg      Barometric Pressure for Blood Gas 757.7 mmHg      Modality Adult Vent     FIO2 50 %      Ventilator Mode PC     Set Tidal Volume 458     Set Mech Resp  Rate 22     Rate 28 Breaths/minute      PEEP 5    Renal Function Panel [[id number]]  (Abnormal) Collected: 12/02/22 0445    Specimen: Blood Updated: 12/02/22 0544     Glucose 164 mg/dL      BUN 92 mg/dL      Creatinine 4.25 mg/dL      Sodium 146 mmol/L      Potassium 3.6 mmol/L      Chloride 114 mmol/L      CO2 15.9 mmol/L      Calcium 8.5 mg/dL      Albumin 3.80 g/dL      Phosphorus 6.2 mg/dL      Anion Gap 16.1 mmol/L      BUN/Creatinine Ratio 21.6     eGFR 10.4 mL/min/1.73      Comment: <15 Indicative of kidney failure       Narrative:      GFR Normal >60  Chronic Kidney Disease <60  Kidney Failure <15    The GFR formula is only valid for adults with stable renal function between ages 18 and 70.    Fibrinogen [[id number]]  (Abnormal) Collected: 12/02/22 0445    Specimen: Blood Updated: 12/02/22 0528     Fibrinogen 193 mg/dL     CBC & Differential [[id number]]  (Abnormal) Collected: 12/02/22 0445    Specimen: Blood Updated: 12/02/22 0503    Narrative:      The following orders were created for panel order CBC & Differential.  Procedure                               Abnormality         Status                     ---------                               -----------         ------                     CBC Auto Differential[[id number]]        Abnormal            Final result                 Please view results for these tests on the individual orders.    CBC Auto Differential [[id number]]  (Abnormal) Collected: 12/02/22 0445    Specimen: Blood Updated: 12/02/22 0503     WBC 12.52 10*3/mm3      RBC 2.64 10*6/mm3      Hemoglobin 7.3 g/dL      Hematocrit 22.4 %      MCV 84.8 fL      MCH 27.7 pg      MCHC 32.6 g/dL      RDW 15.7 %      RDW-SD 47.5 fl      MPV 8.9 fL      Platelets 159 10*3/mm3      Neutrophil % 95.5 %      Lymphocyte % 1.6 %      Monocyte % 1.8 %      Eosinophil % 0.0 %      Basophil % 0.1 %      Immature Grans % 1.0 %      Neutrophils, Absolute 11.95 10*3/mm3      Lymphocytes, Absolute 0.20 10*3/mm3       Monocytes, Absolute 0.23 10*3/mm3      Eosinophils, Absolute 0.00 10*3/mm3      Basophils, Absolute 0.01 10*3/mm3      Immature Grans, Absolute 0.13 10*3/mm3      nRBC 0.1 /100 WBC     POC Glucose Once [[id number]]  (Abnormal) Collected: 12/02/22 0221    Specimen: Blood Updated: 12/02/22 0222     Glucose 139 mg/dL      Comment: Meter: [id number] : [id number] Genie BRODERICK RN       POC Glucose Once [[id number]]  (Abnormal) Collected: 12/01/22 1945    Specimen: Blood Updated: 12/1945     Glucose 220 mg/dL      Comment: Meter: [id number] : [id number] More WAHL       POC Glucose Once [[id number]]  (Abnormal) Collected: 12/01/22 1818    Specimen: Blood Updated: 12/01/22 1820     Glucose 259 mg/dL      Comment: Meter: [id number] : [id number] Kishor Holman RN       POC Glucose Once [[id number]]  (Abnormal) Collected: 12/01/22 1609    Specimen: Blood Updated: 12/01/22 1613     Glucose 224 mg/dL      Comment: Meter: [id number] : [id number] Dannie WAHL       Tissue Pathology Exam [[id number]] Collected: 11/28/22 1530    Specimen: Tissue from Kidney Updated: 12/01/22 1548     Addendum --     Please see the completely scanned Pathology report from [facility] below.       Case Report --     Surgical Pathology Report                         Case: [id number]                                  Authorizing Provider:  Jonathan Hawley MD      Collected:           11/28/2022 03:30 PM          Ordering Location:     [facility]  Received:            11/28/2022 03:55 PM                                 [facility]                                                               Pathologist:           Emi Mansfield MD                                                          Specimen:    Kidney, right kidney renal bx                                                               Final Diagnosis --     1. Kidney, Right, Biopsy:   A. Entirely submitted to [employer] with an addendum to  follow.        Gross Description --     1. Kidney.  Received in two separate vials labeled with the patient's name and designated 'right kidney renal biopsy' is a formalin container consisting of a tan to red variegated core fragment of soft tissue measuring 1.4 x 0.1 cm in tubal diameter and a Luis Enrique's fixative vial containing a red to tan core fragment of soft tissue measuring 1.4 x 0.1 cm in tubal diameter. The entire specimen is sent to [facility] lab for processing.  No specimen is submitted for routine histology.  Gross diagnosis only.    mb/jazmin/johnna/dima             Imaging:  Imaging Results (Last 24 Hours)     ** No results found for the last 24 hours. **         PPE worn at all times washed before washed up afterwards disposed of everything properly is not within 6 feet of her for more than few minutes during my exam no aerosols used at any point  Assessment and Plan:     I believe this patient has an appropriate degree of metabolic encephalopathy status post Ativan and some Dilaudid overnight and today.  Her exam is still not suggestive of any acute stroke syndrome.      Scar Rangel MD  12/02/22  14:03 EST

## 2022-12-02 NOTE — PROGRESS NOTES
[facility] Progress Note       Encounter Date:22  Patient:Yi Lee  :1945  MRN:[id number]      Chief Complaint: Bradycardia      Subjective:        No events overnight    Review of Systems:  Review of Systems   Unable to perform ROS: intubated       Medications:  Scheduled Meds:  albumin human, 12.5 g, Intravenous, Once   And  sodium chloride, 200 mL, Intravenous, Once  albumin human, 3,000 mL, Intravenous, Daily  anticoagulant citrate (ACD) formula A, 1,000 mL, Apheresis, Daily  calcium gluconate IVPB, 4 g, Intravenous, Daily  cefTRIAXone, 2 g, Intravenous, Q24H  chlorhexidine, 15 mL, Mouth/Throat, Q12H  [START ON 12/3/2022] ferrous sulfate, 300 mg, Nasogastric, Every Other Day  insulin lispro, 0-9 Units, Subcutaneous, Q6H  lactulose, 30 g, Nasogastric, BID  lamoTRIgine, 200 mg, Nasogastric, Daily  lamoTRIgine, 300 mg, Nasogastric, Nightly  levothyroxine, 100 mcg, Nasogastric, Daily  methylPREDNISolone sodium succinate, 500 mg, Intravenous, Daily  pantoprazole, 40 mg, Intravenous, Q24H  rosuvastatin, 5 mg, Nasogastric, Nightly  cyanocobalamin, 1,000 mcg, Nasogastric, Daily    Continuous Infusions:  Pharmacy Consult,   sodium bicarbonate, 150 mEq, Last Rate: 150 mEq (22 0500)  sodium chloride, 30 mL/hr, Last Rate: Stopped (22 1500)    PRN Meds:  •  acetaminophen  •  benzonatate  •  dextrose  •  dextrose  •  fentaNYL citrate (PF)  •  glucagon (human recombinant)  •  heparin (porcine)  •  HYDROcodone-acetaminophen  •  HYDROmorphone  •  LORazepam  •  Pharmacy Consult  •  sodium chloride         Objective:       Vitals:    22 0800 22 0810 22 0900 22 1000   BP: 118/99  153/78 118/70   BP Location:       Patient Position:       Pulse: 67 67 70 70   Resp:       Temp:       TempSrc:       SpO2: 96% 96% 92% (!) 87%   Weight:       Height:               Physical Exam:  Constitutional:  Frail, chronically ill-appearing, no acute distress    HENT: Oropharynx clear and membrane moist, ET tube in place  Eyes: Normal conjunctiva, no sclera icterus.  Neck: Supple, no carotid bruit bilaterally.  Cardiovascular: Regular rate and rhythm, Early peaking systolic murmur over the right upper sternal border, No bilateral lower extremity edema.  Pulmonary: Normal respiratory effort, normal lung sounds, no wheezing.  Abdominal: Soft, nontender, no hepatosplenomegaly, liver is non-pulsatile.  Neurological:  Sedated on ventilator unable to assess   Skin: Warm, dry, no ecchymosis, no rash.  Psych:  Unable to assess patient intubated and sedated.         Lab Review:   Results from last 7 days   Lab Units 12/02/22  0445 12/01/22  1255 12/01/22  0443 11/30/22  0358 11/29/22  0354 11/28/22  0234 11/27/22  1040   SODIUM mmol/L 146* 144 141 139 133* 136 132*   POTASSIUM mmol/L 3.6 4.3 4.4 3.7 3.9 3.9 3.9   CHLORIDE mmol/L 114* 115* 113* 109* 102 103 102   CO2 mmol/L 15.9* 11.7* 14.0* 14.0* 18.8* 20.1* 20.5*   BUN mg/dL 92* 75* 79* 62* 63* 41* 29*   CREATININE mg/dL 4.25* 4.04* 3.93* 3.37* 2.94* 2.75* 2.45*   GLUCOSE mg/dL 164* 195* 182* 181* 202* 194* 149*   CALCIUM mg/dL 8.5* 8.7 8.8 8.5* 8.5* 9.0 9.2         Results from last 7 days   Lab Units 12/02/22  0445 12/01/22  1130 12/01/22  0443 11/30/22  0358 11/29/22  0655 11/28/22  0234 11/27/22  1932 11/27/22  1040   WBC 10*3/mm3 12.52*  --  10.47 13.53* 14.73* 9.13  --  12.21*   HEMOGLOBIN g/dL 7.3* 7.4* 6.6* 7.2* 7.2* 8.0* 6.7* 6.0*   HEMATOCRIT % 22.4* 23.3* 20.2* 22.8* 22.3* 24.5* 21.1* 17.9*   PLATELETS 10*3/mm3 159  --  194 284 296 210  --  276         Results from last 7 days   Lab Units 11/30/22  2325 11/28/22  0234 11/27/22  1040   MAGNESIUM mg/dL 2.1 2.3 2.0     Results from last 7 days   Lab Units 12/01/22  0443   TRIGLYCERIDES mg/dL 142         Results from last 7 days   Lab Units 11/30/22  2325   TSH uIU/mL 1.110     Echocardiogram 11/30/2022:  • Left ventricular systolic function is normal. Left ventricular  ejection fraction appears to be 61 - 65%.  • Estimated right ventricular systolic pressure from tricuspid regurgitation is mildly elevated (35-45 mmHg)  • The left atrial cavity is severely dilated.       Previous testing:        Echocardiogram 10/05/2022:  • Calculated left ventricular EF = 66% Estimated left ventricular EF was in agreement with the calculated left ventricular EF. Left ventricular systolic function is normal.  • Left ventricular wall thickness is consistent with mild concentric hypertrophy.  • Left ventricular diastolic function was normal.  • Normal right ventricular cavity size and systolic function noted.  • The left atrial cavity is moderately dilated  • The interatrial septum appears redundant.  • The aortic valve leaflets are moderately calcified  • Calculated right ventricular systolic pressure from tricuspid regurgitation is 19 mmHg.  • There is no evidence of pericardial effusion     Stress test 10/05/2022:  • Myocardial perfusion imaging indicates a normal myocardial perfusion study with no evidence of ischemia.  • Left ventricular ejection fraction is hyperdynamic (Calculated EF > 70%). .  • Impressions are consistent with a low risk study.     24 hr holter 03/29/2021:  • Underlying heart rhythm was sinus rhythm with an average heart of 76 bpm and a heart rate range of 35 bpm up to 115 bpm   • There were frequent episodes of type I second-degree AV block throughout the study.             Assessment:          Diagnosis Plan   1. Community acquired pneumonia, unspecified laterality        2. Hemoptysis  Case request    Case request    Case request    Case request    Non-gynecologic Cytology    Non-gynecologic Cytology    Fungus Culture - Wash, Lung, Right Upper Lobe    Fungus Culture - Wash, Lung, Right Upper Lobe    Fungus Culture - Lavage, Lung, Right Upper Lobe    Fungus Culture - Lavage, Lung, Right Upper Lobe    Fungus Culture - Tissue, Lung, R    Fungus Culture - Tissue, Lung, R     Tissue / Bone Culture - Tissue, Lung, R    Tissue / Bone Culture - Tissue, Lung, R    Tissue Pathology Exam    Tissue Pathology Exam    AFB Culture - Wash, Lung, Right Upper Lobe    AFB Culture - Wash, Lung, Right Upper Lobe    AFB Culture - Lavage, Lung, Right Upper Lobe    AFB Culture - Lavage, Lung, Right Upper Lobe    AFB Culture - Tissue, Lung, R    AFB Culture - Tissue, Lung, R    Fungus Smear - Wash, Lung, Right Upper Lobe    Fungus Smear - Wash, Lung, Right Upper Lobe    Fungus Smear - Lavage, Lung, Right Upper Lobe    Fungus Smear - Lavage, Lung, Right Upper Lobe    Respiratory Culture - Wash, Lung, Right Upper Lobe    Respiratory Culture - Wash, Lung, Right Upper Lobe    BAL Culture, Quantitative - Lavage, Lung, Right Upper Lobe    BAL Culture, Quantitative - Lavage, Lung, Right Upper Lobe      3. Stage 3 chronic kidney disease, unspecified whether stage 3a or 3b CKD (HCC)  Case request    Case request    Non-gynecologic Cytology    Non-gynecologic Cytology    Fungus Culture - Wash, Lung, Right Upper Lobe    Fungus Culture - Wash, Lung, Right Upper Lobe    Fungus Culture - Lavage, Lung, Right Upper Lobe    Fungus Culture - Lavage, Lung, Right Upper Lobe    Fungus Culture - Tissue, Lung, R    Fungus Culture - Tissue, Lung, R    Tissue / Bone Culture - Tissue, Lung, R    Tissue / Bone Culture - Tissue, Lung, R    Tissue Pathology Exam    Tissue Pathology Exam    AFB Culture - Wash, Lung, Right Upper Lobe    AFB Culture - Wash, Lung, Right Upper Lobe    AFB Culture - Lavage, Lung, Right Upper Lobe    AFB Culture - Lavage, Lung, Right Upper Lobe    AFB Culture - Tissue, Lung, R    AFB Culture - Tissue, Lung, R    Fungus Smear - Wash, Lung, Right Upper Lobe    Fungus Smear - Wash, Lung, Right Upper Lobe    Fungus Smear - Lavage, Lung, Right Upper Lobe    Fungus Smear - Lavage, Lung, Right Upper Lobe    Respiratory Culture - Wash, Lung, Right Upper Lobe    Respiratory Culture - Wash, Lung, Right Upper Lobe     BAL Culture, Quantitative - Lavage, Lung, Right Upper Lobe    BAL Culture, Quantitative - Lavage, Lung, Right Upper Lobe             Plan:       Ms. Lee is a 76 y.o. woman with past medical history notable for hypertension, coronary artery disease, mixed hyperlipidemia, hypothyroidism, sick sinus syndrome, lupus, diabetes type 2, and aortic sclerosis who presented to the emergency room on 11/21/2022 with worsening shortness of breath and hemoptysis.  She underwent bronchoscopy to further evaluate her pulmonary infiltrates was concerning for possible vasculitis with ANCA panel and further evaluation of her acute renal failure noted concerning evidence of vasculitis on renal biopsy 11/28/2022.  She had been undergoing plasmapheresis which started on 11/29/2022.  Unfortunately patient became agitated and altered requiring intubation 11/30/2022.  She did have bradycardia after intubation while on Precedex and propofol and carvedilol all of which has been stopped.  Her heart rate has improved.  She does have some underlying mild sick sinus syndrome but does have good heart rate response when tested as an outpatient.  Does not seem to be needing any pacemaker or any adjustments besides holding AV fay blocking agents at this time.  We will continue to closely follow along over the next 24 to 40 hours to monitor heart rate response but overall patient seems to be doing reasonable from a cardiac perspective we will continue to follow along peripherally going forward over the weekend.        Bradycardia:  • Does have mild sick sinus syndrome and IN prolongation at baseline likely exacerbated by acute illness and AV fay blocking medications  • Will continue to hold carvedilol going forward  • Trying to minimize use of Precedex and propofol as possible     Aortic sclerosis:  • Defines heart murmur heard on exam no further work-up needed                  Fran Falcon MD  [facility]  12/02/22  11:17  EST

## 2022-12-03 NOTE — PROGRESS NOTES
Patient Identification:  NAME:  Yi Lee  Age:  76 y.o.   Sex:  female   :  1945   MRN:  [id number]       Chief complaint: Metabolic encephalopathy, vasculitis of the lungs, renal vasculitis    History of present illness: Patient has been while just got some Ativan and is now calm.  She still moving all 4 extremities.  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  She is being treated aggressively with steroids and plasmapheresis and has received rituximab  ABG performed around 3:00 in the morning today showed PO2 of 64.3, PCO2 of 31.5.  pH normal at 7.398.  Today's creatinine elevated at 4.32   Past medical history:  Past Medical History:   Diagnosis Date   • Abdominal pain, LLQ    • Abnormal Pap smear of cervix    • JODY (acute kidney injury) (HCC) 2022   • Anemia    • Arthralgia of hip 2015   • Asthma    • Ataxic gait 2015    Description: Eval Dr Lillian Mederos, Neuro.  Some vestibular dysfunction present    • Bipolar I disorder, single manic episode (HCC)    • Bradycardia    • Cardiac murmur    • Colon polyp    • Coronary artery disease    • Degeneration, intervertebral disc, thoracolumbar    • Depression    • Diabetes mellitus (HCC)    • Disease of thyroid gland     Hypothyroidism   • Diverticulosis    • Ductal carcinoma in situ (DCIS) of left breast    • Esophageal spasm    • Fatigue    • GERD (gastroesophageal reflux disease)    • H/O bone density study 10/17/2007    Dr. Giles   • Heart murmur    • Hemorrhoids    • History of mammogram     2012, per GYN Reshma Aranda   • History of Papanicolaou smear of cervix     DR. GILES GYN   • History of transfusion    • Hyperlipidemia    • Hypertension    • Impaired cognition 2015    Description: Testing done    • Lupus (HCC)    • Optic nerve contusion    • Pain of lower extremity 2015   •  Pseudoaneurysm following procedure (HCC)     DURING CARDIAC CATHETERIZATION   • Shoulder pain     LEFT   • Skin tear of forearm without complication, right, initial encounter    • Stage 2 chronic kidney disease    • Systolic murmur    • Thyroid disease    • Vitamin B12 deficiency    • Vitamin D deficiency        Allergies:  Penicillins, Ace inhibitors, Codeine, Morphine, and Telmisartan    Home medications:  Medications Prior to Admission   Medication Sig Dispense Refill Last Dose   • ALPRAZolam (Xanax) 0.5 MG tablet Take 1 tablet by mouth 3 (Three) Times a Day As Needed for Anxiety. (Patient taking differently: Take 0.5 mg by mouth 3 (Three) Times a Day As Needed for Anxiety. Pt states she only takes 1/2 a pill prn) 270 tablet 0    • anastrozole (ARIMIDEX) 1 MG tablet Take 1 mg by mouth Daily.      • carvedilol (COREG) 6.25 MG tablet Take 1 tablet by mouth 2 (Two) Times a Day. 60 tablet 0    • chlorthalidone (HYGROTON) 25 MG tablet Take 25 mg by mouth Daily.      • cyanocobalamin (VITAMIN B-12) 1000 MCG tablet Take 1 tablet by mouth Daily. 30 tablet 0    • ferrous sulfate 325 (65 FE) MG tablet Take 1 tablet by mouth Daily With Breakfast & Dinner. 60 tablet 3    • glucose blood (FREESTYLE LITE) test strip Use to test everyday as directed 100 each 5    • hydrALAZINE (APRESOLINE) 50 MG tablet Take 100 mg by mouth 3 (Three) Times a Day.      • lactulose (Generlac) 10 GM/15ML solution solution (encephalopathy) TAKE 35 TO 45 ML BY MOUTH DAILY FOR CONSTIPATION 4050 mL 1    • lamoTRIgine (LaMICtal) 200 MG tablet TAKE 2 AND 1/2 TABLETS BY  MOUTH AT NIGHT (Patient taking differently: Take 200 mg by mouth. Pt states she takes 1 in the morning and 1 1/2 at night) 225 tablet 3    • Lancets (FREESTYLE) lancets As directed to check blood glucose 100 each 12    • levothyroxine (SYNTHROID, LEVOTHROID) 100 MCG tablet TAKE 1 TABLET BY MOUTH  DAILY 90 tablet 3    • metFORMIN (GLUCOPHAGE) 500 MG tablet TAKE 2 TABLETS BY MOUTH  DAILY  WITH BREAKFAST 180 tablet 3    • omeprazole (priLOSEC) 40 MG capsule TAKE 1 CAPSULE BY MOUTH  DAILY 90 capsule 3    • PARoxetine (PAXIL) 40 MG tablet TAKE 1 TABLET BY MOUTH IN  THE MORNING FOR DEPRESSION 90 tablet 1    • rosuvastatin (CRESTOR) 5 MG tablet TAKE 1 TABLET BY MOUTH AT  NIGHT FOR CHOLESTEROL 90 tablet 3         Hospital medications:  albumin human, 12.5 g, Intravenous, Once   And  sodium chloride, 200 mL, Intravenous, Once  albumin human, 3,000 mL, Intravenous, Daily  cefTRIAXone, 2 g, Intravenous, Q24H  chlorhexidine, 15 mL, Mouth/Throat, Q12H  ferrous sulfate, 300 mg, Nasogastric, Every Other Day  insulin lispro, 0-9 Units, Subcutaneous, Q6H  lactulose, 30 g, Nasogastric, BID  lamoTRIgine, 200 mg, Nasogastric, Daily  lamoTRIgine, 300 mg, Nasogastric, Nightly  levothyroxine, 100 mcg, Nasogastric, Daily  methylPREDNISolone sodium succinate, 500 mg, Intravenous, Daily  pantoprazole, 40 mg, Intravenous, Q24H  rosuvastatin, 5 mg, Nasogastric, Nightly  cyanocobalamin, 1,000 mcg, Nasogastric, Daily      sodium bicarbonate, 75 mEq, Last Rate: 75 mEq (12/03/22 1136)  sodium chloride, 30 mL/hr, Last Rate: Stopped (11/22/22 1500)      •  acetaminophen  •  benzonatate  •  dextrose  •  dextrose  •  fentaNYL citrate (PF)  •  glucagon (human recombinant)  •  heparin (porcine)  •  hydrALAZINE  •  HYDROcodone-acetaminophen  •  HYDROmorphone  •  LORazepam  •  sodium chloride      Objective:  Vitals Ranges:   Temp:  [98 °F (36.7 °C)-98.7 °F (37.1 °C)] 98.7 °F (37.1 °C)  Heart Rate:  [59-70] 66  Resp:  [28-30] 30  BP: (131-185)/(53-73) 138/53  FiO2 (%):  [29 %-40 %] 29 %      Physical Exam:  Patient is sedated.  She just got very wild in bed moving all extremities and thrashing got some Ativan and is now calm.  Pupils 2 and half constricting slightly eyes are conjugate.  Equal tone throughout.  Reflexes trace toes downgoing    Results review:   I reviewed the patient's new clinical results.    Data review:  Lab Results  (last 24 hours)     Procedure Component Value Units Date/Time    POC Glucose Once [[id number]]  (Abnormal) Collected: 12/03/22 0601    Specimen: Blood Updated: 12/03/22 0602     Glucose 260 mg/dL      Comment: Meter: [id number] : [id number] Sami WAHL       CBC & Differential [[id number]]  (Abnormal) Collected: 12/03/22 0445    Specimen: Blood Updated: 12/03/22 0531    Narrative:      The following orders were created for panel order CBC & Differential.  Procedure                               Abnormality         Status                     ---------                               -----------         ------                     CBC Auto Differential[[id number]]        Abnormal            Final result                 Please view results for these tests on the individual orders.    CBC Auto Differential [[id number]]  (Abnormal) Collected: 12/03/22 0445    Specimen: Blood Updated: 12/03/22 0531     WBC 14.48 10*3/mm3      RBC 2.64 10*6/mm3      Hemoglobin 7.3 g/dL      Hematocrit 22.3 %      MCV 84.5 fL      MCH 27.7 pg      MCHC 32.7 g/dL      RDW 15.8 %      RDW-SD 49.2 fl      MPV 9.6 fL      Platelets 127 10*3/mm3      Neutrophil % 94.6 %      Lymphocyte % 1.6 %      Monocyte % 2.2 %      Eosinophil % 0.0 %      Basophil % 0.1 %      Immature Grans % 1.5 %      Neutrophils, Absolute 13.71 10*3/mm3      Lymphocytes, Absolute 0.23 10*3/mm3      Monocytes, Absolute 0.32 10*3/mm3      Eosinophils, Absolute 0.00 10*3/mm3      Basophils, Absolute 0.01 10*3/mm3      Immature Grans, Absolute 0.21 10*3/mm3      nRBC 0.0 /100 WBC     Fibrinogen [[id number]]  (Abnormal) Collected: 12/03/22 0445    Specimen: Blood Updated: 12/03/22 0530     Fibrinogen 214 mg/dL     Renal Function Panel [[id number]]  (Abnormal) Collected: 12/03/22 0445    Specimen: Blood Updated: 12/03/22 0527     Glucose 271 mg/dL      BUN 88 mg/dL      Creatinine 4.32 mg/dL      Sodium 148 mmol/L      Potassium 3.5 mmol/L      Chloride 110 mmol/L      CO2 19.9  mmol/L      Calcium 8.2 mg/dL      Albumin 3.60 g/dL      Phosphorus 4.8 mg/dL      Anion Gap 18.1 mmol/L      BUN/Creatinine Ratio 20.4     eGFR 10.2 mL/min/1.73      Comment: <15 Indicative of kidney failure       Narrative:      GFR Normal >60  Chronic Kidney Disease <60  Kidney Failure <15    The GFR formula is only valid for adults with stable renal function between ages 18 and 70.    Blood Gas, Arterial - [[id number]]  (Abnormal) Collected: 12/03/22 0326    Specimen: Arterial Blood Updated: 12/03/22 0328     Site Arterial: right brachial     Owen's Test N/A     pH, Arterial 7.398 pH units      pCO2, Arterial 31.5 mm Hg      pO2, Arterial 64.3 mm Hg      HCO3, Arterial 19.4 mmol/L      Base Excess, Arterial -4.9 mmol/L      O2 Saturation Calculated 92.5 %      A-a DO2 0.3 mmHg      Barometric Pressure for Blood Gas 751.5 mmHg      Modality Adult Vent     FIO2 30 %      Ventilator Mode PC     Set [facility] Resp Rate 28     Rate 33 Breaths/minute      PEEP 5     Comment: PCV IP 16  ETCO2 28 SATS 94% Meter: [id number] : [id number] Butsc       POC Glucose Once [[id number]]  (Abnormal) Collected: 12/02/22 2332    Specimen: Blood Updated: 12/02/22 2334     Glucose 261 mg/dL      Comment: Meter: [id number] : [id number] Sami WAHL       POC Glucose Once [[id number]]  (Abnormal) Collected: 12/02/22 1725    Specimen: Blood Updated: 12/02/22 1727     Glucose 228 mg/dL      Comment: Meter: [id number] : [id number] Ravi WAHL              Imaging:  Imaging Results (Last 24 Hours)     Procedure Component Value Units Date/Time    XR Abdomen KUB [[id number]] Resulted: 12/03/22 1032     Updated: 12/03/22 1056    XR Chest 1 View [[id number]] Collected: 12/03/22 0754     Updated: 12/03/22 0754    Narrative:        Patient: BARBER HORNER  Time Out: 07:53  Exam(s): FILM CXR 1 VIEW     EXAM:    XR Chest, 1 View    CLINICAL HISTORY:     Reason for exam: vent.    TECHNIQUE:    Frontal view of the  chest.    COMPARISON:    No relevant prior studies available.    FINDINGS:    Lungs:  Patchy bilateral airspace opacities, correlate for infiltrates.    No pneumothorax.    Pleural space:  See above.    Heart:  Unremarkable.  No cardiomegaly.    Mediastinum:  Unremarkable.    Bones joints:  Left-sided reverse total shoulder arthroplasty.    Tubes, lines and devices:  Endotracheal tube terminates 2.8 cm above   the alexy.  Feeding tube terminates in the region of the distal   esophagus.  Advancement and reimaging is required prior to use.    IMPRESSION:       1.  Endotracheal tube terminates 2.8 cm above the alexy.    2.  Feeding tube terminates in the region of the distal esophagus.    Advancement and reimaging is required prior to use.    3.  Patchy bilateral airspace opacities, correlate for infiltrates.  No   pneumothorax.      Impression:          Electronically signed by Feliz Bond MD on 12-03-22 at 0753      PPE worn at all times washed before washed up afterwards disposed of everything properly is not within 6 feet of her for more than few minutes during my exam no aerosols used at any point    At least 25 minutes spent seeing this patient talking to nurses examining the patient and getting on and writing the note on this critically ill patient with multiple problems.  Counseling and coordination making up at least 50% of all of this along with the nurses and orders as written on this critically ill patient  Review of systems no fever chills rash no seizure activity no unilateral weakness seems to be agitated but calmed down after Ativan.  No nystagmus  Assessment and Plan:   This patient appears to have a vasculitic process at least as defined by the kidney biopsy.  She is receiving steroids and plasmapheresis and has received rituximab.  The patient has just received some Ativan after she was wild and acting now she is pretty unresponsive.  According to nurses definitely moves all 4 extremities and my  exam above is not lateralized.  She has ample reasons for metabolic encephalopathy including hypoxia earlier today as well as her increasing renal failure.  I have not repeated imaging of her brain since she was readmitted on 11/21/2022 but note 11/1/2022.  CT was normal and 11/2/2022 MRI scan showed no acute abnormality.  I am confident this patient has dementia with superimposed metabolic encephalopathy.  She is being treated for a vasculitic process involving the lungs presenting to the hospital with hemoptysis.    Scar Rangel MD  12/03/22  12:02 EST

## 2022-12-03 NOTE — PROGRESS NOTES
[facility] Progress Note      Patient Name: Yi Lee  : 1945  MRN: [id number]  Primary Care Physician:  Ankit Albert MD  Date of admission: 2022    Subjective     Interval History:   Follow-up acute kidney injury on chronic kidney disease.    Patient is intubated, sedated, no acute distress overnight  Low urine output but nonoliguric.  On bicarb drip    Review of Systems:   Not obtainable    Objective     Vitals:   Temp:  [98 °F (36.7 °C)-98.7 °F (37.1 °C)] 98.7 °F (37.1 °C)  Heart Rate:  [59-71] 68  Resp:  [28-30] 30  BP: (118-185)/(54-78) 139/65  Flow (L/min):  [15] 15  FiO2 (%):  [29 %-40 %] 29 %    Intake/Output Summary (Last 24 hours) at 12/3/2022 0852  Last data filed at 12/3/2022 0410  Gross per 24 hour   Intake 1887 ml   Output 500 ml   Net 1387 ml       Physical Exam:    General Appearance: Intubated  Skin: warm and dry  HEENT: oral mucosa normal, nonicteric sclera  Neck: supple, no JVD  Lungs: Bilateral rhonchi, breathing effort not labored  Heart: RRR, normal S1 and S2, no S3, no rub  Abdomen: soft, nontender, nondistended  : no palpable bladder        Scheduled Meds:     albumin human, 12.5 g, Intravenous, Once   And  sodium chloride, 200 mL, Intravenous, Once  albumin human, 3,000 mL, Intravenous, Daily  anticoagulant citrate (ACD) formula A, 1,000 mL, Apheresis, Daily  calcium gluconate IVPB, 4 g, Intravenous, Daily  cefTRIAXone, 2 g, Intravenous, Q24H  chlorhexidine, 15 mL, Mouth/Throat, Q12H  ferrous sulfate, 300 mg, Nasogastric, Every Other Day  insulin lispro, 0-9 Units, Subcutaneous, Q6H  lactulose, 30 g, Nasogastric, BID  lamoTRIgine, 200 mg, Nasogastric, Daily  lamoTRIgine, 300 mg, Nasogastric, Nightly  levothyroxine, 100 mcg, Nasogastric, Daily  methylPREDNISolone sodium succinate, 500 mg, Intravenous, Daily  pantoprazole, 40 mg, Intravenous, Q24H  rosuvastatin, 5 mg, Nasogastric, Nightly  cyanocobalamin, 1,000 mcg, Nasogastric, Daily      IV  Meds:   Pharmacy Consult,   sodium bicarbonate, 150 mEq, Last Rate: 150 mEq (12/03/22 0639)  sodium chloride, 30 mL/hr, Last Rate: Stopped (11/22/22 1500)        Results Reviewed:   I have personally reviewed the results from the time of this admission to 12/3/2022 08:52 EST     Results from last 7 days   Lab Units 12/03/22 0445 12/02/22 0445 12/01/22  1255   SODIUM mmol/L 148* 146* 144   POTASSIUM mmol/L 3.5 3.6 4.3   CHLORIDE mmol/L 110* 114* 115*   CO2 mmol/L 19.9* 15.9* 11.7*   BUN mg/dL 88* 92* 75*   CREATININE mg/dL 4.32* 4.25* 4.04*   CALCIUM mg/dL 8.2* 8.5* 8.7   GLUCOSE mg/dL 271* 164* 195*     Estimated Creatinine Clearance: 11.1 mL/min (A) (by C-G formula based on SCr of 4.32 mg/dL (H)).  Results from last 7 days   Lab Units 12/03/22 0445 12/02/22 0445 12/01/22 0443 11/30/22  2325 11/29/22  0354 11/28/22  0234 11/27/22  1040   MAGNESIUM mg/dL  --   --   --  2.1  --  2.3 2.0   PHOSPHORUS mg/dL 4.8* 6.2* 7.2*  --    < > 5.5* 3.9    < > = values in this interval not displayed.     Results from last 7 days   Lab Units 11/28/22  0234 11/27/22  1040   URIC ACID mg/dL 7.9* 6.7*     Results from last 7 days   Lab Units 12/03/22 0445 12/02/22 0445 12/01/22  1130 12/01/22 0443 11/30/22  0358 11/29/22  0655   WBC 10*3/mm3 14.48* 12.52*  --  10.47 13.53* 14.73*   HEMOGLOBIN g/dL 7.3* 7.3* 7.4* 6.6* 7.2* 7.2*   PLATELETS 10*3/mm3 127* 159  --  194 284 296           Assessment / Plan     ASSESSMENT:  1.  Acute kidney injury on chronic kidney disease stage III: Creatinine on 11/24/2022 was 1.74,  high suspicion of pauci-immune vasculitis based on the high ANCA and pulmonary hemorrhage.  Received first dose of rituximab 11/28/2022.  Biopsy was done on 11/28/2022.  Creatinine increasing and has metabolic acidosis with mild hyponatremia  2.  Bilateral pulmonary infiltrate patient had biopsy yesterday suggestive of alveolar hemorrhage  3.  Chronic kidney disease stage III with baseline creatinine 1.4 mg/dL  4.   History of COPD  5.  History of diabetes mellitus type  6.  History of hypertension, reasonably,     PLAN:    1.  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  Patient received 1 dose of rituximab on November 28, 2022.  She is going to receive her fifth session of plasmapheresis today.    2.  Continue pulse dose of steroid  3.  Blood transfusion as needed.    4.  May need to start dialysis if creatinine continue to increase  5.  Decreased bicarb with IV fluids    Thank you for involving us in the care of Yi Lee.  Please feel free to call with any questions.    Jonn Griffiths MD  12/03/22  08:52 EST    [facility]  [phone]

## 2022-12-03 NOTE — PROGRESS NOTES
[facility] INPATIENT PROGRESS NOTE         [facility] INTENSIVE CARE    12/3/2022      PATIENT IDENTIFICATION:  Name: Yi Lee ADMIT: 2022   : 1945  PCP: Ankit Albert MD    MRN: [id number] LOS: 12 days   AGE/SEX: 76 y.o. female  ROOM: [facility]                     LOS 12    Reason for visit: Respiratory failure with diffuse alveolar hemorrhage secondary to vasculitis      SUBJECTIVE:      Sedated on ventilator.  Nursing staff.  Chest x-ray shows NG tube a bit high and we will advance.  On Solu-Medrol IV drip.  Not on any sedation.  Does not wake up and follow commands or anything other than withdrawal to pain.  Discussed with nursing staff.  I am seeing the patient for the first time today.  All patient problems are new to me.      Objective   OBJECTIVE:    Vital Sign Min/Max for last 24 hours  Temp  Min: 97.3 °F (36.3 °C)  Max: 98.7 °F (37.1 °C)   BP  Min: 131/62  Max: 185/73   Pulse  Min: 59  Max: 71   Resp  Min: 28  Max: 30   SpO2  Min: 89 %  Max: 100 %   No data recorded   Weight  Min: 74.7 kg (164 lb 10.9 oz)  Max: 74.7 kg (164 lb 10.9 oz)       FiO2 (%):  [29 %-40 %] 30 %  S RR:  [28] 28  PEEP/CPAP (cm H2O):  [5 cm H20] 5 cm H20  MAP (cm H2O):  [13-14] 14           FiO2 (%): 30 %     Body mass index is 28.27 kg/m².    Intake/Output Summary (Last 24 hours) at 12/3/2022 1259  Last data filed at 12/3/2022 0410  Gross per 24 hour   Intake 1812 ml   Output 500 ml   Net 1312 ml         Exam:  GEN:  No distress, appears stated age  EYES:   PERRL, anicteric sclerae  ENT:    External ears/nose normal, OP clear  NECK:  No adenopathy, midline trachea  LUNGS: Normal chest on inspection, palpation and auscultation  CV:  Normal S1S2, without murmur  ABD:  Nontender, nondistended, no hepatosplenomegaly, +BS  EXT:  No edema.  No cyanosis or clubbing.  No mottling and normal cap refill.    Assessment     Scheduled meds:  albumin human, 12.5 g, Intravenous, Once   And  sodium chloride, 200 mL,  Intravenous, Once  albumin human, 3,000 mL, Intravenous, Daily  cefTRIAXone, 2 g, Intravenous, Q24H  chlorhexidine, 15 mL, Mouth/Throat, Q12H  ferrous sulfate, 300 mg, Nasogastric, Every Other Day  insulin lispro, 0-9 Units, Subcutaneous, Q6H  lactulose, 30 g, Nasogastric, BID  lamoTRIgine, 200 mg, Nasogastric, Daily  lamoTRIgine, 300 mg, Nasogastric, Nightly  levothyroxine, 100 mcg, Nasogastric, Daily  methylPREDNISolone sodium succinate, 500 mg, Intravenous, Daily  pantoprazole, 40 mg, Intravenous, Q24H  rosuvastatin, 5 mg, Nasogastric, Nightly  cyanocobalamin, 1,000 mcg, Nasogastric, Daily      IV meds:                      sodium bicarbonate, 75 mEq, Last Rate: 75 mEq (12/03/22 1136)  sodium chloride, 30 mL/hr, Last Rate: Stopped (11/22/22 1500)      Data Review:  Results from last 7 days   Lab Units 12/03/22 0445 12/02/22 0445 12/01/22  1255 12/01/22 0443 11/30/22  0358   SODIUM mmol/L 148* 146* 144 141 139   POTASSIUM mmol/L 3.5 3.6 4.3 4.4 3.7   CHLORIDE mmol/L 110* 114* 115* 113* 109*   CO2 mmol/L 19.9* 15.9* 11.7* 14.0* 14.0*   BUN mg/dL 88* 92* 75* 79* 62*   CREATININE mg/dL 4.32* 4.25* 4.04* 3.93* 3.37*   GLUCOSE mg/dL 271* 164* 195* 182* 181*   CALCIUM mg/dL 8.2* 8.5* 8.7 8.8 8.5*         Estimated Creatinine Clearance: 11.1 mL/min (A) (by C-G formula based on SCr of 4.32 mg/dL (H)).  Results from last 7 days   Lab Units 12/03/22 0445 12/02/22  0445 12/01/22  1130 12/01/22  0443 11/30/22  0358 11/29/22  0655   WBC 10*3/mm3 14.48* 12.52*  --  10.47 13.53* 14.73*   HEMOGLOBIN g/dL 7.3* 7.3* 7.4* 6.6* 7.2* 7.2*   PLATELETS 10*3/mm3 127* 159  --  194 284 296             Results from last 7 days   Lab Units 12/03/22  0326 12/02/22  1112 12/02/22  0607 12/01/22  1239 11/30/22  0908 11/27/22  1100   PH, ARTERIAL pH units 7.398 7.151* 7.235* 7.158* 7.217* 7.437   PO2 ART mm Hg 64.3* 62.6* 75.4* 78.4* 79.1* 64.9*   PCO2, ARTERIAL mm Hg 31.5* 45.7* 39.5 31.4* 43.1 29.4*   HCO3 ART mmol/L 19.4* 15.9* 16.7*  11.1* 17.5* 19.8*     Results from last 7 days   Lab Units 12/01/22  1255 11/30/22  0358 11/27/22  1040   PROCALCITONIN ng/mL  --  1.12* 0.49*   LACTATE mmol/L 0.8  --   --          Glucose   Date/Time Value Ref Range Status   12/03/2022 1210 194 (H) 70 - 130 mg/dL Final     Comment:     Meter: [id number] : [id number] Michael Story NA   12/03/2022 0601 260 (H) 70 - 130 mg/dL Final     Comment:     Meter: [id number] : [id number] Sami Fallon NA   12/02/2022 2332 261 (H) 70 - 130 mg/dL Final     Comment:     Meter: [id number] : [id number] Sami Fallon NA   12/02/2022 1725 228 (H) 70 - 130 mg/dL Final     Comment:     Meter: [id number] : [id number] Ravi Young NA   12/02/2022 1153 129 70 - 130 mg/dL Final     Comment:     Meter: [id number] : [id number] Ravi Young NA   12/02/2022 0609 179 (H) 70 - 130 mg/dL Final     Comment:     Meter: [id number] : [id number] Dick Self NA   12/02/2022 0221 139 (H) 70 - 130 mg/dL Final     Comment:     Meter: [id number] : [id number] Genie BRODERICK RN       Chest x-ray 12/3 reviewed: Patchy airspace disease bilaterally.  ET tube stable.  NG tube at the distal esophagus.  Advancing and will check KUB.        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         ASSESSMENT:    Pulmonary vasculitis with diffuse alveolar hemorrhage: C-ANCA  JODY with hematuria on top of CKD II  Type 2  diabetes  Anemia  Encephalopathy  Acute hypoxemic respiratory failure requiring mechanical ventilation  Hypothyroidism  Bipolar disorder            PLAN:    On high-dose pulse steroid with Solu-Medrol 500 mg daily.  Cyclophosphamide per nephrology on November 28 and plasmapheresis with fifth session today.  Nephrology's notes state may require to start dialysis if creatinine continues to increase.  Transfuse as needed if hemoglobin drops below 7.  Neurology input for metabolic encephalopathy appreciated.  Advanced NG tube but no change on repeat imaging.  We will ask interventional radiology to advance under fluoroscopy.    CCT: 35 min    Cecilio Ortega MD  Pulmonary and Critical Care Medicine  [facility]  12/3/2022    12:59 EST

## 2022-12-04 NOTE — NURSING NOTE
HD without incident. tolerated well. removed no volume. drsg current, cdi . no meds administered. stable upon completion of HD.

## 2022-12-04 NOTE — NURSING NOTE
Pt eyes deviated downward with roving eye movement. BP elevated. EZRA Crooks notified. PRN Ativan given. Neurology consult pending.

## 2022-12-04 NOTE — PROGRESS NOTES
[facility] Progress Note      Patient Name: Yi Lee  : 1945  MRN: [id number]  Primary Care Physician:  Ankit Albert MD  Date of admission: 2022    Subjective     Interval History:   Follow-up acute kidney injury on chronic kidney disease.    Patient is intubated, sedated, no acute distress overnight.  On FiO2 30% with PEEP of 8.  Low urine output but nonoliguric.  On bicarb drip  Questionable seizure activity overnight per nursing report      Review of Systems:   Not obtainable    Objective     Vitals:   Temp:  [97.3 °F (36.3 °C)-98 °F (36.7 °C)] 98 °F (36.7 °C)  Heart Rate:  [58-75] 64  Resp:  [34] 34  BP: (136-189)/(50-75) 172/72  FiO2 (%):  [30 %] 30 %    Intake/Output Summary (Last 24 hours) at 2022 0946  Last data filed at 2022 0500  Gross per 24 hour   Intake 1904.12 ml   Output 400 ml   Net 1504.12 ml       Physical Exam:    General Appearance: Intubated  Skin: warm and dry  HEENT: oral mucosa normal, nonicteric sclera  Neck: supple, no JVD  Lungs: Bilateral rhonchi, breathing effort not labored  Heart: RRR, normal S1 and S2, no S3, no rub  Abdomen: soft, nontender, nondistended  : no palpable bladder        Scheduled Meds:     albumin human, 12.5 g, Intravenous, Once   And  sodium chloride, 200 mL, Intravenous, Once  cefTRIAXone, 2 g, Intravenous, Q24H  chlorhexidine, 15 mL, Mouth/Throat, Q12H  ferrous sulfate, 300 mg, Nasogastric, Every Other Day  insulin lispro, 0-9 Units, Subcutaneous, Q6H  lactulose, 30 g, Nasogastric, BID  lamoTRIgine, 200 mg, Nasogastric, Daily  lamoTRIgine, 300 mg, Nasogastric, Nightly  levothyroxine, 100 mcg, Nasogastric, Daily  methylPREDNISolone sodium succinate, 500 mg, Intravenous, Daily  pantoprazole, 40 mg, Intravenous, Q24H  rosuvastatin, 5 mg, Nasogastric, Nightly  cyanocobalamin, 1,000 mcg, Nasogastric, Daily      IV Meds:   sodium bicarbonate, 75 mEq, Last Rate: 75 mEq (22 3536)  sodium chloride, 30  mL/hr, Last Rate: Stopped (11/22/22 1500)        Results Reviewed:   I have personally reviewed the results from the time of this admission to 12/4/2022 09:46 EST     Results from last 7 days   Lab Units 12/04/22 0441 12/03/22 0445 12/02/22 0445   SODIUM mmol/L 144 148* 146*   POTASSIUM mmol/L 3.3* 3.5 3.6   CHLORIDE mmol/L 109* 110* 114*   CO2 mmol/L 21.0* 19.9* 15.9*   BUN mg/dL 89* 88* 92*   CREATININE mg/dL 4.67* 4.32* 4.25*   CALCIUM mg/dL 8.6 8.2* 8.5*   GLUCOSE mg/dL 216* 271* 164*     Estimated Creatinine Clearance: 10.3 mL/min (A) (by C-G formula based on SCr of 4.67 mg/dL (H)).  Results from last 7 days   Lab Units 12/04/22 0441 12/03/22 0445 12/02/22 0445 12/01/22 0443 11/30/22  2325 11/29/22  0354 11/28/22  0234 11/27/22  1040   MAGNESIUM mg/dL  --   --   --   --  2.1  --  2.3 2.0   PHOSPHORUS mg/dL 4.5 4.8* 6.2*   < >  --    < > 5.5* 3.9    < > = values in this interval not displayed.     Results from last 7 days   Lab Units 11/28/22  0234 11/27/22  1040   URIC ACID mg/dL 7.9* 6.7*     Results from last 7 days   Lab Units 12/04/22 0441 12/03/22 0445 12/02/22 0445 12/01/22  1130 12/01/22 0443 11/30/22  0358   WBC 10*3/mm3 19.08* 14.48* 12.52*  --  10.47 13.53*   HEMOGLOBIN g/dL 7.3* 7.3* 7.3* 7.4* 6.6* 7.2*   PLATELETS 10*3/mm3 122* 127* 159  --  194 284           Assessment / Plan     ASSESSMENT:  1.  Acute kidney injury on chronic kidney disease stage III: Creatinine on 11/24/2022 was 1.74,  high suspicion of pauci-immune vasculitis based on the high ANCA and pulmonary hemorrhage.  Received first dose of rituximab 11/28/2022.  Biopsy was done on 11/28/2022.  Creatinine continue to increase.  Mild hypokalemia per morning labs  2.  Bilateral pulmonary infiltrate.  Diffuse alveolar hemorrhage per pulmonary note  3.  Chronic kidney disease stage III with baseline creatinine 1.4 mg/dL  4.  History of COPD  5.  History of diabetes mellitus type  6.  History of hypertension,  reasonably,     PLAN:    1.  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  Patient received 1 dose of rituximab on November 28, 2022.  S/p 5 plasmapheresis sessions  2.  Continue pulse dose of steroid  3.  Blood transfusion as needed.    4.  Starting hemodialysis today  5.  Discontinue bicarb drip    Thank you for involving us in the care of Yi Lee.  Please feel free to call with any questions.    Jonn Griffiths MD  12/04/22  09:46 RUST    [facility]  [phone]

## 2022-12-04 NOTE — PROGRESS NOTES
[facility] INPATIENT PROGRESS NOTE         [facility] INTENSIVE CARE    2022      PATIENT IDENTIFICATION:  Name: Yi Lee ADMIT: 2022   : 1945  PCP: Ankit Albert MD    MRN: [id number] LOS: 13 days   AGE/SEX: 76 y.o. female  ROOM: [facility]                     LOS 13    Reason for visit: Respiratory failure with diffuse alveolar hemorrhage secondary to vasculitis      SUBJECTIVE:      Doing fairly well on spontaneous breathing trial but still quite confused.  Does not follow commands.  Discussed with nursing staff at bedside.  On bicarb drip.  Not requiring any pressors.  Not on any sedation for the last 24 hours.  Noted there was a question of a seizure with eye fluttering last night per night nurse but no other classic signs of seizure no further activity.  Unclear if this actually was a seizure.  Patient did receive Ativan at that time.      Objective   OBJECTIVE:    Vital Sign Min/Max for last 24 hours  Temp  Min: 97.3 °F (36.3 °C)  Max: 98 °F (36.7 °C)   BP  Min: 136/56  Max: 189/73   Pulse  Min: 58  Max: 75   Resp  Min: 34  Max: 34   SpO2  Min: 92 %  Max: 100 %   No data recorded   Weight  Min: 74.8 kg (164 lb 14.5 oz)  Max: 74.8 kg (164 lb 14.5 oz)       FiO2 (%):  [29 %-30 %] 29 %  S RR:  [20-28] 28  PEEP/CPAP (cm H2O):  [5 cm H20] 5 cm H20  OH SUP:  [8 cm H20] 8 cm H20  MAP (cm H2O):  [12-16] 16           FiO2 (%): 29 %     Body mass index is 28.31 kg/m².    Intake/Output Summary (Last 24 hours) at 2022 1101  Last data filed at 2022 0500  Gross per 24 hour   Intake 1904.12 ml   Output 400 ml   Net 1504.12 ml         Exam:  GEN:  No distress, appears stated age  EYES:   PERRL, anicteric sclerae  ENT:    External ears/nose normal, OP clear  NECK:  No adenopathy, midline trachea  LUNGS: Normal chest on inspection, palpation and auscultation  CV:  Normal S1S2, without murmur  ABD:  Nontender, nondistended, no hepatosplenomegaly, +BS  EXT:  No edema.  No cyanosis or  clubbing.  No mottling and normal cap refill.    Assessment     Scheduled meds:  albumin human, 12.5 g, Intravenous, Once   And  sodium chloride, 200 mL, Intravenous, Once  cefTRIAXone, 2 g, Intravenous, Q24H  chlorhexidine, 15 mL, Mouth/Throat, Q12H  ferrous sulfate, 300 mg, Nasogastric, Every Other Day  insulin lispro, 0-9 Units, Subcutaneous, Q6H  lactulose, 30 g, Nasogastric, BID  lamoTRIgine, 200 mg, Nasogastric, Daily  lamoTRIgine, 300 mg, Nasogastric, Nightly  levothyroxine, 100 mcg, Nasogastric, Daily  methylPREDNISolone sodium succinate, 500 mg, Intravenous, Daily  pantoprazole, 40 mg, Intravenous, Q24H  rosuvastatin, 5 mg, Nasogastric, Nightly  cyanocobalamin, 1,000 mcg, Nasogastric, Daily      IV meds:                      sodium chloride, 30 mL/hr, Last Rate: Stopped (11/22/22 1500)      Data Review:  Results from last 7 days   Lab Units 12/04/22 0441 12/03/22 0445 12/02/22  0445 12/01/22  1255 12/01/22  0443   SODIUM mmol/L 144 148* 146* 144 141   POTASSIUM mmol/L 3.3* 3.5 3.6 4.3 4.4   CHLORIDE mmol/L 109* 110* 114* 115* 113*   CO2 mmol/L 21.0* 19.9* 15.9* 11.7* 14.0*   BUN mg/dL 89* 88* 92* 75* 79*   CREATININE mg/dL 4.67* 4.32* 4.25* 4.04* 3.93*   GLUCOSE mg/dL 216* 271* 164* 195* 182*   CALCIUM mg/dL 8.6 8.2* 8.5* 8.7 8.8         Estimated Creatinine Clearance: 10.3 mL/min (A) (by C-G formula based on SCr of 4.67 mg/dL (H)).  Results from last 7 days   Lab Units 12/04/22  0441 12/03/22  0445 12/02/22  0445 12/01/22  1130 12/01/22  0443 11/30/22  0358   WBC 10*3/mm3 19.08* 14.48* 12.52*  --  10.47 13.53*   HEMOGLOBIN g/dL 7.3* 7.3* 7.3* 7.4* 6.6* 7.2*   PLATELETS 10*3/mm3 122* 127* 159  --  194 284             Results from last 7 days   Lab Units 12/04/22  0330 12/03/22  0326 12/02/22  1112 12/02/22  0607 12/01/22  1239 11/30/22  0908   PH, ARTERIAL pH units 7.422 7.398 7.151* 7.235* 7.158* 7.217*   PO2 ART mm Hg 70.1* 64.3* 62.6* 75.4* 78.4* 79.1*   PCO2, ARTERIAL mm Hg 31.3* 31.5* 45.7* 39.5  31.4* 43.1   HCO3 ART mmol/L 20.4* 19.4* 15.9* 16.7* 11.1* 17.5*     Results from last 7 days   Lab Units 12/01/22  1255 11/30/22  0358   PROCALCITONIN ng/mL  --  1.12*   LACTATE mmol/L 0.8  --          Glucose   Date/Time Value Ref Range Status   12/04/2022 0659 219 (H) 70 - 130 mg/dL Final     Comment:     Meter: [id number] : [id number] Rocky Liangieserin NESS   12/04/2022 0003 264 (H) 70 - 130 mg/dL Final     Comment:     Meter: [id number] : [id number] Rocky Kelley    12/03/2022 1658 298 (H) 70 - 130 mg/dL Final     Comment:     Meter: [id number] : [id number] Michael Nelsonmaraí NA   12/03/2022 1210 194 (H) 70 - 130 mg/dL Final     Comment:     Meter: [id number] : [id number] Rodriguezbrigitte Nelson NA   12/03/2022 0601 260 (H) 70 - 130 mg/dL Final     Comment:     Meter: [id number] : [id number] Sami Fallon NA   12/02/2022 2332 261 (H) 70 - 130 mg/dL Final     Comment:     Meter: [id number] : [id number] Sami Hallmanica NA   12/02/2022 1725 228 (H) 70 - 130 mg/dL Final     Comment:     Meter: [id number] : [id number] Rvai Young NA     11/23/2022 0638 11/25/2022 1209 ANCA Panel [[id number]]    (Abnormal)   Blood    Final result Component Value Units   ANTI-MPO ANTIBODIES >8.0 High  units   ANTI-PR3 ANTIBODIES 3.5 High  units   C-ANCA 1:640 High  titer   P-ANCA Comment  titer   Atypical pANCA <1:20  titer                Chest x-ray 12/4 reviewed: Patchy airspace disease bilaterally.  ET tube stable.  NG tube at the distal esophagus.  Compared to previous imaging 12/1 airspace disease shows moderate improvement.        Active Hospital Problems    Diagnosis  POA   • **Hemoptysis [R04.2]  Yes   • Moderate malnutrition (HCC) [E44.0]  Yes   • JODY (acute kidney injury) (HCC) [N17.9]  Yes   • Stage 3 chronic kidney disease (HCC) [N18.30]  Yes   • Lupus erythematosus [L93.0]  Yes   • Normocytic anemia [D64.9]  Yes   • Ductal carcinoma in situ (DCIS) of left breast [D05.12]  Yes   • Cystic kidney disease [Q61.9]  Not  Applicable   • Type 2 diabetes mellitus with diabetic neuropathy, without long-term current use of insulin (HCC) [E11.40]  Yes   • Hypothyroidism [E03.9]  Yes      Resolved Hospital Problems   No resolved problems to display.         ASSESSMENT:    Pulmonary vasculitis with diffuse alveolar hemorrhage  JODY with hematuria on top of CKD II: Kidney biopsy consistent with pauci-immune vasculitis  Type 2 diabetes  Anemia  Encephalopathy  Acute hypoxemic respiratory failure requiring mechanical ventilation  Hypothyroidism  Bipolar disorder  Dementia          PLAN:    Plan sedation vacation and spontaneous breathing trial.  On high-dose pulse steroid with Solu-Medrol 500 mg daily.  Repeat chest x-ray shows moderate improvement compared to previous imaging 12/1.    On pulse dose steroids x72 hours and will switch to prednisone when able to use enteral tract.  Question whether patient should be considered for rituximab versus mycophenolate or cyclophosphamide.  Received cyclophosphamide per nephrology on November 28 and plasmapheresis with 5 sessions.  Nephrology's notes state start dialysis today.  Transfuse as needed if hemoglobin drops below 7.  Neurology input for metabolic encephalopathy appreciated.  Advanced NG tube but no change on repeat imaging.  We will ask interventional radiology to advance under fluoroscopy.    CCT: 35 min    Cecilio Ortega MD  Pulmonary and Critical Care Medicine  [facility]  12/4/2022    11:01 EST

## 2022-12-04 NOTE — PROGRESS NOTES
Patient Identification:  NAME:  Yi Lee  Age:  76 y.o.   Sex:  female   :  1945   MRN:  [id number]       Chief complaint: Vasculitic syndrome affecting the lungs and kidneys metabolic encephalopathy agitation    History of present illness: He looks about the same to me today.  The nurses noted that maybe she looks like she had a gaze preference earlier now she has some roving eye movements may be slightly more to the right than the left, see exam below, but she still moves all extremities needs sedation and withdraws to pain.  She has not had any witnessed seizure activity.  She is still very agitated moving her head rocking it back and forth and is not going to tolerate an EEG at this point      Past medical history:  Past Medical History:   Diagnosis Date   • Abdominal pain, LLQ    • Abnormal Pap smear of cervix    • JODY (acute kidney injury) (HCC) 2022   • Anemia    • Arthralgia of hip 2015   • Asthma    • Ataxic gait 2015    Description: Eval Dr Lillian Mederos, Neuro.  Some vestibular dysfunction present    • Bipolar I disorder, single manic episode ([facility])    • Bradycardia    • Cardiac murmur    • Colon polyp    • Coronary artery disease    • Degeneration, intervertebral disc, thoracolumbar    • Depression    • Diabetes mellitus (HCC)    • Disease of thyroid gland     Hypothyroidism   • Diverticulosis    • Ductal carcinoma in situ (DCIS) of left breast    • Esophageal spasm    • Fatigue    • GERD (gastroesophageal reflux disease)    • H/O bone density study 10/17/2007    Dr. Giles   • Heart murmur    • Hemorrhoids    • History of mammogram     2012, per GYN Reshma Aranda   • History of Papanicolaou smear of cervix     DR. GILES GYN   • History of transfusion    • Hyperlipidemia    • Hypertension    • Impaired cognition 2015    Description: Testing done    • Lupus (HCC)    • Optic nerve contusion    • Pain of lower extremity 2015   • Pseudoaneurysm  following procedure (HCC)     DURING CARDIAC CATHETERIZATION   • Shoulder pain     LEFT   • Skin tear of forearm without complication, right, initial encounter    • Stage 2 chronic kidney disease    • Systolic murmur    • Thyroid disease    • Vitamin B12 deficiency    • Vitamin D deficiency        Allergies:  Penicillins, Ace inhibitors, Codeine, Morphine, and Telmisartan    Home medications:  Medications Prior to Admission   Medication Sig Dispense Refill Last Dose   • ALPRAZolam (Xanax) 0.5 MG tablet Take 1 tablet by mouth 3 (Three) Times a Day As Needed for Anxiety. (Patient taking differently: Take 0.5 mg by mouth 3 (Three) Times a Day As Needed for Anxiety. Pt states she only takes 1/2 a pill prn) 270 tablet 0    • anastrozole (ARIMIDEX) 1 MG tablet Take 1 mg by mouth Daily.      • carvedilol (COREG) 6.25 MG tablet Take 1 tablet by mouth 2 (Two) Times a Day. 60 tablet 0    • chlorthalidone (HYGROTON) 25 MG tablet Take 25 mg by mouth Daily.      • cyanocobalamin (VITAMIN B-12) 1000 MCG tablet Take 1 tablet by mouth Daily. 30 tablet 0    • ferrous sulfate 325 (65 FE) MG tablet Take 1 tablet by mouth Daily With Breakfast & Dinner. 60 tablet 3    • glucose blood (FREESTYLE LITE) test strip Use to test everyday as directed 100 each 5    • hydrALAZINE (APRESOLINE) 50 MG tablet Take 100 mg by mouth 3 (Three) Times a Day.      • lactulose (Generlac) 10 GM/15ML solution solution (encephalopathy) TAKE 35 TO 45 ML BY MOUTH DAILY FOR CONSTIPATION 4050 mL 1    • lamoTRIgine (LaMICtal) 200 MG tablet TAKE 2 AND 1/2 TABLETS BY  MOUTH AT NIGHT (Patient taking differently: Take 200 mg by mouth. Pt states she takes 1 in the morning and 1 1/2 at night) 225 tablet 3    • Lancets (FREESTYLE) lancets As directed to check blood glucose 100 each 12    • levothyroxine (SYNTHROID, LEVOTHROID) 100 MCG tablet TAKE 1 TABLET BY MOUTH  DAILY 90 tablet 3    • metFORMIN (GLUCOPHAGE) 500 MG tablet TAKE 2 TABLETS BY MOUTH  DAILY WITH BREAKFAST  180 tablet 3    • omeprazole (priLOSEC) 40 MG capsule TAKE 1 CAPSULE BY MOUTH  DAILY 90 capsule 3    • PARoxetine (PAXIL) 40 MG tablet TAKE 1 TABLET BY MOUTH IN  THE MORNING FOR DEPRESSION 90 tablet 1    • rosuvastatin (CRESTOR) 5 MG tablet TAKE 1 TABLET BY MOUTH AT  NIGHT FOR CHOLESTEROL 90 tablet 3         Hospital medications:  albumin human, 12.5 g, Intravenous, Once   And  sodium chloride, 200 mL, Intravenous, Once  chlorhexidine, 15 mL, Mouth/Throat, Q12H  ferrous sulfate, 300 mg, Nasogastric, Every Other Day  insulin lispro, 0-9 Units, Subcutaneous, Q6H  lactulose, 30 g, Nasogastric, BID  lamoTRIgine, 200 mg, Nasogastric, Daily  lamoTRIgine, 300 mg, Nasogastric, Nightly  levothyroxine, 100 mcg, Nasogastric, Daily  methylPREDNISolone sodium succinate, 500 mg, Intravenous, Daily  pantoprazole, 40 mg, Intravenous, Q24H  rosuvastatin, 5 mg, Nasogastric, Nightly  cyanocobalamin, 1,000 mcg, Nasogastric, Daily      sodium chloride, 30 mL/hr, Last Rate: Stopped (11/22/22 1500)      •  acetaminophen  •  benzonatate  •  dextrose  •  dextrose  •  fentaNYL citrate (PF)  •  glucagon (human recombinant)  •  heparin (porcine)  •  hydrALAZINE  •  HYDROcodone-acetaminophen  •  HYDROmorphone  •  LORazepam  •  sodium chloride      Objective:  Vitals Ranges:   Temp:  [97.3 °F (36.3 °C)-98 °F (36.7 °C)] 98 °F (36.7 °C)  Heart Rate:  [58-75] 64  Resp:  [34] 34  BP: (136-189)/(54-75) 170/73  FiO2 (%):  [29 %-30 %] 29 %      Physical Exam:  Patient is sedated resist eye opening her pupils 3 with some constriction.  She has roving eye movements may have a very slight tendency to look more to the right than the left.  She does withdraw to pain on both upper extremities reflexes trace.  I think toes are both downgoing bilaterally.  She does have withdrawal    Results review:   I reviewed the patient's new clinical results.    Data review:  Lab Results (last 24 hours)     Procedure Component Value Units Date/Time    POC Glucose Once  [[id number]]  (Abnormal) Collected: 12/04/22 0659    Specimen: Blood Updated: 12/04/22 0701     Glucose 219 mg/dL      Comment: Meter: [id number] : [id number] Rocky Kelley RN       Fibrinogen [[id number]]  (Abnormal) Collected: 12/04/22 0441    Specimen: Blood Updated: 12/04/22 0543     Fibrinogen 125 mg/dL     Renal Function Panel [[id number]]  (Abnormal) Collected: 12/04/22 0441    Specimen: Blood Updated: 12/04/22 0539     Glucose 216 mg/dL      BUN 89 mg/dL      Creatinine 4.67 mg/dL      Sodium 144 mmol/L      Potassium 3.3 mmol/L      Chloride 109 mmol/L      CO2 21.0 mmol/L      Calcium 8.6 mg/dL      Albumin 3.50 g/dL      Phosphorus 4.5 mg/dL      Anion Gap 14.0 mmol/L      BUN/Creatinine Ratio 19.1     eGFR 9.3 mL/min/1.73      Comment: <15 Indicative of kidney failure       Narrative:      GFR Normal >60  Chronic Kidney Disease <60  Kidney Failure <15    The GFR formula is only valid for adults with stable renal function between ages 18 and 70.    CBC & Differential [[id number]]  (Abnormal) Collected: 12/04/22 0441    Specimen: Blood Updated: 12/04/22 0534    Narrative:      The following orders were created for panel order CBC & Differential.  Procedure                               Abnormality         Status                     ---------                               -----------         ------                     CBC Auto Differential[[id number]]        Abnormal            Final result                 Please view results for these tests on the individual orders.    CBC Auto Differential [[id number]]  (Abnormal) Collected: 12/04/22 0441    Specimen: Blood Updated: 12/04/22 0534     WBC 19.08 10*3/mm3      RBC 2.70 10*6/mm3      Hemoglobin 7.3 g/dL      Hematocrit 21.7 %      MCV 80.4 fL      MCH 27.0 pg      MCHC 33.6 g/dL      RDW 15.5 %      RDW-SD 45.3 fl      MPV 10.0 fL      Platelets 122 10*3/mm3      Neutrophil % 92.2 %      Lymphocyte % 1.4 %      Monocyte % 4.6 %      Eosinophil % 0.0 %       Basophil % 0.1 %      Immature Grans % 1.7 %      Neutrophils, Absolute 17.58 10*3/mm3      Lymphocytes, Absolute 0.27 10*3/mm3      Monocytes, Absolute 0.88 10*3/mm3      Eosinophils, Absolute 0.00 10*3/mm3      Basophils, Absolute 0.02 10*3/mm3      Immature Grans, Absolute 0.33 10*3/mm3      nRBC 0.1 /100 WBC     Blood Gas, Arterial - [[id number]]  (Abnormal) Collected: 12/04/22 0330    Specimen: Arterial Blood Updated: 12/04/22 0332     Site Arterial: right brachial     Owen's Test N/A     pH, Arterial 7.422 pH units      pCO2, Arterial 31.3 mm Hg      pO2, Arterial 70.1 mm Hg      HCO3, Arterial 20.4 mmol/L      Base Excess, Arterial -3.6 mmol/L      O2 Saturation Calculated 94.4 %      A-a DO2 0.4 mmHg      Barometric Pressure for Blood Gas 761.3 mmHg      Modality Adult Vent     FIO2 30 %      Ventilator Mode PC     Set [facility] Resp Rate 28     Rate 33 Breaths/minute      PEEP 5     Comment: pcv ip 16 vt500 etco2 29 sats 95% Meter: [id number] : [id number] Tony       POC Glucose Once [[id number]]  (Abnormal) Collected: 12/04/22 0003    Specimen: Blood Updated: 12/04/22 0004     Glucose 264 mg/dL      Comment: Meter: [id number] : [id number] Rocky Kelley RN       POC Glucose Once [[id number]]  (Abnormal) Collected: 12/03/22 1658    Specimen: Blood Updated: 12/03/22 1659     Glucose 298 mg/dL      Comment: Meter: [id number] : [id number] Michael WAHL       POC Glucose Once [[id number]]  (Abnormal) Collected: 12/03/22 1210    Specimen: Blood Updated: 12/03/22 1211     Glucose 194 mg/dL      Comment: Meter: [id number] : [id number] Michael WAHL              Imaging:  Imaging Results (Last 24 Hours)     Procedure Component Value Units Date/Time    XR Chest 1 View [[id number]] Collected: 12/04/22 0645     Updated: 12/04/22 0645    Narrative:        Patient: BARBER HORNER  Time Out: 06:45  Exam(s): FILM CXR 1 VIEW     EXAM:    XR Chest, 1 View    CLINICAL HISTORY:     Reason for exam: vent.  VENT.    TECHNIQUE:    Frontal view of the chest.    COMPARISON:  CXR, December 03, 2022.      FINDINGS IMPRESSION:  Endotracheal tube terminates 2 cm above the alexy.  Feeding tube terminates in the distal esophagus, advancement recommended,   followed by reimaging.    Patchy lower lobe opacities, concerning for infiltrates.    No pneumothorax.    Left reverse shoulder arthroplasty.    Impression:          Electronically signed by Feliz Bond MD on 12-04-22 at 0645    XR Abdomen KUB [[id number]] Collected: 12/03/22 1245     Updated: 12/03/22 1250    Narrative:      XR ABDOMEN KUB-     INDICATIONS: Advancement of NG tube     TECHNIQUE: Frontal views of the abdomen     COMPARISON: 11/30/2022     FINDINGS:     A previous NG tube appear significantly changed in position, tip at the  proximal stomach at the level of the hernia. A previous feeding tube is  no longer seen. The bowel gas pattern is nonspecific. Follow-up as  clinically indicated.       Impression:         As described.     This report was finalized on 12/3/2022 12:47 PM by Dr. Daren Apodaca M.D.         PPE worn at all times washed before washed up afterwards disposed of everything properly is not within 6 feet of her for more than few minutes during my exam no aerosols used at any point    Assessment and Plan:     Vasculitis involving the lungs as well as the kidneys.  Immune vasculitis she is on steroids plasma exchange.  She remains very restless and is not following commands but does withdraw to pain and has downgoing toes.  I am aware that in early November she had normal MRI and CT but I think now is the time to go ahead and recheck a plain CAT scan of the brain to make sure she does not have any diffuse cerebral edema or anything else going on.  Otherwise, she looks like a significant metabolic encephalopathy and her current as needed medications do keep her calm.  She does not have a focal neurologic exam so I really do not think this patient  has an acute stroke syndrome.  Recall her presenting symptoms was that of hemoptysis.  So, lets see what the plain CAT scan shows      Scar Rangel MD  12/04/22  12:07 EST

## 2022-12-05 NOTE — PROGRESS NOTES
[facility] INPATIENT PROGRESS NOTE         [facility] INTENSIVE CARE    2022      PATIENT IDENTIFICATION:  Name: Yi Lee ADMIT: 2022   : 1945  PCP: Ankit Albert MD    MRN: [id number] LOS: 14 days   AGE/SEX: 76 y.o. female  ROOM: [facility]                     LOS 14    Reason for visit: Respiratory failure with diffuse alveolar hemorrhage secondary to vasculitis      SUBJECTIVE:      Only responsive on ventilator.  Withdraws to noxious stimuli.  Not on any sedation.  Empirically decreasing respiratory rate 22.  To low 20s.  ABG with respiratory alkalosis.      Objective   OBJECTIVE:    Vital Sign Min/Max for last 24 hours  Temp  Min: 98.3 °F (36.8 °C)  Max: 99.2 °F (37.3 °C)   BP  Min: 120/71  Max: 148/77   Pulse  Min: 59  Max: 99   Resp  Min: 28  Max: 32   SpO2  Min: 84 %  Max: 100 %   No data recorded   No data recorded       FiO2 (%):  [29 %-30 %] 30 %  S RR:  [22-28] 22  PEEP/CPAP (cm H2O):  [5 cm H20] 5 cm H20  MAP (cm H2O):  [11-14] 11           FiO2 (%): 30 %     Body mass index is 28.31 kg/m².    Intake/Output Summary (Last 24 hours) at 2022 1145  Last data filed at 2022 0500  Gross per 24 hour   Intake 749.38 ml   Output 100 ml   Net 649.38 ml         Exam:  GEN:  No distress, appears stated age  EYES:   PERRL, anicteric sclerae  ENT:    External ears/nose normal, OP clear  NECK:  No adenopathy, midline trachea  LUNGS: Normal chest on inspection, palpation and diminished auscultation  CV:  Normal S1S2, without murmur  ABD:  Nontender, nondistended, no hepatosplenomegaly, +BS  EXT:  No edema.  No cyanosis or clubbing.  No mottling and normal cap refill.    Assessment     Scheduled meds:  albumin human, 12.5 g, Intravenous, Once   And  sodium chloride, 200 mL, Intravenous, Once  chlorhexidine, 15 mL, Mouth/Throat, Q12H  ferrous sulfate, 300 mg, Nasogastric, Every Other Day  insulin lispro, 0-9 Units, Subcutaneous, Q6H  lactulose, 30 g, Nasogastric,  BID  lamoTRIgine, 200 mg, Nasogastric, Daily  lamoTRIgine, 300 mg, Nasogastric, Nightly  levothyroxine, 100 mcg, Nasogastric, Daily  methylPREDNISolone sodium succinate, 500 mg, Intravenous, Daily  pantoprazole, 40 mg, Intravenous, Q24H  rosuvastatin, 5 mg, Nasogastric, Nightly  cyanocobalamin, 1,000 mcg, Nasogastric, Daily      IV meds:                      sodium chloride, 30 mL/hr, Last Rate: Stopped (11/22/22 1500)      Data Review:  Results from last 7 days   Lab Units 12/05/22  0413 12/04/22  0441 12/03/22  0445 12/02/22  0445 12/01/22  1255   SODIUM mmol/L 143 144 148* 146* 144   POTASSIUM mmol/L 3.6 3.3* 3.5 3.6 4.3   CHLORIDE mmol/L 106 109* 110* 114* 115*   CO2 mmol/L 22.9 21.0* 19.9* 15.9* 11.7*   BUN mg/dL 47* 89* 88* 92* 75*   CREATININE mg/dL 3.03* 4.67* 4.32* 4.25* 4.04*   GLUCOSE mg/dL 169* 216* 271* 164* 195*   CALCIUM mg/dL 8.2* 8.6 8.2* 8.5* 8.7         Estimated Creatinine Clearance: 15.9 mL/min (A) (by C-G formula based on SCr of 3.03 mg/dL (H)).  Results from last 7 days   Lab Units 12/05/22  0413 12/04/22  0441 12/03/22  0445 12/02/22  0445 12/01/22  1130 12/01/22  0443   WBC 10*3/mm3 23.47* 19.08* 14.48* 12.52*  --  10.47   HEMOGLOBIN g/dL 8.0* 7.3* 7.3* 7.3* 7.4* 6.6*   PLATELETS 10*3/mm3 170 122* 127* 159  --  194             Results from last 7 days   Lab Units 12/05/22  0326 12/04/22  0330 12/03/22  0326 12/02/22  1112 12/02/22  0607 12/01/22  1239 11/30/22  0908   PH, ARTERIAL pH units 7.471* 7.422 7.398 7.151* 7.235* 7.158* 7.217*   PO2 ART mm Hg 66.3* 70.1* 64.3* 62.6* 75.4* 78.4* 79.1*   PCO2, ARTERIAL mm Hg 30.6* 31.3* 31.5* 45.7* 39.5 31.4* 43.1   HCO3 ART mmol/L 22.3 20.4* 19.4* 15.9* 16.7* 11.1* 17.5*     Results from last 7 days   Lab Units 12/01/22  1255 11/30/22  0358   PROCALCITONIN ng/mL  --  1.12*   LACTATE mmol/L 0.8  --          Glucose   Date/Time Value Ref Range Status   12/05/2022 0610 168 (H) 70 - 130 mg/dL Final     Comment:     Meter: [id number] : [id number]  Rocky Kelley RN   12/05/2022 0102 176 (H) 70 - 130 mg/dL Final     Comment:     Meter: [id number] : [id number] Rocky Kelley RN   12/04/2022 1807 129 70 - 130 mg/dL Final     Comment:     Meter: [id number] : [id number] [place] Vicki PETER   12/04/2022 1226 211 (H) 70 - 130 mg/dL Final     Comment:     Meter: [id number] : [id number] [place] Vicki PETER   12/04/2022 0659 219 (H) 70 - 130 mg/dL Final     Comment:     Meter: [id number] : [id number] Rocky Kelley RN   12/04/2022 0003 264 (H) 70 - 130 mg/dL Final     Comment:     Meter: [id number] : [id number] Rocky Kelley    12/03/2022 1658 298 (H) 70 - 130 mg/dL Final     Comment:     Meter: [id number] : [id number] Michael Story NA     11/23/2022 0638 11/25/2022 1209 ANCA Panel [[id number]]    (Abnormal)   Blood    Final result Component Value Units   ANTI-MPO ANTIBODIES >8.0 High  units   ANTI-PR3 ANTIBODIES 3.5 High  units   C-ANCA 1:640 High  titer   P-ANCA Comment  titer   Atypical pANCA <1:20  titer                Chest x-ray 12/5 reviewed: Patchy airspace disease bilaterally.  ET tube stable.       CT head 12/4 reviewed shows new area of infarct in left occipital lobe.  No intracranial hemorrhage or hydrocephalus.        Microbiology reviewed             Active Hospital Problems    Diagnosis  POA   • **Hemoptysis [R04.2]  Yes   • Moderate malnutrition (HCC) [E44.0]  Yes   • JODY (acute kidney injury) (HCC) [N17.9]  Yes   • Stage 3 chronic kidney disease (HCC) [N18.30]  Yes   • Lupus erythematosus [L93.0]  Yes   • Normocytic anemia [D64.9]  Yes   • Ductal carcinoma in situ (DCIS) of left breast [D05.12]  Yes   • 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         ASSESSMENT:    Pulmonary vasculitis with diffuse  alveolar hemorrhage  JODY with hematuria on top of CKD II: Kidney biopsy consistent with pauci-immune vasculitis  Type 2 diabetes  Anemia  Encephalopathy  Acute hypoxemic respiratory failure requiring mechanical ventilation  Hypothyroidism  Bipolar disorder  Dementia          PLAN:    Making appropriate ventilator changes based on blood gas results.  Decreasing respiratory rate.  On high-dose pulse steroid with Solu-Medrol 500 mg daily.  Repeat chest x-ray shows moderate improvement compared to previous imaging 12/1.    On pulse dose steroids >72 hours and will switch to prednisone when able to use enteral tract.  Question whether patient should be considered for rituximab versus mycophenolate or cyclophosphamide.  Received cyclophosphamide per nephrology on November 28 and plasmapheresis with 5 sessions.  Nephrology is started on dialysis.  Hemoglobin improving.  Neurology input for metabolic encephalopathy appreciated.  Unable to advance NG tube.  We will change to Cortrak and do it under fluoroscopy given significant hiatal hernia issue.    Discussed with multidisciplinary ICU team on rounds this morning.      CCT: 34 min    Cecilio Ortega MD  Pulmonary and Critical Care Medicine  [facility], [facility]  12/5/2022    11:45 EST

## 2022-12-05 NOTE — PROGRESS NOTES
"Nutrition Services    Patient Name:  Yi Lee  YOB: 1945  MRN: [id number]  Admit Date:  11/21/2022    Comment: Nutrition follow up. Remains NPO (day5).  Pt went to IR today to try to get her feeding tube in a safe position.  Radiologist unable to get it in place either..  \"corkscrew esophagus\" per RN.  ? If can be done with endoscopy.      Will continue to follow Clinical Course and monitor nutrition support needs.    CLINICAL NUTRITION ASSESSMENT      Reason for Assessment Follow-up Protocol, NPO/Clear Liquid Status     Diagnosis/Problem   Respiratory failure on the vent, vasculitis, AMS, JODY, anemia, DM,      Encounter Information        Nutrition History:  Po intake fair 50%,Usually only eats one meal per day   Food Preferences:    Supplements:    Factors Affecting Intake: altered mental status, altered respiratory status     Anthropometrics        Current Height  Current Weight  BMI kg/m2 Height: 162.6 cm (64\")  Weight: 74.8 kg (164 lb 14.5 oz) (12/04/22 0600)  Body mass index is 28.31 kg/m².   Adjusted BMI (if applicable)        Admission Weight        Ideal Body Weight (IBW) 54.7 kg   Adjusted IBW (if applicable)        Usual Body Weight (UBW) 165   Weight Change/Trend Loss 151-175lb, -10-13lbx x 2 mo       Weight History Wt Readings from Last 30 Encounters:   12/04/22 0600 74.8 kg (164 lb 14.5 oz)   12/03/22 0410 74.7 kg (164 lb 10.9 oz)   11/30/22 1520 68.9 kg (152 lb)   11/22/22 1341 68.9 kg (152 lb)   11/22/22 0100 69 kg (152 lb 3.2 oz)   11/21/22 1104 70.8 kg (156 lb)   11/09/22 1120 69.4 kg (153 lb)   11/02/22 1355 68.5 kg (151 lb)   11/02/22 0203 68.8 kg (151 lb 10.8 oz)   10/31/22 1008 69.9 kg (154 lb)   10/05/22 0109 76.4 kg (168 lb 6.4 oz)   09/16/22 0907 73.5 kg (162 lb)   08/30/22 1000 72.6 kg (160 lb)   08/24/22 0829 74.8 kg (165 lb)   08/09/22 1027 75.2 kg (165 lb 12.8 oz)   08/04/22 1405 75.2 kg (165 lb 12.8 oz)   06/14/22 1356 73.9 kg (163 lb)   06/02/22 0901 73.9 kg (163 " lb)   05/16/22 1332 72.6 kg (160 lb)   04/15/22 1132 74 kg (163 lb 3.2 oz)   12/07/21 0636 71.2 kg (157 lb)   11/15/21 1022 71.1 kg (156 lb 12.8 oz)   10/13/21 1455 74 kg (163 lb 3.2 oz)   09/17/21 1054 72.6 kg (160 lb)   08/11/21 0817 75.3 kg (166 lb)   06/11/21 1337 76.7 kg (169 lb)   05/03/21 1318 77.1 kg (170 lb)   04/28/21 0758 78.5 kg (173 lb)   03/29/21 1027 78.9 kg (174 lb)   03/22/21 1005 78.9 kg (174 lb)   03/18/21 0934 79.2 kg (174 lb 9.6 oz)   02/24/21 1020 77.6 kg (171 lb)   02/04/21 1308 78.8 kg (173 lb 12.8 oz)   01/27/21 1033 78 kg (172 lb)   11/23/20 1451 79.4 kg (175 lb)             Tests/Procedures        Tests/Procedures Dialysis, X-Ray plasmapheresis, 12/5 IR attempted feeding tube placement     Labs       Pertinent Labs    Results from last 7 days   Lab Units 12/05/22 0413 12/04/22  0441 12/03/22  0445   SODIUM mmol/L 143 144 148*   POTASSIUM mmol/L 3.6 3.3* 3.5   CHLORIDE mmol/L 106 109* 110*   CO2 mmol/L 22.9 21.0* 19.9*   BUN mg/dL 47* 89* 88*   CREATININE mg/dL 3.03* 4.67* 4.32*   CALCIUM mg/dL 8.2* 8.6 8.2*   GLUCOSE mg/dL 169* 216* 271*     Results from last 7 days   Lab Units 12/05/22  0413 12/01/22  1130 12/01/22  0443 11/30/22  2325   MAGNESIUM mg/dL  --   --   --  2.1   PHOSPHORUS mg/dL 3.7   < > 7.2*  --    HEMOGLOBIN g/dL 8.0*   < > 6.6*  --    HEMATOCRIT % 25.6*   < > 20.2*  --    WBC 10*3/mm3 23.47*   < > 10.47  --    TRIGLYCERIDES mg/dL  --   --  142  --    ALBUMIN g/dL 3.30*   < > 4.10  --     < > = values in this interval not displayed.     Results from last 7 days   Lab Units 12/05/22  0413 12/04/22  0441 12/03/22  0445 12/02/22  0445 12/01/22  0443   PLATELETS 10*3/mm3 170 122* 127* 159 194     COVID19   Date Value Ref Range Status   11/21/2022 Not Detected Not Detected - Ref. Range Final     Lab Results   Component Value Date    HGBA1C 6.10 (H) 11/02/2022          Medications           Scheduled Medications albumin human, 12.5 g, Intravenous, Once   And  sodium chloride,  "200 mL, Intravenous, Once  chlorhexidine, 15 mL, Mouth/Throat, Q12H  ferrous sulfate, 300 mg, Nasogastric, Every Other Day  insulin lispro, 0-9 Units, Subcutaneous, Q6H  lactulose, 30 g, Nasogastric, BID  lamoTRIgine, 200 mg, Nasogastric, Daily  lamoTRIgine, 300 mg, Nasogastric, Nightly  levothyroxine, 100 mcg, Nasogastric, Daily  methylPREDNISolone sodium succinate, 500 mg, Intravenous, Daily  pantoprazole, 40 mg, Intravenous, Q24H  rosuvastatin, 5 mg, Nasogastric, Nightly  cyanocobalamin, 1,000 mcg, Nasogastric, Daily       Infusions sodium chloride, 30 mL/hr, Last Rate: Stopped (11/22/22 1500)       PRN Medications •  acetaminophen  •  benzonatate  •  dextrose  •  dextrose  •  fentaNYL citrate (PF)  •  glucagon (human recombinant)  •  heparin (porcine)  •  hydrALAZINE  •  HYDROcodone-acetaminophen  •  HYDROmorphone  •  LORazepam  •  sodium chloride     Physical Findings          Physical Appearance ventilator support   Oral/Mouth Cavity teeth missing   Edema  no edema   Gastrointestinal last bowel movement:11/30   Skin  other: \"wound\", dressing in place   Tubes/Drains none   NFPE Not applicable at this time    -  Malnutrition Severity Assessment      Patient meets criteria for : Moderate (non-severe) Malnutrition       -  Estimated/Assessed Needs       Energy Requirements    Height for Calculation  Height: 162.6 cm (64\")   Weight for Calculation 68.9 kg   Method for Estimation  25 kcal/kg, 30 kcal/kg   EST Needs (kcal/day) 7016-2361       Protein Requirements    Weight for Calculation 68.9 kg   EST Protein Needs (g/kg) 1.0 gm/kg, 1.2 gm/kg   EST Daily Needs (g/day) 68-82       Fluid Requirements     Method for Estimation 1 mL/kcal    Estimated Needs (mL/day) 1722       Fluid Deficit    Current Na Level (mEq/L)    Desired Na Level (mEq/L)    Estimated Fluid Deficit (L)           Current Nutrition Orders & Evaluation of Intake       Oral Nutrition     Food Allergies NKFA   Current PO Diet NPO Diet NPO Type: Strict " NPO   Supplement n/a   PO Evaluation     % PO Intake     # of Days Evaluated    --  PES STATEMENT / NUTRITION DIAGNOSIS      Nutrition Dx Problem  Problem: Needs Alternative Route  Etiology: Factors Affecting Nutrition respiratory failure on the vent  Signs/Symptoms: NPO    Comment:    --  NUTRITION INTERVENTION / PLAN OF CARE      Intervention Goal(s) Maintain nutrition status, Nutrition support treatment, Improved nutrition related labs, Reduce/improve symptoms, Meet estimated needs, Disease management/therapy, Initiate TF/PN, Tolerate TF/PN at goal and No significant weight loss         RD Intervention/Action Follow Tx Progress, Care plan reviewed and Recommend/ordered:         Enteral Prescription:     Enteral Route NG    TF Delivery Method Continuous    Enteral Product Novasource Renal    Modular     Propofol Rate/Kcal     TF Start Rate (mL/hr) 20    TF Goal Rate (mL/hr) 35    Free Water Flush (mL) [id number]    TF Provision at Goal: 1920kcal, 87gm protein, 691mL free water + 180mL water flushes         Calories 100 % needs met         Protein  106 % needs met         Fluid (mL) 871mL total     Prescription Ordered No, recommended   -      Monitor/Evaluation Per protocol, I&O, Pertinent labs, EN delivery/tolerance, Weight, Skin status, GI status, Symptoms, POC/GOC, Hemodynamic stability   Discharge Plan/Needs Pending clinical course   Education Will instruct as appropriate   --    RD to follow per protocol.      Electronically signed by:  Ivanna Lee RD  12/05/22 13:49 EST

## 2022-12-05 NOTE — NURSING NOTE
Monitor tech notified RN pt flipped into Afib rhythm. Stat EKG ordered and confirmed pt in afib. Card MD notified. No new orders received. Will continue to monitor and update MD with any changes.

## 2022-12-05 NOTE — PROGRESS NOTES
Neurology Progress Note    Reason for visit  Follow up for encephalopathy    Interval History  Transfer of care from Dr. Rangel  Patient is a 76 year old woman admitted 11/21/22 for hemoptosis, transferred to ICU 11/27/22 for respiratory failure.  Found to have alveolar hemorrhage due to vasculitis.    Neurology consult requested 11/30/22 for altered mental status.    She is being treated with cyclophosphamide, steroids and has received 5 plasma exchange treatments.  She received first dose rituxamab 11/28/22    Prior brain MRI with contrast on 11/2/2022 for dizziness and walking difficulty showed no acute pathology.    Head CT done 12/4/2022 showed new left occipital hypodensity. This was first imaging of the head done this admission.    Patient remains on mechanical ventilator. She is on no sedation.     Also has JODY on CKD, on hemodialysis.    Paroxysmal atrial fibrillation not on anticoagulation       Medications:  Scheduled Meds:albumin human, 12.5 g, Intravenous, Once   And  sodium chloride, 200 mL, Intravenous, Once  chlorhexidine, 15 mL, Mouth/Throat, Q12H  ferrous sulfate, 300 mg, Nasogastric, Every Other Day  insulin lispro, 0-9 Units, Subcutaneous, Q6H  lactulose, 30 g, Nasogastric, BID  lamoTRIgine, 200 mg, Nasogastric, Daily  lamoTRIgine, 300 mg, Nasogastric, Nightly  levothyroxine, 100 mcg, Nasogastric, Daily  methylPREDNISolone sodium succinate, 500 mg, Intravenous, Daily  pantoprazole, 40 mg, Intravenous, Q24H  rosuvastatin, 5 mg, Nasogastric, Nightly  cyanocobalamin, 1,000 mcg, Nasogastric, Daily      Continuous Infusions:sodium chloride, 30 mL/hr, Last Rate: Stopped (11/22/22 1500)      PRN Meds:.•  acetaminophen  •  benzonatate  •  dextrose  •  dextrose  •  fentaNYL citrate (PF)  •  glucagon (human recombinant)  •  heparin (porcine)  •  hydrALAZINE  •  HYDROcodone-acetaminophen  •  HYDROmorphone  •  LORazepam  •  sodium chloride    Review of Systems:   Review of Systems   Unable to perform ROS:  Patient unresponsive     Vital Signs  Temp:  [99.2 °F (37.3 °C)] 99.2 °F (37.3 °C)  Heart Rate:  [59-99] 85  Resp:  [28-32] 28  BP: ()/(50-90) 109/60  FiO2 (%):  [29 %-30 %] 30 %    Physical Exam:  Constitutional: appears comfortable  HEENT:  Intubated, no rash  CVS:  Regular rate and rhythm.    Musculoskeletal:  No signs of peripheral edema  Neurologic:   Comatose   Pupils 3 mm and minimally reactive   Corneals present   Eyes deviated down with some oculocephalic response   Spontaneous movement of BIE   Does not localize to pain  Psychiatric: no agitation     Results Review:    Head CT shows hypodensity in left occipital region, new since last imaging (brain MRI 11/2/22)    Glucose 169  BUN  47  Creatinine 3.03  Sodium 143  Potassium 3.6  Calcium 8.2  Magnesium 2.1  ALT  15  AST  8    TSH  1.11  B12  392  (10/30/22)    WBC  23.47  Hgb  8.0  Platelets 170      Medical Decision Making and Recommendations  Encephalopathy  multifactorial due to JODY on CKD, respiratory failure and now new stroke    Left occipital acute stroke  Stroke could cardio-embolic secondary to atrial fibrillation or could be secondary to vasculitis  She is not candidate for anticoagulation for the former and  is on full bore treatment for the latter, so no new recommendations at this time.  She could have smaller strokes not visualized on head CT but MRI would not  at this juncture.  If she is not progressing to improvement as JODY resolves would consider brain MRI for prognostic purposes.  Check ammonia level.    Son is at bedside, these findings and recommendations discussed in detail, all questions addressed.        Dayanna Wu MD  12/05/22  13:03 EST

## 2022-12-05 NOTE — PROGRESS NOTES
[facility] Progress Note       Encounter Date:22  Patient:Yi Lee  :1945  MRN:[id number]      Chief Complaint: Bradycardia      Subjective:        Patient with new onset atrial fibrillation this morning still well rate controlled    Review of Systems:  Review of Systems   Unable to perform ROS: intubated       Medications:  Scheduled Meds:  albumin human, 12.5 g, Intravenous, Once   And  sodium chloride, 200 mL, Intravenous, Once  chlorhexidine, 15 mL, Mouth/Throat, Q12H  ferrous sulfate, 300 mg, Nasogastric, Every Other Day  insulin lispro, 0-9 Units, Subcutaneous, Q6H  lactulose, 30 g, Nasogastric, BID  lamoTRIgine, 200 mg, Nasogastric, Daily  lamoTRIgine, 300 mg, Nasogastric, Nightly  levothyroxine, 100 mcg, Nasogastric, Daily  methylPREDNISolone sodium succinate, 500 mg, Intravenous, Daily  pantoprazole, 40 mg, Intravenous, Q24H  rosuvastatin, 5 mg, Nasogastric, Nightly  cyanocobalamin, 1,000 mcg, Nasogastric, Daily    Continuous Infusions:  sodium chloride, 30 mL/hr, Last Rate: Stopped (22 1500)    PRN Meds:  •  acetaminophen  •  benzonatate  •  dextrose  •  dextrose  •  fentaNYL citrate (PF)  •  glucagon (human recombinant)  •  heparin (porcine)  •  hydrALAZINE  •  HYDROcodone-acetaminophen  •  HYDROmorphone  •  LORazepam  •  sodium chloride         Objective:       Vitals:    22 0405 22 0500 22 0600 22 0724   BP:   148/77    BP Location:   Left arm    Patient Position:   Lying    Pulse: 79 63 77 79   Resp:    28   Temp:       TempSrc:       SpO2: 100% 100% 100% 100%   Weight:       Height:               Physical Exam:  Constitutional:  Frail, chronically ill-appearing, no acute distress   HENT: Oropharynx clear and membrane moist, ET tube in place  Eyes: Normal conjunctiva, no sclera icterus.  Neck: Supple, no carotid bruit bilaterally.  Cardiovascular:  Irregularly irregular Rate and rhythm, Early peaking systolic murmur over the right  upper sternal border, No bilateral lower extremity edema.  Pulmonary: Normal respiratory effort, normal lung sounds, no wheezing.  Abdominal: Soft, nontender, no hepatosplenomegaly, liver is non-pulsatile.  Neurological:  Sedated on ventilator unable to assess   Skin: Warm, dry, no ecchymosis, no rash.  Psych:  Unable to assess patient intubated and sedated.         Lab Review:   Results from last 7 days   Lab Units 12/05/22  0413 12/04/22  0441 12/03/22  0445 12/02/22  0445 12/01/22  1255 12/01/22  0443 11/30/22  0358   SODIUM mmol/L 143 144 148* 146* 144 141 139   POTASSIUM mmol/L 3.6 3.3* 3.5 3.6 4.3 4.4 3.7   CHLORIDE mmol/L 106 109* 110* 114* 115* 113* 109*   CO2 mmol/L 22.9 21.0* 19.9* 15.9* 11.7* 14.0* 14.0*   BUN mg/dL 47* 89* 88* 92* 75* 79* 62*   CREATININE mg/dL 3.03* 4.67* 4.32* 4.25* 4.04* 3.93* 3.37*   GLUCOSE mg/dL 169* 216* 271* 164* 195* 182* 181*   CALCIUM mg/dL 8.2* 8.6 8.2* 8.5* 8.7 8.8 8.5*         Results from last 7 days   Lab Units 12/05/22  0413 12/04/22  0441 12/03/22  0445 12/02/22  0445 12/01/22  1130 12/01/22  0443 11/30/22  0358 11/29/22  0655   WBC 10*3/mm3 23.47* 19.08* 14.48* 12.52*  --  10.47 13.53* 14.73*   HEMOGLOBIN g/dL 8.0* 7.3* 7.3* 7.3* 7.4* 6.6* 7.2* 7.2*   HEMATOCRIT % 25.6* 21.7* 22.3* 22.4* 23.3* 20.2* 22.8* 22.3*   PLATELETS 10*3/mm3 170 122* 127* 159  --  194 284 296         Results from last 7 days   Lab Units 11/30/22  2325   MAGNESIUM mg/dL 2.1     Results from last 7 days   Lab Units 12/01/22  0443   TRIGLYCERIDES mg/dL 142         Results from last 7 days   Lab Units 11/30/22  2325   TSH uIU/mL 1.110     Echocardiogram 11/30/2022:  • Left ventricular systolic function is normal. Left ventricular ejection fraction appears to be 61 - 65%.  • Estimated right ventricular systolic pressure from tricuspid regurgitation is mildly elevated (35-45 mmHg)  • The left atrial cavity is severely dilated.       Previous testing:        Echocardiogram 10/05/2022:  • Calculated  left ventricular EF = 66% Estimated left ventricular EF was in agreement with the calculated left ventricular EF. Left ventricular systolic function is normal.  • Left ventricular wall thickness is consistent with mild concentric hypertrophy.  • Left ventricular diastolic function was normal.  • Normal right ventricular cavity size and systolic function noted.  • The left atrial cavity is moderately dilated  • The interatrial septum appears redundant.  • The aortic valve leaflets are moderately calcified  • Calculated right ventricular systolic pressure from tricuspid regurgitation is 19 mmHg.  • There is no evidence of pericardial effusion     Stress test 10/05/2022:  • Myocardial perfusion imaging indicates a normal myocardial perfusion study with no evidence of ischemia.  • Left ventricular ejection fraction is hyperdynamic (Calculated EF > 70%). .  • Impressions are consistent with a low risk study.     24 hr holter 03/29/2021:  • Underlying heart rhythm was sinus rhythm with an average heart of 76 bpm and a heart rate range of 35 bpm up to 115 bpm   • There were frequent episodes of type I second-degree AV block throughout the study.             Assessment:          Diagnosis Plan   1. Community acquired pneumonia, unspecified laterality        2. Hemoptysis  Case request    Case request    Case request    Case request    Non-gynecologic Cytology    Non-gynecologic Cytology    Fungus Culture - Wash, Lung, Right Upper Lobe    Fungus Culture - Wash, Lung, Right Upper Lobe    Fungus Culture - Lavage, Lung, Right Upper Lobe    Fungus Culture - Lavage, Lung, Right Upper Lobe    Fungus Culture - Tissue, Lung, R    Fungus Culture - Tissue, Lung, R    Tissue / Bone Culture - Tissue, Lung, R    Tissue / Bone Culture - Tissue, Lung, R    Tissue Pathology Exam    Tissue Pathology Exam    AFB Culture - Wash, Lung, Right Upper Lobe    AFB Culture - Wash, Lung, Right Upper Lobe    AFB Culture - Lavage, Lung, Right Upper Lobe     AFB Culture - Lavage, Lung, Right Upper Lobe    AFB Culture - Tissue, Lung, R    AFB Culture - Tissue, Lung, R    Fungus Smear - Wash, Lung, Right Upper Lobe    Fungus Smear - Wash, Lung, Right Upper Lobe    Fungus Smear - Lavage, Lung, Right Upper Lobe    Fungus Smear - Lavage, Lung, Right Upper Lobe    Respiratory Culture - Wash, Lung, Right Upper Lobe    Respiratory Culture - Wash, Lung, Right Upper Lobe    BAL Culture, Quantitative - Lavage, Lung, Right Upper Lobe    BAL Culture, Quantitative - Lavage, Lung, Right Upper Lobe      3. Stage 3 chronic kidney disease, unspecified whether stage 3a or 3b CKD (HCC)  Case request    Case request    Non-gynecologic Cytology    Non-gynecologic Cytology    Fungus Culture - Wash, Lung, Right Upper Lobe    Fungus Culture - Wash, Lung, Right Upper Lobe    Fungus Culture - Lavage, Lung, Right Upper Lobe    Fungus Culture - Lavage, Lung, Right Upper Lobe    Fungus Culture - Tissue, Lung, R    Fungus Culture - Tissue, Lung, R    Tissue / Bone Culture - Tissue, Lung, R    Tissue / Bone Culture - Tissue, Lung, R    Tissue Pathology Exam    Tissue Pathology Exam    AFB Culture - Wash, Lung, Right Upper Lobe    AFB Culture - Wash, Lung, Right Upper Lobe    AFB Culture - Lavage, Lung, Right Upper Lobe    AFB Culture - Lavage, Lung, Right Upper Lobe    AFB Culture - Tissue, Lung, R    AFB Culture - Tissue, Lung, R    Fungus Smear - Wash, Lung, Right Upper Lobe    Fungus Smear - Wash, Lung, Right Upper Lobe    Fungus Smear - Lavage, Lung, Right Upper Lobe    Fungus Smear - Lavage, Lung, Right Upper Lobe    Respiratory Culture - Wash, Lung, Right Upper Lobe    Respiratory Culture - Wash, Lung, Right Upper Lobe    BAL Culture, Quantitative - Lavage, Lung, Right Upper Lobe    BAL Culture, Quantitative - Lavage, Lung, Right Upper Lobe             Plan:       Ms. Lee is a 76 y.o. woman with past medical history notable for hypertension, coronary artery disease, mixed hyperlipidemia,  hypothyroidism, sick sinus syndrome, lupus, diabetes type 2, and aortic sclerosis who presented to the emergency room on 11/21/2022 with worsening shortness of breath and hemoptysis.  She underwent bronchoscopy to further evaluate her pulmonary infiltrates was concerning for possible vasculitis with ANCA panel and further evaluation of her acute renal failure noted concerning evidence of vasculitis on renal biopsy 11/28/2022.  She had been undergoing plasmapheresis which started on 11/29/2022.  Unfortunately patient became agitated and altered requiring intubation 11/30/2022.  She did have bradycardia after intubation while on Precedex and propofol and carvedilol all of which has been stopped.  Her heart rate has improved.  She does have some underlying mild sick sinus syndrome but does have good heart rate response when tested as an outpatient.  Does not seem to be needing any pacemaker or any adjustments besides holding AV fay blocking agents at this time.    Unfortunately on 12/5/2022 patient did go into new onset atrial fibrillation however well rate controlled.     Paroxysmal atrial fibrillation:  · New onset 12/5/2022  · Rate is well controlled  · Given underlying illness not a candidate for anticoagulation at this time  · Would hold off on rate controlling agents given rate is reasonably controlled and acute illness     Bradycardia:  • Does have mild sick sinus syndrome and DE prolongation at baseline likely exacerbated by acute illness and AV fay blocking medications  • Will continue to hold carvedilol going forward  • Trying to minimize use of Precedex and propofol as possible     Aortic sclerosis:  • Defines heart murmur heard on exam no further work-up needed              Fran Falcon MD  [facility]  12/05/22  08:26 EST

## 2022-12-05 NOTE — PROGRESS NOTES
[facility] Progress Note      Patient Name: Yi Lee  : 1945  MRN: [id number]  Primary Care Physician:  Ankit Albert MD  Date of admission: 2022    Subjective     Interval History:   Follow-up acute kidney injury on chronic kidney disease.    Patient is intubated, sedated, no acute distress overnight.  On FiO2 30% with PEEP of 8.  Poor urine output.  Had dialysis yesterday.  Not following command.  Restless overnight.  Had CT of the head that showed new infarct.      Review of Systems:   Not obtainable    Objective     Vitals:   Temp:  [98 °F (36.7 °C)-99.2 °F (37.3 °C)] 99.2 °F (37.3 °C)  Heart Rate:  [59-99] 79  Resp:  [28-32] 28  BP: (120-172)/(57-77) 148/77  FiO2 (%):  [29 %-30 %] 30 %    Intake/Output Summary (Last 24 hours) at 2022 0736  Last data filed at 2022 0500  Gross per 24 hour   Intake 749.38 ml   Output 100 ml   Net 649.38 ml       Physical Exam:    General Appearance: Intubated  Skin: warm and dry  HEENT: oral mucosa normal, nonicteric sclera  Neck: supple, no JVD  Lungs: Bilateral rhonchi, breathing effort not labored  Heart: RRR, normal S1 and S2, no S3, no rub  Abdomen: soft, nontender, nondistended  : no palpable bladder        Scheduled Meds:     albumin human, 12.5 g, Intravenous, Once   And  sodium chloride, 200 mL, Intravenous, Once  chlorhexidine, 15 mL, Mouth/Throat, Q12H  ferrous sulfate, 300 mg, Nasogastric, Every Other Day  insulin lispro, 0-9 Units, Subcutaneous, Q6H  lactulose, 30 g, Nasogastric, BID  lamoTRIgine, 200 mg, Nasogastric, Daily  lamoTRIgine, 300 mg, Nasogastric, Nightly  levothyroxine, 100 mcg, Nasogastric, Daily  methylPREDNISolone sodium succinate, 500 mg, Intravenous, Daily  pantoprazole, 40 mg, Intravenous, Q24H  rosuvastatin, 5 mg, Nasogastric, Nightly  cyanocobalamin, 1,000 mcg, Nasogastric, Daily      IV Meds:   sodium chloride, 30 mL/hr, Last Rate: Stopped (22 1500)        Results Reviewed:   I  have personally reviewed the results from the time of this admission to 12/5/2022 07:36 EST     Results from last 7 days   Lab Units 12/05/22 0413 12/04/22 0441 12/03/22 0445   SODIUM mmol/L 143 144 148*   POTASSIUM mmol/L 3.6 3.3* 3.5   CHLORIDE mmol/L 106 109* 110*   CO2 mmol/L 22.9 21.0* 19.9*   BUN mg/dL 47* 89* 88*   CREATININE mg/dL 3.03* 4.67* 4.32*   CALCIUM mg/dL 8.2* 8.6 8.2*   GLUCOSE mg/dL 169* 216* 271*     Estimated Creatinine Clearance: 15.9 mL/min (A) (by C-G formula based on SCr of 3.03 mg/dL (H)).  Results from last 7 days   Lab Units 12/05/22 0413 12/04/22 0441 12/03/22 0445 12/01/22 0443 11/30/22  2325   MAGNESIUM mg/dL  --   --   --   --  2.1   PHOSPHORUS mg/dL 3.7 4.5 4.8*   < >  --     < > = values in this interval not displayed.         Results from last 7 days   Lab Units 12/05/22 0413 12/04/22 0441 12/03/22 0445 12/02/22 0445 12/01/22  1130 12/01/22 0443   WBC 10*3/mm3 23.47* 19.08* 14.48* 12.52*  --  10.47   HEMOGLOBIN g/dL 8.0* 7.3* 7.3* 7.3* 7.4* 6.6*   PLATELETS 10*3/mm3 170 122* 127* 159  --  194           Assessment / Plan     ASSESSMENT:  1.  Acute kidney injury on chronic kidney disease stage III: Creatinine on 11/24/2022 was 1.74,  high suspicion of pauci-immune vasculitis based on the high ANCA and pulmonary hemorrhage.  Received first dose of rituximab 11/28/2022.  Biopsy was done on 11/28/2022.  Creatinine continue to increase.  Mild hypokalemia per morning labs  2.  Bilateral pulmonary infiltrate.  Diffuse alveolar hemorrhage per pulmonary note  3.  Chronic kidney disease stage III with baseline creatinine 1.4 mg/dL  4.  History of COPD  5.  History of diabetes mellitus type  6.  History of hypertension  7.  Metabolic encephalopathy: Neurology is on board.  CT of the head was noted and showed a new area of infarct in the left occipital lobe     PLAN:    1.  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  Patient received 1 dose of rituximab on November 28, 2022.  S/p 5 plasmapheresis sessions. Had her fist session of dialysis 10/04/2022  2.  Plan for 2nd session of dialysis today with no fluid removal  3.  Surveillance labs    Thank you for involving us in the care of Yi Lee.  Please feel free to call with any questions.    Magdalene Unger MD  12/05/22  07:36 [facility]    [facility]  [phone]

## 2022-12-05 NOTE — PROGRESS NOTES
Continued Stay Note  [facility]     Patient Name: Yi Lee  MRN: [id number]  Today's Date: 12/5/2022    Admit Date: 11/21/2022    Plan: Discharge plans pending clinical course   Discharge Plan     Row Name 12/05/22 1506       Plan    Plan Discharge plans pending clinical course    Plan Comments Pt remain on  a vent.  CCP following for discharge needs as clinical course evolves               Discharge Codes    No documentation.               Expected Discharge Date and Time     Expected Discharge Date Expected Discharge Time    Nov 29, 2022             Kya Hector RN

## 2022-12-06 PROBLEM — K44.9 HIATAL HERNIA: Status: ACTIVE | Noted: 2022-01-01

## 2022-12-06 NOTE — NURSING NOTE
Patient positive for C. Diff. Contact Isolation precautions initiated. Pulm MD notified. New orders received.

## 2022-12-06 NOTE — PLAN OF CARE
Goal Outcome Evaluation:      Pt remains off sedation; minimal pain medication administered for CPOT score vs vent compliance; pt experienced 8 BM this shift; concern for fem shiley site- Nephrology to determine need for continued HD/site change.     IR unable to place cortrak due to physical obstruction: Corkscrew-like visualization on images ends in stricture/obstruction in esophagus;     Pt son updated- requested goals of care meeting tomorrow to obtain health summary and prognosis; reports he does not want his mother to live like this.

## 2022-12-06 NOTE — PROGRESS NOTES
[facility] INPATIENT PROGRESS NOTE         [facility] INTENSIVE CARE    2022      PATIENT IDENTIFICATION:  Name: Yi Lee ADMIT: 2022   : 1945  PCP: Ankit Albert MD    MRN: [id number] LOS: 15 days   AGE/SEX: 76 y.o. female  ROOM: [facility]                     LOS 15    Reason for visit: Respiratory failure with diffuse alveolar hemorrhage secondary to vasculitis      SUBJECTIVE:      Opens eyes to loud voice.  Does not follow commands.  Moves all extremities.  Discussed with nursing staff.  Plan Daily sedation and spontaneous breathing trial again today.  Failed yesterday.  Plan for new dialysis catheter per vascular surgery.      Objective   OBJECTIVE:    Vital Sign Min/Max for last 24 hours  Temp  Min: 97.6 °F (36.4 °C)  Max: 97.9 °F (36.6 °C)   BP  Min: 102/66  Max: 168/81   Pulse  Min: 59  Max: 112   Resp  Min: 22  Max: 26   SpO2  Min: 90 %  Max: 100 %   No data recorded   No data recorded       FiO2 (%):  [30 %] 30 %  S RR:  [22] 22  PEEP/CPAP (cm H2O):  [5 cm H20] 5 cm H20  MAP (cm H2O):  [11-13] 11           FiO2 (%): 30 %     Body mass index is 28.31 kg/m².    Intake/Output Summary (Last 24 hours) at 2022 1106  Last data filed at 2022 1020  Gross per 24 hour   Intake 200 ml   Output --   Net 200 ml         Exam:  GEN:  No distress, appears stated age  EYES:   PERRL, anicteric sclerae  ENT:    External ears/nose normal, OP clear  NECK:  No adenopathy, midline trachea  LUNGS: Normal chest on inspection, palpation and diminished auscultation  CV:  Normal S1S2, without murmur  ABD:  Nontender, nondistended, no hepatosplenomegaly, +BS  EXT:  No edema.  No cyanosis or clubbing.  No mottling and normal cap refill.    Assessment     Scheduled meds:  albumin human, 12.5 g, Intravenous, Once   And  sodium chloride, 200 mL, Intravenous, Once  chlorhexidine, 15 mL, Mouth/Throat, Q12H  ferrous sulfate, 300 mg, Nasogastric, Every Other Day  insulin lispro, 0-9 Units,  Subcutaneous, Q6H  lactulose, 30 g, Nasogastric, BID  lamoTRIgine, 200 mg, Nasogastric, Daily  lamoTRIgine, 300 mg, Nasogastric, Nightly  levothyroxine, 100 mcg, Nasogastric, Daily  methylPREDNISolone sodium succinate, 500 mg, Intravenous, Daily  metroNIDAZOLE, 500 mg, Intravenous, Q8H  pantoprazole, 40 mg, Intravenous, Q24H  rosuvastatin, 5 mg, Nasogastric, Nightly  cyanocobalamin, 1,000 mcg, Nasogastric, Daily      IV meds:                      sodium chloride, 30 mL/hr, Last Rate: Stopped (11/22/22 1500)      Data Review:  Results from last 7 days   Lab Units 12/06/22 0410 12/05/22 0413 12/04/22 0441 12/03/22 0445 12/02/22 0445   SODIUM mmol/L 144 143 144 148* 146*   POTASSIUM mmol/L 4.0 3.6 3.3* 3.5 3.6   CHLORIDE mmol/L 108* 106 109* 110* 114*   CO2 mmol/L 21.7* 22.9 21.0* 19.9* 15.9*   BUN mg/dL 79* 47* 89* 88* 92*   CREATININE mg/dL 3.91* 3.03* 4.67* 4.32* 4.25*   GLUCOSE mg/dL 158* 169* 216* 271* 164*   CALCIUM mg/dL 8.4* 8.2* 8.6 8.2* 8.5*         Estimated Creatinine Clearance: 12.3 mL/min (A) (by C-G formula based on SCr of 3.91 mg/dL (H)).  Results from last 7 days   Lab Units 12/06/22  0410 12/05/22  0413 12/04/22  0441 12/03/22  0445 12/02/22  0445   WBC 10*3/mm3 36.13* 23.47* 19.08* 14.48* 12.52*   HEMOGLOBIN g/dL 9.8* 8.0* 7.3* 7.3* 7.3*   PLATELETS 10*3/mm3 183 170 122* 127* 159             Results from last 7 days   Lab Units 12/06/22  0339 12/05/22  0326 12/04/22  0330 12/03/22  0326 12/02/22  1112 12/02/22  0607 12/01/22  1239   PH, ARTERIAL pH units 7.443 7.471* 7.422 7.398 7.151* 7.235* 7.158*   PO2 ART mm Hg 88.6 66.3* 70.1* 64.3* 62.6* 75.4* 78.4*   PCO2, ARTERIAL mm Hg 29.9* 30.6* 31.3* 31.5* 45.7* 39.5 31.4*   HCO3 ART mmol/L 20.4* 22.3 20.4* 19.4* 15.9* 16.7* 11.1*     Results from last 7 days   Lab Units 12/01/22  1255 11/30/22  0358   PROCALCITONIN ng/mL  --  1.12*   LACTATE mmol/L 0.8  --          Glucose   Date/Time Value Ref Range Status   12/06/2022 0850 167 (H) 70 - 130 mg/dL  Final     Comment:     Meter: [id number] : [email or web address] Slime Greenberg RN   12/06/2022 0126 175 (H) 70 - 130 mg/dL Final     Comment:     Meter: [id number] : [id number] Rocky Kelley RN   12/05/2022 1742 199 (H) 70 - 130 mg/dL Final     Comment:     Meter: [id number] : [email or web address] Amanda Ada RN   12/05/2022 1549 200 (H) 70 - 130 mg/dL Final     Comment:     Meter: [id number] : [email or web address] Amanda Ada RN   12/05/2022 0610 168 (H) 70 - 130 mg/dL Final     Comment:     Meter: [id number] : [id number] Rocky Kelley RN   12/05/2022 0102 176 (H) 70 - 130 mg/dL Final     Comment:     Meter: [id number] : [id number] Rocky Kelley RN   12/04/2022 1807 129 70 - 130 mg/dL Final     Comment:     Meter: [id number] : [id number] [place]-Curtis Vicki PETER     11/23/2022 0638 11/25/2022 1209 ANCA Panel [[id number]]    (Abnormal)   Blood    Final result Component Value Units   ANTI-MPO ANTIBODIES >8.0 High  units   ANTI-PR3 ANTIBODIES 3.5 High  units   C-ANCA 1:640 High  titer   P-ANCA Comment  titer   Atypical pANCA <1:20  titer                Chest x-ray 12/5 reviewed: Patchy airspace disease bilaterally.  ET tube stable.       CT head 12/4 reviewed shows new area of infarct in left occipital lobe.  No intracranial hemorrhage or hydrocephalus.        Microbiology reviewed             Active Hospital Problems    Diagnosis  POA   • **Hemoptysis [R04.2]  Yes   • Moderate malnutrition (HCC) [E44.0]  Yes   • JODY (acute kidney injury) (HCC) [N17.9]  Yes   • Stage 3 chronic kidney disease (HCC) [N18.30]  Yes   • Lupus erythematosus [L93.0]  Yes   • Normocytic anemia [D64.9]  Yes   • Ductal carcinoma in situ (DCIS) of left breast [D05.12]  Yes   • 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          ASSESSMENT:    Pulmonary vasculitis with diffuse alveolar hemorrhage  JODY with hematuria on top of CKD II: Kidney biopsy consistent with pauci-immune vasculitis  Type 2 diabetes  Anemia  Encephalopathy  Acute hypoxemic respiratory failure requiring mechanical ventilation  Hypothyroidism  Bipolar disorder  Dementia          PLAN:    Making appropriate ventilator changes based on blood gas results.  Failed breathing trial yesterday.  Repeat daily sedation vacation and spontaneous breathing trials.  On high-dose pulse steroid with Solu-Medrol 500 mg daily.  Repeat chest x-ray shows moderate improvement compared to previous imaging 12/1.  Unable to start prednisone yet due to difficulty placing Cortrak.  Signs of significant stenosis at base of hernia and will consult GI.    Decrease IV steroid.  Completed pulse dose steroids >72 hours and will switch to prednisone when able to use enteral tract.  Question whether patient should be considered for rituximab versus mycophenolate or cyclophosphamide.  Received cyclophosphamide per nephrology on November 28 and plasmapheresis with 5 sessions.  Nephrology is started on dialysis.  Hemoglobin improving.  Neurology input for metabolic encephalopathy appreciated.  Unable to advance NG tube.  Attempted to place Cortrak under fluoroscopy with interventional radiology unsuccessful yesterday.  Poor prognosis.  Likely will require long-term dialysis.  Palliative to meet with family.    Discussed with multidisciplinary ICU team on rounds this morning.      CCT: 33 min    Cecilio Ortega MD  Pulmonary and Critical Care Medicine  [facility]  12/6/2022    11:06 EST

## 2022-12-06 NOTE — NURSING NOTE
Pt having large liquid bowel movements that have saturated the femoral shiley site. RN performed central line care and replaced the dressing four times overnight. Pulm MD notified. Pt positive for C.diff. IV Flagyl started. Concerned for fem shiley site due to fecal incontinence.

## 2022-12-06 NOTE — PROGRESS NOTES
[facility] Progress Note       Encounter Date:22  Patient:Yi Lee  :1945  MRN:[id number]      Chief Complaint: Bradycardia      Subjective:        Patient remains intubated, did not enter patient's room given C. difficile infection telemetry reviewed    Review of Systems:  Review of Systems   Unable to perform ROS: intubated       Medications:  Scheduled Meds:  albumin human, 12.5 g, Intravenous, Once   And  sodium chloride, 200 mL, Intravenous, Once  chlorhexidine, 15 mL, Mouth/Throat, Q12H  ferrous sulfate, 300 mg, Nasogastric, Every Other Day  insulin lispro, 0-9 Units, Subcutaneous, Q6H  lactulose, 30 g, Nasogastric, BID  lamoTRIgine, 200 mg, Nasogastric, Daily  lamoTRIgine, 300 mg, Nasogastric, Nightly  levothyroxine, 100 mcg, Nasogastric, Daily  methylPREDNISolone sodium succinate, 500 mg, Intravenous, Daily  metroNIDAZOLE, 500 mg, Intravenous, Q8H  pantoprazole, 40 mg, Intravenous, Q24H  rosuvastatin, 5 mg, Nasogastric, Nightly  cyanocobalamin, 1,000 mcg, Nasogastric, Daily    Continuous Infusions:  sodium chloride, 30 mL/hr, Last Rate: Stopped (22 1500)    PRN Meds:  •  acetaminophen  •  benzonatate  •  dextrose  •  dextrose  •  fentaNYL citrate (PF)  •  glucagon (human recombinant)  •  heparin (porcine)  •  hydrALAZINE  •  HYDROcodone-acetaminophen  •  HYDROmorphone  •  LORazepam  •  sodium chloride         Objective:       Vitals:    22 0500 22 0600 22 0700 22 0837   BP: 144/85 143/86 150/96    BP Location: Left arm Left arm Left arm    Patient Position: Lying Lying Lying    Pulse: 91 94 94    Resp:    26   Temp:   97.6 °F (36.4 °C)    TempSrc:   Axillary    SpO2: 96% 100% 100%    Weight:       Height:               Physical Exam:  Constitutional:  Frail, chronically ill-appearing, no acute distress   HENT: Oropharynx clear and membrane moist, ET tube in place  Neurological:  Sedated on ventilator unable to assess   Skin: Warm, dry, no  ecchymosis, no rash.  Psych:  Unable to assess patient intubated and sedated.         Lab Review:   Results from last 7 days   Lab Units 12/06/22  0410 12/05/22  0413 12/04/22  0441 12/03/22  0445 12/02/22  0445 12/01/22  1255 12/01/22  0443   SODIUM mmol/L 144 143 144 148* 146* 144 141   POTASSIUM mmol/L 4.0 3.6 3.3* 3.5 3.6 4.3 4.4   CHLORIDE mmol/L 108* 106 109* 110* 114* 115* 113*   CO2 mmol/L 21.7* 22.9 21.0* 19.9* 15.9* 11.7* 14.0*   BUN mg/dL 79* 47* 89* 88* 92* 75* 79*   CREATININE mg/dL 3.91* 3.03* 4.67* 4.32* 4.25* 4.04* 3.93*   GLUCOSE mg/dL 158* 169* 216* 271* 164* 195* 182*   CALCIUM mg/dL 8.4* 8.2* 8.6 8.2* 8.5* 8.7 8.8         Results from last 7 days   Lab Units 12/06/22  0410 12/05/22  0413 12/04/22  0441 12/03/22  0445 12/02/22  0445 12/01/22  1130 12/01/22  0443 11/30/22  0358   WBC 10*3/mm3 36.13* 23.47* 19.08* 14.48* 12.52*  --  10.47 13.53*   HEMOGLOBIN g/dL 9.8* 8.0* 7.3* 7.3* 7.3* 7.4* 6.6* 7.2*   HEMATOCRIT % 29.0* 25.6* 21.7* 22.3* 22.4* 23.3* 20.2* 22.8*   PLATELETS 10*3/mm3 183 170 122* 127* 159  --  194 284         Results from last 7 days   Lab Units 11/30/22  2325   MAGNESIUM mg/dL 2.1     Results from last 7 days   Lab Units 12/01/22  0443   TRIGLYCERIDES mg/dL 142         Results from last 7 days   Lab Units 11/30/22  2325   TSH uIU/mL 1.110     Echocardiogram 11/30/2022:  • Left ventricular systolic function is normal. Left ventricular ejection fraction appears to be 61 - 65%.  • Estimated right ventricular systolic pressure from tricuspid regurgitation is mildly elevated (35-45 mmHg)  • The left atrial cavity is severely dilated.       Previous testing:        Echocardiogram 10/05/2022:  • Calculated left ventricular EF = 66% Estimated left ventricular EF was in agreement with the calculated left ventricular EF. Left ventricular systolic function is normal.  • Left ventricular wall thickness is consistent with mild concentric hypertrophy.  • Left ventricular diastolic function was  normal.  • Normal right ventricular cavity size and systolic function noted.  • The left atrial cavity is moderately dilated  • The interatrial septum appears redundant.  • The aortic valve leaflets are moderately calcified  • Calculated right ventricular systolic pressure from tricuspid regurgitation is 19 mmHg.  • There is no evidence of pericardial effusion     Stress test 10/05/2022:  • Myocardial perfusion imaging indicates a normal myocardial perfusion study with no evidence of ischemia.  • Left ventricular ejection fraction is hyperdynamic (Calculated EF > 70%). .  • Impressions are consistent with a low risk study.     24 hr holter 03/29/2021:  • Underlying heart rhythm was sinus rhythm with an average heart of 76 bpm and a heart rate range of 35 bpm up to 115 bpm   • There were frequent episodes of type I second-degree AV block throughout the study.             Assessment:          Diagnosis Plan   1. Community acquired pneumonia, unspecified laterality        2. Hemoptysis  Case request    Case request    Case request    Case request    Non-gynecologic Cytology    Non-gynecologic Cytology    Fungus Culture - Wash, Lung, Right Upper Lobe    Fungus Culture - Wash, Lung, Right Upper Lobe    Fungus Culture - Lavage, Lung, Right Upper Lobe    Fungus Culture - Lavage, Lung, Right Upper Lobe    Fungus Culture - Tissue, Lung, R    Fungus Culture - Tissue, Lung, R    Tissue / Bone Culture - Tissue, Lung, R    Tissue / Bone Culture - Tissue, Lung, R    Tissue Pathology Exam    Tissue Pathology Exam    AFB Culture - Wash, Lung, Right Upper Lobe    AFB Culture - Wash, Lung, Right Upper Lobe    AFB Culture - Lavage, Lung, Right Upper Lobe    AFB Culture - Lavage, Lung, Right Upper Lobe    AFB Culture - Tissue, Lung, R    AFB Culture - Tissue, Lung, R    Fungus Smear - Wash, Lung, Right Upper Lobe    Fungus Smear - Wash, Lung, Right Upper Lobe    Fungus Smear - Lavage, Lung, Right Upper Lobe    Fungus Smear - Lavage,  Lung, Right Upper Lobe    Respiratory Culture - Wash, Lung, Right Upper Lobe    Respiratory Culture - Wash, Lung, Right Upper Lobe    BAL Culture, Quantitative - Lavage, Lung, Right Upper Lobe    BAL Culture, Quantitative - Lavage, Lung, Right Upper Lobe      3. Stage 3 chronic kidney disease, unspecified whether stage 3a or 3b CKD (HCC)  Case request    Case request    Non-gynecologic Cytology    Non-gynecologic Cytology    Fungus Culture - Wash, Lung, Right Upper Lobe    Fungus Culture - Wash, Lung, Right Upper Lobe    Fungus Culture - Lavage, Lung, Right Upper Lobe    Fungus Culture - Lavage, Lung, Right Upper Lobe    Fungus Culture - Tissue, Lung, R    Fungus Culture - Tissue, Lung, R    Tissue / Bone Culture - Tissue, Lung, R    Tissue / Bone Culture - Tissue, Lung, R    Tissue Pathology Exam    Tissue Pathology Exam    AFB Culture - Wash, Lung, Right Upper Lobe    AFB Culture - Wash, Lung, Right Upper Lobe    AFB Culture - Lavage, Lung, Right Upper Lobe    AFB Culture - Lavage, Lung, Right Upper Lobe    AFB Culture - Tissue, Lung, R    AFB Culture - Tissue, Lung, R    Fungus Smear - Wash, Lung, Right Upper Lobe    Fungus Smear - Wash, Lung, Right Upper Lobe    Fungus Smear - Lavage, Lung, Right Upper Lobe    Fungus Smear - Lavage, Lung, Right Upper Lobe    Respiratory Culture - Wash, Lung, Right Upper Lobe    Respiratory Culture - Wash, Lung, Right Upper Lobe    BAL Culture, Quantitative - Lavage, Lung, Right Upper Lobe    BAL Culture, Quantitative - Lavage, Lung, Right Upper Lobe             Plan:       Ms. Lee is a 76 y.o. woman with past medical history notable for hypertension, coronary artery disease, mixed hyperlipidemia, hypothyroidism, sick sinus syndrome, lupus, diabetes type 2, and aortic sclerosis who presented to the emergency room on 11/21/2022 with worsening shortness of breath and hemoptysis.  She underwent bronchoscopy to further evaluate her pulmonary infiltrates was concerning for possible  vasculitis with ANCA panel and further evaluation of her acute renal failure noted concerning evidence of vasculitis on renal biopsy 11/28/2022.  She had been undergoing plasmapheresis which started on 11/29/2022.  Unfortunately patient became agitated and altered requiring intubation 11/30/2022.  She did have bradycardia after intubation while on Precedex and propofol and carvedilol all of which has been stopped.  Her heart rate has improved.  She does have some underlying mild sick sinus syndrome but does have good heart rate response when tested as an outpatient.  Does not seem to be needing any pacemaker or any adjustments besides holding AV fay blocking agents at this time.    Unfortunately on 12/5/2022 patient did go into new onset atrial fibrillation but was fairly well rate controlled.  She unfortunately then has developed C. difficile infection with copious amounts of diarrhea on 12/6/2022.  Her heart rate has been a little bit higher now in atrial fibrillation might be volume related we will continue to monitor and if rates rise consistently to the 110s to 120's could consider adding on a low-dose beta-blocker but likely somewhat reactive to acute illness     Paroxysmal atrial fibrillation:  · New onset 12/5/2022  · Rate is well controlled  · Given underlying illness not a candidate for anticoagulation at this time  · Would hold off on rate controlling agents given rate is reasonably controlled but if rates continue to climb could add a low-dose beta-blocker but I think they are mainly reactive to her acute illness and new C. difficile infection     Bradycardia:  • Does have mild sick sinus syndrome and MI prolongation at baseline likely exacerbated by acute illness and AV fay blocking medications  • Could consider adding back low-dose beta-blocker if heart rates increase in atrial fibrillation but likely are reactive to changes in volume status rather than poorly controlled A. fib  • Trying to  minimize use of Precedex and propofol as possible     Aortic sclerosis:  • Defines heart murmur heard on exam no further work-up needed              Fran Falcon MD  [facility]  12/06/22  10:07 EST

## 2022-12-06 NOTE — NURSING NOTE
"Escorted pt to IR for placement of cortrak under fluro; Radiologist unable to place due to \"stricture or mass preventing advancement\".  Corkscrew like presentation observable on IR images; will require further diagnostics to assess plan;   "

## 2022-12-06 NOTE — PROGRESS NOTES
[facility] Progress Note      Patient Name: Yi Lee  : 1945  MRN: [id number]  Primary Care Physician:  Ankit Albert MD  Date of admission: 2022    Subjective     Interval History:   Follow-up acute kidney injury on chronic kidney disease.    Patient is intubated, sedated, no acute distress overnight.  On FiO2 30% with PEEP of 8.  Poor urine output.  She did not have her dialysis yesterday.  Has been having severe diarrhea.  Not on any pressors.  Open her eyes and not following command.    Review of Systems:   Not obtainable    Objective     Vitals:   Temp:  [97.6 °F (36.4 °C)-97.9 °F (36.6 °C)] 97.9 °F (36.6 °C)  Heart Rate:  [59-99] 94  Resp:  [22-28] 22  BP: ()/(50-90) 143/86  FiO2 (%):  [30 %] 30 %  No intake or output data in the 24 hours ending 22 0643    Physical Exam:    General Appearance: Intubated  Skin: warm and dry  HEENT: oral mucosa normal, nonicteric sclera  Neck: supple, no JVD  Lungs: Bilateral rhonchi, breathing effort not labored  Heart: RRR, normal S1 and S2, no S3, no rub  Abdomen: soft, nontender, nondistended  : no palpable bladder        Scheduled Meds:     albumin human, 12.5 g, Intravenous, Once   And  sodium chloride, 200 mL, Intravenous, Once  chlorhexidine, 15 mL, Mouth/Throat, Q12H  ferrous sulfate, 300 mg, Nasogastric, Every Other Day  insulin lispro, 0-9 Units, Subcutaneous, Q6H  lactulose, 30 g, Nasogastric, BID  lamoTRIgine, 200 mg, Nasogastric, Daily  lamoTRIgine, 300 mg, Nasogastric, Nightly  levothyroxine, 100 mcg, Nasogastric, Daily  methylPREDNISolone sodium succinate, 500 mg, Intravenous, Daily  metroNIDAZOLE, 500 mg, Intravenous, Q8H  pantoprazole, 40 mg, Intravenous, Q24H  rosuvastatin, 5 mg, Nasogastric, Nightly  cyanocobalamin, 1,000 mcg, Nasogastric, Daily      IV Meds:   sodium chloride, 30 mL/hr, Last Rate: Stopped (22 1500)        Results Reviewed:   I have personally reviewed the results from the  time of this admission to 12/6/2022 06:43 EST     Results from last 7 days   Lab Units 12/06/22 0410 12/05/22 0413 12/04/22 0441   SODIUM mmol/L 144 143 144   POTASSIUM mmol/L 4.0 3.6 3.3*   CHLORIDE mmol/L 108* 106 109*   CO2 mmol/L 21.7* 22.9 21.0*   BUN mg/dL 79* 47* 89*   CREATININE mg/dL 3.91* 3.03* 4.67*   CALCIUM mg/dL 8.4* 8.2* 8.6   GLUCOSE mg/dL 158* 169* 216*     Estimated Creatinine Clearance: 12.3 mL/min (A) (by C-G formula based on SCr of 3.91 mg/dL (H)).  Results from last 7 days   Lab Units 12/06/22 0410 12/05/22 0413 12/04/22 0441 12/01/22 0443 11/30/22  2325   MAGNESIUM mg/dL  --   --   --   --  2.1   PHOSPHORUS mg/dL 5.5* 3.7 4.5   < >  --     < > = values in this interval not displayed.         Results from last 7 days   Lab Units 12/06/22 0410 12/05/22 0413 12/04/22 0441 12/03/22 0445 12/02/22 0445   WBC 10*3/mm3 36.13* 23.47* 19.08* 14.48* 12.52*   HEMOGLOBIN g/dL 9.8* 8.0* 7.3* 7.3* 7.3*   PLATELETS 10*3/mm3 183 170 122* 127* 159           Assessment / Plan     ASSESSMENT:  1.  Acute kidney injury on chronic kidney disease stage III: Creatinine on 11/24/2022 was 1.74,  high suspicion of pauci-immune vasculitis based on the high ANCA and pulmonary hemorrhage.  Received first dose of rituximab 11/28/2022.  Biopsy was done on 11/28/2022.  Creatinine continue to increase.  Mild hypokalemia per morning labs  2.  Bilateral pulmonary infiltrate.  Diffuse alveolar hemorrhage per pulmonary note  3.  Chronic kidney disease stage III with baseline creatinine 1.4 mg/dL  4.  History of COPD  5.  History of diabetes mellitus type  6.  History of hypertension  7.  Metabolic encephalopathy: Neurology is on board.  CT of the head was noted and showed a new area of infarct in the left occipital lobe     PLAN:    1.  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  Patient received 1 dose of rituximab on November 28, 2022.  S/p 5  plasmapheresis sessions. Had her fist session of dialysis 10/04/2022  2.  Plan for 2nd session of dialysis today after tunneled catheter placement.  Removed family due to contamination with stool material  3.  Surveillance labs    Thank you for involving us in the care of Yi Lee.  Please feel free to call with any questions.    Magdalene Unger MD  12/06/22  06:43 [facility]    [facility]  [phone]

## 2022-12-06 NOTE — CONSULTS
Chief Complaint   Patient presents with   • Coughing Up Blood       Yi Lee is a 76 y.o. female who initially presented with hemoptysis on 11/21    HPI  Mrs. Lee is a 76 yoF w h/o DM, bipolar disorder, dementia who presented on 11/21 with hemoptysis and has subsequently been treated for pulmonary vasculitis with diffuse alveolar hemorrhage.  She is intubated at time of encounter so history is by chart review.  She is currently on high dose pulse steroid.  ICU team unable to start prednisone due to inability to advance the feeding tube beyond the distal esophagus even with fluoroscopic guidance.  She has history of hiatal hernia, although small on last EGD in 2021.  CT chest in November noted moderate hiatal hernia.    Past Medical History:   Diagnosis Date   • Abdominal pain, LLQ    • Abnormal Pap smear of cervix    • JODY (acute kidney injury) (HCC) 11/21/2022   • Anemia    • Arthralgia of hip 11/22/2015   • Asthma    • Ataxic gait 11/22/2015    Description: Eval Dr Lillian Mederos, Neuro.  Some vestibular dysfunction present 2013/2014   • Bipolar I disorder, single manic episode (HCC)    • Bradycardia    • Cardiac murmur    • Colon polyp    • Coronary artery disease    • Degeneration, intervertebral disc, thoracolumbar    • Depression    • Diabetes mellitus (HCC)    • Disease of thyroid gland     Hypothyroidism   • Diverticulosis    • Ductal carcinoma in situ (DCIS) of left breast    • Esophageal spasm    • Fatigue    • GERD (gastroesophageal reflux disease)    • H/O bone density study 10/17/2007    Dr. Giles   • Heart murmur    • Hemorrhoids    • History of mammogram     02/2012, per GYN Reshma Aranda   • History of Papanicolaou smear of cervix     DR. GILES GYN   • History of transfusion    • Hyperlipidemia    • Hypertension    • Impaired cognition 11/22/2015    Description: Testing done 05/14   • Lupus (HCC)    • Optic nerve contusion    • Pain of lower extremity 11/22/2015   • Pseudoaneurysm following  procedure (HCC)     DURING CARDIAC CATHETERIZATION   • Shoulder pain     LEFT   • Skin tear of forearm without complication, right, initial encounter    • Stage 2 chronic kidney disease    • Systolic murmur    • Thyroid disease    • Vitamin B12 deficiency    • Vitamin D deficiency        Past Surgical History:   Procedure Laterality Date   • BREAST BIOPSY  2011   • BREAST LUMPECTOMY Left 2020    benign   • BRONCHOSCOPY N/A 11/22/2022    Procedure: BRONCHOSCOPY with fluoroscopy,  BAL, washings, biopsies;  Surgeon: Arjun Joseph Jr., MD;  Location: [facility] ENDOSCOPY;  Service: Pulmonary;  Laterality: N/A;  PRE OP - HEMOPTYSIS, PULMONARY INFILTRATES  POST OP - SAME   • CARDIAC CATHETERIZATION     • CATARACT EXTRACTION, BILATERAL Bilateral    • CHOLECYSTECTOMY     • COLONOSCOPY  2007    done 02/12, repeat 5 yrs, Dr Grigsby; tics in sigmoid colon, IH   • COLONOSCOPY N/A 11/03/2016    polyp, IH   • COLONOSCOPY N/A 02/24/2020    Procedure: COLONOSCOPY TO CECUM WITH COLD POLYPECTOMY;  Surgeon: Eddie Grigsby MD;  Location: [facility] ENDOSCOPY;  Service: Gastroenterology;  Laterality: N/A;  pre: hx of polyps  post: polyp, hemorrhoids, diverticulosis   • COLONOSCOPY N/A 12/07/2021    Procedure: COLONOSCOPY TO CECUM  - COLD BIOPSY POLYPECTOMIES;  Surgeon: Eddie Grigsby MD;  Location: [facility] ENDOSCOPY;  Service: Gastroenterology;  Laterality: N/A;  IRON DEFICIENCY ANEMIA, HX POLYPS, CONSTIPATION   ---DIVERTICULOSIS, POLYPS   • COLONOSCOPY  2010    normal   • COLPOSCOPY W/ BIOPSY / CURETTAGE     • D & C AND LAPAROSCOPY  1974   • DILATATION AND CURETTAGE     • ENDOSCOPY N/A 02/24/2020    Procedure: ESOPHAGOGASTRODUODENOSCOPY with biopsies;  Surgeon: Eddie Grigsby MD;  Location: [facility] ENDOSCOPY;  Service: Gastroenterology;  Laterality: N/A;  pre: iron deficiency anemia  post: gastriits   • ENDOSCOPY N/A 12/07/2021    Procedure: ESOPHAGOGASTRODUODENOSCOPY, WITH BIOPSIES;  Surgeon: Eddie Grigsby MD;  Location: [facility] ENDOSCOPY;   Service: Gastroenterology;  Laterality: N/A;  GERD,WT LOSS, IRON DEFICIENCY ANEMIA ,  DYSPEPSIA  ---HIATAL HERNIA, GASTRITIS   • ESOPHAGOGASTRIC FUNDOPLASTY     • HYSTEROSCOPY     • JOINT REPLACEMENT     • ROTATOR CUFF REPAIR Right 2009   • ROTATOR CUFF REPAIR Left    • TOTAL KNEE ARTHROPLASTY Left 02/13/2018    Procedure: LT TOTAL KNEE ARTHROPLASTY;  Surgeon: Selwyn Pinto MD;  Location: [facility] OR;  Service:    • TOTAL SHOULDER ARTHROPLASTY W/ DISTAL CLAVICLE EXCISION Left 08/09/2022    Procedure: TOTAL SHOULDER REVERSE ARTHROPLASTY;  Surgeon: Yony Gallardo MD;  Location: [facility];  Service: Orthopedics;  Laterality: Left;         Current Facility-Administered Medications:   •  acetaminophen (TYLENOL) tablet 650 mg, 650 mg, Oral, Q4H PRN, More Mckenzie, APRN, 650 mg at 11/27/22 1324  •  albumin human 25 % IV SOLN 12.5 g, 12.5 g, Intravenous, Once **AND** sodium chloride 0.9 % infusion 200 mL, 200 mL, Intravenous, Once, Magdalene Unger MD  •  benzonatate (TESSALON) capsule 200 mg, 200 mg, Oral, TID PRN, Norris Christina MD  •  chlorhexidine (PERIDEX) 0.12 % solution 15 mL, 15 mL, Mouth/Throat, Q12H, Norris Christina MD, 15 mL at 12/06/22 0906  •  dextrose (D50W) (25 g/50 mL) IV injection 25 g, 25 g, Intravenous, Q15 Min PRN, Arjun Joseph Jr., MD  •  dextrose (GLUTOSE) oral gel 15 g, 15 g, Oral, Q15 Min PRN, Arjun Joseph Jr., MD  •  fentaNYL citrate (PF) (SUBLIMAZE) injection 75 mcg, 75 mcg, Intravenous, Q1H PRN, Ramakrishna Dee MD, 75 mcg at 12/06/22 0250  •  glucagon (human recombinant) (GLUCAGEN DIAGNOSTIC) injection 1 mg, 1 mg, Intramuscular, Q15 Min PRN, Arjun Joseph Jr., MD  •  heparin (porcine) injection 3,000 Units, 3,000 Units, Intracatheter, PRN, Micheal Peñaloza MD, 3,000 Units at 12/04/22 1610  •  heparin (porcine) injection 5,000 Units, 5,000 Units, Intracatheter, Once, Orquidea Sharp MD  •  hydrALAZINE (APRESOLINE) injection 10 mg, 10 mg,  Intravenous, Q4H PRN, Perez Dumont MD, 10 mg at 22 0828  •  HYDROcodone-acetaminophen (NORCO) 5-325 MG per tablet 1 tablet, 1 tablet, Oral, Q4H PRN, Cecilio Ortega MD  •  HYDROmorphone (DILAUDID) injection 1 mg, 1 mg, Intravenous, Q3H PRN, Ramakrishna Dee MD, 1 mg at 22 1303  •  insulin lispro (ADMELOG) injection 0-9 Units, 0-9 Units, Subcutaneous, Q6H, Norris Christina MD, 2 Units at 22 0905  •  [START ON 2022] Levothyroxine Sodium injection 75 mcg, 75 mcg, Intravenous, Q AM, Cecilio Ortega MD  •  lidocaine (XYLOCAINE) 1 % injection 20 mL, 20 mL, Infiltration, Once, Orquidea Sharp MD  •  LORazepam (ATIVAN) injection 1 mg, 1 mg, Intravenous, Q4H PRN, Ramakrishna Dee MD, 1 mg at 22 0046  •  methylPREDNISolone sodium succinate (SOLU-Medrol) injection 60 mg, 60 mg, Intravenous, Q6H, Cecilio Ortega MD  •  metroNIDAZOLE (FLAGYL) IVPB 500 mg, 500 mg, Intravenous, Q8H, Arjun Joseph Jr., MD, 500 mg at 22 1020  •  pantoprazole (PROTONIX) injection 40 mg, 40 mg, Intravenous, Q24H, Norris Christina MD, 40 mg at 22 0907  •  sodium chloride 0.9 % infusion, 30 mL/hr, Intravenous, Continuous PRN, Arjun Joseph Jr., MD, Stopped at 22 1500    Allergies   Allergen Reactions   • Penicillins Hives and Swelling   • Ace Inhibitors Cough   • Codeine Cough   • Morphine Confusion   • Telmisartan Cough       Social History     Socioeconomic History   • Marital status:    • Number of children: 1   Tobacco Use   • Smoking status: Former     Packs/day: 1.00     Years: 7.00     Pack years: 7.00     Types: Cigarettes     Quit date:      Years since quittin.9   • Smokeless tobacco: Never   • Tobacco comments:     caffeine use: 2 CUPS COFFEE/ 3 DIET PEPSIS DAILY   Vaping Use   • Vaping Use: Never used   Substance and Sexual Activity   • Alcohol use: Yes     Comment: OCC   • Drug use: No   • Sexual activity: Never       Family History   Problem  Relation Age of Onset   • Heart disease Mother    • Colon polyps Father    • Heart disease Father    • Prostate cancer Father    • Emphysema Father    • Lung disease Father    • Heart disease Brother    • Hypertension Brother    • Lung cancer Maternal Aunt    • Lung cancer Maternal Aunt    • Brain cancer Maternal Uncle    • Lung cancer Paternal Uncle    • Stroke Paternal Grandmother    • Cerebral aneurysm Paternal Grandmother    • Stroke Paternal Grandfather    • Cerebral aneurysm Paternal Grandfather    • Hepatitis Other    • Coronary artery disease Other    • Malig Hyperthermia Neg Hx        Review of Systems  UTO 2/2 intubation    Vitals:    12/06/22 1228   BP:    Pulse: 102   Resp: 23   Temp:    SpO2: 100%       Physical Exam   General: elderly appearing, intubated   Eyes: Normal lids and lashes, no scleral icterus   Neck: supple   Skin: warm and dry, not jaundiced   Cardiovascular: regular rate, well perfused extremities   Pulm: Equal expansion bilaterally on MV   Abdomen: soft, nondistended;    Extremities: no rash or edema              Neuro: intubated, does not interact   Psychiatric: intubated, does not interact    CT Head Without Contrast    Result Date: 12/4/2022   A new area of infarct at the left occipital lobe. No acute intracranial hemorrhage or hydrocephalus. If there is further clinical concern, MRI could be considered for further evaluation.  Fluid in the right maxillary sinus.  This report was finalized on 12/4/2022 1:23 PM by Dr. Daren Apodaca M.D.      XR Chest 1 View    Result Date: 12/6/2022  Electronically signed by Yony Buchanan M.D. on 12-06-22 at 0611    XR Chest 1 View    Result Date: 12/5/2022  Electronically signed by Yony Buchanan M.D. on 12-05-22 at 0626    XR Chest 1 View    Result Date: 12/4/2022  Electronically signed by Feliz Bond MD on 12-04-22 at 0645    XR Chest 1 View    Result Date: 12/3/2022  Electronically signed by Feliz Bond MD on 12-03-22 at 0753    XR Chest 1  View    Result Date: 11/30/2022  1. Slight worsening in the bilateral pulmonary infiltrates, edema and/or ARDS since the 11/27/2022 study as described.  This report was finalized on 11/30/2022 7:22 AM by Dr. Jonathan Munoz M.D.      CT Needle Biopsy Kidney Renal    Result Date: 11/28/2022   1. Successful random right kidney biopsy, see subsequent pathology report.   2. Worsened pulmonary opacities compared to 11/21/2022.  This report was finalized on 11/28/2022 3:53 PM by Dr. Panda Moreau M.D.      XR Abdomen KUB    Result Date: 12/3/2022   As described.  This report was finalized on 12/3/2022 12:47 PM by Dr. Daren Apodaca M.D.      XR Abdomen KUB    Result Date: 11/30/2022   IMPRESSION:  As described.  This report was finalized on 11/30/2022 6:06 PM by Dr. Daren Apodaca M.D.      XR Abdomen KUB    Result Date: 11/30/2022   As described.  This report was finalized on 11/30/2022 1:22 PM by Dr. Daren Apodaca M.D.      FINDINGS:  1. There is a small left-sided pleural effusion with bilateral lower  lobe infiltrates, potential pneumonia.  2. Hiatal hernia with hemostatic clips about the GE junction, distal  esophagus leading to this location is satisfactory in appearance.  3. 3 cm hypodense right adrenal nodule, attenuation suggests adenoma  however size increased since 2016. It measured 10 mm at that time.  4. Diffuse wall thickening and edema involving the entire colon  consistent with colitis. No pneumatosis, small amount of free  intraperitoneal fluid most pronounced in the right upper quadrant.  Caliber the large and small bowel is within normal limits. The stomach  is collapsed, contour within normal limits. No abnormality of the  duodenum.  5. The liver, pancreas, spleen, left adrenal gland and kidneys are  satisfactory in appearance. The bladder is collapsed. Uterus is small in  size, appropriate for age, no adnexal abnormality seen.  6. Diffuse body wall and presacral edema. Diameter of the  aorta is  within normal limits.      Impression:  1. Pulmonary Vasculitis with DAH on high dose steroids, rituxan on 11/28  2. Acute hypoxemic respiratory failure requiring mechanical ventilation  3. H/o Hiatal Hernia, inability to pass feeding tube  4. Leukocytosis (on high dose steroids)  5. JODY on HD  6. C. Diff, Severe    Plan:  - Given inability to pass feeding tube and previous h/o only small hiatal hernia, will get CT abd/pelvis to evaluate for any signs of strangulation or volvulus  - Can consider endoscopic attempt at placement of nasal enteral tube pending CT review    Addendum:  Reviewed CT abd/pelvis as noted above.  On Flagyl already for C. Diff, add vanc enemas until enteral access obtained.  Plan for EGD tomorrow for nasoenteral tube placement.      Scar Trejo MD

## 2022-12-07 NOTE — NURSING NOTE
Pt did not tolerate HD.   SBP dropped to 60s after start.  Dr. Leal notified and new orders received.   Blood returned when pt lost pulse.  MD and [facility] updated.

## 2022-12-07 NOTE — PLAN OF CARE
Goal Outcome Evaluation:      Contacted pt's son Marin with update that pt was not tolerating HD, hypotension, and labored breathing on ventilator. States he wants to make his mother a DNR/DNI. Palliative meeting was arranged today for Thursday. Son to decide if he wishes to make his mother comfort care. States she would not want to live like this and he doesn't want her to suffer anymore. Son also spoke with Dr. Joseph about his decision.

## 2022-12-07 NOTE — PROCEDURES
Date of Admission:  11/21/2022  Today's Date:  12/06/22  Linus Mancia II, MD  [facility]    Procedure Note  Non-tunneled central venous catheter placement for hemodialysis    Pre-op Diagnosis:   Acute renal failure    Post-op Diagnosis:     Same     Procedure:      1) Insertion of non-tunneled central venous catheter (81604)  2) Ultrasound-guided vascular access (99015)    Surgeon: Linus Mancia II, MD    Assistant:  None    Anesthesia:   lidocaine 1% without epinephrine    Estimated Blood Loss:   Minimal    Complications:  None    Findings:   Successful placement of non tunneled dialysis catheter    Indications:    The patient is an 76 y.o. female referred for acute dialysis catheter placement secondary to Acute renal failure.  Written consent was obtained.  The risks, benefits, and turns were also discussed.  The patient was identified via the arm band and hospital assigned identification number.  Immediately prior to the procedure a timeout was called to verify the correct patient, procedure, equipment, support staff and the site/side was marked as required.       Procedure:  Site: Right internal jugular vein  Preparation: skin prepped with 2% chlorhexidine  Skin prep agent dried: skin prep agent completely dried prior to procedure  Sterile barriers: all five maximum sterile barriers used - cap, mask, sterile gown, sterile gloves, and large sterile sheet  Hand hygiene: hand hygiene performed prior to central venous catheter insertion  Patient position: Trendelenberg  Catheter type: double lumen with pigtail  Catheter size: 14 Fr 16 cm in the Right internal jugular vein  Ultrasound guidance: yes  Number of attempts: 2  Successful placement: yes  Post-procedure: line sutured and dressing applied  Assessment: blood return through all ports and was locked with normal saline  Patient tolerated the procedure well with no immediate complications    Linus Mancia II, MD     Date: 12/6/2022  Time: 19:06  EST    Active Hospital Problems    Diagnosis  POA   • **Hemoptysis [R04.2]  Yes   • Moderate malnutrition (HCC) [E44.0]  Yes   • JODY (acute kidney injury) (HCC) [N17.9]  Yes   • Stage 3 chronic kidney disease (HCC) [N18.30]  Yes   • Lupus erythematosus [L93.0]  Yes   • Normocytic anemia [D64.9]  Yes   • Ductal carcinoma in situ (DCIS) of left breast [D05.12]  Yes   • 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

## 2022-12-07 NOTE — DISCHARGE SUMMARY
"Time of death 0600 on 12/7/2022    Cause of death: Acute pauci-immune vasculitis      Diagnoses:  Pulmonary vasculitis with diffuse alveolar hemorrhage  JODY with hematuria on top of CKD II: Kidney biopsy consistent with pauci-immune vasculitis  Type 2 diabetes  Anemia  Encephalopathy  Acute hypoxemic respiratory failure requiring mechanical ventilation  Hypothyroidism  Bipolar disorder  Dementia      Patient admitted by hospitalist service on 11/22 with:  Chief Complaint   Patient presents with   • Coughing Up Blood         Ms. Lee is a 76 y.o. female former smoker with a history of CKD, CAD, DM, HTN, lupus, lt breast cancer, LUCA and multiple medical issues that presents to [facility] complaining of worsening hemoptysis, shortness of breath. She reports coughing up fresh \"bright red\" and old blood for a few weeks, worse yesterday. She has felt short of breath, especially with exertion. She has also had some chest discomfort. Denies lung disease. Denies fever, palpitations, n/v/d, dysuria, LE pain or swelling.      Afebrile. HR controlled. BP stable. On room air. WBC 6.28, Hgb 8.4. Procal 0.19. D Dimer 1.95. Gluc 109, BUN/Cr 31/2.10, CO2 20.4. Covid neg. Blood cultures collected. Lactate 2.4-->0.6. UA 1+ leuk est, neg nitrite, 31-50 RBC, 3-5 WBC, 2+ bact. RVP neg. CT chest: multifocal pneumonia all lobes; slightly larger mediastinal nodes likely reactive      We were consulted help with evaluation of shortness of breath hemoptysis.  Analysis suggested vasculitis.  Kidney biopsy suggested pauci-immune vasculitis.  Was started on high-dose pulse steroids with IV Solu-Medrol and received plasmapheresis x5.  Was seen in consultation by nephrology, cardiology, vascular surgery, neurology and gastroenterology during hospitalization.  Was on the ventilator and imaging was showing some signs of improvement but neurologic function was not improving and family decided to change to palliative measures after was " noted that patient was not tolerating dialysis.  Patient was pronounced dead at 0600 this morning.      Time spent with chart review and discharge summary greater than 35 minutes.      Electronically signed by Cecilio Ortega MD, 12/07/22, 11:48 AM EST.

## 2022-12-07 NOTE — PLAN OF CARE
Goal Outcome Evaluation:      Spoke with pt's son about treating pt's BP vs comfort care at the request of MD. States he does not want to tx BP with vasopressors, but wants us to provide medication to keep his mother comfortable Explained that we would give her Fentanyl and Ativan to aid in her relaxation so she doesn't fight against ventilator. Dr. Josehp notified of son's wishes.

## 2022-12-07 NOTE — SIGNIFICANT NOTE
Son notified of his mother's passing. States he does not know what  home at this time. Informed him that pt's belongings would stay with pt.

## 2022-12-07 NOTE — PROCEDURES
CODE BLUE was called I responded patient was just being put on dialysis and lost her pulse.  They returned fluids she is got a pulse now  her blood pressures pretty low.  Apparently the son had been talking about possibly going palliative care, nurse had him on the phone when I was in assessing and he requested no CPR.  I did get on the phone and discussed with him seems like a very intelligent gentleman he said that they have been talking about it he just was not in because he himself was sick with a fever and that she been going through a whole lot for the past week or so and he thinks she is just suffering and that her chances for meaningful recovery are pretty slim and he does not want her suffering further.  She is going to be DO NOT RESUSCITATE.  They have plans if she survives to discuss further about palliative care.  I will change her orders to DNR.  We will have to hold dialysis at this point.  Short and long-term prognosis both look poor.  I discontinued CODE BLUE.

## 2022-12-07 NOTE — PLAN OF CARE
Goal Outcome Evaluation:   Pt pronounced at 0600 by 2 nurses. Pt's son notified, states he will have to call back with name of  home when he has the information. Informed pt's belongings will be with pt.               Problem: Adult Inpatient Plan of Care  Goal: Plan of Care Review  Outcome: Unable to Meet, Plan Revised  Variance Change in diagnosis  Flowsheets (Taken 2022 0658)  Plan of Care Reviewed With: son  Goal: Patient-Specific Goal (Individualized)  Outcome: Unable to Meet, Plan Revised  Goal: Absence of Hospital-Acquired Illness or Injury  Outcome: Unable to Meet, Plan Revised  Goal: Optimal Comfort and Wellbeing  Outcome: Unable to Meet, Plan Revised  Goal: Readiness for Transition of Care  Outcome: Unable to Meet, Plan Revised     Problem: Fall Injury Risk  Goal: Absence of Fall and Fall-Related Injury  Outcome: Unable to Meet, Plan Revised  Goal: Absence of Fall and Fall-Related Injury  Outcome: Unable to Meet, Plan Revised     Problem: Airway Clearance Ineffective  Goal: Effective Airway Clearance  Outcome: Unable to Meet, Plan Revised     Problem: Asthma Comorbidity  Goal: Maintenance of Asthma Control  Outcome: Unable to Meet, Plan Revised     Problem: Behavioral Health Comorbidity  Goal: Maintenance of Behavioral Health Symptom Control  Outcome: Unable to Meet, Plan Revised     Problem: COPD (Chronic Obstructive Pulmonary Disease) Comorbidity  Goal: Maintenance of COPD Symptom Control  Outcome: Unable to Meet, Plan Revised     Problem: Diabetes Comorbidity  Goal: Blood Glucose Level Within Targeted Range  Outcome: Unable to Meet, Plan Revised     Problem: Heart Failure Comorbidity  Goal: Maintenance of Heart Failure Symptom Control  Outcome: Unable to Meet, Plan Revised     Problem: Hypertension Comorbidity  Goal: Blood Pressure in Desired Range  Outcome: Unable to Meet, Plan Revised     Problem: Obstructive Sleep Apnea Risk or Actual Comorbidity Management  Goal: Unobstructed Breathing  During Sleep  Outcome: Unable to Meet, Plan Revised     Problem: Osteoarthritis Comorbidity  Goal: Maintenance of Osteoarthritis Symptom Control  Outcome: Unable to Meet, Plan Revised     Problem: Pain Chronic (Persistent) (Comorbidity Management)  Goal: Acceptable Pain Control and Functional Ability  Outcome: Unable to Meet, Plan Revised     Problem: Seizure Disorder Comorbidity  Goal: Maintenance of Seizure Control  Outcome: Unable to Meet, Plan Revised     Problem: Skin Injury Risk Increased  Goal: Skin Health and Integrity  Outcome: Unable to Meet, Plan Revised     Problem: Restraint, Nonbehavioral (Nonviolent)  Goal: Absence of Harm or Injury  Outcome: Unable to Meet, Plan Revised

## 2022-12-20 LAB
FUNGUS WND CULT: NORMAL

## 2023-01-03 LAB
MYCOBACTERIUM SPEC CULT: NORMAL
NIGHT BLUE STAIN TISS: NORMAL

## 2023-09-29 LAB
MAXIMAL PREDICTED HEART RATE: 145 BPM
STRESS TARGET HR: 123 BPM

## 2023-10-05 NOTE — RESULT ENCOUNTER NOTE
How Severe Are Your Spot(S)?: moderate Refer urol  [address] blood neg culture Have Your Spot(S) Been Treated In The Past?: has not been treated Hpi Title: Evaluation of Skin Lesions

## 2023-10-16 LAB
MAXIMAL PREDICTED HEART RATE: 145 BPM
STRESS TARGET HR: 123 BPM
